# Patient Record
Sex: MALE | Race: WHITE | Employment: OTHER | URBAN - METROPOLITAN AREA
[De-identification: names, ages, dates, MRNs, and addresses within clinical notes are randomized per-mention and may not be internally consistent; named-entity substitution may affect disease eponyms.]

---

## 2017-06-21 PROBLEM — E29.1 TESTICULAR HYPOFUNCTION: Status: ACTIVE | Noted: 2017-06-21

## 2017-06-21 PROBLEM — R39.15 URGENCY OF URINATION: Status: ACTIVE | Noted: 2017-06-21

## 2017-06-21 PROBLEM — N41.9 PROSTATITIS, UNSPECIFIED: Status: ACTIVE | Noted: 2017-06-21

## 2017-06-21 PROBLEM — R97.20 ELEVATED PROSTATE SPECIFIC ANTIGEN (PSA): Status: ACTIVE | Noted: 2017-06-21

## 2017-06-21 PROBLEM — R39.14 FEELING OF INCOMPLETE BLADDER EMPTYING: Status: ACTIVE | Noted: 2017-06-21

## 2017-06-21 PROBLEM — N52.9 MALE ERECTILE DYSFUNCTION, UNSPECIFIED: Status: ACTIVE | Noted: 2017-06-21

## 2017-06-21 PROBLEM — N42.9 UNSPECIFIED DISORDER OF PROSTATE: Status: ACTIVE | Noted: 2017-06-21

## 2017-06-21 PROBLEM — E11.9 DIABETES MELLITUS WITHOUT COMPLICATION (HCC): Status: ACTIVE | Noted: 2017-06-21

## 2017-06-21 PROBLEM — N28.1 ACQUIRED CYST OF KIDNEY: Status: ACTIVE | Noted: 2017-06-21

## 2017-06-21 PROBLEM — Z80.42 FAMILY HISTORY OF MALIGNANT NEOPLASM OF PROSTATE: Status: ACTIVE | Noted: 2017-06-21

## 2017-06-21 PROBLEM — I10 UNSPECIFIED ESSENTIAL HYPERTENSION: Status: ACTIVE | Noted: 2017-06-21

## 2018-10-30 PROBLEM — Z95.810 ICD (IMPLANTABLE CARDIOVERTER-DEFIBRILLATOR), BIVENTRICULAR, IN SITU: Status: ACTIVE | Noted: 2018-10-30

## 2018-12-04 ENCOUNTER — OFFICE VISIT (OUTPATIENT)
Dept: PAIN MANAGEMENT | Age: 73
End: 2018-12-04
Payer: COMMERCIAL

## 2018-12-04 VITALS
DIASTOLIC BLOOD PRESSURE: 70 MMHG | WEIGHT: 140 LBS | TEMPERATURE: 97.7 F | BODY MASS INDEX: 23.32 KG/M2 | RESPIRATION RATE: 18 BRPM | HEART RATE: 72 BPM | SYSTOLIC BLOOD PRESSURE: 140 MMHG | HEIGHT: 65 IN

## 2018-12-04 DIAGNOSIS — S33.5XXD LUMBAR SPRAIN, SUBSEQUENT ENCOUNTER: Primary | ICD-10-CM

## 2018-12-04 DIAGNOSIS — M51.37 DEGENERATION OF LUMBAR OR LUMBOSACRAL INTERVERTEBRAL DISC: ICD-10-CM

## 2018-12-04 DIAGNOSIS — F34.1 DYSTHYMIC DISORDER: ICD-10-CM

## 2018-12-04 DIAGNOSIS — M51.26 DISPLACEMENT OF LUMBAR INTERVERTEBRAL DISC WITHOUT MYELOPATHY: ICD-10-CM

## 2018-12-04 DIAGNOSIS — S33.9XXA CHRONIC OR OLD SPRAIN OF LUMBOSACRAL LIGAMENT: ICD-10-CM

## 2018-12-04 PROBLEM — M51.379 DEGENERATION OF LUMBAR OR LUMBOSACRAL INTERVERTEBRAL DISC: Status: ACTIVE | Noted: 2018-12-04

## 2018-12-04 PROBLEM — S33.5XXA LUMBAR SPRAIN: Status: ACTIVE | Noted: 2018-12-04

## 2018-12-04 PROCEDURE — 99204 OFFICE O/P NEW MOD 45 MIN: CPT | Performed by: PAIN MEDICINE

## 2018-12-04 PROCEDURE — 99214 OFFICE O/P EST MOD 30 MIN: CPT | Performed by: PAIN MEDICINE

## 2018-12-04 RX ORDER — OXYCODONE HYDROCHLORIDE AND ACETAMINOPHEN 5; 325 MG/1; MG/1
1 TABLET ORAL 2 TIMES DAILY PRN
Qty: 60 TABLET | Refills: 0 | Status: SHIPPED | OUTPATIENT
Start: 2018-12-04 | End: 2018-12-05 | Stop reason: SDUPTHER

## 2018-12-04 NOTE — PROGRESS NOTES
[address]        [address] [facility], [address] [facility]      [phone]          Consult Note      Patient:  BARBARA Banegas 1945    Date of Service:  18    Requesting Physician:  Birdie Carrel, MD    Reason for Consult:      Patient presents with complaints of bilateral back pain that started many years ago    HISTORY OF PRESENT ILLNESS:      Pain is constant and is described as aching and dull. Pain does not radiate to both lower extremities. He  has numbness, tingling of the both lower extremities and has bladder dysfunction (intermittent incontinence) but no bowel dysfunction. Alleviating factors include: rest.  Aggravating factors include:  walking, standing, bending, lifting. He has been on anticoagulation medications to include ASA, NSAIDS (diclofenac and ASA) and has not been on herbal supplements. He is not diabetic. Imaging:   CT myelogram dated 2016 demonstrates complete block at L3/4 level. Degenerative disc disease, spondylosis causing stenosis. Probable large extruded disc at L3-4. Spinal listhesis L4 and L5 exacerbating the stenosis  EMG dated 3/24/2016    L5 radiculopathy  CT dated 2016 lumbar spine demonstrates degenerative spinal stenosis that is severe at L3-4 L4-5 and L5-S1 levels    Previous treatments: Physical Therapy, Epidural Steroid Injection, Surgery (lumbar fusion, right TKR) and medications.       Past Medical History:   Diagnosis Date    Abnormal EKG     CAD (coronary artery disease)     Cardiac defibrillator in place     CHF (congestive heart failure) (HCC)     Depression     Diabetes mellitus (HCC)     Disease of pericardium     DJD (degenerative joint disease)     Erectile dysfunction     Fracture     ulna, hand, tibia, and ankle    GERD (gastroesophageal reflux disease)     History of echocardiogram     ,,,,,10/06,,,,    Hyperlipidemia  Hypertension     Ischemic cardiomyopathy     Ischemic heart disease     LBBB (left bundle branch block)     w/ wide Qrs    Mitral regurgitation     Nonrheumatic aortic valve disorder     Presence of coronary angioplasty implant and graft     Sleep apnea     Ventricular tachycardia [facility])        Past Surgical History:   Procedure Laterality Date    ANKLE SURGERY      CARDIAC DEFIBRILLATOR PLACEMENT  11/2009    Bi-vent    CARDIAC PACEMAKER PLACEMENT      CORONARY ANGIOPLASTY  09/2014    DIAGNOSTIC CARDIAC CATH LAB PROCEDURE Left 04/2006    DIAGNOSTIC CARDIAC CATH LAB PROCEDURE Left 09/2014    LEXY-RCA    ELBOW SURGERY      FRACTURE SURGERY      foot, finger, left ankle, left leg,and right arm    JOINT REPLACEMENT Right 09/18/2018    LEG SURGERY      LUMBAR FUSION  05/29/2018    OTHER SURGICAL HISTORY  04/2006    cardiac tamponade evacuation     PACEMAKER INSERTION      TRANSESOPHAGEAL ECHOCARDIOGRAM      10/07       Prior to Admission medications    Medication Sig Start Date End Date Taking? Authorizing Provider   tamsulosin (FLOMAX) 0.4 MG capsule Take 1 capsule by mouth daily 12/3/18  Yes BARI Menon CNP   oxyCODONE-acetaminophen (PERCOCET) 5-325 MG per tablet Take 1 tablet by mouth every 6 hours as needed.  . 10/10/18  Yes Historical Provider, MD   carvedilol (COREG) 3.125 MG tablet Take 1 tablet by mouth 2 times daily (with meals) 11/9/18  Yes BARI Owen CNP   lisinopril (PRINIVIL;ZESTRIL) 20 MG tablet Take 1 tablet by mouth 2 times daily 11/14/17  Yes BARI Peck CNP   ezetimibe (ZETIA) 10 MG tablet Take 1 tablet by mouth daily 8/25/17  Yes BARI Nicholson CNP   lactulose (CEPHULAC) 10 G packet Take 10 g by mouth as needed   Yes Historical Provider, MD   diclofenac sodium 1 % GEL Apply 2 g topically as needed for Pain   Yes Historical Provider, MD   hydrALAZINE (APRESOLINE) 50 MG tablet Take 1 tablet by mouth daily 1/3/17  Yes Tha Lopez MD every 3 days   Yes Historical Provider, MD   diazepam (VALIUM) 5 MG tablet Take 5 mg by mouth as needed for Anxiety   Yes Historical Provider, MD   oxybutynin (DITROPAN-XL) 10 MG extended release tablet Take 1 tablet by mouth daily  Patient taking differently: Take 10 mg by mouth as needed  7/13/17 11/9/18  Gabriella Lewis MD   Testosterone 200 MG PLLT by Implant route    Historical Provider, MD   triazolam (HALCION) 0.25 MG tablet Take 0.25 mg by mouth nightly as needed    Historical Provider, MD       Allergies   Allergen Reactions    Cefaclor Anaphylaxis    Pentazocine-Acetaminophen Hives and Rash     Rapid heart beats.  Bee Venom Other (See Comments)    Ciprofloxacin     Statins Other (See Comments)     Myalgia      Sulfamethoxazole-Trimethoprim Itching    Toprol Xl [Metoprolol] Other (See Comments)     Fatigue         Social History     Social History    Marital status:      Spouse name: N/A    Number of children: N/A    Years of education: N/A     Occupational History    Not on file. Social History Main Topics    Smoking status: Former Smoker     Quit date: 1980    Smokeless tobacco: Never Used    Alcohol use Yes      Comment: a beer a day    Drug use: No      Comment: coffee    Sexual activity: Not on file     Other Topics Concern    Not on file     Social History Narrative    No narrative on file       Family History   Problem Relation Age of Onset    Heart Disease Mother     No Known Problems Father     Prostate Cancer Brother         2011    Cancer Other     Diabetes Other     High Blood Pressure Other     Stroke Other     Tuberculosis Other        REVIEW OF SYSTEMS:     Patient specifically denies fever/chills, chest pain, shortness of breath, new bowel or bladder complaints. All other review of systems was negative.     PHYSICAL EXAMINATION:      BP (!) 140/70   Pulse 72   Temp 97.7 °F (36.5 °C)   Resp 18   Ht 5' 5\" (1.651 m)   Wt 140 lb (63.5 kg)   BMI 23.30 kg/m²     General:      General appearance:pleasant and well-hydrated, in no distress and A & O x3  Build:Normal Weight  Function:Rises from a seated position with difficulty    HEENT:    Head:normocephalic, atraumatic  Pupils:regular, round, equal  Sclera: icterus absent    Lungs:    Breathing:normal breathing pattern    Abdomen:    Shape:non-distended and normal  Tenderness:none  Guarding:none    Cervical spine:    Inspection:normal  Palpation:tenderness paravertebral muscles, tenderness trapezium, left, right and positive. Range of motion:abnormal mildly flexion, extension rotation bilateral and is  painful. Thoracic spine:    Spine inspection:normal   pationPal:non-tender midline and paraspinals, left and right. Range of motion:normal in flexion, extension rotation bilateral and is not painful. Lumbar spine:    Spine inspection:normal   CVA tenderness:No   Palpation:tenderness paravertebral muscles, left, right and positive. Range of motion:abnormal mildly Lateral bending, flexion, extension rotation bilateral and is  painful. Musculoskeletal:    Trigger points in trapezius:absent bilaterally  Trigger points in rhomboids:absent bilaterally  Trigger points in Paraveteral:absent bilaterally  Trigger points in supraspinatus/infraspinatus:absent  Spurling's:negative right, negative left    Still's:negative right, negative left   SI joint tenderness:negative right, negative left              LAKSHMI test:not done right, not done             left  Piriformis tenderness:negative right, negative left  Trochanteric bursa tenderness:negative right, negative left  SLR:negative right, negative left, sitting     Extremities:    Tremors:None bilaterally upper and lower  Range of motion:decreased ROM rt shoulder in abudction, pain with internal rotation of hips negative.   Intact:Yes  Varicose veins:absent   Pulses:present Lt radial  Cyanosis:none  Edema:none x all 4 extremities    Knee: Inspection:asymmetric, swelling none bilaterally, right s/p TKR  Tenderness of Bony Landmarks:negative, bilateral  Tenderness of Bursa:none, bilateral  Drawer Test:negative  Effusion:absent bilaterally  Crepitus:absent bilaterally  ROM:Left full  Right full     Neurological:    Sensory:normal to light touch BLE    Motor:   Right Grip5/5              Left Grip5/5               Right Bicep5/5           Left Bicep5/5              Right Triceps5/5       Left Triceps5/5          Right Deltoid5/5     Left Deltoid5/5                  Right Quadriceps5/5          Left Quadriceps5/5           Right Gastrocnemius5/5    Left Gastrocnemius5/5  Right Ant Tibialis5/5  Left Ant Tibialis5/5    Reflexes:    Right Brachioradialis reflex2+  Left Brachioradialis reflex2+  Right Biceps reflex2+  Left Biceps reflex2+  Right Triceps reflex2+  Left Triceps reflex2+  Right Quadriceps reflex2+  Left Quadriceps reflex2+  Right Achilles reflex2+  Left Achilles reflex2+  Gait:antalgic    Dermatology:    Skin:no rashes or lesions noted    Assessment/Plan:  LBP, s/p fusion surgery 5/2018 with Dr. Connor Marks for severe stenosis  Rt shoulder pain, is candidate for RTSA per Dr. Román Wyatt knee pain, s/p 9/2018 TKR    Reviewed referral documents and imaging  Urine screen today  OARRS report reviewed  Percocet 5/325 BID #60 - discussed limited dosing  Patient encouraged to stay active  Treatment plan discussed with the patient including medication and procedure side effects     CC:  Referring physician    MYNOR Mcghee.

## 2018-12-05 DIAGNOSIS — S33.5XXD LUMBAR SPRAIN, SUBSEQUENT ENCOUNTER: ICD-10-CM

## 2018-12-05 DIAGNOSIS — M51.26 DISPLACEMENT OF LUMBAR INTERVERTEBRAL DISC WITHOUT MYELOPATHY: ICD-10-CM

## 2018-12-05 DIAGNOSIS — S33.9XXA CHRONIC OR OLD SPRAIN OF LUMBOSACRAL LIGAMENT: ICD-10-CM

## 2018-12-05 DIAGNOSIS — M51.37 DEGENERATION OF LUMBAR OR LUMBOSACRAL INTERVERTEBRAL DISC: ICD-10-CM

## 2018-12-05 RX ORDER — OXYCODONE HYDROCHLORIDE AND ACETAMINOPHEN 5; 325 MG/1; MG/1
1 TABLET ORAL 2 TIMES DAILY PRN
Qty: 60 TABLET | Refills: 0 | Status: SHIPPED | OUTPATIENT
Start: 2018-12-05 | End: 2019-01-02 | Stop reason: ALTCHOICE

## 2019-01-02 ENCOUNTER — OFFICE VISIT (OUTPATIENT)
Dept: PAIN MANAGEMENT | Age: 74
End: 2019-01-02
Payer: COMMERCIAL

## 2019-01-02 VITALS
TEMPERATURE: 98 F | SYSTOLIC BLOOD PRESSURE: 140 MMHG | DIASTOLIC BLOOD PRESSURE: 64 MMHG | RESPIRATION RATE: 18 BRPM | HEART RATE: 72 BPM

## 2019-01-02 DIAGNOSIS — S33.5XXD LUMBAR SPRAIN, SUBSEQUENT ENCOUNTER: ICD-10-CM

## 2019-01-02 DIAGNOSIS — M51.26 DISPLACEMENT OF LUMBAR INTERVERTEBRAL DISC WITHOUT MYELOPATHY: Primary | ICD-10-CM

## 2019-01-02 DIAGNOSIS — S33.9XXA CHRONIC OR OLD SPRAIN OF LUMBOSACRAL LIGAMENT: ICD-10-CM

## 2019-01-02 DIAGNOSIS — M51.37 DEGENERATION OF LUMBAR OR LUMBOSACRAL INTERVERTEBRAL DISC: ICD-10-CM

## 2019-01-02 PROCEDURE — 99214 OFFICE O/P EST MOD 30 MIN: CPT | Performed by: PAIN MEDICINE

## 2019-01-02 RX ORDER — OXYCODONE AND ACETAMINOPHEN 7.5; 325 MG/1; MG/1
1 TABLET ORAL 3 TIMES DAILY
Qty: 90 TABLET | Refills: 0 | Status: SHIPPED | OUTPATIENT
Start: 2019-01-04 | End: 2019-01-30 | Stop reason: ALTCHOICE

## 2019-01-02 RX ORDER — PREGABALIN 25 MG/1
CAPSULE ORAL
Qty: 69 CAPSULE | Refills: 0 | Status: SHIPPED | OUTPATIENT
Start: 2019-01-02 | End: 2019-01-30 | Stop reason: ALTCHOICE

## 2019-01-09 ENCOUNTER — TELEPHONE (OUTPATIENT)
Dept: PAIN MANAGEMENT | Age: 74
End: 2019-01-09

## 2019-01-12 PROBLEM — N30.00 ACUTE CYSTITIS WITHOUT HEMATURIA: Status: ACTIVE | Noted: 2019-01-12

## 2019-01-30 ENCOUNTER — OFFICE VISIT (OUTPATIENT)
Dept: PAIN MANAGEMENT | Age: 74
End: 2019-01-30
Payer: COMMERCIAL

## 2019-01-30 VITALS
OXYGEN SATURATION: 92 % | WEIGHT: 140 LBS | DIASTOLIC BLOOD PRESSURE: 76 MMHG | BODY MASS INDEX: 23.32 KG/M2 | HEART RATE: 76 BPM | SYSTOLIC BLOOD PRESSURE: 138 MMHG | RESPIRATION RATE: 18 BRPM | HEIGHT: 65 IN | TEMPERATURE: 98 F

## 2019-01-30 DIAGNOSIS — M51.37 DEGENERATION OF LUMBAR OR LUMBOSACRAL INTERVERTEBRAL DISC: Primary | ICD-10-CM

## 2019-01-30 DIAGNOSIS — M51.26 DISPLACEMENT OF LUMBAR INTERVERTEBRAL DISC WITHOUT MYELOPATHY: ICD-10-CM

## 2019-01-30 DIAGNOSIS — S33.5XXD LUMBAR SPRAIN, SUBSEQUENT ENCOUNTER: ICD-10-CM

## 2019-01-30 DIAGNOSIS — G89.4 CHRONIC PAIN SYNDROME: ICD-10-CM

## 2019-01-30 DIAGNOSIS — S33.9XXA CHRONIC OR OLD SPRAIN OF LUMBOSACRAL LIGAMENT: ICD-10-CM

## 2019-01-30 PROCEDURE — 99213 OFFICE O/P EST LOW 20 MIN: CPT | Performed by: PAIN MEDICINE

## 2019-01-30 PROCEDURE — 99213 OFFICE O/P EST LOW 20 MIN: CPT

## 2019-01-30 RX ORDER — LACTULOSE 10 G/15ML
10 SOLUTION ORAL EVERY EVENING
Qty: 1 BOTTLE | Refills: 0 | Status: SHIPPED | OUTPATIENT
Start: 2019-01-30 | End: 2019-02-26 | Stop reason: SDUPTHER

## 2019-01-30 RX ORDER — OXYCODONE HYDROCHLORIDE AND ACETAMINOPHEN 5; 325 MG/1; MG/1
1 TABLET ORAL EVERY 6 HOURS PRN
Qty: 120 TABLET | Refills: 0 | Status: SHIPPED | OUTPATIENT
Start: 2019-02-04 | End: 2019-02-26 | Stop reason: SDUPTHER

## 2019-01-30 RX ORDER — GABAPENTIN 100 MG/1
CAPSULE ORAL
Qty: 69 CAPSULE | Refills: 0 | Status: SHIPPED | OUTPATIENT
Start: 2019-01-30 | End: 2019-02-26 | Stop reason: SINTOL

## 2019-02-26 ENCOUNTER — OFFICE VISIT (OUTPATIENT)
Dept: PAIN MANAGEMENT | Age: 74
End: 2019-02-26
Payer: COMMERCIAL

## 2019-02-26 VITALS
HEART RATE: 84 BPM | TEMPERATURE: 98.2 F | HEIGHT: 64 IN | BODY MASS INDEX: 23.56 KG/M2 | WEIGHT: 138 LBS | SYSTOLIC BLOOD PRESSURE: 120 MMHG | OXYGEN SATURATION: 97 % | DIASTOLIC BLOOD PRESSURE: 78 MMHG | RESPIRATION RATE: 16 BRPM

## 2019-02-26 DIAGNOSIS — S33.5XXD LUMBAR SPRAIN, SUBSEQUENT ENCOUNTER: ICD-10-CM

## 2019-02-26 DIAGNOSIS — M51.37 DEGENERATION OF LUMBAR OR LUMBOSACRAL INTERVERTEBRAL DISC: Primary | ICD-10-CM

## 2019-02-26 DIAGNOSIS — M51.26 DISPLACEMENT OF LUMBAR INTERVERTEBRAL DISC WITHOUT MYELOPATHY: ICD-10-CM

## 2019-02-26 DIAGNOSIS — F34.1 DYSTHYMIC DISORDER: ICD-10-CM

## 2019-02-26 PROCEDURE — 99214 OFFICE O/P EST MOD 30 MIN: CPT | Performed by: PAIN MEDICINE

## 2019-02-26 PROCEDURE — 99213 OFFICE O/P EST LOW 20 MIN: CPT | Performed by: PAIN MEDICINE

## 2019-02-26 RX ORDER — LACTULOSE 10 G/15ML
10 SOLUTION ORAL EVERY EVENING
Qty: 1 BOTTLE | Refills: 0 | Status: SHIPPED | OUTPATIENT
Start: 2019-02-26 | End: 2019-05-01 | Stop reason: SDUPTHER

## 2019-02-26 RX ORDER — OXYCODONE HYDROCHLORIDE AND ACETAMINOPHEN 5; 325 MG/1; MG/1
1 TABLET ORAL EVERY 6 HOURS PRN
Qty: 120 TABLET | Refills: 0 | Status: SHIPPED | OUTPATIENT
Start: 2019-03-06 | End: 2019-04-03 | Stop reason: SDUPTHER

## 2019-03-29 NOTE — PROGRESS NOTES
[facility]  [address] [facility], [address] [place] [address] [place]  [phone]    Follow up Note      Cliff Lewis     Date of Visit:  4/3/2019    CC:  Patient presents for follow up   Chief Complaint   Patient presents with    Pain     back pain and right knee neck        HPI:    Pain is unchanged. Change in quality of symptoms:no    Medication side effects:Lyrica caused itching and difficulty breathing. Recent diagnostic testing:none. Recent interventional procedures:none. He has been on anticoagulation medications to include ASA, NSAIDS (diclofenac and ASA) and has not been on herbal supplements. He is not diabetic.     Imagin/2018 lumbar xray - fusion is solid  CT myelogram dated 2016 demonstrates complete block at L3/4 level. Degenerative disc disease, spondylosis causing stenosis. Probable large extruded disc at L3-4.  Spinal listhesis L4 and L5 exacerbating the stenosis  EMG dated 3/24/2016     L5 radiculopathy  CT dated 2016 lumbar spine demonstrates degenerative spinal stenosis that is severe at L3-4 L4-5 and L5-S1 levels       Potential Aberrant Drug-Related Behavior: no     Urine Drug Screenin2018 buccal consistent  2019 buccal consistent    OARRS report:  2018 consistent  2019 consistent  2019 consistent  2019 consistent    Past Medical History:   Diagnosis Date    Abnormal EKG     CAD (coronary artery disease)     Cardiac defibrillator in place     [facility]) [facility])     Depression     Diabetes mellitus ([facility] [address]     Disease of pericardium     DJD (degenerative joint disease)     Erectile dysfunction     Fracture     ulna, hand, tibia, and ankle    GERD (gastroesophageal reflux disease)     History of echocardiogram     ,,,,,10/06,,,,    Hyperlipidemia     Hypertension     Ischemic cardiomyopathy     Ischemic heart disease     LBBB (left bundle branch block) w/ wide Qrs    Mitral regurgitation     Nonrheumatic aortic valve disorder     Presence of coronary angioplasty implant and graft     Sleep apnea     Ventricular tachycardia [facility])        Past Surgical History:   Procedure Laterality Date    ANKLE SURGERY      CARDIAC DEFIBRILLATOR PLACEMENT  11/2009    Bi-vent    CARDIAC PACEMAKER PLACEMENT      CORONARY ANGIOPLASTY  09/2014    DIAGNOSTIC CARDIAC CATH LAB PROCEDURE Left 04/2006    DIAGNOSTIC CARDIAC CATH LAB PROCEDURE Left 09/2014    LEXY-RCA    ELBOW SURGERY      FRACTURE SURGERY      foot, finger, left ankle, left leg,and right arm    JOINT REPLACEMENT Right 09/18/2018    LEG SURGERY      LUMBAR FUSION  05/29/2018    OTHER SURGICAL HISTORY  04/2006    cardiac tamponade evacuation     PACEMAKER INSERTION      TRANSESOPHAGEAL ECHOCARDIOGRAM      10/07       Prior to Admission medications    Medication Sig Start Date End Date Taking? Authorizing Provider   oxyCODONE-acetaminophen (PERCOCET) 5-325 MG per tablet Take 1 tablet by mouth every 6 hours as needed for Pain for up to 30 days. . 3/6/19 4/5/19 Yes Xander Lara DO   lactulose [facility]) 10 GM/15ML solution Take 15 mLs by mouth every evening 2/26/19  Yes Xander Lara DO   tamsulosin [facility]) 0.4 MG capsule Take 1 capsule by mouth daily 12/3/18  Yes BARI Cummings CNP   carvedilol (COREG) 3.125 MG tablet Take 1 tablet by mouth 2 times daily (with meals) 11/9/18  Yes BARI Low CNP   lisinopril (PRINIVIL;ZESTRIL) 20 MG tablet Take 1 tablet by mouth 2 times daily 11/14/17  Yes BARI Robles CNP   ezetimibe (ZETIA) 10 MG tablet Take 1 tablet by mouth daily 8/25/17  Yes BARI Kelly CNP   Testosterone 200 MG PLLT by Implant route   Yes Historical Provider, MD   hydrALAZINE (APRESOLINE) 50 MG tablet Take 1 tablet by mouth daily 1/3/17  Yes Ember Olsen MD   albuterol sulfate  (90 BASE) MCG/ACT inhaler Inhale 2 puffs into the lungs every 6 hours as needed for Wheezing   Yes Historical Provider, MD   aspirin 81 MG tablet Take 81 mg by mouth daily   Yes Historical Provider, MD   baclofen (LIORESAL) 10 MG tablet Take 10 mg by mouth as needed    Yes Historical Provider, MD   betamethasone dipropionate (DIPROLENE) 0.05 % ointment Apply topically 2 times daily Apply topically 2 times daily. Yes Historical Provider, MD   tadalafil (CIALIS) 20 MG tablet Take 20 mg by mouth as needed for Erectile Dysfunction   Yes Historical Provider, MD   venlafaxine (EFFEXOR XR) 150 MG extended release capsule Take 150 mg by mouth daily   Yes Historical Provider, MD   EPINEPHrine (EPIPEN IJ) Inject as directed Indications: as directed   Yes Historical Provider, MD   ferrous sulfate 325 (65 FE) MG tablet Take 325 mg by mouth as needed    Yes Historical Provider, MD   fluticasone (FLONASE ALLERGY RELIEF) 50 MCG/ACT nasal spray 1 spray by Nasal route daily   Yes Historical Provider, MD   furosemide (LASIX) 40 MG tablet Take 40 mg by mouth daily Prn   Yes Historical Provider, MD   lidocaine (XYLOCAINE) 5 % ointment Apply topically as needed for Pain Apply topically as needed. Yes Historical Provider, MD   magnesium oxide (MAG-OX) 400 MG tablet Take 400 mg by mouth daily   Yes Historical Provider, MD   pantoprazole (PROTONIX) 40 MG tablet Take 40 mg by mouth daily   Yes Historical Provider, MD   Testosterone Cypionate 200 MG/ML KIT Inject into the muscle Indications: as directed   Yes Historical Provider, MD   traZODone (DESYREL) 50 MG tablet Take 50 mg by mouth nightly   Yes Historical Provider, MD   triamcinolone (KENALOG) 0.1 % cream Apply topically 2 times daily Apply topically 2 times daily.    Yes Historical Provider, MD   Febuxostat (ULORIC) 80 MG TABS Take by mouth Once every 3 days   Yes Historical Provider, MD   diazepam (VALIUM) 5 MG tablet Take 5 mg by mouth as needed for Anxiety   Yes Historical Provider, MD   diclofenac sodium (VOLTAREN) 1 % GEL Apply 4 g topically 4 times daily as needed for Pain 1/30/19 3/1/19  Nadine Plain, DO   oxybutynin (DITROPAN-XL) 10 MG extended release tablet Take 1 tablet by mouth daily  Patient taking differently: Take 10 mg by mouth as needed  17  Michael Macias MD       Allergies   Allergen Reactions    Cefaclor Anaphylaxis    Lyrica [Pregabalin] Shortness Of Breath     Itching     Pentazocine-Acetaminophen Hives and Rash     Rapid heart beats.      Bee Venom Other (See Comments)    Ciprofloxacin     Statins Other (See Comments)     Myalgia      Sulfamethoxazole-Trimethoprim Itching    Toprol Xl [Metoprolol] Other (See Comments)     Fatigue         Social History     Socioeconomic History    Marital status:      Spouse name: Not on file    Number of children: Not on file    Years of education: Not on file    Highest education level: Not on file   Occupational History    Not on file   Social Needs    Financial resource strain: Not on file    Food insecurity:     Worry: Not on file     Inability: Not on file    Transportation needs:     Medical: Not on file     Non-medical: Not on file   Tobacco Use    Smoking status: Former Smoker     Last attempt to quit: 1980     Years since quittin.2    Smokeless tobacco: Never Used   Substance and Sexual Activity    Alcohol use: Yes     Comment: a beer a day    Drug use: No     Comment: coffee    Sexual activity: Not on file   Lifestyle    Physical activity:     Days per week: Not on file     Minutes per session: Not on file    Stress: Not on file   Relationships    Social connections:     Talks on phone: Not on file     Gets together: Not on file     Attends [employer] service: Not on file     Active member of club or organization: Not on file     Attends meetings of clubs or organizations: Not on file     Relationship status: Not on file    Intimate partner violence:     Fear of current or ex partner: Not on file     Emotionally abused: Not on file Physically abused: Not on file     Forced sexual activity: Not on file   Other Topics Concern    Not on file   Social History Narrative    Not on file       Family History   Problem Relation Age of Onset    Heart Disease Mother     No Known Problems Father     Prostate Cancer Brother         2011    Cancer Other     Diabetes Other     High Blood Pressure Other     Stroke Other     Tuberculosis Other        REVIEW OF SYSTEMS:     Gilles Gip denies fever/chills, chest pain, shortness of breath, new bowel or bladder complaints. All other review of systems was negative. PHYSICAL EXAMINATION:      /78   Pulse 78   Temp 98.4 °F (36.9 °C)   Resp 18   Ht 5' 4\" (1.626 m)   Wt 140 lb (63.5 kg)   SpO2 96%   BMI 24.03 kg/m²     General:       General appearance:pleasant and well-hydrated, in no distress and A & O x3  Build:Normal Weight  Function:Rises from a seated position with difficulty, mild     HEENT:     Head:normocephalic, atraumatic  Pupils:regular, round, equal  Sclera: icterus absent     Lungs:     Breathing:normal breathing pattern     Abdomen:     Shape:non-distended and normal  Tenderness:none  Guarding:none      Lumbar spine:     Spine inspection:surgical scar   CVA tenderness:No   Palpation:tenderness paravertebral muscles, left, right and positive.   Range of motion:abnormal moderately in lateral bending, flexion, extension rotation bilateral and is painful.     Musculoskeletal:     Trigger points in Paraveteral:absent bilaterally  SI joint tenderness:negative right, negative left              LAKSHMI test:not done right, not done             left  Piriformis tenderness:negative right, negative left  Trochanteric bursa tenderness:negative right, negative left  SLR:negative right, negative left, sitting      Extremities:     Tremors:None bilaterally upper and lower  Range of motion:decreased ROM rt shoulder in abudction  Intact:Yes  Varicose veins:absent   Pulses:present Lt radial  Cyanosis:none  Edema:none x all 4 extremities     Knee:     Inspection:asymmetric, swelling none bilaterally, right s/p TKR  Tenderness of Bony Landmarks:negative, bilateral  Tenderness of Bursa:none, bilateral     Neurological:     Sensory:normal to light touch BLE     Motor:                 Right Quadriceps5/5          Left Quadriceps5/5           Right Gastrocnemius5/5    Left Gastrocnemius5/5  Right Ant Tibialis5/5  Left Ant Tibialis5/5    Gait:antalgic, with a cane     Dermatology:     Skin:no rashes or lesions noted     Assessment/Plan:  LBP, s/p L3-5 laminectomy and fusion surgery 5/2018 with Dr. Kayla Lange for severe stenosis, xray shows solid fusion 12/2018  Rt shoulder pain, is candidate for RTSA per Dr. Nadege Robertson knee pain improving, s/p 9/2018 TKR  + SHELLEY on CPAP, + ICD/pacer  Pt states most significant pain today is LBP and burning in the tops of the feet    Exam unchanged today    OARRS reviewed  Continue percocet 5/325 QID - discussed limited dosing  Failed gabapentin 100 mg titration due to weakness, Lyrica due to itching and difficulty breathing  Continue diclofenac gel  continue lactulose for OIC  PT - encouraged to continue  Caudal [id number] - r/b discussed  Patient encouraged to stay active  Treatment plan discussed with the patient including medication and procedure side effects     Cc:  Referring physician    MYNOR Ramsey.

## 2019-04-03 ENCOUNTER — PREP FOR PROCEDURE (OUTPATIENT)
Dept: PAIN MANAGEMENT | Age: 74
End: 2019-04-03

## 2019-04-03 ENCOUNTER — OFFICE VISIT (OUTPATIENT)
Dept: PAIN MANAGEMENT | Age: 74
End: 2019-04-03
Payer: COMMERCIAL

## 2019-04-03 VITALS
SYSTOLIC BLOOD PRESSURE: 132 MMHG | BODY MASS INDEX: 23.9 KG/M2 | DIASTOLIC BLOOD PRESSURE: 78 MMHG | HEART RATE: 78 BPM | TEMPERATURE: 98.4 F | RESPIRATION RATE: 18 BRPM | WEIGHT: 140 LBS | OXYGEN SATURATION: 96 % | HEIGHT: 64 IN

## 2019-04-03 DIAGNOSIS — M51.37 DEGENERATION OF LUMBAR OR LUMBOSACRAL INTERVERTEBRAL DISC: Primary | ICD-10-CM

## 2019-04-03 DIAGNOSIS — M51.26 DISPLACEMENT OF LUMBAR INTERVERTEBRAL DISC WITHOUT MYELOPATHY: ICD-10-CM

## 2019-04-03 DIAGNOSIS — S33.5XXD LUMBAR SPRAIN, SUBSEQUENT ENCOUNTER: ICD-10-CM

## 2019-04-03 PROCEDURE — 99213 OFFICE O/P EST LOW 20 MIN: CPT | Performed by: PAIN MEDICINE

## 2019-04-03 PROCEDURE — 1036F TOBACCO NON-USER: CPT | Performed by: PAIN MEDICINE

## 2019-04-03 PROCEDURE — G8420 CALC BMI NORM PARAMETERS: HCPCS | Performed by: PAIN MEDICINE

## 2019-04-03 PROCEDURE — 1123F ACP DISCUSS/DSCN MKR DOCD: CPT | Performed by: PAIN MEDICINE

## 2019-04-03 PROCEDURE — 4040F PNEUMOC VAC/ADMIN/RCVD: CPT | Performed by: PAIN MEDICINE

## 2019-04-03 PROCEDURE — G8427 DOCREV CUR MEDS BY ELIG CLIN: HCPCS | Performed by: PAIN MEDICINE

## 2019-04-03 PROCEDURE — 3017F COLORECTAL CA SCREEN DOC REV: CPT | Performed by: PAIN MEDICINE

## 2019-04-03 RX ORDER — OXYCODONE HYDROCHLORIDE AND ACETAMINOPHEN 5; 325 MG/1; MG/1
1 TABLET ORAL EVERY 6 HOURS PRN
Qty: 120 TABLET | Refills: 0 | Status: SHIPPED | OUTPATIENT
Start: 2019-04-05 | End: 2019-05-01 | Stop reason: SDUPTHER

## 2019-04-03 NOTE — PROGRESS NOTES
Casey Fields presents to the [facility] on 4/3/2019. Tate Wong is complaining of pain in the back neck knee . The pain is constant. The pain is described as aching, throbbing, shooting, stabbing and depends on what he is doing . Pain is rated on his best day at a 6, on his worst day at a 6, and on average at a 6 on the VAS scale. He took his last dose of Percocet today . Any procedures since your last visit: No    Pacemaker or defibrilator: Yes managed by [facility]   [phone] Dr Gil Sepulveda     He has been on anticoagulation medications to include ASA and is managed by Fany Boone.       BP (!) 158/84   Pulse 78   Temp 98.4 °F (36.9 °C)   Resp 18   Ht 5' 4\" (1.626 m)   Wt 140 lb (63.5 kg)   SpO2 96%   BMI 24.03 kg/m²

## 2019-04-17 ENCOUNTER — TELEPHONE (OUTPATIENT)
Dept: PAIN MANAGEMENT | Age: 74
End: 2019-04-17

## 2019-05-01 ENCOUNTER — OFFICE VISIT (OUTPATIENT)
Dept: PAIN MANAGEMENT | Age: 74
End: 2019-05-01
Payer: COMMERCIAL

## 2019-05-01 VITALS
WEIGHT: 138 LBS | RESPIRATION RATE: 16 BRPM | SYSTOLIC BLOOD PRESSURE: 122 MMHG | OXYGEN SATURATION: 98 % | HEART RATE: 78 BPM | HEIGHT: 65 IN | DIASTOLIC BLOOD PRESSURE: 64 MMHG | BODY MASS INDEX: 22.99 KG/M2 | TEMPERATURE: 98.6 F

## 2019-05-01 DIAGNOSIS — M51.37 DEGENERATION OF LUMBAR OR LUMBOSACRAL INTERVERTEBRAL DISC: Primary | ICD-10-CM

## 2019-05-01 DIAGNOSIS — S33.9XXA CHRONIC OR OLD SPRAIN OF LUMBOSACRAL LIGAMENT: ICD-10-CM

## 2019-05-01 DIAGNOSIS — M51.26 DISPLACEMENT OF LUMBAR INTERVERTEBRAL DISC WITHOUT MYELOPATHY: ICD-10-CM

## 2019-05-01 DIAGNOSIS — S33.5XXD LUMBAR SPRAIN, SUBSEQUENT ENCOUNTER: ICD-10-CM

## 2019-05-01 PROCEDURE — 1036F TOBACCO NON-USER: CPT | Performed by: PAIN MEDICINE

## 2019-05-01 PROCEDURE — 3017F COLORECTAL CA SCREEN DOC REV: CPT | Performed by: PAIN MEDICINE

## 2019-05-01 PROCEDURE — 4040F PNEUMOC VAC/ADMIN/RCVD: CPT | Performed by: PAIN MEDICINE

## 2019-05-01 PROCEDURE — 1123F ACP DISCUSS/DSCN MKR DOCD: CPT | Performed by: PAIN MEDICINE

## 2019-05-01 PROCEDURE — G8420 CALC BMI NORM PARAMETERS: HCPCS | Performed by: PAIN MEDICINE

## 2019-05-01 PROCEDURE — 99213 OFFICE O/P EST LOW 20 MIN: CPT | Performed by: PAIN MEDICINE

## 2019-05-01 PROCEDURE — G8427 DOCREV CUR MEDS BY ELIG CLIN: HCPCS | Performed by: PAIN MEDICINE

## 2019-05-01 RX ORDER — LACTULOSE 10 G/15ML
10 SOLUTION ORAL EVERY EVENING
Qty: 1 BOTTLE | Refills: 0 | Status: SHIPPED | OUTPATIENT
Start: 2019-05-01 | End: 2019-11-25 | Stop reason: SDUPTHER

## 2019-05-01 RX ORDER — OXYCODONE HYDROCHLORIDE AND ACETAMINOPHEN 5; 325 MG/1; MG/1
1 TABLET ORAL EVERY 6 HOURS PRN
Qty: 120 TABLET | Refills: 0 | Status: SHIPPED | OUTPATIENT
Start: 2019-05-05 | End: 2019-05-30 | Stop reason: SDUPTHER

## 2019-05-01 RX ORDER — LISINOPRIL 20 MG/1
20 TABLET ORAL
COMMUNITY
Start: 2019-04-25 | End: 2019-05-01

## 2019-05-01 RX ORDER — VENLAFAXINE HYDROCHLORIDE 37.5 MG/1
CAPSULE, EXTENDED RELEASE ORAL
COMMUNITY
Start: 2019-04-30 | End: 2019-05-01

## 2019-05-01 NOTE — PROGRESS NOTES
[facility]  [address] [facility], [address] [place] [address] Marcial  [phone]    Follow up Note      Emerson Lakisha     Date of Visit:  2019    CC:  Patient presents for follow up   Chief Complaint   Patient presents with    Follow-up     lower back/legs       HPI:    Pain is unchanged. Change in quality of symptoms:yes - is feeling more off-balance recently  Medication side effects:Lyrica caused itching and difficulty breathing. Recent diagnostic testing:none. Recent interventional procedures:none. He has been on anticoagulation medications to include ASA, NSAIDS (diclofenac and ASA) and has not been on herbal supplements. He is not diabetic.     Imagin/2018 lumbar xray - fusion is solid  CT myelogram dated 2016 demonstrates complete block at L3/4 level. Degenerative disc disease, spondylosis causing stenosis. Probable large extruded disc at L3-4.  Spinal listhesis L4 and L5 exacerbating the stenosis  EMG dated 3/24/2016     L5 radiculopathy  CT dated 2016 lumbar spine demonstrates degenerative spinal stenosis that is severe at L3-4 L4-5 and L5-S1 levels       Potential Aberrant Drug-Related Behavior: no     Urine Drug Screenin2018 buccal consistent  2019 buccal consistent    OARRS report:  2018 consistent  2019 consistent  2019 consistent  2019 consistent    Past Medical History:   Diagnosis Date    Abnormal EKG     CAD (coronary artery disease)     Cardiac defibrillator in place     [facility]est[facility]) [facility])     Depression     Diabetes mellitus ([facility] [address]     Disease of pericardium     DJD (degenerative joint disease)     Erectile dysfunction     Fracture     ulna, hand, tibia, and ankle    GERD (gastroesophageal reflux disease)     History of echocardiogram     ,,,,,10/06,,,,    Hyperlipidemia     Hypertension     Ischemic cardiomyopathy     Ischemic heart disease     LBBB (left bundle branch block)     w/ wide Qrs    Mitral regurgitation     Nonrheumatic aortic valve disorder     Presence of coronary angioplasty implant and graft     Sleep apnea     Ventricular tachycardia [facility])        Past Surgical History:   Procedure Laterality Date    ANKLE SURGERY      CARDIAC DEFIBRILLATOR PLACEMENT  11/2009    Bi-vent    CARDIAC PACEMAKER PLACEMENT      CORONARY ANGIOPLASTY  09/2014    DIAGNOSTIC CARDIAC CATH LAB PROCEDURE Left 04/2006    DIAGNOSTIC CARDIAC CATH LAB PROCEDURE Left 09/2014    LEXY-RCA    ELBOW SURGERY      FRACTURE SURGERY      foot, finger, left ankle, left leg,and right arm    JOINT REPLACEMENT Right 09/18/2018    LEG SURGERY      LUMBAR FUSION  05/29/2018    OTHER SURGICAL HISTORY  04/2006    cardiac tamponade evacuation     PACEMAKER INSERTION      TRANSESOPHAGEAL ECHOCARDIOGRAM      10/07       Prior to Admission medications    Medication Sig Start Date End Date Taking? Authorizing Provider   oxybutynin (DITROPAN-XL) 10 MG extended release tablet Take 1 tablet by mouth daily 4/24/19 7/23/19 Yes BARI Guerrero CNP   oxyCODONE-acetaminophen (PERCOCET) 5-325 MG per tablet Take 1 tablet by mouth every 6 hours as needed for Pain for up to 30 days.  4/5/19 5/5/19 Yes Xander Lara DO   lactulose [facility]) 10 GM/15ML solution Take 15 mLs by mouth every evening 2/26/19  Yes Xander Lara DO   tamsulosin [facility]) 0.4 MG capsule Take 1 capsule by mouth daily 12/3/18  Yes BARI Cummings CNP   carvedilol (COREG) 3.125 MG tablet Take 1 tablet by mouth 2 times daily (with meals) 11/9/18  Yes BARI Low CNP   lisinopril (PRINIVIL;ZESTRIL) 20 MG tablet Take 1 tablet by mouth 2 times daily 11/14/17  Yes BARI Robles CNP   ezetimibe (ZETIA) 10 MG tablet Take 1 tablet by mouth daily 8/25/17  Yes BARI Kelly CNP   Testosterone 200 MG PLLT by Implant route   Yes Historical Provider, MD   hydrALAZINE (APRESOLINE) 50 MG tablet on file     Forced sexual activity: Not on file   Other Topics Concern    Not on file   Social History Narrative    Not on file       Family History   Problem Relation Age of Onset    Heart Disease Mother     No Known Problems Father     Prostate Cancer Brother         2011    Cancer Other     Diabetes Other     High Blood Pressure Other     Stroke Other     Tuberculosis Other        REVIEW OF SYSTEMS:     Radha Rashid denies fever/chills, chest pain, shortness of breath, new bowel or bladder complaints. All other review of systems was negative. PHYSICAL EXAMINATION:      /64   Pulse 78   Temp 98.6 °F (37 °C) (Oral)   Resp 16   Ht 5' 5\" (1.651 m)   Wt 138 lb (62.6 kg)   SpO2 98%   BMI 22.96 kg/m²     General:       General appearance:pleasant and well-hydrated, in no distress and A & O x3  Build:Normal Weight  Function:Rises from a seated position with difficulty, mild     HEENT:     Head:normocephalic, atraumatic  Pupils:regular, round, equal  Sclera: icterus absent     Lungs:     Breathing:normal breathing pattern     Abdomen:     Shape:non-distended and normal  Tenderness:none  Guarding:none      Lumbar spine:     Spine inspection:surgical scar   CVA tenderness:No   Palpation:tenderness paravertebral muscles, left, right and positive.   Range of motion:abnormal moderately in lateral bending, flexion, extension rotation bilateral and is painful.     Musculoskeletal:     Trigger points in Paraveteral:absent bilaterally  SI joint tenderness:negative right, negative left              LAKSHMI test:not done right, not done             left  Piriformis tenderness:negative right, negative left  Trochanteric bursa tenderness:negative right, negative left  SLR:negative right, negative left, sitting      Extremities:     Tremors:None bilaterally upper and lower  Range of motion:decreased ROM rt shoulder in abudction  Intact:Yes  Varicose veins:absent   Pulses:present Lt radial  Cyanosis:none  Edema:none x

## 2019-05-06 NOTE — PROGRESS NOTES
[facility]  INSTRUCTIONS    . ..........................................................................................................................................       ? Parking the day of Surgery is located in the [facility]. Upon entering the door, make an immediate right to the surgery reception desk    ? Bring photo ID and insurance card     ? You may have a light breakfast day of procedure    ? Wear loose comfortable clothing    ? Please follow instructions for medications as given per Dr's office     ? Stop blood thinners as per Dr's office instructions    ? You can expect a call the business day prior to procedure to notify you if your arrival time changes    ? Please arrange for     ?   Other instructions

## 2019-05-07 ENCOUNTER — HOSPITAL ENCOUNTER (OUTPATIENT)
Dept: GENERAL RADIOLOGY | Age: 74
Discharge: HOME | End: 2019-05-09
Attending: PAIN MEDICINE
Payer: MEDICARE

## 2019-05-07 ENCOUNTER — HOSPITAL ENCOUNTER (OUTPATIENT)
Age: 74
Setting detail: OUTPATIENT SURGERY
Discharge: HOME | End: 2019-05-07
Attending: PAIN MEDICINE | Admitting: PAIN MEDICINE
Payer: MEDICARE

## 2019-05-07 VITALS
SYSTOLIC BLOOD PRESSURE: 162 MMHG | BODY MASS INDEX: 23.32 KG/M2 | OXYGEN SATURATION: 92 % | TEMPERATURE: 98.4 F | DIASTOLIC BLOOD PRESSURE: 86 MMHG | RESPIRATION RATE: 20 BRPM | HEART RATE: 60 BPM | WEIGHT: 140 LBS | HEIGHT: 65 IN

## 2019-05-07 DIAGNOSIS — R52 PAIN: ICD-10-CM

## 2019-05-07 PROCEDURE — 2709999900 HC NON-CHARGEABLE SUPPLY: Performed by: PAIN MEDICINE

## 2019-05-07 PROCEDURE — 2580000003 HC RX 258: Performed by: PAIN MEDICINE

## 2019-05-07 PROCEDURE — 6360000004 HC RX CONTRAST MEDICATION: Performed by: PAIN MEDICINE

## 2019-05-07 PROCEDURE — 62323 NJX INTERLAMINAR LMBR/SAC: CPT | Performed by: PAIN MEDICINE

## 2019-05-07 PROCEDURE — 6360000002 HC RX W HCPCS: Performed by: PAIN MEDICINE

## 2019-05-07 PROCEDURE — 3600000002 HC SURGERY LEVEL 2 BASE: Performed by: PAIN MEDICINE

## 2019-05-07 PROCEDURE — 7100000010 HC PHASE II RECOVERY - FIRST 15 MIN: Performed by: PAIN MEDICINE

## 2019-05-07 PROCEDURE — 2500000003 HC RX 250 WO HCPCS: Performed by: PAIN MEDICINE

## 2019-05-07 PROCEDURE — 3209999900 FLUORO FOR SURGICAL PROCEDURES

## 2019-05-07 PROCEDURE — 7100000011 HC PHASE II RECOVERY - ADDTL 15 MIN: Performed by: PAIN MEDICINE

## 2019-05-07 RX ORDER — 0.9 % SODIUM CHLORIDE 0.9 %
VIAL (ML) INJECTION PRN
Status: DISCONTINUED | OUTPATIENT
Start: 2019-05-07 | End: 2019-05-07 | Stop reason: ALTCHOICE

## 2019-05-07 RX ORDER — LIDOCAINE HYDROCHLORIDE 5 MG/ML
INJECTION, SOLUTION INFILTRATION; INTRAVENOUS PRN
Status: DISCONTINUED | OUTPATIENT
Start: 2019-05-07 | End: 2019-05-07 | Stop reason: ALTCHOICE

## 2019-05-07 RX ORDER — METHYLPREDNISOLONE ACETATE 40 MG/ML
INJECTION, SUSPENSION INTRA-ARTICULAR; INTRALESIONAL; INTRAMUSCULAR; SOFT TISSUE PRN
Status: DISCONTINUED | OUTPATIENT
Start: 2019-05-07 | End: 2019-05-07 | Stop reason: ALTCHOICE

## 2019-05-07 ASSESSMENT — PAIN DESCRIPTION - PROGRESSION
CLINICAL_PROGRESSION: GRADUALLY IMPROVING
CLINICAL_PROGRESSION: NOT CHANGED
CLINICAL_PROGRESSION: NOT CHANGED

## 2019-05-07 ASSESSMENT — PAIN SCALES - GENERAL
PAINLEVEL_OUTOF10: 5

## 2019-05-07 ASSESSMENT — PAIN - FUNCTIONAL ASSESSMENT: PAIN_FUNCTIONAL_ASSESSMENT: 0-10

## 2019-05-07 ASSESSMENT — PAIN DESCRIPTION - FREQUENCY
FREQUENCY: CONTINUOUS
FREQUENCY: CONTINUOUS

## 2019-05-07 ASSESSMENT — PAIN DESCRIPTION - LOCATION
LOCATION: BACK
LOCATION: BACK

## 2019-05-07 ASSESSMENT — PAIN DESCRIPTION - DESCRIPTORS
DESCRIPTORS: DISCOMFORT
DESCRIPTORS: DISCOMFORT

## 2019-05-07 ASSESSMENT — PAIN DESCRIPTION - PAIN TYPE
TYPE: ACUTE PAIN;CHRONIC PAIN
TYPE: CHRONIC PAIN

## 2019-05-07 NOTE — H&P
Taking? Authorizing Provider   oxyCODONE-acetaminophen (PERCOCET) 5-325 MG per tablet Take 1 tablet by mouth every 6 hours as needed for Pain for up to 30 days. 5/5/19 6/4/19  Samanta Saltness, DO   lactulose [facility]) 10 GM/15ML solution Take 15 mLs by mouth every evening 5/1/19   Samanta Saltness, DO   diclofenac sodium (VOLTAREN) 1 % GEL Apply 4 g topically 4 times daily as needed for Pain 5/1/19 5/31/19  Samanta Saltness, DO   oxybutynin (DITROPAN-XL) 10 MG extended release tablet Take 1 tablet by mouth daily 4/24/19 7/23/19  BARI Dorsey CNP   tamsulosin [facility]) 0.4 MG capsule Take 1 capsule by mouth daily 12/3/18   BARI Spear CNP   carvedilol (COREG) 3.125 MG tablet Take 1 tablet by mouth 2 times daily (with meals) 11/9/18   BARI Buchanan CNP   lisinopril (PRINIVIL;ZESTRIL) 20 MG tablet Take 1 tablet by mouth 2 times daily 11/14/17   BARI Higginbotham CNP   ezetimibe (ZETIA) 10 MG tablet Take 1 tablet by mouth daily 8/25/17   BARI Waddell CNP   hydrALAZINE (APRESOLINE) 50 MG tablet Take 1 tablet by mouth daily 1/3/17   Amelie Chaidez MD   albuterol sulfate  (90 BASE) MCG/ACT inhaler Inhale 2 puffs into the lungs every 6 hours as needed for Wheezing    Historical Provider, MD   aspirin 81 MG tablet Take 81 mg by mouth daily On week hold per dr. Mitch Horowitz Provider, MD   baclofen (LIORESAL) 10 MG tablet Take 10 mg by mouth as needed     Historical Provider, MD   betamethasone dipropionate (DIPROLENE) 0.05 % ointment Apply topically 2 times daily Apply topically 2 times daily.     Historical Provider, MD   tadalafil (CIALIS) 20 MG tablet Take 20 mg by mouth as needed for Erectile Dysfunction    Historical Provider, MD   venlafaxine (EFFEXOR XR) 150 MG extended release capsule Take 150 mg by mouth daily    Historical Provider, MD   EPINEPHrine (EPIPEN IJ) Inject as directed Indications: as directed    Historical Provider, MD   ferrous sulfate 325 (65 FE) MG tablet Take 325 mg by mouth as needed     Historical Provider, MD   fluticasone (FLONASE ALLERGY RELIEF) 50 MCG/ACT nasal spray 1 spray by Nasal route daily    Historical Provider, MD   furosemide (LASIX) 40 MG tablet Take 40 mg by mouth daily Prn    Historical Provider, MD   lidocaine (XYLOCAINE) 5 % ointment Apply topically as needed for Pain Apply topically as needed. Historical Provider, MD   magnesium oxide (MAG-OX) 400 MG tablet Take 400 mg by mouth daily    Historical Provider, MD   pantoprazole (PROTONIX) 40 MG tablet Take 40 mg by mouth daily    Historical Provider, MD   Testosterone Cypionate 200 MG/ML KIT Inject into the muscle Indications: as directed    Historical Provider, MD   traZODone (DESYREL) 50 MG tablet Take 50 mg by mouth nightly    Historical Provider, MD   triamcinolone (KENALOG) 0.1 % cream Apply topically 2 times daily Apply topically 2 times daily. Historical Provider, MD   Febuxostat (ULORIC) 80 MG TABS Take by mouth Once every 3 days    Historical Provider, MD   diazepam (VALIUM) 5 MG tablet Take 5 mg by mouth as needed for Anxiety    Historical Provider, MD       Allergies   Allergen Reactions    Cefaclor Anaphylaxis    Lyrica [Pregabalin] Shortness Of Breath     Itching     Pentazocine-Acetaminophen Hives and Rash     Rapid heart beats.      Bee Venom Other (See Comments)    Ciprofloxacin     Statins Other (See Comments)     Myalgia      Sulfamethoxazole-Trimethoprim Itching    Toprol Xl [Metoprolol] Other (See Comments)     Fatigue         Social History     Socioeconomic History    Marital status:      Spouse name: Not on file    Number of children: Not on file    Years of education: Not on file    Highest education level: Not on file   Occupational History    Not on file   Social Needs    Financial resource strain: Not on file    Food insecurity:     Worry: Not on file     Inability: Not on file    Transportation needs:     Medical: Not on file VITALS:  Ht 5' 5\" (1.651 m)   Wt 140 lb (63.5 kg)   BMI 23.30 kg/m²     CONSTITUTIONAL:  awake, alert, cooperative, no apparent distress, and appears stated age    EYES: PERRLA, EOMI    LUNGS:  No increased work of breathing, no audible wheezing    CARDIOVASCULAR:  regular rate and rhythm    ABDOMEN:  Soft non tender non distended     EXTREMITIES: no signs of clubbing or cyanosis. MUSCULOSKELETAL: negative for flaccid muscle tone or spastic movements. SKIN: gross examination reveals no signs of rashes, or diaphoresis. NEURO: Cranial nerves II-XII grossly intact. No signs of agitated mood.        Assessment/Plan:    LBP BLE pain for caudal KRZYSZTOF

## 2019-05-07 NOTE — OP NOTE
2019    Patient: Jana Larson  :  1945  Age:  75 y.o. Sex:  male     PRE-OPERATIVE DIAGNOSIS: Displacement of lumbar intervertebral disc, Lumbar DDD, Spinal stenosis. POST-OPERATIVE DIAGNOSIS: Same. PROCEDURE PERFORMED:  #1 Therapeutic Caudal Epidural done under fluoroscopic guidance. SURGEON:   MYNOR Carrion. ANESTHESIA: local    ESTIMATED BLOOD LOSS: None. BRIEF HISTORY: Jana Larson comes in today for the first  therapeutic caudal epidural steroid injection under fluorsoscopic guidance. After discussing the potential risks and benefits of the procedure with the patient  Galinaken Shimon did request that we proceed. A complete History & Physical was reviewed and it is unchanged. DESCRIPTION OF PROCEDURE:    After confirming written and informed consent, a time-out was performed and Jesus name and date of birth, the procedure to be performed as well as the plan for the location of the needle insertion were confirmed. Patient was brought into the procedure room and was placed in the prone position on a fluoroscopy table. Standard monitors were placed and vital signs were observed throughout the procedure. The lumbosacral and caudal area were prepped with chloraprep and draped in a sterile manner. The sacral hiatus was identified and marked under AP fluoroscopy. A sterile gauze was placed in the midgluteal cleft for increased sterility. The overlying skin and subcutaneous tissues were anesthetized with 0.5% Lidocaine. Under lateral fluoroscopic guidance, a # 22 gauge 3.5 inch spinal needle was advanced into the sacral hiatus . After the needle passed through the sacrococcygeal ligament, the needle angle was advanced slightly. There was no evidence of paresthesia throughout the needle placement. After negative aspiration for blood and CSF, epidural spread was confirmed with injection of 2 cc of Isovue-M 200 both live lateral and live AP fluoroscopy.  A solution consisting of 0.25% Lidocaine and 40 mg DepoMedrol, total of 15 cc, was easily injected . There was a clear outline of the epidural space and visualization of the sacral roots. The needle was then removed and a sterile Band-Aid was applied to the puncture site. Disposition the patient tolerated the procedure well and there were no complications . Vital signs remained stable throughout the procedure. The patient was escorted to the recovery area where they remained until discharge and written discharge instructions for the procedure were given. Plan: Jass Garcia will return to our pain management center as scheduled.      Samanta Bolanos, DO

## 2019-05-29 NOTE — PROGRESS NOTES
mouth daily 12/3/18  Yes BARI Sutton CNP   carvedilol (COREG) 3.125 MG tablet Take 1 tablet by mouth 2 times daily (with meals) 11/9/18  Yes BARI Bustamante CNP   lisinopril (PRINIVIL;ZESTRIL) 20 MG tablet Take 1 tablet by mouth 2 times daily 11/14/17  Yes BARI Lawrence - CNP   ezetimibe (ZETIA) 10 MG tablet Take 1 tablet by mouth daily 8/25/17  Yes BARI Walker - CNP   hydrALAZINE (APRESOLINE) 50 MG tablet Take 1 tablet by mouth daily 1/3/17  Yes Laurence Sutton MD   albuterol sulfate  (90 BASE) MCG/ACT inhaler Inhale 2 puffs into the lungs every 6 hours as needed for Wheezing   Yes Historical Provider, MD   aspirin 81 MG tablet Take 81 mg by mouth daily On week hold per dr. Misha Bains   Yes Historical Provider, MD   baclofen (LIORESAL) 10 MG tablet Take 10 mg by mouth as needed    Yes Historical Provider, MD   betamethasone dipropionate (DIPROLENE) 0.05 % ointment Apply topically 2 times daily Apply topically 2 times daily. Yes Historical Provider, MD   tadalafil (CIALIS) 20 MG tablet Take 20 mg by mouth as needed for Erectile Dysfunction   Yes Historical Provider, MD   venlafaxine (EFFEXOR XR) 150 MG extended release capsule Take 150 mg by mouth daily   Yes Historical Provider, MD   EPINEPHrine (EPIPEN IJ) Inject as directed Indications: as directed   Yes Historical Provider, MD   ferrous sulfate 325 (65 FE) MG tablet Take 325 mg by mouth as needed    Yes Historical Provider, MD   fluticasone (FLONASE ALLERGY RELIEF) 50 MCG/ACT nasal spray 1 spray by Nasal route daily   Yes Historical Provider, MD   furosemide (LASIX) 40 MG tablet Take 40 mg by mouth daily Prn   Yes Historical Provider, MD   lidocaine (XYLOCAINE) 5 % ointment Apply topically as needed for Pain Apply topically as needed.    Yes Historical Provider, MD   magnesium oxide (MAG-OX) 400 MG tablet Take 400 mg by mouth daily   Yes Historical Provider, MD   pantoprazole (PROTONIX) 40 MG tablet Take 40 mg by mouth daily Yes Historical Provider, MD   Testosterone Cypionate 200 MG/ML KIT Inject into the muscle Indications: as directed   Yes Historical Provider, MD   traZODone (DESYREL) 50 MG tablet Take 50 mg by mouth nightly   Yes Historical Provider, MD   triamcinolone (KENALOG) 0.1 % cream Apply topically 2 times daily Apply topically 2 times daily. Yes Historical Provider, MD   Febuxostat (ULORIC) 80 MG TABS Take by mouth Once every 3 days   Yes Historical Provider, MD   diazepam (VALIUM) 5 MG tablet Take 5 mg by mouth as needed for Anxiety   Yes Historical Provider, MD       Allergies   Allergen Reactions    Cefaclor Anaphylaxis    Lyrica [Pregabalin] Shortness Of Breath     Itching     Pentazocine-Acetaminophen Hives and Rash     Rapid heart beats.  Bee Venom Other (See Comments)    Ciprofloxacin     Statins Other (See Comments)     Myalgia      Sulfamethoxazole-Trimethoprim Itching    Toprol Xl [Metoprolol] Other (See Comments)     Fatigue         Social History     Socioeconomic History    Marital status:      Spouse name: Not on file    Number of children: Not on file    Years of education: Not on file    Highest education level: Not on file   Occupational History    Not on file   Social Needs    Financial resource strain: Not on file    Food insecurity:     Worry: Not on file     Inability: Not on file    Transportation needs:     Medical: Not on file     Non-medical: Not on file   Tobacco Use    Smoking status: Former Smoker     Last attempt to quit: 1980     Years since quittin.4    Smokeless tobacco: Never Used   Substance and Sexual Activity    Alcohol use:  Yes     Alcohol/week: 8.4 oz     Types: 14 Cans of beer per week    Drug use: No     Comment: coffee    Sexual activity: Not on file   Lifestyle    Physical activity:     Days per week: Not on file     Minutes per session: Not on file    Stress: Not on file   Relationships    Social connections:     Talks on phone: Not on file Gets together: Not on file     Attends Jew service: Not on file     Active member of club or organization: Not on file     Attends meetings of clubs or organizations: Not on file     Relationship status: Not on file    Intimate partner violence:     Fear of current or ex partner: Not on file     Emotionally abused: Not on file     Physically abused: Not on file     Forced sexual activity: Not on file   Other Topics Concern    Not on file   Social History Narrative    Not on file       Family History   Problem Relation Age of Onset    Heart Disease Mother     No Known Problems Father     Prostate Cancer Brother         2011    Cancer Other     Diabetes Other     High Blood Pressure Other     Stroke Other     Tuberculosis Other        REVIEW OF SYSTEMS:     Robledo People denies fever/chills, chest pain, shortness of breath, new bowel or bladder complaints. All other review of systems was negative. PHYSICAL EXAMINATION:      /68 (Site: Left Upper Arm, Position: Sitting, Cuff Size: Medium Adult)   Pulse 80   Temp 98.5 °F (36.9 °C) (Oral)   Resp 18   Ht 5' 5\" (1.651 m)   Wt 138 lb (62.6 kg)   BMI 22.96 kg/m²     General:       General appearance:pleasant and well-hydrated, in no distress and A & O x3  Build:Normal Weight  Function:Rises from a seated position with difficulty, mild     HEENT:     Head:normocephalic, atraumatic  Pupils:regular, round, equal  Sclera: icterus absent     Lungs:     Breathing:normal breathing pattern     Abdomen:     Shape:non-distended and normal  Tenderness:none  Guarding:none      Lumbar spine:     Spine inspection:surgical scar   CVA tenderness:No   Palpation:tenderness paravertebral muscles, left, right and positive - improved.   Range of motion:abnormal moderately in lateral bending, flexion, extension rotation bilateral and is painful.     Musculoskeletal:     Trigger points in Paraveteral:absent bilaterally  SI joint tenderness:negative right, negative wait until after he sees Dr. Jacinto Reid  Caudal KRZYSZTOF - 80% relief of back pain, repeat prn  Patient encouraged to stay active  Treatment plan discussed with the patient including medication and procedure side effects     Cc:  Referring physician    MYNOR Whitten.

## 2019-05-30 ENCOUNTER — OFFICE VISIT (OUTPATIENT)
Dept: PAIN MANAGEMENT | Age: 74
End: 2019-05-30
Payer: COMMERCIAL

## 2019-05-30 VITALS
TEMPERATURE: 98.5 F | WEIGHT: 138 LBS | HEIGHT: 65 IN | RESPIRATION RATE: 18 BRPM | DIASTOLIC BLOOD PRESSURE: 68 MMHG | HEART RATE: 80 BPM | BODY MASS INDEX: 22.99 KG/M2 | SYSTOLIC BLOOD PRESSURE: 122 MMHG

## 2019-05-30 DIAGNOSIS — M51.26 DISPLACEMENT OF LUMBAR INTERVERTEBRAL DISC WITHOUT MYELOPATHY: ICD-10-CM

## 2019-05-30 DIAGNOSIS — M51.37 DEGENERATION OF LUMBAR OR LUMBOSACRAL INTERVERTEBRAL DISC: Primary | ICD-10-CM

## 2019-05-30 DIAGNOSIS — S33.5XXD LUMBAR SPRAIN, SUBSEQUENT ENCOUNTER: ICD-10-CM

## 2019-05-30 PROCEDURE — 1123F ACP DISCUSS/DSCN MKR DOCD: CPT | Performed by: PAIN MEDICINE

## 2019-05-30 PROCEDURE — 4040F PNEUMOC VAC/ADMIN/RCVD: CPT | Performed by: PAIN MEDICINE

## 2019-05-30 PROCEDURE — G8427 DOCREV CUR MEDS BY ELIG CLIN: HCPCS | Performed by: PAIN MEDICINE

## 2019-05-30 PROCEDURE — 3017F COLORECTAL CA SCREEN DOC REV: CPT | Performed by: PAIN MEDICINE

## 2019-05-30 PROCEDURE — 99213 OFFICE O/P EST LOW 20 MIN: CPT | Performed by: PAIN MEDICINE

## 2019-05-30 PROCEDURE — G8420 CALC BMI NORM PARAMETERS: HCPCS | Performed by: PAIN MEDICINE

## 2019-05-30 PROCEDURE — 1036F TOBACCO NON-USER: CPT | Performed by: PAIN MEDICINE

## 2019-05-30 RX ORDER — OXYCODONE HYDROCHLORIDE AND ACETAMINOPHEN 5; 325 MG/1; MG/1
1 TABLET ORAL EVERY 6 HOURS PRN
Qty: 120 TABLET | Refills: 0 | Status: SHIPPED | OUTPATIENT
Start: 2019-06-04 | End: 2019-06-28 | Stop reason: SDUPTHER

## 2019-06-21 NOTE — PROGRESS NOTES
[address] [place] [address], [address] [place] [address] Marcial  [phone]    Follow up Note      Kike Sims     Date of Visit:  2019    CC:  Patient presents for follow up   Chief Complaint   Patient presents with    Pain     all over body rt shoulder back     HPI:    Pain is unchanged. Change in quality of symptoms:no  Medication side effects:Lyrica caused itching and difficulty breathing previously   Recent diagnostic testing:none. Recent interventional procedures:none since last visit    He has been on anticoagulation medications to include ASA, NSAIDS (diclofenac and ASA) and has not been on herbal supplements. He is not diabetic.     Imagin/2018 lumbar xray - fusion is solid  CT myelogram dated 2016 demonstrates complete block at L3/4 level. Degenerative disc disease, spondylosis causing stenosis. Probable large extruded disc at L3-4.  Spinal listhesis L4 and L5 exacerbating the stenosis  EMG dated 3/24/2016     L5 radiculopathy  CT dated 2016 lumbar spine demonstrates degenerative spinal stenosis that is severe at L3-4 L4-5 and L5-S1 levels       Potential Aberrant Drug-Related Behavior: no     Urine Drug Screenin2018 buccal consistent  2019 buccal consistent  2019 consistent    OARRS report:  2018 consistent  2019 consistent  2019 consistent  2019 consistent  2019 consistent    Past Medical History:   Diagnosis Date    Abnormal EKG     CAD (coronary artery disease)     Cardiac defibrillator in place     [facility] failure[facility])     Depression     Diabetes mellitus ([facility] [address]     not on any medications, history of    Disease of pericardium     DJD (degenerative joint disease)     Erectile dysfunction     Fracture     ulna, hand, tibia, and ankle    GERD (gastroesophageal reflux disease)     History of echocardiogram     ,,,,,10/06,,,,    Hyperlipidemia     Hypertension  Ischemic cardiomyopathy     Ischemic heart disease     LBBB (left bundle branch block)     w/ wide Qrs    Mitral regurgitation     Nonrheumatic aortic valve disorder     SHELLEY on CPAP     setting 7    Presence of coronary angioplasty implant and graft     Ventricular tachycardia [facility])        Past Surgical History:   Procedure Laterality Date    ANKLE SURGERY      CARDIAC DEFIBRILLATOR PLACEMENT  11/2009    Bi-vent    CARDIAC DEFIBRILLATOR PLACEMENT      CARDIAC PACEMAKER PLACEMENT      CORONARY ANGIOPLASTY  09/2014    DIAGNOSTIC CARDIAC CATH LAB PROCEDURE Left 04/2006    DIAGNOSTIC CARDIAC CATH LAB PROCEDURE Left 09/2014    LEXY-RCA    ELBOW SURGERY      EPIDURAL STEROID INJECTION N/A 5/7/2019    CAUDAL EPIDURAL STEROID INJECTION performed by Rafita Cerrato DO at [facility]      foot, finger, left ankle, left leg,and right arm    JOINT REPLACEMENT Right 09/18/2018    LEG SURGERY      LUMBAR FUSION  05/29/2018    OTHER SURGICAL HISTORY  04/2006    cardiac tamponade evacuation     PACEMAKER INSERTION      TRANSESOPHAGEAL ECHOCARDIOGRAM      10/07       Prior to Admission medications    Medication Sig Start Date End Date Taking? Authorizing Provider   oxyCODONE-acetaminophen (PERCOCET) 5-325 MG per tablet Take 1 tablet by mouth every 6 hours as needed for Pain for up to 30 days.  6/4/19 7/4/19 Yes Rafita Cerrato DO   lactulose [facility]) 10 GM/15ML solution Take 15 mLs by mouth every evening 5/1/19  Yes Rafita Cerrato DO   oxybutynin (DITROPAN-XL) 10 MG extended release tablet Take 1 tablet by mouth daily 4/24/19 7/23/19 Yes BARI Mejia CNP   tamsulosin [facility]) 0.4 MG capsule Take 1 capsule by mouth daily 12/3/18  Yes BARI Santiago CNP   carvedilol (COREG) 3.125 MG tablet Take 1 tablet by mouth 2 times daily (with meals) 11/9/18  Yes BARI Vargas CNP   lisinopril (PRINIVIL;ZESTRIL) 20 MG tablet Take 1 tablet by mouth 2 times daily 11/14/17  Yes Taylor Obando BARI Goyal - CNP   ezetimibe (ZETIA) 10 MG tablet Take 1 tablet by mouth daily 8/25/17  Yes BARI Arnold - CNP   hydrALAZINE (APRESOLINE) 50 MG tablet Take 1 tablet by mouth daily 1/3/17  Yes Danya Feliciano MD   albuterol sulfate  (90 BASE) MCG/ACT inhaler Inhale 2 puffs into the lungs every 6 hours as needed for Wheezing   Yes Historical Provider, MD   aspirin 81 MG tablet Take 81 mg by mouth daily On week hold per dr. Haleigh Ash   Yes Historical Provider, MD   baclofen (LIORESAL) 10 MG tablet Take 10 mg by mouth as needed    Yes Historical Provider, MD   betamethasone dipropionate (DIPROLENE) 0.05 % ointment Apply topically 2 times daily Apply topically 2 times daily. Yes Historical Provider, MD   tadalafil (CIALIS) 20 MG tablet Take 20 mg by mouth as needed for Erectile Dysfunction   Yes Historical Provider, MD   venlafaxine (EFFEXOR XR) 150 MG extended release capsule Take 150 mg by mouth daily   Yes Historical Provider, MD   EPINEPHrine (EPIPEN IJ) Inject as directed Indications: as directed   Yes Historical Provider, MD   ferrous sulfate 325 (65 FE) MG tablet Take 325 mg by mouth as needed    Yes Historical Provider, MD   fluticasone (FLONASE ALLERGY RELIEF) 50 MCG/ACT nasal spray 1 spray by Nasal route daily   Yes Historical Provider, MD   furosemide (LASIX) 40 MG tablet Take 40 mg by mouth daily Prn   Yes Historical Provider, MD   lidocaine (XYLOCAINE) 5 % ointment Apply topically as needed for Pain Apply topically as needed.    Yes Historical Provider, MD   magnesium oxide (MAG-OX) 400 MG tablet Take 400 mg by mouth daily   Yes Historical Provider, MD   pantoprazole (PROTONIX) 40 MG tablet Take 40 mg by mouth daily   Yes Historical Provider, MD   Testosterone Cypionate 200 MG/ML KIT Inject into the muscle Indications: as directed   Yes Historical Provider, MD   traZODone (DESYREL) 50 MG tablet Take 50 mg by mouth nightly   Yes Historical Provider, MD   triamcinolone (KENALOG) 0.1 % cream Apply topically 2 times daily Apply topically 2 times daily. Yes Historical Provider, MD   Febuxostat (ULORIC) 80 MG TABS Take by mouth Once every 3 days   Yes Historical Provider, MD   diazepam (VALIUM) 5 MG tablet Take 5 mg by mouth as needed for Anxiety   Yes Historical Provider, MD   Testosterone 4 MG/24HR PT24 Place 4 mg onto the skin daily for 30 days. 19  Julia Peacock MD   diclofenac sodium (VOLTAREN) 1 % GEL Apply 4 g topically 4 times daily as needed for Pain 19  Rosana Barrientos,        Allergies   Allergen Reactions    Cefaclor Anaphylaxis    Lyrica [Pregabalin] Shortness Of Breath     Itching     Pentazocine-Acetaminophen Hives and Rash     Rapid heart beats.  Bee Venom Other (See Comments)    Ciprofloxacin     Statins Other (See Comments)     Myalgia      Sulfamethoxazole-Trimethoprim Itching    Toprol Xl [Metoprolol] Other (See Comments)     Fatigue         Social History     Socioeconomic History    Marital status:      Spouse name: Not on file    Number of children: Not on file    Years of education: Not on file    Highest education level: Not on file   Occupational History    Not on file   Social Needs    Financial resource strain: Not on file    Food insecurity:     Worry: Not on file     Inability: Not on file    Transportation needs:     Medical: Not on file     Non-medical: Not on file   Tobacco Use    Smoking status: Former Smoker     Last attempt to quit: 1980     Years since quittin.5    Smokeless tobacco: Never Used   Substance and Sexual Activity    Alcohol use:  Yes     Alcohol/week: 8.4 oz     Types: 14 Cans of beer per week    Drug use: No     Comment: coffee    Sexual activity: Not on file   Lifestyle    Physical activity:     Days per week: Not on file     Minutes per session: Not on file    Stress: Not on file   Relationships    Social connections:     Talks on phone: Not on file     Gets together: Not on file Attends Religion service: Not on file     Active member of club or organization: Not on file     Attends meetings of clubs or organizations: Not on file     Relationship status: Not on file    Intimate partner violence:     Fear of current or ex partner: Not on file     Emotionally abused: Not on file     Physically abused: Not on file     Forced sexual activity: Not on file   Other Topics Concern    Not on file   Social History Narrative    Not on file       Family History   Problem Relation Age of Onset    Heart Disease Mother     No Known Problems Father     Prostate Cancer Brother         2011    Cancer Other     Diabetes Other     High Blood Pressure Other     Stroke Other     Tuberculosis Other        REVIEW OF SYSTEMS:     Dimple Marrow denies fever/chills, chest pain, shortness of breath, new bowel or bladder complaints. All other review of systems was negative. PHYSICAL EXAMINATION:      /72   Pulse 84   Temp 98.6 °F (37 °C)   Resp 18   Ht 5' 5\" (1.651 m)   Wt 137 lb (62.1 kg)   SpO2 99%   BMI 22.80 kg/m²     General:       General appearance:pleasant and well-hydrated, in no distress and A & O x3  Build:Normal Weight  Function:Rises from a seated position with difficulty, mild     HEENT:     Head:normocephalic, atraumatic  Pupils:regular, round, equal  Sclera: icterus absent     Lungs:     Breathing:normal breathing pattern     Abdomen:     Shape:non-distended and normal  Tenderness:none  Guarding:none      Lumbar spine:     Spine inspection:surgical scar   CVA tenderness:No   Palpation:tenderness paravertebral muscles, left, right and positive.   Range of motion:abnormal moderately in lateral bending, flexion, extension rotation bilateral and is painful.     Musculoskeletal:     Trigger points in Paraveteral:absent bilaterally  SI joint tenderness:negative right, negative left              LAKSHMI test:not done right, not done             left  Piriformis tenderness:negative right, negative left  Trochanteric bursa tenderness:negative right, negative left  SLR:negative right, negative left, sitting      Extremities:     Tremors:None bilaterally upper and lower  Range of motion:decreased ROM rt shoulder in abudction  Intact:Yes  Varicose veins:absent   Pulses:present Lt radial  Cyanosis:none  Edema:none x all 4 extremities     Knee:     Inspection:asymmetric, swelling none bilaterally, right s/p TKR  Tenderness of Bony Landmarks:negative, bilateral  Tenderness of Bursa:none, bilateral     Neurological:     Sensory:normal to light touch BLE     Motor:                 Right Quadriceps5/5          Left Quadriceps5/5           Right Gastrocnemius5/5    Left Gastrocnemius5/5  Right Ant Tibialis5/5  Left Ant Tibialis5/5    Gait:antalgic, with a cane     Dermatology:     Skin:no rashes or lesions noted     Assessment/Plan:  LBP, s/p L3-5 laminectomy and fusion surgery 5/2018 with Dr. Marylene Bowler for severe stenosis, xray shows solid fusion 12/2018  Rt shoulder pain, is candidate for RTSA per Dr. Alan Burch  Rt knee pain improving, s/p 9/2018 TKR  + SHELLEY on CPAP, + ICD/pacer  Pt states most significant pain today is LBP and burning in the tops of the feet    Pt states he would like a f/u visit with Dr. Marylene Bowler due to increased falls and back pain (although it did improve with the caudal KRZYSZTOF), scheduled for 7/8/2019    [address] [place] [address] denied caudal KRZYSZTOF stating patient without radiculopathy and radiculopathy not covered on claim, however, DDD lumbar is covered on claim and caudal KRZYSZTOF is utilized to treat DDD. Also pt reports feeling as though he has radicular pain, in retrospect, which improved since the injection.     Buccal screen and OARRS reviewed  Previously discussed risk of overdose with combining opioids with benzos, encouraged him to utilize the lowest dose of benzo as possible and wean off if possible  RF percocet 5/325 QID #120 - previously discussed limited dosing  Failed gabapentin 100 mg titration due to weakness, Lyrica due to itching and difficulty breathing  Cotninue diclofenac gel - no RF needed  Continue lactulose for OIC - no RF needed  PT - encouraged to r/s follow ups, he would like to wait until after he sees Dr. Sharlene Lobato (he stopped due to increased pain)  Caudal KRZYSZTOF - 80% relief of back pain, pt states today he has noted a return of radicular complaints that he did not realize were gone after the injection (BLE numbness and ankle pain), repeat prn - he'd like to have done in August (has been denied by [facility])  Patient encouraged to stay active  Treatment plan discussed with the patient including medication and procedure side effects     Cc:  Referring physician    MYNOR Harley.

## 2019-06-28 ENCOUNTER — OFFICE VISIT (OUTPATIENT)
Dept: PAIN MANAGEMENT | Age: 74
End: 2019-06-28
Payer: COMMERCIAL

## 2019-06-28 VITALS
RESPIRATION RATE: 18 BRPM | WEIGHT: 137 LBS | BODY MASS INDEX: 22.82 KG/M2 | HEIGHT: 65 IN | TEMPERATURE: 98.6 F | DIASTOLIC BLOOD PRESSURE: 72 MMHG | OXYGEN SATURATION: 99 % | HEART RATE: 84 BPM | SYSTOLIC BLOOD PRESSURE: 138 MMHG

## 2019-06-28 DIAGNOSIS — M51.26 DISPLACEMENT OF LUMBAR INTERVERTEBRAL DISC WITHOUT MYELOPATHY: ICD-10-CM

## 2019-06-28 DIAGNOSIS — M51.37 DEGENERATION OF LUMBAR OR LUMBOSACRAL INTERVERTEBRAL DISC: ICD-10-CM

## 2019-06-28 DIAGNOSIS — S33.5XXD LUMBAR SPRAIN, SUBSEQUENT ENCOUNTER: Primary | ICD-10-CM

## 2019-06-28 PROCEDURE — G8420 CALC BMI NORM PARAMETERS: HCPCS | Performed by: PAIN MEDICINE

## 2019-06-28 PROCEDURE — 99213 OFFICE O/P EST LOW 20 MIN: CPT | Performed by: PAIN MEDICINE

## 2019-06-28 PROCEDURE — G8427 DOCREV CUR MEDS BY ELIG CLIN: HCPCS | Performed by: PAIN MEDICINE

## 2019-06-28 PROCEDURE — 1123F ACP DISCUSS/DSCN MKR DOCD: CPT | Performed by: PAIN MEDICINE

## 2019-06-28 PROCEDURE — 3017F COLORECTAL CA SCREEN DOC REV: CPT | Performed by: PAIN MEDICINE

## 2019-06-28 PROCEDURE — 1036F TOBACCO NON-USER: CPT | Performed by: PAIN MEDICINE

## 2019-06-28 PROCEDURE — 4040F PNEUMOC VAC/ADMIN/RCVD: CPT | Performed by: PAIN MEDICINE

## 2019-06-28 RX ORDER — OXYCODONE HYDROCHLORIDE AND ACETAMINOPHEN 5; 325 MG/1; MG/1
1 TABLET ORAL EVERY 6 HOURS PRN
Qty: 120 TABLET | Refills: 0 | Status: SHIPPED | OUTPATIENT
Start: 2019-07-05 | End: 2019-07-30 | Stop reason: ALTCHOICE

## 2019-06-28 NOTE — PROGRESS NOTES
Doris Kamara presents to the [facility] on 6/28/2019. Robledo People is complaining of pain in the back and shoulder . The pain is constant. The pain is described as aching, throbbing, shooting and stabbing. Pain is rated on his best day at a 9, on his worst day at a 9, and on average at a 9 on the VAS scale. He took his last dose of Percocet this am.     Any procedures since your last visit: No,     Pacemaker or defibrilator: Yes managed by [facility] cardio . He has been on anticoagulation medications to include ASA and is managed by [facility]  .      /72   Pulse 84   Temp 98.6 °F (37 °C)   Resp 18   Ht 5' 5\" (1.651 m)   Wt 137 lb (62.1 kg)   SpO2 99%   BMI 22.80 kg/m²      No LMP for male patient.

## 2019-07-30 ENCOUNTER — OFFICE VISIT (OUTPATIENT)
Dept: PAIN MANAGEMENT | Age: 74
End: 2019-07-30
Payer: COMMERCIAL

## 2019-07-30 ENCOUNTER — PREP FOR PROCEDURE (OUTPATIENT)
Dept: PAIN MANAGEMENT | Age: 74
End: 2019-07-30

## 2019-07-30 VITALS
TEMPERATURE: 98.6 F | DIASTOLIC BLOOD PRESSURE: 72 MMHG | BODY MASS INDEX: 23.32 KG/M2 | WEIGHT: 140 LBS | SYSTOLIC BLOOD PRESSURE: 122 MMHG | HEIGHT: 65 IN | HEART RATE: 74 BPM | OXYGEN SATURATION: 97 % | RESPIRATION RATE: 18 BRPM

## 2019-07-30 DIAGNOSIS — S33.5XXD LUMBAR SPRAIN, SUBSEQUENT ENCOUNTER: ICD-10-CM

## 2019-07-30 DIAGNOSIS — M51.26 DISPLACEMENT OF LUMBAR INTERVERTEBRAL DISC WITHOUT MYELOPATHY: ICD-10-CM

## 2019-07-30 DIAGNOSIS — M51.37 DEGENERATION OF LUMBAR OR LUMBOSACRAL INTERVERTEBRAL DISC: Primary | ICD-10-CM

## 2019-07-30 DIAGNOSIS — M51.26 DISPLACEMENT OF LUMBAR INTERVERTEBRAL DISC WITHOUT MYELOPATHY: Primary | ICD-10-CM

## 2019-07-30 PROCEDURE — G8420 CALC BMI NORM PARAMETERS: HCPCS | Performed by: PAIN MEDICINE

## 2019-07-30 PROCEDURE — 1123F ACP DISCUSS/DSCN MKR DOCD: CPT | Performed by: PAIN MEDICINE

## 2019-07-30 PROCEDURE — G8427 DOCREV CUR MEDS BY ELIG CLIN: HCPCS | Performed by: PAIN MEDICINE

## 2019-07-30 PROCEDURE — 1036F TOBACCO NON-USER: CPT | Performed by: PAIN MEDICINE

## 2019-07-30 PROCEDURE — 99213 OFFICE O/P EST LOW 20 MIN: CPT | Performed by: PAIN MEDICINE

## 2019-07-30 PROCEDURE — 4040F PNEUMOC VAC/ADMIN/RCVD: CPT | Performed by: PAIN MEDICINE

## 2019-07-30 PROCEDURE — 3017F COLORECTAL CA SCREEN DOC REV: CPT | Performed by: PAIN MEDICINE

## 2019-07-30 RX ORDER — OXYCODONE AND ACETAMINOPHEN 7.5; 325 MG/1; MG/1
1 TABLET ORAL 3 TIMES DAILY PRN
Qty: 90 TABLET | Refills: 0 | Status: SHIPPED | OUTPATIENT
Start: 2019-08-04 | End: 2019-08-28 | Stop reason: SDUPTHER

## 2019-07-30 NOTE — PROGRESS NOTES
hydrALAZINE (APRESOLINE) 50 MG tablet Take 1 tablet by mouth daily 1/3/17  Yes Romulo Pope MD   albuterol sulfate  (90 BASE) MCG/ACT inhaler Inhale 2 puffs into the lungs every 6 hours as needed for Wheezing   Yes Historical Provider, MD   aspirin 81 MG tablet Take 81 mg by mouth daily On week hold per dr. Krysta Verdin   Yes Historical Provider, MD   baclofen (LIORESAL) 10 MG tablet Take 10 mg by mouth as needed    Yes Historical Provider, MD   betamethasone dipropionate (DIPROLENE) 0.05 % ointment Apply topically 2 times daily Apply topically 2 times daily. Yes Historical Provider, MD   tadalafil (CIALIS) 20 MG tablet Take 20 mg by mouth as needed for Erectile Dysfunction   Yes Historical Provider, MD   venlafaxine (EFFEXOR XR) 150 MG extended release capsule Take 150 mg by mouth daily   Yes Historical Provider, MD   EPINEPHrine (EPIPEN IJ) Inject as directed Indications: as directed   Yes Historical Provider, MD   ferrous sulfate 325 (65 FE) MG tablet Take 325 mg by mouth as needed    Yes Historical Provider, MD   fluticasone (FLONASE ALLERGY RELIEF) 50 MCG/ACT nasal spray 1 spray by Nasal route daily   Yes Historical Provider, MD   furosemide (LASIX) 40 MG tablet Take 40 mg by mouth daily Prn   Yes Historical Provider, MD   lidocaine (XYLOCAINE) 5 % ointment Apply topically as needed for Pain Apply topically as needed. Yes Historical Provider, MD   magnesium oxide (MAG-OX) 400 MG tablet Take 400 mg by mouth daily   Yes Historical Provider, MD   pantoprazole (PROTONIX) 40 MG tablet Take 40 mg by mouth daily   Yes Historical Provider, MD   Testosterone Cypionate 200 MG/ML KIT Inject into the muscle Indications: as directed   Yes Historical Provider, MD   traZODone (DESYREL) 50 MG tablet Take 50 mg by mouth nightly   Yes Historical Provider, MD   triamcinolone (KENALOG) 0.1 % cream Apply topically 2 times daily Apply topically 2 times daily.    Yes Historical Provider, MD   Febuxostat (ULORIC) 80 MG TABS Not on file     Attends [employer] service: Not on file     Active member of club or organization: Not on file     Attends meetings of clubs or organizations: Not on file     Relationship status: Not on file    Intimate partner violence:     Fear of current or ex partner: Not on file     Emotionally abused: Not on file     Physically abused: Not on file     Forced sexual activity: Not on file   Other Topics Concern    Not on file   Social History Narrative    Not on file       Family History   Problem Relation Age of Onset    Heart Disease Mother     No Known Problems Father     Prostate Cancer Brother         2011    Cancer Other     Diabetes Other     High Blood Pressure Other     Stroke Other     Tuberculosis Other        REVIEW OF SYSTEMS:     Felix Morning denies fever/chills, chest pain, shortness of breath, new bowel or bladder complaints. All other review of systems was negative. PHYSICAL EXAMINATION:      /72   Pulse 74   Temp 98.6 °F (37 °C)   Resp 18   Ht 5' 5\" (1.651 m)   Wt 140 lb (63.5 kg)   SpO2 97%   BMI 23.30 kg/m²     General:       General appearance:pleasant and well-hydrated, in no distress and A & O x3  Build:Normal Weight  Function:Rises from a seated position with difficulty     HEENT:     Head:normocephalic, atraumatic  Pupils:regular, round, equal  Sclera: icterus absent     Lungs:     Breathing:normal breathing pattern     Abdomen:     Shape:non-distended and normal  Tenderness:none  Guarding:none      Lumbar spine:     Spine inspection:surgical scar   CVA tenderness:No   Palpation:tenderness paravertebral muscles, left, right and positive.   Range of motion:abnormal moderately in lateral bending, flexion, extension rotation bilateral and is painful.     Musculoskeletal:     Trigger points in Paraveteral:absent bilaterally  SI joint tenderness:negative right, negative left              LAKSHMI test:not done right, not done             left  Piriformis tenderness:negative realize were gone after the injection (BLE numbness and ankle pain), repeat prn - he'd like to have done in August - will schedule  Patient encouraged to stay active  Treatment plan discussed with the patient including medication and procedure side effects     Cc:  Referring physician    MYNOR Gibbs.

## 2019-08-01 ENCOUNTER — HOSPITAL ENCOUNTER (OUTPATIENT)
Dept: GENERAL RADIOLOGY | Age: 74
Discharge: HOME | End: 2019-08-03
Attending: PAIN MEDICINE
Payer: COMMERCIAL

## 2019-08-01 ENCOUNTER — HOSPITAL ENCOUNTER (OUTPATIENT)
Age: 74
Setting detail: OUTPATIENT SURGERY
Discharge: HOME | End: 2019-08-01
Attending: PAIN MEDICINE | Admitting: PAIN MEDICINE
Payer: COMMERCIAL

## 2019-08-01 VITALS
RESPIRATION RATE: 16 BRPM | WEIGHT: 140 LBS | BODY MASS INDEX: 23.32 KG/M2 | SYSTOLIC BLOOD PRESSURE: 176 MMHG | HEIGHT: 65 IN | HEART RATE: 74 BPM | DIASTOLIC BLOOD PRESSURE: 77 MMHG | TEMPERATURE: 97.8 F | OXYGEN SATURATION: 97 %

## 2019-08-01 DIAGNOSIS — R52 PAIN: ICD-10-CM

## 2019-08-01 PROCEDURE — 3600000002 HC SURGERY LEVEL 2 BASE: Performed by: PAIN MEDICINE

## 2019-08-01 PROCEDURE — 7100000010 HC PHASE II RECOVERY - FIRST 15 MIN: Performed by: PAIN MEDICINE

## 2019-08-01 PROCEDURE — 2500000003 HC RX 250 WO HCPCS: Performed by: PAIN MEDICINE

## 2019-08-01 PROCEDURE — 7100000011 HC PHASE II RECOVERY - ADDTL 15 MIN: Performed by: PAIN MEDICINE

## 2019-08-01 PROCEDURE — 2709999900 HC NON-CHARGEABLE SUPPLY: Performed by: PAIN MEDICINE

## 2019-08-01 PROCEDURE — 3209999900 FLUORO FOR SURGICAL PROCEDURES

## 2019-08-01 PROCEDURE — 6360000004 HC RX CONTRAST MEDICATION: Performed by: PAIN MEDICINE

## 2019-08-01 PROCEDURE — 62323 NJX INTERLAMINAR LMBR/SAC: CPT | Performed by: PAIN MEDICINE

## 2019-08-01 PROCEDURE — 6360000002 HC RX W HCPCS: Performed by: PAIN MEDICINE

## 2019-08-01 PROCEDURE — 2580000003 HC RX 258: Performed by: PAIN MEDICINE

## 2019-08-01 RX ORDER — LIDOCAINE HYDROCHLORIDE 5 MG/ML
INJECTION, SOLUTION INFILTRATION; INTRAVENOUS PRN
Status: DISCONTINUED | OUTPATIENT
Start: 2019-08-01 | End: 2019-08-01 | Stop reason: ALTCHOICE

## 2019-08-01 RX ORDER — 0.9 % SODIUM CHLORIDE 0.9 %
VIAL (ML) INJECTION PRN
Status: DISCONTINUED | OUTPATIENT
Start: 2019-08-01 | End: 2019-08-01 | Stop reason: ALTCHOICE

## 2019-08-01 RX ORDER — METHYLPREDNISOLONE ACETATE 40 MG/ML
INJECTION, SUSPENSION INTRA-ARTICULAR; INTRALESIONAL; INTRAMUSCULAR; SOFT TISSUE PRN
Status: DISCONTINUED | OUTPATIENT
Start: 2019-08-01 | End: 2019-08-01 | Stop reason: ALTCHOICE

## 2019-08-01 ASSESSMENT — PAIN DESCRIPTION - DESCRIPTORS: DESCRIPTORS: ACHING;DISCOMFORT

## 2019-08-01 ASSESSMENT — PAIN DESCRIPTION - PAIN TYPE: TYPE: CHRONIC PAIN

## 2019-08-01 ASSESSMENT — PAIN SCALES - GENERAL
PAINLEVEL_OUTOF10: 0
PAINLEVEL_OUTOF10: 0

## 2019-08-01 ASSESSMENT — PAIN - FUNCTIONAL ASSESSMENT: PAIN_FUNCTIONAL_ASSESSMENT: 0-10

## 2019-08-01 NOTE — H&P
MG tablet Take 400 mg by mouth daily   Yes Historical Provider, MD   pantoprazole (PROTONIX) 40 MG tablet Take 40 mg by mouth daily   Yes Historical Provider, MD   Testosterone Cypionate 200 MG/ML KIT Inject into the muscle Indications: as directed   Yes Historical Provider, MD   traZODone (DESYREL) 50 MG tablet Take 50 mg by mouth nightly   Yes Historical Provider, MD   triamcinolone (KENALOG) 0.1 % cream Apply topically 2 times daily Apply topically 2 times daily. Yes Historical Provider, MD   Febuxostat (ULORIC) 80 MG TABS Take by mouth Once every 3 days   Yes Historical Provider, MD   diazepam (VALIUM) 5 MG tablet Take 5 mg by mouth as needed for Anxiety   Yes Historical Provider, MD       Allergies   Allergen Reactions    Bee Venom Anaphylaxis    Cefaclor Anaphylaxis    Lyrica [Pregabalin] Shortness Of Breath     Itching     Pentazocine-Acetaminophen Hives and Rash     Rapid heart beats.      Ciprofloxacin Itching    Statins Other (See Comments)     Myalgia      Sulfamethoxazole-Trimethoprim Itching    Toprol Xl [Metoprolol] Other (See Comments)     Fatigue         Social History     Socioeconomic History    Marital status:      Spouse name: Not on file    Number of children: Not on file    Years of education: Not on file    Highest education level: Not on file   Occupational History    Not on file   Social Needs    Financial resource strain: Not on file    Food insecurity:     Worry: Not on file     Inability: Not on file    Transportation needs:     Medical: Not on file     Non-medical: Not on file   Tobacco Use    Smoking status: Former Smoker     Last attempt to quit:      Years since quittin.6    Smokeless tobacco: Never Used   Substance and Sexual Activity    Alcohol use: Yes     Comment: beer daily    Drug use: No     Comment: coffee    Sexual activity: Not on file   Lifestyle    Physical activity:     Days per week: Not on file     Minutes per session: Not on file

## 2019-08-01 NOTE — OP NOTE
2019    Patient: Tino Navarro  :  1945  Age:  75 y.o. Sex:  male     PRE-OPERATIVE DIAGNOSIS: Displacement of lumbar intervertebral disc, Lumbar DDD, Spinal stenosis. POST-OPERATIVE DIAGNOSIS: Same. PROCEDURE PERFORMED:  #1 Therapeutic Caudal Epidural done under fluoroscopic guidance. SURGEON:   MYNOR Celeste. ANESTHESIA: local    ESTIMATED BLOOD LOSS: None. BRIEF HISTORY: Tino Navarro comes in today for the first  therapeutic caudal epidural steroid injection under fluorsoscopic guidance. After discussing the potential risks and benefits of the procedure with the patient  Paula Fernandez did request that we proceed. A complete History & Physical was reviewed and it is unchanged. DESCRIPTION OF PROCEDURE:    After confirming written and informed consent, a time-out was performed and Jesus name and date of birth, the procedure to be performed as well as the plan for the location of the needle insertion were confirmed. Patient was brought into the procedure room and was placed in the prone position on a fluoroscopy table. Standard monitors were placed and vital signs were observed throughout the procedure. The lumbosacral and caudal area were prepped with chloraprep and draped in a sterile manner. The sacral hiatus was identified and marked under AP fluoroscopy. A sterile gauze was placed in the midgluteal cleft for increased sterility. The overlying skin and subcutaneous tissues were anesthetized with 0.5% Lidocaine. Under lateral fluoroscopic guidance, a # 22 gauge 3.5 inch spinal needle was advanced into the sacral hiatus . After the needle passed through the sacrococcygeal ligament, the needle angle was advanced slightly. There was no evidence of paresthesia throughout the needle placement. After negative aspiration for blood and CSF, epidural spread was confirmed with injection of 2 cc of Isovue-M 200 both live lateral and live AP fluoroscopy.  A solution consisting of

## 2019-08-07 NOTE — PROGRESS NOTES
(gastroesophageal reflux disease)     Hyperlipidemia     Hypertension     Ischemic cardiomyopathy     Ischemic heart disease     LBBB (left bundle branch block)     w/ wide Qrs    Mitral regurgitation     Nonrheumatic aortic valve disorder     SHELLEY on CPAP     setting 7    Presence of coronary angioplasty implant and graft     Ventricular tachycardia [facility])        Past Surgical History:   Procedure Laterality Date    ANKLE SURGERY      CARDIAC DEFIBRILLATOR PLACEMENT  11/2009    Bi-vent    CARDIAC DEFIBRILLATOR PLACEMENT      CARDIAC PACEMAKER PLACEMENT      CORONARY ANGIOPLASTY  09/2014    DIAGNOSTIC CARDIAC CATH LAB PROCEDURE Left 04/2006    DIAGNOSTIC CARDIAC CATH LAB PROCEDURE Left 09/2014    LEXY-RCA    ELBOW SURGERY      EPIDURAL STEROID INJECTION N/A 5/7/2019    CAUDAL EPIDURAL STEROID INJECTION performed by Jenna Maciel DO at [address] N/A 8/1/2019    CAUDAL EPIDURAL STEROID INJECTION performed by Jenna Maciel DO at [address] [address]      foot, finger, left ankle, left leg,and right arm    JOINT REPLACEMENT Right 09/18/2018    LEG SURGERY      LUMBAR FUSION  05/29/2018    OTHER SURGICAL HISTORY  04/2006    cardiac tamponade evacuation     PACEMAKER INSERTION      TRANSESOPHAGEAL ECHOCARDIOGRAM      10/07       Prior to Admission medications    Medication Sig Start Date End Date Taking? Authorizing Provider   tamsulosin (FLOMAX) 0.4 MG capsule Take 1 capsule by mouth daily 8/19/19  Yes Cher Hutchinson PA-C   TRUE METRIX BLOOD GLUCOSE TEST strip USE AS DIRECTED to test blood glucose TWICE DAILY 7/30/19  Yes Historical Provider, MD   oxyCODONE-acetaminophen (PERCOCET) 7.5-325 MG per tablet Take 1 tablet by mouth 3 times daily as needed for Pain for up to 30 days.  Intended supply: 30 days 8/4/19 9/3/19 Yes Jenna Maciel DO   lactulose [facility]) 10 GM/15ML solution Take 15 mLs by mouth every evening 5/1/19  Yes Jenna Maciel DO   carvedilol abduction and FF  Intact:Yes  Varicose veins:absent   Pulses:present Lt radial  Cyanosis:none  Edema:none x all 4 extremities     Neurological:     Sensory:normal to light touch BLE     Motor:                 Right Quadriceps4/5          Left Quadriceps4/5           Right Gastrocnemius4/5    Left Gastrocnemius4/5  Right Ant Tibialis4/5  Left Ant Tibialis4/5    Gait:antalgic, with a walker     Dermatology:     Skin:no rashes or lesions noted     Assessment/Plan:  LBP, s/p L3-5 laminectomy and fusion surgery 5/2018 with Dr. Laurence Raymond for severe stenosis, xray shows solid fusion and hardware in good position 7/2019  Rt shoulder pain, is candidate for RTSA per Dr. Paulino Aburto knee pain improving, s/p 9/2018 TKR  + SHELLEY on CPAP, + ICD/pacer    OARRS reviewed 08/2019  Previously discussed risk of overdose with combining opioids with benzos, encouraged him to utilize the lowest dose of benzo as possible and wean off if possible  Continue percocet 7.5/325 TID #90 (from 5/325 QID) - again discussed limited dosing. Ordered for 9/4/2019. Failed gabapentin 100 mg titration due to weakness, Lyrica due to itching and difficulty breathing  Cotninue diclofenac gel - Ordered today. Continue lactulose for OIC - no RF needed  PT - encouraged to set up aquatic therapy by Dr. Laurence Raymond, pt is unsure about doing further therapy  Patient is s/p:  8/1/2019 PROCEDURE PERFORMED:  #1 Therapeutic Caudal Epidural done under fluoroscopic guidance - 80% better x 3 weeks. He will schedule another in October or November when it starts to get cold. Patient encouraged to stay active  Treatment plan discussed with the patient including medication  side effects     Cc:  Referring physician    Controlled Substance Monitoring:    Acute and Chronic Pain Monitoring:   RX Monitoring 8/28/2019   Periodic Controlled Substance Monitoring Possible medication side effects, risk of tolerance/dependence & alternative treatments discussed. ;No signs of potential drug abuse or diversion identified. ;Assessed functional status. ;Obtaining appropriate analgesic effect of treatment.

## 2019-08-28 ENCOUNTER — OFFICE VISIT (OUTPATIENT)
Dept: PAIN MANAGEMENT | Age: 74
End: 2019-08-28
Payer: COMMERCIAL

## 2019-08-28 VITALS
DIASTOLIC BLOOD PRESSURE: 66 MMHG | SYSTOLIC BLOOD PRESSURE: 124 MMHG | WEIGHT: 137 LBS | BODY MASS INDEX: 22.82 KG/M2 | RESPIRATION RATE: 16 BRPM | TEMPERATURE: 97.6 F | HEIGHT: 65 IN | OXYGEN SATURATION: 99 % | HEART RATE: 78 BPM

## 2019-08-28 DIAGNOSIS — M51.37 DEGENERATION OF LUMBAR OR LUMBOSACRAL INTERVERTEBRAL DISC: ICD-10-CM

## 2019-08-28 DIAGNOSIS — M51.26 DISPLACEMENT OF LUMBAR INTERVERTEBRAL DISC WITHOUT MYELOPATHY: ICD-10-CM

## 2019-08-28 DIAGNOSIS — S33.9XXA CHRONIC OR OLD SPRAIN OF LUMBOSACRAL LIGAMENT: ICD-10-CM

## 2019-08-28 DIAGNOSIS — S33.5XXD LUMBAR SPRAIN, SUBSEQUENT ENCOUNTER: ICD-10-CM

## 2019-08-28 PROCEDURE — 99213 OFFICE O/P EST LOW 20 MIN: CPT | Performed by: PHYSICIAN ASSISTANT

## 2019-08-28 PROCEDURE — 1036F TOBACCO NON-USER: CPT | Performed by: PHYSICIAN ASSISTANT

## 2019-08-28 PROCEDURE — 3017F COLORECTAL CA SCREEN DOC REV: CPT | Performed by: PHYSICIAN ASSISTANT

## 2019-08-28 PROCEDURE — 1123F ACP DISCUSS/DSCN MKR DOCD: CPT | Performed by: PHYSICIAN ASSISTANT

## 2019-08-28 PROCEDURE — G8427 DOCREV CUR MEDS BY ELIG CLIN: HCPCS | Performed by: PHYSICIAN ASSISTANT

## 2019-08-28 PROCEDURE — 4040F PNEUMOC VAC/ADMIN/RCVD: CPT | Performed by: PHYSICIAN ASSISTANT

## 2019-08-28 PROCEDURE — G8420 CALC BMI NORM PARAMETERS: HCPCS | Performed by: PHYSICIAN ASSISTANT

## 2019-08-28 RX ORDER — OXYCODONE AND ACETAMINOPHEN 7.5; 325 MG/1; MG/1
1 TABLET ORAL 3 TIMES DAILY PRN
Qty: 90 TABLET | Refills: 0 | Status: SHIPPED | OUTPATIENT
Start: 2019-09-04 | End: 2019-10-03 | Stop reason: SDUPTHER

## 2019-08-28 NOTE — PROGRESS NOTES
Steve Aden presents to the [address] [place] [address] on 8/28/2019. Peg Hayden is complaining of pain lower /middle back. The pain is constant. The pain is described as aching, throbbing, shooting and stabbing. Pain is rated on his best day at a 7, on his worst day at a 10, and on average at a 8 on the VAS scale. He took his last dose of Percocet today     Any procedures since your last visit: No, with % relief. Pacemaker or defibrilator: No managed by . He has been on anticoagulation medications to include ASA and is managed by PCP. /66   Pulse 78   Temp 97.6 °F (36.4 °C) (Oral)   Resp 16   Ht 5' 5\" (1.651 m)   Wt 137 lb (62.1 kg)   SpO2 99%   BMI 22.80 kg/m²      No LMP for male patient.

## 2019-10-03 ENCOUNTER — OFFICE VISIT (OUTPATIENT)
Dept: PAIN MANAGEMENT | Age: 74
End: 2019-10-03
Payer: COMMERCIAL

## 2019-10-03 VITALS
OXYGEN SATURATION: 95 % | HEART RATE: 95 BPM | WEIGHT: 138 LBS | TEMPERATURE: 98.6 F | RESPIRATION RATE: 18 BRPM | HEIGHT: 65 IN | SYSTOLIC BLOOD PRESSURE: 138 MMHG | DIASTOLIC BLOOD PRESSURE: 72 MMHG | BODY MASS INDEX: 22.99 KG/M2

## 2019-10-03 DIAGNOSIS — M51.26 DISPLACEMENT OF LUMBAR INTERVERTEBRAL DISC WITHOUT MYELOPATHY: ICD-10-CM

## 2019-10-03 DIAGNOSIS — M51.37 DEGENERATION OF LUMBAR OR LUMBOSACRAL INTERVERTEBRAL DISC: ICD-10-CM

## 2019-10-03 DIAGNOSIS — S33.5XXD LUMBAR SPRAIN, SUBSEQUENT ENCOUNTER: ICD-10-CM

## 2019-10-03 PROCEDURE — 3017F COLORECTAL CA SCREEN DOC REV: CPT | Performed by: PHYSICIAN ASSISTANT

## 2019-10-03 PROCEDURE — 99213 OFFICE O/P EST LOW 20 MIN: CPT | Performed by: PHYSICIAN ASSISTANT

## 2019-10-03 PROCEDURE — 4040F PNEUMOC VAC/ADMIN/RCVD: CPT | Performed by: PHYSICIAN ASSISTANT

## 2019-10-03 PROCEDURE — G8484 FLU IMMUNIZE NO ADMIN: HCPCS | Performed by: PHYSICIAN ASSISTANT

## 2019-10-03 PROCEDURE — 1123F ACP DISCUSS/DSCN MKR DOCD: CPT | Performed by: PHYSICIAN ASSISTANT

## 2019-10-03 PROCEDURE — 1036F TOBACCO NON-USER: CPT | Performed by: PHYSICIAN ASSISTANT

## 2019-10-03 PROCEDURE — G8427 DOCREV CUR MEDS BY ELIG CLIN: HCPCS | Performed by: PHYSICIAN ASSISTANT

## 2019-10-03 PROCEDURE — G8420 CALC BMI NORM PARAMETERS: HCPCS | Performed by: PHYSICIAN ASSISTANT

## 2019-10-03 RX ORDER — OXYCODONE AND ACETAMINOPHEN 7.5; 325 MG/1; MG/1
1 TABLET ORAL 3 TIMES DAILY PRN
Qty: 90 TABLET | Refills: 0 | Status: SHIPPED | OUTPATIENT
Start: 2019-10-04 | End: 2019-10-31 | Stop reason: SDUPTHER

## 2019-10-31 ENCOUNTER — OFFICE VISIT (OUTPATIENT)
Dept: PAIN MANAGEMENT | Age: 74
End: 2019-10-31
Payer: COMMERCIAL

## 2019-10-31 VITALS
HEIGHT: 65 IN | DIASTOLIC BLOOD PRESSURE: 78 MMHG | TEMPERATURE: 97.6 F | BODY MASS INDEX: 22.99 KG/M2 | WEIGHT: 138 LBS | RESPIRATION RATE: 16 BRPM | HEART RATE: 84 BPM | OXYGEN SATURATION: 99 % | SYSTOLIC BLOOD PRESSURE: 116 MMHG

## 2019-10-31 DIAGNOSIS — S33.5XXD LUMBAR SPRAIN, SUBSEQUENT ENCOUNTER: ICD-10-CM

## 2019-10-31 DIAGNOSIS — M51.37 DEGENERATION OF LUMBAR OR LUMBOSACRAL INTERVERTEBRAL DISC: ICD-10-CM

## 2019-10-31 DIAGNOSIS — M51.26 DISPLACEMENT OF LUMBAR INTERVERTEBRAL DISC WITHOUT MYELOPATHY: ICD-10-CM

## 2019-10-31 PROCEDURE — 1123F ACP DISCUSS/DSCN MKR DOCD: CPT | Performed by: PHYSICIAN ASSISTANT

## 2019-10-31 PROCEDURE — 99213 OFFICE O/P EST LOW 20 MIN: CPT | Performed by: PHYSICIAN ASSISTANT

## 2019-10-31 PROCEDURE — G8420 CALC BMI NORM PARAMETERS: HCPCS | Performed by: PHYSICIAN ASSISTANT

## 2019-10-31 PROCEDURE — 3017F COLORECTAL CA SCREEN DOC REV: CPT | Performed by: PHYSICIAN ASSISTANT

## 2019-10-31 PROCEDURE — 1036F TOBACCO NON-USER: CPT | Performed by: PHYSICIAN ASSISTANT

## 2019-10-31 PROCEDURE — G8427 DOCREV CUR MEDS BY ELIG CLIN: HCPCS | Performed by: PHYSICIAN ASSISTANT

## 2019-10-31 PROCEDURE — G8484 FLU IMMUNIZE NO ADMIN: HCPCS | Performed by: PHYSICIAN ASSISTANT

## 2019-10-31 PROCEDURE — 4040F PNEUMOC VAC/ADMIN/RCVD: CPT | Performed by: PHYSICIAN ASSISTANT

## 2019-10-31 RX ORDER — OXYCODONE AND ACETAMINOPHEN 7.5; 325 MG/1; MG/1
1 TABLET ORAL 3 TIMES DAILY PRN
Qty: 90 TABLET | Refills: 0 | Status: SHIPPED | OUTPATIENT
Start: 2019-11-03 | End: 2019-11-25 | Stop reason: SDUPTHER

## 2019-11-25 ENCOUNTER — OFFICE VISIT (OUTPATIENT)
Dept: PAIN MANAGEMENT | Age: 74
End: 2019-11-25
Payer: COMMERCIAL

## 2019-11-25 VITALS
RESPIRATION RATE: 16 BRPM | HEART RATE: 66 BPM | HEIGHT: 65 IN | TEMPERATURE: 97.6 F | DIASTOLIC BLOOD PRESSURE: 74 MMHG | BODY MASS INDEX: 22.99 KG/M2 | WEIGHT: 138 LBS | SYSTOLIC BLOOD PRESSURE: 128 MMHG | OXYGEN SATURATION: 99 %

## 2019-11-25 DIAGNOSIS — M51.37 DEGENERATION OF LUMBAR OR LUMBOSACRAL INTERVERTEBRAL DISC: ICD-10-CM

## 2019-11-25 DIAGNOSIS — S33.5XXD LUMBAR SPRAIN, SUBSEQUENT ENCOUNTER: ICD-10-CM

## 2019-11-25 DIAGNOSIS — M51.26 DISPLACEMENT OF LUMBAR INTERVERTEBRAL DISC WITHOUT MYELOPATHY: ICD-10-CM

## 2019-11-25 PROCEDURE — G8484 FLU IMMUNIZE NO ADMIN: HCPCS | Performed by: PHYSICIAN ASSISTANT

## 2019-11-25 PROCEDURE — G8427 DOCREV CUR MEDS BY ELIG CLIN: HCPCS | Performed by: PHYSICIAN ASSISTANT

## 2019-11-25 PROCEDURE — 99213 OFFICE O/P EST LOW 20 MIN: CPT | Performed by: PHYSICIAN ASSISTANT

## 2019-11-25 PROCEDURE — 3017F COLORECTAL CA SCREEN DOC REV: CPT | Performed by: PHYSICIAN ASSISTANT

## 2019-11-25 PROCEDURE — 1123F ACP DISCUSS/DSCN MKR DOCD: CPT | Performed by: PHYSICIAN ASSISTANT

## 2019-11-25 PROCEDURE — 1036F TOBACCO NON-USER: CPT | Performed by: PHYSICIAN ASSISTANT

## 2019-11-25 PROCEDURE — 4040F PNEUMOC VAC/ADMIN/RCVD: CPT | Performed by: PHYSICIAN ASSISTANT

## 2019-11-25 PROCEDURE — G8420 CALC BMI NORM PARAMETERS: HCPCS | Performed by: PHYSICIAN ASSISTANT

## 2019-11-25 RX ORDER — LACTULOSE 10 G/15ML
10 SOLUTION ORAL EVERY EVENING
Qty: 1 BOTTLE | Refills: 0 | Status: SHIPPED | OUTPATIENT
Start: 2019-11-25 | End: 2020-01-02 | Stop reason: SDUPTHER

## 2019-11-25 RX ORDER — TRAZODONE HYDROCHLORIDE 100 MG/1
TABLET ORAL
Refills: 0 | COMMUNITY
Start: 2019-11-15 | End: 2019-11-25

## 2019-11-25 RX ORDER — OXYCODONE AND ACETAMINOPHEN 7.5; 325 MG/1; MG/1
1 TABLET ORAL 3 TIMES DAILY PRN
Qty: 90 TABLET | Refills: 0 | Status: SHIPPED | OUTPATIENT
Start: 2019-12-03 | End: 2019-12-12 | Stop reason: CLARIF

## 2019-11-25 RX ORDER — EZETIMIBE 10 MG/1
10 TABLET ORAL
COMMUNITY
Start: 2019-10-23

## 2019-12-23 ENCOUNTER — TELEPHONE (OUTPATIENT)
Dept: PAIN MANAGEMENT | Age: 74
End: 2019-12-23

## 2019-12-26 ENCOUNTER — HOSPITAL ENCOUNTER (OUTPATIENT)
Dept: GENERAL RADIOLOGY | Age: 74
Setting detail: OUTPATIENT SURGERY
Discharge: HOME | End: 2019-12-28
Attending: PAIN MEDICINE
Payer: MEDICARE

## 2019-12-26 ENCOUNTER — HOSPITAL ENCOUNTER (OUTPATIENT)
Age: 74
Setting detail: OUTPATIENT SURGERY
Discharge: HOME | End: 2019-12-26
Attending: PAIN MEDICINE | Admitting: PAIN MEDICINE
Payer: MEDICARE

## 2019-12-26 VITALS
WEIGHT: 140 LBS | DIASTOLIC BLOOD PRESSURE: 72 MMHG | BODY MASS INDEX: 23.32 KG/M2 | RESPIRATION RATE: 18 BRPM | OXYGEN SATURATION: 94 % | HEIGHT: 65 IN | TEMPERATURE: 97 F | SYSTOLIC BLOOD PRESSURE: 153 MMHG | HEART RATE: 69 BPM

## 2019-12-26 DIAGNOSIS — R52 PAIN MANAGEMENT: ICD-10-CM

## 2019-12-26 PROCEDURE — 2500000003 HC RX 250 WO HCPCS: Performed by: PAIN MEDICINE

## 2019-12-26 PROCEDURE — 2580000003 HC RX 258: Performed by: PAIN MEDICINE

## 2019-12-26 PROCEDURE — 62323 NJX INTERLAMINAR LMBR/SAC: CPT | Performed by: PAIN MEDICINE

## 2019-12-26 PROCEDURE — 7100000010 HC PHASE II RECOVERY - FIRST 15 MIN: Performed by: PAIN MEDICINE

## 2019-12-26 PROCEDURE — 6360000004 HC RX CONTRAST MEDICATION: Performed by: PAIN MEDICINE

## 2019-12-26 PROCEDURE — 6360000002 HC RX W HCPCS: Performed by: PAIN MEDICINE

## 2019-12-26 PROCEDURE — 3209999900 FLUORO FOR SURGICAL PROCEDURES

## 2019-12-26 PROCEDURE — 2709999900 HC NON-CHARGEABLE SUPPLY: Performed by: PAIN MEDICINE

## 2019-12-26 PROCEDURE — 3600000002 HC SURGERY LEVEL 2 BASE: Performed by: PAIN MEDICINE

## 2019-12-26 PROCEDURE — 7100000011 HC PHASE II RECOVERY - ADDTL 15 MIN: Performed by: PAIN MEDICINE

## 2019-12-26 RX ORDER — LIDOCAINE HYDROCHLORIDE 5 MG/ML
INJECTION, SOLUTION INFILTRATION; INTRAVENOUS PRN
Status: DISCONTINUED | OUTPATIENT
Start: 2019-12-26 | End: 2019-12-26 | Stop reason: ALTCHOICE

## 2019-12-26 RX ORDER — METHYLPREDNISOLONE ACETATE 40 MG/ML
INJECTION, SUSPENSION INTRA-ARTICULAR; INTRALESIONAL; INTRAMUSCULAR; SOFT TISSUE PRN
Status: DISCONTINUED | OUTPATIENT
Start: 2019-12-26 | End: 2019-12-26 | Stop reason: ALTCHOICE

## 2019-12-26 RX ORDER — SODIUM CHLORIDE 9 MG/ML
INJECTION INTRAVENOUS PRN
Status: DISCONTINUED | OUTPATIENT
Start: 2019-12-26 | End: 2019-12-26 | Stop reason: ALTCHOICE

## 2019-12-26 ASSESSMENT — PAIN - FUNCTIONAL ASSESSMENT: PAIN_FUNCTIONAL_ASSESSMENT: 0-10

## 2020-01-02 ENCOUNTER — OFFICE VISIT (OUTPATIENT)
Dept: PAIN MANAGEMENT | Age: 75
End: 2020-01-02
Payer: COMMERCIAL

## 2020-01-02 VITALS
WEIGHT: 140 LBS | TEMPERATURE: 97.6 F | OXYGEN SATURATION: 94 % | BODY MASS INDEX: 23.32 KG/M2 | HEIGHT: 65 IN | DIASTOLIC BLOOD PRESSURE: 68 MMHG | HEART RATE: 86 BPM | SYSTOLIC BLOOD PRESSURE: 124 MMHG | RESPIRATION RATE: 16 BRPM

## 2020-01-02 PROCEDURE — 3017F COLORECTAL CA SCREEN DOC REV: CPT | Performed by: PHYSICIAN ASSISTANT

## 2020-01-02 PROCEDURE — 1036F TOBACCO NON-USER: CPT | Performed by: PHYSICIAN ASSISTANT

## 2020-01-02 PROCEDURE — 1123F ACP DISCUSS/DSCN MKR DOCD: CPT | Performed by: PHYSICIAN ASSISTANT

## 2020-01-02 PROCEDURE — 4040F PNEUMOC VAC/ADMIN/RCVD: CPT | Performed by: PHYSICIAN ASSISTANT

## 2020-01-02 PROCEDURE — G8484 FLU IMMUNIZE NO ADMIN: HCPCS | Performed by: PHYSICIAN ASSISTANT

## 2020-01-02 PROCEDURE — 99213 OFFICE O/P EST LOW 20 MIN: CPT | Performed by: PHYSICIAN ASSISTANT

## 2020-01-02 PROCEDURE — G8427 DOCREV CUR MEDS BY ELIG CLIN: HCPCS | Performed by: PHYSICIAN ASSISTANT

## 2020-01-02 PROCEDURE — G8420 CALC BMI NORM PARAMETERS: HCPCS | Performed by: PHYSICIAN ASSISTANT

## 2020-01-02 RX ORDER — OXYCODONE AND ACETAMINOPHEN 7.5; 325 MG/1; MG/1
1 TABLET ORAL 3 TIMES DAILY PRN
Qty: 90 TABLET | Refills: 0 | Status: SHIPPED | OUTPATIENT
Start: 2020-01-02 | End: 2020-01-28 | Stop reason: SDUPTHER

## 2020-01-02 RX ORDER — LACTULOSE 10 G/15ML
10 SOLUTION ORAL EVERY EVENING
Qty: 1 BOTTLE | Refills: 0 | Status: SHIPPED | OUTPATIENT
Start: 2020-01-02 | End: 2020-01-28 | Stop reason: SDUPTHER

## 2020-01-02 RX ORDER — LIDOCAINE 50 MG/G
OINTMENT TOPICAL PRN
Qty: 1 TUBE | Refills: 0 | Status: SHIPPED
Start: 2020-01-02 | End: 2020-11-03 | Stop reason: SDUPTHER

## 2020-01-02 NOTE — PROGRESS NOTES
Robyn Huber presents to the [facility] on 1/2/2020. Xuan Rosenbaum is complaining of pain lower back. The pain is constant. The pain is described as aching. Pain is rated on his best day at a 5, on his worst day at a 9, and on average at a 8 on the VAS scale. He took his last dose of Percocet      Any procedures since your last visit: Yes, with 50% relief in his legs     Pacemaker or defibrilator: Yes managed by     He is not on NSAIDS and is  on anticoagulation medications to include ASA and is managed by PCP     /68   Pulse 86   Temp 97.6 °F (36.4 °C) (Oral)   Resp 16   Ht 5' 5\" (1.651 m)   Wt 140 lb (63.5 kg)   SpO2 94%   BMI 23.30 kg/m²      No LMP for male patient.

## 2020-01-02 NOTE — PROGRESS NOTES
[facility]  [address] [id number] [facility]    Follow up Note      Diane Nuno     Date of Visit:  2020    CC:  Patient presents for follow up   Chief Complaint   Patient presents with    Follow-up     lower back     HPI:    Pain is unchanged. Patient reports caudal KRZYSZTOF only helped slightly for a couple days. Change in quality of symptoms:no  Medication side effects: None. Recent diagnostic testing:none. Recent interventional procedures:  2019 PROCEDURE PERFORMED:  #2 Therapeutic Caudal Epidural done under fluoroscopic guidance - 20-30% relief x 2-3 days.     He has been on anticoagulation medications to include ASA, NSAIDS (diclofenac and ASA) and has not been on herbal supplements. He is not diabetic.     Imagin/2018 lumbar xray - fusion is solid  CT myelogram dated 2016 demonstrates complete block at L3/4 level. Degenerative disc disease, spondylosis causing stenosis. Probable large extruded disc at L3-4.  Spinal listhesis L4 and L5 exacerbating the stenosis  EMG dated 3/24/2016     L5 radiculopathy  CT dated 2016 lumbar spine demonstrates degenerative spinal stenosis that is severe at L3-4 L4-5 and L5-S1 levels       Potential Aberrant Drug-Related Behavior: no     Urine Drug Screenin2018 buccal consistent  2019 buccal consistent  2019 consistent  10/2019 consistent for oxycodone, metabolites, and benzodiazapines    OARRS report:  2018 consistent  2019 consistent  2019 consistent  2019 consistent  2019 consistent  2019 consistent  10/2019 consistent  10/2019 consistent  2019 consistent  2020 consistent    Past Medical History:   Diagnosis Date    CAD (coronary artery disease)     Cardiac defibrillator in place     [employer]    CHF (congestive heart failure) (HCC)     Depression     Disease of pericardium     DJD (degenerative joint disease)     Erectile dysfunction     GERD (gastroesophageal reflux disease)  Hyperlipidemia     Hypertension     Ischemic cardiomyopathy     Ischemic heart disease     LBBB (left bundle branch block)     w/ wide Qrs    Mitral regurgitation     Nonrheumatic aortic valve disorder     SHELLEY on CPAP     setting 7    Presence of coronary angioplasty implant and graft     Ventricular tachycardia [facility])        Past Surgical History:   Procedure Laterality Date    ANKLE SURGERY      CARDIAC DEFIBRILLATOR PLACEMENT  11/2009    Bi-vent    CARDIAC DEFIBRILLATOR PLACEMENT      CARDIAC PACEMAKER PLACEMENT      CORONARY ANGIOPLASTY  09/2014    DIAGNOSTIC CARDIAC CATH LAB PROCEDURE Left 04/2006    DIAGNOSTIC CARDIAC CATH LAB PROCEDURE Left 09/2014    LEXY-RCA    ELBOW SURGERY      EPIDURAL STEROID INJECTION N/A 5/7/2019    CAUDAL EPIDURAL STEROID INJECTION performed by Keerthi Pollock, DO at [facility] N/A 8/1/2019    CAUDAL EPIDURAL STEROID INJECTION performed by Keerthi Pollock, DO at [address]      foot, finger, left ankle, left leg,and right arm    JOINT REPLACEMENT Right 09/18/2018    knee    LEG SURGERY      LUMBAR FUSION  05/29/2018    OTHER SURGICAL HISTORY  04/2006    cardiac tamponade evacuation     [facility] - Dr. Yakelin Pulliam N/A 12/26/2019    CAUDAL EPIDURAL STEROID INJECTION #2 performed by Keerthi Pollock, DO at [facility] TRANSESOPHAGEAL ECHOCARDIOGRAM      10/07       Prior to Admission medications    Medication Sig Start Date End Date Taking?  Authorizing Provider   NONFORMULARY Every three month testosterone pellet insertion - Last 12/2019   Yes Historical Provider, MD   carvedilol (COREG) 3.125 MG tablet Take 1 tablet by mouth 2 times daily (with meals) 12/11/19  Yes BARI Mtz - CNP   ezetimibe (ZETIA) 10 MG tablet Take 10 mg by mouth 10/23/19  Yes Historical Provider, MD   lactulose (CHRONULAC) 10 GM/15ML solution Take 15 mLs by mouth

## 2020-01-28 ENCOUNTER — OFFICE VISIT (OUTPATIENT)
Dept: PAIN MANAGEMENT | Age: 75
End: 2020-01-28
Payer: COMMERCIAL

## 2020-01-28 VITALS
RESPIRATION RATE: 18 BRPM | HEIGHT: 65 IN | HEART RATE: 70 BPM | BODY MASS INDEX: 23.99 KG/M2 | WEIGHT: 144 LBS | DIASTOLIC BLOOD PRESSURE: 68 MMHG | SYSTOLIC BLOOD PRESSURE: 124 MMHG | OXYGEN SATURATION: 97 %

## 2020-01-28 PROCEDURE — 4040F PNEUMOC VAC/ADMIN/RCVD: CPT | Performed by: PAIN MEDICINE

## 2020-01-28 PROCEDURE — 99213 OFFICE O/P EST LOW 20 MIN: CPT | Performed by: PAIN MEDICINE

## 2020-01-28 PROCEDURE — 1036F TOBACCO NON-USER: CPT | Performed by: PAIN MEDICINE

## 2020-01-28 PROCEDURE — G8427 DOCREV CUR MEDS BY ELIG CLIN: HCPCS | Performed by: PAIN MEDICINE

## 2020-01-28 PROCEDURE — G8484 FLU IMMUNIZE NO ADMIN: HCPCS | Performed by: PAIN MEDICINE

## 2020-01-28 PROCEDURE — G8420 CALC BMI NORM PARAMETERS: HCPCS | Performed by: PAIN MEDICINE

## 2020-01-28 PROCEDURE — 1123F ACP DISCUSS/DSCN MKR DOCD: CPT | Performed by: PAIN MEDICINE

## 2020-01-28 PROCEDURE — 3017F COLORECTAL CA SCREEN DOC REV: CPT | Performed by: PAIN MEDICINE

## 2020-01-28 RX ORDER — OXYCODONE AND ACETAMINOPHEN 7.5; 325 MG/1; MG/1
1 TABLET ORAL 3 TIMES DAILY PRN
Qty: 90 TABLET | Refills: 0 | Status: SHIPPED
Start: 2020-02-01 | End: 2020-02-26 | Stop reason: SDUPTHER

## 2020-01-28 RX ORDER — LACTULOSE 10 G/15ML
10 SOLUTION ORAL EVERY EVENING
Qty: 1 BOTTLE | Refills: 0 | Status: SHIPPED
Start: 2020-01-28 | End: 2020-02-26 | Stop reason: SDUPTHER

## 2020-01-28 NOTE — PROGRESS NOTES
Sarai Reyes presents to the [facility] on 1/28/2020. Kimberly Flores is complaining of pain lower back and neck. The pain is constant. The pain is described as aching, throbbing, shooting and stabbing. Pain is rated on his best day at a 6, on his worst day at a 10, and on average at a 9 on the VAS scale. He took his last dose of Percocet   Ankle and whole foot going numb hes is falling      Any procedures since your last visit: No,   Pacemaker or defibrilator: Yes managed by katarzyna. He is not on NSAIDS and is  on anticoagulation medications to include ASA and is managed by Alis . There were no vitals taken for this visit. No LMP for male patient.
left              LAKSHMI test:not done right, not done left  Piriformis tenderness:negative right, negative left  Trochanteric bursa tenderness:negative right, negative left  SLR:negative right, negative left, sitting      Extremities:     Tremors:None bilaterally upper and lower  Range of motion:decreased ROM rt shoulder in abduction and FF  Intact:Yes  Varicose veins:absent   Pulses:present Lt radial  Cyanosis:none  Edema:none x all 4 extremities     Neurological:     Sensory:normal to light touch BLE     Motor:                 Right Quadriceps4/5          Left Quadriceps4/5           Right Gastrocnemius4/5    Left Gastrocnemius4/5  Right Ant Tibialis4/5  Left Ant Tibialis4/5    Gait:antalgic, with a walker     Dermatology:     Skin:no rashes or lesions noted     Assessment/Plan:    LBP, s/p L3-5 laminectomy and fusion surgery 5/2018 with Dr. Stephen Park for severe stenosis, xray shows solid fusion and hardware in good position 7/2019  Rt shoulder pain, is candidate for RTSA per Dr. Efrem Pimentel knee pain improving, s/p 9/2018 TKR  + SHELLEY on CPAP, + ICD/pacer    Caudal KRZYSZTOF done in December with relief x 2-3 days only thus I do not suggest repeat  PT for LE strengthening and balance training  UDS today  OARRS reviewed today  Previously discussed risk of overdose with combining opioids with benzos, encouraged him to utilize the lowest dose of benzo as possible and wean off if possible  Continue percocet 7.5/325 TID #90 (from 5/325 QID) -  Ordered today. Failed gabapentin 100 mg titration due to weakness, Lyrica due to itching and difficulty breathing  Cotninue diclofenac gel - Does not need a refill. Helpful. Continue lactulose for OIC - Ordered today.     PT - encouraged to set up aquatic therapy by Dr. Stephen Park, pt is unsure about doing further therapy  Patient encouraged to stay active  Treatment plan discussed with the patient including medication side effects

## 2020-02-25 NOTE — PROGRESS NOTES
LBBB (left bundle branch block)     w/ wide Qrs    Mitral regurgitation     Nonrheumatic aortic valve disorder     SHELLEY on CPAP     setting 7    Presence of coronary angioplasty implant and graft     Ventricular tachycardia [facility])        Past Surgical History:   Procedure Laterality Date    ANKLE SURGERY      CARDIAC DEFIBRILLATOR PLACEMENT  11/2009    Bi-vent    CARDIAC DEFIBRILLATOR PLACEMENT      CARDIAC PACEMAKER PLACEMENT      CORONARY ANGIOPLASTY  09/2014    DIAGNOSTIC CARDIAC CATH LAB PROCEDURE Left 04/2006    DIAGNOSTIC CARDIAC CATH LAB PROCEDURE Left 09/2014    LEXY-RCA    ELBOW SURGERY      EPIDURAL STEROID INJECTION N/A 5/7/2019    CAUDAL EPIDURAL STEROID INJECTION performed by Adam Hernandez DO at [facility] N/A 8/1/2019    CAUDAL EPIDURAL STEROID INJECTION performed by Adam Hernandez DO at [facility]      foot, finger, left ankle, left leg,and right arm    JOINT REPLACEMENT Right 09/18/2018    knee    LEG SURGERY      LUMBAR FUSION  05/29/2018    OTHER SURGICAL HISTORY  04/2006    cardiac tamponade evacuation     [facility] N/A 12/26/2019    CAUDAL EPIDURAL STEROID INJECTION #2 performed by Adam Hernandez DO at [facility] TRANSESOPHAGEAL ECHOCARDIOGRAM      10/07       Prior to Admission medications    Medication Sig Start Date End Date Taking? Authorizing Provider   oxyCODONE-acetaminophen (PERCOCET) 7.5-325 MG per tablet Take 1 tablet by mouth 3 times daily as needed for Pain for up to 30 days.  Intended supply: 30 days 2/1/20 3/2/20 Yes Adam Hernandez DO   lactulose [facility]) 10 GM/15ML solution Take 15 mLs by mouth every evening 1/28/20  Yes Adam Hernandez DO   diclofenac sodium (VOLTAREN) 1 % GEL Apply 4 g topically 4 times daily as needed for Pain 1/28/20 2/27/20 Yes Adam Hernandez DO   lidocaine (XYLOCAINE) 5 % ointment Apply topically as needed for Pain Apply topically as needed.  1/2/20  Yes PEACE Ramirez   NONFORMULARY Every 4 month testosterone pellet insertion - Last 12/2019   Yes Historical Provider, MD   carvedilol (COREG) 3.125 MG tablet Take 1 tablet by mouth 2 times daily (with meals) 12/11/19  Yes BARI York CNP   ezetimibe (ZETIA) 10 MG tablet Take 10 mg by mouth 10/23/19  Yes Historical Provider, MD   tamsulosin (FLOMAX) 0.4 MG capsule Take 1 capsule by mouth daily 11/12/19  Yes Lennox Organ YENI Murphy   oxybutynin (DITROPAN-XL) 10 MG extended release tablet Take 1 tablet by mouth daily 11/12/19 11/6/20 Yes Lennox Organ YENI Murphy   TRUE METRIX BLOOD GLUCOSE TEST strip USE AS DIRECTED to test blood glucose TWICE DAILY 7/30/19  Yes Historical Provider, MD   lisinopril (PRINIVIL;ZESTRIL) 20 MG tablet Take 1 tablet by mouth 2 times daily 11/14/17  Yes BARI Min CNP   hydrALAZINE (APRESOLINE) 50 MG tablet Take 1 tablet by mouth daily 1/3/17  Yes Uriel Hawkins MD   albuterol sulfate  (90 BASE) MCG/ACT inhaler Inhale 2 puffs into the lungs every 6 hours as needed for Wheezing   Yes Historical Provider, MD   aspirin 81 MG tablet Take 81 mg by mouth daily To check w/ Dr. Milford Claude office   Yes Historical Provider, MD   baclofen (LIORESAL) 10 MG tablet Take 10 mg by mouth as needed    Yes Historical Provider, MD   tadalafil (CIALIS) 20 MG tablet Take 20 mg by mouth as needed for Erectile Dysfunction   Yes Historical Provider, MD   venlafaxine (EFFEXOR XR) 150 MG extended release capsule Take 150 mg by mouth daily   Yes Historical Provider, MD   EPINEPHrine (EPIPEN IJ) Inject as directed Indications: as directed   Yes Historical Provider, MD   ferrous sulfate 325 (65 FE) MG tablet Take 325 mg by mouth as needed    Yes Historical Provider, MD   fluticasone (FLONASE ALLERGY RELIEF) 50 MCG/ACT nasal spray 1 spray by Nasal route daily   Yes Historical Provider, MD   furosemide (LASIX) 40 MG tablet Take 40 mg by mouth daily Prn   Yes Historical Provider, MD   magnesium oxide (MAG-OX) 400 MG tablet Take 400 mg by mouth daily   Yes Historical Provider, MD   pantoprazole (PROTONIX) 40 MG tablet Take 40 mg by mouth daily   Yes Historical Provider, MD   traZODone (DESYREL) 50 MG tablet Take 100 mg by mouth nightly    Yes Historical Provider, MD   triamcinolone (KENALOG) 0.1 % cream Apply topically 2 times daily Apply topically 2 times daily. Yes Historical Provider, MD   Febuxostat (ULORIC) 80 MG TABS Take by mouth Once every 3 days   Yes Historical Provider, MD   diazepam (VALIUM) 5 MG tablet Take 5 mg by mouth as needed for Anxiety   Yes Historical Provider, MD       Allergies   Allergen Reactions    Bee Venom Anaphylaxis    Cefaclor Anaphylaxis    Lyrica [Pregabalin] Shortness Of Breath     Itching     Pentazocine-Acetaminophen Hives and Rash     Rapid heart beats.      Acetaminophen Hives    Ciprofloxacin Itching    Statins Other (See Comments)     Myalgia      Sulfamethoxazole-Trimethoprim Itching    Toprol Xl [Metoprolol] Other (See Comments)     Fatigue         Social History     Socioeconomic History    Marital status:      Spouse name: Not on file    Number of children: Not on file    Years of education: Not on file    Highest education level: Not on file   Occupational History    Not on file   Social Needs    Financial resource strain: Not on file    Food insecurity:     Worry: Not on file     Inability: Not on file    Transportation needs:     Medical: Not on file     Non-medical: Not on file   Tobacco Use    Smoking status: Former Smoker     Last attempt to quit:      Years since quittin.1    Smokeless tobacco: Never Used   Substance and Sexual Activity    Alcohol use: Yes     Comment: 6-8 beers weekly     Drug use: No     Comment: coffee 2 cups    Sexual activity: Not on file   Lifestyle    Physical activity:     Days per week: Not on file     Minutes per session: Not on file    Stress: Not on file   Relationships    Social connections:     Talks on phone: Not on file     Gets together: Not on file     Attends Taoism service: Not on file     Active member of club or organization: Not on file     Attends meetings of clubs or organizations: Not on file     Relationship status: Not on file    Intimate partner violence:     Fear of current or ex partner: Not on file     Emotionally abused: Not on file     Physically abused: Not on file     Forced sexual activity: Not on file   Other Topics Concern    Not on file   Social History Narrative    Not on file       Family History   Problem Relation Age of Onset    Heart Disease Mother     No Known Problems Father     Prostate Cancer Brother         2011    Cancer Other     Diabetes Other     High Blood Pressure Other     Stroke Other     Tuberculosis Other        REVIEW OF SYSTEMS:     Dejah Sethi denies fever/chills, chest pain, shortness of breath, new bowel or bladder complaints. All other review of systems was negative.     PHYSICAL EXAMINATION:      BP (!) 142/84   Pulse 107   Temp 98.6 °F (37 °C)   Resp 18   Ht 5' 5\" (1.651 m)   Wt 149 lb (67.6 kg)   SpO2 93%   BMI 24.79 kg/m²     General:       General appearance:pleasant and well-hydrated, in no distress and A & O x3  Build:Normal Weight  Function:Rises from a seated position with difficulty     HEENT:     Head:normocephalic, atraumatic  Pupils:regular, round, equal  Sclera: icterus absent     Lungs:     Breathing:normal breathing pattern     Abdomen:     Shape:non-distended and normal  Tenderness:none  Guarding:none      Lumbar spine:     Spine inspection:surgical scar, decreased lordosis  CVA tenderness:No   Palpation: + midline TTP, positive paraspinal TTP  Range of motion:abnormal moderately in lateral bending, flexion, extension rotation bilateral and is mildly painful.     Musculoskeletal:     Trigger points in Paravertebral:absent bilaterally  SI joint tenderness:negative right, negative left              LAKSHMI test:not done right, not done left  Piriformis tenderness:negative right, negative left  Trochanteric bursa tenderness:negative right, negative left  SLR:negative right, negative left, sitting      Extremities:     Tremors:None bilaterally upper and lower  Range of motion:decreased ROM rt shoulder in abduction and FF  Intact:Yes  Varicose veins:absent   Pulses:present Lt radial  Cyanosis:none  Edema:none x all 4 extremities     Neurological:     Sensory:normal to light touch BLE     Motor:                 Right Quadriceps4/5          Left Quadriceps4/5           Right Gastrocnemius4/5    Left Gastrocnemius4/5  Right Ant Tibialis4/5  Left Ant Tibialis4/5    Gait:antalgic, with a walker     Dermatology:     Skin:no rashes or lesions noted; healing ecchymoses right forefoot     Assessment/Plan:    LBP, s/p L3-5 laminectomy and fusion surgery 5/2018 with Dr. Clay Noble for severe stenosis, xray shows solid fusion and hardware in good position 7/2019  Rt shoulder pain, is candidate for RTSA per Dr. Kayleigh León knee pain improving, s/p 9/2018 TKR  + SHELLEY on CPAP, + ICD/pacer    Recently found to be hemoccult +, pending colonoscopy  Continues to have frequent falls, encouraged in use of non-rollator walker as rollator walker may have been contributory    Caudal KRZYSZTOF done in December with relief x 2-3 days only thus I do not suggest repeat  PT for LE strengthening and balance training ordered previously but he has not participated  UDS and OARRS reviewed  Previously discussed risk of overdose with combining opioids with benzos, encouraged him to utilize the lowest dose of benzo as possible and wean off if possible  RF percocet 7.5/325 TID #90 (from 5/325 QID)   Discussed OTC apap up to 2000 mg per day in total  Failed gabapentin 100 mg titration due to weakness, Lyrica due to itching and difficulty breathing  Cotninue diclofenac gel - Does not need a refill. Helpful. Continue lactulose for OIC - RF  PT - encouraged to set up aquatic therapy by Dr. Jean Pierre Beavers, pt is unsure about doing further therapy  He is not a candidate for SCS due to his quite frequent falls  Patient encouraged to stay active  Treatment plan discussed with the patient including medication side effects

## 2020-02-26 ENCOUNTER — OFFICE VISIT (OUTPATIENT)
Dept: PAIN MANAGEMENT | Age: 75
End: 2020-02-26
Payer: COMMERCIAL

## 2020-02-26 VITALS
BODY MASS INDEX: 24.83 KG/M2 | WEIGHT: 149 LBS | TEMPERATURE: 98.6 F | RESPIRATION RATE: 18 BRPM | DIASTOLIC BLOOD PRESSURE: 84 MMHG | HEIGHT: 65 IN | OXYGEN SATURATION: 93 % | SYSTOLIC BLOOD PRESSURE: 142 MMHG | HEART RATE: 107 BPM

## 2020-02-26 PROCEDURE — G8427 DOCREV CUR MEDS BY ELIG CLIN: HCPCS | Performed by: PAIN MEDICINE

## 2020-02-26 PROCEDURE — 1123F ACP DISCUSS/DSCN MKR DOCD: CPT | Performed by: PAIN MEDICINE

## 2020-02-26 PROCEDURE — G8484 FLU IMMUNIZE NO ADMIN: HCPCS | Performed by: PAIN MEDICINE

## 2020-02-26 PROCEDURE — 3017F COLORECTAL CA SCREEN DOC REV: CPT | Performed by: PAIN MEDICINE

## 2020-02-26 PROCEDURE — G8420 CALC BMI NORM PARAMETERS: HCPCS | Performed by: PAIN MEDICINE

## 2020-02-26 PROCEDURE — 4040F PNEUMOC VAC/ADMIN/RCVD: CPT | Performed by: PAIN MEDICINE

## 2020-02-26 PROCEDURE — 99214 OFFICE O/P EST MOD 30 MIN: CPT | Performed by: PAIN MEDICINE

## 2020-02-26 PROCEDURE — 1036F TOBACCO NON-USER: CPT | Performed by: PAIN MEDICINE

## 2020-02-26 RX ORDER — LACTULOSE 10 G/15ML
10 SOLUTION ORAL EVERY EVENING
Qty: 1 BOTTLE | Refills: 5 | Status: SHIPPED
Start: 2020-02-26 | End: 2020-12-23 | Stop reason: SDUPTHER

## 2020-02-26 RX ORDER — OXYCODONE AND ACETAMINOPHEN 7.5; 325 MG/1; MG/1
1 TABLET ORAL 3 TIMES DAILY PRN
Qty: 90 TABLET | Refills: 0 | Status: SHIPPED
Start: 2020-03-02 | End: 2020-03-31 | Stop reason: SDUPTHER

## 2020-03-24 ENCOUNTER — TELEPHONE (OUTPATIENT)
Dept: PAIN MANAGEMENT | Age: 75
End: 2020-03-24

## 2020-03-24 NOTE — TELEPHONE ENCOUNTER
Called spoke to wife, Gabriella Sunshine has apt on 3-31 at 2:45 with Dr Iam Motta, the [address] [place] [address] office is closed due to the Covd 19. Dr Iam Motta will be doing the telephone visits   Call [phone].

## 2020-03-31 ENCOUNTER — VIRTUAL VISIT (OUTPATIENT)
Dept: PAIN MANAGEMENT | Age: 75
End: 2020-03-31
Payer: COMMERCIAL

## 2020-03-31 PROCEDURE — 99214 OFFICE O/P EST MOD 30 MIN: CPT | Performed by: PAIN MEDICINE

## 2020-03-31 RX ORDER — OXYCODONE AND ACETAMINOPHEN 7.5; 325 MG/1; MG/1
1 TABLET ORAL 3 TIMES DAILY PRN
Qty: 90 TABLET | Refills: 0 | Status: SHIPPED
Start: 2020-04-01 | End: 2020-05-01 | Stop reason: SDUPTHER

## 2020-03-31 NOTE — PROGRESS NOTES
that these medications may cause drowsiness, sedation or dizziness and have counseled the patient not to drive or operate machinery. We have discussed that these medications will be prescribed only by one provider. We have discussed with the patient about age related risk factors and have thoroughly discussed the importance of taking these medications as prescribed. The patient verbalizes understanding. Steps to help prevent the spread of COVID-19 if you are sick  SOURCE - https://f[employer]ice.info/. html     Stay home except to get medical care   ; Stay home: You should restrict activities outside your home, except for getting medical care.   ; Avoid public areas: Do not go to work, school, or public areas.   ; Avoid public transportation: Avoid using public transportation, ride-sharing, or taxis.  ; Separate yourself from other people and animals in your home         Information for Household Members and Caregivers of Someone who is Sick   Call ahead before visiting your doctor   Call ahead: If you have a medical appointment, call the healthcare provider and tell them that you have or may have COVID-19. This will help the healthcare provider's office take steps to keep other people from getting infected or exposed. Wear a facemask if you are sick   ; If you are sick: You should wear a facemask when you are around other people and before you enter a healthcare provider's office. Cover your coughs and sneezes   ; Cover: Cover your mouth and nose with a tissue when you cough or sneeze.   ; Wash hands: Immediately wash your hands with soap and water for at least 20 seconds or, if soap and water are not available, clean your hands with an alcohol-based hand  that contains at least 60% alcohol. Clean your hands often   ;  Wash hands: Wash your hands often with soap and water for at least 20 seconds, especially after blowing your nose, coughing, or sneezing; going

## 2020-05-01 ENCOUNTER — VIRTUAL VISIT (OUTPATIENT)
Dept: PAIN MANAGEMENT | Age: 75
End: 2020-05-01
Payer: COMMERCIAL

## 2020-05-01 PROCEDURE — 99213 OFFICE O/P EST LOW 20 MIN: CPT | Performed by: PAIN MEDICINE

## 2020-05-01 RX ORDER — OXYCODONE AND ACETAMINOPHEN 7.5; 325 MG/1; MG/1
1 TABLET ORAL 3 TIMES DAILY PRN
Qty: 90 TABLET | Refills: 0 | Status: SHIPPED
Start: 2020-05-01 | End: 2020-05-26 | Stop reason: SDUPTHER

## 2020-05-01 NOTE — PROGRESS NOTES
[address]  [address] [facility], [address] [place] [address], [id number] [address]        Date of Visit:  2020    CC:     Consent:  He and/or health care decision maker is aware that that he may receive a bill for this telephone service, depending on his insurance coverage, and has provided verbal consent to proceed: Yes    HPI:  Documentation:  I communicated with the patient and/or health care decision maker about the treatment plan. Details of this discussion including any medical advice provided: in the medical record    Patient location: Home  Physician location: office    Galeinocencio Nowak on anticoagulation medications to include ASA, NSAIDS (diclofenac and ASA) and has not been on herbal supplements.  He is not diabetic.     Imagin/2018 lumbar xray - fusion is solid  CT myelogram dated 2016 demonstrates complete block at L3/4 level. Degenerative disc disease, spondylosis causing stenosis. Probable large extruded disc at L3-4.  Spinal listhesis L4 and L5 exacerbating the stenosis  EMG dated 3/24/2016     L5 radiculopathy  CT dated 2016 lumbar spine demonstrates degenerative spinal stenosis that is severe at L3-4 L4-5 and L5-S1 levels                                        Potential Aberrant Drug-Related Behavior: no      Urine Drug Screenin2018 buccal consistent  2019 buccal consistent  2019 consistent  10/2019 consistent for oxycodone, metabolites, and benzodiazapines  2020 consistent     OARRS report:  2018 consistent  2019 consistent  2019 consistent  2019 consistent  2019 consistent  2019 consistent  10/2019 consistent  10/2019 consistent  2019 consistent  2020 consistent  2020 consistent  2020 consistent  3/2020 consistent   2020 consistent    Past Medical History: Reviewed    Past Surgical History: Reviewed     Home Medications: Reviewed    Allergies: Reviewed     Social History: Reviewed     REVIEW OF SYSTEMS:     [facility] department in the past 7 days or scheduled within the next 24 hours.     Total Time: minutes: 21-30 minutes    Note: not billable if this call serves to triage the patient into an appointment for the relevant concern

## 2020-05-01 NOTE — PROGRESS NOTES
Ludmila Rodriguez was read the following message We want to confirm that, for purposes of billing, this is a virtual visit with your provider for which we will submit a claim for reimbursement with your insurance company. You will be responsible for any copays, coinsurance amounts or other amounts not covered by your insurance company. If you do not accept this, unfortunately we will not be able to schedule a virtual visit with the provider. Do you accept? Dinora Mejía responded Yes .

## 2020-05-22 NOTE — PROGRESS NOTES
[address]  [address] [facility], [address] [place] [address], [address] [address]        Date of Visit:  2020    CC:     Consent:  He and/or health care decision maker is aware that that he may receive a bill for this telephone service, depending on his insurance coverage, and has provided verbal consent to proceed: Yes    HPI:  Documentation:  I communicated with the patient and/or health care decision maker about the treatment plan. Details of this discussion including any medical advice provided: in the medical record    Patient location: Home  Physician location: Other address in [address] [place] [address] on anticoagulation medications to include ASA, NSAIDS (diclofenac and ASA) and has not been on herbal supplements.  He is not diabetic.     Imagin/2018 lumbar xray - fusion is solid  CT myelogram dated 2016 demonstrates complete block at L3/4 level. Degenerative disc disease, spondylosis causing stenosis. Probable large extruded disc at L3-4.  Spinal listhesis L4 and L5 exacerbating the stenosis  EMG dated 3/24/2016     L5 radiculopathy  CT dated 2016 lumbar spine demonstrates degenerative spinal stenosis that is severe at L3-4 L4-5 and L5-S1 levels                                        Potential Aberrant Drug-Related Behavior: no      Urine Drug Screenin2018 buccal consistent  2019 buccal consistent  2019 consistent  10/2019 consistent for oxycodone, metabolites, and benzodiazapines  2020 consistent     OARRS report:  2018 consistent  2019 consistent  2019 consistent  2019 consistent  2019 consistent  2019 consistent  10/2019 consistent  10/2019 consistent  2019 consistent  2020 consistent  2020 consistent  2020 consistent  3/2020 consistent   2020 consistent  2020 consistent    Past Medical History: Reviewed    Past Surgical History: Reviewed     Home Medications: Reviewed    Allergies: Reviewed     Social History: Reviewed REVIEW OF SYSTEMS:     Frankey Boyer denies fever/chills, chest pain, shortness of breath, new bowel or bladder complaints. All other review of systems was negative. PHYSICAL EXAMINATION:     General:       A & O x3    Lungs:    Breathing:non-labored      Impression:  LBP, s/p L3-5 laminectomy and fusion surgery 5/2018 with Dr. Corrina Brantley for severe stenosis, xray shows solid fusion and hardware in good position 7/2019  Rt shoulder pain, is candidate for RTSA per Dr. Sumi Bob knee pain improving, s/p 9/2018 TKR  + SHELLEY on CPAP, + ICD/pacer     Improving with dx of PNA, no longer coughing up blood      Plan:    Caudal KRZYSZTOF done in December with relief x 2-3 days only thus I do not suggest repeat  PT for LE strengthening and balance training ordered previously but he has not participated  OARRS reviewed  Previously discussed risk of overdose with combining opioids with benzos, encouraged him to utilize the lowest dose of benzo as possible and wean off if possible  RF percocet 7.5/325 TID #90 (from 5/325 QID)   Discussed OTC apap up to 2000 mg per day in total (including what is in percocet)  Failed gabapentin 100 mg titration due to weakness, Lyrica due to itching and difficulty breathing  Cotninue diclofenac gel - Does not need a refill. Helpful. Continue lactulose for OIC - no RF needed  PT - encouraged to set up aquatic therapy by Dr. Corrina Brantley, pt is unsure about doing further therapy  He is not a candidate for SCS due to his quite frequent falls  Patient encouraged to stay active  Treatment plan discussed with the patient including medication side effects     We discussed with the patient that combining opioids, benzodiazepines, alcohol, illicit drugs or sleep aids increases the risk of respiratory depression including death. We discussed that these medications may cause drowsiness, sedation or dizziness and have counseled the patient not to drive or operate machinery.  We have discussed that these medications will be

## 2020-05-26 ENCOUNTER — VIRTUAL VISIT (OUTPATIENT)
Dept: PAIN MANAGEMENT | Age: 75
End: 2020-05-26
Payer: COMMERCIAL

## 2020-05-26 PROCEDURE — 99442 PR PHYS/QHP TELEPHONE EVALUATION 11-20 MIN: CPT | Performed by: PAIN MEDICINE

## 2020-05-26 RX ORDER — OXYCODONE AND ACETAMINOPHEN 7.5; 325 MG/1; MG/1
1 TABLET ORAL 3 TIMES DAILY PRN
Qty: 90 TABLET | Refills: 0 | Status: SHIPPED
Start: 2020-05-31 | End: 2020-07-01 | Stop reason: SDUPTHER

## 2020-06-30 NOTE — PROGRESS NOTES
consistent  5/2020 consistent  7/2020 consistent    Past Medical History: Reviewed    Past Surgical History: Reviewed     Home Medications: Reviewed    Allergies: Reviewed     Social History: Reviewed     REVIEW OF SYSTEMS:     Yinka Zamorano denies fever/chills, chest pain, shortness of breath, new bowel or bladder complaints. All other review of systems was negative. PHYSICAL EXAMINATION:     General:       A & O x3    Lungs:    Breathing:non-labored      Impression:  LBP, s/p L3-5 laminectomy and fusion surgery 5/2018 with Dr. Tamra Hamilton for severe stenosis, xray shows solid fusion and hardware in good position 7/2019  Rt shoulder pain, is candidate for RTSA per Dr. Ariana Lin knee pain improving, s/p 9/2018 TKR  + SHELLEY on CPAP, + ICD/pacer     Patient states he continues with significant back pain and intermittent BLE numbness which leads to falls, feels as though this is worse over time and worse since he last saw Dr. Tamra Hamilton  He may consider seeing Dr. Tamra Hamilton in follow up again, we discussed the next step would be a CT myelogram  He reports losing muscle mass \"all over my body\", including BUE and BLE, discussed consideration for a neurology referral and he would like to move forward with neurology referral      Plan:    Caudal KRZYSZTOF done in December with relief x 2-3 days only thus I do not suggest repeat  PT for LE strengthening and balance training ordered previously but he has not participated  OARRS reviewed  Previously discussed risk of overdose with combining opioids with benzos, encouraged him to utilize the lowest dose of benzo as possible and wean off if possible  RF percocet 7.5/325 TID #90 (from 5/325 QID)   Discussed OTC apap up to 2000 mg per day in total (including what is in percocet)  Failed gabapentin 100 mg titration due to weakness, Lyrica due to itching and difficulty breathing  Cotninue diclofenac gel - Does not need a refill. Helpful.     Continue lactulose for OIC - no RF needed  He is not a candidate for SCS due to his quite frequent falls  Patient encouraged to stay active  Treatment plan discussed with the patient including medication side effects      We discussed with the patient that combining opioids, benzodiazepines, alcohol, illicit drugs or sleep aids increases the risk of respiratory depression including death. We discussed that these medications  may cause drowsiness, sedation or dizziness and have counseled the patient not to drive or operate machinery. We have discussed that these medications will be prescribed only by one provider. We have  discussed with the patient about age related risk factors and have thoroughly discussed the importance of taking these medications as prescribed. The patient verbalizes understanding. Steps to help prevent the spread of COVID-19 if you are sick  SOURCE - https://[employer]ice.[employer]/. html     Stay home except to get medical care   ; Stay home: You should restrict activities outside your home, except for getting medical care.   ; Avoid public areas: Do not go to work, school, or public areas.   ; Avoid public transportation: Avoid using public transportation, ride-sharing, or taxis.  ; Separate yourself from other people and animals in your home         Information for Household Members and Caregivers of Someone who is Sick   Call ahead before visiting your doctor   Call ahead: If you have a medical appointment, call the healthcare provider and tell them that you have or may have COVID-19. This will help the healthcare provider's office take steps to keep other people from getting infected or exposed. Wear a facemask if you are sick   ; If you are sick: You should wear a facemask when you are around other people and before you enter a healthcare provider's office. Cover your coughs and sneezes   ;  Cover: Cover your mouth and nose with a tissue when you cough or sneeze.   ; Wash hands: Immediately wash your hands with soap and water for at least 20 seconds or, if soap and water are not available, clean your hands with an alcohol-based hand  that contains at least 60% alcohol. Clean your hands often   ; Wash hands: Wash your hands often with soap and water for at least 20 seconds, especially after blowing your nose, coughing, or sneezing; going to the bathroom; and before eating or preparing food.   ; Hand : If soap and water are not readily available, use an alcohol-based hand  with at least 60% alcohol.  ; Avoid touching: Avoid touching your eyes, nose, and mouth with unwashed hands. Handwashing Tips   ; Wet your hands with clean, running water (warm or cold), turn off the tap, and apply soap.  ; Lather your hands by rubbing them together with the soap. Lather the backs of your hands, between your fingers, and under your nails. ; Scrub your hands for at least 20 seconds. Need a timer? Hum the [place] from beginning to end twice.  ; Rinse your hands well under clean, running water.  ; Dry your hands using a clean towel or air dry them. Monitor your symptoms     Seek medical attention: Seek prompt medical attention if your illness is worsening (e.g., difficulty breathing).     ; Call your doctor: Before seeking care, call your healthcare provider and tell them that you have, or are being evaluated for, COVID-19.   ; Wear a facemask when sick: Put on a facemask before you enter the facility. These steps will help the healthcare provider's office to keep other people in the office or waiting room from getting infected or exposed.   ; Call 911 if you have a medical emergency: If you have a medical emergency and need to call 911, notify the dispatch personnel that you have, or are being evaluated for COVID-19. If possible, put on a facemask before emergency medical services arrive.        I affirm this is a Patient Initiated Episode with an Established Patient who has not had a related appointment within my department in the past 7 days or scheduled within the next 24 hours.     Total Time: minutes: 11-20 minutes    Note: not billable if this call serves to triage the patient into an appointment for the relevant concern

## 2020-07-01 ENCOUNTER — VIRTUAL VISIT (OUTPATIENT)
Dept: PAIN MANAGEMENT | Age: 75
End: 2020-07-01
Payer: COMMERCIAL

## 2020-07-01 PROCEDURE — 99213 OFFICE O/P EST LOW 20 MIN: CPT | Performed by: PAIN MEDICINE

## 2020-07-01 RX ORDER — OXYCODONE AND ACETAMINOPHEN 7.5; 325 MG/1; MG/1
1 TABLET ORAL 3 TIMES DAILY PRN
Qty: 90 TABLET | Refills: 0 | Status: SHIPPED
Start: 2020-07-01 | End: 2020-08-05 | Stop reason: SDUPTHER

## 2020-08-03 ENCOUNTER — TELEPHONE (OUTPATIENT)
Dept: NEUROLOGY | Age: 75
End: 2020-08-03

## 2020-08-03 NOTE — TELEPHONE ENCOUNTER
Pt wife contacted office and stated pt was referred to neurology, but would like to hold off on seeing provider, because pt is dealing with a lot of other health issues at this time.       Referral closed

## 2020-08-05 ENCOUNTER — VIRTUAL VISIT (OUTPATIENT)
Dept: PAIN MANAGEMENT | Age: 75
End: 2020-08-05
Payer: COMMERCIAL

## 2020-08-05 PROCEDURE — 99213 OFFICE O/P EST LOW 20 MIN: CPT | Performed by: PAIN MEDICINE

## 2020-08-05 RX ORDER — OXYCODONE AND ACETAMINOPHEN 7.5; 325 MG/1; MG/1
1 TABLET ORAL 3 TIMES DAILY PRN
Qty: 90 TABLET | Refills: 0 | Status: SHIPPED
Start: 2020-08-05 | End: 2020-09-01 | Stop reason: SDUPTHER

## 2020-08-05 NOTE — PROGRESS NOTES
[address]  [address] [facility], [address] [place] [address], [address] [address]        Date of Visit:  2020    CC:     Consent:  He and/or health care decision maker is aware that that he may receive a bill for this telephone service, depending on his insurance coverage, and has provided verbal consent to proceed: Yes    HPI:  Documentation:  I communicated with the patient and/or health care decision maker about the treatment plan. Details of this discussion including any medical advice provided: in the medical record    Patient location: Home  Physician location: Other address in [facility]    Pain is unchanged. Change in quality of symptoms:no  Medication side effects: None. Recent diagnostic testing:none. Recent interventional procedures:  None since last visit    He has been on anticoagulation medications to include ASA, NSAIDS (diclofenac and ASA) and has not been on herbal supplements.  He is not diabetic.     Imagin/2018 lumbar xray - fusion is solid  CT myelogram dated 2016 demonstrates complete block at L3/4 level. Degenerative disc disease, spondylosis causing stenosis. Probable large extruded disc at L3-4.  Spinal listhesis L4 and L5 exacerbating the stenosis  EMG dated 3/24/2016     L5 radiculopathy  CT dated 2016 lumbar spine demonstrates degenerative spinal stenosis that is severe at L3-4 L4-5 and L5-S1 levels                                        Potential Aberrant Drug-Related Behavior: no      Urine Drug Screenin2018 buccal consistent  2019 buccal consistent  2019 consistent  10/2019 consistent for oxycodone, metabolites, and benzodiazapines  2020 consistent     OARRS report:  2018 consistent  2019 consistent  2019 consistent  2019 consistent  2019 consistent  2019 consistent  10/2019 consistent  10/2019 consistent  2019 consistent  2020 consistent  2020 consistent  2020 consistent  3/2020 consistent   2020 consistent  5/2020 consistent  7/2020 consistent  8/2020 consistent    Past Medical History: Reviewed    Past Surgical History: Reviewed     Home Medications: Reviewed    Allergies: Reviewed     Social History: Reviewed     REVIEW OF SYSTEMS:     Mikey Natarajan denies fever/chills, chest pain, shortness of breath, new bowel or bladder complaints. All other review of systems was negative.     PHYSICAL EXAMINATION:     General:       A & O x3    Lungs:    Breathing:non-labored      Impression:  LBP, s/p L3-5 laminectomy and fusion surgery 5/2018 with Dr. Brigette Acosta for severe stenosis, xray shows solid fusion and hardware in good position 7/2019  Rt shoulder pain, is candidate for RTSA per Dr. Michel Rubin knee pain improving, s/p 9/2018 TKR  + SHELLEY on CPAP, + ICD/pacer     He reports losing muscle mass \"all over my body\", including BUE and BLE, discussed consideration for a neurology referral and he would like to move forward with neurology referral (has been put on hold at this time due to the heart failure issues noted below)     Was found to have significant heart failure, had echo diagnosed with severe aortic stenosis with 3 \"bad valves\" and is pending a DUSTY  Wife also states his RBC and hemoglobin are also continuing to decline    Today he reports he had some thoracic radiating pain in to the abdomen, has had a CT chest recently which shows thoracic degeneration with autofusion  Can consider a dedicated thoracic CT scan once his heart issues are treated, cannot have MRI due to pacemaker/defib     Plan:    Caudal KRZYSZTOF done in December with relief x 2-3 days only thus I do not suggest repeat  PT for LE strengthening and balance training ordered previously but he has not participated  OARRS reviewed  Previously discussed risk of overdose with combining opioids with benzos, encouraged him to utilize the lowest dose of benzo as possible and wean off if possible  RF percocet 7.5/325 TID #90 (from 5/325 QID), pt states he feels as other people from getting infected or exposed. Wear a facemask if you are sick   ; If you are sick: You should wear a facemask when you are around other people and before you enter a healthcare provider's office. Cover your coughs and sneezes   ; Cover: Cover your mouth and nose with a tissue when you cough or sneeze.   ; Wash hands: Immediately wash your hands with soap and water for at least 20 seconds or, if soap and water are not available, clean your hands with an alcohol-based hand  that contains at least 60% alcohol. Clean your hands often   ; Wash hands: Wash your hands often with soap and water for at least 20 seconds, especially after blowing your nose, coughing, or sneezing; going to the bathroom; and before eating or preparing food.   ; Hand : If soap and water are not readily available, use an alcohol-based hand  with at least 60% alcohol.  ; Avoid touching: Avoid touching your eyes, nose, and mouth with unwashed hands. Handwashing Tips   ; Wet your hands with clean, running water (warm or cold), turn off the tap, and apply soap.  ; Lather your hands by rubbing them together with the soap. Lather the backs of your hands, between your fingers, and under your nails. ; Scrub your hands for at least 20 seconds. Need a timer? Hum the [place] from beginning to end twice.  ; Rinse your hands well under clean, running water.  ; Dry your hands using a clean towel or air dry them. Monitor your symptoms     Seek medical attention: Seek prompt medical attention if your illness is worsening (e.g., difficulty breathing).     ; Call your doctor: Before seeking care, call your healthcare provider and tell them that you have, or are being evaluated for, COVID-19.   ; Wear a facemask when sick: Put on a facemask before you enter the facility.  These steps will help the healthcare provider's office to keep other people in the office or waiting room from getting infected or exposed.   ; Call 911 if you have a medical emergency: If you have a medical emergency and need to call 911, notify the dispatch personnel that you have, or are being evaluated for COVID-19. If possible, put on a facemask before emergency medical services arrive. I affirm this is a Patient Initiated Episode with an Established Patient who has not had a related appointment within my department in the past 7 days or scheduled within the next 24 hours.     Total Time: minutes: 21-30 minutes    Note: not billable if this call serves to triage the patient into an appointment for the relevant concern

## 2020-08-05 NOTE — PROGRESS NOTES
Do you currently have any of the following:    Fever: No  Headache:  No  Cough: No  Shortness of breath: No  Exposed to anyone with these symptoms: No                                                                                                                Marti Lev presents to the [facility] on 8/5/2020. Corona Montes De Oca is complaining of pain lower back and left buttock right shoulder. The pain is constant. The pain is described as aching, throbbing, shooting and stabbing. Pain is rated on his best day at a 7, on his worst day at a 10, and on average at a 8 on the VAS scale. He took his last dose of Percocet today. Corona Montes De Oca does not have issues with constipation. Any procedures since your last visit: No, with  % relief. He is not on NSAIDS and  is  on anticoagulation medications to include ASA and is managed by Pretty Way MD  .     Pacemaker or defibrilator: yes Physician managing device is Dr Juan M Shaikh. There were no vitals taken for this visit. No LMP for male patient.

## 2020-08-05 NOTE — PROGRESS NOTES
Lotus Copeland was read the following message We want to confirm that, for purposes of billing, this is a virtual visit with your provider for which we will submit a claim for reimbursement with your insurance company. You will be responsible for any copays, coinsurance amounts or other amounts not covered by your insurance company. If you do not accept this, unfortunately we will not be able to schedule a virtual visit with the provider. Do you accept? Ember Curry responded Yes .

## 2020-08-31 PROBLEM — N48.1 BALANITIS: Status: ACTIVE | Noted: 2020-08-31

## 2020-09-01 ENCOUNTER — OFFICE VISIT (OUTPATIENT)
Dept: PAIN MANAGEMENT | Age: 75
End: 2020-09-01
Payer: COMMERCIAL

## 2020-09-01 VITALS
OXYGEN SATURATION: 98 % | BODY MASS INDEX: 22.16 KG/M2 | DIASTOLIC BLOOD PRESSURE: 69 MMHG | TEMPERATURE: 97.6 F | WEIGHT: 133 LBS | SYSTOLIC BLOOD PRESSURE: 120 MMHG | HEART RATE: 90 BPM | HEIGHT: 65 IN

## 2020-09-01 PROCEDURE — 99213 OFFICE O/P EST LOW 20 MIN: CPT | Performed by: PAIN MEDICINE

## 2020-09-01 PROCEDURE — 4040F PNEUMOC VAC/ADMIN/RCVD: CPT | Performed by: PAIN MEDICINE

## 2020-09-01 PROCEDURE — 1036F TOBACCO NON-USER: CPT | Performed by: PAIN MEDICINE

## 2020-09-01 PROCEDURE — G8427 DOCREV CUR MEDS BY ELIG CLIN: HCPCS | Performed by: PAIN MEDICINE

## 2020-09-01 PROCEDURE — 1123F ACP DISCUSS/DSCN MKR DOCD: CPT | Performed by: PAIN MEDICINE

## 2020-09-01 PROCEDURE — G8420 CALC BMI NORM PARAMETERS: HCPCS | Performed by: PAIN MEDICINE

## 2020-09-01 RX ORDER — BACLOFEN 10 MG/1
10 TABLET ORAL DAILY PRN
Qty: 30 TABLET | Refills: 0 | Status: SHIPPED
Start: 2020-09-01 | End: 2020-09-30 | Stop reason: SDUPTHER

## 2020-09-01 RX ORDER — LACTULOSE 10 G/15ML
10 SOLUTION ORAL EVERY EVENING
Qty: 1 BOTTLE | Refills: 0 | Status: SHIPPED
Start: 2020-09-01 | End: 2020-09-30 | Stop reason: SDUPTHER

## 2020-09-01 RX ORDER — OXYCODONE AND ACETAMINOPHEN 7.5; 325 MG/1; MG/1
1 TABLET ORAL 3 TIMES DAILY PRN
Qty: 90 TABLET | Refills: 0 | Status: SHIPPED
Start: 2020-09-04 | End: 2020-09-30 | Stop reason: SDUPTHER

## 2020-09-01 NOTE — PROGRESS NOTES
Do you currently have any of the following:    Fever: No  Headache:  No  Cough: No  Shortness of breath: No  Exposed to anyone with these   ymptoms: No         Bren García presents to the [address] [place] [address] on 9/1/2020. Vish Dimas is complaining of pain lumbar sprain. The pain is constant. The pain is described as aching, shooting, stabbing, dull and sharp. Pain is rated on his best day at a 7, on his worst day at a 10, and on average at a 9 on the VAS scale. He took his last dose of Percocet this am.     Any procedures since your last visit: No,     Pacemaker or defibrilator: Yes managed by Dr Edin Dillon, Cardiologist.    He is  on NSAIDS and is  on anticoagulation medications to include ASA and is managed by Dr Riki Johnson, Cardiologist.     /69 (Site: Right Upper Arm, Position: Sitting, Cuff Size: Medium Adult)   Pulse 90   Temp 97.6 °F (36.4 °C)   Ht 5' 5\" (1.651 m)   Wt 133 lb (60.3 kg)   SpO2 98%   BMI 22.13 kg/m²      No LMP for male patient.

## 2020-09-01 NOTE — PROGRESS NOTES
[address]  [address]    Follow up Note      Deborah Gomez     Date of Visit:  2020    CC:  Patient presents for follow up   Chief Complaint   Patient presents with    Follow-up     back pain     HPI:    Pain is unchanged. Change in quality of symptoms:yes, having increased back spasms  Medication side effects: None. Recent diagnostic testing:none.   Recent interventional procedures:  None since last visit     He has been on anticoagulation medications to include ASA, NSAIDS (diclofenac and ASA) and has not been on herbal supplements.  He is not diabetic.     Imagin/2018 lumbar xray - fusion is solid  CT myelogram dated 2016 demonstrates complete block at L3/4 level. Degenerative disc disease, spondylosis causing stenosis. Probable large extruded disc at L3-4.  Spinal listhesis L4 and L5 exacerbating the stenosis  EMG dated 3/24/2016     L5 radiculopathy  CT dated 2016 lumbar spine demonstrates degenerative spinal stenosis that is severe at L3-4 L4-5 and L5-S1 levels                                        Potential Aberrant Drug-Related Behavior: no      Urine Drug Screenin2018 buccal consistent  2019 buccal consistent  2019 consistent  10/2019 consistent for oxycodone, metabolites, and benzodiazapines  2020 consistent     OARRS report:  2018 consistent  2019 consistent  2019 consistent  2019 consistent  2019 consistent  2019 consistent  10/2019 consistent  10/2019 consistent  2019 consistent  2020 consistent  2020 consistent  2020 consistent  3/2020 consistent   2020 consistent  2020 consistent  2020 consistent  2020 consistent  2020 consistent      Past Medical History:   Diagnosis Date    CAD (coronary artery disease)     Cardiac defibrillator in place     [employer]    CHF (congestive heart failure) (HCC)     Depression     Disease of pericardium     DJD (degenerative joint disease)  Erectile dysfunction     GERD (gastroesophageal reflux disease)     Hyperlipidemia     Hypertension     Ischemic cardiomyopathy     Ischemic heart disease     LBBB (left bundle branch block)     w/ wide Qrs    Mitral regurgitation     Nonrheumatic aortic valve disorder     SHELLEY on CPAP     setting 7    Presence of coronary angioplasty implant and graft     Ventricular tachycardia [facility])        Past Surgical History:   Procedure Laterality Date    ANKLE SURGERY      CARDIAC CATHETERIZATION  08/24/2020    CARDIAC DEFIBRILLATOR PLACEMENT  11/2009    Bi-vent    CARDIAC DEFIBRILLATOR PLACEMENT      CARDIAC PACEMAKER PLACEMENT      CORONARY ANGIOPLASTY  09/2014    DIAGNOSTIC CARDIAC CATH LAB PROCEDURE Left 04/2006    DIAGNOSTIC CARDIAC CATH LAB PROCEDURE Left 09/2014    LXEY-RCA    ELBOW SURGERY      EPIDURAL STEROID INJECTION N/A 5/7/2019    CAUDAL EPIDURAL STEROID INJECTION performed by Tiarra Frey DO at [address] N/A 8/1/2019    CAUDAL EPIDURAL STEROID INJECTION performed by Tiarra Frey DO at [facility]      foot, finger, left ankle, left leg,and right arm    JOINT REPLACEMENT Right 09/18/2018    knee    LEG SURGERY      LUMBAR FUSION  05/29/2018    OTHER SURGICAL HISTORY  04/2006    cardiac tamponade evacuation     Carly Jiménez - Dr. Sho Hernández N/A 12/26/2019    CAUDAL EPIDURAL STEROID INJECTION #2 performed by Tiarra Frey DO at [facility] TRANSESOPHAGEAL ECHOCARDIOGRAM      10/07    TRANSESOPHAGEAL ECHOCARDIOGRAM  08/24/2020       Prior to Admission medications    Medication Sig Start Date End Date Taking? Authorizing Provider   nystatin-triamcinolone (MYCOLOG II) 354661-1.9 UNIT/GM-% cream Apply topically 2 times daily.  8/25/20  Yes Amita Macias MD   oxyCODONE-acetaminophen (PERCOCET) 7.5-325 MG per tablet Take 1 tablet by mouth 3 times daily as needed needed    Yes Historical Provider, MD   venlafaxine (EFFEXOR XR) 150 MG extended release capsule Take 150 mg by mouth daily   Yes Historical Provider, MD   EPINEPHrine (EPIPEN IJ) Inject as directed Indications: as directed   Yes Historical Provider, MD   ferrous sulfate 325 (65 FE) MG tablet Take 325 mg by mouth as needed    Yes Historical Provider, MD   fluticasone (FLONASE ALLERGY RELIEF) 50 MCG/ACT nasal spray 1 spray by Nasal route daily   Yes Historical Provider, MD   furosemide (LASIX) 40 MG tablet Take 40 mg by mouth daily Prn   Yes Historical Provider, MD   magnesium oxide (MAG-OX) 400 MG tablet Take 400 mg by mouth daily   Yes Historical Provider, MD   pantoprazole (PROTONIX) 40 MG tablet Take 40 mg by mouth daily   Yes Historical Provider, MD   traZODone (DESYREL) 50 MG tablet Take 100 mg by mouth nightly    Yes Historical Provider, MD   triamcinolone (KENALOG) 0.1 % cream Apply topically 2 times daily Apply topically 2 times daily. Yes Historical Provider, MD   Febuxostat (ULORIC) 80 MG TABS Take by mouth Once every 3 days   Yes Historical Provider, MD   diazepam (VALIUM) 5 MG tablet Take 5 mg by mouth as needed for Anxiety   Yes Historical Provider, MD   diclofenac sodium (VOLTAREN) 1 % GEL Apply 4 g topically 4 times daily as needed for Pain 1/28/20 2/27/20  Allyson Zuniga, DO       Allergies   Allergen Reactions    Bee Venom Anaphylaxis    Cefaclor Anaphylaxis    Lyrica [Pregabalin] Shortness Of Breath     Itching     Pentazocine-Acetaminophen Hives and Rash     Rapid heart beats.      Acetaminophen Hives    Ciprofloxacin Itching    Statins Other (See Comments)     Myalgia      Sulfamethoxazole-Trimethoprim Itching    Toprol Xl [Metoprolol] Other (See Comments)     Fatigue         Social History     Socioeconomic History    Marital status:      Spouse name: Not on file    Number of children: Not on file    Years of education: Not on file    Highest education level: Not on file Occupational History    Not on file   Social Needs    Financial resource strain: Not on file    Food insecurity     Worry: Not on file     Inability: Not on file    Transportation needs     Medical: Not on file     Non-medical: Not on file   Tobacco Use    Smoking status: Former Smoker     Last attempt to quit: 1980     Years since quittin.6    Smokeless tobacco: Never Used   Substance and Sexual Activity    Alcohol use: Yes     Comment: 6-8 beers weekly     Drug use: No    Sexual activity: Not on file   Lifestyle    Physical activity     Days per week: Not on file     Minutes per session: Not on file    Stress: Not on file   Relationships    Social connections     Talks on phone: Not on file     Gets together: Not on file     Attends Hinduism service: Not on file     Active member of club or organization: Not on file     Attends meetings of clubs or organizations: Not on file     Relationship status: Not on file    Intimate partner violence     Fear of current or ex partner: Not on file     Emotionally abused: Not on file     Physically abused: Not on file     Forced sexual activity: Not on file   Other Topics Concern    Not on file   Social History Narrative    Not on file       Family History   Problem Relation Age of Onset    Heart Disease Mother     No Known Problems Father     Prostate Cancer Brother             Cancer Other     Diabetes Other     High Blood Pressure Other     Stroke Other     Tuberculosis Other        REVIEW OF SYSTEMS:     Madeline Ferraro denies fever/chills, chest pain, shortness of breath, new bowel or bladder complaints. All other review of systems was negative.     PHYSICAL EXAMINATION:      /69 (Site: Right Upper Arm, Position: Sitting, Cuff Size: Medium Adult)   Pulse 90   Temp 97.6 °F (36.4 °C)   Ht 5' 5\" (1.651 m)   Wt 133 lb (60.3 kg)   SpO2 98%   BMI 22.13 kg/m²     General:       General appearance:pleasant and well-hydrated, in no distress and A & O x3  Build:Normal Weight  Function:Rises from a seated position with difficulty     HEENT:     Head:normocephalic, atraumatic  Pupils:regular, round, equal  Sclera: icterus absent     Lungs:     Breathing:normal breathing pattern     Abdomen:     Shape:non-distended and normal  Tenderness:none  Guarding:none      Lumbar spine:     Spine inspection:surgical scar, decreased lordosis  CVA tenderness:No   Palpation: + midline TTP, positive paraspinal TTP  Range of motion:abnormal moderately in lateral bending, flexion, extension rotation bilateral and is mildly painful.     Musculoskeletal:     Trigger points in Paravertebral:absent bilaterally  SI joint tenderness:negative right, negative left              LAKSHMI test:not done right, not done left  Piriformis tenderness:negative right, negative left  Trochanteric bursa tenderness:negative right, negative left  SLR:negative right, negative left, sitting      Extremities:     Tremors:None bilaterally upper and lower  Range of motion:decreased ROM rt shoulder in abduction and FF  Intact:Yes  Varicose veins:absent   Pulses:present Lt radial  Cyanosis:none  Edema:none x all 4 extremities     Neurological:     Sensory:normal to light touch BLE     Motor:                 Right Quadriceps4/5          Left Quadriceps4/5           Right Gastrocnemius4/5    Left Gastrocnemius4/5  Right Ant Tibialis4/5  Left Ant Tibialis4/5    Gait:antalgic, with a walker     Dermatology:     Skin:no rashes or lesions noted; healing ecchymoses right forefoot     Assessment/Plan:    LBP, s/p L3-5 laminectomy and fusion surgery 5/2018 with Dr. Maricarmen Montana for severe stenosis, xray shows solid fusion and hardware in good position 7/2019  Rt shoulder pain, is candidate for RTSA per Dr. Mauro Pike  Rt knee pain improving, s/p 9/2018 TKR  + SHELLEY on CPAP, + ICD/pacer, 3 heart valves are stenotic with resulting  Thoracic radiating pain in to the abdomen, has had a CT chest recently which shows thoracic degeneration with autofusion  Can consider a dedicated thoracic CT scan once his heart issues are treated, cannot have MRI due to pacemaker/defib     He's having some spasms in the lumbar area and would like to retry some baclofen to see if it helps  He is not a candidate for a TENS unit due to pacer/defib. Plan:     Caudal KRZYSZTOF done in December with relief x 2-3 days only thus I do not suggest repeat  PT for LE strengthening and balance training ordered previously but he has not participated  OARRS reviewed  Buccal drug screen   Previously discussed risk of overdose with combining opioids with benzos, encouraged him to utilize the lowest dose of benzo as possible and wean off if possible  RF percocet 7.5/325 TID #90 (from 5/325 QID), pt states he feels as though he needs more medication but we discussed limited dosing  Discussed OTC apap up to 2000 mg per day in total (including what is in percocet)  Failed gabapentin 100 mg titration due to weakness, Lyrica due to itching and difficulty breathing  RF diclofenac gel - Does not need a refill.  Helpful.    RF lactulose for OIC - no RF needed  Start baclofen 10 mg daily prn #30  He is not a candidate for SCS due to his quite frequent falls  Patient encouraged to stay active  Treatment plan discussed with the patient including medication side effects                  We discussed with the patient that combining opioids, benzodiazepines, alcohol, illicit drugs or sleep aids increases the risk of respiratory depression including death. We discussed that these medications      may cause drowsiness, sedation or dizziness and have counseled the patient not to drive or operate machinery. We have discussed that these medications will be prescribed only by one provider. We have            discussed with the patient about age related risk factors and have thoroughly discussed the importance of taking these medications as prescribed.  The patient verbalizes understanding.

## 2020-09-30 ENCOUNTER — VIRTUAL VISIT (OUTPATIENT)
Dept: PAIN MANAGEMENT | Age: 75
End: 2020-09-30
Payer: COMMERCIAL

## 2020-09-30 PROCEDURE — 99213 OFFICE O/P EST LOW 20 MIN: CPT | Performed by: PAIN MEDICINE

## 2020-09-30 RX ORDER — BACLOFEN 10 MG/1
10 TABLET ORAL DAILY PRN
Qty: 30 TABLET | Refills: 0 | Status: SHIPPED
Start: 2020-09-30 | End: 2020-10-29 | Stop reason: ALTCHOICE

## 2020-09-30 RX ORDER — OXYCODONE AND ACETAMINOPHEN 7.5; 325 MG/1; MG/1
1 TABLET ORAL 3 TIMES DAILY PRN
Qty: 90 TABLET | Refills: 0 | Status: SHIPPED
Start: 2020-10-04 | End: 2020-10-29 | Stop reason: SDUPTHER

## 2020-09-30 NOTE — PROGRESS NOTES
Horald Boast was read the following message We want to confirm that, for purposes of billing, this is a virtual visit with your provider for which we will submit a claim for reimbursement with your insurance company. You will be responsible for any copays, coinsurance amounts or other amounts not covered by your insurance company. If you do not accept this, unfortunately we will not be able to schedule a virtual visit with the provider. Do you accept? Jana Mcintosh responded Yes .

## 2020-09-30 NOTE — PROGRESS NOTES
[address]  [address] [facility], [address] [place] [address], [id number] [address]        Date of Visit:  2020    CC:     Consent:  He and/or health care decision maker is aware that that he may receive a bill for this telephone service, depending on his insurance coverage, and has provided verbal consent to proceed: Yes    HPI:  Documentation:  I communicated with the patient and/or health care decision maker about the treatment plan. Details of this discussion including any medical advice provided: in the medical record    Patient location: Home  Physician location: Other address in [facility]    Pain is unchanged. Change in quality of symptoms:no  Medication side effects: None. Recent diagnostic testing:none.   Recent interventional procedures:  None since last visit    He has been on anticoagulation medications to include ASA, NSAIDS (diclofenac and ASA) and has not been on herbal supplements.  He is not diabetic.     Imagin/2018 lumbar xray - fusion is solid  CT myelogram dated 2016 demonstrates complete block at L3/4 level. Degenerative disc disease, spondylosis causing stenosis. Probable large extruded disc at L3-4.  Spinal listhesis L4 and L5 exacerbating the stenosis  EMG dated 3/24/2016     L5 radiculopathy  CT dated 2016 lumbar spine demonstrates degenerative spinal stenosis that is severe at L3-4 L4-5 and L5-S1 levels                                        Potential Aberrant Drug-Related Behavior: no      Urine Drug Screenin2018 buccal consistent  2019 buccal consistent  2019 consistent  10/2019 consistent for oxycodone, metabolites, and benzodiazapines  2020 consistent  2020 consistent    OARRS report:  2018-2020 consistent    Past Medical History: Reviewed    Past Surgical History: Reviewed     Home Medications: Reviewed    Allergies: Reviewed     Social History: Reviewed     REVIEW OF SYSTEMS:     Prem Davis denies fever/chills, chest pain, shortness of breath, new bowel or bladder complaints. All other review of systems was negative.     PHYSICAL EXAMINATION:     General:       A & O x3    Lungs:    Breathing:non-labored      Impression:  LBP, s/p L3-5 laminectomy and fusion surgery 5/2018 with Dr. Maricarmen Montana for severe stenosis, xray shows solid fusion and hardware in good position 7/2019  Rt shoulder pain, is candidate for RTSA per Dr. Muller [place] knee pain improving, s/p 9/2018 TKR  + SHELLEY on CPAP, + ICD/pacer, 3 heart valves are stenotic with resulting  Thoracic radiating pain in to the abdomen, has had a CT chest recently which shows thoracic degeneration with autofusion  Can consider a dedicated thoracic CT scan once his heart issues are treated, cannot have MRI due to pacemaker/defib    Baclofen helpful  Will be seeing Dr. Maricarmen Montana for cervical spine issues  Saw orthopedics for Rt shoulder issues who is having him see Dr. Maricarmen Montana for his cervical spine                 Plan:     Caudal KRZYSZTOF done in December with relief x 2-3 days only thus I do not suggest repeat  PT for LE strengthening and balance training ordered previously but he has not participated  OARRS reviewed  Buccal drug screen reviewed   Previously discussed risk of overdose with combining opioids with benzos, encouraged him to utilize the lowest dose of benzo as possible and wean off if possible  RF percocet 7.5/325 TID #90 (from 5/325 QID), pt states he feels as though he needs more medication but we discussed limited dosing  Discussed OTC apap up to 2000 mg per day in total (including what is in percocet)  Failed gabapentin 100 mg titration due to weakness, Lyrica due to itching and difficulty breathing  Continue diclofenac gel - Does not need a refill.  Helpful.    Continue lactulose for OIC - no RF needed  RF baclofen 10 mg daily prn #30  He is not a candidate for SCS due to his quite frequent falls  Not candidate for TENS due to pacer/defib  Patient encouraged to stay active  Treatment plan discussed with the patient including medication side effects                  We discussed with the patient that combining opioids, benzodiazepines, alcohol, illicit drugs or sleep aids increases the risk of respiratory depression including death. We discussed that these medications             may cause drowsiness, sedation or dizziness and have counseled the patient not to drive or operate machinery. We have discussed that these medications will be prescribed only by one provider. We have             discussed with the patient about age related risk factors and have thoroughly discussed the importance of taking these medications as prescribed. The patient verbalizes understanding. Steps to help prevent the spread of COVID-19 if you are sick  SOURCE - https://f[employer]imaur[employer].info/. html     Stay home except to get medical care   ; Stay home: You should restrict activities outside your home, except for getting medical care.   ; Avoid public areas: Do not go to work, school, or public areas.   ; Avoid public transportation: Avoid using public transportation, ride-sharing, or taxis.  ; Separate yourself from other people and animals in your home         Information for Household Members and Caregivers of Someone who is Sick   Call ahead before visiting your doctor   Call ahead: If you have a medical appointment, call the healthcare provider and tell them that you have or may have COVID-19. This will help the healthcare provider's office take steps to keep other people from getting infected or exposed. Wear a facemask if you are sick   ; If you are sick: You should wear a facemask when you are around other people and before you enter a healthcare provider's office. Cover your coughs and sneezes   ;  Cover: Cover your mouth and nose with a tissue when you cough or sneeze.   ; Wash hands: Immediately wash your hands with soap and water for at least 20 seconds or, if soap and water are not available, clean your hands with an alcohol-based hand  that contains at least 60% alcohol. Clean your hands often   ; Wash hands: Wash your hands often with soap and water for at least 20 seconds, especially after blowing your nose, coughing, or sneezing; going to the bathroom; and before eating or preparing food.   ; Hand : If soap and water are not readily available, use an alcohol-based hand  with at least 60% alcohol.  ; Avoid touching: Avoid touching your eyes, nose, and mouth with unwashed hands. Handwashing Tips   ; Wet your hands with clean, running water (warm or cold), turn off the tap, and apply soap.  ; Lather your hands by rubbing them together with the soap. Lather the backs of your hands, between your fingers, and under your nails. ; Scrub your hands for at least 20 seconds. Need a timer? Hum the [place] from beginning to end twice.  ; Rinse your hands well under clean, running water.  ; Dry your hands using a clean towel or air dry them. Monitor your symptoms     Seek medical attention: Seek prompt medical attention if your illness is worsening (e.g., difficulty breathing).     ; Call your doctor: Before seeking care, call your healthcare provider and tell them that you have, or are being evaluated for, COVID-19.   ; Wear a facemask when sick: Put on a facemask before you enter the facility. These steps will help the healthcare provider's office to keep other people in the office or waiting room from getting infected or exposed.   ; Call 911 if you have a medical emergency: If you have a medical emergency and need to call 911, notify the dispatch personnel that you have, or are being evaluated for COVID-19. If possible, put on a facemask before emergency medical services arrive.        I affirm this is a Patient Initiated Episode with an Established Patient who has not had a related appointment within my department in the past 7 days or

## 2020-10-19 ENCOUNTER — TELEPHONE (OUTPATIENT)
Dept: PAIN MANAGEMENT | Age: 75
End: 2020-10-19

## 2020-10-20 ENCOUNTER — TELEPHONE (OUTPATIENT)
Dept: PAIN MANAGEMENT | Age: 75
End: 2020-10-20

## 2020-10-20 NOTE — TELEPHONE ENCOUNTER
Called patient back. Wife and patient concerned that he will not have appropriate pain control after his carpal tunnel release. Discussed that we do not manage post operative pain, however, his normal pain regimen may be appropriate for recovery from his CTR. Discussed that we would be happy to speak with his surgeon if needed.

## 2020-10-20 NOTE — TELEPHONE ENCOUNTER
Pt wife called again stating \" the surgeon will not reach out to initiate pain protocol after surgery and only 1 physician can be in charge of his pain medication and I am very concerned this will not be taken care of.\" Per wife surgery is Wednesday 10/21/20 Please advise Mrs. Devika Mijares.

## 2020-10-29 ENCOUNTER — VIRTUAL VISIT (OUTPATIENT)
Dept: PAIN MANAGEMENT | Age: 75
End: 2020-10-29
Payer: COMMERCIAL

## 2020-10-29 PROCEDURE — 99213 OFFICE O/P EST LOW 20 MIN: CPT | Performed by: PAIN MEDICINE

## 2020-10-29 RX ORDER — OXYCODONE AND ACETAMINOPHEN 7.5; 325 MG/1; MG/1
1 TABLET ORAL 3 TIMES DAILY PRN
Qty: 90 TABLET | Refills: 0 | Status: SHIPPED
Start: 2020-11-04 | End: 2020-12-01 | Stop reason: SDUPTHER

## 2020-10-29 RX ORDER — BACLOFEN 5 MG/1
10 TABLET ORAL 2 TIMES DAILY
Qty: 60 TABLET | Refills: 0 | Status: SHIPPED
Start: 2020-10-29 | End: 2020-11-03 | Stop reason: CLARIF

## 2020-10-29 NOTE — PROGRESS NOTES
Ayesha Reyes was read the following message We want to confirm that, for purposes of billing, this is a virtual visit with your provider for which we will submit a claim for reimbursement with your insurance company. You will be responsible for any copays, coinsurance amounts or other amounts not covered by your insurance company. If you do not accept this, unfortunately we will not be able to schedule a virtual visit with the provider. Do you accept?  Charlene Herrera responded Komal Small

## 2020-10-29 NOTE — PROGRESS NOTES
shortness of breath, new bowel or bladder complaints. All other review of systems was negative.     PHYSICAL EXAMINATION:     General:       A & O x3    Lungs:    Breathing:non-labored      Impression:  LBP, s/p L3-5 laminectomy and fusion surgery 5/2018 with Dr. Ernesto Olmedo for severe stenosis, xray shows solid fusion and hardware in good position 7/2019  Rt shoulder pain, is candidate for RTSA per Dr. 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 Ernesto Olmedo for cervical spine issues, has not yet done so  Saw orthopedics for Rt shoulder issues who is having him see Dr. Ernesto Olmedo for his cervical spine  Pending appt with [facility] cardiology  Scheduled for carpal tunnel surgery 11/11/2020                 Plan:     Failed most recent caudal KRZYSZTOF   Pt declines   OARRS reviewed  Previously discussed risk of overdose with combining opioids with benzos, encouraged him to utilize the lowest dose of benzo as possible and wean off if possible  RF percocet 7.5/325 TID #90 (from 5/325 QID), pt states he feels as though he needs more medication but we discussed limited dosing  Failed gabapentin 100 mg titration due to weakness, Lyrica due to itching and difficulty breathing  Continue diclofenac gel - Does not need a refill.  Helpful.    Continue lactulose for OIC - no RF needed  Change baclofen 10 mg daily to 5 mg BID #60 to see if more helpful  He is not a candidate for SCS due to his quite frequent falls  Not candidate for TENS due to pacer/defib  Patient encouraged to stay active  Treatment plan discussed with the patient including medication side effects                  We discussed with the patient that combining opioids, benzodiazepines, alcohol, illicit drugs or sleep aids increases the risk of respiratory depression including death. We discussed that these medications             may cause drowsiness, sedation or dizziness and have counseled the patient not to drive or operate machinery. We have discussed that these medications will be prescribed only by one provider. We have             discussed with the patient about age related risk factors and have thoroughly discussed the importance of taking these medications as prescribed. The patient verbalizes understanding. Steps to help prevent the spread of COVID-19 if you are sick  SOURCE - https://sri-maurice.info/. html     Stay home except to get medical care   ; Stay home: You should restrict activities outside your home, except for getting medical care.   ; Avoid public areas: Do not go to work, school, or public areas.   ; Avoid public transportation: Avoid using public transportation, ride-sharing, or taxis.  ; Separate yourself from other people and animals in your home         Information for Household Members and Caregivers of Someone who is Sick   Call ahead before visiting your doctor   Call ahead: If you have a medical appointment, call the healthcare provider and tell them that you have or may have COVID-19. This will help the healthcare provider's office take steps to keep other people from getting infected or exposed. Wear a facemask if you are sick   ; If you are sick: You should wear a facemask when you are around other people and before you enter a healthcare provider's office. Cover your coughs and sneezes   ;  Cover: Cover your mouth and nose with a tissue when you cough or sneeze.   ; Wash hands: Immediately wash your hands with soap and water for at least 20 seconds or, if soap and water are not available, clean your hands with an alcohol-based hand  that contains at least 60% alcohol. Clean your hands often   ; Wash hands: Wash your hands often with soap and water for at least 20 seconds, especially after blowing your nose, coughing, or sneezing; going to the bathroom; and before eating or preparing food.   ; Hand : If soap and water are not readily available, use an alcohol-based hand  with at least 60% alcohol.  ; Avoid touching: Avoid touching your eyes, nose, and mouth with unwashed hands. Handwashing Tips   ; Wet your hands with clean, running water (warm or cold), turn off the tap, and apply soap.  ; Lather your hands by rubbing them together with the soap. Lather the backs of your hands, between your fingers, and under your nails. ; Scrub your hands for at least 20 seconds. Need a timer? Hum the [place] from beginning to end twice.  ; Rinse your hands well under clean, running water.  ; Dry your hands using a clean towel or air dry them. Monitor your symptoms     Seek medical attention: Seek prompt medical attention if your illness is worsening (e.g., difficulty breathing).     ; Call your doctor: Before seeking care, call your healthcare provider and tell them that you have, or are being evaluated for, COVID-19.   ; Wear a facemask when sick: Put on a facemask before you enter the facility. These steps will help the healthcare provider's office to keep other people in the office or waiting room from getting infected or exposed.   ; Call 911 if you have a medical emergency: If you have a medical emergency and need to call 911, notify the dispatch personnel that you have, or are being evaluated for COVID-19. If possible, put on a facemask before emergency medical services arrive. I affirm this is a Patient Initiated Episode with an Established Patient who has not had a related appointment within my department in the past 7 days or scheduled within the next 24 hours.     Total Time: minutes: 11-20 minutes    Note: not billable if this call serves to triage the patient into an appointment for the relevant concern

## 2020-11-03 RX ORDER — BACLOFEN 5 MG/1
TABLET ORAL
Qty: 30 TABLET | Refills: 0 | OUTPATIENT
Start: 2020-11-03 | End: 2020-11-03 | Stop reason: CLARIF

## 2020-11-03 RX ORDER — LIDOCAINE 50 MG/G
OINTMENT TOPICAL PRN
Qty: 1 TUBE | Refills: 1 | OUTPATIENT
Start: 2020-11-03 | End: 2021-09-21 | Stop reason: SDUPTHER

## 2020-11-03 RX ORDER — BACLOFEN 5 MG/1
TABLET ORAL
Qty: 60 TABLET | Refills: 0 | OUTPATIENT
Start: 2020-11-03 | End: 2020-12-01 | Stop reason: SDUPTHER

## 2020-11-03 NOTE — TELEPHONE ENCOUNTER
Geoff's wife called about the refills he needed before his next appointment. The baclofen 10 mg BID is not covered by [address] [place] [address], so I called the pharmacy in [facility] for clarification. Baclofen 5 mg take 1 tablet BID is covered, as well as, refills on diclofenac and lidocaine as wife requested.

## 2020-11-12 ENCOUNTER — TELEPHONE (OUTPATIENT)
Dept: PAIN MANAGEMENT | Age: 75
End: 2020-11-12

## 2020-11-12 NOTE — TELEPHONE ENCOUNTER
He had carpal tunnel surgery per my note. Please call his wife Evelyn Basilio to verify. If so, he can take script from carpal tunnel surgery but needs to hold meds prescribed by me during that time. His next script's date will be adjusted. Please verify.   thx.

## 2020-11-12 NOTE — TELEPHONE ENCOUNTER
Spoke with pharmacist, medication Percocet 5/325 take 1 tablet Q8 hrs #20 tablets prescribed by his surgeon who performed the carpal tunnel release. This nurse called patient and instructed him that he can get this medication filled ,however, he would have to stop the current prescribed medication from this office. Erik Joy stated that he would not fill the script from the surgeon and remain on the current script from this office. Eirk Joy was instructed to call the pharmacy and cancel the script. I called the pharmacy and spoke to the pharmacist that the patient will be calling to cancel script.

## 2020-11-25 NOTE — PROGRESS NOTES
[address]  [address] [facility], [address] [place] [address], [phone] Raji Lindsay        Date of Visit:  2020    CC:     Consent:  He and/or health care decision maker is aware that that he may receive a bill for this telephone service, depending on his insurance coverage, and has provided verbal consent to proceed: Yes    HPI:  Documentation:  I communicated with the patient and/or health care decision maker about the treatment plan. Details of this discussion including any medical advice provided: in the medical record    Patient location: Home  Physician location: Other address in [facility]    Pain is unchanged. Change in quality of symptoms:no  Medication side effects: None. Recent diagnostic testing:none.   Recent interventional procedures:  None since last visit    He has been on anticoagulation medications to include ASA, NSAIDS (diclofenac and ASA) and has not been on herbal supplements.  He is not diabetic.     Imagin/2018 lumbar xray - fusion is solid  CT myelogram dated 2016 demonstrates complete block at L3/4 level. Degenerative disc disease, spondylosis causing stenosis. Probable large extruded disc at L3-4.  Spinal listhesis L4 and L5 exacerbating the stenosis  EMG dated 3/24/2016     L5 radiculopathy  CT dated 2016 lumbar spine demonstrates degenerative spinal stenosis that is severe at L3-4 L4-5 and L5-S1 levels                                        Potential Aberrant Drug-Related Behavior: no      Urine Drug Screenin2018 buccal consistent  2019 buccal consistent  2019 consistent  10/2019 consistent for oxycodone, metabolites, and benzodiazapines  2020 consistent  2020 consistent    OARRS report:  2018-2020 consistent    Past Medical History: Reviewed    Past Surgical History: Reviewed     Home Medications: Reviewed    Allergies: Reviewed     Social History: Reviewed     REVIEW OF SYSTEMS:     Rosalee Beebe denies fever/chills, chest pain, shortness of breath, new bowel or bladder complaints. All other review of systems was negative.     PHYSICAL EXAMINATION:     General:       A & O x3    Lungs:    Breathing:non-labored      Impression:  LBP, s/p L3-5 laminectomy and fusion surgery 5/2018 with Dr. Ernesto Olmedo for severe stenosis, xray shows solid fusion and hardware in good position 7/2019  Rt shoulder pain, is candidate for RTSA per Dr. 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 Ernesto Olmedo for cervical spine issues, has not yet done so  Saw orthopedics for Rt shoulder issues who is having him see Dr. Ernesto Olmedo for his cervical spine  Pending appt with [facility] cardiology  Had carpal tunnel surgery 11/11/2020, became infected but recovering well                 Plan:     Failed most recent caudal KRZYSZTOF   OARRS reviewed  Previously discussed risk of overdose with combining opioids with benzos, encouraged him to utilize the lowest dose of benzo as possible and wean off if possible  RF percocet 7.5/325 TID #90 (from 5/325 QID), pt states he feels as though he needs more medication but we discussed limited dosing  Failed gabapentin 100 mg titration due to weakness, Lyrica due to itching and difficulty breathing  Continue diclofenac gel - Does not need a refill.  Helpful.    Continue lactulose for OIC - no RF needed  RF baclofen 10 mg daily to 5 mg BID #60 to see if more helpful  He is not a candidate for SCS due to his quite frequent falls  Not candidate for TENS due to pacer/defib  Patient encouraged to stay active  Treatment plan discussed with the patient including medication side effects                  We discussed with the patient that combining opioids, benzodiazepines, alcohol, illicit drugs or sleep aids increases the risk of respiratory depression including death. We discussed that these medications             may cause drowsiness, sedation or dizziness and have counseled the patient not to drive or operate machinery. We have discussed that these medications will be prescribed only by one provider. We have             discussed with the patient about age related risk factors and have thoroughly discussed the importance of taking these medications as prescribed. The patient verbalizes understanding. Steps to help prevent the spread of COVID-19 if you are sick  SOURCE - https://sriEasy Eyemaurice.info/. html     Stay home except to get medical care   ; Stay home: You should restrict activities outside your home, except for getting medical care.   ; Avoid public areas: Do not go to work, school, or public areas.   ; Avoid public transportation: Avoid using public transportation, ride-sharing, or taxis.  ; Separate yourself from other people and animals in your home         Information for Household Members and Caregivers of Someone who is Sick   Call ahead before visiting your doctor   Call ahead: If you have a medical appointment, call the healthcare provider and tell them that you have or may have COVID-19. This will help the healthcare provider's office take steps to keep other people from getting infected or exposed. Wear a facemask if you are sick   ; If you are sick: You should wear a facemask when you are around other people and before you enter a healthcare provider's office. Cover your coughs and sneezes   ;  Cover: Cover your mouth and nose with a tissue when you cough or sneeze.   ; Wash hands: Immediately wash your hands with soap and water for at least 20 seconds or, if soap and water are not available, clean your hands with an alcohol-based hand  that contains at least 60% alcohol. Clean your hands often   ; Wash hands: Wash your hands often with soap and water for at least 20 seconds, especially after blowing your nose, coughing, or sneezing; going to the bathroom; and before eating or preparing food.   ; Hand : If soap and water are not readily available, use an alcohol-based hand  with at least 60% alcohol.  ; Avoid touching: Avoid touching your eyes, nose, and mouth with unwashed hands. Handwashing Tips   ; Wet your hands with clean, running water (warm or cold), turn off the tap, and apply soap.  ; Lather your hands by rubbing them together with the soap. Lather the backs of your hands, between your fingers, and under your nails. ; Scrub your hands for at least 20 seconds. Need a timer? Hum the [place] from beginning to end twice.  ; Rinse your hands well under clean, running water.  ; Dry your hands using a clean towel or air dry them. Monitor your symptoms     Seek medical attention: Seek prompt medical attention if your illness is worsening (e.g., difficulty breathing).     ; Call your doctor: Before seeking care, call your healthcare provider and tell them that you have, or are being evaluated for, COVID-19.   ; Wear a facemask when sick: Put on a facemask before you enter the facility. These steps will help the healthcare provider's office to keep other people in the office or waiting room from getting infected or exposed.   ; Call 911 if you have a medical emergency: If you have a medical emergency and need to call 911, notify the dispatch personnel that you have, or are being evaluated for COVID-19. If possible, put on a facemask before emergency medical services arrive. I affirm this is a Patient Initiated Episode with an Established Patient who has not had a related appointment within my department in the past 7 days or scheduled within the next 24 hours.     Total Time: minutes: 11-20 minutes    Note: not billable if this call serves to triage the patient into an appointment for the relevant concern

## 2020-12-01 ENCOUNTER — VIRTUAL VISIT (OUTPATIENT)
Dept: PAIN MANAGEMENT | Age: 75
End: 2020-12-01
Payer: COMMERCIAL

## 2020-12-01 PROCEDURE — 99212 OFFICE O/P EST SF 10 MIN: CPT | Performed by: PAIN MEDICINE

## 2020-12-01 RX ORDER — BACLOFEN 5 MG/1
TABLET ORAL
Qty: 60 TABLET | Refills: 0 | Status: SHIPPED
Start: 2020-12-01 | End: 2020-12-23

## 2020-12-01 RX ORDER — OXYCODONE AND ACETAMINOPHEN 7.5; 325 MG/1; MG/1
1 TABLET ORAL 3 TIMES DAILY PRN
Qty: 90 TABLET | Refills: 0 | Status: SHIPPED
Start: 2020-12-04 | End: 2020-12-23 | Stop reason: SDUPTHER

## 2020-12-01 NOTE — PROGRESS NOTES
Rina Cordero was read the following message We want to confirm that, for purposes of billing, this is a virtual visit with your provider for which we will submit a claim for reimbursement with your insurance company. You will be responsible for any copays, coinsurance amounts or other amounts not covered by your insurance company. If you do not accept this, unfortunately we will not be able to schedule a virtual visit with the provider. Do you accept?  Kat Vallecillo responded Theta Osier

## 2020-12-03 ENCOUNTER — TELEPHONE (OUTPATIENT)
Dept: PAIN MANAGEMENT | Age: 75
End: 2020-12-03

## 2020-12-18 NOTE — PROGRESS NOTES
[address]  [address] [facility], [address] [place] [address], [id number] Raji Newman        Date of Visit:  2020    CC:     Consent:  He and/or health care decision maker is aware that that he may receive a bill for this telephone service, depending on his insurance coverage, and has provided verbal consent to proceed: Yes    HPI:  Documentation:  I communicated with the patient and/or health care decision maker about the treatment plan. Details of this discussion including any medical advice provided: in the medical record    Patient location: Home  Physician location: Other address in [facility]    Pain is unchanged. Change in quality of symptoms:no  Medication side effects: None. Recent diagnostic testing:none.   Recent interventional procedures:  None since last visit    He has been on anticoagulation medications to include ASA, NSAIDS (diclofenac and ASA) and has not been on herbal supplements.  He is not diabetic.     Imagin/2018 lumbar xray - fusion is solid  CT myelogram dated [id number] demonstrates complete block at L3/4 level. Degenerative disc disease, spondylosis causing stenosis. Probable large extruded disc at L3-4.  Spinal listhesis L4 and L5 exacerbating the stenosis  EMG dated 3/24/2016     L5 radiculopathy  CT dated 2016 lumbar spine demonstrates degenerative spinal stenosis that is severe at L3-4 L4-5 and L5-S1 levels                                        Potential Aberrant Drug-Related Behavior: no      Urine Drug Screenin2018 buccal consistent  2019 buccal consistent  2019 consistent  10/2019 consistent for oxycodone, metabolites, and benzodiazapines  2020 consistent  2020 consistent    OARRS report:  2018-2020 consistent    Past Medical History: Reviewed    Past Surgical History: Reviewed     Home Medications: Reviewed    Allergies: Reviewed     Social History: Reviewed     REVIEW OF SYSTEMS: Leonarda Holder denies fever/chills, chest pain, shortness of breath, new bowel or bladder complaints. All other review of systems was negative.     PHYSICAL EXAMINATION:     General:       A & O x3    Lungs:    Breathing:non-labored      Impression:  LBP, s/p L3-5 laminectomy and fusion surgery 5/2018 with Dr. Jeffrey Lowe for severe stenosis, xray shows solid fusion and hardware in good position 7/2019  Rt shoulder pain, is candidate for RTSA per Dr. 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 Jeffrey Lowe for cervical spine issues, has not yet done so  Saw orthopedics for Rt shoulder issues who is having him see Dr. Jeffrey Lowe for his cervical spine  Pending appt with [facility] cardiology  Had carpal tunnel surgery 11/11/2020, became infected but recovering well                 Plan:     Failed most recent caudal KRZYSZTOF   OARRS reviewed  Previously discussed risk of overdose with combining opioids with benzos, encouraged him to utilize the lowest dose of benzo as possible and wean off if possible  RF percocet 7.5/325 TID #90 (from 5/325 QID), pt states he feels as though he needs more medication but we discussed limited dosing  Failed gabapentin 100 mg titration due to weakness, Lyrica due to itching and difficulty breathing  RF diclofenac gel - Does not need a refill.  Helpful.    RF lactulose for OIC - no RF needed  RF baclofen 10 mg daily to 5 mg BID #60 to see if more helpful  He is not a candidate for SCS due to his quite frequent falls  Not candidate for TENS due to pacer/defib  Patient encouraged to stay active  Treatment plan discussed with the patient including medication side effects                 We discussed with the patient that combining opioids, benzodiazepines, alcohol, illicit drugs or sleep aids increases the risk of respiratory depression including death. We discussed that these medications             may cause drowsiness, sedation or dizziness and have counseled the patient not to drive or operate machinery. We have discussed that these medications will be prescribed only by one provider. We have             discussed with the patient about age related risk factors and have thoroughly discussed the importance of taking these medications as prescribed. The patient verbalizes understanding. Steps to help prevent the spread of COVID-19 if you are sick  SOURCE - https://sri-maurice.info/. html     Stay home except to get medical care   ; Stay home: You should restrict activities outside your home, except for getting medical care.   ; Avoid public areas: Do not go to work, school, or public areas.   ; Avoid public transportation: Avoid using public transportation, ride-sharing, or taxis.  ; Separate yourself from other people and animals in your home         Information for Household Members and Caregivers of Someone who is Sick   Call ahead before visiting your doctor   Call ahead: If you have a medical appointment, call the healthcare provider and tell them that you have or may have COVID-19. This will help the healthcare provider's office take steps to keep other people from getting infected or exposed. Wear a facemask if you are sick   ; If you are sick: You should wear a facemask when you are around other people and before you enter a healthcare provider's office. Cover your coughs and sneezes   ; Cover: Cover your mouth and nose with a tissue when you cough or sneeze. ; Wash hands: Immediately wash your hands with soap and water for at least 20 seconds or, if soap and water are not available, clean your hands with an alcohol-based hand  that contains at least 60% alcohol. Clean your hands often   ; Wash hands: Wash your hands often with soap and water for at least 20 seconds, especially after blowing your nose, coughing, or sneezing; going to the bathroom; and before eating or preparing food.   ; Hand : If soap and water are not readily available, use an alcohol-based hand  with at least 60% alcohol.  ; Avoid touching: Avoid touching your eyes, nose, and mouth with unwashed hands. Handwashing Tips   ; Wet your hands with clean, running water (warm or cold), turn off the tap, and apply soap.  ; Lather your hands by rubbing them together with the soap. Lather the backs of your hands, between your fingers, and under your nails. ; Scrub your hands for at least 20 seconds. Need a timer? Hum the [place] from beginning to end twice.  ; Rinse your hands well under clean, running water.  ; Dry your hands using a clean towel or air dry them. Monitor your symptoms     Seek medical attention: Seek prompt medical attention if your illness is worsening (e.g., difficulty breathing).     ; Call your doctor: Before seeking care, call your healthcare provider and tell them that you have, or are being evaluated for, COVID-19.   ; Wear a facemask when sick: Put on a facemask before you enter the facility. These steps will help the healthcare provider's office to keep other people in the office or waiting room from getting infected or exposed.   ; Call 911 if you have a medical emergency: If you have a medical emergency and need to call 911, notify the dispatch personnel that you have, or are being evaluated for COVID-19. If possible, put on a facemask before emergency medical services arrive. I affirm this is a Patient Initiated Episode with an Established Patient who has not had a related appointment within my department in the past 7 days or scheduled within the next 24 hours.     Total Time: minutes: 11-20 minutes    Note: not billable if this call serves to triage the patient into an appointment for the relevant concern

## 2020-12-23 ENCOUNTER — VIRTUAL VISIT (OUTPATIENT)
Dept: PAIN MANAGEMENT | Age: 75
End: 2020-12-23
Payer: COMMERCIAL

## 2020-12-23 PROCEDURE — 99212 OFFICE O/P EST SF 10 MIN: CPT | Performed by: PAIN MEDICINE

## 2020-12-23 RX ORDER — LACTULOSE 10 G/15ML
10 SOLUTION ORAL EVERY EVENING
Qty: 1 BOTTLE | Refills: 5 | Status: SHIPPED | OUTPATIENT
Start: 2020-12-23

## 2020-12-23 RX ORDER — OXYCODONE AND ACETAMINOPHEN 7.5; 325 MG/1; MG/1
1 TABLET ORAL 3 TIMES DAILY PRN
Qty: 90 TABLET | Refills: 0 | Status: SHIPPED
Start: 2021-01-03 | End: 2021-02-02 | Stop reason: SDUPTHER

## 2020-12-23 RX ORDER — BACLOFEN 5 MG/1
TABLET ORAL
Qty: 60 TABLET | Refills: 0 | Status: SHIPPED
Start: 2020-12-23 | End: 2021-02-02 | Stop reason: ALTCHOICE

## 2021-01-05 DIAGNOSIS — G89.4 CHRONIC PAIN SYNDROME: Primary | ICD-10-CM

## 2021-01-05 RX ORDER — BACLOFEN 10 MG/1
TABLET ORAL
Qty: 30 TABLET | Refills: 0 | Status: SHIPPED
Start: 2021-01-05 | End: 2021-02-02 | Stop reason: SDUPTHER

## 2021-01-29 NOTE — PROGRESS NOTES
Tiigi 33  [address] [facility], [address] [place] [address], [id number] Raji Lindsay        Date of Visit:  2021    CC:     Consent:  He and/or health care decision maker is aware that that he may receive a bill for this telephone service, depending on his insurance coverage, and has provided verbal consent to proceed: Yes    HPI:  Documentation:  I communicated with the patient and/or health care decision maker about the treatment plan. Details of this discussion including any medical advice provided: in the medical record    Patient location: Home  Physician location: Other address in [facility]    Pain is unchanged. Change in quality of symptoms:no  Medication side effects: None. Recent diagnostic testing:none.   Recent interventional procedures: None since last visit    He has been on anticoagulation medications to include Eliquis, ASA, NSAIDS (diclofenac and ASA) and has not been on herbal supplements.  He is not diabetic.     Imagin/2018 lumbar xray - fusion is solid  CT myelogram dated [id number] demonstrates complete block at L3/4 level. Degenerative disc disease, spondylosis causing stenosis. Probable large extruded disc at L3-4.  Spinal listhesis L4 and L5 exacerbating the stenosis  EMG dated 3/24/2016     L5 radiculopathy  CT dated 2016 lumbar spine demonstrates degenerative spinal stenosis that is severe at L3-4 L4-5 and L5-S1 levels                                        Potential Aberrant Drug-Related Behavior: no      Urine Drug Screenin2018 buccal consistent  2019 buccal consistent  2019 consistent  10/2019 consistent for oxycodone, metabolites, and benzodiazapines  2020 consistent  2020 consistent    OARRS report:  2018-2021 consistent    Past Medical History: Reviewed    Past Surgical History: Reviewed     Home Medications: Reviewed    Allergies: Reviewed     Social History: Reviewed     REVIEW OF SYSTEMS:     Nicole Funk denies fever/chills, chest pain, shortness of breath, new bowel or bladder complaints. All other review of systems was negative.     PHYSICAL EXAMINATION:     General:       A & O x3    Lungs:    Breathing:non-labored    Impression:  LBP, s/p L3-5 laminectomy and fusion surgery 5/2018 with Dr. Gregorio Negron for severe stenosis, xray shows solid fusion and hardware in good position 7/2019  Rt shoulder pain, is candidate for RTSA per Dr. 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 Gregorio Negron for cervical spine issues, has not yet done so, had c-spine xray which shows severe DDD  Saw orthopedics for Rt shoulder issues who is having him see Dr. Gregorio Negron for his cervical spine  Had urgent appt with [facility] cardiology today due to his device reporting he is in a. Fib, now on Eliquis                 Plan:     Failed most recent caudal KRZYSZTOF   OARRS reviewed  Previously discussed risk of overdose with combining opioids with benzos, encouraged him to utilize the lowest dose of benzo as possible and wean off if possible  RF percocet 7.5/325 TID #90 - we have previously discussed limited dosing  Failed gabapentin 100 mg titration due to weakness, Lyrica due to itching and difficulty breathing  Continue diclofenac gel - Does not need a refill.  Helpful.    Continue lactulose for OIC - no RF needed  RF baclofen 10 mg daily #30 to see if more helpful  He is not a candidate for SCS due to his quite frequent falls  Not candidate for TENS due to pacer/defib  Patient encouraged to stay active  Treatment plan discussed with the patient including medication side effects                  We discussed with the patient that combining opioids, benzodiazepines, alcohol, illicit drugs or sleep aids increases the risk of respiratory depression including death. We discussed that these medications             may cause drowsiness, sedation or dizziness and have counseled the patient not to drive or operate machinery. We have discussed that these medications will be prescribed only by one provider. We have             discussed with the patient about age related risk factors and have thoroughly discussed the importance of taking these medications as prescribed. The patient verbalizes understanding. Steps to help prevent the spread of COVID-19 if you are sick  SOURCE - https://fierroMlogmaurice.info/. html     Stay home except to get medical care   ; Stay home: You should restrict activities outside your home, except for getting medical care.   ; Avoid public areas: Do not go to work, school, or public areas.   ; Avoid public transportation: Avoid using public transportation, ride-sharing, or taxis.  ; Separate yourself from other people and animals in your home         Information for Household Members and Caregivers of Someone who is Sick   Call ahead before visiting your doctor   Call ahead: If you have a medical appointment, call the healthcare provider and tell them that you have or may have COVID-19. This will help the healthcare provider's office take steps to keep other people from getting infected or exposed. Wear a facemask if you are sick   ; If you are sick: You should wear a facemask when you are around other people and before you enter a healthcare provider's office. Cover your coughs and sneezes   ; Cover: Cover your mouth and nose with a tissue when you cough or sneeze.   ; Wash hands: Immediately wash your hands with soap and water for at least 20 seconds or, if soap and water are not available, clean your hands with an alcohol-based hand  that contains at least 60% alcohol. Clean your hands often   ;  Wash hands: Wash your hands often with soap and water for at least 20 seconds, especially after blowing your nose, coughing, or sneezing; going to the bathroom; and before eating or preparing food.   ; Hand : If soap and water are not readily available, use an alcohol-based hand  with at least 60% alcohol.  ; Avoid touching: Avoid touching your eyes, nose, and mouth with unwashed hands. Handwashing Tips   ; Wet your hands with clean, running water (warm or cold), turn off the tap, and apply soap.  ; Lather your hands by rubbing them together with the soap. Lather the backs of your hands, between your fingers, and under your nails. ; Scrub your hands for at least 20 seconds. Need a timer? Hum the [place] from beginning to end twice.  ; Rinse your hands well under clean, running water.  ; Dry your hands using a clean towel or air dry them. Monitor your symptoms     Seek medical attention: Seek prompt medical attention if your illness is worsening (e.g., difficulty breathing).     ; Call your doctor: Before seeking care, call your healthcare provider and tell them that you have, or are being evaluated for, COVID-19.   ; Wear a facemask when sick: Put on a facemask before you enter the facility. These steps will help the healthcare provider's office to keep other people in the office or waiting room from getting infected or exposed.   ; Call 911 if you have a medical emergency: If you have a medical emergency and need to call 911, notify the dispatch personnel that you have, or are being evaluated for COVID-19. If possible, put on a facemask before emergency medical services arrive. I affirm this is a Patient Initiated Episode with an Established Patient who has not had a related appointment within my department in the past 7 days or scheduled within the next 24 hours.     Total Time: minutes: 11-20 minutes    Note: not billable if this call serves to triage the patient into an appointment for the relevant concern

## 2021-02-02 ENCOUNTER — VIRTUAL VISIT (OUTPATIENT)
Dept: PAIN MANAGEMENT | Age: 76
End: 2021-02-02
Payer: COMMERCIAL

## 2021-02-02 DIAGNOSIS — S33.5XXD LUMBAR SPRAIN, SUBSEQUENT ENCOUNTER: ICD-10-CM

## 2021-02-02 DIAGNOSIS — M51.37 DEGENERATION OF LUMBAR OR LUMBOSACRAL INTERVERTEBRAL DISC: ICD-10-CM

## 2021-02-02 DIAGNOSIS — M51.26 DISPLACEMENT OF LUMBAR INTERVERTEBRAL DISC WITHOUT MYELOPATHY: ICD-10-CM

## 2021-02-02 DIAGNOSIS — S33.9XXA CHRONIC OR OLD SPRAIN OF LUMBOSACRAL LIGAMENT: ICD-10-CM

## 2021-02-02 PROCEDURE — 99213 OFFICE O/P EST LOW 20 MIN: CPT | Performed by: PAIN MEDICINE

## 2021-02-02 RX ORDER — BACLOFEN 10 MG/1
10 TABLET ORAL DAILY
Qty: 30 TABLET | Refills: 0 | Status: SHIPPED
Start: 2021-02-02 | End: 2021-03-24 | Stop reason: SDUPTHER

## 2021-02-02 RX ORDER — OXYCODONE AND ACETAMINOPHEN 7.5; 325 MG/1; MG/1
1 TABLET ORAL 3 TIMES DAILY PRN
Qty: 90 TABLET | Refills: 0 | Status: SHIPPED
Start: 2021-02-04 | End: 2021-03-24 | Stop reason: SDUPTHER

## 2021-02-02 NOTE — PROGRESS NOTES
Papi Gupta was read the following message We want to confirm that, for purposes of billing, this is a virtual visit with your provider for which we will submit a claim for reimbursement with your insurance company. You will be responsible for any copays, coinsurance amounts or other amounts not covered by your insurance company. If you do not accept this, unfortunately we will not be able to schedule a virtual visit with the provider. Do you accept?  Thad Bolanos responded [facility]

## 2021-02-08 ENCOUNTER — TELEPHONE (OUTPATIENT)
Dept: PAIN MANAGEMENT | Age: 76
End: 2021-02-08

## 2021-03-01 ENCOUNTER — TELEPHONE (OUTPATIENT)
Dept: PAIN MANAGEMENT | Age: 76
End: 2021-03-01

## 2021-03-01 NOTE — TELEPHONE ENCOUNTER
Pt wife called to cancel his virtual visit as pt is hospitalized. Wife states while in hospital they are using oxycodone to manage his pain currently.

## 2021-03-02 NOTE — TELEPHONE ENCOUNTER
Please let them know that we must move back to in office visits and help them to schedule this. Thank you.

## 2021-03-02 NOTE — TELEPHONE ENCOUNTER
Can you please call her and ask why he is hospitalized and ask if he is OK. Please tell Antonina Salazar I am praying for him. Thank  You.

## 2021-03-23 ENCOUNTER — TELEPHONE (OUTPATIENT)
Dept: PAIN MANAGEMENT | Age: 76
End: 2021-03-23

## 2021-03-24 ENCOUNTER — VIRTUAL VISIT (OUTPATIENT)
Dept: PAIN MANAGEMENT | Age: 76
End: 2021-03-24
Payer: COMMERCIAL

## 2021-03-24 DIAGNOSIS — M51.37 DEGENERATION OF LUMBAR OR LUMBOSACRAL INTERVERTEBRAL DISC: ICD-10-CM

## 2021-03-24 DIAGNOSIS — S33.9XXA CHRONIC OR OLD SPRAIN OF LUMBOSACRAL LIGAMENT: ICD-10-CM

## 2021-03-24 DIAGNOSIS — S33.5XXD LUMBAR SPRAIN, SUBSEQUENT ENCOUNTER: ICD-10-CM

## 2021-03-24 DIAGNOSIS — M51.26 DISPLACEMENT OF LUMBAR INTERVERTEBRAL DISC WITHOUT MYELOPATHY: Primary | ICD-10-CM

## 2021-03-24 PROCEDURE — 99372 PR PHYSICIAN PHONE CONSULT,INTERMED: CPT | Performed by: PAIN MEDICINE

## 2021-03-24 RX ORDER — ATORVASTATIN CALCIUM 80 MG/1
80 TABLET, FILM COATED ORAL
COMMUNITY

## 2021-03-24 RX ORDER — OXYCODONE AND ACETAMINOPHEN 7.5; 325 MG/1; MG/1
1 TABLET ORAL 3 TIMES DAILY PRN
Qty: 90 TABLET | Refills: 0 | Status: SHIPPED
Start: 2021-03-24 | End: 2021-04-23 | Stop reason: SDUPTHER

## 2021-03-24 RX ORDER — AMIODARONE HYDROCHLORIDE 200 MG/1
200 TABLET ORAL DAILY
COMMUNITY

## 2021-03-24 RX ORDER — MIRTAZAPINE 30 MG/1
30 TABLET, FILM COATED ORAL NIGHTLY
COMMUNITY

## 2021-03-24 RX ORDER — BUMETANIDE 1 MG/1
1.5 TABLET ORAL DAILY
COMMUNITY

## 2021-03-24 RX ORDER — BACLOFEN 10 MG/1
10 TABLET ORAL DAILY
Qty: 30 TABLET | Refills: 0 | Status: SHIPPED
Start: 2021-03-24 | End: 2021-04-23 | Stop reason: SDUPTHER

## 2021-03-24 RX ORDER — SPIRONOLACTONE 25 MG/1
12.5 TABLET ORAL DAILY
COMMUNITY

## 2021-03-24 RX ORDER — DIGOXIN 125 MCG
125 TABLET ORAL DAILY
COMMUNITY
End: 2021-06-22

## 2021-03-24 NOTE — PROGRESS NOTES
Carlene Guzmán  [address] [facility], [address] [place] [address], [id number] Raji Lindsay        Date of Visit:  3/24/2021    CC:     Consent:  He and/or health care decision maker is aware that that he may receive a bill for this telephone service, depending on his insurance coverage, and has provided verbal consent to proceed: Yes    HPI:  Documentation:  I communicated with the patient and/or health care decision maker about the treatment plan. Details of this discussion including any medical advice provided: in the medical record    Patient location: Home  Physician location: Other address in [address] [place] [address], having pain from rib fractures. Change in quality of symptoms:yes - as above  Medication side effects: None. Recent diagnostic testing:none.   Recent interventional procedures: None since last visit    He has been on anticoagulation medications to include Eliquis, ASA, NSAIDS (diclofenac and ASA) and has not been on herbal supplements.  He is not diabetic.     Imagin/2018 lumbar xray - fusion is solid  CT myelogram dated  demonstrates complete block at L3/4 level. Degenerative disc disease, spondylosis causing stenosis. Probable large extruded disc at L3-4.  Spinal listhesis L4 and L5 exacerbating the stenosis  EMG dated 3/24/2016     L5 radiculopathy  CT dated 2016 lumbar spine demonstrates degenerative spinal stenosis that is severe at L3-4 L4-5 and L5-S1 levels                                        Potential Aberrant Drug-Related Behavior: no      Urine Drug Screenin2018 buccal consistent  2019 buccal consistent  2019 consistent  10/2019 consistent for oxycodone, metabolites, and benzodiazapines  2020 consistent  2020 consistent    OARRS report:  2018-3/2021 consistent    Past Medical History: Reviewed    Past Surgical History: Reviewed     Home Medications: Reviewed    Allergies: Reviewed     Social History: Reviewed     REVIEW OF SYSTEMS: Leah Byers denies fever/chills, chest pain, shortness of breath, new bowel or bladder complaints. All other review of systems was negative. PHYSICAL EXAMINATION:     General:       A & O x3    Lungs:    Breathing:non-labored    Impression:  LBP, s/p L3-5 laminectomy and fusion surgery 5/2018 with Dr. Alivia Perry for severe stenosis, xray shows solid fusion and hardware in good position 7/2019  Rt shoulder pain, is candidate for RTSA per Dr. 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 Fib, cardiac arrest while inpatient resulting in rib fractures.                Plan:     Failed most recent caudal KRZYSZTOF   OARRS reviewed  Previously discussed risk of overdose with combining opioids with benzos, encouraged him to utilize the lowest dose of benzo as possible and wean off if possible  RF percocet 7.5/325 TID #90 - we have previously discussed limited dosing   RF baclofen 10 mg daily #30 to see if more helpful  Failed gabapentin 100 mg titration due to weakness, Lyrica due to itching and difficulty breathing  Continue diclofenac gel - Does not need a refill.  Helpful.    Continue lactulose for OIC - no RF needed  He is not a candidate for SCS due to his quite frequent falls  Not candidate for TENS due to pacer/defib  Patient encouraged to stay active  Treatment plan discussed with the patient including medication side effects                  We discussed with the patient that combining opioids, benzodiazepines, alcohol, illicit drugs or sleep aids increases the risk of respiratory depression including death.  We discussed that these medications             may cause drowsiness, sedation or dizziness and have counseled the patient not to drive or operate machinery. We have discussed that these medications will be prescribed only by one provider. We have             discussed with the patient about age related risk factors and have thoroughly discussed the importance of taking these medications as prescribed. The patient verbalizes understanding. Steps to help prevent the spread of COVID-19 if you are sick  SOURCE - https://fierroTidePoolmaurice.info/. html     Stay home except to get medical care   ; Stay home: You should restrict activities outside your home, except for getting medical care.   ; Avoid public areas: Do not go to work, school, or public areas.   ; Avoid public transportation: Avoid using public transportation, ride-sharing, or taxis.  ; Separate yourself from other people and animals in your home         Information for Household Members and Caregivers of Someone who is Sick   Call ahead before visiting your doctor   Call ahead: If you have a medical appointment, call the healthcare provider and tell them that you have or may have COVID-19. This will help the healthcare provider's office take steps to keep other people from getting infected or exposed. Wear a facemask if you are sick   ; If you are sick: You should wear a facemask when you are around other people and before you enter a healthcare provider's office. Cover your coughs and sneezes   ; Cover: Cover your mouth and nose with a tissue when you cough or sneeze.   ; Wash hands: Immediately wash your hands with soap and water for at least 20 seconds or, if soap and water are not available, clean your hands with an alcohol-based hand  that contains at least 60% alcohol. Clean your hands often   ;  Wash hands: Wash your hands often with soap and water for at least 20 seconds, especially after blowing your nose,

## 2021-04-23 ENCOUNTER — OFFICE VISIT (OUTPATIENT)
Dept: PAIN MANAGEMENT | Age: 76
End: 2021-04-23
Payer: COMMERCIAL

## 2021-04-23 VITALS
HEART RATE: 101 BPM | TEMPERATURE: 98.1 F | OXYGEN SATURATION: 96 % | RESPIRATION RATE: 16 BRPM | WEIGHT: 153 LBS | DIASTOLIC BLOOD PRESSURE: 62 MMHG | HEIGHT: 65 IN | SYSTOLIC BLOOD PRESSURE: 110 MMHG | BODY MASS INDEX: 25.49 KG/M2

## 2021-04-23 DIAGNOSIS — M51.26 DISPLACEMENT OF LUMBAR INTERVERTEBRAL DISC WITHOUT MYELOPATHY: ICD-10-CM

## 2021-04-23 DIAGNOSIS — G89.4 CHRONIC PAIN SYNDROME: Primary | ICD-10-CM

## 2021-04-23 DIAGNOSIS — M51.37 DEGENERATION OF LUMBAR OR LUMBOSACRAL INTERVERTEBRAL DISC: ICD-10-CM

## 2021-04-23 DIAGNOSIS — S33.5XXD LUMBAR SPRAIN, SUBSEQUENT ENCOUNTER: ICD-10-CM

## 2021-04-23 DIAGNOSIS — S33.9XXA CHRONIC OR OLD SPRAIN OF LUMBOSACRAL LIGAMENT: ICD-10-CM

## 2021-04-23 PROCEDURE — 1123F ACP DISCUSS/DSCN MKR DOCD: CPT | Performed by: PAIN MEDICINE

## 2021-04-23 PROCEDURE — G8427 DOCREV CUR MEDS BY ELIG CLIN: HCPCS | Performed by: PAIN MEDICINE

## 2021-04-23 PROCEDURE — 1036F TOBACCO NON-USER: CPT | Performed by: PAIN MEDICINE

## 2021-04-23 PROCEDURE — 99213 OFFICE O/P EST LOW 20 MIN: CPT | Performed by: PAIN MEDICINE

## 2021-04-23 PROCEDURE — G8417 CALC BMI ABV UP PARAM F/U: HCPCS | Performed by: PAIN MEDICINE

## 2021-04-23 PROCEDURE — 4040F PNEUMOC VAC/ADMIN/RCVD: CPT | Performed by: PAIN MEDICINE

## 2021-04-23 PROCEDURE — 99203 OFFICE O/P NEW LOW 30 MIN: CPT | Performed by: PAIN MEDICINE

## 2021-04-23 RX ORDER — BACLOFEN 10 MG/1
10 TABLET ORAL DAILY
Qty: 30 TABLET | Refills: 0 | Status: SHIPPED
Start: 2021-04-23 | End: 2021-05-21 | Stop reason: SDUPTHER

## 2021-04-23 RX ORDER — OXYCODONE AND ACETAMINOPHEN 7.5; 325 MG/1; MG/1
1 TABLET ORAL 3 TIMES DAILY PRN
Qty: 90 TABLET | Refills: 0 | Status: SHIPPED
Start: 2021-04-23 | End: 2021-05-21 | Stop reason: SDUPTHER

## 2021-04-23 NOTE — PROGRESS NOTES
Do you currently have any of the following:    Fever: No  Headache:  No  Cough: No  Shortness of breath: No  Exposed to anyone with these symptoms: No         [place] Room presents to the [address] [place] [address] on 4/23/2021. Dionne Maxwell is complaining of pain back. The pain is constant. The pain is described as aching, throbbing, shooting and burning. Pain is rated on his best day at a 6, on his worst day at a 10, and on average at a 8 on the VAS scale. He took his last dose of Percocet this morning. Any procedures since your last visit: No, with  % relief. Pacemaker or defibrillator: Yes managed by Dr Sandee Pulliam. He is not on NSAIDS and is  on anticoagulation medications to include Eliquis and is managed by Cardiologist.     Medication Contract and Consent for Opioid Use Documents Filed      No documents found                /62   Pulse 101   Temp 98.1 °F (36.7 °C) (Infrared)   Resp 16   Ht 5' 5\" (1.651 m)   Wt 153 lb (69.4 kg)   SpO2 96%   BMI 25.46 kg/m²      No LMP for male patient.

## 2021-04-23 NOTE — PROGRESS NOTES
[address]  [address] [facility], [address]    Follow up Note      Clara Bunn     Date of Visit:  2021    CC:  Patient presents for follow up   Chief Complaint   Patient presents with    Follow-up    Back Pain    Shoulder Pain     HPI:    Pain is worse, having pain from rib fractures. Change in quality of symptoms:yes - as above  Medication side effects: None. Recent diagnostic testing:none.   Recent interventional procedures: None since last visit     He has been on anticoagulation medications to include Eliquis and has not been on herbal supplements.  He is not diabetic.     Imagin/2018 lumbar xray - fusion is solid  CT myelogram dated 5452432 demonstrates complete block at L3/4 level. Degenerative disc disease, spondylosis causing stenosis. Probable large extruded disc at L3-4.  Spinal listhesis L4 and L5 exacerbating the stenosis  EMG dated 3/24/2016     L5 radiculopathy  CT dated 2016 lumbar spine demonstrates degenerative spinal stenosis that is severe at L3-4 L4-5 and L5-S1 levels                                        Potential Aberrant Drug-Related Behavior: no      Urine Drug Screenin2018 buccal consistent  2019 buccal consistent  2019 consistent  10/2019 consistent for oxycodone, metabolites, and benzodiazapines  2020 consistent     OARRS report:  2018-2021 consistent    Past Medical History:   Diagnosis Date    CAD (coronary artery disease)     Cardiac defibrillator in place     Medtronic    CHF (congestive heart failure) (HCC)     Depression     Disease of pericardium     DJD (degenerative joint disease)     Erectile dysfunction     GERD (gastroesophageal reflux disease)     Hyperlipidemia     Hypertension     Ischemic cardiomyopathy     Ischemic heart disease     LBBB (left bundle branch block)     w/ wide Qrs    Mitral regurgitation     Nonrheumatic aortic valve disorder     SHELLEY on CPAP     setting 7    Presence of coronary angioplasty implant and graft     Ventricular tachycardia [facility])        Past Surgical History:   Procedure Laterality Date    ANKLE SURGERY      CARDIAC CATHETERIZATION  08/24/2020    CARDIAC DEFIBRILLATOR PLACEMENT  11/2009    Bi-vent    CARDIAC DEFIBRILLATOR PLACEMENT      CARDIAC PACEMAKER PLACEMENT      CARPAL TUNNEL RELEASE      CORONARY ANGIOPLASTY  09/2014    DIAGNOSTIC CARDIAC CATH LAB PROCEDURE Left 04/2006    DIAGNOSTIC CARDIAC CATH LAB PROCEDURE Left 09/2014    LEXY-RCA    ELBOW SURGERY      EPIDURAL STEROID INJECTION N/A 5/7/2019    CAUDAL EPIDURAL STEROID INJECTION performed by Kassandra Roa DO at [facility] N/A 8/1/2019    CAUDAL EPIDURAL STEROID INJECTION performed by Kassandra Roa DO at [facility]      foot, finger, left ankle, left leg,and right arm    JOINT REPLACEMENT Right 09/18/2018    knee    LEG SURGERY      LUMBAR FUSION  05/29/2018    OTHER SURGICAL HISTORY  04/2006    cardiac tamponade evacuation     Elio Ovalle - Dr. Luciana Xie N/A 12/26/2019    CAUDAL EPIDURAL STEROID INJECTION #2 performed by Kassandra Roa DO at [facility] TRANSESOPHAGEAL ECHOCARDIOGRAM      10/07    TRANSESOPHAGEAL ECHOCARDIOGRAM  08/24/2020       Prior to Admission medications    Medication Sig Start Date End Date Taking? Authorizing Provider   dapagliflozin (FARXIGA) 10 MG tablet Take 10 mg by mouth every morning   Yes Historical Provider, MD   sacubitril-valsartan (ENTRESTO)  MG per tablet Take 1 tablet by mouth 2 times daily   Yes Historical Provider, MD   oxyCODONE-acetaminophen (PERCOCET) 7.5-325 MG per tablet Take 1 tablet by mouth 3 times daily as needed for Pain for up to 30 days.  Intended supply: 30 days 3/24/21 4/23/21 Yes Kassandra Roa DO   baclofen (LIORESAL) 10 MG tablet Take 1 tablet by mouth daily 3/24/21 4/23/21 Yes Kassandra Roa DO differently: 25 mg 2 times daily  3/6/20  Yes BARI Kauffman CNP   ezetimibe (ZETIA) 10 MG tablet Take 10 mg by mouth 10/23/19  Yes Historical Provider, MD   TRUE METRIX BLOOD GLUCOSE TEST strip USE AS DIRECTED to test blood glucose TWICE DAILY 7/30/19  Yes Historical Provider, MD   albuterol sulfate  (90 BASE) MCG/ACT inhaler Inhale 2 puffs into the lungs every 6 hours as needed for Wheezing    Yes Historical Provider, MD   venlafaxine (EFFEXOR XR) 150 MG extended release capsule Take 150 mg by mouth daily   Yes Historical Provider, MD   EPINEPHrine (EPIPEN IJ) Inject as directed Indications: as directed   Yes Historical Provider, MD   ferrous sulfate 325 (65 FE) MG tablet Take 325 mg by mouth as needed    Yes Historical Provider, MD   fluticasone (FLONASE ALLERGY RELIEF) 50 MCG/ACT nasal spray 1 spray by Nasal route daily as needed    Yes Historical Provider, MD   magnesium oxide (MAG-OX) 400 MG tablet Take 400 mg by mouth daily   Yes Historical Provider, MD   pantoprazole (PROTONIX) 40 MG tablet Take 40 mg by mouth daily   Yes Historical Provider, MD   triamcinolone (KENALOG) 0.1 % cream Apply topically 2 times daily Apply topically 2 times daily. Yes Historical Provider, MD   Febuxostat (ULORIC) 80 MG TABS Take by mouth Once every 3 days   Yes Historical Provider, MD   diazepam (VALIUM) 5 MG tablet Take 5 mg by mouth as needed for Anxiety.     Yes Historical Provider, MD   digoxin (LANOXIN) 125 MCG tablet Take 125 mcg by mouth daily 2x week    Historical Provider, MD   oxybutynin (DITROPAN-XL) 10 MG extended release tablet Take 1 tablet by mouth daily 11/12/19 11/6/20  Lisa Murphy PA-C   lisinopril (PRINIVIL;ZESTRIL) 20 MG tablet Take 1 tablet by mouth 2 times daily  Patient not taking: Reported on 3/24/2021 11/14/17   BARI Edge CNP   hydrALAZINE (APRESOLINE) 50 MG tablet Take 1 tablet by mouth daily  Patient not taking: Reported on 4/23/2021 1/3/17   Wanda Arrieta MD aspirin 81 MG tablet Take 81 mg by mouth daily To check w/ Dr. Kimmie Santo office    Historical Provider, MD   furosemide (LASIX) 40 MG tablet Take 40 mg by mouth daily Indications: as needed     Historical Provider, MD   traZODone (DESYREL) 50 MG tablet Take 100 mg by mouth nightly     Historical Provider, MD       Allergies   Allergen Reactions    Bee Venom Anaphylaxis    Cefaclor Anaphylaxis    Lyrica [Pregabalin] Shortness Of Breath     Itching     Pentazocine-Acetaminophen Hives and Rash     Rapid heart beats.      Statins Other (See Comments)     Myalgia      Toprol Xl [Metoprolol] Other (See Comments)     Fatigue         Social History     Socioeconomic History    Marital status:      Spouse name: Not on file    Number of children: Not on file    Years of education: Not on file    Highest education level: Not on file   Occupational History    Not on file   Social Needs    Financial resource strain: Not on file    Food insecurity     Worry: Not on file     Inability: Not on file    Transportation needs     Medical: Not on file     Non-medical: Not on file   Tobacco Use    Smoking status: Former Smoker     Quit date:      Years since quittin.3    Smokeless tobacco: Never Used   Substance and Sexual Activity    Alcohol use: Yes     Comment: 6-8 beers weekly     Drug use: No     Comment: caffeine 2 cups coffee daily    Sexual activity: Not on file   Lifestyle    Physical activity     Days per week: Not on file     Minutes per session: Not on file    Stress: Not on file   Relationships    Social connections     Talks on phone: Not on file     Gets together: Not on file     Attends [employer] service: Not on file     Active member of club or organization: Not on file     Attends meetings of clubs or organizations: Not on file     Relationship status: Not on file    Intimate partner violence     Fear of current or ex partner: Not on file     Emotionally abused: Not on file Pulses:present Lt radial  Cyanosis:none  Edema:none x all 4 extremities     Neurological:     Sensory:normal to light touch BLE     Motor:                 Right Quadriceps5/5          Left Quadriceps5/5           Right Gastrocnemius5/5    Left Gastrocnemius5/5  Right Ant Tibialis5/5  Left Ant Tibialis5/5    Gait:normal station     Dermatology:     Skin:no rashes or lesions noted     Assessment/Plan:    LBP, s/p L3-5 laminectomy and fusion surgery 5/2018 with Dr. Apoorva Richards for severe stenosis, xray shows solid fusion and hardware in good position 7/2019  Rt shoulder pain, is candidate for RTSA per Dr. Mattie Munroe knee pain improving, s/p 9/2018 TKR  + SHELLEY on CPAP, + ICD/pacer, 3 heart valves are stenotic with resulting  Thoracic radiating pain in to the abdomen, has had a CT chest recently which shows thoracic degeneration with autofusion  Can consider a dedicated thoracic CT scan once his heart issues are treated, cannot have MRI due to pacemaker/defib  On ELILQUIS     He has had some significant cardiac issues since last seen, heart failure, a. Fib, cardiac arrest while inpatient resulting in rib fractures. Now has Cardiomems implanted in his pulmonary artery which is measuring his pulmonary pressures.     Will call PCP to discuss a potential diagnosis of PM to see if he has ever been evaluated for this given the pt describes diffuse pain/stiffness.                Plan:     Buccal drug screen next visit  Failed most recent caudal [id number]   OARRS reviewed  Previously discussed risk of overdose with combining opioids with benzos, encouraged him to utilize the lowest dose of benzo as possible and wean off if possible  RF percocet 7.5/325 TID #90 - we have previously discussed limited dosing              RF baclofen 10 mg daily #30 to see if more helpful  Failed gabapentin 100 mg titration due to weakness, Lyrica due to itching and difficulty breathing  Continue diclofenac gel - Does not need a refill.  Helpful.   Continue lactulose for OIC - no RF needed  He is not a candidate for SCS due to his quite frequent falls  Not candidate for TENS due to pacer/defib  Patient encouraged to stay active  Treatment plan discussed with the patient including medication side effects                  We discussed with the patient that combining opioids, benzodiazepines, alcohol, illicit drugs or sleep aids increases the risk of respiratory depression including death. We discussed that these medications             may cause drowsiness, sedation or dizziness and have counseled the patient not to drive or operate machinery. We have discussed that these medications will be prescribed only by one provider. We have             discussed with the patient about age related risk factors and have thoroughly discussed the importance of taking these medications as prescribed.  The patient verbalizes understanding.

## 2021-05-21 ENCOUNTER — OFFICE VISIT (OUTPATIENT)
Dept: PAIN MANAGEMENT | Age: 76
End: 2021-05-21
Payer: COMMERCIAL

## 2021-05-21 VITALS
SYSTOLIC BLOOD PRESSURE: 122 MMHG | TEMPERATURE: 97.8 F | OXYGEN SATURATION: 98 % | BODY MASS INDEX: 23.32 KG/M2 | WEIGHT: 140 LBS | RESPIRATION RATE: 16 BRPM | DIASTOLIC BLOOD PRESSURE: 64 MMHG | HEART RATE: 60 BPM | HEIGHT: 65 IN

## 2021-05-21 DIAGNOSIS — G89.4 CHRONIC PAIN SYNDROME: Primary | ICD-10-CM

## 2021-05-21 DIAGNOSIS — M51.26 DISPLACEMENT OF LUMBAR INTERVERTEBRAL DISC WITHOUT MYELOPATHY: ICD-10-CM

## 2021-05-21 DIAGNOSIS — S33.5XXD LUMBAR SPRAIN, SUBSEQUENT ENCOUNTER: ICD-10-CM

## 2021-05-21 DIAGNOSIS — M51.37 DEGENERATION OF LUMBAR OR LUMBOSACRAL INTERVERTEBRAL DISC: ICD-10-CM

## 2021-05-21 DIAGNOSIS — S33.9XXA CHRONIC OR OLD SPRAIN OF LUMBOSACRAL LIGAMENT: ICD-10-CM

## 2021-05-21 PROCEDURE — 99213 OFFICE O/P EST LOW 20 MIN: CPT | Performed by: PHYSICIAN ASSISTANT

## 2021-05-21 PROCEDURE — G8420 CALC BMI NORM PARAMETERS: HCPCS | Performed by: PHYSICIAN ASSISTANT

## 2021-05-21 PROCEDURE — 1036F TOBACCO NON-USER: CPT | Performed by: PHYSICIAN ASSISTANT

## 2021-05-21 PROCEDURE — 1123F ACP DISCUSS/DSCN MKR DOCD: CPT | Performed by: PHYSICIAN ASSISTANT

## 2021-05-21 PROCEDURE — 4040F PNEUMOC VAC/ADMIN/RCVD: CPT | Performed by: PHYSICIAN ASSISTANT

## 2021-05-21 PROCEDURE — G8427 DOCREV CUR MEDS BY ELIG CLIN: HCPCS | Performed by: PHYSICIAN ASSISTANT

## 2021-05-21 PROCEDURE — 99214 OFFICE O/P EST MOD 30 MIN: CPT | Performed by: PHYSICIAN ASSISTANT

## 2021-05-21 RX ORDER — OXYCODONE AND ACETAMINOPHEN 7.5; 325 MG/1; MG/1
1 TABLET ORAL 3 TIMES DAILY PRN
Qty: 90 TABLET | Refills: 0 | Status: SHIPPED
Start: 2021-05-23 | End: 2021-06-22 | Stop reason: SDUPTHER

## 2021-05-21 RX ORDER — BACLOFEN 10 MG/1
10 TABLET ORAL DAILY
Qty: 30 TABLET | Refills: 0 | Status: SHIPPED
Start: 2021-05-21 | End: 2021-06-22 | Stop reason: SDUPTHER

## 2021-05-21 NOTE — PROGRESS NOTES
NICOLASA NAVEEN [facility] - BEHAVIORAL HEALTH SERVICES Pain Management  [address]  [facility] - BEHAVIORAL HEALTH SERVICES, [facility]    Follow up Note      Mckay Cui     Date of Visit:  2021    CC:  Patient presents for follow up   Chief Complaint   Patient presents with    Follow-up     lower back pain        HPI:    Pain is unchanged. No new changes today. Appropriate analgesia with current medications regimen: yes. Change in quality of symptoms:no. Medication side effects:none. Recent diagnostic testing:none. Recent interventional procedures:none. He has been on anticoagulation medications to include Eliquis and has not been on herbal supplements.  He is not diabetic.     Imagin/2018 lumbar xray - fusion is solid  CT myelogram dated 6488498 demonstrates complete block at L3/4 level. Degenerative disc disease, spondylosis causing stenosis. Probable large extruded disc at L3-4. Spinal listhesis L4 and L5 exacerbating the stenosis  EMG dated 3/24/2016     L5 radiculopathy  CT dated 2016 lumbar spine demonstrates degenerative spinal stenosis that is severe at L3-4 L4-5 and L5-S1 levels                                        Potential Aberrant Drug-Related Behavior: no      Urine Drug Screenin2018 buccal consistent  2019 buccal consistent  2019 consistent  10/2019 consistent for oxycodone, metabolites, and benzodiazapines  2020 consistent     OARRS report:  2018-2021 consistent    Opioid Agreement:  Date enacted:  Needs new one today.   Renewal date:    Past Medical History:   Diagnosis Date    CAD (coronary artery disease)     Cardiac defibrillator in place     [employer]tronic    CHF (congestive heart failure) (HCC)     Depression     Disease of pericardium     DJD (degenerative joint disease)     Erectile dysfunction     GERD (gastroesophageal reflux disease)     Hyperlipidemia     Hypertension     Ischemic cardiomyopathy     Ischemic heart disease     LBBB (left bundle branch block)     w/ wide Qrs    Mitral regurgitation     Nonrheumatic aortic valve disorder     SHELLEY on CPAP     setting 7    Presence of coronary angioplasty implant and graft     Ventricular tachycardia [facility])        Past Surgical History:   Procedure Laterality Date    ANKLE SURGERY      CARDIAC CATHETERIZATION  08/24/2020    CARDIAC DEFIBRILLATOR PLACEMENT  11/2009    Bi-vent    CARDIAC DEFIBRILLATOR PLACEMENT      CARDIAC PACEMAKER PLACEMENT      CARPAL TUNNEL RELEASE      CORONARY ANGIOPLASTY  09/2014    DIAGNOSTIC CARDIAC CATH LAB PROCEDURE Left 04/2006    DIAGNOSTIC CARDIAC CATH LAB PROCEDURE Left 09/2014    LEXY-RCA    ELBOW SURGERY      EPIDURAL STEROID INJECTION N/A 5/7/2019    CAUDAL EPIDURAL STEROID INJECTION performed by Kathi Groves DO at [facility] N/A 8/1/2019    CAUDAL EPIDURAL STEROID INJECTION performed by Kathi Groves DO at [facility]      foot, finger, left ankle, left leg,and right arm    JOINT REPLACEMENT Right 09/18/2018    knee    LEG SURGERY      LUMBAR FUSION  05/29/2018    OTHER SURGICAL HISTORY  04/2006    cardiac tamponade evacuation     Gilbert Huang - Dr. Pauline Goldstein N/A 12/26/2019    CAUDAL EPIDURAL STEROID INJECTION #2 performed by Kathi Groves DO at [address] TRANSESOPHAGEAL ECHOCARDIOGRAM      10/07    TRANSESOPHAGEAL ECHOCARDIOGRAM  08/24/2020       Prior to Admission medications    Medication Sig Start Date End Date Taking? Authorizing Provider   dapagliflozin (FARXIGA) 10 MG tablet Take 10 mg by mouth every morning   Yes Historical Provider, MD   sacubitril-valsartan (ENTRESTO)  MG per tablet Take 1 tablet by mouth 2 times daily   Yes Historical Provider, MD   oxyCODONE-acetaminophen (PERCOCET) 7.5-325 MG per tablet Take 1 tablet by mouth 3 times daily as needed for Pain for up to 30 days.  Intended supply: 30 days 4/23/21 5/23/21 Yes Kathi Groves DO   baclofen (LIORESAL) 10 MG tablet Take 1 tablet by mouth daily 4/23/21 5/23/21 Yes Tevin Henning DO   amiodarone (CORDARONE) 200 MG tablet Take 200 mg by mouth daily   Yes Historical Provider, MD   atorvastatin (LIPITOR) 80 MG tablet Take 80 mg by mouth   Yes Historical Provider, MD   bumetanide (BUMEX) 1 MG tablet Take 1 mg by mouth daily Once in the morning, and once in the afternoon on Monday, Wednesday, Friday, and Saturday   Yes Historical Provider, MD   digoxin (LANOXIN) 125 MCG tablet Take 125 mcg by mouth daily 2x week   Yes Historical Provider, MD   mirtazapine (REMERON) 15 MG tablet Take 15 mg by mouth nightly   Yes Historical Provider, MD   spironolactone (ALDACTONE) 25 MG tablet Take 25 mg by mouth daily   Yes Historical Provider, MD   pseudoephedrine (SUDAFED) 30 MG tablet Take 30 mg by mouth every 4 hours as needed for Congestion   Yes Historical Provider, MD   apixaban (ELIQUIS) 5 MG TABS tablet Take 1 tablet by mouth 2 times daily 2/2/21  Yes BARI Peterson CNP   tamsulosin (FLOMAX) 0.4 MG capsule Take 1 capsule by mouth daily 1/4/21  Yes BARI Cotter CNP   tadalafil (CIALIS) 20 MG tablet Take 1 tablet by mouth as needed for Erectile Dysfunction 30 minutes prior to sexual intercourse 12/23/20  Yes BARI Allen CNP   lactulose [facility]) 10 GM/15ML solution Take 15 mLs by mouth every evening 12/23/20  Yes Tevin Henning DO   lidocaine (XYLOCAINE) 5 % ointment Apply topically as needed for Pain Apply topically as needed. Patient taking differently: Apply topically as needed for Pain Apply topically as needed.   11/3/20  Yes Tevin Henning DO   carvedilol (COREG) 3.125 MG tablet TAKE 1 TABLET BY MOUTH WITH MEALS TWICE DAILY  Patient taking differently: 25 mg 2 times daily  3/6/20  Yes BARI Peterson CNP   ezetimibe (ZETIA) 10 MG tablet Take 10 mg by mouth 10/23/19  Yes Historical Provider, MD   TRUE METRIX BLOOD GLUCOSE TEST strip USE AS DIRECTED to test blood glucose TWICE DAILY tablet by mouth daily 19  Jr Sanchez PA-C       Allergies   Allergen Reactions    Bee Venom Anaphylaxis    Cefaclor Anaphylaxis    Lyrica [Pregabalin] Shortness Of Breath     Itching     Pentazocine-Acetaminophen Hives and Rash     Rapid heart beats.  Statins Other (See Comments)     Myalgia      Toprol Xl [Metoprolol] Other (See Comments)     Fatigue         Social History     Socioeconomic History    Marital status:      Spouse name: Not on file    Number of children: Not on file    Years of education: Not on file    Highest education level: Not on file   Occupational History    Not on file   Tobacco Use    Smoking status: Former Smoker     Quit date:      Years since quittin.4    Smokeless tobacco: Never Used   Vaping Use    Vaping Use: Never used   Substance and Sexual Activity    Alcohol use: Yes     Comment: 6-8 beers weekly     Drug use: No     Comment: caffeine 2 cups coffee daily    Sexual activity: Not on file   Other Topics Concern    Not on file   Social History Narrative    Not on file     Social Determinants of Health     Financial Resource Strain:     Difficulty of Paying Living Expenses:    Food Insecurity:     Worried About Running Out of Food in the Last Year:     [address] in the Last Year:    Transportation Needs:     Lack of Transportation (Medical):      Lack of Transportation (Non-Medical):    Physical Activity:     Days of Exercise per Week:     Minutes of Exercise per Session:    Stress:     Feeling of Stress :    Social Connections:     Frequency of Communication with Friends and Family:     Frequency of Social Gatherings with Friends and Family:     Attends Religion Services:     Active Member of Clubs or Organizations:     Attends Club or Organization Meetings:     Marital Status:    Intimate Partner Violence:     Fear of Current or Ex-Partner:     Emotionally Abused:     Physically Abused:     Sexually Abused:        Family History   Problem Relation Age of Onset    Heart Disease Mother     No Known Problems Father     Prostate Cancer Brother         2011    Cancer Other     Diabetes Other     High Blood Pressure Other     Stroke Other     Tuberculosis Other        REVIEW OF SYSTEMS:     Rafa Webb denies fever/chills, chest pain, shortness of breath, new bowel or bladder complaints. All other review of systems was negative. PHYSICAL EXAMINATION:      /64   Pulse 60   Temp 97.8 °F (36.6 °C) (Infrared)   Resp 16   Ht 5' 5\" (1.651 m)   Wt 140 lb (63.5 kg)   SpO2 98%   BMI 23.30 kg/m²     General:      General appearance:   pleasant and well-hydrated. , in mild discomfort and A & O x3  Build:Normal Weight    HEENT:    Head:normocephalic and atraumatic  Sclera: icterus absent,    Lungs:    Breathing:Normal expansion. No wheezing. Abdomen:    Shape:non-distended and normal    Lumbar spine:    Range of motion:abnormal moderately Lateral bending, flexion, extension rotation bilateral and is painful. Extremities:    Tremors:None bilaterally upper and lower  Range of motion:Generally limited extension shoulder - right, pain with internal rotation of hips not done.   Intact:Yes  Edema:Normal      Neurological:    [address]    Dermatology:    Skin:no unusual rashes, no skin lesions, no palpable subcutaneous nodules and good skin turgor    Impression:    LBP, s/p L3-5 laminectomy and fusion surgery 5/2018 with Dr. Bonita Noe for severe stenosis, xray shows solid fusion and hardware in good position 7/2019  Rt shoulder pain, is candidate for RTSA per Dr. Deepthi Bolanos knee pain improving, s/p 9/2018 TKR  + SHELLEY on CPAP, + ICD/pacer, 3 heart valves are stenotic with resulting  Thoracic radiating pain in to the abdomen, has had a CT chest recently which shows thoracic degeneration with autofusion  Can consider a dedicated thoracic CT scan once his heart issues are treated, cannot have MRI due to death. We discussed that these medications may cause drowsiness, sedation or dizziness and have counseled the patient not to drive or operate machinery. We have discussed that these medications will be prescribed only by one provider. We have discussed with the patient about age related risk factors and have thoroughly discussed the importance of taking these medications as prescribed. The patient verbalizes understanding.     ccreferring physic

## 2021-05-21 NOTE — PROGRESS NOTES
Do you currently have any of the following:    Fever: No  Headache:  No  Cough: No  Shortness of breath: No  Exposed to anyone with these symptoms: No         William Mcghee presents to the [address] [place] [address] on 5/21/2021. Evelia Moreno is complaining of pain lower back . The pain is constant. The pain is described as aching, throbbing, shooting and stabbing. Pain is rated on his best day at a 4, on his worst day at a 10, and on average at a 7 on the VAS scale. He took his last dose of Percocet today     Any procedures since your last visit:     Pacemaker or defibrillator: Yes managed by cardiology. He is not on NSAIDS and is  on anticoagulation medications to include ASA and Eliquis and is managed by PCP. Medication Contract and Consent for Opioid Use Documents Filed      No documents found                /64   Pulse 60   Temp 97.8 °F (36.6 °C) (Infrared)   Resp 16   Ht 5' 5\" (1.651 m)   Wt 140 lb (63.5 kg)   SpO2 98%   BMI 23.30 kg/m²      No LMP for male patient.

## 2021-06-21 NOTE — PROGRESS NOTES
[facility]  [address]  [facility]    Follow up Note      Aminah Hoffman     Date of Visit:  2021    CC:  Patient presents for follow up   Chief Complaint   Patient presents with    Follow-up    Lower Back Pain       HPI:    Pain is unchanged. No new changes today. Appropriate analgesia with current medications regimen: yes. Change in quality of symptoms:no. Medication side effects:none. Recent diagnostic testing:none. Recent interventional procedures:none. He has been on anticoagulation medications to include Eliquis and has not been on herbal supplements.  He is not diabetic.     Imagin/2018 lumbar xray - fusion is solid  CT myelogram dated [id number] demonstrates complete block at L3/4 level. Degenerative disc disease, spondylosis causing stenosis. Probable large extruded disc at L3-4. Spinal listhesis L4 and L5 exacerbating the stenosis  EMG dated 3/24/2016     L5 radiculopathy  CT dated 2016 lumbar spine demonstrates degenerative spinal stenosis that is severe at L3-4 L4-5 and L5-S1 levels                                        Potential Aberrant Drug-Related Behavior: no      Urine Drug Screenin2018 buccal consistent  2019 buccal consistent  2019 consistent  10/2019 consistent for oxycodone, metabolites, and benzodiazapines  2020 consistent  2021 consistent     OARRS report:  2018-2021 consistent    Opioid Agreement:  Date enacted:  Needs new one today.   Renewal date:    Past Medical History:   Diagnosis Date    CAD (coronary artery disease)     Cardiac defibrillator in place     [employer]    CHF (congestive heart failure) (HCC)     Depression     Disease of pericardium     DJD (degenerative joint disease)     Erectile dysfunction     GERD (gastroesophageal reflux disease)     Hyperlipidemia     Hypertension     Ischemic cardiomyopathy     Ischemic heart disease     LBBB (left bundle branch block)     w/ sacubitril-valsartan (ENTRESTO)  MG per tablet Take 1 tablet by mouth 2 times daily   Yes Historical Provider, MD   amiodarone (CORDARONE) 200 MG tablet Take 200 mg by mouth daily   Yes Historical Provider, MD   atorvastatin (LIPITOR) 80 MG tablet Take 80 mg by mouth   Yes Historical Provider, MD   bumetanide (BUMEX) 1 MG tablet Take 1 mg by mouth daily Once in the morning, and once in the afternoon on Monday, Wednesday, Friday, and Saturday   Yes Historical Provider, MD   mirtazapine (REMERON) 30 MG tablet Take 30 mg by mouth nightly    Yes Historical Provider, MD   spironolactone (ALDACTONE) 25 MG tablet Take 12.5 mg by mouth daily    Yes Historical Provider, MD   pseudoephedrine (SUDAFED) 30 MG tablet Take 30 mg by mouth every 4 hours as needed for Congestion   Yes Historical Provider, MD   apixaban (ELIQUIS) 5 MG TABS tablet Take 1 tablet by mouth 2 times daily 2/2/21  Yes Deloras Schlatter, APRN - CNP   tamsulosin (FLOMAX) 0.4 MG capsule Take 1 capsule by mouth daily 1/4/21  Yes BARI Cotter - CNP   tadalafil (CIALIS) 20 MG tablet Take 1 tablet by mouth as needed for Erectile Dysfunction 30 minutes prior to sexual intercourse 12/23/20  Yes BARI Post CNP   lactulose [facility]) 10 GM/15ML solution Take 15 mLs by mouth every evening 12/23/20  Yes Emily Hurt DO   diclofenac sodium (VOLTAREN) 1 % GEL Apply topically 4 times daily as needed for Pain 12/23/20 6/22/21 Yes Emily Hurt DO   lidocaine (XYLOCAINE) 5 % ointment Apply topically as needed for Pain Apply topically as needed. Patient taking differently: Apply topically as needed for Pain Apply topically as needed. 11/3/20  Yes Emily Hurt DO   nystatin-triamcinolone [facility] II) 853696-1.6 UNIT/GM-% cream Apply topically 2 times daily.  10/6/20  Yes BARI Post CNP   carvedilol (COREG) 3.125 MG tablet TAKE 1 TABLET BY MOUTH WITH MEALS TWICE DAILY  Patient taking differently: 25 mg 2 times daily  3/6/20  Yes Deloras Schlatter, [Metoprolol] Other (See Comments)     Fatigue         Social History     Socioeconomic History    Marital status:      Spouse name: Not on file    Number of children: Not on file    Years of education: Not on file    Highest education level: Not on file   Occupational History    Not on file   Tobacco Use    Smoking status: Former Smoker     Quit date:      Years since quittin.5    Smokeless tobacco: Never Used   Vaping Use    Vaping Use: Never used   Substance and Sexual Activity    Alcohol use: Yes     Comment: 6-8 beers weekly     Drug use: No     Comment: caffeine 2 cups coffee daily    Sexual activity: Not on file   Other Topics Concern    Not on file   Social History Narrative    Not on file     Social Determinants of Health     Financial Resource Strain:     Difficulty of Paying Living Expenses:    Food Insecurity:     Worried About [address] in the Last Year:     [address] in the Last Year:    Transportation Needs:     Lack of Transportation (Medical):      Lack of Transportation (Non-Medical):    Physical Activity:     Days of Exercise per Week:     Minutes of Exercise per Session:    Stress:     Feeling of Stress :    Social Connections:     Frequency of Communication with Friends and Family:     Frequency of Social Gatherings with Friends and Family:     Attends Worship Services:     Active Member of Clubs or Organizations:     Attends Club or Organization Meetings:     Marital Status:    Intimate Partner Violence:     Fear of Current or Ex-Partner:     Emotionally Abused:     Physically Abused:     Sexually Abused:        Family History   Problem Relation Age of Onset    Heart Disease Mother     No Known Problems Father     Prostate Cancer Brother             Cancer Other     Diabetes Other     High Blood Pressure Other     Stroke Other     Tuberculosis Other        REVIEW OF SYSTEMS:     Arpita Arceo denies fever/chills, chest pain, shortness of breath, new bowel or bladder complaints. All other review of systems was negative. PHYSICAL EXAMINATION:      /70   Pulse 72   Temp 97.4 °F (36.3 °C)   Resp 16     General:      General appearance:   pleasant and well-hydrated. , in mild discomfort and A & O x3  Build:Normal Weight    HEENT:    Head:normocephalic and atraumatic  Sclera: icterus absent,    Lungs:    Breathing:Normal expansion. No wheezing. Abdomen:    Shape:non-distended and normal    Lumbar spine:    Range of motion:abnormal moderately Lateral bending, flexion, extension rotation bilateral and is painful. Extremities:    Tremors:None bilaterally upper and lower  Range of motion:Generally limited extension shoulder - right, pain with internal rotation of hips not done. Intact:Yes  Edema:Normal      Neurological:    [address]    Dermatology:    Skin:no unusual rashes, no skin lesions, no palpable subcutaneous nodules and good skin turgor    Impression:    LBP, s/p L3-5 laminectomy and fusion surgery 5/2018 with Dr. Suzanna Owens for severe stenosis, xray shows solid fusion and hardware in good position 7/2019  Rt shoulder pain, is candidate for RTSA per Dr. Jane Horner knee pain improving, s/p 9/2018 TKR  + SHELLEY on CPAP, + ICD/pacer, 3 heart valves are stenotic with resulting  Thoracic radiating pain in to the abdomen, has had a CT chest recently which shows thoracic degeneration with autofusion  Can consider a dedicated thoracic CT scan once his heart issues are treated, cannot have MRI due to pacemaker/defib  On ELILQUIS     He has had some significant cardiac issues recently, heart failure, a. Fib, cardiac arrest while inpatient resulting in rib fractures.   Now has Cardiomems implanted in his pulmonary artery which is measuring his pulmonary pressures.     Dr. Ju Ha to call PCP to discuss a potential diagnosis of PM to see if he has ever been evaluated for this given the pt describes diffuse pain/stiffness.                Plan:     Failed most recent caudal KRZYSZTOF   OARRS reviewed  Previously discussed risk of overdose with combining opioids with benzos, encouraged him to utilize the lowest dose of benzo as possible and wean off if possible  RF percocet 7.5/325 TID #90 - we have previously discussed limited dosing              RF baclofen 10 mg daily #30 to see if more helpful  Failed gabapentin 100 mg titration due to weakness, Lyrica due to itching and difficulty breathing  Continue diclofenac gel  Continue lactulose for OIC - no RF needed  He is not a candidate for SCS due to his quite frequent falls  Not candidate for TENS due to pacer/defib  Patient encouraged to stay active  Treatment plan discussed with the patient including medication side effects                 Controlled Substance Monitoring:    Acute and Chronic Pain Monitoring:   RX Monitoring 6/22/2021   Periodic Controlled Substance Monitoring Possible medication side effects, risk of tolerance/dependence & alternative treatments discussed. ;No signs of potential drug abuse or diversion identified. ;Assessed functional status. ;Obtaining appropriate analgesic effect of treatment. We discussed with the patient that combining opioids, benzodiazepines, alcohol, illicit drugs or sleep aids increases the risk of respiratory depression including death. We discussed that these medications may cause drowsiness, sedation or dizziness and have counseled the patient not to drive or operate machinery. We have discussed that these medications will be prescribed only by one provider. We have discussed with the patient about age related risk factors and have thoroughly discussed the importance of taking these medications as prescribed. The patient verbalizes understanding.     ccreferring physic

## 2021-06-22 ENCOUNTER — OFFICE VISIT (OUTPATIENT)
Dept: PAIN MANAGEMENT | Age: 76
End: 2021-06-22
Payer: MEDICARE

## 2021-06-22 VITALS
HEART RATE: 72 BPM | DIASTOLIC BLOOD PRESSURE: 70 MMHG | RESPIRATION RATE: 16 BRPM | TEMPERATURE: 97.4 F | SYSTOLIC BLOOD PRESSURE: 108 MMHG

## 2021-06-22 DIAGNOSIS — S33.9XXA CHRONIC OR OLD SPRAIN OF LUMBOSACRAL LIGAMENT: Primary | ICD-10-CM

## 2021-06-22 DIAGNOSIS — G89.4 CHRONIC PAIN SYNDROME: ICD-10-CM

## 2021-06-22 DIAGNOSIS — M51.37 DEGENERATION OF LUMBAR OR LUMBOSACRAL INTERVERTEBRAL DISC: ICD-10-CM

## 2021-06-22 DIAGNOSIS — M51.26 DISPLACEMENT OF LUMBAR INTERVERTEBRAL DISC WITHOUT MYELOPATHY: ICD-10-CM

## 2021-06-22 DIAGNOSIS — S33.5XXD LUMBAR SPRAIN, SUBSEQUENT ENCOUNTER: ICD-10-CM

## 2021-06-22 PROCEDURE — 1123F ACP DISCUSS/DSCN MKR DOCD: CPT | Performed by: PHYSICIAN ASSISTANT

## 2021-06-22 PROCEDURE — 1036F TOBACCO NON-USER: CPT | Performed by: PHYSICIAN ASSISTANT

## 2021-06-22 PROCEDURE — G8427 DOCREV CUR MEDS BY ELIG CLIN: HCPCS | Performed by: PHYSICIAN ASSISTANT

## 2021-06-22 PROCEDURE — 4040F PNEUMOC VAC/ADMIN/RCVD: CPT | Performed by: PHYSICIAN ASSISTANT

## 2021-06-22 PROCEDURE — G8420 CALC BMI NORM PARAMETERS: HCPCS | Performed by: PHYSICIAN ASSISTANT

## 2021-06-22 PROCEDURE — 99213 OFFICE O/P EST LOW 20 MIN: CPT

## 2021-06-22 PROCEDURE — 99213 OFFICE O/P EST LOW 20 MIN: CPT | Performed by: PHYSICIAN ASSISTANT

## 2021-06-22 RX ORDER — BACLOFEN 10 MG/1
10 TABLET ORAL DAILY
Qty: 30 TABLET | Refills: 0 | Status: SHIPPED
Start: 2021-06-22 | End: 2021-07-21 | Stop reason: SDUPTHER

## 2021-06-22 RX ORDER — OXYCODONE AND ACETAMINOPHEN 7.5; 325 MG/1; MG/1
1 TABLET ORAL 3 TIMES DAILY PRN
Qty: 90 TABLET | Refills: 0 | Status: SHIPPED
Start: 2021-06-22 | End: 2021-07-21 | Stop reason: SDUPTHER

## 2021-06-22 NOTE — PROGRESS NOTES
Do you currently have any of the following:    Fever: No  Headache:  No  Cough: No  Shortness of breath: No  Exposed to anyone with these symptoms: No         Conner Stroud presents to the [address] [place] [address] on 6/22/2021. Fany Rios is complaining of pain in his lower back. The pain is constant. The pain is described as aching, throbbing, shooting, stabbing, sharp and nagging. Pain is rated on his best day at a 4, on his worst day at a 10, and on average at a 8 on the VAS scale. He took his last dose of Percocet today. Any procedures since your last visit: No.    Pacemaker or defibrillator: Yes managed by Dr. Fernando Breaux at [facility]    He is  on NSAIDS and is  on anticoagulation medications to include Eliquis and is managed by Dr. Patricia Morin. Medication Contract and Consent for Opioid Use Documents Filed     Patient Documents       Type of Document Status Date Received Received By Description     Medication Contract Received 5/24/2021  9:00 AM Ya Pulliam Medication Contract                /70   Pulse 72   Temp 97.4 °F (36.3 °C)   Resp 16      No LMP for male patient.

## 2021-06-29 PROBLEM — L08.9 SKIN INFECTION: Status: ACTIVE | Noted: 2021-06-29

## 2021-07-21 ENCOUNTER — OFFICE VISIT (OUTPATIENT)
Dept: PAIN MANAGEMENT | Age: 76
End: 2021-07-21
Payer: MEDICARE

## 2021-07-21 VITALS
DIASTOLIC BLOOD PRESSURE: 70 MMHG | HEIGHT: 65 IN | HEART RATE: 70 BPM | SYSTOLIC BLOOD PRESSURE: 106 MMHG | RESPIRATION RATE: 16 BRPM | TEMPERATURE: 97.9 F | OXYGEN SATURATION: 93 % | BODY MASS INDEX: 23.32 KG/M2 | WEIGHT: 140 LBS

## 2021-07-21 DIAGNOSIS — M51.37 DEGENERATION OF LUMBAR OR LUMBOSACRAL INTERVERTEBRAL DISC: Primary | ICD-10-CM

## 2021-07-21 DIAGNOSIS — M51.26 DISPLACEMENT OF LUMBAR INTERVERTEBRAL DISC WITHOUT MYELOPATHY: ICD-10-CM

## 2021-07-21 DIAGNOSIS — S33.5XXD LUMBAR SPRAIN, SUBSEQUENT ENCOUNTER: ICD-10-CM

## 2021-07-21 DIAGNOSIS — S33.9XXA CHRONIC OR OLD SPRAIN OF LUMBOSACRAL LIGAMENT: ICD-10-CM

## 2021-07-21 DIAGNOSIS — G89.4 CHRONIC PAIN SYNDROME: ICD-10-CM

## 2021-07-21 PROCEDURE — G8427 DOCREV CUR MEDS BY ELIG CLIN: HCPCS | Performed by: PHYSICIAN ASSISTANT

## 2021-07-21 PROCEDURE — 1036F TOBACCO NON-USER: CPT | Performed by: PHYSICIAN ASSISTANT

## 2021-07-21 PROCEDURE — 1123F ACP DISCUSS/DSCN MKR DOCD: CPT | Performed by: PHYSICIAN ASSISTANT

## 2021-07-21 PROCEDURE — G8420 CALC BMI NORM PARAMETERS: HCPCS | Performed by: PHYSICIAN ASSISTANT

## 2021-07-21 PROCEDURE — 4040F PNEUMOC VAC/ADMIN/RCVD: CPT | Performed by: PHYSICIAN ASSISTANT

## 2021-07-21 PROCEDURE — 99213 OFFICE O/P EST LOW 20 MIN: CPT | Performed by: PHYSICIAN ASSISTANT

## 2021-07-21 RX ORDER — BACLOFEN 10 MG/1
10 TABLET ORAL DAILY
Qty: 30 TABLET | Refills: 0 | Status: SHIPPED
Start: 2021-07-21 | End: 2021-08-18 | Stop reason: SDUPTHER

## 2021-07-21 RX ORDER — OXYCODONE AND ACETAMINOPHEN 7.5; 325 MG/1; MG/1
1 TABLET ORAL 3 TIMES DAILY PRN
Qty: 90 TABLET | Refills: 0 | Status: SHIPPED
Start: 2021-07-23 | End: 2021-08-18 | Stop reason: SDUPTHER

## 2021-07-21 RX ORDER — ALLOPURINOL 100 MG/1
TABLET ORAL
COMMUNITY
Start: 2021-07-09 | End: 2021-08-18

## 2021-07-21 RX ORDER — NITROGLYCERIN 0.4 MG/1
TABLET SUBLINGUAL
COMMUNITY
Start: 2020-07-31

## 2021-07-21 NOTE — PROGRESS NOTES
Ischemic cardiomyopathy     Ischemic heart disease     LBBB (left bundle branch block)     w/ wide Qrs    Mitral regurgitation     Nonrheumatic aortic valve disorder     SHELLEY on CPAP     setting 7    Presence of coronary angioplasty implant and graft     Ventricular tachycardia [facility])        Past Surgical History:   Procedure Laterality Date    ANKLE SURGERY      CARDIAC CATHETERIZATION  08/24/2020    CARDIAC DEFIBRILLATOR PLACEMENT  11/2009    Bi-vent    CARDIAC DEFIBRILLATOR PLACEMENT      CARDIAC PACEMAKER PLACEMENT      CARPAL TUNNEL RELEASE      CORONARY ANGIOPLASTY  09/2014    DIAGNOSTIC CARDIAC CATH LAB PROCEDURE Left 04/2006    DIAGNOSTIC CARDIAC CATH LAB PROCEDURE Left 09/2014    LEXY-RCA    ELBOW SURGERY      EPIDURAL STEROID INJECTION N/A 5/7/2019    CAUDAL EPIDURAL STEROID INJECTION performed by Marcos Rhodes DO at [facility] N/A 8/1/2019    CAUDAL EPIDURAL STEROID INJECTION performed by Marcos Rhodes DO at [facility]      foot, finger, left ankle, left leg,and right arm    JOINT REPLACEMENT Right 09/18/2018    knee    LEG SURGERY      LUMBAR FUSION  05/29/2018    OTHER SURGICAL HISTORY  04/2006    cardiac tamponade evacuation     Tomas Méndez N/A 12/26/2019    CAUDAL EPIDURAL STEROID INJECTION #2 performed by Marcos Rhodes DO at [facility] TRANSESOPHAGEAL ECHOCARDIOGRAM      10/07    TRANSESOPHAGEAL ECHOCARDIOGRAM  08/24/2020       Prior to Admission medications    Medication Sig Start Date End Date Taking?  Authorizing Provider   nitroGLYCERIN (NITROSTAT) 0.4 MG SL tablet Place under the tongue 7/31/20  Yes Historical Provider, MD   allopurinol (ZYLOPRIM) 100 MG tablet TAKE 1 TABLET BY MOUTH EVERY DAY 7/9/21  Yes Historical Provider, MD   oxyCODONE-acetaminophen (PERCOCET) 7.5-325 MG per tablet Take 1 tablet by mouth 3 times daily as needed for Pain for up to 30 days. Intended supply: 30 days 7/23/21 8/22/21 Yes PEACE Orta   baclofen (LIORESAL) 10 MG tablet Take 1 tablet by mouth daily 7/21/21 8/20/21 Yes PEACE Orta   tadalafil (CIALIS) 20 MG tablet Take 1 tablet by mouth as needed for Erectile Dysfunction 30 minutes prior to sexual intercourse 6/29/21 7/29/21 Yes Zafar Gonzales MD   dapagliflozin (FARXIGA) 10 MG tablet Take 10 mg by mouth every morning   Yes Historical Provider, MD   sacubitril-valsartan (ENTRESTO)  MG per tablet Take 1 tablet by mouth 2 times daily   Yes Historical Provider, MD   amiodarone (CORDARONE) 200 MG tablet Take 200 mg by mouth daily   Yes Historical Provider, MD   atorvastatin (LIPITOR) 80 MG tablet Take 80 mg by mouth   Yes Historical Provider, MD   bumetanide (BUMEX) 1 MG tablet Take 1 mg by mouth daily Once in the morning, and once in the afternoon on Monday, Wednesday, Friday, and Saturday   Yes Historical Provider, MD   mirtazapine (REMERON) 30 MG tablet Take 30 mg by mouth nightly    Yes Historical Provider, MD   spironolactone (ALDACTONE) 25 MG tablet Take 12.5 mg by mouth daily    Yes Historical Provider, MD   pseudoephedrine (SUDAFED) 30 MG tablet Take 30 mg by mouth every 4 hours as needed for Congestion   Yes Historical Provider, MD   apixaban (ELIQUIS) 5 MG TABS tablet Take 1 tablet by mouth 2 times daily 2/2/21  Yes BARI Light CNP   tamsulosin (FLOMAX) 0.4 MG capsule Take 1 capsule by mouth daily 1/4/21  Yes BARI Cotter CNP   amoxicillin (AMOXIL) 500 MG capsule amoxicillin 500 mg capsule   Yes Historical Provider, MD   lactulose (CHRONULAC) 10 GM/15ML solution Take 15 mLs by mouth every evening 12/23/20  Yes Aziza Ferreira, DO   diclofenac sodium (VOLTAREN) 1 % GEL Apply topically 4 times daily as needed for Pain 12/23/20 7/21/21 Yes Aziza Ferreira, DO   lidocaine (XYLOCAINE) 5 % ointment Apply topically as needed for Pain Apply topically as needed.   Patient taking differently: Apply topically as needed for Pain Apply topically as needed. 11/3/20  Yes Stefanie Marquez,    nystatin-triamcinolone [facility] II) 146626-4.0 UNIT/GM-% cream Apply topically 2 times daily. 10/6/20  Yes Josefina Officer, APRN - CNP   carvedilol (COREG) 3.125 MG tablet TAKE 1 TABLET BY MOUTH WITH MEALS TWICE DAILY  Patient taking differently: 25 mg 2 times daily  3/6/20  Yes Lencho Mccray APRN - CYNDI   ezetimibe (ZETIA) 10 MG tablet Take 10 mg by mouth 10/23/19  Yes Historical Provider, MD   TRUE METRIX BLOOD GLUCOSE TEST strip USE AS DIRECTED to test blood glucose TWICE DAILY 7/30/19  Yes Historical Provider, MD   albuterol sulfate  (90 BASE) MCG/ACT inhaler Inhale 2 puffs into the lungs every 6 hours as needed for Wheezing    Yes Historical Provider, MD   venlafaxine (EFFEXOR XR) 150 MG extended release capsule Take 150 mg by mouth daily   Yes Historical Provider, MD   EPINEPHrine (EPIPEN IJ) Inject as directed Indications: as directed   Yes Historical Provider, MD   ferrous sulfate 325 (65 FE) MG tablet Take 325 mg by mouth as needed    Yes Historical Provider, MD   fluticasone (FLONASE ALLERGY RELIEF) 50 MCG/ACT nasal spray 1 spray by Nasal route daily as needed    Yes Historical Provider, MD   magnesium oxide (MAG-OX) 400 MG tablet Take 400 mg by mouth daily   Yes Historical Provider, MD   pantoprazole (PROTONIX) 40 MG tablet Take 40 mg by mouth daily   Yes Historical Provider, MD   triamcinolone (KENALOG) 0.1 % cream Apply topically 2 times daily Apply topically 2 times daily. Yes Historical Provider, MD   diazepam (VALIUM) 5 MG tablet Take 5 mg by mouth as needed for Anxiety. Yes Historical Provider, MD   nystatin-triamcinolone (MYCOLOG II) 235150-8.1 UNIT/GM-% cream Apply topically 2 times daily.   Patient not taking: Reported on 7/21/2021 6/29/21   Irineo Pereira MD   oxybutynin (DITROPAN-XL) 10 MG extended release tablet Take 1 tablet by mouth daily  Patient not taking: Reported on 19  Ramila Murphy PA-C   Febuxostat (ULORIC) 80 MG TABS Take by mouth Once every 3 days  Patient not taking: Reported on 2021    Historical Provider, MD       Allergies   Allergen Reactions    Bee Venom Anaphylaxis    Cefaclor Anaphylaxis    Lyrica [Pregabalin] Shortness Of Breath     Itching     Pentazocine-Acetaminophen Hives and Rash     Rapid heart beats.  Statins Other (See Comments)     Myalgia      Toprol Xl [Metoprolol] Other (See Comments)     Fatigue         Social History     Socioeconomic History    Marital status:      Spouse name: Not on file    Number of children: Not on file    Years of education: Not on file    Highest education level: Not on file   Occupational History    Not on file   Tobacco Use    Smoking status: Former Smoker     Quit date:      Years since quittin.5    Smokeless tobacco: Never Used   Vaping Use    Vaping Use: Never used   Substance and Sexual Activity    Alcohol use: Yes     Comment: 6-8 beers weekly     Drug use: No     Comment: caffeine 2 cups coffee daily    Sexual activity: Not on file   Other Topics Concern    Not on file   Social History Narrative    Not on file     Social Determinants of Health     Financial Resource Strain:     Difficulty of Paying Living Expenses:    Food Insecurity:     Worried About Running Out of Food in the Last Year:     [address] in the Last Year:    Transportation Needs:     Lack of Transportation (Medical):      Lack of Transportation (Non-Medical):    Physical Activity:     Days of Exercise per Week:     Minutes of Exercise per Session:    Stress:     Feeling of Stress :    Social Connections:     Frequency of Communication with Friends and Family:     Frequency of Social Gatherings with Friends and Family:     Attends Hinduism Services:     Active Member of Clubs or Organizations:     Attends Club or Organization Meetings:     Marital Status: degeneration with autofusion  Can consider a dedicated thoracic CT scan once his heart issues are treated, cannot have MRI due to pacemaker/defib  On ELILQUIS     He has had some significant cardiac issues recently, heart failure, a. Fib, cardiac arrest while inpatient resulting in rib fractures. Now has Cardiomems implanted in his pulmonary artery which is measuring his pulmonary pressures.     Dr. Wasserman to call PCP to discuss a potential diagnosis of PM to see if he has ever been evaluated for this given the pt describes diffuse pain/stiffness.                Plan:     Failed most recent caudal KRZYSZTOF   OARRS reviewed  Previously discussed risk of overdose with combining opioids with benzos, encouraged him to utilize the lowest dose of benzo as possible and wean off if possible  RF percocet 7.5/325 TID #90 - we have previously discussed limited dosing              RF baclofen 10 mg daily #30 to see if more helpful  Failed gabapentin 100 mg titration due to weakness, Lyrica due to itching and difficulty breathing  Continue diclofenac gel  Continue lactulose for OIC - no RF needed  He is not a candidate for SCS due to his quite frequent falls  Not candidate for TENS due to pacer/defib  Patient encouraged to stay active  Treatment plan discussed with the patient including medication side effects               Controlled Substance Monitoring:    Acute and Chronic Pain Monitoring:   RX Monitoring 7/21/2021   Periodic Controlled Substance Monitoring Possible medication side effects, risk of tolerance/dependence & alternative treatments discussed. ;No signs of potential drug abuse or diversion identified.;Obtaining appropriate analgesic effect of treatment. ;Assessed functional status. We discussed with the patient that combining opioids, benzodiazepines, alcohol, illicit drugs or sleep aids increases the risk of respiratory depression including death.  We discussed that these medications may cause drowsiness, sedation or dizziness and have counseled the patient not to drive or operate machinery. We have discussed that these medications will be prescribed only by one provider. We have discussed with the patient about age related risk factors and have thoroughly discussed the importance of taking these medications as prescribed. The patient verbalizes understanding.     ccreferring physic

## 2021-07-21 NOTE — PROGRESS NOTES
Do you currently have any of the following:    Fever: No  Headache:  No  Cough: No  Shortness of breath: No  Exposed to anyone with these symptoms: No         Jairo Edmondnt presents to the [address] [place] [address] on 7/21/2021. Nereida Todd is complaining of pain lower back, shoulder, and knee . The pain is constant. The pain is described as aching, throbbing, shooting and stabbing. Pain is rated on his best day at a 5, on his worst day at a 10, and on average at a 8 on the VAS scale. He took his last dose of Percocet . Any procedures since your last visit: No, with  % relief. Pacemaker or defibrillator: Yes managed by Dr Nithin Jo. He is not on NSAIDS and is  on anticoagulation medications to include Eliquis and is managed by Sparkle Lopez. Medication Contract and Consent for Opioid Use Documents Filed     Patient Documents       Type of Document Status Date Received Received By Description     Medication Contract Received 5/24/2021  9:00 AM Eugenio Coats Medication Contract                /70   Pulse 70   Temp 97.9 °F (36.6 °C) (Infrared)   Resp 16   Ht 5' 5\" (1.651 m)   Wt 140 lb (63.5 kg)   SpO2 93%   BMI 23.30 kg/m²      No LMP for male patient.

## 2021-08-12 PROBLEM — D63.1 ANEMIA DUE TO STAGE 4 CHRONIC KIDNEY DISEASE TREATED WITH ERYTHROPOIETIN (HCC): Status: ACTIVE | Noted: 2021-08-12

## 2021-08-12 PROBLEM — N18.4 ANEMIA DUE TO STAGE 4 CHRONIC KIDNEY DISEASE TREATED WITH ERYTHROPOIETIN (HCC): Status: ACTIVE | Noted: 2021-08-12

## 2021-08-18 ENCOUNTER — OFFICE VISIT (OUTPATIENT)
Dept: PAIN MANAGEMENT | Age: 76
End: 2021-08-18
Payer: MEDICARE

## 2021-08-18 VITALS
WEIGHT: 140 LBS | DIASTOLIC BLOOD PRESSURE: 76 MMHG | OXYGEN SATURATION: 98 % | BODY MASS INDEX: 23.32 KG/M2 | SYSTOLIC BLOOD PRESSURE: 126 MMHG | HEART RATE: 64 BPM | RESPIRATION RATE: 16 BRPM | TEMPERATURE: 98 F | HEIGHT: 65 IN

## 2021-08-18 DIAGNOSIS — M51.37 DEGENERATION OF LUMBAR OR LUMBOSACRAL INTERVERTEBRAL DISC: ICD-10-CM

## 2021-08-18 DIAGNOSIS — S33.9XXA CHRONIC OR OLD SPRAIN OF LUMBOSACRAL LIGAMENT: ICD-10-CM

## 2021-08-18 DIAGNOSIS — G89.4 CHRONIC PAIN SYNDROME: Primary | ICD-10-CM

## 2021-08-18 DIAGNOSIS — M51.26 DISPLACEMENT OF LUMBAR INTERVERTEBRAL DISC WITHOUT MYELOPATHY: ICD-10-CM

## 2021-08-18 DIAGNOSIS — S33.5XXD LUMBAR SPRAIN, SUBSEQUENT ENCOUNTER: ICD-10-CM

## 2021-08-18 PROCEDURE — G8420 CALC BMI NORM PARAMETERS: HCPCS | Performed by: PHYSICIAN ASSISTANT

## 2021-08-18 PROCEDURE — 99213 OFFICE O/P EST LOW 20 MIN: CPT | Performed by: PHYSICIAN ASSISTANT

## 2021-08-18 PROCEDURE — G8427 DOCREV CUR MEDS BY ELIG CLIN: HCPCS | Performed by: PHYSICIAN ASSISTANT

## 2021-08-18 PROCEDURE — 1123F ACP DISCUSS/DSCN MKR DOCD: CPT | Performed by: PHYSICIAN ASSISTANT

## 2021-08-18 PROCEDURE — 1036F TOBACCO NON-USER: CPT | Performed by: PHYSICIAN ASSISTANT

## 2021-08-18 PROCEDURE — 4040F PNEUMOC VAC/ADMIN/RCVD: CPT | Performed by: PHYSICIAN ASSISTANT

## 2021-08-18 RX ORDER — OXYCODONE AND ACETAMINOPHEN 7.5; 325 MG/1; MG/1
1 TABLET ORAL 3 TIMES DAILY PRN
Qty: 90 TABLET | Refills: 0 | Status: SHIPPED
Start: 2021-08-22 | End: 2021-09-21 | Stop reason: SDUPTHER

## 2021-08-18 RX ORDER — BACLOFEN 10 MG/1
10 TABLET ORAL DAILY
Qty: 30 TABLET | Refills: 0 | Status: SHIPPED
Start: 2021-08-18 | End: 2021-09-21 | Stop reason: SDUPTHER

## 2021-08-18 NOTE — PROGRESS NOTES
[facility]  [address]  [facility]    Follow up Note      Jennifer Anthony     Date of Visit:  2021    CC:  Patient presents for follow up   Chief Complaint   Patient presents with    Follow-up     lower back        HPI:    Pain is unchanged. No new changes today. Right shoulder continues to be the most painful area. Lower back is painful. Appropriate analgesia with current medications regimen: yes. Change in quality of symptoms:no. Medication side effects:none. Recent diagnostic testing:none. Recent interventional procedures:none. He has been on anticoagulation medications to include Eliquis and has not been on herbal supplements.  He is not diabetic.     Imagin/2018 lumbar xray - fusion is solid  CT myelogram dated [id number] demonstrates complete block at L3/4 level. Degenerative disc disease, spondylosis causing stenosis. Probable large extruded disc at L3-4.  Spinal listhesis L4 and L5 exacerbating the stenosis  EMG dated 3/24/2016     L5 radiculopathy  CT dated 2016 lumbar spine demonstrates degenerative spinal stenosis that is severe at L3-4 L4-5 and L5-S1 levels                                        Potential Aberrant Drug-Related Behavior: no      Urine Drug Screenin2018 buccal consistent  2019 buccal consistent  2019 consistent  10/2019 consistent for oxycodone, metabolites, and benzodiazapines  2020 consistent  2021 consistent     OARRS report:  2018-2021 consistent    Opioid Agreement:  Date enacted:  2021  Renewal date:    Past Medical History:   Diagnosis Date    CAD (coronary artery disease)     Cardiac defibrillator in place     Medtronic    CHF (congestive heart failure) ([facility] [address]     Depression     Disease of pericardium     DJD (degenerative joint disease)     Erectile dysfunction     GERD (gastroesophageal reflux disease)     Hyperlipidemia     Hypertension     Ischemic cardiomyopathy     Ischemic heart disease     LBBB (left bundle branch block)     w/ wide Qrs    Mitral regurgitation     Nonrheumatic aortic valve disorder     SHELLEY on CPAP     setting 7    Presence of coronary angioplasty implant and graft     Ventricular tachycardia [facility])        Past Surgical History:   Procedure Laterality Date    ANKLE SURGERY      CARDIAC CATHETERIZATION  08/24/2020    CARDIAC DEFIBRILLATOR PLACEMENT  11/2009    Bi-vent    CARDIAC DEFIBRILLATOR PLACEMENT      CARDIAC PACEMAKER PLACEMENT      CARPAL TUNNEL RELEASE      CORONARY ANGIOPLASTY  09/2014    DIAGNOSTIC CARDIAC CATH LAB PROCEDURE Left 04/2006    DIAGNOSTIC CARDIAC CATH LAB PROCEDURE Left 09/2014    LEXY-RCA    ELBOW SURGERY      EPIDURAL STEROID INJECTION N/A 5/7/2019    CAUDAL EPIDURAL STEROID INJECTION performed by Jennifer Steven DO at [address] N/A 8/1/2019    CAUDAL EPIDURAL STEROID INJECTION performed by Jennifer Steven DO at [address] [facility]      foot, finger, left ankle, left leg,and right arm    JOINT REPLACEMENT Right 09/18/2018    knee    LEG SURGERY      LUMBAR FUSION  05/29/2018    OTHER SURGICAL HISTORY  04/2006    cardiac tamponade evacuation     [facility] - Dr. Georgina Lucero N/A 12/26/2019    CAUDAL EPIDURAL STEROID INJECTION #2 performed [facility] Jennifer Steven DO at [facility] TRANSESOPHAGEAL ECHOCARDIOGRAM      10/07    TRANSESOPHAGEAL ECHOCARDIOGRAM  08/24/2020       Prior to Admission medications    Medication Sig Start Date End Date Taking? Authorizing Provider   oxyCODONE-acetaminophen (PERCOCET) 7.5-325 MG per tablet Take 1 tablet by mouth 3 times daily as needed for Pain for up to 30 days.  Intended supply: 30 days 8/22/21 9/21/21 Yes PEACE Ingram   baclofen (LIORESAL) 10 MG tablet Take 1 tablet by mouth daily 8/18/21 9/17/21 Yes PEACE Ingram   nitroGLYCERIN (NITROSTAT) 0.4 MG SL tablet Place under the tongue 7/31/20  Yes Historical Provider, MD   nystatin-triamcinolone (MYCOLOG II) 023589-7.1 UNIT/GM-% cream Apply topically 2 times daily. 6/29/21  Yes Giuliano Lopez MD   dapagliflozin (FARXIGA) 10 MG tablet Take 10 mg by mouth every morning   Yes Historical Provider, MD   sacubitril-valsartan (ENTRESTO)  MG per tablet Take 1 tablet by mouth 2 times daily   Yes Historical Provider, MD   amiodarone (CORDARONE) 200 MG tablet Take 200 mg by mouth daily   Yes Historical Provider, MD   atorvastatin (LIPITOR) 80 MG tablet Take 80 mg by mouth   Yes Historical Provider, MD   bumetanide (BUMEX) 1 MG tablet Take 1 mg by mouth daily Once in the morning, and once in the afternoon on Monday, Wednesday, Friday, and Saturday   Yes Historical Provider, MD   mirtazapine (REMERON) 30 MG tablet Take 30 mg by mouth nightly    Yes Historical Provider, MD   spironolactone (ALDACTONE) 25 MG tablet Take 12.5 mg by mouth daily    Yes Historical Provider, MD   pseudoephedrine (SUDAFED) 30 MG tablet Take 30 mg by mouth every 4 hours as needed for Congestion   Yes Historical Provider, MD   apixaban (ELIQUIS) 5 MG TABS tablet Take 1 tablet by mouth 2 times daily 2/2/21  Yes BARI Aldridge CNP   tamsulosin (FLOMAX) 0.4 MG capsule Take 1 capsule by mouth daily 1/4/21  Yes BARI Cotter CNP   lactulose (CHRONULAC) 10 GM/15ML solution Take 15 mLs by mouth every evening 12/23/20  Yes Doris Tejeda,    lidocaine (XYLOCAINE) 5 % ointment Apply topically as needed for Pain Apply topically as needed. Patient taking differently: Apply topically as needed for Pain Apply topically as needed. 11/3/20  Yes Doris Tejeda DO   nystatin-triamcinolone [facility] II) 284113-2.4 UNIT/GM-% cream Apply topically 2 times daily.  10/6/20  Yes BARI Cervantes CNP   carvedilol (COREG) 3.125 MG tablet TAKE 1 TABLET BY MOUTH WITH MEALS TWICE DAILY  Patient taking differently: 25 mg 2 times daily  3/6/20  Yes Joceline RICO BARI Montana - CNP   ezetimibe (ZETIA) 10 MG tablet Take 10 mg by mouth 10/23/19  Yes Historical Provider, MD   TRUE METRIX BLOOD GLUCOSE TEST strip USE AS DIRECTED to test blood glucose TWICE DAILY 7/30/19  Yes Historical Provider, MD   albuterol sulfate  (90 BASE) MCG/ACT inhaler Inhale 2 puffs into the lungs every 6 hours as needed for Wheezing    Yes Historical Provider, MD   venlafaxine (EFFEXOR XR) 150 MG extended release capsule Take 150 mg by mouth daily   Yes Historical Provider, MD   EPINEPHrine (EPIPEN IJ) Inject as directed Indications: as directed   Yes Historical Provider, MD   ferrous sulfate 325 (65 FE) MG tablet Take 325 mg by mouth as needed    Yes Historical Provider, MD   fluticasone (FLONASE ALLERGY RELIEF) 50 MCG/ACT nasal spray 1 spray by Nasal route daily as needed    Yes Historical Provider, MD   pantoprazole (PROTONIX) 40 MG tablet Take 40 mg by mouth daily   Yes Historical Provider, MD   triamcinolone (KENALOG) 0.1 % cream Apply topically 2 times daily Apply topically 2 times daily. Yes Historical Provider, MD   diazepam (VALIUM) 5 MG tablet Take 5 mg by mouth as needed for Anxiety.     Yes Historical Provider, MD   tadalafil (CIALIS) 20 MG tablet Take 1 tablet by mouth as needed for Erectile Dysfunction 30 minutes prior to sexual intercourse 6/29/21 7/29/21  Raul Tapia MD   amoxicillin (AMOXIL) 500 MG capsule amoxicillin 500 mg capsule  Patient not taking: Reported on 8/18/2021    Historical Provider, MD   diclofenac sodium (VOLTAREN) 1 % GEL Apply topically 4 times daily as needed for Pain 12/23/20 7/21/21  Gaby Peppers,    oxybutynin (DITROPAN-XL) 10 MG extended release tablet Take 1 tablet by mouth daily  Patient not taking: Reported on 7/21/2021 11/12/19 6/22/21  Daysi Murphy PA-C   Febuxostat (ULORIC) 80 MG TABS Take by mouth Once every 3 days  Patient not taking: Reported on 8/18/2021    Historical Provider, MD       Allergies   Allergen Reactions    Bee Venom Anaphylaxis    Cefaclor Anaphylaxis    Lyrica [Pregabalin] Shortness Of Breath     Itching     Pentazocine-Acetaminophen Hives and Rash     Rapid heart beats.  Statins Other (See Comments)     Myalgia      Toprol Xl [Metoprolol] Other (See Comments)     Fatigue         Social History     Socioeconomic History    Marital status:      Spouse name: Not on file    Number of children: Not on file    Years of education: Not on file    Highest education level: Not on file   Occupational History    Not on file   Tobacco Use    Smoking status: Former Smoker     Quit date:      Years since quittin.6    Smokeless tobacco: Never Used   Vaping Use    Vaping Use: Never used   Substance and Sexual Activity    Alcohol use: Yes     Comment: 6-8 beers weekly     Drug use: No     Comment: caffeine 2 cups coffee daily    Sexual activity: Not on file   Other Topics Concern    Not on file   Social History Narrative    Not on file     Social Determinants of Health     Financial Resource Strain:     Difficulty of Paying Living Expenses:    Food Insecurity:     Worried About [address] [employer] in the Last Year:     [address] in the Last Year:    Transportation Needs:     Lack of Transportation (Medical):      Lack of Transportation (Non-Medical):    Physical Activity:     Days of Exercise per Week:     Minutes of Exercise per Session:    Stress:     Feeling of Stress :    Social Connections:     Frequency of Communication with Friends and Family:     Frequency of Social Gatherings with Friends and Family:     Attends Temple Services:     Active Member of Clubs or Organizations:     Attends Club or Organization Meetings:     Marital Status:    Intimate Partner Violence:     Fear of Current or Ex-Partner:     Emotionally Abused:     Physically Abused:     Sexually Abused:        Family History   Problem Relation Age of Onset    Heart Disease Mother     No Known Problems Father     Prostate Cancer Brother         2011    Cancer Other     Diabetes Other     High Blood Pressure Other     Stroke Other     Tuberculosis Other        REVIEW OF SYSTEMS:     Yesy Kelley denies fever/chills, chest pain, shortness of breath, new bowel or bladder complaints. All other review of systems was negative. PHYSICAL EXAMINATION:      /76   Pulse 64   Temp 98 °F (36.7 °C) (Infrared)   Resp 16   Ht 5' 5\" (1.651 m)   Wt 140 lb (63.5 kg)   SpO2 98%   BMI 23.30 kg/m²     General:      General appearance:   pleasant and well-hydrated. , in mild discomfort and A & O x3  Build:Normal Weight    HEENT:    Head:normocephalic and atraumatic  Sclera: icterus absent,    Lungs:    Breathing:Normal expansion. No wheezing. Abdomen:    Shape:non-distended and normal    Lumbar spine:    Range of motion:abnormal moderately Lateral bending, flexion, extension rotation bilateral and is painful. Extremities:    Tremors:None bilaterally upper and lower  Range of motion:Generally limited extension shoulder - right, pain with internal rotation of hips not done.   Intact:Yes  Edema:Normal      Neurological:    [address]    Dermatology:    Skin:no unusual rashes, no skin lesions, no palpable subcutaneous nodules and good skin turgor    Impression:    LBP, s/p L3-5 laminectomy and fusion surgery 5/2018 with Dr. Twyla Reaves for severe stenosis, xray shows solid fusion and hardware in good position 7/2019  Rt shoulder pain, is candidate for RTSA per Dr. Fritz Glover knee pain improving, s/p 9/2018 TKR  + SHELLEY on CPAP, + ICD/pacer, 3 heart valves are stenotic with resulting  Thoracic radiating pain in to the abdomen, has had a CT chest recently which shows thoracic degeneration with autofusion  Can consider a dedicated thoracic CT scan once his heart issues are treated, cannot have MRI due to pacemaker/defib  On ELILQUIS     He has had some significant cardiac issues recently, heart failure, a. Fib, cardiac arrest while inpatient resulting in rib fractures. Now has Cardiomems implanted in his pulmonary artery which is measuring his pulmonary pressures.     Dr. Kailee Whittaker to call PCP to discuss a potential diagnosis of PM to see if he has ever been evaluated for this given the pt describes diffuse pain/stiffness.                Plan:     Failed most recent caudal [id number]   OARRS reviewed  Previously discussed risk of overdose with combining opioids with benzos, encouraged him to utilize the lowest dose of benzo as possible and wean off if possible  RF percocet 7.5/325 TID #90 - we have previously discussed limited dosing              RF baclofen 10 mg daily #30  Failed gabapentin 100 mg titration due to weakness, Lyrica due to itching and difficulty breathing  Continue diclofenac gel  Continue lactulose for OIC   He is not a candidate for SCS due to his quite frequent falls  Not candidate for TENS due to pacer/defib  Patient encouraged to stay active  Treatment plan discussed with the patient including medication side effects     Controlled Substance Monitoring:    Acute and Chronic Pain Monitoring:   RX Monitoring 8/18/2021   Periodic Controlled Substance Monitoring Possible medication side effects, risk of tolerance/dependence & alternative treatments discussed. ;No signs of potential drug abuse or diversion identified. ;Assessed functional status. ;Obtaining appropriate analgesic effect of treatment. We discussed with the patient that combining opioids, benzodiazepines, alcohol, illicit drugs or sleep aids increases the risk of respiratory depression including death. We discussed that these medications may cause drowsiness, sedation or dizziness and have counseled the patient not to drive or operate machinery. We have discussed that these medications will be prescribed only by one provider.  We have discussed with the patient about age related risk factors and have thoroughly discussed the importance of taking these medications as prescribed. The patient verbalizes understanding.     ccreferring physic

## 2021-08-18 NOTE — PROGRESS NOTES
Do you currently have any of the following:    Fever: No  Headache:  No  Cough: No  Shortness of breath: No  Exposed to anyone with these symptoms: No         Gilson Kang presents to the [facility] on [facility]18/2021. Olen Schwab is complaining of pain lower back The pain is constant. The pain is described as aching, throbbing, shooting and stabbing. Pain is rated on his best day at a 6, on his worst day at a 10, and on average at a 6 on the VAS scale. He took his last dose of Percocet today     Any procedures since your last visit:     Pacemaker or defibrillator: Yes managed by     He is not on NSAIDS and is  on anticoagulation medications to include Eliquis and is managed by cardiology     Medication Contract and Consent for Opioid Use Documents Filed     Patient Documents       Type of Document Status Date Received Received By Description     Medication Contract Received 5/24/2021  9:00 AM Elvis Sandoval Medication Contract                /76   Pulse 64   Temp 98 °F (36.7 °C) (Infrared)   Resp 16   Ht 5' 5\" (1.651 m)   Wt 140 lb (63.5 kg)   SpO2 98%   BMI 23.30 kg/m²      No LMP for male patient.

## 2021-09-20 NOTE — PROGRESS NOTES
[facility]  [address]  [facility]    Follow up Note      Ophelia Novak     Date of Visit:  2021    CC:  Patient presents for follow up   Chief Complaint   Patient presents with    Follow-up     low back pain        HPI:    Pain is unchanged. No new changes today. Right shoulder continues to be the most painful area. Lower back is painful. C/o right 2nd finger joint pain. Appropriate analgesia with current medications regimen: yes. Change in quality of symptoms:no. Medication side effects:none. Recent diagnostic testing:none. Recent interventional procedures:none. He has been on anticoagulation medications to include Eliquis and has not been on herbal supplements.  He is not diabetic.     Imagin/2018 lumbar xray - fusion is solid  CT myelogram dated 9[id number] demonstrates complete block at L3/4 level. Degenerative disc disease, spondylosis causing stenosis. Probable large extruded disc at L3-4.  Spinal listhesis L4 and L5 exacerbating the stenosis  EMG dated 3/24/2016     L5 radiculopathy  CT dated 2016 lumbar spine demonstrates degenerative spinal stenosis that is severe at L3-4 L4-5 and L5-S1 levels                                        Potential Aberrant Drug-Related Behavior: no      Urine Drug Screenin2018 buccal consistent  2019 buccal consistent  2019 consistent  10/2019 consistent for oxycodone, metabolites, and benzodiazapines  2020 consistent  2021 consistent     OARRS report:  2018-2021 consistent    Opioid Agreement:  Date enacted:  2021  Renewal date:    Past Medical History:   Diagnosis Date    CAD (coronary artery disease)     Cardiac defibrillator in place     [employer]tr[employer]    CHF [facility]congestive heart failure) ([facility] [address]     Depression     Disease of pericardium     DJD (degenerative joint disease)     Erectile dysfunction     GERD (gastroesophageal reflux disease)     Hyperlipidemia     Hypertension  Ischemic cardiomyopathy     Ischemic heart disease     LBBB (left bundle branch block)     w/ wide Qrs    Mitral regurgitation     Nonrheumatic aortic valve disorder     SHELLEY on CPAP     setting 7    Presence of coronary angioplasty implant and graft     Ventricular tachycardia [facility])        Past Surgical History:   Procedure Laterality Date    ANKLE SURGERY      CARDIAC CATHETERIZATION  08/24/2020    CARDIAC DEFIBRILLATOR PLACEMENT  11/2009    Bi-vent    CARDIAC DEFIBRILLATOR PLACEMENT      CARDIAC PACEMAKER PLACEMENT      CARPAL TUNNEL RELEASE      CORONARY ANGIOPLASTY  09/2014    DIAGNOSTIC CARDIAC CATH LAB PROCEDURE Left 04/2006    DIAGNOSTIC CARDIAC CATH LAB PROCEDURE Left 09/2014    LEXY-RCA    ELBOW SURGERY      EPIDURAL STEROID INJECTION N/A 5/7/2019    CAUDAL EPIDURAL STEROID INJECTION performed by Gagan Galloway DO at [address] [facility] N/A 8/1/2019    CAUDAL EPIDURAL STEROID INJECTION performed by Gagan Galloway DO at [address] [facility]      foot, finger, left ankle, left leg,and right arm    JOINT REPLACEMENT Right 09/18/2018    knee    LEG SURGERY      LUMBAR FUSION  05/29/2018    OTHER SURGICAL HISTORY  04/2006    cardiac tamponade evacuation     Ishan Felder Manual N/A 12/26/2019    CAUDAL EPIDURAL STEROID INJECTION #2 performed by Gagan Galloway DO at [address] TRANSESOPHAGEAL ECHOCARDIOGRAM      10/07    TRANSESOPHAGEAL ECHOCARDIOGRAM  08/24/2020       Prior to Admission medications    Medication Sig Start Date End Date Taking? Authorizing Provider   oxyCODONE-acetaminophen (PERCOCET) 7.5-325 MG per tablet Take 1 tablet by mouth 3 times daily as needed for Pain for up to 30 days.  Intended supply: 30 days 9/23/21 10/23/21 Yes PEACE Cervantes   baclofen (LIORESAL) 10 MG tablet Take 1 tablet by mouth daily 9/21/21 10/21/21 Yes Jaxson Cervantes lidocaine (XYLOCAINE) 5 % ointment Apply topically as needed for Pain Apply topically as needed. 9/21/21  Yes PEACE Block   nitroGLYCERIN (NITROSTAT) 0.4 MG SL tablet Place under the tongue 7/31/20  Yes Historical Provider, MD   nystatin-triamcinolone (MYCOLOG II) 270751-6.1 UNIT/GM-% cream Apply topically 2 times daily. 6/29/21  Yes Tyra Ramsey MD   sacubitril-valsartan (ENTRESTO)  MG per tablet Take 1 tablet by mouth 2 times daily   Yes Historical Provider, MD   amiodarone (CORDARONE) 200 MG tablet Take 200 mg by mouth daily   Yes Historical Provider, MD   atorvastatin (LIPITOR) 80 MG tablet Take 80 mg by mouth   Yes Historical Provider, MD   bumetanide (BUMEX) 1 MG tablet Take 1 mg by mouth daily Once in the morning, and once in the afternoon on Monday, Wednesday, Friday, and Saturday   Yes Historical Provider, MD   mirtazapine (REMERON) 30 MG tablet Take 30 mg by mouth nightly    Yes Historical Provider, MD   spironolactone (ALDACTONE) 25 MG tablet Take 12.5 mg by mouth daily    Yes Historical Provider, MD   pseudoephedrine (SUDAFED) 30 MG tablet Take 30 mg by mouth every 4 hours as needed for Congestion   Yes Historical Provider, MD   apixaban (ELIQUIS) 5 MG TABS tablet Take 1 tablet by mouth 2 times daily 2/2/21  Yes Tacho Elise APRN - CNP   tamsulosin (FLOMAX) 0.4 MG capsule Take 1 capsule by mouth daily 1/4/21  Yes Prince Juarez APRN - CNP   amoxicillin (AMOXIL) 500 MG capsule amoxicillin 500 mg capsule   Yes Historical Provider, MD   lactulose (CHRONULAC) 10 GM/15ML solution Take 15 mLs by mouth every evening 12/23/20  Yes Loni Healy DO   nystatin-triamcinolone [facility] II) 810662-8.1 UNIT/GM-% cream Apply topically 2 times daily.  10/6/20  Yes BARI Mckeon - CNP   carvedilol (COREG) 3.125 MG tablet TAKE 1 TABLET BY MOUTH WITH MEALS TWICE DAILY  Patient taking differently: 25 mg 2 times daily  3/6/20  Yes BARI Mancini - CNP   ezetimibe (ZETIA) 10 MG tablet Take 10 mg shortness of breath, new bowel or bladder complaints. All other review of systems was negative. PHYSICAL EXAMINATION:      /64   Pulse 74   Temp 98.2 °F (36.8 °C) (Infrared)   Resp 16   Ht 5' 4\" (1.626 m)   Wt 155 lb (70.3 kg)   SpO2 98%   BMI 26.61 kg/m²     General:      General appearance:   pleasant and well-hydrated. , in mild discomfort and A & O x3  Build:Normal Weight    HEENT:    Head:normocephalic and atraumatic  Sclera: icterus absent,    Lungs:    Breathing:Normal expansion. No wheezing. Abdomen:    Shape:non-distended and normal    Lumbar spine:    Range of motion:abnormal moderately Lateral bending, flexion, extension rotation bilateral and is painful. Extremities:    Tremors:None bilaterally upper and lower  Range of motion:Generally limited extension shoulder - right, pain with internal rotation of hips not done. Intact:Yes  Edema:Normal      Neurological:    [address]    Dermatology:    Skin:no unusual rashes, no skin lesions, no palpable subcutaneous nodules and good skin turgor    Impression:    LBP, s/p L3-5 laminectomy and fusion surgery 5/2018 with Dr. Twyla Reaves for severe stenosis, xray shows solid fusion and hardware in good position 7/2019  Rt shoulder pain, is candidate for RTSA per Dr. Fritz Glover knee pain improving, s/p 9/2018 TKR  + SHELLEY on CPAP, + ICD/pacer, 3 heart valves are stenotic with resulting  Thoracic radiating pain in to the abdomen, has had a CT chest recently which shows thoracic degeneration with autofusion  Can consider a dedicated thoracic CT scan once his heart issues are treated, cannot have MRI due to pacemaker/defib  On ELILQUIS     He has had some significant cardiac issues recently, heart failure, a. Fib, cardiac arrest while inpatient resulting in rib fractures.   Now has Cardiomems implanted in his pulmonary artery which is measuring his pulmonary pressures.     From previous note: Dr. Dawood Peterson to call PCP to discuss a potential diagnosis of

## 2021-09-21 ENCOUNTER — OFFICE VISIT (OUTPATIENT)
Dept: PAIN MANAGEMENT | Age: 76
End: 2021-09-21
Payer: MEDICARE

## 2021-09-21 VITALS
WEIGHT: 155 LBS | DIASTOLIC BLOOD PRESSURE: 64 MMHG | HEIGHT: 64 IN | TEMPERATURE: 98.2 F | SYSTOLIC BLOOD PRESSURE: 120 MMHG | HEART RATE: 74 BPM | RESPIRATION RATE: 16 BRPM | OXYGEN SATURATION: 98 % | BODY MASS INDEX: 26.46 KG/M2

## 2021-09-21 DIAGNOSIS — M51.37 DEGENERATION OF LUMBAR OR LUMBOSACRAL INTERVERTEBRAL DISC: Primary | ICD-10-CM

## 2021-09-21 DIAGNOSIS — G89.4 CHRONIC PAIN SYNDROME: ICD-10-CM

## 2021-09-21 DIAGNOSIS — S33.5XXD LUMBAR SPRAIN, SUBSEQUENT ENCOUNTER: ICD-10-CM

## 2021-09-21 DIAGNOSIS — S33.9XXA CHRONIC OR OLD SPRAIN OF LUMBOSACRAL LIGAMENT: ICD-10-CM

## 2021-09-21 DIAGNOSIS — M51.26 DISPLACEMENT OF LUMBAR INTERVERTEBRAL DISC WITHOUT MYELOPATHY: ICD-10-CM

## 2021-09-21 PROCEDURE — 1123F ACP DISCUSS/DSCN MKR DOCD: CPT | Performed by: PHYSICIAN ASSISTANT

## 2021-09-21 PROCEDURE — 1036F TOBACCO NON-USER: CPT | Performed by: PHYSICIAN ASSISTANT

## 2021-09-21 PROCEDURE — G8427 DOCREV CUR MEDS BY ELIG CLIN: HCPCS | Performed by: PHYSICIAN ASSISTANT

## 2021-09-21 PROCEDURE — 4040F PNEUMOC VAC/ADMIN/RCVD: CPT | Performed by: PHYSICIAN ASSISTANT

## 2021-09-21 PROCEDURE — G8417 CALC BMI ABV UP PARAM F/U: HCPCS | Performed by: PHYSICIAN ASSISTANT

## 2021-09-21 PROCEDURE — 99213 OFFICE O/P EST LOW 20 MIN: CPT | Performed by: PHYSICIAN ASSISTANT

## 2021-09-21 RX ORDER — OXYCODONE AND ACETAMINOPHEN 7.5; 325 MG/1; MG/1
1 TABLET ORAL 3 TIMES DAILY PRN
Qty: 90 TABLET | Refills: 0 | Status: SHIPPED
Start: 2021-09-23 | End: 2022-01-05 | Stop reason: SDUPTHER

## 2021-09-21 RX ORDER — LIDOCAINE 50 MG/G
OINTMENT TOPICAL PRN
Qty: 4 G | Refills: 0 | Status: SHIPPED
Start: 2021-09-21 | End: 2021-09-22 | Stop reason: SDUPTHER

## 2021-09-21 RX ORDER — BACLOFEN 10 MG/1
10 TABLET ORAL DAILY
Qty: 30 TABLET | Refills: 2 | Status: SHIPPED
Start: 2021-09-21 | End: 2022-02-01 | Stop reason: SDUPTHER

## 2021-09-21 NOTE — PROGRESS NOTES
Do you currently have any of the following:    Fever: No  Headache:  No  Cough: No  Shortness of breath: No  Exposed to anyone with these symptoms: No         Mei Cairo presents to the [address] [place] [address] on 9/21/2021. Cricket Head is complaining of pain low back  The pain is constant. The pain is described as aching, throbbing, shooting and stabbing. Pain is rated on his best day at a 7, on his worst day at a 10, and on average at a 8 on the VAS scale. He took his last dose of Percocet    Any procedures since your last visit:     Pacemaker or defibrillator: Yes managed by     He is not on NSAIDS and is  on anticoagulation medications to include Eliquis and is managed by PCP     Medication Contract and Consent for Opioid Use Documents Filed     Patient Documents       Type of Document Status Date Received Received By Description     Medication Contract Received 5/24/2021  9:00 AM Nabor Patel Medication Contract                /64   Pulse 74   Temp 98.2 °F (36.8 °C) (Infrared)   Resp 16   Ht 5' 4\" (1.626 m)   Wt 155 lb (70.3 kg)   SpO2 98%   BMI 26.61 kg/m²      No LMP for male patient.

## 2021-09-22 ENCOUNTER — TELEPHONE (OUTPATIENT)
Dept: PAIN MANAGEMENT | Age: 76
End: 2021-09-22

## 2021-09-22 RX ORDER — LIDOCAINE 50 MG/G
OINTMENT TOPICAL PRN
Qty: 35.4 G | Refills: 1 | OUTPATIENT
Start: 2021-09-22 | End: 2022-02-01

## 2021-09-22 NOTE — TELEPHONE ENCOUNTER
Pharmacy called with a question on the lidocaine ointment. It was written as a 4g tube which it does come in a 35.4 g tube. I did switch to a 35.4 g tube.

## 2021-11-15 ENCOUNTER — TELEPHONE (OUTPATIENT)
Dept: PAIN MANAGEMENT | Age: 76
End: 2021-11-15

## 2021-11-15 NOTE — TELEPHONE ENCOUNTER
Wife called in. Reports patient is in rehab for heart failure and \"head bleed\" after fall. States that he was only getting his pain medication prn. She has since spoken to the DON and states that he is getting his medication TID. Discussed that we do not manage patient while they are in rehab but would be happy to speak with the treating provider if needed.

## 2022-01-05 ENCOUNTER — OFFICE VISIT (OUTPATIENT)
Dept: PAIN MANAGEMENT | Age: 77
End: 2022-01-05
Payer: MEDICARE

## 2022-01-05 VITALS
OXYGEN SATURATION: 98 % | TEMPERATURE: 98.2 F | SYSTOLIC BLOOD PRESSURE: 118 MMHG | DIASTOLIC BLOOD PRESSURE: 72 MMHG | HEART RATE: 61 BPM

## 2022-01-05 DIAGNOSIS — M51.26 DISPLACEMENT OF LUMBAR INTERVERTEBRAL DISC WITHOUT MYELOPATHY: ICD-10-CM

## 2022-01-05 DIAGNOSIS — S33.5XXD LUMBAR SPRAIN, SUBSEQUENT ENCOUNTER: ICD-10-CM

## 2022-01-05 DIAGNOSIS — S33.9XXA CHRONIC OR OLD SPRAIN OF LUMBOSACRAL LIGAMENT: ICD-10-CM

## 2022-01-05 DIAGNOSIS — M51.37 DEGENERATION OF LUMBAR OR LUMBOSACRAL INTERVERTEBRAL DISC: Primary | ICD-10-CM

## 2022-01-05 DIAGNOSIS — G89.4 CHRONIC PAIN SYNDROME: ICD-10-CM

## 2022-01-05 PROCEDURE — G8484 FLU IMMUNIZE NO ADMIN: HCPCS | Performed by: PHYSICIAN ASSISTANT

## 2022-01-05 PROCEDURE — 99213 OFFICE O/P EST LOW 20 MIN: CPT | Performed by: PHYSICIAN ASSISTANT

## 2022-01-05 PROCEDURE — 1123F ACP DISCUSS/DSCN MKR DOCD: CPT | Performed by: PHYSICIAN ASSISTANT

## 2022-01-05 PROCEDURE — 99213 OFFICE O/P EST LOW 20 MIN: CPT

## 2022-01-05 PROCEDURE — G8417 CALC BMI ABV UP PARAM F/U: HCPCS | Performed by: PHYSICIAN ASSISTANT

## 2022-01-05 PROCEDURE — 4040F PNEUMOC VAC/ADMIN/RCVD: CPT | Performed by: PHYSICIAN ASSISTANT

## 2022-01-05 PROCEDURE — 1036F TOBACCO NON-USER: CPT | Performed by: PHYSICIAN ASSISTANT

## 2022-01-05 PROCEDURE — G8427 DOCREV CUR MEDS BY ELIG CLIN: HCPCS | Performed by: PHYSICIAN ASSISTANT

## 2022-01-05 RX ORDER — OXYCODONE AND ACETAMINOPHEN 7.5; 325 MG/1; MG/1
1 TABLET ORAL 3 TIMES DAILY PRN
Qty: 90 TABLET | Refills: 0 | Status: SHIPPED
Start: 2022-01-05 | End: 2022-02-01 | Stop reason: SDUPTHER

## 2022-01-05 RX ORDER — EMPAGLIFLOZIN 10 MG/1
TABLET, FILM COATED ORAL
COMMUNITY

## 2022-01-05 RX ORDER — OXYCODONE AND ACETAMINOPHEN 7.5; 325 MG/1; MG/1
TABLET ORAL
COMMUNITY
End: 2022-01-05

## 2022-01-05 RX ORDER — CARVEDILOL 25 MG/1
25 TABLET ORAL 2 TIMES DAILY
COMMUNITY
Start: 2021-10-02

## 2022-01-05 NOTE — PROGRESS NOTES
[facility]  [facility] [address] [place] [address]    Follow up Note      Garrison Irby     Date of Visit:  2022    CC:  Patient presents for follow up   Chief Complaint   Patient presents with    Follow-up    Lower Back Pain       HPI:    Pain is unchanged. No new changes today. Right shoulder continues to be the most painful area. Recently out of rehab. Appropriate analgesia with current medications regimen: yes. Change in quality of symptoms:no. Medication side effects:none. Recent diagnostic testing:none. Recent interventional procedures:none. He has been on anticoagulation medications to include Eliquis and has not been on herbal supplements.  He is not diabetic.     Imagin/2018 lumbar xray - fusion is solid  CT myelogram dated 2016 demonstrates complete block at L3/4 level. Degenerative disc disease, spondylosis causing stenosis. Probable large extruded disc at L3-4.  Spinal listhesis L4 and L5 exacerbating the stenosis  EMG dated 3/24/2016     L5 radiculopathy  CT dated 2016 lumbar spine demonstrates degenerative spinal stenosis that is severe at L3-4 L4-5 and L5-S1 levels                                        Potential Aberrant Drug-Related Behavior: no      Urine Drug Screenin2018 buccal consistent  2019 buccal consistent  2019 consistent  10/2019 consistent for oxycodone, metabolites, and benzodiazapines  2020 consistent  2021 consistent     OARRS report:  2018-2022 consistent    Opioid Agreement:  Date enacted:  2021  Renewal date:    Past Medical History:   Diagnosis Date    CAD (coronary artery disease)     Cardiac defibrillator in place     [employer]    CHF (congestive heart failure) ([facility] [address]     Depression     Disease of pericardium     DJD (degenerative joint disease)     Erectile dysfunction     GERD (gastroesophageal reflux disease)     Hyperlipidemia     Hypertension     Ischemic cardiomyopathy     Ischemic heart disease     LBBB (left bundle branch block)     w/ wide Qrs    Mitral regurgitation     Nonrheumatic aortic valve disorder     SHELLEY on CPAP     setting 7    Presence of coronary angioplasty implant and graft     Ventricular tachycardia [facility])        Past Surgical History:   Procedure Laterality Date    ANKLE SURGERY      CARDIAC CATHETERIZATION  08/24/2020    CARDIAC DEFIBRILLATOR PLACEMENT  11/2009    Bi-vent    CARDIAC DEFIBRILLATOR PLACEMENT      CARDIAC PACEMAKER PLACEMENT      CARPAL TUNNEL RELEASE      CORONARY ANGIOPLASTY  09/2014    DIAGNOSTIC CARDIAC CATH LAB PROCEDURE Left 04/2006    DIAGNOSTIC CARDIAC CATH LAB PROCEDURE Left 09/2014    LEXY-RCA    ELBOW SURGERY      EPIDURAL STEROID INJECTION N/A 5/7/2019    CAUDAL EPIDURAL STEROID INJECTION performed by Lula Guevara DO at [facility] N/A 8/1/2019    CAUDAL EPIDURAL STEROID INJECTION performed by Lula Guevara DO at [facility]      foot, finger, left ankle, left leg,and right arm    JOINT REPLACEMENT Right 09/18/2018    knee    LEG SURGERY      LUMBAR FUSION  05/29/2018    OTHER SURGICAL HISTORY  04/2006    cardiac tamponade evacuation     [facility] - Dr. Dragan Sparks N/A 12/26/2019    CAUDAL EPIDURAL STEROID INJECTION #2 performed by Lula Guevara DO at [facility] TRANSESOPHAGEAL ECHOCARDIOGRAM      10/07    TRANSESOPHAGEAL ECHOCARDIOGRAM  08/24/2020       Prior to Admission medications    Medication Sig Start Date End Date Taking? Authorizing Provider   empagliflozin (JARDIANCE) 10 MG tablet 1 tablet   Yes Historical Provider, MD   carvedilol (COREG) 25 MG tablet Take 25 mg by mouth 2 times daily 10/2/21  Yes Historical Provider, MD   oxyCODONE-acetaminophen (PERCOCET) 7.5-325 MG per tablet Take 1 tablet by mouth 3 times daily as needed for Pain for up to 30 days.  Intended supply: 30 days 1/5/22 2/4/22 Yes PEACE Ellis   tamsulosin (FLOMAX) 0.4 MG capsule Take 1 capsule by mouth daily 11/1/21  Yes BARI Cotter CNP   nystatin-triamcinolone (MYCOLOG II) 077899-4.1 UNIT/GM-% cream Apply topically 2 times daily. 10/4/21  Yes Simeon Ledezma MD   lidocaine (XYLOCAINE) 5 % ointment Apply topically as needed for Pain Apply topically as needed. 9/22/21  Yes PEACE Ellis   nitroGLYCERIN (NITROSTAT) 0.4 MG SL tablet Place under the tongue 7/31/20  Yes Historical Provider, MD   tadalafil (CIALIS) 20 MG tablet Take 1 tablet by mouth as needed for Erectile Dysfunction 30 minutes prior to sexual intercourse 6/29/21 1/5/22 Yes Simeon Ledezma MD   sacubitril-valsartan (ENTRESTO)  MG per tablet Take 1 tablet by mouth 2 times daily   Yes Historical Provider, MD   amiodarone (CORDARONE) 200 MG tablet Take 200 mg by mouth daily   Yes Historical Provider, MD   atorvastatin (LIPITOR) 80 MG tablet Take 80 mg by mouth   Yes Historical Provider, MD   bumetanide (BUMEX) 1 MG tablet Take 1 mg by mouth daily Once in the morning, and once in the afternoon on Monday, Wednesday, Friday, and Saturday   Yes Historical Provider, MD   mirtazapine (REMERON) 30 MG tablet Take 30 mg by mouth nightly    Yes Historical Provider, MD   pseudoephedrine (SUDAFED) 30 MG tablet Take 30 mg by mouth every 4 hours as needed for Congestion   Yes Historical Provider, MD   apixaban (ELIQUIS) 5 MG TABS tablet Take 1 tablet by mouth 2 times daily 2/2/21  Yes Du Esparza, APRN - CNP   lactulose ([facility]) 10 GM/15ML solution Take 15 mLs by mouth every evening 12/23/20  Yes Selene Crowley,    diclofenac sodium (VOLTAREN) 1 % GEL Apply topically 4 times daily as needed for Pain 12/23/20 1/5/22 Yes Selene Crowley, DO   nystatin-triamcinolone [facility]) 382242-8.1 UNIT/GM-% cream Apply topically 2 times daily.  10/6/20  Yes BARI Meehan CNP   ezetimibe (ZETIA) 10 MG tablet Take 10 mg by mouth 10/23/19  Yes Historical Provider, MD   TRUE METRIX BLOOD GLUCOSE TEST strip USE AS DIRECTED to test blood glucose TWICE DAILY 7/30/19  Yes Historical Provider, MD   albuterol sulfate  (90 BASE) MCG/ACT inhaler Inhale 2 puffs into the lungs every 6 hours as needed for Wheezing    Yes Historical Provider, MD   venlafaxine (EFFEXOR XR) 150 MG extended release capsule Take 150 mg by mouth daily   Yes Historical Provider, MD   ferrous sulfate 325 (65 FE) MG tablet Take 325 mg by mouth as needed    Yes Historical Provider, MD   fluticasone (FLONASE ALLERGY RELIEF) 50 MCG/ACT nasal spray 1 spray by Nasal route daily as needed    Yes Historical Provider, MD   pantoprazole (PROTONIX) 40 MG tablet Take 40 mg by mouth daily   Yes Historical Provider, MD   triamcinolone (KENALOG) 0.1 % cream Apply topically 2 times daily Apply topically 2 times daily. Yes Historical Provider, MD   diazepam (VALIUM) 5 MG tablet Take 5 mg by mouth as needed for Anxiety. Yes Historical Provider, MD   spironolactone (ALDACTONE) 25 MG tablet Take 12.5 mg by mouth daily   Patient not taking: Reported on 1/5/2022    Historical Provider, MD   amoxicillin (AMOXIL) 500 MG capsule amoxicillin 500 mg capsule  Patient not taking: Reported on 1/5/2022    Historical Provider, MD   EPINEPHrine (EPIPEN IJ) Inject as directed Indications: as directed  Patient not taking: Reported on 1/5/2022    Historical Provider, MD       Allergies   Allergen Reactions    Bee Venom Anaphylaxis    Cefaclor Anaphylaxis    Lyrica [Pregabalin] Shortness Of Breath     Itching     Pentazocine-Acetaminophen Hives and Rash     Rapid heart beats.      Statins Other (See Comments)     Myalgia      Toprol Xl [Metoprolol] Other (See Comments)     Fatigue         Social History     Socioeconomic History    Marital status:      Spouse name: Not on file    Number of children: Not on file    Years of education: Not on file    Highest education level: Not on file   Occupational History    Not on file   Tobacco Use    Smoking status: Former Smoker     Quit date:      Years since quittin.0    Smokeless tobacco: Never Used   Vaping Use    Vaping Use: Never used   Substance and Sexual Activity    Alcohol use: Yes     Comment: 6-8 beers weekly     Drug use: No     Comment: caffeine 2 cups coffee daily    Sexual activity: Not on file   Other Topics Concern    Not on file   Social History Narrative    Not on file     Social Determinants of Health     Financial Resource Strain:     Difficulty of Paying Living Expenses: Not on file   Food Insecurity:     Worried About Running Out of Food in the Last Year: Not on file    Yara of Food in the Last Year: Not on file   Transportation Needs:     Lack of Transportation (Medical): Not on file    Lack of Transportation (Non-Medical):  Not on file   Physical Activity:     Days of Exercise per Week: Not on file    Minutes of Exercise per Session: Not on file   Stress:     Feeling of Stress : Not on file   Social Connections:     Frequency of Communication with Friends and Family: Not on file    Frequency of Social Gatherings with Friends and Family: Not on file    Attends Mu-ism Services: Not on file    Active Member of [address] [place] [address] [facility] or Organizations: Not on file    Attends Club or Organization Meetings: Not on file    Marital Status: Not on file   Intimate Partner Violence:     Fear of Current or Ex-Partner: Not on file    Emotionally Abused: Not on file    Physically Abused: Not on file    Sexually Abused: Not on file   Housing Stability:     Unable to Pay for Housing in the Last Year: Not on file    Number of [place] in the Last Year: Not on file    Unstable Housing in the Last Year: Not on file       Family History   Problem Relation Age of Onset    Heart Disease Mother     No Known Problems Father     Prostate Cancer Brother             Cancer Other     Diabetes Other     High Blood Pressure Other     Stroke Other     Tuberculosis Other        REVIEW OF SYSTEMS:     Phil Prakash denies fever/chills, chest pain, shortness of breath, new bowel or bladder complaints. All other review of systems was negative. PHYSICAL EXAMINATION:      /72   Pulse 61   Temp 98.2 °F (36.8 °C) (Infrared)   SpO2 98%     General:      General appearance:   pleasant and well-hydrated. , in mild discomfort and A & O x3  Build:Normal Weight    HEENT:    Head:normocephalic and atraumatic  Sclera: icterus absent,    Lungs:    Breathing:Normal expansion. No wheezing. Abdomen:    Shape:non-distended and normal    Lumbar spine:    Range of motion:abnormal moderately Lateral bending, flexion, extension rotation bilateral and is painful. Extremities:    Tremors:None bilaterally upper and lower  Range of motion:Generally limited extension shoulder - right, pain with internal rotation of hips not done. Intact:Yes  Edema:Normal      Neurological:    [address]    Dermatology:    Skin:no unusual rashes, no skin lesions, no palpable subcutaneous nodules and good skin turgor    Impression:    LBP, s/p L3-5 laminectomy and fusion surgery 5/2018 with Dr. Orlando Gomez for severe stenosis, xray shows solid fusion and hardware in good position 7/2019  Rt shoulder pain, is candidate for RTSA per Dr. Jorge Fung knee pain improving, s/p 9/2018 TKR  + SHELLEY on CPAP, + ICD/pacer, 3 heart valves are stenotic with resulting  Thoracic radiating pain in to the abdomen, has had a CT chest recently which shows thoracic degeneration with autofusion  Can consider a dedicated thoracic CT scan once his heart issues are treated, cannot have MRI due to pacemaker/defib  On ELILQUIS     He has had some significant cardiac issues recently, heart failure, a. Fib, cardiac arrest while inpatient resulting in rib fractures.   Now has Cardiomems implanted in his pulmonary artery which is measuring his pulmonary pressures.     From previous note: Dr. Griselda Badillo to call PCP to discuss a potential diagnosis of PM to see if he has ever been evaluated for this given the pt describes diffuse pain/stiffness. Reports his pain came on gradually and not suddently.                       Plan:     Failed most recent caudal KRZYSZTOF   OARRS reviewed  Previously discussed risk of overdose with combining opioids with benzos, encouraged him to utilize the lowest dose of benzo as possible and wean off if possible  RF percocet 7.5/325 TID #90 - we have previously discussed limited dosing. Patient and family report that he has been taking same dosing in rehab and the same since he has been home utilizing medication that was not used while in the hospital.   April Blandon will call if they need a refill of baclofen 10 mg daily #30  Failed gabapentin 100 mg titration due to weakness, Lyrica due to itching and difficulty breathing  Continue diclofenac gel  Continue lactulose for OIC   He is not a candidate for SCS due to his quite frequent falls  Not candidate for TENS due to pacer/defib  Patient encouraged to stay active  Treatment plan discussed with the patient including medication side effects     Controlled Substance Monitoring:    Acute and Chronic Pain Monitoring:   RX Monitoring 1/5/2022   Periodic Controlled Substance Monitoring Possible medication side effects, risk of tolerance/dependence & alternative treatments discussed. ;No signs of potential drug abuse or diversion identified. ;Assessed functional status. ;Obtaining appropriate analgesic effect of treatment. We discussed with the patient that combining opioids, benzodiazepines, alcohol, illicit drugs or sleep aids increases the risk of respiratory depression including death. We discussed that these medications may cause drowsiness, sedation or dizziness and have counseled the patient not to drive or operate machinery. We have discussed that these medications will be prescribed only by one provider.  We have discussed with the patient about age related risk factors and have thoroughly discussed the importance of taking these medications as prescribed. The patient verbalizes understanding.     ccreferring physic

## 2022-01-05 NOTE — PROGRESS NOTES
Do you currently have any of the following:    Fever: No  Headache:  No  Cough: No  Shortness of breath: No  Exposed to anyone with these symptoms: No         Edilma Padilla presents to the [facility] on [facility]. Lucina Dumont is complaining of pain in his lower back. The pain is constant. The pain is described as aching, shooting, stabbing, sharp and nagging. Pain is rated on his best day at a 7, on his worst day at a 10, and on average at a 8 on the VAS scale. He took his last dose of Percocet today. Any procedures since your last visit: No.    Pacemaker or defibrillator: Yes managed by Dr. Toño Hedrick. He is  on NSAIDS and is  on anticoagulation medications to include Eliquis and is managed by Dr. Hardin Halsted. Medication Contract and Consent for Opioid Use Documents Filed     Patient Documents     Type of Document Status Date Received Received By Description    Medication Contract Received 5/24/2021  9:00 AM Shani Granados Medication Contract                /72   Pulse 61   Temp 98.2 °F (36.8 °C) (Infrared)   SpO2 98%      No LMP for male patient.

## 2022-02-01 ENCOUNTER — VIRTUAL VISIT (OUTPATIENT)
Dept: PAIN MANAGEMENT | Age: 77
End: 2022-02-01
Payer: COMMERCIAL

## 2022-02-01 DIAGNOSIS — G89.4 CHRONIC PAIN SYNDROME: ICD-10-CM

## 2022-02-01 DIAGNOSIS — M51.26 DISPLACEMENT OF LUMBAR INTERVERTEBRAL DISC WITHOUT MYELOPATHY: Primary | ICD-10-CM

## 2022-02-01 DIAGNOSIS — M51.37 DEGENERATION OF LUMBAR OR LUMBOSACRAL INTERVERTEBRAL DISC: ICD-10-CM

## 2022-02-01 DIAGNOSIS — S33.9XXA CHRONIC OR OLD SPRAIN OF LUMBOSACRAL LIGAMENT: ICD-10-CM

## 2022-02-01 DIAGNOSIS — S33.5XXD LUMBAR SPRAIN, SUBSEQUENT ENCOUNTER: ICD-10-CM

## 2022-02-01 PROCEDURE — 99372 PR PHYSICIAN PHONE CONSULT,INTERMED: CPT | Performed by: PHYSICIAN ASSISTANT

## 2022-02-01 RX ORDER — LIDOCAINE 50 MG/G
OINTMENT TOPICAL PRN
Qty: 4 G | Refills: 0 | Status: SHIPPED
Start: 2022-02-01 | End: 2022-03-01 | Stop reason: SDUPTHER

## 2022-02-01 RX ORDER — BACLOFEN 10 MG/1
10 TABLET ORAL DAILY
Qty: 30 TABLET | Refills: 2 | Status: SHIPPED
Start: 2022-02-01 | End: 2022-04-08 | Stop reason: SDUPTHER

## 2022-02-01 RX ORDER — OXYCODONE AND ACETAMINOPHEN 7.5; 325 MG/1; MG/1
1 TABLET ORAL 3 TIMES DAILY PRN
Qty: 90 TABLET | Refills: 0 | Status: SHIPPED
Start: 2022-02-04 | End: 2022-03-01 | Stop reason: SDUPTHER

## 2022-02-01 NOTE — PROGRESS NOTES
Osmany Samayoa was read the following message We want to confirm that, for purposes of billing, this is a virtual visit with your provider for which we will submit a claim for reimbursement with your insurance company. You will be responsible for any copays, coinsurance amounts or other amounts not covered by your insurance company. If you do not accept this, unfortunately we will not be able to schedule or proceed with a virtual visit with the provider. Do you accept?  Brodie Damon responded No .

## 2022-02-01 NOTE — PROGRESS NOTES
NICOLASA ZUNIGA [facility] Pain Management  Located within [facility]    Telephone follow up visit      Date of Visit:  2022    CC:  Follow Up    Consent:  Telephone follow up due to Meagan 19 pandemic   The patient and/or health care decision maker is aware that he/she may receive a bill for this telephone service, depending on his insurance coverage, and has provided verbal consent to proceed: Yes    I have considered the risks of abuse, dependence, addiction and diversion. My patient is aware that they will need a follow-up visit (in-person or virtually) at the appropriate time indicated for continued medications. Further, my patient is aware that when this acute crisis has lifted, they will be expected to return for an in-person visit and all elements of standard local and hospital guidelines in order to continue this medication. Patient location: Home   Phys[place] Location[place] in [place]    HPI:  Pain is unchanged. No new issues. Appropriate analgesia with current medications regimen: yes. Change in quality of symptoms:no. Medication side effects:none. Recent diagnostic testing:none. Recent interventional procedures:none. He has been on anticoagulation medications to include Eliquis and has not been on herbal supplements.  He is not diabetic.     Imagin/2018 lumbar xray - fusion is solid  CT myelogram dated [id number] demonstrates complete block at L3/4 level. Degenerative disc disease, spondylosis causing stenosis. Probable large extruded disc at L3-4.  Spinal listhesis L4 and L5 exacerbating the stenosis  EMG dated 3/24/2016     L5 radiculopathy  CT dated 2016 lumbar spine demonstrates degenerative spinal stenosis that is severe at L3-4 L4-5 and L5-S1 levels                                        Potential Aberrant Drug-Related Behavior: no      Urine Drug Screenin2018 buccal consistent  2019 buccal consistent  2019 consistent  10/2019 consistent for oxycodone, metabolites, and benzodiazapines  2/2020 consistent  05/2021 consistent     OARRS report:  12/2018-02/2022 consistent     Opioid Agreement:  Date enacted:  05/24/2021  Renewal date:    Past Medical History: Reviewed    Past Surgical History: Reviewed     Home Medications: Reviewed    Allergies: Reviewed     Social History: Reviewed     REVIEW OF SYSTEMS:     Arvin Roach denies fever/chills, chest pain, shortness of breath, new bowel or bladder complaints. All other review of systems was negative. PHYSICAL EXAMINATION:     General:       A & O x3    Lungs:    Breathing: No breathing abnormalities noted over the phone. Impression:    LBP, s/p L3-5 laminectomy and fusion surgery 5/2018 with Dr. Flower Chao for severe stenosis, xray shows solid fusion and hardware in good position 7/2019  Rt shoulder pain, is candidate for RTSA per Dr. Cynthia Petersen knee pain improving, s/p 9/2018 TKR  + SHELLEY on CPAP, + ICD/pacer, 3 heart valves are stenotic with resulting  Thoracic radiating pain in to the abdomen, has had a CT chest recently which shows thoracic degeneration with autofusion  Can consider a dedicated thoracic CT scan once his heart issues are treated, cannot have MRI due to pacemaker/defib  On ELILQUIS     He has had some significant cardiac issues recently, heart failure, a. Fib, cardiac arrest while inpatient resulting in rib fractures. Now has Cardiomems implanted in his pulmonary artery which is measuring his pulmonary pressures.     From previous note: Dr. Aranza Estrada to call PCP to discuss a potential diagnosis of PM to see if he has ever been evaluated for this given the pt describes diffuse pain/stiffness. Reports his pain came on gradually and not suddently. No new issues this visit.   Virtual visit due to having water damage occurring right now in their sun room.           Plan:     Failed most recent caudal KRZYSZTOF   OARRS reviewed  UDS next visit  Previously discussed risk of overdose with combining opioids with benzos, encouraged him to utilize the lowest dose of benzo as possible and wean off if possible              RF percocet 7.5/325 TID #90 - we have previously discussed limited             dosing.                            Continue baclofen 10 mg daily #30  Failed gabapentin 100 mg titration due to weakness, Lyrica due to itching and difficulty breathing  Continue diclofenac gel  Continue lactulose for OIC   He is not a candidate for SCS due to his quite frequent falls  Not candidate for TENS due to pacer/defib  Patient encouraged to stay active  Treatment plan discussed with the patient including medication side effects                 Controlled Substance Monitoring:    Acute and Chronic Pain Monitoring:   RX Monitoring 2/1/2022   Periodic Controlled Substance Monitoring Possible medication side effects, risk of tolerance/dependence & alternative treatments discussed. ;No signs of potential drug abuse or diversion identified. ;Assessed functional status. ;Obtaining appropriate analgesic effect of treatment. Shin Sparks, was evaluated through a synchronous (real-time) audio-video encounter. The patient (or guardian if applicable) is aware that this is a billable service, which includes applicable co-pays. This Virtual Visit was conducted with patient's (and/or legal guardian's) consent. The visit was conducted pursuant to the emergency declaration under the [facility], [address] [place] [address] [facility] authority and the [employer] General Act. Patient identification was verified, and a caregiver was present when appropriate. The patient was located in a state where the provider was licensed to provide care. We discussed with the patient that combining opioids, benzodiazepines, alcohol, illicit drugs or sleep aids increases the risk of respiratory depression including death.  We discussed that these medications may cause drowsiness, sedation or dizziness and have counseled the patient not to drive or operate machinery. We have discussed that these medications will be prescribed only by one provider. We have discussed with the patient about age related risk factors and have thoroughly discussed the importance of taking these medications as prescribed. The patient verbalizes understanding. Patient advised regarding steps to help prevent the spread of COVID-19   SOURCE - https://sri-kaylene.info/. html     1-Stay home except to get medical care  2-Clean your hands often for atleast 20 secnds, avoid touching: Avoid touching your eyes, nose, and mouth with unwashed hands. 3-Seek medical attention: Seek prompt medical attention if your illness is worsening (e.g., difficulty breathing). Call you doctor first.  3-Wear a facemask if you are sick   4-Cover your coughs and sneezes        I affirm this is a Patient Initiated Episode with an Established Patient who has not had a related appointment within my department in the past 7 days or scheduled within the next 24 hours.     Total Time: minutes: 11-20 minutes    Note: not billable if this call serves to triage the patient into an appointment for the relevant concern

## 2022-03-01 ENCOUNTER — OFFICE VISIT (OUTPATIENT)
Dept: PAIN MANAGEMENT | Age: 77
End: 2022-03-01
Payer: MEDICARE

## 2022-03-01 VITALS
HEIGHT: 65 IN | OXYGEN SATURATION: 93 % | DIASTOLIC BLOOD PRESSURE: 61 MMHG | TEMPERATURE: 96.5 F | WEIGHT: 153 LBS | BODY MASS INDEX: 25.49 KG/M2 | RESPIRATION RATE: 16 BRPM | HEART RATE: 67 BPM | SYSTOLIC BLOOD PRESSURE: 119 MMHG

## 2022-03-01 DIAGNOSIS — S33.9XXA CHRONIC OR OLD SPRAIN OF LUMBOSACRAL LIGAMENT: ICD-10-CM

## 2022-03-01 DIAGNOSIS — M51.37 DEGENERATION OF LUMBAR OR LUMBOSACRAL INTERVERTEBRAL DISC: ICD-10-CM

## 2022-03-01 DIAGNOSIS — G89.4 CHRONIC PAIN SYNDROME: Primary | ICD-10-CM

## 2022-03-01 DIAGNOSIS — M51.26 DISPLACEMENT OF LUMBAR INTERVERTEBRAL DISC WITHOUT MYELOPATHY: ICD-10-CM

## 2022-03-01 DIAGNOSIS — S33.5XXD LUMBAR SPRAIN, SUBSEQUENT ENCOUNTER: ICD-10-CM

## 2022-03-01 PROCEDURE — 1123F ACP DISCUSS/DSCN MKR DOCD: CPT | Performed by: PAIN MEDICINE

## 2022-03-01 PROCEDURE — G8484 FLU IMMUNIZE NO ADMIN: HCPCS | Performed by: PAIN MEDICINE

## 2022-03-01 PROCEDURE — 99203 OFFICE O/P NEW LOW 30 MIN: CPT | Performed by: PAIN MEDICINE

## 2022-03-01 PROCEDURE — 4040F PNEUMOC VAC/ADMIN/RCVD: CPT | Performed by: PAIN MEDICINE

## 2022-03-01 PROCEDURE — 1036F TOBACCO NON-USER: CPT | Performed by: PAIN MEDICINE

## 2022-03-01 PROCEDURE — 99213 OFFICE O/P EST LOW 20 MIN: CPT | Performed by: PAIN MEDICINE

## 2022-03-01 PROCEDURE — G8417 CALC BMI ABV UP PARAM F/U: HCPCS | Performed by: PAIN MEDICINE

## 2022-03-01 PROCEDURE — G8427 DOCREV CUR MEDS BY ELIG CLIN: HCPCS | Performed by: PAIN MEDICINE

## 2022-03-01 RX ORDER — LIDOCAINE 50 MG/G
OINTMENT TOPICAL PRN
Qty: 4 G | Refills: 0 | Status: SHIPPED
Start: 2022-03-01 | End: 2022-04-08

## 2022-03-01 RX ORDER — FOLIC ACID-PYRIDOXINE-CYANOCOBALAMIN TAB 2.5-25-2 MG 2.5-25-2 MG
TAB ORAL
COMMUNITY
Start: 2022-02-17

## 2022-03-01 RX ORDER — OXYCODONE AND ACETAMINOPHEN 7.5; 325 MG/1; MG/1
1 TABLET ORAL 3 TIMES DAILY PRN
Qty: 90 TABLET | Refills: 0 | Status: SHIPPED
Start: 2022-03-09 | End: 2022-04-08 | Stop reason: SDUPTHER

## 2022-03-01 NOTE — PROGRESS NOTES
Do you currently have any of the following:    Fever: No  Headache:  No  Cough: No  Shortness of breath: No  Exposed to anyone with these symptoms: No         Jayme Thomas presents to the [address] [place] [address] on 3/1/2022. Gabriel Denise is complaining of pain back right shoulder. The pain is constant. The pain is described as aching, throbbing, shooting, stabbing and sharp. Pain is rated on his best day at a 7, on his worst day at a 10, and on average at a 7 on the VAS scale. He took his last dose of Percocet last night. Any procedures since your last visit: Yes, with  % relief. Pacemaker or defibrillator: Yes managed by Dr. Sheeba Shepherd. He is not on NSAIDS and is  on anticoagulation medications to include Eliquis and is managed by Dr. Shelly Colvin. Medication Contract and Consent for Opioid Use Documents Filed     Patient Documents     Type of Document Status Date Received Received By Description    Medication Contract Received 5/24/2021  9:00 AM Arlean Gosselin Medication Contract                /61   Pulse 67   Temp 96.5 °F (35.8 °C) (Infrared)   Resp 16   Ht 5' 5\" (1.651 m)   Wt 153 lb (69.4 kg)   SpO2 93%   BMI 25.46 kg/m²      No LMP for male patient.

## 2022-03-01 NOTE — PROGRESS NOTES
[facility]  [address]  [facility]    Follow up Note      Alexandro Gilford     Date of Visit:  3/1/2022    CC:  Patient presents for follow up   Chief Complaint   Patient presents with    Follow-up    Back Pain    Shoulder Pain     right        HPI:    Pain is unchanged. Appropriate analgesia with current medications regimen: yes. Change in quality of symptoms:no. Medication side effects:none. Recent diagnostic testing:none. Recent interventional procedures:none. He has been on anticoagulation medications to include Eliquis and has not been on herbal supplements.  He is not diabetic.     Imagin/2018 lumbar xray - fusion is solid  CT myelogram dated 2016 demonstrates complete block at L3/4 level. Degenerative disc disease, spondylosis causing stenosis. Probable large extruded disc at L3-4.  Spinal listhesis L4 and L5 exacerbating the stenosis  EMG dated 3/24/2016     L5 radiculopathy  CT dated 2016 lumbar spine demonstrates degenerative spinal stenosis that is severe at L3-4 L4-5 and L5-S1 levels                                        Potential Aberrant Drug-Related Behavior: no      Urine Drug Screenin2018 buccal consistent  2019 buccal consistent  2019 consistent  10/2019 consistent for oxycodone, metabolites, and benzodiazapines  2020 consistent  2021 consistent     OARRS report:  2018-2022 consistent    Opioid Agreement:  Date enacted:  2021  Renewal date:    Past Medical History:   Diagnosis Date    CAD (coronary artery disease)     Cardiac defibrillator in place     [employer]    CHF (congestive heart failure) ([facility] [address]     Depression     Disease of pericardium     DJD (degenerative joint disease)     Erectile dysfunction     GERD (gastroesophageal reflux disease)     Hyperlipidemia     Hypertension     Ischemic cardiomyopathy     Ischemic heart disease     LBBB (left bundle branch block)     w/ wide Qrs    Mitral regurgitation     Nonrheumatic aortic valve disorder     SHELLEY on CPAP     setting 7    Presence of coronary angioplasty implant and graft     Ventricular tachycardia [facility])        Past Surgical History:   Procedure Laterality Date    ANKLE SURGERY      CARDIAC CATHETERIZATION  08/24/2020    CARDIAC DEFIBRILLATOR PLACEMENT  11/2009    Bi-vent    CARDIAC DEFIBRILLATOR PLACEMENT      CARDIAC PACEMAKER PLACEMENT      CARPAL TUNNEL RELEASE      CORONARY ANGIOPLASTY  09/2014    DIAGNOSTIC CARDIAC CATH LAB PROCEDURE Left 04/2006    DIAGNOSTIC CARDIAC CATH LAB PROCEDURE Left 09/2014    LEXY-RCA    ELBOW SURGERY      EPIDURAL STEROID INJECTION N/A 5/7/2019    CAUDAL EPIDURAL STEROID INJECTION performed by Nabil Grossman DO at [address] N/A 8/1/2019    CAUDAL EPIDURAL STEROID INJECTION performed by Nabil Grossman DO at [address]      foot, finger, left ankle, left leg,and right arm    JOINT REPLACEMENT Right 09/18/2018    knee    LEG SURGERY      LUMBAR FUSION  05/29/2018    OTHER SURGICAL HISTORY  04/2006    cardiac tamponade evacuation     Ivonne Anders N/A 12/26/2019    CAUDAL EPIDURAL STEROID INJECTION #2 performed by Nabil Grossman DO at [address] TRANSESOPHAGEAL ECHOCARDIOGRAM      10/07    TRANSESOPHAGEAL ECHOCARDIOGRAM  08/24/2020       Prior to Admission medications    Medication Sig Start Date End Date Taking? Authorizing Provider   FOLBIC 2.5-25-2 MG TABS TAKE 1 TABLET BY MOUTH ONCE DAILY 2/17/22  Yes Historical Provider, MD   oxyCODONE-acetaminophen (PERCOCET) 7.5-325 MG per tablet Take 1 tablet by mouth 3 times daily as needed for Pain for up to 30 days.  Intended supply: 30 days 2/4/22 3/6/22 Yes PEACE Lemus   baclofen (LIORESAL) 10 MG tablet Take 1 tablet by mouth daily 2/1/22 3/3/22 Yes PEACE Lemus   lidocaine (XYLOCAINE) 5 % ointment Apply topically as needed for Pain Apply topically as needed. 2/1/22  Yes PEACE Porras   empagliflozin (JARDIANCE) 10 MG tablet 1 tablet   Yes Historical Provider, MD   carvedilol (COREG) 25 MG tablet Take 25 mg by mouth 2 times daily 10/2/21  Yes Historical Provider, MD   tamsulosin (FLOMAX) 0.4 MG capsule Take 1 capsule by mouth daily 11/1/21  Yes BARI Cotter - CNP   nystatin-triamcinolone (MYCOLOG II) 972415-1.1 UNIT/GM-% cream Apply topically 2 times daily. 10/4/21  Yes Elke Reyes MD   nitroGLYCERIN (NITROSTAT) 0.4 MG SL tablet Place under the tongue 7/31/20  Yes Historical Provider, MD   amiodarone (CORDARONE) 200 MG tablet Take 200 mg by mouth daily   Yes Historical Provider, MD   atorvastatin (LIPITOR) 80 MG tablet Take 80 mg by mouth   Yes Historical Provider, MD   bumetanide (BUMEX) 1 MG tablet Take 1 mg by mouth daily Once in the morning, and once in the afternoon on Monday, Wednesday, Friday, and Saturday   Yes Historical Provider, MD   mirtazapine (REMERON) 30 MG tablet Take 30 mg by mouth nightly    Yes Historical Provider, MD   spironolactone (ALDACTONE) 25 MG tablet Take 12.5 mg by mouth daily    Yes Historical Provider, MD   pseudoephedrine (SUDAFED) 30 MG tablet Take 30 mg by mouth every 4 hours as needed for Congestion   Yes Historical Provider, MD   apixaban (ELIQUIS) 5 MG TABS tablet Take 1 tablet by mouth 2 times daily 2/2/21  Yes BARI Vargas CNP   amoxicillin (AMOXIL) 500 MG capsule amoxicillin 500 mg capsule   Yes Historical Provider, MD   lactulose (CHRONULAC) 10 GM/15ML solution Take 15 mLs by mouth every evening 12/23/20  Yes Rocky Holter, DO   nystatin-triamcinolone [facility] II) 294040-8.1 UNIT/GM-% cream Apply topically 2 times daily.  10/6/20  Yes BARI Agarwal - CNP   ezetimibe (ZETIA) 10 MG tablet Take 10 mg by mouth 10/23/19  Yes Historical Provider, MD   TRUE METRIX BLOOD GLUCOSE TEST strip USE AS DIRECTED to test blood glucose TWICE DAILY 7/30/19  Yes Historical Provider, MD   albuterol sulfate  (90 BASE) MCG/ACT inhaler Inhale 2 puffs into the lungs every 6 hours as needed for Wheezing    Yes Historical Provider, MD   venlafaxine (EFFEXOR XR) 150 MG extended release capsule Take 150 mg by mouth daily   Yes Historical Provider, MD   EPINEPHrine (EPIPEN IJ) Inject as directed Indications: as directed    Yes Historical Provider, MD   ferrous sulfate 325 (65 FE) MG tablet Take 325 mg by mouth as needed    Yes Historical Provider, MD   fluticasone (FLONASE ALLERGY RELIEF) 50 MCG/ACT nasal spray 1 spray by Nasal route daily as needed    Yes Historical Provider, MD   pantoprazole (PROTONIX) 40 MG tablet Take 40 mg by mouth daily   Yes Historical Provider, MD   triamcinolone (KENALOG) 0.1 % cream Apply topically 2 times daily Apply topically 2 times daily. Yes Historical Provider, MD   diazepam (VALIUM) 5 MG tablet Take 5 mg by mouth as needed for Anxiety. Yes Historical Provider, MD   tadalafil (CIALIS) 20 MG tablet Take 1 tablet by mouth as needed for Erectile Dysfunction 30 minutes prior to sexual intercourse 6/29/21 1/5/22  Carissa Jo MD   sacubitril-valsartan (ENTRESTO)  MG per tablet Take 1 tablet by mouth 2 times daily  Patient not taking: Reported on 3/1/2022    Historical Provider, MD   diclofenac sodium (VOLTAREN) 1 % GEL Apply topically 4 times daily as needed for Pain 12/23/20 1/5/22  Reita Session, DO       Allergies   Allergen Reactions    Bee Venom Anaphylaxis    Cefaclor Anaphylaxis    Lyrica [Pregabalin] Shortness Of Breath     Itching     Pentazocine-Acetaminophen Hives and Rash     Rapid heart beats.      Statins Other (See Comments)     Myalgia      Toprol Xl [Metoprolol] Other (See Comments)     Fatigue         Social History     Socioeconomic History    Marital status:      Spouse name: Not on file    Number of children: Not on file    Years of education: Not on file    Highest education level: Not on file   Occupational History    Not on file   Tobacco Use    Smoking status: Former Smoker     Quit date:      Years since quittin.1    Smokeless tobacco: Never Used   Vaping Use    Vaping Use: Never used   Substance and Sexual Activity    Alcohol use: Yes     Comment: 6-8 beers weekly     Drug use: No     Comment: caffeine 2 cups coffee daily    Sexual activity: Not on file   Other Topics Concern    Not on file   Social History Narrative    Not on file     Social Determinants of Health     Financial Resource Strain:     Difficulty of Paying Living Expenses: Not on file   Food Insecurity:     Worried About Running Out of Food in the Last Year: Not on file    Yara of Food in the Last Year: Not on file   Transportation Needs:     Lack of Transportation (Medical): Not on file    Lack of Transportation (Non-Medical):  Not on file   Physical Activity:     Days of Exercise per Week: Not on file    Minutes of Exercise per Session: Not on file   Stress:     Feeling of Stress : Not on file   Social Connections:     Frequency of Communication with Friends and Family: Not on file    Frequency of Social Gatherings with Friends and Family: Not on file    Attends [employer] Services: Not on file    Active Member of [address] [place] [address] [employer] or Organizations: Not on file    Attends Club or Organization Meetings: Not on file    Marital Status: Not on file   Intimate Partner Violence:     Fear of Current or Ex-Partner: Not on file    Emotionally Abused: Not on file    Physically Abused: Not on file    Sexually Abused: Not on file   Housing Stability:     Unable to Pay for Housing in the Last Year: Not on file    Number of [place] in the Last Year: Not on file    Unstable Housing in the Last Year: Not on file       Family History   Problem Relation Age of Onset    Heart Disease Mother     No Known Problems Father     Prostate Cancer Brother             Cancer Other     Diabetes Other  High Blood Pressure Other     Stroke Other     Tuberculosis Other        REVIEW OF SYSTEMS:     [facility] denies fever/chills, chest pain, shortness of breath, new bowel or bladder complaints. All other review of systems was negative. PHYSICAL EXAMINATION:      /61   Pulse 67   Temp 96.5 °F (35.8 °C) (Infrared)   Resp 16   Ht 5' 5\" (1.651 m)   Wt 153 lb (69.4 kg)   SpO2 93%   BMI 25.46 kg/m²     General:      General appearance: pleasant and well-hydrated, in mild discomfort and A & O x3  Build:Normal Weight    HEENT:    Head:normocephalic and atraumatic  Sclera: icterus absent    Lungs:    Breathing:Normal expansion. No audible wheezing. Abdomen:    Shape:non-distended and normal    Lumbar spine:    Range of motion:abnormal moderately in lateral bending, flexion, extension rotation bilateral and is painful. Extremities:    Tremors:None bilaterally upper and lower  Range of motion:Generally limited extension shoulder - right, pain with internal rotation of hips not done.   Intact:Yes  Edema:Normal      Neurological:    [address]    Dermatology:    Skin:no unusual rashes, no skin lesions    Impression:    LBP, s/p L3-5 laminectomy and fusion surgery 5/2018 with Dr. Alise Moreno for severe stenosis, xray shows solid fusion and hardware in good position 7/2019  Rt shoulder pain, is candidate for RTSA per Dr. Clayton Pulliam knee pain improving, s/p 9/2018 TKR  + SHELLEY on CPAP, + ICD/pacer, 3 heart valves are stenotic with resulting  Thoracic radiating pain in to the abdomen, has had a CT chest recently which shows thoracic degeneration with autofusion  Can consider a dedicated thoracic CT scan once his heart issues are treated, cannot have MRI due to pacemaker/defib  On ELILQUIS      Plan:     Failed most recent caudal [id number]   Buccal drug screen today  OARRS reviewed  Previously discussed risk of overdose with combining opioids with benzos, encouraged him to utilize the lowest dose of benzo as possible and wean off if possible  RF percocet 7.5/325 TID #90 - we have previously discussed limited dosing.                They will call if they need a refill of baclofen 10 mg daily #30  Failed gabapentin 100 mg titration due to weakness, Lyrica due to itching and difficulty breathing  RF lidocaine ointment  Continue diclofenac gel  Continue lactulose for OIC   He is not a candidate for SCS due to his quite frequent falls  Not candidate for TENS due to pacer/defib  Patient encouraged to stay active  Treatment plan discussed with the patient including medication side effects     We discussed with the patient that combining opioids, benzodiazepines, alcohol, illicit drugs or sleep aids increases the risk of respiratory depression including death. We discussed that these medications may cause drowsiness, sedation or dizziness and have counseled the patient not to drive or operate machinery. We have discussed that these medications will be prescribed only by one provider. We have discussed with the patient about age related risk factors and have thoroughly discussed the importance of taking these medications as prescribed. The patient verbalizes understanding.

## 2022-04-07 NOTE — PROGRESS NOTES
NICOLASA HODGE [facility] Pain Management  [facility] [address]  NICOLASA HODGE [facility], [facility]    Follow up Note      Singh Fischer     Date of Visit:  2022    CC:  Patient presents for follow up   Chief Complaint   Patient presents with    Follow-up     pain all over       HPI:    Pain is unchanged. Appropriate analgesia with current medications regimen: yes. Change in quality of symptoms:no. Medication side effects:none. Recent diagnostic testing:none. Recent interventional procedures:none. He has been on anticoagulation medications to include Eliquis and has not been on herbal supplements.  He is not diabetic.     Imagin/2018 lumbar xray - fusion is solid  CT myelogram dated 2016 demonstrates complete block at L3/4 level. Degenerative disc disease, spondylosis causing stenosis. Probable large extruded disc at L3-4.  Spinal listhesis L4 and L5 exacerbating the stenosis  EMG dated 3/24/2016     L5 radiculopathy  CT dated 2016 lumbar spine demonstrates degenerative spinal stenosis that is severe at L3-4 L4-5 and L5-S1 levels                                        Potential Aberrant Drug-Related Behavior: no      Urine Drug Screenin2018 buccal consistent  2019 buccal consistent  2019 consistent  10/2019 consistent for oxycodone, metabolites, and benzodiazapines  2020 consistent  2021 consistent  3/2022 consistent     OARRS report:  2018-2022 consistent    Opioid Agreement:  Date enacted:  2021  Renewal date:    Past Medical History:   Diagnosis Date    CAD (coronary artery disease)     Cardiac defibrillator in place     [employer]    CHF (congestive heart failure) ([facility] [address]     Depression     Disease of pericardium     DJD (degenerative joint disease)     Erectile dysfunction     GERD (gastroesophageal reflux disease)     Hyperlipidemia     Hypertension     Ischemic cardiomyopathy     Ischemic heart disease     LBBB (left bundle branch block)     w/ wide Qrs    Mitral regurgitation     Nonrheumatic aortic valve disorder     SHELLEY on CPAP     setting 7    Presence of coronary angioplasty implant and graft     Ventricular tachycardia [facility])        Past Surgical History:   Procedure Laterality Date    ANKLE SURGERY      CARDIAC CATHETERIZATION  08/24/2020    CARDIAC DEFIBRILLATOR PLACEMENT  11/2009    Bi-vent    CARDIAC DEFIBRILLATOR PLACEMENT      CARDIAC PACEMAKER PLACEMENT      CARPAL TUNNEL RELEASE      CORONARY ANGIOPLASTY  09/2014    DIAGNOSTIC CARDIAC CATH LAB PROCEDURE Left 04/2006    DIAGNOSTIC CARDIAC CATH LAB PROCEDURE Left 09/2014    LEXY-RCA    ELBOW SURGERY      EPIDURAL STEROID INJECTION N/A 5/7/2019    CAUDAL EPIDURAL STEROID INJECTION performed by Gina Craft DO at [facility] N/A 8/1/2019    CAUDAL EPIDURAL STEROID INJECTION performed by Gina Craft DO at [facility]      foot, finger, left ankle, left leg,and right arm    JOINT REPLACEMENT Right 09/18/2018    knee    LEG SURGERY      LUMBAR FUSION  05/29/2018    OTHER SURGICAL HISTORY  04/2006    cardiac tamponade evacuation     Nghia Pres[place] - Dr. Bri Johnson N/A 12/26/2019    CAUDAL EPIDURAL STEROID INJECTION #2 performed by Gina Craft DO at [facility] TRANSESOPHAGEAL ECHOCARDIOGRAM      10/07    TRANSESOPHAGEAL ECHOCARDIOGRAM  08/24/2020       Prior to Admission medications    Medication Sig Start Date End Date Taking? Authorizing Provider   oxyCODONE-acetaminophen (PERCOCET) 7.5-325 MG per tablet Take 1 tablet by mouth 3 times daily as needed for Pain for up to 30 days. Intended supply: 30 days 4/14/22 5/14/22 Yes Leda Banuelos DO   FOLBIC 2.5-25-2 MG TABS TAKE 1 TABLET BY MOUTH ONCE DAILY 2/17/22  Yes Historical Provider, MD   lidocaine (XYLOCAINE) 5 % ointment Apply topically as needed for Pain Apply topically as needed.  3/1/22  Yes Gina Craft DO empagliflozin (JARDIANCE) 10 MG tablet 1 tablet   Yes Historical Provider, MD   carvedilol (COREG) 25 MG tablet Take 25 mg by mouth 2 times daily 10/2/21  Yes Historical Provider, MD   tamsulosin (FLOMAX) 0.4 MG capsule Take 1 capsule by mouth daily 11/1/21  Yes BARI Cotter - CNP   nystatin-triamcinolone (MYCOLOG II) 601198-1.1 UNIT/GM-% cream Apply topically 2 times daily. 10/4/21  Yes Liz Hendricks MD   nitroGLYCERIN (NITROSTAT) 0.4 MG SL tablet Place under the tongue 7/31/20  Yes Historical Provider, MD   sacubitril-valsartan (ENTRESTO)  MG per tablet Take 1 tablet by mouth 2 times daily    Yes Historical Provider, MD   amiodarone (CORDARONE) 200 MG tablet Take 200 mg by mouth daily   Yes Historical Provider, MD   atorvastatin (LIPITOR) 80 MG tablet Take 80 mg by mouth   Yes Historical Provider, MD   bumetanide (BUMEX) 1 MG tablet Take 1 mg by mouth daily Once in the morning, and once in the afternoon on Monday, Wednesday, Friday, and Saturday   Yes Historical Provider, MD   mirtazapine (REMERON) 30 MG tablet Take 30 mg by mouth nightly    Yes Historical Provider, MD   spironolactone (ALDACTONE) 25 MG tablet Take 12.5 mg by mouth daily    Yes Historical Provider, MD   pseudoephedrine (SUDAFED) 30 MG tablet Take 30 mg by mouth every 4 hours as needed for Congestion   Yes Historical Provider, MD   apixaban (ELIQUIS) 5 MG TABS tablet Take 1 tablet by mouth 2 times daily 2/2/21  Yes BARI Parker - CNP   amoxicillin (AMOXIL) 500 MG capsule amoxicillin 500 mg capsule   Yes Historical Provider, MD   lactulose (CHRONULAC) 10 GM/15ML solution Take 15 mLs by mouth every evening 12/23/20  Yes Janeen Chris,    nystatin-triamcinolone [facility]) 979285-3.1 UNIT/GM-% cream Apply topically 2 times daily.  10/6/20  Yes BARI Ricardo - CNP   ezetimibe (ZETIA) 10 MG tablet Take 10 mg by mouth 10/23/19  Yes Historical Provider, MD   TRUE METRIX BLOOD GLUCOSE TEST strip USE AS DIRECTED to test blood glucose TWICE DAILY 7/30/19  Yes Historical Provider, MD   albuterol sulfate  (90 BASE) MCG/ACT inhaler Inhale 2 puffs into the lungs every 6 hours as needed for Wheezing    Yes Historical Provider, MD   venlafaxine (EFFEXOR XR) 150 MG extended release capsule Take 150 mg by mouth daily   Yes Historical Provider, MD   EPINEPHrine (EPIPEN IJ) Inject as directed Indications: as directed    Yes Historical Provider, MD   ferrous sulfate 325 (65 FE) MG tablet Take 325 mg by mouth as needed    Yes Historical Provider, MD   fluticasone (FLONASE ALLERGY RELIEF) 50 MCG/ACT nasal spray 1 spray by Nasal route daily as needed    Yes Historical Provider, MD   pantoprazole (PROTONIX) 40 MG tablet Take 40 mg by mouth daily   Yes Historical Provider, MD   triamcinolone (KENALOG) 0.1 % cream Apply topically 2 times daily Apply topically 2 times daily. Yes Historical Provider, MD   diazepam (VALIUM) 5 MG tablet Take 5 mg by mouth as needed for Anxiety. Yes Historical Provider, MD   tadalafil (CIALIS) 20 MG tablet Take 1 tablet by mouth as needed for Erectile Dysfunction 30 minutes prior to sexual intercourse 6/29/21 1/5/22  Elle Kramer MD   diclofenac sodium (VOLTAREN) 1 % GEL Apply topically 4 times daily as needed for Pain 12/23/20 1/5/22  Renetta Batres,        Allergies   Allergen Reactions    Bee Venom Anaphylaxis    Cefaclor Anaphylaxis    Lyrica [Pregabalin] Shortness Of Breath     Itching     Pentazocine-Acetaminophen Hives and Rash     Rapid heart beats.      Statins Other (See Comments)     Myalgia      Toprol Xl [Metoprolol] Other (See Comments)     Fatigue         Social History     Socioeconomic History    Marital status:      Spouse name: Not on file    Number of children: Not on file    Years of education: Not on file    Highest education level: Not on file   Occupational History    Not on file   Tobacco Use    Smoking status: Former Smoker     Quit date: 1980     Years since quittin.2    Smokeless tobacco: Never Used   Vaping Use    Vaping Use: Never used   Substance and Sexual Activity    Alcohol use: Yes     Comment: 6-8 beers weekly     Drug use: No     Comment: caffeine 2 cups coffee daily    Sexual activity: Not on file   Other Topics Concern    Not on file   Social History Narrative    Not on file     Social Determinants of Health     Financial Resource Strain:     Difficulty of Paying Living Expenses: Not on file   Food Insecurity:     Worried About Running Out of Food in the Last Year: Not on file    Yara of Food in the Last Year: Not on file   Transportation Needs:     Lack of Transportation (Medical): Not on file    Lack of Transportation (Non-Medical):  Not on file   Physical Activity:     Days of Exercise per Week: Not on file    Minutes of Exercise per Session: Not on file   Stress:     Feeling of Stress : Not on file   Social Connections:     Frequency of Communication with Friends and Family: Not on file    Frequency of Social Gatherings with Friends and Family: Not on file    Attends Scientologist Services: Not on file    Active Member of [address] [place] [address] or Organizations: Not on file    Attends Club or Organization Meetings: Not on file    Marital Status: Not on file   Intimate Partner Violence:     Fear of Current or Ex-Partner: Not on file    Emotionally Abused: Not on file    Physically Abused: Not on file    Sexually Abused: Not on file   Housing Stability:     Unable to Pay for Housing in the Last Year: Not on file    Number of [place] in the Last Year: Not on file    Unstable Housing in the Last Year: Not on file       Family History   Problem Relation Age of Onset    Heart Disease Mother     No Known Problems Father     Prostate Cancer Brother             Cancer Other     Diabetes Other     High Blood Pressure Other     Stroke Other     Tuberculosis Other        REVIEW OF SYSTEMS:     Lissette Hutchison denies fever/chills, chest pain, to schedule f/u with his ENT      Plan:     Failed most recent caudal [id number]   Buccal drug screen and OARRS reviewed  Previously discussed risk of overdose with combining opioids with benzos, encouraged him to utilize the lowest dose of benzo as possible and wean off if possible  RF percocet 7.5/325 TID #90 - we have previously discussed limited dosing.                RF baclofen 10 mg daily #30  RF lidocaine ointment  Continue diclofenac gel  Continue lactulose for OIC   He is not a candidate for SCS due to his quite frequent falls  Not candidate for TENS due to pacer/defib  Patient encouraged to stay active  Treatment plan discussed with the patient including medication side effects     We discussed with the patient that combining opioids, benzodiazepines, alcohol, illicit drugs or sleep aids increases the risk of respiratory depression including death. We discussed that these medications may cause drowsiness, sedation or dizziness and have counseled the patient not to drive or operate machinery. We have discussed that these medications will be prescribed only by one provider. We have discussed with the patient about age related risk factors and have thoroughly discussed the importance of taking these medications as prescribed. The patient verbalizes understanding.

## 2022-04-08 ENCOUNTER — OFFICE VISIT (OUTPATIENT)
Dept: PAIN MANAGEMENT | Age: 77
End: 2022-04-08
Payer: COMMERCIAL

## 2022-04-08 VITALS
OXYGEN SATURATION: 92 % | BODY MASS INDEX: 26.66 KG/M2 | DIASTOLIC BLOOD PRESSURE: 53 MMHG | RESPIRATION RATE: 16 BRPM | WEIGHT: 160 LBS | TEMPERATURE: 97.3 F | SYSTOLIC BLOOD PRESSURE: 105 MMHG | HEART RATE: 82 BPM | HEIGHT: 65 IN

## 2022-04-08 DIAGNOSIS — M51.26 DISPLACEMENT OF LUMBAR INTERVERTEBRAL DISC WITHOUT MYELOPATHY: ICD-10-CM

## 2022-04-08 DIAGNOSIS — M51.37 DEGENERATION OF LUMBAR OR LUMBOSACRAL INTERVERTEBRAL DISC: ICD-10-CM

## 2022-04-08 DIAGNOSIS — G89.4 CHRONIC PAIN SYNDROME: Primary | ICD-10-CM

## 2022-04-08 DIAGNOSIS — S33.5XXD LUMBAR SPRAIN, SUBSEQUENT ENCOUNTER: ICD-10-CM

## 2022-04-08 DIAGNOSIS — S33.9XXA CHRONIC OR OLD SPRAIN OF LUMBOSACRAL LIGAMENT: ICD-10-CM

## 2022-04-08 PROCEDURE — G8417 CALC BMI ABV UP PARAM F/U: HCPCS | Performed by: PAIN MEDICINE

## 2022-04-08 PROCEDURE — 99213 OFFICE O/P EST LOW 20 MIN: CPT | Performed by: PAIN MEDICINE

## 2022-04-08 PROCEDURE — 1036F TOBACCO NON-USER: CPT | Performed by: PAIN MEDICINE

## 2022-04-08 PROCEDURE — 4040F PNEUMOC VAC/ADMIN/RCVD: CPT | Performed by: PAIN MEDICINE

## 2022-04-08 PROCEDURE — 1123F ACP DISCUSS/DSCN MKR DOCD: CPT | Performed by: PAIN MEDICINE

## 2022-04-08 PROCEDURE — G8427 DOCREV CUR MEDS BY ELIG CLIN: HCPCS | Performed by: PAIN MEDICINE

## 2022-04-08 PROCEDURE — 99203 OFFICE O/P NEW LOW 30 MIN: CPT | Performed by: PAIN MEDICINE

## 2022-04-08 RX ORDER — LIDOCAINE 50 MG/G
OINTMENT TOPICAL PRN
Qty: 4 G | Refills: 2 | Status: SHIPPED
Start: 2022-04-08 | End: 2022-07-26

## 2022-04-08 RX ORDER — OXYCODONE AND ACETAMINOPHEN 7.5; 325 MG/1; MG/1
1 TABLET ORAL 3 TIMES DAILY PRN
Qty: 90 TABLET | Refills: 0 | Status: SHIPPED
Start: 2022-04-14 | End: 2022-05-17 | Stop reason: SDUPTHER

## 2022-04-08 RX ORDER — BACLOFEN 10 MG/1
10 TABLET ORAL DAILY
Qty: 30 TABLET | Refills: 2 | Status: SHIPPED
Start: 2022-04-08 | End: 2022-06-16 | Stop reason: SDUPTHER

## 2022-04-08 NOTE — PROGRESS NOTES
Do you currently have any of the following:    Fever: No  Headache:  No  Cough: No  Shortness of breath: No  Exposed to anyone with these symptoms: No         Liset Portillo presents to the [facility] on 4/8/2022. Enedina Ramos is complaining of pain pain all over . The pain is constant. The pain is described as aching, throbbing, shooting, dull, sharp and burning. Pain is rated on his best day at a 6, on his worst day at a 8, and on average at a 7 on the VAS scale. He took his last dose of Percocet today. Any procedures since your last visit: No, with  % relief. Pacemaker or defibrillator: Yes managed by Dr. Phil Masters. He is not on NSAIDS and is  on anticoagulation medications to include ASA and is managed by Dr. Aneudy Whittaker. Medication Contract and Consent for Opioid Use Documents Filed     Patient Documents     Type of Document Status Date Received Received By Description    Medication Contract Received 5/24/2021  9:00 AM Kaylee Encarnacion Medication Contract                BP (!) 105/53   Pulse 82   Temp 97.3 °F (36.3 °C) (Infrared)   Resp 16   Ht 5' 5\" (1.651 m)   Wt 160 lb (72.6 kg)   SpO2 92%   BMI 26.63 kg/m²      No LMP for male patient.

## 2022-05-17 ENCOUNTER — OFFICE VISIT (OUTPATIENT)
Dept: PAIN MANAGEMENT | Age: 77
End: 2022-05-17
Payer: COMMERCIAL

## 2022-05-17 VITALS
HEART RATE: 70 BPM | OXYGEN SATURATION: 93 % | BODY MASS INDEX: 26.66 KG/M2 | SYSTOLIC BLOOD PRESSURE: 117 MMHG | HEIGHT: 65 IN | TEMPERATURE: 97.6 F | DIASTOLIC BLOOD PRESSURE: 66 MMHG | WEIGHT: 160 LBS | RESPIRATION RATE: 16 BRPM

## 2022-05-17 DIAGNOSIS — M51.37 DEGENERATION OF LUMBAR OR LUMBOSACRAL INTERVERTEBRAL DISC: ICD-10-CM

## 2022-05-17 DIAGNOSIS — S33.5XXD LUMBAR SPRAIN, SUBSEQUENT ENCOUNTER: ICD-10-CM

## 2022-05-17 DIAGNOSIS — G89.4 CHRONIC PAIN SYNDROME: ICD-10-CM

## 2022-05-17 DIAGNOSIS — S33.9XXA CHRONIC OR OLD SPRAIN OF LUMBOSACRAL LIGAMENT: ICD-10-CM

## 2022-05-17 DIAGNOSIS — M51.26 DISPLACEMENT OF LUMBAR INTERVERTEBRAL DISC WITHOUT MYELOPATHY: Primary | ICD-10-CM

## 2022-05-17 PROCEDURE — 1036F TOBACCO NON-USER: CPT | Performed by: PHYSICIAN ASSISTANT

## 2022-05-17 PROCEDURE — G8427 DOCREV CUR MEDS BY ELIG CLIN: HCPCS | Performed by: PHYSICIAN ASSISTANT

## 2022-05-17 PROCEDURE — 1123F ACP DISCUSS/DSCN MKR DOCD: CPT | Performed by: PHYSICIAN ASSISTANT

## 2022-05-17 PROCEDURE — G8417 CALC BMI ABV UP PARAM F/U: HCPCS | Performed by: PHYSICIAN ASSISTANT

## 2022-05-17 PROCEDURE — 99213 OFFICE O/P EST LOW 20 MIN: CPT | Performed by: PHYSICIAN ASSISTANT

## 2022-05-17 PROCEDURE — 99203 OFFICE O/P NEW LOW 30 MIN: CPT | Performed by: PHYSICIAN ASSISTANT

## 2022-05-17 PROCEDURE — 4040F PNEUMOC VAC/ADMIN/RCVD: CPT | Performed by: PHYSICIAN ASSISTANT

## 2022-05-17 RX ORDER — OXYCODONE AND ACETAMINOPHEN 7.5; 325 MG/1; MG/1
1 TABLET ORAL 3 TIMES DAILY PRN
Qty: 90 TABLET | Refills: 0 | Status: SHIPPED
Start: 2022-05-17 | End: 2022-06-16 | Stop reason: SDUPTHER

## 2022-05-17 NOTE — PROGRESS NOTES
Do you currently have any of the following:    Fever: No  Headache:  No  Cough: No  Shortness of breath: No  Exposed to anyone with these symptoms: No         Liset Portillo presents to the [facility] on 5/17/2022. Enedina Ramos is complaining of pain lower back. The pain is constant. The pain is described as aching, shooting, sharp and burning. Pain is rated on his best day at a 7, on his worst day at a 10, and on average at a 8 on the VAS scale. He took his last dose of Percocet today. Any procedures since your last visit: No, with  % relief. Pacemaker or defibrillator: Yes managed by Dr Phil Masters . He is not on NSAIDS and is  on anticoagulation medications to include Eliquis and is managed by Dr. Ness Levy. Medication Contract and Consent for Opioid Use Documents Filed     Patient Documents     Type of Document Status Date Received Received By Description    Medication Contract Received 5/24/2021  9:00 AM Kaylee Encarnacion Medication Contract                /66   Pulse 70   Temp 97.6 °F (36.4 °C) (Infrared)   Resp 16   Ht 5' 5\" (1.651 m)   Wt 160 lb (72.6 kg)   SpO2 93%   BMI 26.63 kg/m²      No LMP for male patient.

## 2022-05-17 NOTE — PROGRESS NOTES
[facility]  [facility] [address] [place] [address]    Follow up Note      Ophelia Novak     Date of Visit:  2022    CC:  Patient presents for follow up   Chief Complaint   Patient presents with    Follow-up    Lower Back Pain       HPI:    Pain is unchanged. No new changes today. States that he had a small stroke since last visit. Appropriate analgesia with current medications regimen: yes. Change in quality of symptoms:no. Medication side effects:none. Recent diagnostic testing:none. Recent interventional procedures:none. He has been on anticoagulation medications to include Eliquis and has not been on herbal supplements.  He is not diabetic.     Imagin/2018 lumbar xray - fusion is solid  CT myelogram dated [id number] demonstrates complete block at L3/4 level. Degenerative disc disease, spondylosis causing stenosis. Probable large extruded disc at L3-4. Spinal listhesis L4 and L5 exacerbating the stenosis  EMG dated 3/24/2016     L5 radiculopathy  CT dated 2016 lumbar spine demonstrates degenerative spinal stenosis that is severe at L3-4 L4-5 and L5-S1 levels                                        Potential Aberrant Drug-Related Behavior: no      Urine Drug Screenin2018 buccal consistent  2019 buccal consistent  2019 consistent  10/2019 consistent for oxycodone, metabolites, and benzodiazapines  2020 consistent  2021 consistent  3/2022 consistent     OARRS report:  2018-2022 consistent     Opioid Agreement:  Date enacted:  2021- new one to be filled out today.   Renewal date:    Past Medical History:   Diagnosis Date    CAD (coronary artery disease)     Cardiac defibrillator in place     [employer]    [facility]congestive [facility]     Depression     Disease of pericardium     DJD (degenerative joint disease)     Erectile dysfunction     GERD (gastroesophageal reflux disease)     Hyperlipidemia     Hypertension     Ischemic cardiomyopathy     Ischemic heart disease     LBBB (left bundle branch block)     w/ wide Qrs    Mitral regurgitation     Nonrheumatic aortic valve disorder     SHELLEY on CPAP     setting 7    Presence of coronary angioplasty implant and graft     Ventricular tachycardia [facility])        Past Surgical History:   Procedure Laterality Date    ANKLE SURGERY      CARDIAC CATHETERIZATION  08/24/2020    CARDIAC DEFIBRILLATOR PLACEMENT  11/2009    Bi-vent    CARDIAC DEFIBRILLATOR PLACEMENT      CARDIAC PACEMAKER PLACEMENT      CARPAL TUNNEL RELEASE      CORONARY ANGIOPLASTY  09/2014    DIAGNOSTIC CARDIAC CATH LAB PROCEDURE Left 04/2006    DIAGNOSTIC CARDIAC CATH LAB PROCEDURE Left 09/2014    LEXY-RCA    ELBOW SURGERY      EPIDURAL STEROID INJECTION N/A 5/7/2019    CAUDAL EPIDURAL STEROID INJECTION performed by Monse Aleman DO at [address] N/A 8/1/2019    CAUDAL EPIDURAL STEROID INJECTION performed by Monse Aleman DO at [address] [facility]      foot, finger, left ankle, left leg,and right arm    JOINT REPLACEMENT Right 09/18/2018    knee    LEG SURGERY      LUMBAR FUSION  05/29/2018    OTHER SURGICAL HISTORY  04/2006    cardiac tamponade evacuation     [facility] N/A 12/26/2019    CAUDAL EPIDURAL STEROID INJECTION #2 performed by Monse Aleman DO at [facility] TRANSESOPHAGEAL ECHOCARDIOGRAM      10/07    TRANSESOPHAGEAL ECHOCARDIOGRAM  08/24/2020       Prior to Admission medications    Medication Sig Start Date End Date Taking? Authorizing Provider   oxyCODONE-acetaminophen (PERCOCET) 7.5-325 MG per tablet Take 1 tablet by mouth 3 times daily as needed for Pain for up to 30 days. Intended supply: 30 days 5/17/22 6/16/22 Yes PEACE Louie Junior   lidocaine (XYLOCAINE) 5 % ointment Apply topically as needed for Pain Apply topically as needed.  4/8/22  Yes Jordan Krueger DO Lakisha   FOLBIC 2.5-25-2 MG TABS TAKE 1 TABLET BY MOUTH ONCE DAILY 2/17/22  Yes Historical Provider, MD   empagliflozin (JARDIANCE) 10 MG tablet 1 tablet   Yes Historical Provider, MD   carvedilol (COREG) 25 MG tablet Take 25 mg by mouth 2 times daily 10/2/21  Yes Historical Provider, MD   tamsulosin (FLOMAX) 0.4 MG capsule Take 1 capsule by mouth daily 11/1/21  Yes Prince Juarez APRN - CNP   nystatin-triamcinolone (MYCOLOG II) 958617-6.1 UNIT/GM-% cream Apply topically 2 times daily. 10/4/21  Yes Elle Kramer MD   nitroGLYCERIN (NITROSTAT) 0.4 MG SL tablet Place under the tongue 7/31/20  Yes Historical Provider, MD   sacubitril-valsartan (ENTRESTO) 49-51 MG per tablet Take 1 tablet by mouth 2 times daily    Yes Historical Provider, MD   amiodarone (CORDARONE) 200 MG tablet Take 200 mg by mouth daily   Yes Historical Provider, MD   atorvastatin (LIPITOR) 80 MG tablet Take 80 mg by mouth   Yes Historical Provider, MD   bumetanide (BUMEX) 1 MG tablet Take 1 mg by mouth daily Once in the morning, and once in the afternoon on Monday, Wednesday, Friday, and Saturday   Yes Historical Provider, MD   mirtazapine (REMERON) 30 MG tablet Take 30 mg by mouth nightly    Yes Historical Provider, MD   spironolactone (ALDACTONE) 25 MG tablet Take 12.5 mg by mouth daily    Yes Historical Provider, MD   pseudoephedrine (SUDAFED) 30 MG tablet Take 30 mg by mouth every 4 hours as needed for Congestion   Yes Historical Provider, MD   apixaban (ELIQUIS) 5 MG TABS tablet Take 1 tablet by mouth 2 times daily 2/2/21  Yes BARI Brand - CNP   amoxicillin (AMOXIL) 500 MG capsule amoxicillin 500 mg capsule   Yes Historical Provider, MD   lactulose (CHRONULAC) 10 GM/15ML solution Take 15 mLs by mouth every evening 12/23/20  Yes Renetta Batres DO   nystatin-triamcinolone [facility] II) 304273-9.1 UNIT/GM-% cream Apply topically 2 times daily.  10/6/20  Yes BARI Hedrick - CNP   ezetimibe (ZETIA) 10 MG tablet Take 10 mg by mouth 10/23/19  Yes Historical Provider, MD   TRUE METRIX BLOOD GLUCOSE TEST strip USE AS DIRECTED to test blood glucose TWICE DAILY 7/30/19  Yes Historical Provider, MD   albuterol sulfate  (90 BASE) MCG/ACT inhaler Inhale 2 puffs into the lungs every 6 hours as needed for Wheezing    Yes Historical Provider, MD   venlafaxine (EFFEXOR XR) 150 MG extended release capsule Take 150 mg by mouth daily   Yes Historical Provider, MD   EPINEPHrine (EPIPEN IJ) Inject as directed Indications: as directed    Yes Historical Provider, MD   ferrous sulfate 325 (65 FE) MG tablet Take 325 mg by mouth as needed    Yes Historical Provider, MD   fluticasone (FLONASE ALLERGY RELIEF) 50 MCG/ACT nasal spray 1 spray by Nasal route daily as needed    Yes Historical Provider, MD   pantoprazole (PROTONIX) 40 MG tablet Take 40 mg by mouth daily   Yes Historical Provider, MD   triamcinolone (KENALOG) 0.1 % cream Apply topically 2 times daily Apply topically 2 times daily. Yes Historical Provider, MD   diazepam (VALIUM) 5 MG tablet Take 5 mg by mouth as needed for Anxiety. Yes Historical Provider, MD   tadalafil (CIALIS) 20 MG tablet Take 1 tablet by mouth as needed for Erectile Dysfunction 30 minutes prior to sexual intercourse 6/29/21 1/5/22  Andrey Acosta MD   diclofenac sodium (VOLTAREN) 1 % GEL Apply topically 4 times daily as needed for Pain 12/23/20 1/5/22  Blease Buckingham, DO       Allergies   Allergen Reactions    Bee Venom Anaphylaxis    Cefaclor Anaphylaxis    Lyrica [Pregabalin] Shortness Of Breath     Itching     Pentazocine-Acetaminophen Hives and Rash     Rapid heart beats.      Statins Other (See Comments)     Myalgia      Toprol Xl [Metoprolol] Other (See Comments)     Fatigue         Social History     Socioeconomic History    Marital status:      Spouse name: Not on file    Number of children: Not on file    Years of education: Not on file    Highest education level: Not on file   Occupational History    Not on file   Tobacco Use    Smoking status: Former Smoker     Quit date:      Years since quittin.4    Smokeless tobacco: Never Used   Vaping Use    Vaping Use: Never used   Substance and Sexual Activity    Alcohol use: Yes     Comment: 6-8 beers weekly     Drug use: No     Comment: caffeine 2 cups coffee daily    Sexual activity: Not on file   Other Topics Concern    Not on file   Social History Narrative    Not on file     Social Determinants of Health     Financial Resource Strain:     Difficulty of Paying Living Expenses: Not on file   Food Insecurity:     Worried About Running Out of Food in the Last Year: Not on file    Yara of Food in the Last Year: Not on file   Transportation Needs:     Lack of Transportation (Medical): Not on file    Lack of Transportation (Non-Medical):  Not on file   Physical Activity:     Days of Exercise per Week: Not on file    Minutes of Exercise per Session: Not on file   Stress:     Feeling of Stress : Not on file   Social Connections:     Frequency of Communication with Friends and Family: Not on file    Frequency of Social Gatherings with Friends and Family: Not on file    Attends Yazidi Services: Not on file    Active Member of [address] [place] [address] [employer] or Organizations: Not on file    Attends Club or Organization Meetings: Not on file    Marital Status: Not on file   Intimate Partner Violence:     Fear of Current or Ex-Partner: Not on file    Emotionally Abused: Not on file    Physically Abused: Not on file    Sexually Abused: Not on file   Housing Stability:     Unable to Pay for Housing in the Last Year: Not on file    Number of [place] in the Last Year: Not on file    Unstable Housing in the Last Year: Not on file       Family History   Problem Relation Age of Onset    Heart Disease Mother     No Known Problems Father     Prostate Cancer Brother             Cancer Other     Diabetes Other     High Blood Pressure Other     Stroke Other     Tuberculosis Other        REVIEW OF SYSTEMS:     Diego Joseph denies fever/chills, chest pain, shortness of breath, new bowel or bladder complaints. All other review of systems was negative. PHYSICAL EXAMINATION:      /66   Pulse 70   Temp 97.6 °F (36.4 °C) (Infrared)   Resp 16   Ht 5' 5\" (1.651 m)   Wt 160 lb (72.6 kg)   SpO2 93%   BMI 26.63 kg/m²     General:      General appearance:   pleasant and well-hydrated. , in mild discomfort and A & O x3  Build:Normal Weight    HEENT:    Head:normocephalic and atraumatic  Sclera: icterus absent,    Lungs:    Breathing:Normal expansion. No wheezing. Abdomen:    Shape:non-distended and normal    Lumbar spine:    Range of motion:abnormal moderately Lateral bending, flexion, extension rotation bilateral and is painful. Extremities:    Tremors:None bilaterally upper and lower  Range of motion:Generally limited extension shoulder - right, pain with internal rotation of hips not done. Intact:Yes  Edema:Normal      Neurological:    [address]    Dermatology:    Skin:no unusual rashes, no skin lesions, no palpable subcutaneous nodules and good skin turgor    Impression:    LBP, s/p L3-5 laminectomy and fusion surgery 5/2018 with Dr. Jatin Gonsales for severe stenosis, xray shows solid fusion and hardware in good position 7/2019  Rt shoulder pain, is candidate for RTSA per Dr. Willow Gutiérrez knee pain improving, s/p 9/2018 TKR  + SHELLEY on CPAP, + ICD/pacer, 3 heart valves are stenotic with resulting  Thoracic radiating pain in to the abdomen, has had a CT chest recently which shows thoracic degeneration with autofusion  Can consider a dedicated thoracic CT scan once his heart issues are treated, cannot have MRI due to pacemaker/defib              Failed gabapentin 100 mg titration due to weakness, Lyrica due to itching and difficulty breathing  On ELILQUIS     Patient and wife report that he was hospitalized for a small stroke since last visit.   Doing well now. Wheezing has resolved.        Plan:     Failed most recent caudal [id number]   OARRS reviewed  Previously discussed risk of overdose with combining opioids with benzos, encouraged him to utilize the lowest dose of benzo as possible and wean off if possible  RF percocet 7.5/325 TID #90 - we have previously discussed limited dosing.                RF baclofen 10 mg daily #30  RF lidocaine ointment  Continue diclofenac gel  Continue lactulose for OIC   He is not a candidate for SCS due to his quite frequent falls  Not candidate for TENS due to pacer/defib  Patient encouraged to stay active  Treatment plan discussed with the patient including medication side effects      Controlled Substance Monitoring:    Acute and Chronic Pain Monitoring:   RX Monitoring 5/17/2022   Periodic Controlled Substance Monitoring Possible medication side effects, risk of tolerance/dependence & alternative treatments discussed. ;No signs of potential drug abuse or diversion identified. ;Assessed functional status. ;Obtaining appropriate analgesic effect of treatment. We discussed with the patient that combining opioids, benzodiazepines, alcohol, illicit drugs or sleep aids increases the risk of respiratory depression including death. We discussed that these medications may cause drowsiness, sedation or dizziness and have counseled the patient not to drive or operate machinery. We have discussed that these medications will be prescribed only by one provider. We have discussed with the patient about age related risk factors and have thoroughly discussed the importance of taking these medications as prescribed. The patient verbalizes understanding.     ccreferring physic

## 2022-06-16 ENCOUNTER — OFFICE VISIT (OUTPATIENT)
Dept: PAIN MANAGEMENT | Age: 77
End: 2022-06-16
Payer: COMMERCIAL

## 2022-06-16 VITALS
OXYGEN SATURATION: 92 % | WEIGHT: 160 LBS | TEMPERATURE: 95 F | HEART RATE: 63 BPM | BODY MASS INDEX: 26.66 KG/M2 | HEIGHT: 65 IN | SYSTOLIC BLOOD PRESSURE: 107 MMHG | DIASTOLIC BLOOD PRESSURE: 73 MMHG

## 2022-06-16 DIAGNOSIS — M51.26 DISPLACEMENT OF LUMBAR INTERVERTEBRAL DISC WITHOUT MYELOPATHY: ICD-10-CM

## 2022-06-16 DIAGNOSIS — G89.4 CHRONIC PAIN SYNDROME: Primary | ICD-10-CM

## 2022-06-16 DIAGNOSIS — S33.9XXA CHRONIC OR OLD SPRAIN OF LUMBOSACRAL LIGAMENT: ICD-10-CM

## 2022-06-16 DIAGNOSIS — S33.5XXD LUMBAR SPRAIN, SUBSEQUENT ENCOUNTER: ICD-10-CM

## 2022-06-16 DIAGNOSIS — M51.37 DEGENERATION OF LUMBAR OR LUMBOSACRAL INTERVERTEBRAL DISC: ICD-10-CM

## 2022-06-16 PROCEDURE — 99213 OFFICE O/P EST LOW 20 MIN: CPT

## 2022-06-16 PROCEDURE — 1036F TOBACCO NON-USER: CPT | Performed by: PAIN MEDICINE

## 2022-06-16 PROCEDURE — 99213 OFFICE O/P EST LOW 20 MIN: CPT | Performed by: PAIN MEDICINE

## 2022-06-16 PROCEDURE — 1123F ACP DISCUSS/DSCN MKR DOCD: CPT | Performed by: PAIN MEDICINE

## 2022-06-16 PROCEDURE — G8417 CALC BMI ABV UP PARAM F/U: HCPCS | Performed by: PAIN MEDICINE

## 2022-06-16 PROCEDURE — G8427 DOCREV CUR MEDS BY ELIG CLIN: HCPCS | Performed by: PAIN MEDICINE

## 2022-06-16 RX ORDER — BACLOFEN 10 MG/1
10 TABLET ORAL DAILY
Qty: 30 TABLET | Refills: 2 | Status: SHIPPED
Start: 2022-06-16 | End: 2022-07-15 | Stop reason: SDUPTHER

## 2022-06-16 RX ORDER — OXYCODONE AND ACETAMINOPHEN 7.5; 325 MG/1; MG/1
1 TABLET ORAL 3 TIMES DAILY PRN
Qty: 90 TABLET | Refills: 0 | Status: SHIPPED
Start: 2022-06-16 | End: 2022-07-15 | Stop reason: SDUPTHER

## 2022-06-16 NOTE — PROGRESS NOTES
Do you currently have any of the following:    Fever: No  Headache:  No  Cough: No  Shortness of breath: No  Exposed to anyone with these symptoms: No         Meron Piña presents to the [facility] on 6/16/2022. Erik Joy is complaining of pain in back. The pain is constant. The pain is described as aching and sharp. Pain is rated on his best day at a 6, on his worst day at a 10, and on average at a 8 on the VAS scale. He took his last dose of Percocet today. Any procedures since your last visit: No.    Pacemaker or defibrillator: Yes managed by Dr. Leslye Brush. He is not on NSAIDS and is  on anticoagulation medications to include eLIQUIS and is managed by Dr. Tosha Bautista. Medication Contract and Consent for Opioid Use Documents Filed     Patient Documents     Type of Document Status Date Received Received By Description    Medication Contract Received 5/24/2021  9:00 AM Mejia Cristina Medication Contract    Medication Contract Received 5/17/2022  3:39 PM DANNI, [address] [place] [address] Pain Management                Temp (!) 95 °F (35 °C) (Infrared)   Ht 5' 5\" (1.651 m)   Wt 160 lb (72.6 kg)   BMI 26.63 kg/m²      No LMP for male patient.

## 2022-06-16 NOTE — PROGRESS NOTES
[facility] - BEHAVIORAL HEALTH SERVICES Pain Management  [facility] [address]  [facility] - BE[facility]AVIORAL HE[facility] SERVICES, [facility]    Follow up Note      June Baez     Date of Visit:  2022    CC:  Patient presents for follow up   Chief Complaint   Patient presents with    Back Pain       HPI:    Pain is unchanged. Appropriate analgesia with current medications regimen: yes - having some pain in the \"right kidney area\" which in actuality is his iliac crest area. Change in quality of symptoms:no. Medication side effects:none. Recent diagnostic testing:none. Recent interventional procedures:none. He has been on anticoagulation medications to include Eliquis and has not been on herbal supplements.  He is not diabetic.     Imagin/2018 lumbar xray - fusion is solid  CT myelogram dated 2016 demonstrates complete block at L3/4 level. Degenerative disc disease, spondylosis causing stenosis. Probable large extruded disc at L3-4. Spinal listhesis L4 and L5 exacerbating the stenosis  EMG dated 3/24/2016     L5 radiculopathy  CT dated 2016 lumbar spine demonstrates degenerative spinal stenosis that is severe at L3-4 L4-5 and L5-S1 levels                                        Potential Aberrant Drug-Related Behavior: no      Urine Drug Screenin2018 buccal consistent  2019 buccal consistent  2019 consistent  10/2019 consistent for oxycodone, metabolites, and benzodiazapines  2020 consistent  2021 consistent  3/2022 consistent     OARRS report:  2018-2022 consistent     Opioid Agreement:  Date enacted:  2021- new one to be filled out today.   Renewal date:    Past Medical History:   Diagnosis Date    CAD (coronary artery disease)     Cardiac defibrillator in place     [employer]    CHF [facility]congestive heart failure) ([facility])     Depression     Disease of pericardium     DJD (degenerative joint disease)     Erectile dysfunction     GERD (gastroesophageal reflux disease)     Hyperlipidemia     Hypertension     Ischemic cardiomyopathy     Ischemic heart disease     LBBB (left bundle branch block)     w/ wide Qrs    Mitral regurgitation     Nonrheumatic aortic valve disorder     SHELLEY on CPAP     setting 7    Presence of coronary angioplasty implant and graft     Ventricular tachycardia [facility])        Past Surgical History:   Procedure Laterality Date    ANKLE SURGERY      CARDIAC CATHETERIZATION  08/24/2020    CARDIAC DEFIBRILLATOR PLACEMENT  11/2009    Bi-vent    CARDIAC DEFIBRILLATOR PLACEMENT      CARDIAC PACEMAKER PLACEMENT      CARPAL TUNNEL RELEASE      CORONARY ANGIOPLASTY  09/2014    DIAGNOSTIC CARDIAC CATH LAB PROCEDURE Left 04/2006    DIAGNOSTIC CARDIAC CATH LAB PROCEDURE Left 09/2014    LEXY-RCA    ELBOW SURGERY      EPIDURAL STEROID INJECTION N/A 5/7/2019    CAUDAL EPIDURAL STEROID INJECTION performed by Aubrey Spencer DO at [facility] 30 N/A 8/1/2019    CAUDAL EPIDURAL STEROID INJECTION performed by Aubrey Spencer DO at [address] [place] [address]      foot, finger, left ankle, left leg,and right arm    JOINT REPLACEMENT Right 09/18/2018    knee    LEG SURGERY      LUMBAR FUSION  05/29/2018    OTHER SURGICAL HISTORY  04/2006    cardiac tamponade evacuation     Airam Mccarty - Dr. Misty Valdes N/A 12/26/2019    CAUDAL EPIDURAL STEROID INJECTION #2 performed by Aubrey Spencer DO at [facility] 22 TRANSESOPHAGEAL ECHOCARDIOGRAM      10/07    TRANSESOPHAGEAL ECHOCARDIOGRAM  08/24/2020       Prior to Admission medications    Medication Sig Start Date End Date Taking? Authorizing Provider   oxyCODONE-acetaminophen (PERCOCET) 7.5-325 MG per tablet Take 1 tablet by mouth 3 times daily as needed for Pain for up to 30 days. Intended supply: 30 days 5/17/22 6/16/22 Yes PEACE Bello   lidocaine (XYLOCAINE) 5 % ointment Apply topically as needed for Pain Apply topically as needed.  4/8/22  Yes Aubrey Specner DO   FOLBIC 2.5-25-2 MG TABS TAKE 1 TABLET BY MOUTH ONCE DAILY 2/17/22  Yes Historical Provider, MD   empagliflozin (JARDIANCE) 10 MG tablet 1 tablet   Yes Historical Provider, MD   carvedilol (COREG) 25 MG tablet Take 25 mg by mouth 2 times daily 10/2/21  Yes Historical Provider, MD   tamsulosin (FLOMAX) 0.4 MG capsule Take 1 capsule by mouth daily 11/1/21  Yes Prince Juarez APRN - CNP   nystatin-triamcinolone (MYCOLOG II) 490426-3.1 UNIT/GM-% cream Apply topically 2 times daily. 10/4/21  Yes Nils Joseph MD   nitroGLYCERIN (NITROSTAT) 0.4 MG SL tablet Place under the tongue 7/31/20  Yes Historical Provider, MD   sacubitril-valsartan (ENTRESTO) 49-51 MG per tablet Take 1 tablet by mouth 2 times daily    Yes Historical Provider, MD   amiodarone (CORDARONE) 200 MG tablet Take 200 mg by mouth daily   Yes Historical Provider, MD   atorvastatin (LIPITOR) 80 MG tablet Take 80 mg by mouth   Yes Historical Provider, MD   bumetanide (BUMEX) 1 MG tablet Take 1 mg by mouth daily Once in the morning, and once in the afternoon on Monday, Wednesday, Friday, and Saturday   Yes Historical Provider, MD   mirtazapine (REMERON) 30 MG tablet Take 30 mg by mouth nightly    Yes Historical Provider, MD   spironolactone (ALDACTONE) 25 MG tablet Take 12.5 mg by mouth daily    Yes Historical Provider, MD   pseudoephedrine (SUDAFED) 30 MG tablet Take 30 mg by mouth every 4 hours as needed for Congestion   Yes Historical Provider, MD   apixaban (ELIQUIS) 5 MG TABS tablet Take 1 tablet by mouth 2 times daily 2/2/21  Yes BARI Easley - CNP   amoxicillin (AMOXIL) 500 MG capsule amoxicillin 500 mg capsule   Yes Historical Provider, MD   lactulose (CHRONULAC) 10 GM/15ML solution Take 15 mLs by mouth every evening 12/23/20  Yes Monse Aleman, DO   nystatin-triamcinolone [facility] II) 521810-8.1 UNIT/GM-% cream Apply topically 2 times daily.  10/6/20  Yes BARI Crook - CNP   ezetimibe (ZETIA) 10 MG tablet Take 10 mg by mouth 10/23/19 Yes Historical Provider, MD   TRUE METRIX BLOOD GLUCOSE TEST strip USE AS DIRECTED to test blood glucose TWICE DAILY 7/30/19  Yes Historical Provider, MD   albuterol sulfate  (90 BASE) MCG/ACT inhaler Inhale 2 puffs into the lungs every 6 hours as needed for Wheezing    Yes Historical Provider, MD   venlafaxine (EFFEXOR XR) 150 MG extended release capsule Take 150 mg by mouth daily   Yes Historical Provider, MD   EPINEPHrine (EPIPEN IJ) Inject as directed Indications: as directed    Yes Historical Provider, MD   ferrous sulfate 325 (65 FE) MG tablet Take 325 mg by mouth as needed    Yes Historical Provider, MD   fluticasone (FLONASE ALLERGY RELIEF) 50 MCG/ACT nasal spray 1 spray by Nasal route daily as needed    Yes Historical Provider, MD   pantoprazole (PROTONIX) 40 MG tablet Take 40 mg by mouth daily   Yes Historical Provider, MD   triamcinolone (KENALOG) 0.1 % cream Apply topically 2 times daily Apply topically 2 times daily. Yes Historical Provider, MD   diazepam (VALIUM) 5 MG tablet Take 5 mg by mouth as needed for Anxiety. Yes Historical Provider, MD   tadalafil (CIALIS) 20 MG tablet Take 1 tablet by mouth as needed for Erectile Dysfunction 30 minutes prior to sexual intercourse 6/29/21 1/5/22  Dick Wlash MD   diclofenac sodium (VOLTAREN) 1 % GEL Apply topically 4 times daily as needed for Pain 12/23/20 1/5/22  Lamona Crater, DO       Allergies   Allergen Reactions    Bee Venom Anaphylaxis    Cefaclor Anaphylaxis    Lyrica [Pregabalin] Shortness Of Breath     Itching     Pentazocine-Acetaminophen Hives and Rash     Rapid heart beats.      Bactrim [Sulfamethoxazole-Trimethoprim] Rash     Patient states he is not allergic and has taken     Statins Other (See Comments)     Myalgia      Toprol Xl [Metoprolol] Other (See Comments)     Fatigue         Social History     Socioeconomic History    Marital status:      Spouse name: Not on file    Number of children: Not on file    Years of education: Not on file    Highest education level: Not on file   Occupational History    Not on file   Tobacco Use    Smoking status: Former Smoker     Quit date:      Years since quittin.4    Smokeless tobacco: Never Used   Vaping Use    Vaping Use: Never used   Substance and Sexual Activity    Alcohol use: Yes     Comment: 6-8 beers weekly     Drug use: No     Comment: caffeine 2 cups coffee daily    Sexual activity: Not on file   Other Topics Concern    Not on file   Social History Narrative    Not on file     Social Determinants of Health     Financial Resource Strain:     Difficulty of Paying Living Expenses: Not on file   Food Insecurity:     Worried About [address] [employer] in the Last Year: Not on file    Yara of Food in the Last Year: Not on file   Transportation Needs:     Lack of Transportation (Medical): Not on file    Lack of Transportation (Non-Medical):  Not on file   Physical Activity:     Days of Exercise per Week: Not on file    Minutes of Exercise per Session: Not on file   Stress:     Feeling of Stress : Not on file   Social Connections:     Frequency of Communication with Friends and Family: Not on file    Frequency of Social Gatherings with Friends and Family: Not on file    Attends [employer] Services: Not on file    Active Member of [address] [place] [address] [employer] or Organizations: Not on file    Attends Club or Organization Meetings: Not on file    Marital Status: Not on file   Intimate Partner Violence:     Fear of Current or Ex-Partner: Not on file    Emotionally Abused: Not on file    Physically Abused: Not on file    Sexually Abused: Not on file   Housing Stability:     Unable to Pay for Housing in the Last Year: Not on file    Number of [place] in the Last Year: Not on file    Unstable Housing in the Last Year: Not on file       Family History   Problem Relation Age of Onset    Heart Disease Mother     No Known Problems Father     Prostate Cancer Brother 2011    Cancer Other     Diabetes Other     High Blood Pressure Other     Stroke Other     Tuberculosis Other        REVIEW OF SYSTEMS:     Inés Farley denies fever/chills, chest pain, shortness of breath, new bowel or bladder complaints. All other review of systems was negative. PHYSICAL EXAMINATION:      /73   Pulse 63   Temp (!) 95 °F (35 °C) (Infrared)   Ht 5' 5\" (1.651 m)   Wt 160 lb (72.6 kg)   SpO2 92%   BMI 26.63 kg/m²     General:      General appearance: pleasant and well-hydrated, in mild discomfort and A & O x3  Build:Normal Weight    HEENT:    Head:normocephalic and atraumatic  Sclera: icterus absent    Lungs:    Breathing:Normal expansion. No wheezing. Abdomen:    Shape:non-distended and normal    Lumbar spine:    Tenderness: + right iliac crest region  Range of motion:abnormal moderately Lateral bending, flexion, extension rotation bilateral and is painful. Extremities:    Tremors:None bilaterally upper and lower  Range of motion:Generally limited extension shoulder - right, pain with internal rotation of hips not done.   Intact:Yes  Edema:Normal      Neurological:    [address]    Dermatology:    Skin:no unusual rashes, no skin lesions    Impression:    LBP, s/p L3-5 laminectomy and fusion surgery 5/2018 with Dr. Josee Sherwood for severe stenosis, xray shows solid fusion and hardware in good position 7/2019  Rt shoulder pain, is candidate for RTSA per Dr. Linda Holder knee pain improving, s/p 9/2018 TKR  + SHELLEY on CPAP, + ICD/pacer, 3 heart valves are stenotic with resulting  Thoracic radiating pain in to the abdomen, has had a CT chest recently which shows thoracic degeneration with autofusion  Can consider a dedicated thoracic CT scan once his heart issues are treated, cannot have MRI due to pacemaker/defib              Failed gabapentin 100 mg titration due to weakness, Lyrica due to itching and difficulty breathing  On ELILQUIS     He reports pain in my \"right kidney\", in actuality this is the iliac crest region  No palpable masses       Plan:     Consider xray rt iliac crest/hip if pain does not improve by next visit  Ztlido samples for the above area, escribed to [address] [place] [address] too  Failed most recent caudal [id number]   OARRS reviewed  Previously discussed risk of overdose with combining opioids with benzos, encouraged him to utilize the lowest dose of benzo as possible and wean off if possible  RF percocet 7.5/325 TID #90 - we have previously discussed limited dosing.                RF baclofen 10 mg daily #30  Continue lidocaine ointment  Continue diclofenac gel  Continue lactulose for OIC   He is not a candidate for SCS due to his quite frequent falls  Not candidate for TENS due to pacer/defib  Patient encouraged to stay active  Treatment plan discussed with the patient including medication side effects      We discussed with the patient that combining opioids, benzodiazepines, alcohol, illicit drugs or sleep aids increases the risk of respiratory depression including death. We discussed that these medications may cause drowsiness, sedation or dizziness and have counseled the patient not to drive or operate machinery. We have discussed that these medications will be prescribed only by one provider. We have discussed with the patient about age related risk factors and have thoroughly discussed the importance of taking these medications as prescribed. The patient verbalizes understanding.

## 2022-06-17 ENCOUNTER — TELEPHONE (OUTPATIENT)
Dept: PAIN MANAGEMENT | Age: 77
End: 2022-06-17

## 2022-06-20 ENCOUNTER — TELEPHONE (OUTPATIENT)
Dept: PAIN MANAGEMENT | Age: 77
End: 2022-06-20

## 2022-06-23 ENCOUNTER — TELEPHONE (OUTPATIENT)
Dept: PAIN MANAGEMENT | Age: 77
End: 2022-06-23

## 2022-06-23 DIAGNOSIS — S33.5XXD LUMBAR SPRAIN, SUBSEQUENT ENCOUNTER: ICD-10-CM

## 2022-06-23 DIAGNOSIS — S33.9XXA CHRONIC OR OLD SPRAIN OF LUMBOSACRAL LIGAMENT: ICD-10-CM

## 2022-06-23 DIAGNOSIS — G89.4 CHRONIC PAIN SYNDROME: Primary | ICD-10-CM

## 2022-06-23 DIAGNOSIS — M51.26 DISPLACEMENT OF LUMBAR INTERVERTEBRAL DISC WITHOUT MYELOPATHY: ICD-10-CM

## 2022-06-23 DIAGNOSIS — M51.37 DEGENERATION OF LUMBAR OR LUMBOSACRAL INTERVERTEBRAL DISC: ICD-10-CM

## 2022-06-23 RX ORDER — LIDOCAINE 50 MG/G
1 PATCH TOPICAL DAILY
Qty: 30 PATCH | Refills: 5 | Status: SHIPPED | OUTPATIENT
Start: 2022-06-23 | End: 2022-07-23

## 2022-06-27 ENCOUNTER — TELEPHONE (OUTPATIENT)
Dept: PAIN MANAGEMENT | Age: 77
End: 2022-06-27

## 2022-07-14 NOTE — PROGRESS NOTES
[facility]  [address]  [facility]    Follow up Note      Denis Stovall     Date of Visit:  7/15/2022    CC:  Patient presents for follow up   Chief Complaint   Patient presents with    1 Month Follow-Up     Low back pain        HPI:    Pain is unchanged. Appropriate analgesia with current medications regimen: yes - having some continued pain in the \"right kidney area\" which in actuality is his iliac crest area. Change in quality of symptoms:no. Medication side effects:none. Recent diagnostic testing:none. Recent interventional procedures:none. He has been on anticoagulation medications to include Eliquis and has not been on herbal supplements. He is not diabetic. Imagin/2018 lumbar xray - fusion is solid  CT myelogram dated 2016 demonstrates complete block at L3/4 level. Degenerative disc disease, spondylosis causing stenosis. Probable large extruded disc at L3-4. Spinal listhesis L4 and L5 exacerbating the stenosis  EMG dated 3/24/2016     L5 radiculopathy  CT dated 2016 lumbar spine demonstrates degenerative spinal stenosis that is severe at L3-4 L4-5 and L5-S1 levels                                        Potential Aberrant Drug-Related Behavior: no      Urine Drug Screenin2018 buccal consistent  2019 buccal consistent  2019 consistent  10/2019 consistent for oxycodone, metabolites, and benzodiazapines  2020 consistent  2021 consistent  3/2022 consistent     OARRS report:  2018-2022 consistent     Opioid Agreement:  Date enacted:  2021- new one to be filled out today.   Renewal date:    Past Medical History:   Diagnosis Date    CAD (coronary artery disease)     Cardiac defibrillator in place     [employer]    CHF (congestive heart failure) (HCC)     Depression     Disease of pericardium     DJD (degenerative joint disease)     Erectile dysfunction     GERD (gastroesophageal reflux disease)     Hyperlipidemia Hypertension     Ischemic cardiomyopathy     Ischemic heart disease     LBBB (left bundle branch block)     w/ wide Qrs    Mitral regurgitation     Nonrheumatic aortic valve disorder     SHELLEY on CPAP     setting 7    Presence of coronary angioplasty implant and graft     Ventricular tachycardia [facility])        Past Surgical History:   Procedure Laterality Date    ANKLE SURGERY      CARDIAC CATHETERIZATION  08/24/2020    CARDIAC DEFIBRILLATOR PLACEMENT  11/2009    Bi-vent    CARDIAC DEFIBRILLATOR PLACEMENT      CARDIAC PACEMAKER PLACEMENT      CARPAL TUNNEL RELEASE      CORONARY ANGIOPLASTY  09/2014    DIAGNOSTIC CARDIAC CATH LAB PROCEDURE Left 04/2006    DIAGNOSTIC CARDIAC CATH LAB PROCEDURE Left 09/2014    LEXY-RCA    ELBOW SURGERY      EPIDURAL STEROID INJECTION N/A 5/7/2019    CAUDAL EPIDURAL STEROID INJECTION performed by Eugenio Gee DO at [address] [place] [address] N/A 8/1/2019    CAUDAL EPIDURAL STEROID INJECTION performed by Eugenio Gee DO at [place] [id number]      foot, finger, left ankle, left leg,and right arm    JOINT REPLACEMENT Right 09/18/2018    knee    LEG SURGERY      LUMBAR FUSION  05/29/2018    OTHER SURGICAL HISTORY  04/2006    cardiac tamponade evacuation     Salvador Ritter N/A 12/26/2019    CAUDAL EPIDURAL STEROID INJECTION #2 performed by Eugenio Gee DO at [facility] OR    TRANSESOPHAGEAL ECHOCARDIOGRAM      10/07    TRANSESOPHAGEAL ECHOCARDIOGRAM  08/24/2020       Prior to Admission medications    Medication Sig Start Date End Date Taking? Authorizing Provider   lidocaine (LIDODERM) 5 % Place 1 patch onto the skin daily 12 hours on, 12 hours off. 6/23/22 7/23/22  Eugenio Gee DO   oxyCODONE-acetaminophen (PERCOCET) 7.5-325 MG per tablet Take 1 tablet by mouth 3 times daily as needed for Pain for up to 30 days.  Intended supply: 30 days 6/16/22 7/16/22  Eugenio Gee DO   Lidocaine 1.8 % PTCH Apply 1 patch topically every 24 hours If skin irritation occurs wear patch 12 hours on and 12 hours off 6/16/22 7/16/22  Chandni Sharma DO   baclofen (LIORESAL) 10 MG tablet Take 1 tablet by mouth daily 6/16/22 7/16/22  Chandni Sharma DO   lidocaine (XYLOCAINE) 5 % ointment Apply topically as needed for Pain Apply topically as needed. 4/8/22   Leda Singleton DO   FOLBIC 2.5-25-2 MG TABS TAKE 1 TABLET BY MOUTH ONCE DAILY 2/17/22   Historical Provider, MD   empagliflozin (JARDIANCE) 10 MG tablet 1 tablet    Historical Provider, MD   carvedilol (COREG) 25 MG tablet Take 25 mg by mouth 2 times daily 10/2/21   Historical Provider, MD   tamsulosin (FLOMAX) 0.4 MG capsule Take 1 capsule by mouth daily 11/1/21   Prince Juarez, APRN - CNP   nystatin-triamcinolone (MYCOLOG II) 597060-0.1 UNIT/GM-% cream Apply topically 2 times daily.  10/4/21   Charlean Olszewski, MD   nitroGLYCERIN (NITROSTAT) 0.4 MG SL tablet Place under the tongue 7/31/20   Historical Provider, MD   tadalafil (CIALIS) 20 MG tablet Take 1 tablet by mouth as needed for Erectile Dysfunction 30 minutes prior to sexual intercourse 6/29/21 7/11/22  Charlean Olszewski, MD   sacubitril-valsartan (ENTRESTO) 49-51 MG per tablet Take 1 tablet by mouth 2 times daily     Historical Provider, MD   amiodarone (CORDARONE) 200 MG tablet Take 200 mg by mouth daily    Historical Provider, MD   atorvastatin (LIPITOR) 80 MG tablet Take 80 mg by mouth    Historical Provider, MD   bumetanide (BUMEX) 1 MG tablet Take 1 mg by mouth daily Once in the morning, and once in the afternoon on Monday, Wednesday, Friday, and Saturday    Historical Provider, MD   mirtazapine (REMERON) 30 MG tablet Take 30 mg by mouth nightly     Historical Provider, MD   spironolactone (ALDACTONE) 25 MG tablet Take 12.5 mg by mouth daily     Historical Provider, MD   pseudoephedrine (SUDAFED) 30 MG tablet Take 30 mg by mouth every 4 hours as needed for Congestion    Historical Provider, MD apixaban (ELIQUIS) 5 MG TABS tablet Take 1 tablet by mouth 2 times daily 2/2/21   Lane Show, APRN - CNP   amoxicillin (AMOXIL) 500 MG capsule amoxicillin 500 mg capsule    Historical Provider, MD   lactulose (CHRONULAC) 10 GM/15ML solution Take 15 mLs by mouth every evening 12/23/20   Long Cuellar,    diclofenac sodium (VOLTAREN) 1 % GEL Apply topically 4 times daily as needed for Pain 12/23/20 7/11/22  Long Cuellar, DO   nystatin-triamcinolone [facility]) 240183-1.1 UNIT/GM-% cream Apply topically 2 times daily. 10/6/20   Pateltirso Lilly APRN - CNP   ezetimibe (ZETIA) 10 MG tablet Take 10 mg by mouth 10/23/19   Historical Provider, MD   TRUE METRIX BLOOD GLUCOSE TEST strip USE AS DIRECTED to test blood glucose TWICE DAILY 7/30/19   Historical Provider, MD   albuterol sulfate  (90 BASE) MCG/ACT inhaler Inhale 2 puffs into the lungs every 6 hours as needed for Wheezing     Historical Provider, MD   venlafaxine (EFFEXOR XR) 150 MG extended release capsule Take 150 mg by mouth daily    Historical Provider, MD   EPINEPHrine (EPIPEN IJ) Inject as directed Indications: as directed     Historical Provider, MD   ferrous sulfate 325 (65 FE) MG tablet Take 325 mg by mouth as needed     Historical Provider, MD   fluticasone (FLONASE ALLERGY RELIEF) 50 MCG/ACT nasal spray 1 spray by Nasal route daily as needed     Historical Provider, MD   pantoprazole (PROTONIX) 40 MG tablet Take 40 mg by mouth daily    Historical Provider, MD   triamcinolone (KENALOG) 0.1 % cream Apply topically 2 times daily Apply topically 2 times daily. Historical Provider, MD   diazepam (VALIUM) 5 MG tablet Take 5 mg by mouth as needed for Anxiety. Historical Provider, MD       Allergies   Allergen Reactions    Bee Venom Anaphylaxis    Cefaclor Anaphylaxis    Lyrica [Pregabalin] Shortness Of Breath     Itching     Pentazocine-Acetaminophen Hives and Rash     Rapid heart beats.      Bactrim [Sulfamethoxazole-Trimethoprim] Rash Patient states he is not allergic and has taken     Statins Other (See Comments)     Myalgia      Toprol Xl [Metoprolol] Other (See Comments)     Fatigue         Social History     Socioeconomic History    Marital status:      Spouse name: Not on file    Number of children: Not on file    Years of education: Not on file    Highest education level: Not on file   Occupational History    Not on file   Tobacco Use    Smoking status: Former     Packs/day: 0.25     Years: 20.00     Pack years: 5.00     Types: Cigarettes     Start date:      Quit date:      Years since quittin.5    Smokeless tobacco: Never   Vaping Use    Vaping Use: Never used   Substance and Sexual Activity    Alcohol use: Yes     Comment: 6-8 beers weekly     Drug use: No     Comment: caffeine 2 cups coffee daily    Sexual activity: Not on file   Other Topics Concern    Not on file   Social History Narrative    Not on file     Social Determinants of Health     Financial Resource Strain: Not on file   Food Insecurity: Not on file   Transportation Needs: Not on file   Physical Activity: Not on file   Stress: Not on file   Social Connections: Not on file   Intimate Partner Violence: Not on file   Housing Stability: Not on file       Family History   Problem Relation Age of Onset    Heart Disease Mother     No Known Problems Father     Prostate Cancer Brother             Cancer Other     Diabetes Other     High Blood Pressure Other     Stroke Other     Tuberculosis Other        REVIEW OF SYSTEMS:     Wally Bro denies fever/chills, chest pain, shortness of breath, new bowel or bladder complaints. All other review of systems was negative.     PHYSICAL EXAMINATION:      BP (!) 102/54   Pulse 64   Temp 97.9 °F (36.6 °C) (Oral)   Resp 16   Ht 5' 5\" (1.651 m)   Wt 158 lb (71.7 kg)   SpO2 94%   BMI 26.29 kg/m²     General:      General appearance: pleasant and well-hydrated, in mild discomfort and A & O x3  Build:Normal Weight    HEENT:    Head:normocephalic and atraumatic  Sclera: icterus absent    Lungs:    Breathing:Normal expansion. No wheezing. Abdomen:    Shape:non-distended and normal    Lumbar spine:    Tenderness: + right iliac crest region  Range of motion:abnormal moderately Lateral bending, flexion, extension rotation bilateral and is painful. Extremities:    Tremors:None bilaterally upper and lower  Range of motion:Generally limited extension shoulder - right, pain with internal rotation of hips not done. Intact:Yes  Edema:Normal      Neurological:    [address]    Dermatology:    Skin:healing ecchymosis right iliac crest    Impression:    LBP, s/p L3-5 laminectomy and fusion surgery 5/2018 with Dr. Flash Trent for severe stenosis, xray shows solid fusion and hardware in good position 7/2019  Rt shoulder pain, is candidate for RTSA per Dr. Gonzalez Exon knee pain improving, s/p 9/2018 TKR  + SHELLEY on CPAP, + ICD/pacer, 3 heart valves are stenotic with resulting  Thoracic radiating pain in to the abdomen, has had a CT chest recently which shows thoracic degeneration with autofusion  Can consider a dedicated thoracic CT scan once his heart issues are treated, cannot have MRI due to pacemaker/defib              Failed gabapentin 100 mg titration due to weakness, Lyrica due to itching and difficulty breathing  On ELILQUIS     He reports pain in my \"right kidney\", in actuality this is the iliac crest region  No palpable masses   He has developed some ecchymosis in this region, no active bleeding at this time, and it does appear to be healing      Plan:     Xray pelvis and lumbar spine given above continued pain  Continue Ztlido patches  Failed most recent caudal [id number]   OARRS reviewed  Previously discussed risk of overdose with combining opioids with benzos, encouraged him to utilize the lowest dose of benzo as possible and wean off if possible  RF percocet 7.5/325 TID #90 - we have previously discussed limited dosing. RF baclofen 10 mg daily #30  Continue lidocaine ointment  Continue diclofenac gel  Continue lactulose for OIC   He is not a candidate for SCS due to his quite frequent falls  Not candidate for TENS due to pacer/defib  Patient encouraged to stay active  Treatment plan discussed with the patient including medication side effects      We discussed with the patient that combining opioids, benzodiazepines, alcohol, illicit drugs or sleep aids increases the risk of respiratory depression including death. We discussed that these medications may cause drowsiness, sedation or dizziness and have counseled the patient not to drive or operate machinery. We have discussed that these medications will be prescribed only by one provider. We have discussed with the patient about age related risk factors and have thoroughly discussed the importance of taking these medications as prescribed. The patient verbalizes understanding.

## 2022-07-15 ENCOUNTER — OFFICE VISIT (OUTPATIENT)
Dept: PAIN MANAGEMENT | Age: 77
End: 2022-07-15
Payer: COMMERCIAL

## 2022-07-15 VITALS
BODY MASS INDEX: 26.33 KG/M2 | SYSTOLIC BLOOD PRESSURE: 102 MMHG | HEIGHT: 65 IN | OXYGEN SATURATION: 94 % | TEMPERATURE: 97.9 F | RESPIRATION RATE: 16 BRPM | HEART RATE: 64 BPM | WEIGHT: 158 LBS | DIASTOLIC BLOOD PRESSURE: 54 MMHG

## 2022-07-15 DIAGNOSIS — S33.5XXD LUMBAR SPRAIN, SUBSEQUENT ENCOUNTER: ICD-10-CM

## 2022-07-15 DIAGNOSIS — M25.551 RIGHT HIP PAIN: ICD-10-CM

## 2022-07-15 DIAGNOSIS — M25.251: ICD-10-CM

## 2022-07-15 DIAGNOSIS — M51.26 DISPLACEMENT OF LUMBAR INTERVERTEBRAL DISC WITHOUT MYELOPATHY: ICD-10-CM

## 2022-07-15 DIAGNOSIS — S33.9XXA CHRONIC OR OLD SPRAIN OF LUMBOSACRAL LIGAMENT: ICD-10-CM

## 2022-07-15 DIAGNOSIS — G89.4 CHRONIC PAIN SYNDROME: Primary | ICD-10-CM

## 2022-07-15 DIAGNOSIS — M51.37 DEGENERATION OF LUMBAR OR LUMBOSACRAL INTERVERTEBRAL DISC: ICD-10-CM

## 2022-07-15 DIAGNOSIS — G89.4 CHRONIC PAIN SYNDROME: ICD-10-CM

## 2022-07-15 PROCEDURE — 99213 OFFICE O/P EST LOW 20 MIN: CPT | Performed by: PAIN MEDICINE

## 2022-07-15 RX ORDER — BACLOFEN 10 MG/1
10 TABLET ORAL DAILY
Qty: 30 TABLET | Refills: 2 | Status: SHIPPED
Start: 2022-07-15 | End: 2022-10-21 | Stop reason: SDUPTHER

## 2022-07-15 RX ORDER — OXYCODONE AND ACETAMINOPHEN 7.5; 325 MG/1; MG/1
1 TABLET ORAL 3 TIMES DAILY PRN
Qty: 90 TABLET | Refills: 0 | Status: SHIPPED
Start: 2022-07-16 | End: 2022-08-12 | Stop reason: SDUPTHER

## 2022-07-15 NOTE — PROGRESS NOTES
Do you currently have any of the following:    Fever: No  Headache:  No  Cough: No  Shortness of breath: No  Exposed to anyone with these symptoms: No         Samuel Hoskins presents to the [address] [place] [address] on 7/15/2022. Tiffany Laird is complaining of pain in his low back. The pain is constant. The pain is described as aching, stabbing, dull, sharp, and miserable. Pain is rated on his best day at a 7, on his worst day at a 10, and on average at a 8 on the VAS scale. He took his last dose of Percocet today. Any procedures since your last visit: No    Pacemaker or defibrillator: Yes managed by Dr. Daria Elkins. He is not on NSAIDS and is  on anticoagulation medications to include Eliquis and is managed by Dr. Neto Coello. Medication Contract and Consent for Opioid Use Documents Filed       Patient Documents       Type of Document Status Date Received Received By Description    Medication Contract Received 5/24/2021  9:00 AM Merline Neighbours Medication Contract    Medication Contract Received 5/17/2022  3:39 PM Kaylee RAZO [facility]                    BP (!) 102/54   Pulse 64   Temp 97.9 °F (36.6 °C) (Oral)   Resp 16   Ht 5' 5\" (1.651 m)   Wt 158 lb (71.7 kg)   SpO2 94%   BMI 26.29 kg/m²      No LMP for male patient.

## 2022-07-25 ENCOUNTER — TELEPHONE (OUTPATIENT)
Dept: PAIN MANAGEMENT | Age: 77
End: 2022-07-25

## 2022-07-25 NOTE — TELEPHONE ENCOUNTER
Patient's wife called stating Carley Heaton got his X-rays done last Wednesday 7/20/22. Says he is still in a lot of pain. Please advise.

## 2022-07-26 ENCOUNTER — TELEPHONE (OUTPATIENT)
Dept: PAIN MANAGEMENT | Age: 77
End: 2022-07-26

## 2022-07-26 DIAGNOSIS — M51.37 DEGENERATION OF LUMBAR OR LUMBOSACRAL INTERVERTEBRAL DISC: ICD-10-CM

## 2022-07-26 DIAGNOSIS — S33.9XXA CHRONIC OR OLD SPRAIN OF LUMBOSACRAL LIGAMENT: ICD-10-CM

## 2022-07-26 DIAGNOSIS — M51.26 DISPLACEMENT OF LUMBAR INTERVERTEBRAL DISC WITHOUT MYELOPATHY: ICD-10-CM

## 2022-07-26 DIAGNOSIS — G89.4 CHRONIC PAIN SYNDROME: ICD-10-CM

## 2022-07-26 DIAGNOSIS — S33.5XXD LUMBAR SPRAIN, SUBSEQUENT ENCOUNTER: ICD-10-CM

## 2022-07-26 RX ORDER — LIDOCAINE 50 MG/G
OINTMENT TOPICAL
Qty: 35.44 G | Refills: 2 | Status: SHIPPED
Start: 2022-07-26 | End: 2022-10-17 | Stop reason: SDUPTHER

## 2022-07-26 NOTE — TELEPHONE ENCOUNTER
If he would like, we can try an injection of local anesthetic into the area the next time he is in the office (or earlier if they want to make the trip). A trigger point injeciton. Thanks.

## 2022-07-26 NOTE — TELEPHONE ENCOUNTER
I called and spoke to [address] wife Read Monika and explained to her that the X-rays were negative/normal.  She stated understanding and stated to me that Donna Carbajal continues to have difficulty ambulating and is still having pain. She asked what is the next plan of care for Donna Carbajal. Please advise.

## 2022-07-26 NOTE — TELEPHONE ENCOUNTER
Scheduled patient for TPI on 8-12-22 per Dr. Bernardine Dakins for TPI on the same day as his follow up with Antonia Roberson. Patient also requesting a new script for Lidocaine ointment.

## 2022-08-11 NOTE — PROGRESS NOTES
[facility]  [facility] [address] [place] [address]    Follow up Note      Aileen Hui     Date of Visit:  2022    CC:  Patient presents for follow up   Chief Complaint   Patient presents with    Follow-up    Lower Back Pain         HPI:    Pain is unchanged to right lower back area since last visit. Appropriate analgesia with current medications regimen: yes. Change in quality of symptoms:no. Medication side effects:none. Recent diagnostic testing:none. Recent interventional procedures:none. He has been on anticoagulation medications to include Eliquis and has not been on herbal supplements. He is not diabetic. Imagin/2018 lumbar xray - fusion is solid  CT myelogram dated 2016 demonstrates complete block at L3/4 level. Degenerative disc disease, spondylosis causing stenosis. Probable large extruded disc at L3-4.  Spinal listhesis L4 and L5 exacerbating the stenosis  EMG dated 3/24/2016     L5 radiculopathy  CT dated 2016 lumbar spine demonstrates degenerative spinal stenosis that is severe at L3-4 L4-5 and L5-S1 levels                                        Potential Aberrant Drug-Related Behavior: no     Urine Drug Screenin2018 buccal consistent  2019 buccal consistent  2019 consistent  10/2019 consistent for oxycodone, metabolites, and benzodiazapines  2020 consistent  2021 consistent  3/2022 consistent     OARRS report:  2018-2022 consistent     Opioid Agreement:  Date enacted:  2021  Renewal date:  2022       Past Medical History:   Diagnosis Date    CAD (coronary artery disease)     Cardiac defibrillator in place     [employer]    CHF (congestive heart failure) (HCC)     Depression     Disease of pericardium     DJD (degenerative joint disease)     Erectile dysfunction     GERD (gastroesophageal reflux disease)     Hyperlipidemia     Hypertension     Ischemic cardiomyopathy     Ischemic heart disease     LBBB (left bundle branch block)     w/ wide Qrs    Mitral regurgitation     Nonrheumatic aortic valve disorder     SHELLEY on CPAP     setting 7    Presence of coronary angioplasty implant and graft     Ventricular tachycardia [facility])        Past Surgical History:   Procedure Laterality Date    ANKLE SURGERY      CARDIAC CATHETERIZATION  08/24/2020    CARDIAC DEFIBRILLATOR PLACEMENT  11/2009    Bi-vent    CARDIAC DEFIBRILLATOR PLACEMENT      CARDIAC PACEMAKER PLACEMENT      CARPAL TUNNEL RELEASE      CORONARY ANGIOPLASTY  09/2014    DIAGNOSTIC CARDIAC CATH LAB PROCEDURE Left 04/2006    DIAGNOSTIC CARDIAC CATH LAB PROCEDURE Left 09/2014    LEXY-RCA    ELBOW SURGERY      EPIDURAL STEROID INJECTION N/A 5/7/2019    CAUDAL EPIDURAL STEROID INJECTION performed by Sonia Arcos DO at [facility] [address] N/A 8/1/2019    CAUDAL EPIDURAL STEROID INJECTION performed by Sonia Arcos DO at [address] [place] [address]      foot, finger, left ankle, left leg,and right arm    JOINT REPLACEMENT Right 09/18/2018    knee    LEG SURGERY      LUMBAR FUSION  05/29/2018    OTHER SURGICAL HISTORY  04/2006    cardiac tamponade evacuation     Racquel Dickerson - Dr. Mar Be N/A 12/26/2019    CAUDAL EPIDURAL STEROID INJECTION #2 performed by Sonia Arcos DO at [facility] OR    TRANSESOPHAGEAL ECHOCARDIOGRAM      10/07    TRANSESOPHAGEAL ECHOCARDIOGRAM  08/24/2020       Prior to Admission medications    Medication Sig Start Date End Date Taking? Authorizing Provider   oxyCODONE-acetaminophen (PERCOCET) 7.5-325 MG per tablet Take 1 tablet by mouth 3 times daily as needed for Pain for up to 30 days. Intended supply: 30 days 8/15/22 9/14/22 Yes PEACE Carlton   lidocaine (XYLOCAINE) 5 % ointment APPLY TO THE AFFECTED AREA(S) DAILY AS NEEDED FOR PAIN 7/26/22  Yes PEACE Carlton   baclofen (LIORESAL) 10 MG tablet Take 1 tablet by mouth in the morning.  7/15/22 8/14/22 Yes Ledasa Santo Dominguez,    FOLBIC 2.5-25-2 MG TABS TAKE 1 TABLET BY MOUTH ONCE DAILY 2/17/22  Yes Historical Provider, MD   empagliflozin (JARDIANCE) 10 MG tablet 1 tablet   Yes Historical Provider, MD   carvedilol (COREG) 25 MG tablet Take 25 mg by mouth 2 times daily 10/2/21  Yes Historical Provider, MD   tamsulosin (FLOMAX) 0.4 MG capsule Take 1 capsule by mouth daily 11/1/21  Yes BARI Cotter - CNP   nystatin-triamcinolone (MYCOLOG II) 833486-8.1 UNIT/GM-% cream Apply topically 2 times daily. 10/4/21  Yes Vania Baig MD   nitroGLYCERIN (NITROSTAT) 0.4 MG SL tablet Place under the tongue 7/31/20  Yes Historical Provider, MD   sacubitril-valsartan (ENTRESTO) 49-51 MG per tablet Take 1 tablet by mouth 2 times daily    Yes Historical Provider, MD   amiodarone (CORDARONE) 200 MG tablet Take 200 mg by mouth daily   Yes Historical Provider, MD   atorvastatin (LIPITOR) 80 MG tablet Take 80 mg by mouth   Yes Historical Provider, MD   bumetanide (BUMEX) 1 MG tablet Take 1.5 mg by mouth in the morning. Once in the morning, and once in the afternoon on Monday, Wednesday, Friday, and Saturday. Yes Historical Provider, MD   mirtazapine (REMERON) 30 MG tablet Take 30 mg by mouth nightly    Yes Historical Provider, MD   spironolactone (ALDACTONE) 25 MG tablet Take 12.5 mg by mouth daily    Yes Historical Provider, MD   pseudoephedrine (SUDAFED) 30 MG tablet Take 30 mg by mouth every 4 hours as needed for Congestion   Yes Historical Provider, MD   apixaban (ELIQUIS) 5 MG TABS tablet Take 1 tablet by mouth 2 times daily 2/2/21  Yes BARI Jensen CNP   amoxicillin (AMOXIL) 500 MG capsule amoxicillin 500 mg capsule   Yes Historical Provider, MD   lactulose (CHRONULAC) 10 GM/15ML solution Take 15 mLs by mouth every evening 12/23/20  Yes Jung Jewell DO   nystatin-triamcinolone [facility] II) 701486-2.1 UNIT/GM-% cream Apply topically 2 times daily.  10/6/20  Yes Artist BARI Mcdowell - CNP   ezetimibe (Morales Pean) children: Not on file    Years of education: Not on file    Highest education level: Not on file   Occupational History    Not on file   Tobacco Use    Smoking status: Former     Packs/day: 0.25     Years: 20.00     Pack years: 5.00     Types: Cigarettes     Start date: [facility]     Quit date: 36     Years since quittin.6    Smokeless tobacco: Never   Vaping Use    Vaping Use: Never used   Substance and Sexual Activity    Alcohol use: Yes     Comment: 6-8 beers weekly     Drug use: No     Comment: caffeine 2 cups coffee daily    Sexual activity: Not on file   Other Topics Concern    Not on file   Social History Narrative    Not on file     Social Determinants of Health     Financial Resource Strain: Not on file   Food Insecurity: Not on file   Transportation Needs: Not on file   Physical Activity: Not on file   Stress: Not on file   Social Connections: Not on file   Intimate Partner Violence: Not on file   Housing Stability: Not on file       Family History   Problem Relation Age of Onset    Heart Disease Mother     No Known Problems Father     Prostate Cancer Brother             Cancer Other     Diabetes Other     High Blood Pressure Other     Stroke Other     Tuberculosis Other        REVIEW OF SYSTEMS:     Ignacia Zeng denies fever/chills, chest pain, shortness of breath, new bowel or bladder complaints. All other review of systems was negative. PHYSICAL EXAMINATION:      /68   Pulse 71   Temp 97.9 °F (36.6 °C) (Infrared)   Resp 16   Ht 5' 5\" (1.651 m)   Wt 158 lb (71.7 kg)   SpO2 93%   BMI 26.29 kg/m²     General:      General appearance:   pleasant and well-hydrated. , in mild discomfort and A & O x3  Build:Normal Weight    HEENT:    Head:normocephalic and atraumatic  Sclera: icterus absent,    Lungs:    Breathing:Normal expansion. No wheezing. Abdomen:    Shape:non-distended and normal    Lumbar spine:                + TTP to right iliac crest area. No ecchymosis noted.     Range of motion:abnormal moderately Lateral bending, flexion, extension rotation bilateral and is painful. Extremities:    Tremors:None bilaterally upper and lower  Range of motion:Generally limited extension shoulder - right, pain with internal rotation of hips not done. Intact:Yes  Edema:Normal      Neurological:    [address]    Dermatology:    Skin:no unusual rashes, no skin lesions, no palpable subcutaneous nodules and good skin turgor    Impression:    LBP, s/p L3-5 laminectomy and fusion surgery 5/2018 with Dr. Bridgette Ross for severe stenosis, xray shows solid fusion and hardware in good position 7/2019  Rt shoulder pain, is candidate for RTSA per Dr. Kilo Plummer knee pain improving, s/p 9/2018 TKR  + SHELLEY on CPAP, + ICD/pacer, 3 heart valves are stenotic with resulting  Thoracic radiating pain in to the abdomen, has had a CT chest recently which shows thoracic degeneration with autofusion  Can consider a dedicated thoracic CT scan once his heart issues are treated, cannot have MRI due to pacemaker/defib              Failed gabapentin 100 mg titration due to weakness, Lyrica due to itching and difficulty breathing  On ELILQUIS     Pain in right iliac crest area continues. No ecchymosis. Scheduled for injection to the area. Plan:     Xray pelvis and lumbar spine given above continued pain  Continue Ztlido patches  Failed most recent caudal [id number]  OARRS reviewed  Previously discussed risk of overdose with combining opioids with benzos, encouraged him to utilize the lowest dose of benzo as possible and wean off if possible  RF percocet 7.5/325 TID #90 - we have previously discussed limited dosing.                 RF baclofen 10 mg daily #30  Continue lidocaine ointment  Continue diclofenac gel  Continue lactulose for OIC  He is not a candidate for SCS due to his quite frequent falls  Not candidate for TENS due to pacer/defib  Patient encouraged to stay active  Treatment plan discussed with the patient including medication side effects        Controlled Substance Monitoring:    Acute and Chronic Pain Monitoring:   RX Monitoring 8/12/2022   Periodic Controlled Substance Monitoring Possible medication side effects, risk of tolerance/dependence & alternative treatments discussed. ;No signs of potential drug abuse or diversion identified. ;Assessed functional status. ;Obtaining appropriate analgesic effect of treatment. We discussed with the patient that combining opioids, benzodiazepines, alcohol, illicit drugs or sleep aids increases the risk of respiratory depression including death. We discussed that these medications may cause drowsiness, sedation or dizziness and have counseled the patient not to drive or operate machinery. We have discussed that these medications will be prescribed only by one provider. We have discussed with the patient about age related risk factors and have thoroughly discussed the importance of taking these medications as prescribed. The patient verbalizes understanding.     ccreferring physic

## 2022-08-12 ENCOUNTER — OFFICE VISIT (OUTPATIENT)
Dept: PAIN MANAGEMENT | Age: 77
End: 2022-08-12
Payer: MEDICARE

## 2022-08-12 ENCOUNTER — PROCEDURE VISIT (OUTPATIENT)
Dept: PAIN MANAGEMENT | Age: 77
End: 2022-08-12
Payer: MEDICARE

## 2022-08-12 VITALS
SYSTOLIC BLOOD PRESSURE: 112 MMHG | TEMPERATURE: 97.9 F | BODY MASS INDEX: 26.33 KG/M2 | DIASTOLIC BLOOD PRESSURE: 68 MMHG | RESPIRATION RATE: 16 BRPM | HEIGHT: 65 IN | OXYGEN SATURATION: 93 % | HEART RATE: 71 BPM | WEIGHT: 158 LBS

## 2022-08-12 VITALS
TEMPERATURE: 97.9 F | OXYGEN SATURATION: 93 % | HEART RATE: 71 BPM | DIASTOLIC BLOOD PRESSURE: 68 MMHG | WEIGHT: 158 LBS | SYSTOLIC BLOOD PRESSURE: 112 MMHG | BODY MASS INDEX: 26.33 KG/M2 | HEIGHT: 65 IN | RESPIRATION RATE: 16 BRPM

## 2022-08-12 DIAGNOSIS — M51.37 DEGENERATION OF LUMBAR OR LUMBOSACRAL INTERVERTEBRAL DISC: ICD-10-CM

## 2022-08-12 DIAGNOSIS — M51.26 DISPLACEMENT OF LUMBAR INTERVERTEBRAL DISC WITHOUT MYELOPATHY: Primary | ICD-10-CM

## 2022-08-12 DIAGNOSIS — S33.5XXD LUMBAR SPRAIN, SUBSEQUENT ENCOUNTER: ICD-10-CM

## 2022-08-12 DIAGNOSIS — M25.551 RIGHT HIP PAIN: ICD-10-CM

## 2022-08-12 DIAGNOSIS — G89.4 CHRONIC PAIN SYNDROME: ICD-10-CM

## 2022-08-12 DIAGNOSIS — S33.9XXA CHRONIC OR OLD SPRAIN OF LUMBOSACRAL LIGAMENT: ICD-10-CM

## 2022-08-12 DIAGNOSIS — M79.10 MYALGIA: Primary | ICD-10-CM

## 2022-08-12 DIAGNOSIS — M25.251: ICD-10-CM

## 2022-08-12 PROCEDURE — 76942 ECHO GUIDE FOR BIOPSY: CPT | Performed by: PAIN MEDICINE

## 2022-08-12 PROCEDURE — 20553 NJX 1/MLT TRIGGER POINTS 3/>: CPT | Performed by: PAIN MEDICINE

## 2022-08-12 PROCEDURE — 99213 OFFICE O/P EST LOW 20 MIN: CPT | Performed by: PHYSICIAN ASSISTANT

## 2022-08-12 RX ORDER — BUPIVACAINE HYDROCHLORIDE 2.5 MG/ML
10 INJECTION, SOLUTION EPIDURAL; INFILTRATION; INTRACAUDAL ONCE
Status: COMPLETED | OUTPATIENT
Start: 2022-08-12 | End: 2022-08-12

## 2022-08-12 RX ORDER — OXYCODONE AND ACETAMINOPHEN 7.5; 325 MG/1; MG/1
1 TABLET ORAL 3 TIMES DAILY PRN
Qty: 90 TABLET | Refills: 0 | Status: SHIPPED
Start: 2022-08-15 | End: 2022-09-14 | Stop reason: SDUPTHER

## 2022-08-12 RX ADMIN — BUPIVACAINE HYDROCHLORIDE 25 MG: 2.5 INJECTION, SOLUTION EPIDURAL; INFILTRATION; INTRACAUDAL at 14:32

## 2022-08-12 NOTE — PROGRESS NOTES
Do you currently have any of the following:    Fever: No  Headache:  No  Cough: No  Shortness of breath: No  Exposed to anyone with these symptoms: No         Marivel Rojo presents to the [address] [place] [address] on 8/12/2022. Carley Heaton is complaining of pain lower back. The pain is constant. The pain is described as aching, throbbing, shooting, stabbing, and sharp. Pain is rated on his best day at a 5, on his worst day at a 10, and on average at a 7 on the VAS scale. He took his last dose of Percocet today. Any procedures since your last visit: No    Pacemaker or defibrillator: No     He is not on NSAIDS and is  on anticoagulation medications to include Eliquis and is managed by Dr. Nusrat Andrews. Medication Contract and Consent for Opioid Use Documents Filed       Patient Documents       Type of Document Status Date Received Received By Description    Medication Contract Received 5/24/2021  9:00 AM Pili Kirkpatrick Medication Contract    Medication Contract Received 5/17/2022  3:39 PM [facility]                    /68   Pulse 71   Temp 97.9 °F (36.6 °C) (Infrared)   Resp 16   Ht 5' 5\" (1.651 m)   Wt 158 lb (71.7 kg)   SpO2 93%   BMI 26.29 kg/m²      No LMP for male patient.

## 2022-08-12 NOTE — PROGRESS NOTES
Do you currently have any of the following:    Fever: No  Headache:  No  Cough: No  Shortness of breath: No  Exposed to anyone with these symptoms: No         Marivel Rojo presents to the [address] [place] [address] on 8/12/2022. Carley Heaton is complaining of pain lower back. The pain is constant. The pain is described as aching. Pain is rated on his best day at a 5, on his worst day at a 10, and on average at a 7 on the VAS scale. He took his last dose of Percocet today. Any procedures since your last visit: No  Pacemaker or defibrillator: No .    He is not on NSAIDS and is  on anticoagulation medications to include Eliquis and is managed by Dr. Nusrat Andrews. Medication Contract and Consent for Opioid Use Documents Filed       Patient Documents       Type of Document Status Date Received Received By Description    Medication Contract Received 5/24/2021  9:00 AM Pili Kirkpatrick Medication Contract    Medication Contract Received 5/17/2022  3:39 PM [id number] Pain Management                    There were no vitals taken for this visit. No LMP for male patient.

## 2022-08-12 NOTE — PROGRESS NOTES
2022    Patient: Richar Hayden  :  1945  Age:  65 y.o. Sex:  male     PRE-PROCEDURE DIAGNOSIS: Myofascial pain. POST-PROCEDURE DIAGNOSIS: Same. PROCEDURE: right lumbar paraspinal and gluteal trigger point injections under ultrasound guidance. SURGEON:   MYNOR Russell. ANESTHESIA: local    ESTIMATED BLOOD LOSS:  None.  ______________________________________________________________________    BRIEF HISTORY: Richar Hayden comes in today for right lumbar paraspinal and gluteal trigger point injection. After discussing the potential risks and benefits of the procedure with the patient. Harsha Abraham did request that we proceed. A complete History & Physical was reviewed and it is unchanged. DESCRIPTION OF PROCEDURE:   Each trigger point was marked with patient input, prepped with chloraprep and draped. A #25-gauge, 1.5-inch needle was passed into the area of greatest tenderness under ultrasound guidance. Each trigger point received equal, divided dosages or a total of 10 cc of 0.25% Marcaine after negative aspiration of blood, air or paresthesia with ultrasound visualization of solution spread. The needle was removed intact and Band-Aids were applied. Disposition the patient tolerated the procedure well and there were no complications . Harsha Abraham will follow up in our [address] [place] [address] as scheduled. He was encouraged to call with questions, concerns or if worsening of symptoms occurs.     Minor Sommer DO

## 2022-09-14 ENCOUNTER — TELEPHONE (OUTPATIENT)
Dept: PAIN MANAGEMENT | Age: 77
End: 2022-09-14

## 2022-09-14 DIAGNOSIS — S33.5XXD LUMBAR SPRAIN, SUBSEQUENT ENCOUNTER: ICD-10-CM

## 2022-09-14 DIAGNOSIS — G89.4 CHRONIC PAIN SYNDROME: ICD-10-CM

## 2022-09-14 DIAGNOSIS — S33.9XXA CHRONIC OR OLD SPRAIN OF LUMBOSACRAL LIGAMENT: ICD-10-CM

## 2022-09-14 DIAGNOSIS — M51.37 DEGENERATION OF LUMBAR OR LUMBOSACRAL INTERVERTEBRAL DISC: ICD-10-CM

## 2022-09-14 DIAGNOSIS — M51.26 DISPLACEMENT OF LUMBAR INTERVERTEBRAL DISC WITHOUT MYELOPATHY: ICD-10-CM

## 2022-09-14 RX ORDER — OXYCODONE AND ACETAMINOPHEN 7.5; 325 MG/1; MG/1
1 TABLET ORAL 3 TIMES DAILY PRN
Qty: 27 TABLET | Refills: 0 | Status: SHIPPED
Start: 2022-09-14 | End: 2022-09-16

## 2022-09-14 NOTE — TELEPHONE ENCOUNTER
Called Luzma back and let her know the percocet was sent in for enough to get Linda Epstein through until next Friday the 23rd. Sent enough for into next week erich Epstein is not feeling better to come in this Friday the 16th.

## 2022-09-14 NOTE — TELEPHONE ENCOUNTER
Shoaib Nuñez called in to cancel Geoff's appointment today 9/14/22 because he is not feeling good. I rescheduled him Friday in hopes he is feeling better. She was concerned with him missing this appointment because of his percocte ending today. Let her know I would check with what we can do and call her back. Please advise. Thanks.

## 2022-09-14 NOTE — TELEPHONE ENCOUNTER
I sent enough percocet in for him to get to next Friday so if he wants to come sometime next week, that is fine as well. Can also be scheduled with Dr. Kasey Pedroza if I have no spots.

## 2022-09-16 ENCOUNTER — OFFICE VISIT (OUTPATIENT)
Dept: PAIN MANAGEMENT | Age: 77
End: 2022-09-16
Payer: MEDICARE

## 2022-09-16 VITALS
RESPIRATION RATE: 16 BRPM | HEART RATE: 70 BPM | SYSTOLIC BLOOD PRESSURE: 110 MMHG | OXYGEN SATURATION: 94 % | DIASTOLIC BLOOD PRESSURE: 69 MMHG | HEIGHT: 65 IN | WEIGHT: 155 LBS | TEMPERATURE: 98.1 F | BODY MASS INDEX: 25.83 KG/M2

## 2022-09-16 DIAGNOSIS — M25.251: ICD-10-CM

## 2022-09-16 DIAGNOSIS — R10.9 CHRONIC FLANK PAIN: ICD-10-CM

## 2022-09-16 DIAGNOSIS — M79.10 MYALGIA: ICD-10-CM

## 2022-09-16 DIAGNOSIS — M51.37 DEGENERATION OF LUMBAR OR LUMBOSACRAL INTERVERTEBRAL DISC: ICD-10-CM

## 2022-09-16 DIAGNOSIS — S33.9XXA CHRONIC OR OLD SPRAIN OF LUMBOSACRAL LIGAMENT: Primary | ICD-10-CM

## 2022-09-16 DIAGNOSIS — M51.26 DISPLACEMENT OF LUMBAR INTERVERTEBRAL DISC WITHOUT MYELOPATHY: ICD-10-CM

## 2022-09-16 DIAGNOSIS — M25.551 RIGHT HIP PAIN: ICD-10-CM

## 2022-09-16 DIAGNOSIS — S33.5XXD LUMBAR SPRAIN, SUBSEQUENT ENCOUNTER: ICD-10-CM

## 2022-09-16 DIAGNOSIS — G89.29 CHRONIC FLANK PAIN: ICD-10-CM

## 2022-09-16 DIAGNOSIS — G89.4 CHRONIC PAIN SYNDROME: ICD-10-CM

## 2022-09-16 PROCEDURE — 99213 OFFICE O/P EST LOW 20 MIN: CPT | Performed by: PHYSICIAN ASSISTANT

## 2022-09-16 RX ORDER — OXYCODONE AND ACETAMINOPHEN 7.5; 325 MG/1; MG/1
1 TABLET ORAL 3 TIMES DAILY PRN
Qty: 90 TABLET | Refills: 0 | Status: SHIPPED
Start: 2022-09-16 | End: 2022-10-21 | Stop reason: SDUPTHER

## 2022-09-16 NOTE — PROGRESS NOTES
[facility]  [address]  [facility]    Follow up Note      Lisa Finley     Date of Visit:  2022    CC:  Patient presents for follow up   Chief Complaint   Patient presents with    Follow-up     Low back pain          HPI:    Pain is unchanged to right lower back area. Appropriate analgesia with current medications regimen: yes. Change in quality of symptoms:no. Medication side effects:none. Recent diagnostic testing:none. Recent interventional procedures: 2022 PROCEDURE: right lumbar paraspinal and gluteal trigger point injections under ultrasound guidance - worse after the injections. He has been on anticoagulation medications to include Eliquis and has not been on herbal supplements. He is not diabetic. Imagin/2018 lumbar xray - fusion is solid  CT myelogram dated 2016 demonstrates complete block at L3/4 level. Degenerative disc disease, spondylosis causing stenosis. Probable large extruded disc at L3-4.  Spinal listhesis L4 and L5 exacerbating the stenosis  EMG dated 3/24/2016     L5 radiculopathy  CT dated 2016 lumbar spine demonstrates degenerative spinal stenosis that is severe at L3-4 L4-5 and L5-S1 levels                                        Potential Aberrant Drug-Related Behavior: no     Urine Drug Screenin2018 buccal consistent  2019 buccal consistent  2019 consistent  10/2019 consistent for oxycodone, metabolites, and benzodiazapines  2020 consistent  2021 consistent  3/2022 consistent     OARRS report:  2018-2022 consistent     Opioid Agreement:  Date enacted:  2021  Renewal date:  2022       Past Medical History:   Diagnosis Date    CAD (coronary artery disease)     Cardiac defibrillator in place     [employer]    CHF (congestive heart failure) ([facility])     Depression     Disease of pericardium     DJD (degenerative joint disease)     Erectile dysfunction     GERD (gastroesophageal reflux disease)     Hyperlipidemia     Hypertension     Ischemic cardiomyopathy     Ischemic heart disease     LBBB (left bundle branch block)     w/ wide Qrs    Mitral regurgitation     Nonrheumatic aortic valve disorder     SHELLEY on CPAP     setting 7    Presence of coronary angioplasty implant and graft     Ventricular tachycardia [facility])        Past Surgical History:   Procedure Laterality Date    ANKLE SURGERY      CARDIAC CATHETERIZATION  08/24/2020    CARDIAC DEFIBRILLATOR PLACEMENT  11/2009    Bi-vent    CARDIAC DEFIBRILLATOR PLACEMENT      CARDIAC PACEMAKER PLACEMENT      CARPAL TUNNEL RELEASE      CORONARY ANGIOPLASTY  09/2014    DIAGNOSTIC CARDIAC CATH LAB PROCEDURE Left 04/2006    DIAGNOSTIC CARDIAC CATH LAB PROCEDURE Left 09/2014    LEXY-RCA    ELBOW SURGERY      EPIDURAL STEROID INJECTION N/A 5/7/2019    CAUDAL EPIDURAL STEROID INJECTION performed by Natalia Masters DO at [address] [place] [address] N/A 8/1/2019    CAUDAL EPIDURAL STEROID INJECTION performed by Natalia Masters DO at [place] [address]      foot, finger, left ankle, left leg,and right arm    JOINT REPLACEMENT Right 09/18/2018    knee    LEG SURGERY      LUMBAR FUSION  05/29/2018    OTHER SURGICAL HISTORY  04/2006    cardiac tamponade evacuation     [place] N/A 12/26/2019    CAUDAL EPIDURAL STEROID INJECTION #2 performed by Natalia Masters DO at [facility] OR    TRANSESOPHAGEAL ECHOCARDIOGRAM      10/07    TRANSESOPHAGEAL ECHOCARDIOGRAM  08/24/2020       Prior to Admission medications    Medication Sig Start Date End Date Taking? Authorizing Provider   oxyCODONE-acetaminophen (PERCOCET) 7.5-325 MG per tablet Take 1 tablet by mouth 3 times daily as needed for Pain for up to 30 days.  Intended supply: 30 days 9/16/22 10/16/22 Yes PEACE Vela   lidocaine (XYLOCAINE) 5 % ointment APPLY TO THE AFFECTED AREA(S) DAILY AS NEEDED FOR PAIN 7/26/22  Yes PEACE Call   FOLBIC 2.5-25-2 MG TABS TAKE 1 TABLET BY MOUTH ONCE DAILY 2/17/22  Yes Historical Provider, MD   empagliflozin (JARDIANCE) 10 MG tablet 1 tablet   Yes Historical Provider, MD   carvedilol (COREG) 25 MG tablet Take 25 mg by mouth 2 times daily 10/2/21  Yes Historical Provider, MD   tamsulosin (FLOMAX) 0.4 MG capsule Take 1 capsule by mouth daily 11/1/21  Yes BARI Cotter CNP   nystatin-triamcinolone (MYCOLOG II) 053376-0.1 UNIT/GM-% cream Apply topically 2 times daily. 10/4/21  Yes Vania Baig MD   nitroGLYCERIN (NITROSTAT) 0.4 MG SL tablet Place under the tongue 7/31/20  Yes Historical Provider, MD   sacubitril-valsartan (ENTRESTO) 49-51 MG per tablet Take 1 tablet by mouth 2 times daily    Yes Historical Provider, MD   amiodarone (CORDARONE) 200 MG tablet Take 200 mg by mouth daily   Yes Historical Provider, MD   atorvastatin (LIPITOR) 80 MG tablet Take 80 mg by mouth   Yes Historical Provider, MD   bumetanide (BUMEX) 1 MG tablet Take 1.5 mg by mouth in the morning. Once in the morning, and once in the afternoon on Monday, Wednesday, Friday, and Saturday. Yes Historical Provider, MD   mirtazapine (REMERON) 30 MG tablet Take 30 mg by mouth nightly    Yes Historical Provider, MD   spironolactone (ALDACTONE) 25 MG tablet Take 12.5 mg by mouth daily    Yes Historical Provider, MD   pseudoephedrine (SUDAFED) 30 MG tablet Take 30 mg by mouth every 4 hours as needed for Congestion   Yes Historical Provider, MD   apixaban (ELIQUIS) 5 MG TABS tablet Take 1 tablet by mouth 2 times daily 2/2/21  Yes BARI Jensen CNP   lactulose (CHRONULAC) 10 GM/15ML solution Take 15 mLs by mouth every evening 12/23/20  Yes Jung Jewell DO   nystatin-triamcinolone [facility] II) 451191-2.1 UNIT/GM-% cream Apply topically 2 times daily.  10/6/20  Yes Malachi Mcdowell APRN - CNP   ezetimibe (ZETIA) 10 MG tablet Take 10 mg by mouth 10/23/19  Yes Historical Provider, MD MACY FONSECA BLOOD GLUCOSE TEST strip USE AS DIRECTED to test blood glucose TWICE DAILY 7/30/19  Yes Historical Provider, MD   albuterol sulfate  (90 BASE) MCG/ACT inhaler Inhale 2 puffs into the lungs every 6 hours as needed for Wheezing    Yes Historical Provider, MD   venlafaxine (EFFEXOR XR) 150 MG extended release capsule Take 150 mg by mouth daily   Yes Historical Provider, MD   EPINEPHrine (EPIPEN IJ) Inject as directed Indications: as directed    Yes Historical Provider, MD   ferrous sulfate 325 (65 FE) MG tablet Take 325 mg by mouth as needed    Yes Historical Provider, MD   fluticasone (FLONASE) 50 MCG/ACT nasal spray 1 spray by Nasal route daily as needed    Yes Historical Provider, MD   pantoprazole (PROTONIX) 40 MG tablet Take 40 mg by mouth daily   Yes Historical Provider, MD   triamcinolone (KENALOG) 0.1 % cream Apply topically 2 times daily Apply topically 2 times daily. Yes Historical Provider, MD   diazepam (VALIUM) 5 MG tablet Take 5 mg by mouth as needed for Anxiety. Yes Historical Provider, MD   tadalafil (CIALIS) 20 MG tablet Take 1 tablet by mouth as needed for Erectile Dysfunction 30 minutes prior to sexual intercourse 6/29/21 7/15/22  Gladystine Adilia, MD   amoxicillin (AMOXIL) 500 MG capsule amoxicillin 500 mg capsule  Patient not taking: Reported on 9/16/2022    Historical Provider, MD   diclofenac sodium (VOLTAREN) 1 % GEL Apply topically 4 times daily as needed for Pain 12/23/20 7/15/22  Josh Gastelum,        Allergies   Allergen Reactions    Bee Venom Anaphylaxis    Cefaclor Anaphylaxis    Lyrica [Pregabalin] Shortness Of Breath     Itching     Pentazocine-Acetaminophen Hives and Rash     Rapid heart beats.      Bactrim [Sulfamethoxazole-Trimethoprim] Rash     Patient states he is not allergic and has taken     Statins Other (See Comments)     Myalgia      Toprol Xl [Metoprolol] Other (See Comments)     Fatigue         Social History     Socioeconomic History    Marital status:  to pacer/defib  Patient encouraged to stay active  Treatment plan discussed with the patient including medication side effects        Controlled Substance Monitoring:    Acute and Chronic Pain Monitoring:   RX Monitoring 9/16/2022   Periodic Controlled Substance Monitoring Possible medication side effects, risk of tolerance/dependence & alternative treatments discussed. ;No signs of potential drug abuse or diversion identified. ;Assessed functional status. ;Obtaining appropriate analgesic effect of treatment. We discussed with the patient that combining opioids, benzodiazepines, alcohol, illicit drugs or sleep aids increases the risk of respiratory depression including death. We discussed that these medications may cause drowsiness, sedation or dizziness and have counseled the patient not to drive or operate machinery. We have discussed that these medications will be prescribed only by one provider. We have discussed with the patient about age related risk factors and have thoroughly discussed the importance of taking these medications as prescribed. The patient verbalizes understanding.     ccreferring physic

## 2022-09-16 NOTE — PROGRESS NOTES
Do you currently have any of the following:    Fever: No  Headache:  No  Cough: No  Shortness of breath: No  Exposed to anyone with these symptoms: No         Samuel Hoskins presents to the [address] [place] [address] on 9/16/2022. Tiffany Laird is complaining of pain in his low back. The pain is constant. The pain is described as aching, dull, sharp, and miserable. Pain is rated on his best day at a 6, on his worst day at a 10, and on average at a 8 on the VAS scale. He took his last dose of Percocet today. Any procedures since your last visit: No    Pacemaker or defibrillator: Yes managed by . He is not on NSAIDS and is  on anticoagulation medications to include Eliquis and is managed by Koreen Skiff, MD  .     Medication Contract and Consent for Opioid Use Documents Filed       Patient Documents       Type of Document Status Date Received Received By Description    Medication Contract Received 5/24/2021  9:00 AM [facility] Medication Contract    Medication Contract Received 5/17/2022  3:39 PM Kaylee RAZO [facility]                    /69   Pulse 70   Temp 98.1 °F (36.7 °C) (Infrared)   Resp 16   Ht 5' 5\" (1.651 m)   Wt 155 lb (70.3 kg)   SpO2 94%   BMI 25.79 kg/m²      No LMP for male patient.

## 2022-10-17 ENCOUNTER — TELEPHONE (OUTPATIENT)
Dept: PAIN MANAGEMENT | Age: 77
End: 2022-10-17

## 2022-10-17 DIAGNOSIS — S33.5XXD LUMBAR SPRAIN, SUBSEQUENT ENCOUNTER: ICD-10-CM

## 2022-10-17 DIAGNOSIS — S33.9XXA CHRONIC OR OLD SPRAIN OF LUMBOSACRAL LIGAMENT: ICD-10-CM

## 2022-10-17 DIAGNOSIS — M51.37 DEGENERATION OF LUMBAR OR LUMBOSACRAL INTERVERTEBRAL DISC: ICD-10-CM

## 2022-10-17 DIAGNOSIS — G89.4 CHRONIC PAIN SYNDROME: ICD-10-CM

## 2022-10-17 DIAGNOSIS — M51.26 DISPLACEMENT OF LUMBAR INTERVERTEBRAL DISC WITHOUT MYELOPATHY: ICD-10-CM

## 2022-10-17 RX ORDER — LIDOCAINE 50 MG/G
OINTMENT TOPICAL
Qty: 35.44 G | Refills: 2 | Status: SHIPPED | OUTPATIENT
Start: 2022-10-17

## 2022-10-17 NOTE — TELEPHONE ENCOUNTER
Dipesh Le called into the office requesting a refill of  Lidocaine 5%  Ointment. It was last filled on 07/26/22 for a 90 day supply. Their last office visit was on 09/16/22. Their next office visit is scheduled for 10/21/22. The reason they need a refill before their next appointment is because he Cancelled his last appointment because he was sick. Medication pended.

## 2022-10-21 ENCOUNTER — OFFICE VISIT (OUTPATIENT)
Dept: PAIN MANAGEMENT | Age: 77
End: 2022-10-21
Payer: MEDICARE

## 2022-10-21 VITALS
RESPIRATION RATE: 16 BRPM | SYSTOLIC BLOOD PRESSURE: 121 MMHG | TEMPERATURE: 98.6 F | OXYGEN SATURATION: 93 % | HEIGHT: 65 IN | BODY MASS INDEX: 25.66 KG/M2 | DIASTOLIC BLOOD PRESSURE: 74 MMHG | WEIGHT: 154 LBS | HEART RATE: 77 BPM

## 2022-10-21 DIAGNOSIS — M25.251: ICD-10-CM

## 2022-10-21 DIAGNOSIS — G89.4 CHRONIC PAIN SYNDROME: Primary | ICD-10-CM

## 2022-10-21 DIAGNOSIS — S33.9XXA CHRONIC OR OLD SPRAIN OF LUMBOSACRAL LIGAMENT: ICD-10-CM

## 2022-10-21 DIAGNOSIS — G89.4 CHRONIC PAIN SYNDROME: ICD-10-CM

## 2022-10-21 DIAGNOSIS — M51.26 DISPLACEMENT OF LUMBAR INTERVERTEBRAL DISC WITHOUT MYELOPATHY: ICD-10-CM

## 2022-10-21 DIAGNOSIS — S33.5XXD LUMBAR SPRAIN, SUBSEQUENT ENCOUNTER: ICD-10-CM

## 2022-10-21 DIAGNOSIS — M79.10 MYALGIA: ICD-10-CM

## 2022-10-21 DIAGNOSIS — M51.37 DEGENERATION OF LUMBAR OR LUMBOSACRAL INTERVERTEBRAL DISC: ICD-10-CM

## 2022-10-21 PROCEDURE — 99213 OFFICE O/P EST LOW 20 MIN: CPT | Performed by: PHYSICIAN ASSISTANT

## 2022-10-21 RX ORDER — ALLOPURINOL 100 MG/1
TABLET ORAL
COMMUNITY
Start: 2022-08-23

## 2022-10-21 RX ORDER — BACLOFEN 10 MG/1
10 TABLET ORAL DAILY
Qty: 30 TABLET | Refills: 2 | Status: SHIPPED | OUTPATIENT
Start: 2022-10-21 | End: 2022-11-20

## 2022-10-21 RX ORDER — OXYCODONE AND ACETAMINOPHEN 7.5; 325 MG/1; MG/1
1 TABLET ORAL 3 TIMES DAILY PRN
Qty: 90 TABLET | Refills: 0 | Status: SHIPPED | OUTPATIENT
Start: 2022-10-22 | End: 2022-11-21

## 2022-10-21 NOTE — PROGRESS NOTES
Do you currently have any of the following:    Fever: No  Headache:  No  Cough: No  Shortness of breath: No  Exposed to anyone with these symptoms: No         Rinku Donis presents to the [facility] on 10/21/2022. Cristina Go is complaining of pain in his low back. The pain is constant. The pain is described as aching, dull, sharp, tender, and miserable. Pain is rated on his best day at a 5, on his worst day at a 10, and on average at a 7 on the VAS scale. He took his last dose of oxycodone today. Any procedures since your last visit: No    Pacemaker or defibrillator: No     He is not on NSAIDS and is  on anticoagulation medications. Medication Contract and Consent for Opioid Use Documents Filed       Patient Documents       Type of Document Status Date Received Received By Description    Medication Contract Received 5/24/2021  9:00 AM Ruth Castellon Medication Contract    Medication Contract Received 5/17/2022  3:39 PM [facility]                    /74   Pulse 77   Temp 98.6 °F (37 °C) (Infrared)   Resp 16   Ht 5' 5\" (1.651 m)   Wt 154 lb (69.9 kg)   SpO2 93%   BMI 25.63 kg/m²      No LMP for male patient.

## 2022-10-21 NOTE — PROGRESS NOTES
[facility] Pain Management  [address]  [facility]    Follow up Note      Wayne Linear     Date of Visit:  10/21/2022    CC:  Patient presents for follow up   Chief Complaint   Patient presents with    Follow-up     Low back pain            HPI:    Pain is better to right lower back. Appropriate analgesia with current medications regimen: yes. Change in quality of symptoms:no. Medication side effects:none. Recent diagnostic testing:none. Recent interventional procedures: None. He has been on anticoagulation medications to include Eliquis and has not been on herbal supplements. He is not diabetic. Imagin2022 CT abdomen/pelvis    IMPRESSION:   1. Multiple small nonobstructing bilateral calcified calyceal calculi. 2. No evidence for other calcified collecting system calculi or   obstruction. 3. Hypodense posterior right upper pole renal cortical lesion most   likely a cyst. No further evaluation is required*. 4. Streaky soft tissue densities in the bilateral perinephric fat   compatible with active or, more likely, remote inflammation. 5. No evidence of mass, lymphadenopathy or inflammatory process. 6. Dense atherosclerotic calcification throughout normal caliber   abdominal aorta. 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 Degenerative disc disease, spondylosis causing stenosis. Probable large extruded disc at L3-4.  Spinal listhesis L4 and L5 exacerbating the stenosis  EMG dated 3/24/2016     L5 radiculopathy  CT dated 2016 lumbar spine demonstrates degenerative spinal stenosis that is severe at L3-4 L4-5 and L5-S1 levels                                        Potential Aberrant Drug-Related Behavior: no     Urine Drug Screenin2018 buccal consistent  2019 buccal consistent  2019 consistent  10/2019 consistent for oxycodone, metabolites, and benzodiazapines  2020 consistent  05/2021 consistent  3/2022 consistent     OARRS report:  12/2018-10/2022 consistent     Opioid Agreement:  Date enacted:  05/24/2021  Renewal date:  Updated: 05/17/2022       Past Medical History:   Diagnosis Date    CAD (coronary artery disease)     Cardiac defibrillator in place     [employer]    CHF (congestive heart failure) (HCC)     Depression     Disease of pericardium     DJD (degenerative joint disease)     Erectile dysfunction     GERD (gastroesophageal reflux disease)     Hyperlipidemia     Hypertension     Ischemic cardiomyopathy     Ischemic heart disease     LBBB (left bundle branch block)     w/ wide Qrs    Mitral regurgitation     Nonrheumatic aortic valve disorder     SHELLEY on CPAP     setting 7    Presence of coronary angioplasty implant and graft     Ventricular tachycardia        Past Surgical History:   Procedure Laterality Date    ANKLE SURGERY      CARDIAC CATHETERIZATION  08/24/2020    CARDIAC DEFIBRILLATOR PLACEMENT  11/2009    Bi-vent    CARDIAC DEFIBRILLATOR PLACEMENT      CARDIAC PACEMAKER PLACEMENT      CARPAL TUNNEL RELEASE      CORONARY ANGIOPLASTY  09/2014    DIAGNOSTIC CARDIAC CATH LAB PROCEDURE Left 04/2006    DIAGNOSTIC CARDIAC CATH LAB PROCEDURE Left 09/2014    LEXY-RCA    ELBOW SURGERY      EPIDURAL STEROID INJECTION N/A 5/7/2019    CAUDAL EPIDURAL STEROID INJECTION performed by Oziel Pavon DO at [address] [place] [address] N/A 8/1/2019    CAUDAL EPIDURAL STEROID INJECTION performed by Oziel Pavon DO at [place] [id number]      foot, finger, left ankle, left leg,and right arm    JOINT REPLACEMENT Right 09/18/2018    knee    LEG SURGERY      LUMBAR FUSION  05/29/2018    OTHER SURGICAL HISTORY  04/2006    cardiac tamponade evacuation     Doron Hilario - Dr. Ryan Larsen N/A 12/26/2019    CAUDAL EPIDURAL STEROID INJECTION #2 performed by Oziel Pavon DO at [facility] TRANSESOPHAGEAL ECHOCARDIOGRAM      10/07    TRANSESOPHAGEAL ECHOCARDIOGRAM  08/24/2020       Prior to Admission medications    Medication Sig Start Date End Date Taking? Authorizing Provider   oxyCODONE-acetaminophen (PERCOCET) 7.5-325 MG per tablet Take 1 tablet by mouth 3 times daily as needed for Pain for up to 30 days. Intended supply: 30 days 10/22/22 11/21/22 Yes [employer]   baclofen (LIORESAL) 10 MG tablet Take 1 tablet by mouth daily 10/21/22 11/20/22 Yes [employer]   allopurinol (ZYLOPRIM) 100 MG tablet TAKE 1 TABLET BY MOUTH DAILY 8/23/22   Historical Provider, MD   lidocaine (XYLOCAINE) 5 % ointment APPLY TO THE AFFECTED AREA(S) DAILY AS NEEDED FOR PAIN 10/17/22   [employer]   FOLBIC 2.5-25-2 MG TABS TAKE 1 TABLET BY MOUTH ONCE DAILY 2/17/22   Historical Provider, MD   empagliflozin (JARDIANCE) 10 MG tablet 1 tablet    Historical Provider, MD   carvedilol (COREG) 25 MG tablet Take 25 mg by mouth 2 times daily 10/2/21   Historical Provider, MD   tamsulosin (FLOMAX) 0.4 MG capsule Take 1 capsule by mouth daily 11/1/21   Prince Juarez, APRN - CNP   nystatin-triamcinolone (MYCOLOG II) 021119-9.1 UNIT/GM-% cream Apply topically 2 times daily. 10/4/21   Hailey Lema MD   nitroGLYCERIN (NITROSTAT) 0.4 MG SL tablet Place under the tongue 7/31/20   Historical Provider, MD   tadalafil (CIALIS) 20 MG tablet Take 1 tablet by mouth as needed for Erectile Dysfunction 30 minutes prior to sexual intercourse 6/29/21 7/15/22  Hailey Lema MD   sacubitril-valsartan (ENTRESTO) 49-51 MG per tablet Take 1 tablet by mouth 2 times daily     Historical Provider, MD   amiodarone (CORDARONE) 200 MG tablet Take 200 mg by mouth daily  Patient not taking: Reported on 10/21/2022    Historical Provider, MD   atorvastatin (LIPITOR) 80 MG tablet Take 80 mg by mouth    Historical Provider, MD   bumetanide (BUMEX) 1 MG tablet Take 1.5 mg by mouth in the morning.  Once in the morning, and once in the afternoon on Monday, Wednesday, Friday, and Saturday. Historical Provider, MD   mirtazapine (REMERON) 30 MG tablet Take 30 mg by mouth nightly     Historical Provider, MD   spironolactone (ALDACTONE) 25 MG tablet Take 12.5 mg by mouth daily     Historical Provider, MD   pseudoephedrine (SUDAFED) 30 MG tablet Take 30 mg by mouth every 4 hours as needed for Congestion    Historical Provider, MD   apixaban (ELIQUIS) 5 MG TABS tablet Take 1 tablet by mouth 2 times daily 2/2/21   Elvernarendra Fairbanks APRN - CNP   amoxicillin (AMOXIL) 500 MG capsule amoxicillin 500 mg capsule  Patient not taking: No sig reported    Historical Provider, MD   lactulose (CHRONULAC) 10 GM/15ML solution Take 15 mLs by mouth every evening 12/23/20   Curt Marroquin,    diclofenac sodium (VOLTAREN) 1 % GEL Apply topically 4 times daily as needed for Pain 12/23/20 7/15/22  Curt Marroquin, DO   nystatin-triamcinolone [facility]) 681992-1.1 UNIT/GM-% cream Apply topically 2 times daily.  10/6/20   BARI Galeas - CNP   ezetimibe (ZETIA) 10 MG tablet Take 10 mg by mouth 10/23/19   Historical Provider, MD   TRUE METRIX BLOOD GLUCOSE TEST strip USE AS DIRECTED to test blood glucose TWICE DAILY 7/30/19   Historical Provider, MD   albuterol sulfate  (90 BASE) MCG/ACT inhaler Inhale 2 puffs into the lungs every 6 hours as needed for Wheezing     Historical Provider, MD   venlafaxine (EFFEXOR XR) 150 MG extended release capsule Take 150 mg by mouth daily    Historical Provider, MD   EPINEPHrine (EPIPEN IJ) Inject as directed Indications: as directed     Historical Provider, MD   ferrous sulfate 325 (65 FE) MG tablet Take 325 mg by mouth as needed     Historical Provider, MD   fluticasone (FLONASE) 50 MCG/ACT nasal spray 1 spray by Nasal route daily as needed     Historical Provider, MD   pantoprazole (PROTONIX) 40 MG tablet Take 40 mg by mouth daily    Historical Provider, MD   triamcinolone (KENALOG) 0.1 % cream Apply topically 2 times daily Apply topically 2 times daily. Historical Provider, MD   diazepam (VALIUM) 5 MG tablet Take 5 mg by mouth as needed for Anxiety. Historical Provider, MD       Allergies   Allergen Reactions    Bee Venom Anaphylaxis    Cefaclor Anaphylaxis    Lyrica [Pregabalin] Shortness Of Breath     Itching     Pentazocine-Acetaminophen Hives and Rash     Rapid heart beats.      Bactrim [Sulfamethoxazole-Trimethoprim] Rash     Patient states he is not allergic and has taken     Statins Other (See Comments)     Myalgia      Toprol Xl [Metoprolol] Other (See Comments)     Fatigue         Social History     Socioeconomic History    Marital status:      Spouse name: Not on file    Number of children: Not on file    Years of education: Not on file    Highest education level: Not on file   Occupational History    Not on file   Tobacco Use    Smoking status: Former     Packs/day: 0.25     Years: 20.00     Pack years: 5.00     Types: Cigarettes     Start date:      Quit date:      Years since quittin.8    Smokeless tobacco: Never   Vaping Use    Vaping Use: Never used   Substance and Sexual Activity    Alcohol use: Yes     Comment: 6-8 beers weekly     Drug use: No     Comment: caffeine 2 cups coffee daily    Sexual activity: Not on file   Other Topics Concern    Not on file   Social History Narrative    Not on file     Social Determinants of Health     Financial Resource Strain: Not on file   Food Insecurity: Not on file   Transportation Needs: Not on file   Physical Activity: Not on file   Stress: Not on file   Social Connections: Not on file   Intimate Partner Violence: Not on file   Housing Stability: Not on file       Family History   Problem Relation Age of Onset    Heart Disease Mother     No Known Problems Father     Prostate Cancer Brother             Cancer Other     Diabetes Other     High Blood Pressure Other     Stroke Other     Tuberculosis Other        REVIEW OF SYSTEMS:     Aliza Munroe denies fever/chills, chest pain, shortness of breath, new bowel or bladder complaints. All other review of systems was negative. PHYSICAL EXAMINATION:      /74   Pulse 77   Temp 98.6 °F (37 °C) (Infrared)   Resp 16   Ht 5' 5\" (1.651 m)   Wt 154 lb (69.9 kg)   SpO2 93%   BMI 25.63 kg/m²     General:      General appearance:   pleasant and well-hydrated. , in mild discomfort and A & O x3  Build:Normal Weight    HEENT:    Head:normocephalic and atraumatic  Sclera: icterus absent,    Lungs:    Breathing:Normal expansion. No wheezing. Abdomen:    Shape:non-distended and normal    Lumbar spine:    Range of motion:abnormal moderately Lateral bending, flexion, extension rotation bilateral and is painful. Extremities:    Tremors:None bilaterally upper and lower  Range of motion:Generally limited extension shoulder - right, pain with internal rotation of hips not done. Intact:Yes  Edema:Normal      Neurological:    [address]    Dermatology:    Skin:no unusual rashes, no skin lesions, no palpable subcutaneous nodules and good skin turgor    Impression:    LBP, s/p L3-5 laminectomy and fusion surgery 5/2018 with Dr. Colleen Son for severe stenosis, xray shows solid fusion and hardware in good position 7/2019  Rt shoulder pain, is candidate for RTSA per Dr. Atilio Gerber knee pain improving, s/p 9/2018 TKR  + SHELLEY on CPAP, + ICD/pacer, 3 heart valves are stenotic with resulting  Thoracic radiating pain in to the abdomen, has had a CT chest recently which shows thoracic degeneration with autofusion  Can consider a dedicated thoracic CT scan once his heart issues are treated, cannot have MRI due to pacemaker/defib              Failed gabapentin 100 mg titration due to weakness, Lyrica due to itching and difficulty breathing  On ELILQUIS     Pain in right iliac crest area is somewhat better. Reviewed CT abdomen/pelvis reviewed. This has been sent to his PCP.         Plan:     Continue Ztlido patches  Failed most recent caudal KRZYSZTOF  OARRS reviewed  Previously discussed risk of overdose with combining opioids with benzos, encouraged him to utilize the lowest dose of benzo as possible and wean off if possible  RF percocet 7.5/325 TID #90 - we have previously discussed limited dosing. RF baclofen 10 mg daily #30  Continue lidocaine ointment  Continue diclofenac gel  Continue lactulose for OIC  He is not a candidate for SCS due to his quite frequent falls  Not candidate for TENS due to pacer/defib  Patient encouraged to stay active  Treatment plan discussed with the patient including medication side effects        Controlled Substance Monitoring:    Acute and Chronic Pain Monitoring:   RX Monitoring 10/21/2022   Periodic Controlled Substance Monitoring Possible medication side effects, risk of tolerance/dependence & alternative treatments discussed. ;No signs of potential drug abuse or diversion identified. ;Assessed functional status. ;Obtaining appropriate analgesic effect of treatment. We discussed with the patient that combining opioids, benzodiazepines, alcohol, illicit drugs or sleep aids increases the risk of respiratory depression including death. We discussed that these medications may cause drowsiness, sedation or dizziness and have counseled the patient not to drive or operate machinery. We have discussed that these medications will be prescribed only by one provider. We have discussed with the patient about age related risk factors and have thoroughly discussed the importance of taking these medications as prescribed. The patient verbalizes understanding.     ccreferring physic

## 2022-11-07 ENCOUNTER — TELEPHONE (OUTPATIENT)
Dept: PAIN MANAGEMENT | Age: 77
End: 2022-11-07

## 2022-11-07 NOTE — TELEPHONE ENCOUNTER
Geoff's wife called in and stated that the pharmacy will not fill the Lidocaine Gel refill stating it is too early, there is no duration marked on the script. Drew's wife stated that he goes through a tube usually in about three weeks. Please advise.

## 2022-11-10 NOTE — TELEPHONE ENCOUNTER
I called the pharmacy and changed the script to 2 tubes with 2 refills under Cheyenne Elder since she wrote the original script. I called and notified Drew's wife.

## 2022-11-11 ENCOUNTER — SCHEDULED TELEPHONE ENCOUNTER (OUTPATIENT)
Dept: PAIN MANAGEMENT | Age: 77
End: 2022-11-11
Payer: MEDICARE

## 2022-11-11 DIAGNOSIS — G89.29 CHRONIC FLANK PAIN: ICD-10-CM

## 2022-11-11 DIAGNOSIS — M25.251: ICD-10-CM

## 2022-11-11 DIAGNOSIS — S33.9XXA CHRONIC OR OLD SPRAIN OF LUMBOSACRAL LIGAMENT: ICD-10-CM

## 2022-11-11 DIAGNOSIS — G89.4 CHRONIC PAIN SYNDROME: ICD-10-CM

## 2022-11-11 DIAGNOSIS — M79.10 MYALGIA: ICD-10-CM

## 2022-11-11 DIAGNOSIS — S33.5XXD LUMBAR SPRAIN, SUBSEQUENT ENCOUNTER: ICD-10-CM

## 2022-11-11 DIAGNOSIS — M25.551 RIGHT HIP PAIN: ICD-10-CM

## 2022-11-11 DIAGNOSIS — R10.9 CHRONIC FLANK PAIN: ICD-10-CM

## 2022-11-11 DIAGNOSIS — M51.26 DISPLACEMENT OF LUMBAR INTERVERTEBRAL DISC WITHOUT MYELOPATHY: ICD-10-CM

## 2022-11-11 DIAGNOSIS — M51.37 DEGENERATION OF LUMBAR OR LUMBOSACRAL INTERVERTEBRAL DISC: Primary | ICD-10-CM

## 2022-11-11 PROCEDURE — 99442 PR PHYS/QHP TELEPHONE EVALUATION 11-20 MIN: CPT | Performed by: PHYSICIAN ASSISTANT

## 2022-11-11 RX ORDER — OXYCODONE AND ACETAMINOPHEN 7.5; 325 MG/1; MG/1
1 TABLET ORAL 3 TIMES DAILY PRN
Qty: 90 TABLET | Refills: 0 | Status: SHIPPED | OUTPATIENT
Start: 2022-11-21 | End: 2022-12-21

## 2022-11-11 NOTE — PROGRESS NOTES
NICOLASA ZUNIGA [facility] Pain [facility]  [facility]     Telephone follow up visit      Date of Visit:  2022    CC:  Follow up    Consent:  Telephone follow up due to Cristiparth 19 pandemic   The patient and/or health care decision maker is aware that he/she may receive a bill for this telephone service, depending on his insurance coverage, and has provided verbal consent to proceed: Yes    I have considered the risks of abuse, dependence, addiction and diversion. My patient is aware that they will need a follow-up visit (in-person or virtually) at the appropriate time indicated for continued medications. Further, my patient is aware that when this acute crisis has lifted, they will be expected to return for an in-person visit and all elements of standard local and hospital guidelines in order to continue this medication. Patient location: Home   Physician Location:[place] address in [place]    HPI:  Pain is unchanged. No new changes. Appropriate analgesia with current medications regimen: yes. Change in quality of symptoms:no. Medication side effects:none. Recent diagnostic testing:none. Recent interventional procedures:none. He has been on anticoagulation medications to include Eliquis and has not been on herbal supplements. He is not diabetic. Imagin/30/2022 CT abdomen/pelvis     IMPRESSION:   1. Multiple small nonobstructing bilateral calcified calyceal calculi. 2. No evidence for other calcified collecting system calculi or   obstruction. 3. Hypodense posterior right upper pole renal cortical lesion most   likely a cyst. No further evaluation is required*. 4. Streaky soft tissue densities in the bilateral perinephric fat   compatible with active or, more likely, remote inflammation. 5. No evidence of mass, lymphadenopathy or inflammatory process. 6. Dense atherosclerotic calcification throughout normal caliber   abdominal aorta.          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 Degenerative disc disease, spondylosis causing stenosis. Probable large extruded disc at L3-4. Spinal listhesis L4 and L5 exacerbating the stenosis  EMG dated 3/24/2016     L5 radiculopathy  CT dated 2016 lumbar spine demonstrates degenerative spinal stenosis that is severe at L3-4 L4-5 and L5-S1 levels                                        Potential Aberrant Drug-Related Behavior: no     Urine Drug Screenin2018 buccal consistent  2019 buccal consistent  2019 consistent  10/2019 consistent for oxycodone, metabolites, and benzodiazapines  2020 consistent  2021 consistent  3/2022 consistent     OARRS report:  2018-2022 consistent     Opioid Agreement:  Date enacted:  2021  Renewal date:  Updated: 2022      Past Medical History: Reviewed    Past Surgical History: Reviewed     Home Medications: Reviewed    Allergies: Reviewed     Social History: Reviewed     REVIEW OF SYSTEMS:     Long Jiménez denies fever/chills, chest pain, shortness of breath, new bowel or bladder complaints. All other review of systems was negative. PHYSICAL EXAMINATION:     General:       A & O x3    Lungs:    Breathing: No breathing abnormalities noted over the phone.       Impression:    LBP, s/p L3-5 laminectomy and fusion surgery 2018 with Dr. Arielle Funes for severe stenosis, xray shows solid fusion and hardware in good position 2019  Rt shoulder pain, is candidate for RTSA per Dr. Yanez Brood knee pain improving, s/p 2018 TKR  + SHELLEY on CPAP, + ICD/pacer, 3 heart valves are stenotic with resulting  Thoracic radiating pain in to the abdomen, has had a CT chest recently which shows thoracic degeneration with autofusion  Can consider a dedicated thoracic CT scan once his heart issues are treated, cannot have MRI due to pacemaker/defib              Failed gabapentin 100 mg titration due to weakness, Lyrica due to itching and difficulty breathing  On ELILQUIS     Pain in right iliac crest area is somewhat better. Reviewed CT abdomen/pelvis reviewed. This has been sent to his PCP. Plan:     Continue Ztlido patches  Failed most recent caudal KRZYSZTOF  OARRS reviewed  Previously discussed risk of overdose with combining opioids with benzos, encouraged him to utilize the lowest dose of benzo as possible and wean off if possible  RF percocet 7.5/325 TID #90 - we have previously discussed limited dosing. No RF baclofen 10 mg daily #30  Continue lidocaine ointment  Continue diclofenac gel  Continue lactulose for OIC  He is not a candidate for SCS due to his quite frequent falls  Not candidate for TENS due to pacer/defib  Patient encouraged to stay active  Treatment plan discussed with the patient including medication side effects                      Controlled Substance Monitoring:    Acute and Chronic Pain Monitoring:   RX Monitoring 11/11/2022   Periodic Controlled Substance Monitoring Possible medication side effects, risk of tolerance/dependence & alternative treatments discussed. ;No signs of potential drug abuse or diversion identified. ;Assessed functional status. ;Obtaining appropriate analgesic effect of treatment. We discussed with the patient that combining opioids, benzodiazepines, alcohol, illicit drugs or sleep aids increases the risk of respiratory depression including death. We discussed that these medications may cause drowsiness, sedation or dizziness and have counseled the patient not to drive or operate machinery. We have discussed that these medications will be prescribed only by one provider. We have discussed with the patient about age related risk factors and have thoroughly discussed the importance of taking these medications as prescribed. The patient verbalizes understanding.     Patient advised regarding steps to help prevent the spread of COVID-19   SOURCE - https://teddy.info/. html     1-Stay home except to get medical care  2-Clean your hands often for atleast 20 secnds, avoid touching: Avoid touching your eyes, nose, and mouth with unwashed hands. 3-Seek medical attention: Seek prompt medical attention if your illness is worsening (e.g., difficulty breathing). Call you doctor first.  3-Wear a facemask if you are sick   4-Cover your coughs and sneezes        I affirm this is a Patient Initiated Episode with an Established Patient who has not had a related appointment within my department in the past 7 days or scheduled within the next 24 hours.     Total Time: minutes: 11-20 minutes    Note: not billable if this call serves to triage the patient into an appointment for the relevant concern

## 2022-11-11 NOTE — PROGRESS NOTES
Ermias Colindres was read the following message We want to confirm that, for purposes of billing, this is a virtual visit with your provider for which we will submit a claim for reimbursement with your insurance company. You will be responsible for any copays, coinsurance amounts or other amounts not covered by your insurance company. If you do not accept this, unfortunately we will not be able to schedule or proceed with a virtual visit with the provider. Do you accept? Norris Lira responded Yes .

## 2022-12-20 ENCOUNTER — TELEPHONE (OUTPATIENT)
Dept: PAIN MANAGEMENT | Age: 77
End: 2022-12-20

## 2022-12-20 DIAGNOSIS — M51.37 DEGENERATION OF LUMBAR OR LUMBOSACRAL INTERVERTEBRAL DISC: ICD-10-CM

## 2022-12-20 DIAGNOSIS — G89.4 CHRONIC PAIN SYNDROME: ICD-10-CM

## 2022-12-20 DIAGNOSIS — S33.9XXA CHRONIC OR OLD SPRAIN OF LUMBOSACRAL LIGAMENT: ICD-10-CM

## 2022-12-20 DIAGNOSIS — S33.5XXD LUMBAR SPRAIN, SUBSEQUENT ENCOUNTER: ICD-10-CM

## 2022-12-20 DIAGNOSIS — M51.26 DISPLACEMENT OF LUMBAR INTERVERTEBRAL DISC WITHOUT MYELOPATHY: ICD-10-CM

## 2022-12-20 RX ORDER — OXYCODONE AND ACETAMINOPHEN 7.5; 325 MG/1; MG/1
1 TABLET ORAL 3 TIMES DAILY PRN
Qty: 60 TABLET | Refills: 0 | Status: SHIPPED
Start: 2022-12-21 | End: 2023-01-13 | Stop reason: SDUPTHER

## 2022-12-20 NOTE — TELEPHONE ENCOUNTER
Dipesh Nortongo called into the office requesting a refill of Percocet. It was last filled on 11/21/2022 for a 30 day supply per the OARRS report. The OARRS report is  consistent. Their last office visit was on 11/11/2022. Their next office visit is scheduled for 01/10/2023. The reason they need a refill before their next appointment is because he had to cancel his appointment scheduled for today due to not feeling well.

## 2023-01-12 NOTE — PROGRESS NOTES
[facility] Pain Management  [facility] [address]  [facility], [facility]    Follow up Note      Elen Lange     Date of Visit:  2023    CC:  Patient presents for follow up   Chief Complaint   Patient presents with    Follow-up     Low back pain              HPI:    Pain is  unchanged. Having issues with right sided flank issues that continue. Appropriate analgesia with current medications regimen: yes. Change in quality of symptoms:no. Medication side effects:none. Recent diagnostic testing:none. Recent interventional procedures: None. He has been on anticoagulation medications to include Eliquis and has not been on herbal supplements. He is not diabetic. Imagin2022 CT abdomen/pelvis    IMPRESSION:   1. Multiple small nonobstructing bilateral calcified calyceal calculi. 2. No evidence for other calcified collecting system calculi or   obstruction. 3. Hypodense posterior right upper pole renal cortical lesion most   likely a cyst. No further evaluation is required*. 4. Streaky soft tissue densities in the bilateral perinephric fat   compatible with active or, more likely, remote inflammation. 5. No evidence of mass, lymphadenopathy or inflammatory process. 6. Dense atherosclerotic calcification throughout normal caliber   abdominal aorta. 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 Degenerative disc disease, spondylosis causing stenosis. Probable large extruded disc at L3-4.  Spinal listhesis L4 and L5 exacerbating the stenosis  EMG dated 3/24/2016     L5 radiculopathy  CT dated 2016 lumbar spine demonstrates degenerative spinal stenosis that is severe at L3-4 L4-5 and L5-S1 levels                                        Potential Aberrant Drug-Related Behavior: no     Urine Drug Screenin2018 buccal consistent  2019 buccal consistent  2019 consistent  10/2019 consistent for oxycodone, metabolites, and benzodiazapines  2/2020 consistent  05/2021 consistent  3/2022 consistent     OARRS report:  12/2018-01/2023  consistent     Opioid Agreement:  Date enacted:  05/24/2021  Renewal date:  Updated: 05/17/2022       Past Medical History:   Diagnosis Date    CAD (coronary artery disease)     Cardiac defibrillator in place     [employer]    CHF (congestive heart failure) (HCC)     Depression     Disease of pericardium     DJD (degenerative joint disease)     Erectile dysfunction     GERD (gastroesophageal reflux disease)     Hyperlipidemia     Hypertension     Ischemic cardiomyopathy     Ischemic heart disease     LBBB (left bundle branch block)     w/ wide Qrs    Mitral regurgitation     Nonrheumatic aortic valve disorder     SHELLEY on CPAP     setting 7    Presence of coronary angioplasty implant and graft     Ventricular tachycardia        Past Surgical History:   Procedure Laterality Date    ANKLE SURGERY      CARDIAC CATHETERIZATION  08/24/2020    CARDIAC DEFIBRILLATOR PLACEMENT  11/2009    Bi-vent    CARDIAC DEFIBRILLATOR PLACEMENT      CARDIAC PACEMAKER PLACEMENT      CARPAL TUNNEL RELEASE      CORONARY ANGIOPLASTY  09/2014    DIAGNOSTIC CARDIAC CATH LAB PROCEDURE Left 04/2006    DIAGNOSTIC CARDIAC CATH LAB PROCEDURE Left 09/2014    LEXY-RCA    ELBOW SURGERY      EPIDURAL STEROID INJECTION N/A 5/7/2019    CAUDAL EPIDURAL STEROID INJECTION performed by Samaria Bull DO at [address] [place] N/A 8/1/2019    CAUDAL EPIDURAL STEROID INJECTION performed by Samaria Bull DO at [facility]      foot, finger, left ankle, left leg,and right arm    JOINT REPLACEMENT Right 09/18/2018    knee    LEG SURGERY      LUMBAR FUSION  05/29/2018    OTHER SURGICAL HISTORY  04/2006    cardiac tamponade evacuation     Benson Lam N/A 12/26/2019    CAUDAL EPIDURAL STEROID INJECTION #2 performed by Hima Mccray DO at [facility] OR    TRANSESOPHAGEAL ECHOCARDIOGRAM      10/07    TRANSESOPHAGEAL ECHOCARDIOGRAM  08/24/2020       Prior to Admission medications    Medication Sig Start Date End Date Taking? Authorizing Provider   oxyCODONE-acetaminophen (PERCOCET) 7.5-325 MG per tablet Take 1 tablet by mouth 3 times daily as needed for Pain for up to 20 days. 1/20/23 2/9/23 Yes PEACE Thompson   baclofen (LIORESAL) 10 MG tablet Take 1 tablet by mouth daily 1/13/23 2/12/23 Yes PEACE Thompson   lidocaine (XYLOCAINE) 5 % ointment APPLY TO THE AFFECTED AREA(S) DAILY AS NEEDED FOR PAIN 1/13/23  Yes PEACE Thompson   FOLBIC 2.5-25-2 MG TABS TAKE 1 TABLET BY MOUTH ONCE DAILY 2/17/22  Yes Historical Provider, MD   empagliflozin (JARDIANCE) 10 MG tablet 1 tablet   Yes Historical Provider, MD   carvedilol (COREG) 25 MG tablet Take 25 mg by mouth 2 times daily 10/2/21  Yes Historical Provider, MD   tamsulosin (FLOMAX) 0.4 MG capsule Take 1 capsule by mouth daily 11/1/21  Yes Prince Juarez, APRN - CNP   nystatin-triamcinolone (MYCOLOG II) 296957-0.1 UNIT/GM-% cream Apply topically 2 times daily. 10/4/21  Yes Taty Marcial MD   nitroGLYCERIN (NITROSTAT) 0.4 MG SL tablet Place under the tongue 7/31/20  Yes Historical Provider, MD   sacubitril-valsartan (ENTRESTO) 49-51 MG per tablet Take 1 tablet by mouth 2 times daily    Yes Historical Provider, MD   atorvastatin (LIPITOR) 80 MG tablet Take 80 mg by mouth   Yes Historical Provider, MD   bumetanide (BUMEX) 1 MG tablet Take 1.5 mg by mouth in the morning. Once in the morning, and once in the afternoon on Monday, Wednesday, Friday, and Saturday.    Yes Historical Provider, MD   mirtazapine (REMERON) 30 MG tablet Take 30 mg by mouth nightly    Yes Historical Provider, MD   spironolactone (ALDACTONE) 25 MG tablet Take 12.5 mg by mouth daily    Yes Historical Provider, MD   pseudoephedrine (SUDAFED) 30 MG tablet Take 30 mg by mouth every 4 hours as needed for Congestion   Yes Historical Provider, MD   apixaban (ELIQUIS) 5 MG TABS tablet Take 1 tablet by mouth 2 times daily 2/2/21  Yes BARI Horan CNP   lactulose (CHRONULAC) 10 GM/15ML solution Take 15 mLs by mouth every evening 12/23/20  Yes Joe Ross DO   nystatin-triamcinolone [facility]) 197534-5.1 UNIT/GM-% cream Apply topically 2 times daily. 10/6/20  Yes BARI Fisher CNP   ezetimibe (ZETIA) 10 MG tablet Take 10 mg by mouth 10/23/19  Yes Historical Provider, MD   TRUE METRIX BLOOD GLUCOSE TEST strip USE AS DIRECTED to test blood glucose TWICE DAILY 7/30/19  Yes Historical Provider, MD   albuterol sulfate  (90 BASE) MCG/ACT inhaler Inhale 2 puffs into the lungs every 6 hours as needed for Wheezing    Yes Historical Provider, MD   venlafaxine (EFFEXOR XR) 150 MG extended release capsule Take 150 mg by mouth daily   Yes Historical Provider, MD   EPINEPHrine (EPIPEN IJ) Inject as directed Indications: as directed    Yes Historical Provider, MD   ferrous sulfate 325 (65 FE) MG tablet Take 325 mg by mouth as needed    Yes Historical Provider, MD   fluticasone (FLONASE) 50 MCG/ACT nasal spray 1 spray by Nasal route daily as needed    Yes Historical Provider, MD   pantoprazole (PROTONIX) 40 MG tablet Take 40 mg by mouth daily   Yes Historical Provider, MD   triamcinolone (KENALOG) 0.1 % cream Apply topically 2 times daily Apply topically 2 times daily. Yes Historical Provider, MD   diazepam (VALIUM) 5 MG tablet Take 5 mg by mouth as needed for Anxiety.     Yes Historical Provider, MD   allopurinol (ZYLOPRIM) 100 MG tablet TAKE 1 TABLET BY MOUTH DAILY  Patient not taking: Reported on 1/13/2023 8/23/22   Historical Provider, MD   tadalafil (CIALIS) 20 MG tablet Take 1 tablet by mouth as needed for Erectile Dysfunction 30 minutes prior to sexual intercourse 6/29/21 7/15/22  Viviana Cross MD   amiodarone (CORDARONE) 200 MG tablet Take 200 mg by mouth daily  Patient not taking: Reported on 2023    Historical Provider, MD   amoxicillin (AMOXIL) 500 MG capsule amoxicillin 500 mg capsule  Patient not taking: Reported on 2023    Historical Provider, MD   diclofenac sodium (VOLTAREN) 1 % GEL Apply topically 4 times daily as needed for Pain 12/23/20 7/15/22  Tanya Riley DO       Allergies   Allergen Reactions    Bee Venom Anaphylaxis    Cefaclor Anaphylaxis    Lyrica [Pregabalin] Shortness Of Breath     Itching     Pentazocine-Acetaminophen Hives and Rash     Rapid heart beats.      Bactrim [Sulfamethoxazole-Trimethoprim] Rash     Patient states he is not allergic and has taken     Statins Other (See Comments)     Myalgia      Toprol Xl [Metoprolol] Other (See Comments)     Fatigue         Social History     Socioeconomic History    Marital status:      Spouse name: Not on file    Number of children: Not on file    Years of education: Not on file    Highest education level: Not on file   Occupational History    Not on file   Tobacco Use    Smoking status: Former     Packs/day: 0.25     Years: 20.00     Pack years: 5.00     Types: Cigarettes     Start date:      Quit date:      Years since quittin.0    Smokeless tobacco: Never   Vaping Use    Vaping Use: Never used   Substance and Sexual Activity    Alcohol use: Yes     Comment: 6-8 beers weekly     Drug use: No     Comment: caffeine 2 cups coffee daily    Sexual activity: Not on file   Other Topics Concern    Not on file   Social History Narrative    Not on file     Social Determinants of Health     Financial Resource Strain: Not on file   Food Insecurity: Not on file   Transportation Needs: Not on file   Physical Activity: Not on file   Stress: Not on file   Social Connections: Not on file   Intimate Partner Violence: Not on file   Housing Stability: Not on file       Family History   Problem Relation Age of Onset    Heart Disease Mother     No Known Problems Father     Prostate Cancer Brother          Cancer Other     Diabetes Other     High Blood Pressure Other     Stroke Other     Tuberculosis Other        REVIEW OF SYSTEMS:     January Palacios denies fever/chills, chest pain, shortness of breath, new bowel or bladder complaints. All other review of systems was negative. PHYSICAL EXAMINATION:      /79   Pulse 83   Temp 98.4 °F (36.9 °C) (Infrared)   Resp 16   Ht 5' 4\" (1.626 m)   Wt 158 lb (71.7 kg)   SpO2 90%   BMI 27.12 kg/m²     General:      General appearance:   pleasant and well-hydrated. , in mild discomfort and A & O x3  Build:Normal Weight    HEENT:    Head:normocephalic and atraumatic  Sclera: icterus absent,    Lungs:    Breathing:Normal expansion. No wheezing. Abdomen:    Shape:non-distended and normal    Lumbar spine:    Range of motion:abnormal moderately Lateral bending, flexion, extension rotation bilateral and is painful. Extremities:    Tremors:None bilaterally upper and lower  Range of motion:Generally limited extension shoulder - right, pain with internal rotation of hips not done. Intact:Yes  Edema:Normal      Neurological:    [address]    Dermatology:    Skin:no unusual rashes, no skin lesions, no palpable subcutaneous nodules and good skin turgor    Impression:    xray shows solid fusion and hardware in good position 7/2019  Rt shoulder pain, is candidate for RTSA per Dr. Karina Garrison knee pain improving, s/p 9/2018 TKR  + SHELLEY on CPAP, + ICD/pacer, 3 heart valves are stenotic with resulting  Thoracic radiating pain in to the abdomen, has had a CT chest recently which shows thoracic degeneration with autofusion  Can consider a dedicated thoracic CT scan once his heart issues are treated, cannot have MRI due to pacemaker/defib              Failed gabapentin 100 mg titration due to weakness, Lyrica due to itching and difficulty breathing  On ELILQUIS     Pain in right iliac crest area is somewhat better. Reviewed CT abdomen/pelvis reviewed.   This has been sent to his PCP.  Patient reports that the pain is in his right front abdomen now. He has been seen by his PCP. ? Request referral to surgeon for opinion. Plan:     Continue Ztlido patches  Failed most recent caudal [id number]  OARRS reviewed  Buccal screen ordered today  Previously discussed risk of overdose with combining opioids with benzos, encouraged him to utilize the lowest dose of benzo as possible and wean off if possible  RF percocet 7.5/325 TID #90 - we have previously discussed limited dosing. RF baclofen 10 mg daily #30  Continue lidocaine ointment  Continue diclofenac gel  Continue lactulose for OIC  He is not a candidate for SCS due to his quite frequent falls  Not candidate for TENS due to pacer/defib  Patient encouraged to stay active  Treatment plan discussed with the patient including medication side effects           Controlled Substance Monitoring:    Acute and Chronic Pain Monitoring:   RX Monitoring 1/13/2023   Periodic Controlled Substance Monitoring Possible medication side effects, risk of tolerance/dependence & alternative treatments discussed. ;No signs of potential drug abuse or diversion identified. ;Assessed functional status. ;Obtaining appropriate analgesic effect of treatment. We discussed with the patient that combining opioids, benzodiazepines, alcohol, illicit drugs or sleep aids increases the risk of respiratory depression including death. We discussed that these medications may cause drowsiness, sedation or dizziness and have counseled the patient not to drive or operate machinery. We have discussed that these medications will be prescribed only by one provider. We have discussed with the patient about age related risk factors and have thoroughly discussed the importance of taking these medications as prescribed. The patient verbalizes understanding.     ccreferring physic

## 2023-01-13 ENCOUNTER — OFFICE VISIT (OUTPATIENT)
Dept: PAIN MANAGEMENT | Age: 78
End: 2023-01-13
Payer: MEDICARE

## 2023-01-13 VITALS
SYSTOLIC BLOOD PRESSURE: 137 MMHG | OXYGEN SATURATION: 90 % | RESPIRATION RATE: 16 BRPM | TEMPERATURE: 98.4 F | WEIGHT: 158 LBS | HEIGHT: 64 IN | DIASTOLIC BLOOD PRESSURE: 79 MMHG | HEART RATE: 83 BPM | BODY MASS INDEX: 26.98 KG/M2

## 2023-01-13 DIAGNOSIS — M51.37 DEGENERATION OF LUMBAR OR LUMBOSACRAL INTERVERTEBRAL DISC: ICD-10-CM

## 2023-01-13 DIAGNOSIS — M51.26 DISPLACEMENT OF LUMBAR INTERVERTEBRAL DISC WITHOUT MYELOPATHY: ICD-10-CM

## 2023-01-13 DIAGNOSIS — G89.4 CHRONIC PAIN SYNDROME: ICD-10-CM

## 2023-01-13 DIAGNOSIS — S33.5XXD LUMBAR SPRAIN, SUBSEQUENT ENCOUNTER: ICD-10-CM

## 2023-01-13 DIAGNOSIS — G89.4 CHRONIC PAIN SYNDROME: Primary | ICD-10-CM

## 2023-01-13 DIAGNOSIS — S33.9XXA CHRONIC OR OLD SPRAIN OF LUMBOSACRAL LIGAMENT: ICD-10-CM

## 2023-01-13 PROCEDURE — 99213 OFFICE O/P EST LOW 20 MIN: CPT | Performed by: PHYSICIAN ASSISTANT

## 2023-01-13 RX ORDER — OXYCODONE AND ACETAMINOPHEN 7.5; 325 MG/1; MG/1
1 TABLET ORAL 3 TIMES DAILY PRN
Qty: 60 TABLET | Refills: 0 | Status: SHIPPED | OUTPATIENT
Start: 2023-01-20 | End: 2023-02-09

## 2023-01-13 RX ORDER — BACLOFEN 10 MG/1
10 TABLET ORAL DAILY
Qty: 30 TABLET | Refills: 2 | Status: SHIPPED | OUTPATIENT
Start: 2023-01-13 | End: 2023-02-12

## 2023-01-13 RX ORDER — LIDOCAINE 50 MG/G
OINTMENT TOPICAL
Qty: 35.44 G | Refills: 2 | Status: SHIPPED | OUTPATIENT
Start: 2023-01-13

## 2023-01-13 NOTE — PROGRESS NOTES
Do you currently have any of the following:    Fever: No  Headache:  No  Cough: No  Shortness of breath: No  Exposed to anyone with these symptoms: No         Alcira Hutchinson presents to the [address] [place] [address] on 1/13/2023. Brent Crew is complaining of pain in his low back. The pain is constant. The pain is described as aching, stabbing, dull, sharp, and miserable. Pain is rated on his best day at a 6, on his worst day at a 10, and on average at a 7 on the VAS scale. Any procedures since your last visit: No    Pacemaker or defibrillator: No     He is not on NSAIDS and is  on anticoagulation medications to include Eliquis and is managed by Zac Minor MD  .     Medication Contract and Consent for Opioid Use Documents Filed       Patient Documents       Type of Document Status Date Received Received By Description    Medication Contract Received 5/24/2021  9:00 AM Patrice Leal Medication Contract    Medication Contract Received 5/17/2022  3:39 PM DANNI, [address] [place] [address] Pain Management                    /79   Pulse 83   Temp 98.4 °F (36.9 °C) (Infrared)   Resp 16   Ht 5' 4\" (1.626 m)   Wt 158 lb (71.7 kg)   SpO2 90%   BMI 27.12 kg/m²      No LMP for male patient.

## 2023-01-16 ENCOUNTER — TELEPHONE (OUTPATIENT)
Dept: PAIN MANAGEMENT | Age: 78
End: 2023-01-16

## 2023-01-16 DIAGNOSIS — S33.5XXD LUMBAR SPRAIN, SUBSEQUENT ENCOUNTER: ICD-10-CM

## 2023-01-16 DIAGNOSIS — G89.4 CHRONIC PAIN SYNDROME: ICD-10-CM

## 2023-01-16 DIAGNOSIS — M51.37 DEGENERATION OF LUMBAR OR LUMBOSACRAL INTERVERTEBRAL DISC: ICD-10-CM

## 2023-01-16 DIAGNOSIS — M51.26 DISPLACEMENT OF LUMBAR INTERVERTEBRAL DISC WITHOUT MYELOPATHY: ICD-10-CM

## 2023-01-16 DIAGNOSIS — S33.9XXA CHRONIC OR OLD SPRAIN OF LUMBOSACRAL LIGAMENT: ICD-10-CM

## 2023-01-16 LAB
6-MONOACETYLMORPHINE, URINE: NOT DETECTED
ALCOHOL URINE: NOT DETECTED
AMPHETAMINE SCREEN, URINE: NOT DETECTED
BARBITURATE SCREEN URINE: NOT DETECTED
BENZODIAZEPINE SCREEN, URINE: POSITIVE
BUPRENORPHINE URINE: NOT DETECTED
CANNABINOID SCREEN URINE: NOT DETECTED
COCAINE METABOLITE SCREEN URINE: NOT DETECTED
FENTANYL SCREEN, URINE: NOT DETECTED
INTEGRITY CHECK, CREATININE, URINE: 103.8
INTEGRITY CHECK, OXIDANT, URINE: 58
INTEGRITY CHECK, PH, URINE: 6 (ref 4.5–9)
INTEGRITY CHECK, SPECIFIC GRAVITY, URINE: 1.01 (ref 1–1.03)
INTEGRITY CHECK, SPECIMEN INTEGRITY, URINE: ABNORMAL
Lab: ABNORMAL
METHADONE SCREEN, URINE: NOT DETECTED
OPIATE SCREEN URINE: NOT DETECTED
OXYCODONE URINE: POSITIVE
PHENCYCLIDINE SCREEN URINE: NOT DETECTED
TRAMADOL SCREEN URINE: NOT DETECTED

## 2023-01-16 RX ORDER — OXYCODONE AND ACETAMINOPHEN 7.5; 325 MG/1; MG/1
1 TABLET ORAL 3 TIMES DAILY PRN
Qty: 90 TABLET | Refills: 0 | Status: SHIPPED
Start: 2023-01-16 | End: 2023-02-10 | Stop reason: SDUPTHER

## 2023-01-16 RX ORDER — OXYCODONE AND ACETAMINOPHEN 7.5; 325 MG/1; MG/1
1 TABLET ORAL 3 TIMES DAILY PRN
Qty: 90 TABLET | Refills: 0 | Status: SHIPPED
Start: 2023-01-20 | End: 2023-01-16 | Stop reason: SDUPTHER

## 2023-01-16 NOTE — TELEPHONE ENCOUNTER
Karri Comp wife Florecita Alcazar called regarding his percocet. She said that [facility] was out of his medication on the 1/13, but the earliest fill date was scheduled for 1/20/23. Wanted to know why. Please advise. Thanks.

## 2023-01-17 LAB
6-MAM, QUANTITATIVE, URINE: <10
7-AMINOCLONAZEPAM, QUANTITATIVE, URINE: <50
ALPHA-HYDROXYALPRAZOLAM, QUANTITATIVE, URINE: <50
ALPHA-HYDROXYMIDAZOLAM, QUANTITATIVE, URINE: <50
ALPHA-HYDROXYTRIAZOLAM, QUANTITATIVE, URINE: <50
ALPRAZOLAM URINE QUANT: <50
CHLORDIAZEPOXIDE, QUANTITATIVE, URINE: <50
CLONAZEPAM, QUANTITATIVE, URINE: <50
CODEINE, QUANTITATIVE, URINE: <50
COMMENT: NORMAL
DIAZEPAM URINE QUANT: <50
FLUNITRAZEPAM, QUANTITATIVE, URINE: <50
FLURAZEPAM, QUANTITATIVE, URINE: <50
HYDROCODONE, QUANTITATIVE, URINE: <50
HYDROMORPHONE, QUANTITATIVE, URINE: <50
LORAZEPAM URINE QUANT: <50
MIDAZOLAM URINE QUANT: <50
MORPHINE, QUANTITATIVE, URINE: <50
NORDIAZEPAM URINE QUANT: >1000
NORHYDROCODONE, QUANTITATIVE, URINE: <50
NOROXYCODONE, QUANTITATIVE, URINE: >1000
OXAZEPAM URINE QUANT: >1000
OXYCODONE URINE, QUANTITATIVE: >1000
OXYMORPHONE, QUANTITATIVE, URINE: <50
TEMAZEPAM, QUANTITATIVE, URINE: >1000

## 2023-02-10 ENCOUNTER — OFFICE VISIT (OUTPATIENT)
Dept: PAIN MANAGEMENT | Age: 78
End: 2023-02-10
Payer: COMMERCIAL

## 2023-02-10 VITALS
HEART RATE: 70 BPM | WEIGHT: 158 LBS | OXYGEN SATURATION: 95 % | SYSTOLIC BLOOD PRESSURE: 104 MMHG | TEMPERATURE: 97.9 F | BODY MASS INDEX: 26.98 KG/M2 | RESPIRATION RATE: 16 BRPM | HEIGHT: 64 IN | DIASTOLIC BLOOD PRESSURE: 66 MMHG

## 2023-02-10 DIAGNOSIS — G89.4 CHRONIC PAIN SYNDROME: ICD-10-CM

## 2023-02-10 DIAGNOSIS — G89.4 CHRONIC PAIN SYNDROME: Primary | ICD-10-CM

## 2023-02-10 DIAGNOSIS — S33.5XXD LUMBAR SPRAIN, SUBSEQUENT ENCOUNTER: ICD-10-CM

## 2023-02-10 DIAGNOSIS — M25.251: ICD-10-CM

## 2023-02-10 DIAGNOSIS — M51.37 DEGENERATION OF LUMBAR OR LUMBOSACRAL INTERVERTEBRAL DISC: ICD-10-CM

## 2023-02-10 DIAGNOSIS — M79.10 MYALGIA: ICD-10-CM

## 2023-02-10 DIAGNOSIS — M51.26 DISPLACEMENT OF LUMBAR INTERVERTEBRAL DISC WITHOUT MYELOPATHY: ICD-10-CM

## 2023-02-10 DIAGNOSIS — S33.9XXA CHRONIC OR OLD SPRAIN OF LUMBOSACRAL LIGAMENT: ICD-10-CM

## 2023-02-10 PROCEDURE — 99213 OFFICE O/P EST LOW 20 MIN: CPT | Performed by: PHYSICIAN ASSISTANT

## 2023-02-10 RX ORDER — OXYCODONE AND ACETAMINOPHEN 7.5; 325 MG/1; MG/1
1 TABLET ORAL 3 TIMES DAILY PRN
Qty: 90 TABLET | Refills: 0 | Status: SHIPPED | OUTPATIENT
Start: 2023-02-15 | End: 2023-03-17

## 2023-02-10 NOTE — PROGRESS NOTES
[facility] - BE[facility]AVIORAL HE[facility] SERVICES Pain Management  [address]  [facility] - [facility], [facility]    Follow up Note      Amber Patient     Date of Visit:  2/10/2023    CC:  Patient presents for follow up   Chief Complaint   Patient presents with    Follow-up     Low back pain                HPI:    Pain is  unchanged. Still having right sided abdominal pain. Appropriate analgesia with current medications regimen: yes. Change in quality of symptoms:no. Medication side effects:none. Recent diagnostic testing:none. Recent interventional procedures: None. He has been on anticoagulation medications to include Eliquis and has not been on herbal supplements. He is not diabetic. Imagin2022 CT abdomen/pelvis    IMPRESSION:   1. Multiple small nonobstructing bilateral calcified calyceal calculi. 2. No evidence for other calcified collecting system calculi or   obstruction. 3. Hypodense posterior right upper pole renal cortical lesion most   likely a cyst. No further evaluation is required*. 4. Streaky soft tissue densities in the bilateral perinephric fat   compatible with active or, more likely, remote inflammation. 5. No evidence of mass, lymphadenopathy or inflammatory process. 6. Dense atherosclerotic calcification throughout normal caliber   abdominal aorta. 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 Degenerative disc disease, spondylosis causing stenosis. Probable large extruded disc at L3-4.  Spinal listhesis L4 and L5 exacerbating the stenosis  EMG dated 3/24/2016     L5 radiculopathy  CT dated 2016 lumbar spine demonstrates degenerative spinal stenosis that is severe at L3-4 L4-5 and L5-S1 levels                                        Potential Aberrant Drug-Related Behavior: no     Urine Drug Screenin2018 buccal consistent  2019 buccal consistent  2019 consistent  10/2019 consistent for oxycodone, metabolites, and benzodiazapines  2/2020 consistent  05/2021 consistent  3/2022 consistent  01/2023 consistent     OARRS report:  12/2018-02/2023  consistent     Opioid Agreement:  Date enacted:  05/24/2021  Renewal date:  Updated: 05/17/2022       Past Medical History:   Diagnosis Date    CAD (coronary artery disease)     Cardiac defibrillator in place     [employer]    CHF (congestive heart failure) (HCC)     Depression     Disease of pericardium     DJD (degenerative joint disease)     Erectile dysfunction     GERD (gastroesophageal reflux disease)     Hyperlipidemia     Hypertension     Ischemic cardiomyopathy     Ischemic heart disease     LBBB (left bundle branch block)     w/ wide Qrs    Mitral regurgitation     Nonrheumatic aortic valve disorder     SHELLEY on CPAP     setting 7    Presence of coronary angioplasty implant and graft     Ventricular tachycardia        Past Surgical History:   Procedure Laterality Date    ANKLE SURGERY      CARDIAC CATHETERIZATION  08/24/2020    CARDIAC DEFIBRILLATOR PLACEMENT  11/2009    Bi-vent    CARDIAC DEFIBRILLATOR PLACEMENT      CARDIAC PACEMAKER PLACEMENT      CARPAL TUNNEL RELEASE      CORONARY ANGIOPLASTY  09/2014    DIAGNOSTIC CARDIAC CATH LAB PROCEDURE Left 04/2006    DIAGNOSTIC CARDIAC CATH LAB PROCEDURE Left 09/2014    LEXY-RCA    ELBOW SURGERY      EPIDURAL STEROID INJECTION N/A 5/7/2019    CAUDAL EPIDURAL STEROID INJECTION performed by Heriberto Palencia DO at [address] [place] [address] N/A 8/1/2019    CAUDAL EPIDURAL STEROID INJECTION performed by Heriberto Palencia DO at [place] [address]      foot, finger, left ankle, left leg,and right arm    JOINT REPLACEMENT Right 09/18/2018    knee    LEG SURGERY      LUMBAR FUSION  05/29/2018    OTHER SURGICAL HISTORY  04/2006    cardiac tamponade evacuation     Patel Milan N/A 12/26/2019    CAUDAL EPIDURAL STEROID INJECTION #2 performed by Wanda Henry DO at [facility] OR    TRANSESOPHAGEAL ECHOCARDIOGRAM      10/07    TRANSESOPHAGEAL ECHOCARDIOGRAM  08/24/2020       Prior to Admission medications    Medication Sig Start Date End Date Taking? Authorizing Provider   oxyCODONE-acetaminophen (PERCOCET) 7.5-325 MG per tablet Take 1 tablet by mouth 3 times daily as needed for Pain for up to 30 days. Max Daily Amount: 3 tablets 2/15/23 3/17/23 Yes PEACE Metz   baclofen (LIORESAL) 10 MG tablet Take 1 tablet by mouth daily 1/13/23 2/12/23 Yes PEACE Metz   lidocaine (XYLOCAINE) 5 % ointment APPLY TO THE AFFECTED AREA(S) DAILY AS NEEDED FOR PAIN 1/13/23  Yes PEACE Metz   allopurinol (ZYLOPRIM) 100 MG tablet  8/23/22  Yes Historical Provider, MD   FOLBIC 2.5-25-2 MG TABS TAKE 1 TABLET BY MOUTH ONCE DAILY 2/17/22  Yes Historical Provider, MD   empagliflozin (JARDIANCE) 10 MG tablet 1 tablet   Yes Historical Provider, MD   carvedilol (COREG) 25 MG tablet Take 25 mg by mouth 2 times daily 10/2/21  Yes Historical Provider, MD   tamsulosin (FLOMAX) 0.4 MG capsule Take 1 capsule by mouth daily 11/1/21  Yes Prince Juarez, APRN - CNP   nystatin-triamcinolone (MYCOLOG II) 850931-4.1 UNIT/GM-% cream Apply topically 2 times daily. 10/4/21  Yes Nathaly Beyer MD   nitroGLYCERIN (NITROSTAT) 0.4 MG SL tablet Place under the tongue 7/31/20  Yes Historical Provider, MD   sacubitril-valsartan (ENTRESTO) 49-51 MG per tablet Take 1 tablet by mouth 2 times daily    Yes Historical Provider, MD   amiodarone (CORDARONE) 200 MG tablet Take 200 mg by mouth daily   Yes Historical Provider, MD   atorvastatin (LIPITOR) 80 MG tablet Take 80 mg by mouth   Yes Historical Provider, MD   bumetanide (BUMEX) 1 MG tablet Take 1.5 mg by mouth in the morning. Once in the morning, and once in the afternoon on Monday, Wednesday, Friday, and Saturday.    Yes Historical Provider, MD   mirtazapine (REMERON) 30 MG tablet Take 30 mg by mouth nightly    Yes Historical Provider, MD   spironolactone (ALDACTONE) 25 MG tablet Take 12.5 mg by mouth daily    Yes Historical Provider, MD   pseudoephedrine (SUDAFED) 30 MG tablet Take 30 mg by mouth every 4 hours as needed for Congestion   Yes Historical Provider, MD   apixaban (ELIQUIS) 5 MG TABS tablet Take 1 tablet by mouth 2 times daily 2/2/21  Yes BARI Amaral - CNP   amoxicillin (AMOXIL) 500 MG capsule    Yes Historical Provider, MD   lactulose (CHRONULAC) 10 GM/15ML solution Take 15 mLs by mouth every evening 12/23/20  Yes Ksenia Valencia DO   nystatin-triamcinolone [facility]) 667498-2.1 UNIT/GM-% cream Apply topically 2 times daily. 10/6/20  Yes BARI Cuevas - CNP   ezetimibe (ZETIA) 10 MG tablet Take 10 mg by mouth 10/23/19  Yes Historical Provider, MD   TRUE METRIX BLOOD GLUCOSE TEST strip USE AS DIRECTED to test blood glucose TWICE DAILY 7/30/19  Yes Historical Provider, MD   albuterol sulfate  (90 BASE) MCG/ACT inhaler Inhale 2 puffs into the lungs every 6 hours as needed for Wheezing    Yes Historical Provider, MD   venlafaxine (EFFEXOR XR) 150 MG extended release capsule Take 150 mg by mouth daily   Yes Historical Provider, MD   EPINEPHrine (EPIPEN IJ) Inject as directed Indications: as directed    Yes Historical Provider, MD   ferrous sulfate 325 (65 FE) MG tablet Take 325 mg by mouth as needed    Yes Historical Provider, MD   fluticasone (FLONASE) 50 MCG/ACT nasal spray 1 spray by Nasal route daily as needed    Yes Historical Provider, MD   pantoprazole (PROTONIX) 40 MG tablet Take 40 mg by mouth daily   Yes Historical Provider, MD   triamcinolone (KENALOG) 0.1 % cream Apply topically 2 times daily Apply topically 2 times daily. Yes Historical Provider, MD   diazepam (VALIUM) 5 MG tablet Take 5 mg by mouth as needed for Anxiety.     Yes Historical Provider, MD   tadalafil (CIALIS) 20 MG tablet Take 1 tablet by mouth as needed for Erectile Dysfunction 30 minutes prior to sexual intercourse 6/29/21 7/15/22 Jessica Fowler MD   diclofenac sodium (VOLTAREN) 1 % GEL Apply topically 4 times daily as needed for Pain 12/23/20 7/15/22  Josh Beebe,        Allergies   Allergen Reactions    Bee Venom Anaphylaxis    Cefaclor Anaphylaxis    Lyrica [Pregabalin] Shortness Of Breath     Itching     Pentazocine-Acetaminophen Hives and Rash     Rapid heart beats.      Bactrim [Sulfamethoxazole-Trimethoprim] Rash     Patient states he is not allergic and has taken     Statins Other (See Comments)     Myalgia      Toprol Xl [Metoprolol] Other (See Comments)     Fatigue         Social History     Socioeconomic History    Marital status:      Spouse name: Not on file    Number of children: Not on file    Years of education: Not on file    Highest education level: Not on file   Occupational History    Not on file   Tobacco Use    Smoking status: Former     Packs/day: 0.25     Years: 20.00     Pack years: 5.00     Types: Cigarettes     Start date:      Quit date:      Years since quittin.1    Smokeless tobacco: Never   Vaping Use    Vaping Use: Never used   Substance and Sexual Activity    Alcohol use: Yes     Comment: 6-8 beers weekly     Drug use: No     Comment: caffeine 2 cups coffee daily    Sexual activity: Not on file   Other Topics Concern    Not on file   Social History Narrative    Not on file     Social Determinants of Health     Financial Resource Strain: Not on file   Food Insecurity: Not on file   Transportation Needs: Not on file   Physical Activity: Not on file   Stress: Not on file   Social Connections: Not on file   Intimate Partner Violence: Not on file   Housing Stability: Not on file       Family History   Problem Relation Age of Onset    Heart Disease Mother     No Known Problems Father     Prostate Cancer Brother             Cancer Other     Diabetes Other     High Blood Pressure Other     Stroke Other     Tuberculosis Other        REVIEW OF SYSTEMS:     Blanca Lewis denies fever/chills, chest pain, shortness of breath, new bowel or bladder complaints. All other review of systems was negative. PHYSICAL EXAMINATION:      /66   Pulse 70   Temp 97.9 °F (36.6 °C) (Infrared)   Resp 16   Ht 5' 4\" (1.626 m)   Wt 158 lb (71.7 kg)   SpO2 95%   BMI 27.12 kg/m²     General:      General appearance:   pleasant and well-hydrated. , in mild discomfort and A & O x3  Build:Normal Weight    HEENT:    Head:normocephalic and atraumatic  Sclera: icterus absent,    Lungs:    Breathing:Normal expansion. No wheezing. Abdomen:    Shape:non-distended and normal    Lumbar spine:    Range of motion:abnormal moderately Lateral bending, flexion, extension rotation bilateral and is painful. Extremities:    Tremors:None bilaterally upper and lower  Range of motion:Generally limited extension shoulder - right, pain with internal rotation of hips not done. Intact:Yes  Edema:Normal      Neurological:    [address]    Dermatology:    Skin:no unusual rashes, no skin lesions, no palpable subcutaneous nodules and good skin turgor    Impression:    xray shows solid fusion and hardware in good position 7/2019  Rt shoulder pain, is candidate for RTSA per Dr. Sandra Beal knee pain improving, s/p 9/2018 TKR  + SHELLEY on CPAP, + ICD/pacer, 3 heart valves are stenotic with resulting  Thoracic radiating pain in to the abdomen, has had a CT chest recently which shows thoracic degeneration with autofusion  Can consider a dedicated thoracic CT scan once his heart issues are treated, cannot have MRI due to pacemaker/defib              Failed gabapentin 100 mg titration due to weakness, Lyrica due to itching and difficulty breathing  On ELILQUIS     Continues with right abdominal pain. Has been worked up through his PCP. Recommend he call his PCP to get a referral to GI.        Plan:     Continue Ztlido patches  Failed most recent caudal [id number]  OARRS reviewed  Buccal screen reviewed and is consistent  Previously discussed risk of overdose with combining opioids with benzos, encouraged him to utilize the lowest dose of benzo as possible and wean off if possible  RF percocet 7.5/325 TID #90 - we have previously discussed limited dosing. No RF baclofen 10 mg daily #30  Continue lidocaine ointment  Continue diclofenac gel  Continue lactulose for OIC  He is not a candidate for SCS due to his quite frequent falls  Not candidate for TENS due to pacer/defib  Patient encouraged to stay active  Treatment plan discussed with the patient including medication side effects           Controlled Substance Monitoring:    Acute and Chronic Pain Monitoring:   RX Monitoring 2/10/2023   Periodic Controlled Substance Monitoring Possible medication side effects, risk of tolerance/dependence & alternative treatments discussed. ;No signs of potential drug abuse or diversion identified. ;Assessed functional status. ;Obtaining appropriate analgesic effect of treatment. We discussed with the patient that combining opioids, benzodiazepines, alcohol, illicit drugs or sleep aids increases the risk of respiratory depression including death. We discussed that these medications may cause drowsiness, sedation or dizziness and have counseled the patient not to drive or operate machinery. We have discussed that these medications will be prescribed only by one provider. We have discussed with the patient about age related risk factors and have thoroughly discussed the importance of taking these medications as prescribed. The patient verbalizes understanding.     ccreferring physic

## 2023-02-10 NOTE — PROGRESS NOTES
Do you currently have any of the following:    Fever: No  Headache:  No  Cough: No  Shortness of breath: No  Exposed to anyone with these symptoms: No         Mitesh Rubin presents to the [address] [place] [address] on 2/10/2023. Salty Melchor is complaining of pain low back pain. The pain is constant. The pain is described as aching and miserable. Pain is rated on his best day at a 7, on his worst day at a 10, and on average at a 8 on the VAS scale. He took his last dose of Percocet today. Any procedures since your last visit: No    Pacemaker or defibrillator: Yes managed by Dr. Chloe Kenny. He is not on NSAIDS and is  on anticoagulation medications to include Eliquis and is managed by Melanie Kyle MD  .     Medication Contract and Consent for Opioid Use Documents Filed       Patient Documents       Type of Document Status Date Received Received By Description    Medication Contract Received 5/24/2021  9:00 AM Armen Johnston Medication Contract    Medication Contract Received 5/17/2022  3:39 PM DANNI, [address] [place] [address] Pain Management                    /66   Pulse 70   Temp 97.9 °F (36.6 °C) (Infrared)   Resp 16   Ht 5' 4\" (1.626 m)   Wt 158 lb (71.7 kg)   SpO2 95%   BMI 27.12 kg/m²      No LMP for male patient.

## 2023-02-23 LAB
ALANINE AMINOTRANSFERASE (SGPT) (U/L) IN SER/PLAS: 14 U/L (ref 10–52)
ALBUMIN (G/DL) IN SER/PLAS: 4.3 G/DL (ref 3.4–5)
ALKALINE PHOSPHATASE (U/L) IN SER/PLAS: 57 U/L (ref 33–136)
ANION GAP IN SER/PLAS: 12 MMOL/L (ref 10–20)
ASPARTATE AMINOTRANSFERASE (SGOT) (U/L) IN SER/PLAS: 29 U/L (ref 9–39)
BASOPHILS (10*3/UL) IN BLOOD BY AUTOMATED COUNT: 0.1 X10E9/L (ref 0–0.1)
BASOPHILS/100 LEUKOCYTES IN BLOOD BY AUTOMATED COUNT: 1.4 % (ref 0–2)
BILIRUBIN TOTAL (MG/DL) IN SER/PLAS: 0.5 MG/DL (ref 0–1.2)
CALCIDIOL (25 OH VITAMIN D3) (NG/ML) IN SER/PLAS: 53 NG/ML
CALCIUM (MG/DL) IN SER/PLAS: 9.6 MG/DL (ref 8.6–10.3)
CARBON DIOXIDE, TOTAL (MMOL/L) IN SER/PLAS: 28 MMOL/L (ref 21–32)
CHLORIDE (MMOL/L) IN SER/PLAS: 101 MMOL/L (ref 98–107)
CHOLESTEROL (MG/DL) IN SER/PLAS: 108 MG/DL (ref 0–199)
CHOLESTEROL IN HDL (MG/DL) IN SER/PLAS: 58.3 MG/DL
CHOLESTEROL/HDL RATIO: 1.9
COBALAMIN (VITAMIN B12) (PG/ML) IN SER/PLAS: 2680 PG/ML (ref 211–911)
CREATININE (MG/DL) IN SER/PLAS: 1.91 MG/DL (ref 0.5–1.3)
EOSINOPHILS (10*3/UL) IN BLOOD BY AUTOMATED COUNT: 0.23 X10E9/L (ref 0–0.4)
EOSINOPHILS/100 LEUKOCYTES IN BLOOD BY AUTOMATED COUNT: 3.3 % (ref 0–6)
ERYTHROCYTE DISTRIBUTION WIDTH (RATIO) BY AUTOMATED COUNT: 13.2 % (ref 11.5–14.5)
ERYTHROCYTE MEAN CORPUSCULAR HEMOGLOBIN CONCENTRATION (G/DL) BY AUTOMATED: 30.8 G/DL (ref 32–36)
ERYTHROCYTE MEAN CORPUSCULAR VOLUME (FL) BY AUTOMATED COUNT: 105 FL (ref 80–100)
ERYTHROCYTES (10*6/UL) IN BLOOD BY AUTOMATED COUNT: 4.28 X10E12/L (ref 4.5–5.9)
GFR MALE: 35 ML/MIN/1.73M2
GLUCOSE (MG/DL) IN SER/PLAS: 114 MG/DL (ref 74–99)
HEMATOCRIT (%) IN BLOOD BY AUTOMATED COUNT: 45.1 % (ref 41–52)
HEMOGLOBIN (G/DL) IN BLOOD: 13.9 G/DL (ref 13.5–17.5)
IMMATURE GRANULOCYTES/100 LEUKOCYTES IN BLOOD BY AUTOMATED COUNT: 0.3 % (ref 0–0.9)
LDL: 34 MG/DL (ref 0–99)
LEUKOCYTES (10*3/UL) IN BLOOD BY AUTOMATED COUNT: 6.9 X10E9/L (ref 4.4–11.3)
LYMPHOCYTES (10*3/UL) IN BLOOD BY AUTOMATED COUNT: 1.37 X10E9/L (ref 0.8–3)
LYMPHOCYTES/100 LEUKOCYTES IN BLOOD BY AUTOMATED COUNT: 19.8 % (ref 13–44)
MONOCYTES (10*3/UL) IN BLOOD BY AUTOMATED COUNT: 0.55 X10E9/L (ref 0.05–0.8)
MONOCYTES/100 LEUKOCYTES IN BLOOD BY AUTOMATED COUNT: 7.9 % (ref 2–10)
NEUTROPHILS (10*3/UL) IN BLOOD BY AUTOMATED COUNT: 4.66 X10E9/L (ref 1.6–5.5)
NEUTROPHILS/100 LEUKOCYTES IN BLOOD BY AUTOMATED COUNT: 67.3 % (ref 40–80)
PLATELETS (10*3/UL) IN BLOOD AUTOMATED COUNT: 174 X10E9/L (ref 150–450)
POTASSIUM (MMOL/L) IN SER/PLAS: 5.3 MMOL/L (ref 3.5–5.3)
PROTEIN TOTAL: 7.3 G/DL (ref 6.4–8.2)
SODIUM (MMOL/L) IN SER/PLAS: 136 MMOL/L (ref 136–145)
THYROTROPIN (MIU/L) IN SER/PLAS BY DETECTION LIMIT <= 0.05 MIU/L: 1.21 MIU/L (ref 0.44–3.98)
TRIGLYCERIDE (MG/DL) IN SER/PLAS: 77 MG/DL (ref 0–149)
URATE (MG/DL) IN SER/PLAS: 8.6 MG/DL (ref 4–7.5)
UREA NITROGEN (MG/DL) IN SER/PLAS: 40 MG/DL (ref 6–23)
VLDL: 15 MG/DL (ref 0–40)

## 2023-02-24 LAB
ESTIMATED AVERAGE GLUCOSE FOR HBA1C: 111 MG/DL
HEMOGLOBIN A1C/HEMOGLOBIN TOTAL IN BLOOD: 5.5 %

## 2023-03-03 LAB
6-ACETYLMORPHINE: <25 NG/ML
7-AMINOCLONAZEPAM: <25 NG/ML
ALPHA-HYDROXYALPRAZOLAM: <25 NG/ML
ALPHA-HYDROXYMIDAZOLAM: <25 NG/ML
ALPRAZOLAM: <25 NG/ML
AMPHETAMINE (PRESENCE) IN URINE BY SCREEN METHOD: ABNORMAL
BARBITURATES PRESENCE IN URINE BY SCREEN METHOD: ABNORMAL
CANNABINOIDS IN URINE BY SCREEN METHOD: ABNORMAL
CHLORDIAZEPOXIDE: <25 NG/ML
CLONAZEPAM: <25 NG/ML
COCAINE (PRESENCE) IN URINE BY SCREEN METHOD: ABNORMAL
CODEINE: <50 NG/ML
CREATINE, URINE FOR DRUG: 60.6 MG/DL
DIAZEPAM: <25 NG/ML
DRUG SCREEN COMMENT URINE: ABNORMAL
EDDP: <25 NG/ML
FENTANYL CONFIRMATION, URINE: <2.5 NG/ML
HYDROCODONE: <25 NG/ML
HYDROMORPHONE: <25 NG/ML
LORAZEPAM: <25 NG/ML
METHADONE CONFIRMATION,URINE: <25 NG/ML
MIDAZOLAM: <25 NG/ML
MORPHINE URINE: <50 NG/ML
NORDIAZEPAM: 463 NG/ML
NORFENTANYL: <2.5 NG/ML
NORHYDROCODONE: <25 NG/ML
NOROXYCODONE: 970 NG/ML
O-DESMETHYLTRAMADOL: <50 NG/ML
OXAZEPAM: >1000 NG/ML
OXYCODONE: 732 NG/ML
OXYMORPHONE: 348 NG/ML
PHENCYCLIDINE (PRESENCE) IN URINE BY SCREEN METHOD: ABNORMAL
TEMAZEPAM: >1000 NG/ML
TRAMADOL: <50 NG/ML
ZOLPIDEM METABOLITE (ZCA): <25 NG/ML
ZOLPIDEM: <25 NG/ML

## 2023-03-07 ENCOUNTER — TELEPHONE (OUTPATIENT)
Dept: PRIMARY CARE | Facility: CLINIC | Age: 78
End: 2023-03-07
Payer: MEDICARE

## 2023-03-07 DIAGNOSIS — E11.69 TYPE 2 DIABETES MELLITUS WITH OTHER SPECIFIED COMPLICATION, WITHOUT LONG-TERM CURRENT USE OF INSULIN (MULTI): Primary | ICD-10-CM

## 2023-03-07 NOTE — TELEPHONE ENCOUNTER
Pt wife said pharmacy is wanting for the test strips rx to be printed out of this sent to the pharmacy in order for medicare to pay for it? It also needs to be changed to x1 test time daily NOT x3 daily.     [facility] [place]

## 2023-03-10 ENCOUNTER — OFFICE VISIT (OUTPATIENT)
Dept: PAIN MANAGEMENT | Age: 78
End: 2023-03-10
Payer: MEDICARE

## 2023-03-10 VITALS
BODY MASS INDEX: 26.98 KG/M2 | SYSTOLIC BLOOD PRESSURE: 124 MMHG | DIASTOLIC BLOOD PRESSURE: 60 MMHG | HEART RATE: 79 BPM | HEIGHT: 64 IN | RESPIRATION RATE: 16 BRPM | WEIGHT: 158 LBS | OXYGEN SATURATION: 94 % | TEMPERATURE: 98.2 F

## 2023-03-10 DIAGNOSIS — G89.29 CHRONIC FLANK PAIN: ICD-10-CM

## 2023-03-10 DIAGNOSIS — G89.4 CHRONIC PAIN SYNDROME: Primary | ICD-10-CM

## 2023-03-10 DIAGNOSIS — S33.9XXA CHRONIC OR OLD SPRAIN OF LUMBOSACRAL LIGAMENT: ICD-10-CM

## 2023-03-10 DIAGNOSIS — S33.5XXD LUMBAR SPRAIN, SUBSEQUENT ENCOUNTER: ICD-10-CM

## 2023-03-10 DIAGNOSIS — R10.9 CHRONIC FLANK PAIN: ICD-10-CM

## 2023-03-10 DIAGNOSIS — M25.551 RIGHT HIP PAIN: ICD-10-CM

## 2023-03-10 DIAGNOSIS — G89.4 CHRONIC PAIN SYNDROME: ICD-10-CM

## 2023-03-10 DIAGNOSIS — M51.37 DEGENERATION OF LUMBAR OR LUMBOSACRAL INTERVERTEBRAL DISC: ICD-10-CM

## 2023-03-10 DIAGNOSIS — M79.10 MYALGIA: ICD-10-CM

## 2023-03-10 DIAGNOSIS — M51.26 DISPLACEMENT OF LUMBAR INTERVERTEBRAL DISC WITHOUT MYELOPATHY: ICD-10-CM

## 2023-03-10 PROCEDURE — 99213 OFFICE O/P EST LOW 20 MIN: CPT | Performed by: PHYSICIAN ASSISTANT

## 2023-03-10 RX ORDER — CALCIUM CITRATE/VITAMIN D3 200MG-6.25
1 TABLET ORAL DAILY
Qty: 50 STRIP | Refills: 11 | Status: SHIPPED | OUTPATIENT
Start: 2023-03-10 | End: 2023-12-09 | Stop reason: HOSPADM

## 2023-03-10 RX ORDER — OXYCODONE AND ACETAMINOPHEN 7.5; 325 MG/1; MG/1
1 TABLET ORAL 3 TIMES DAILY PRN
Qty: 90 TABLET | Refills: 0 | Status: SHIPPED | OUTPATIENT
Start: 2023-03-17 | End: 2023-04-16

## 2023-03-10 RX ORDER — LIDOCAINE 50 MG/G
OINTMENT TOPICAL
Qty: 50 G | Refills: 2 | Status: SHIPPED | OUTPATIENT
Start: 2023-03-10

## 2023-03-10 RX ORDER — BACLOFEN 10 MG/1
10 TABLET ORAL DAILY
Qty: 30 TABLET | Refills: 2 | Status: SHIPPED | OUTPATIENT
Start: 2023-03-10 | End: 2023-04-09

## 2023-03-10 NOTE — PROGRESS NOTES
Do you currently have any of the following:    Fever: No  Headache:  No  Cough: No  Shortness of breath: No  Exposed to anyone with these symptoms: No         Kevyn Garcia presents to the [address] [place] [address] on 3/10/2023. Ignacia Zeng is complaining of pain in his right flank. The pain is constant. The pain is described as aching and miserable. Pain is rated on his best day at a 4, on his worst day at a 9, and on average at a 6 on the VAS scale. He took his last dose of Percocet today. Any procedures since your last visit: No    Pacemaker or defibrillator: yes    He is not on NSAIDS and is  on anticoagulation medications to include Eliquis and is managed by Gardenia Ma MD  .     Medication Contract and Consent for Opioid Use Documents Filed       Patient Documents       Type of Document Status Date Received Received By Description    Medication Contract Received 5/24/2021  9:00 AM Annelise Frey Medication Contract    Medication Contract Received 5/17/2022  3:39 PM DANNI, [address] [place] [address] Pain Management                    /60   Pulse 79   Temp 98.2 °F (36.8 °C) (Infrared)   Resp 16   Ht 5' 4\" (1.626 m)   Wt 158 lb (71.7 kg)   SpO2 94%   BMI 27.12 kg/m²      No LMP for male patient.

## 2023-03-10 NOTE — PROGRESS NOTES
[facility] Pain Management  [facility] [address]  [facility], [facility]    Follow up Note      Aileen Hui     Date of Visit:  3/10/2023    CC:  Patient presents for follow up   Chief Complaint   Patient presents with    Follow-up     Right flank pain                HPI:    Pain is  unchanged. Having right heel issues that have been worked up. Appropriate analgesia with current medications regimen: yes. Change in quality of symptoms:no. Medication side effects:none. Recent diagnostic testing:none. Recent interventional procedures: None. He has been on anticoagulation medications to include Eliquis and has not been on herbal supplements. He is not diabetic. Imagin2022 CT abdomen/pelvis    IMPRESSION:   1. Multiple small nonobstructing bilateral calcified calyceal calculi. 2. No evidence for other calcified collecting system calculi or   obstruction. 3. Hypodense posterior right upper pole renal cortical lesion most   likely a cyst. No further evaluation is required*. 4. Streaky soft tissue densities in the bilateral perinephric fat   compatible with active or, more likely, remote inflammation. 5. No evidence of mass, lymphadenopathy or inflammatory process. 6. Dense atherosclerotic calcification throughout normal caliber   abdominal aorta. 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 Degenerative disc disease, spondylosis causing stenosis. Probable large extruded disc at L3-4.  Spinal listhesis L4 and L5 exacerbating the stenosis  EMG dated 3/24/2016     L5 radiculopathy  CT dated 2016 lumbar spine demonstrates degenerative spinal stenosis that is severe at L3-4 L4-5 and L5-S1 levels                                        Potential Aberrant Drug-Related Behavior: no     Urine Drug Screenin2018 buccal consistent  2019 buccal consistent  2019 consistent  10/2019 consistent for oxycodone, metabolites, and benzodiazapines  2/2020 consistent  05/2021 consistent  3/2022 consistent  01/2023 consistent     OARRS report:  12/2018-03/2023  consistent     Opioid Agreement:  Date enacted:  05/24/2021  Renewal date:  Updated: 05/17/2022       Past Medical History:   Diagnosis Date    CAD (coronary artery disease)     Cardiac defibrillator in place     [employer]    CHF (congestive heart failure) (HCC)     Depression     Disease of pericardium     DJD (degenerative joint disease)     Erectile dysfunction     GERD (gastroesophageal reflux disease)     Hyperlipidemia     Hypertension     Ischemic cardiomyopathy     Ischemic heart disease     LBBB (left bundle branch block)     w/ wide Qrs    Mitral regurgitation     Nonrheumatic aortic valve disorder     SHELLEY on CPAP     setting 7    Presence of coronary angioplasty implant and graft     Ventricular tachycardia        Past Surgical History:   Procedure Laterality Date    ANKLE SURGERY      CARDIAC CATHETERIZATION  08/24/2020    CARDIAC DEFIBRILLATOR PLACEMENT  11/2009    Bi-vent    CARDIAC DEFIBRILLATOR PLACEMENT      CARDIAC PACEMAKER PLACEMENT      CARPAL TUNNEL RELEASE      CORONARY ANGIOPLASTY  09/2014    DIAGNOSTIC CARDIAC CATH LAB PROCEDURE Left 04/2006    DIAGNOSTIC CARDIAC CATH LAB PROCEDURE Left 09/2014    LEXY-RCA    ELBOW SURGERY      EPIDURAL STEROID INJECTION N/A 5/7/2019    CAUDAL EPIDURAL STEROID INJECTION performed by Chandni Sharma DO at [address] [place] [address] N/A 8/1/2019    CAUDAL EPIDURAL STEROID INJECTION performed by Chandni Sharma DO at [place] [address]      foot, finger, left ankle, left leg,and right arm    JOINT REPLACEMENT Right 09/18/2018    knee    LEG SURGERY      LUMBAR FUSION  05/29/2018    OTHER SURGICAL HISTORY  04/2006    cardiac tamponade evacuation     Eligio Bernstein N/A 12/26/2019    CAUDAL EPIDURAL STEROID INJECTION #2 performed by Ritesh Marquez DO at [facility] OR    TRANSESOPHAGEAL ECHOCARDIOGRAM      10/07    TRANSESOPHAGEAL ECHOCARDIOGRAM  08/24/2020       Prior to Admission medications    Medication Sig Start Date End Date Taking? Authorizing Provider   oxyCODONE-acetaminophen (PERCOCET) 7.5-325 MG per tablet Take 1 tablet by mouth 3 times daily as needed for Pain for up to 30 days. Max Daily Amount: 3 tablets 3/17/23 4/16/23 Yes PEACE Alcantara   lidocaine (XYLOCAINE) 5 % ointment APPLY TO THE AFFECTED AREA(S) DAILY AS NEEDED FOR PAIN 3/10/23  Yes PEACE Alcantara   baclofen (LIORESAL) 10 MG tablet Take 1 tablet by mouth daily 3/10/23 4/9/23 Yes PEACE Alcantara   allopurinol (ZYLOPRIM) 100 MG tablet  8/23/22  Yes Historical Provider, MD   empagliflozin (JARDIANCE) 10 MG tablet 1 tablet   Yes Historical Provider, MD   carvedilol (COREG) 25 MG tablet Take 25 mg by mouth 2 times daily 10/2/21  Yes Historical Provider, MD   tamsulosin (FLOMAX) 0.4 MG capsule Take 1 capsule by mouth daily 11/1/21  Yes Prince Juarez, APRN - CNP   nystatin-triamcinolone (MYCOLOG II) 569578-2.1 UNIT/GM-% cream Apply topically 2 times daily. 10/4/21  Yes James Turner MD   nitroGLYCERIN (NITROSTAT) 0.4 MG SL tablet Place under the tongue 7/31/20  Yes Historical Provider, MD   sacubitril-valsartan (ENTRESTO) 49-51 MG per tablet Take 1 tablet by mouth 2 times daily    Yes Historical Provider, MD   amiodarone (CORDARONE) 200 MG tablet Take 200 mg by mouth daily   Yes Historical Provider, MD   atorvastatin (LIPITOR) 80 MG tablet Take 80 mg by mouth   Yes Historical Provider, MD   bumetanide (BUMEX) 1 MG tablet Take 1.5 mg by mouth in the morning. Once in the morning, and once in the afternoon on Monday, Wednesday, Friday, and Saturday.    Yes Historical Provider, MD   mirtazapine (REMERON) 30 MG tablet Take 30 mg by mouth nightly    Yes Historical Provider, MD   spironolactone (ALDACTONE) 25 MG tablet Take 12.5 mg by mouth daily Yes Historical Provider, MD   pseudoephedrine (SUDAFED) 30 MG tablet Take 30 mg by mouth every 4 hours as needed for Congestion   Yes Historical Provider, MD   apixaban (ELIQUIS) 5 MG TABS tablet Take 1 tablet by mouth 2 times daily 2/2/21  Yes [facility] And[facility] CNP   amoxicillin (AMOXIL) 500 MG capsule    Yes Historical Provider, MD   lactulose (CHRONULAC) 10 GM/15ML solution Take 15 mLs by mouth every evening 12/23/20  Yes Jung Jewell DO   nystatin-triamcinolone [facility]) 705258-8.1 UNIT/GM-% cream Apply topically 2 times daily. 10/6/20  Yes Artist BARI Mcdowell CNP   ezetimibe (ZETIA) 10 MG tablet Take 10 mg by mouth 10/23/19  Yes Historical Provider, MD   TRUE METRIX BLOOD GLUCOSE TEST strip USE AS DIRECTED to test blood glucose TWICE DAILY 7/30/19  Yes Historical Provider, MD   albuterol sulfate  (90 BASE) MCG/ACT inhaler Inhale 2 puffs into the lungs every 6 hours as needed for Wheezing    Yes Historical Provider, MD   venlafaxine (EFFEXOR XR) 150 MG extended release capsule Take 150 mg by mouth daily   Yes Historical Provider, MD   EPINEPHrine (EPIPEN IJ) Inject as directed Indications: as directed    Yes Historical Provider, MD   ferrous sulfate 325 (65 FE) MG tablet Take 325 mg by mouth as needed    Yes Historical Provider, MD   fluticasone (FLONASE) 50 MCG/ACT nasal spray 1 spray by Nasal route daily as needed    Yes Historical Provider, MD   pantoprazole (PROTONIX) 40 MG tablet Take 40 mg by mouth daily   Yes Historical Provider, MD   triamcinolone (KENALOG) 0.1 % cream Apply topically 2 times daily Apply topically 2 times daily. Yes Historical Provider, MD   diazepam (VALIUM) 5 MG tablet Take 5 mg by mouth as needed for Anxiety.     Yes Historical Provider, MD   FOLBIC 2.5-25-2 MG TABS TAKE 1 TABLET BY MOUTH ONCE DAILY  Patient not taking: Reported on 3/10/2023 2/17/22   Historical Provider, MD   tadalafil (CIALIS) 20 MG tablet Take 1 tablet by mouth as needed for Erectile Dysfunction 30 minutes prior to sexual intercourse 6/29/21 7/15/22  Edmond Oshea MD   diclofenac sodium (VOLTAREN) 1 % GEL Apply topically 4 times daily as needed for Pain 12/23/20 7/15/22  Jocelynn Bevel, DO       Allergies   Allergen Reactions    Bee Venom Anaphylaxis    Cefaclor Anaphylaxis    Lyrica [Pregabalin] Shortness Of Breath     Itching     Pentazocine-Acetaminophen Hives and Rash     Rapid heart beats.      Bactrim [Sulfamethoxazole-Trimethoprim] Rash     Patient states he is not allergic and has taken     Statins Other (See Comments)     Myalgia      Toprol Xl [Metoprolol] Other (See Comments)     Fatigue         Social History     Socioeconomic History    Marital status:      Spouse name: Not on file    Number of children: Not on file    Years of education: Not on file    Highest education level: Not on file   Occupational History    Not on file   Tobacco Use    Smoking status: Former     Packs/day: 0.25     Years: 20.00     Pack years: 5.00     Types: Cigarettes     Start date:      Quit date:      Years since quittin.2    Smokeless tobacco: Never   Vaping Use    Vaping Use: Never used   Substance and Sexual Activity    Alcohol use: Yes     Comment: 6-8 beers weekly     Drug use: No     Comment: caffeine 2 cups coffee daily    Sexual activity: Not on file   Other Topics Concern    Not on file   Social History Narrative    Not on file     Social Determinants of Health     Financial Resource Strain: Not on file   Food Insecurity: Not on file   Transportation Needs: Not on file   Physical Activity: Not on file   Stress: Not on file   Social Connections: Not on file   Intimate Partner Violence: Not on file   Housing Stability: Not on file       Family History   Problem Relation Age of Onset    Heart Disease Mother     No Known Problems Father     Prostate Cancer Brother             Cancer Other     Diabetes Other     High Blood Pressure Other     Stroke Other     Tuberculosis Other REVIEW OF SYSTEMS:     Jose Krause denies fever/chills, chest pain, shortness of breath, new bowel or bladder complaints. All other review of systems was negative. PHYSICAL EXAMINATION:      /60   Pulse 79   Temp 98.2 °F (36.8 °C) (Infrared)   Resp 16   Ht 5' 4\" (1.626 m)   Wt 158 lb (71.7 kg)   SpO2 94%   BMI 27.12 kg/m²     General:      General appearance:   pleasant and well-hydrated. , in mild discomfort and A & O x3  Build:Normal Weight    HEENT:    Head:normocephalic and atraumatic  Sclera: icterus absent,    Lungs:    Breathing:Normal expansion. No wheezing. Abdomen:    Shape:non-distended and normal    Lumbar spine:    Range of motion:abnormal moderately Lateral bending, flexion, extension rotation bilateral and is painful. Extremities:    Tremors:None bilaterally upper and lower  Range of motion:Generally limited extension shoulder - right, pain with internal rotation of hips not done. Intact:Yes  Edema:Normal      Neurological:    [address]    Dermatology:    Skin:no unusual rashes, no skin lesions, no palpable subcutaneous nodules and good skin turgor    Impression:    xray shows solid fusion and hardware in good position 7/2019  Rt shoulder pain, is candidate for RTSA per Dr. Kelly Pruett knee pain improving, s/p 9/2018 TKR  + SHELLEY on CPAP, + ICD/pacer, 3 heart valves are stenotic with resulting  Thoracic radiating pain in to the abdomen, has had a CT chest recently which shows thoracic degeneration with autofusion  Can consider a dedicated thoracic CT scan once his heart issues are treated, cannot have MRI due to pacemaker/defib              Failed gabapentin 100 mg titration due to weakness, Lyrica due to itching and difficulty breathing  On ELILQUIS     Continues with right abdominal pain. Has been worked up through his PCP. Recommend he call his PCP to get a referral to GI.        Plan:     Continue Ztlido patches  Failed most recent caudal [id number]  OARRS reviewed  Previously discussed risk of overdose with combining opioids with benzos, encouraged him to utilize the lowest dose of benzo as possible and wean off if possible  RF percocet 7.5/325 TID #90 - we have previously discussed limited dosing. RF baclofen 10 mg daily #30  RF lidocaine ointment  Continue diclofenac gel  Continue lactulose for OIC  He is not a candidate for SCS due to his quite frequent falls  Not candidate for TENS due to pacer/defib  Patient encouraged to stay active  Treatment plan discussed with the patient including medication side effects           Controlled Substance Monitoring:    Acute and Chronic Pain Monitoring:   RX Monitoring 3/10/2023   Periodic Controlled Substance Monitoring Possible medication side effects, risk of tolerance/dependence & alternative treatments discussed. ;No signs of potential drug abuse or diversion identified. ;Assessed functional status. ;Obtaining appropriate analgesic effect of treatment. We discussed with the patient that combining opioids, benzodiazepines, alcohol, illicit drugs or sleep aids increases the risk of respiratory depression including death. We discussed that these medications may cause drowsiness, sedation or dizziness and have counseled the patient not to drive or operate machinery. We have discussed that these medications will be prescribed only by one provider. We have discussed with the patient about age related risk factors and have thoroughly discussed the importance of taking these medications as prescribed. The patient verbalizes understanding.     ccreferring physic

## 2023-03-23 ENCOUNTER — TELEPHONE (OUTPATIENT)
Dept: PRIMARY CARE | Facility: CLINIC | Age: 78
End: 2023-03-23
Payer: MEDICARE

## 2023-03-23 DIAGNOSIS — F41.1 GENERALIZED ANXIETY DISORDER: Primary | ICD-10-CM

## 2023-03-31 RX ORDER — DIAZEPAM 5 MG/1
5 TABLET ORAL EVERY 8 HOURS PRN
Qty: 90 TABLET | Refills: 0 | Status: SHIPPED | OUTPATIENT
Start: 2023-03-31 | End: 2023-05-05 | Stop reason: SDUPTHER

## 2023-04-14 LAB
ALBUMIN (G/DL) IN SER/PLAS: 4 G/DL (ref 3.4–5)
ALBUMIN (MG/L) IN URINE: NORMAL
ALBUMIN/CREATININE (UG/MG) IN URINE: NORMAL
AMORPHOUS CRYSTALS, URINE: NORMAL /HPF
ANION GAP IN SER/PLAS: 12 MMOL/L (ref 10–20)
APPEARANCE, URINE: NORMAL
ASCORBIC ACID: NORMAL MG/DL
BACTERIA, URINE: NORMAL /HPF
BILIRUBIN, URINE: NORMAL
BLOOD, URINE: NORMAL
BROAD CASTS, URINE: NORMAL /LPF
BUDDING YEAST, URINE: NORMAL /HPF
CALCIUM (MG/DL) IN SER/PLAS: 9.2 MG/DL (ref 8.6–10.3)
CALCIUM CARBONATE CRYSTALS, URINE: NORMAL /HPF
CALCIUM OXALATE CRYSTALS, URINE: NORMAL /HPF
CALCIUM PHOSPHATE CRYSTALS, URINE: NORMAL /HPF
CARBON DIOXIDE, TOTAL (MMOL/L) IN SER/PLAS: 27 MMOL/L (ref 21–32)
CHLORIDE (MMOL/L) IN SER/PLAS: 103 MMOL/L (ref 98–107)
COLOR, URINE: NORMAL
CREATININE (MG/DL) IN SER/PLAS: 1.85 MG/DL (ref 0.5–1.3)
CREATININE (MG/DL) IN URINE: NORMAL
CYSTINE CRYSTALS, URINE: NORMAL /HPF
EPITHELIAL CASTS, URINE: NORMAL /LPF
FAT, URINE: NORMAL /HPF
FATTY CASTS, URINE: NORMAL /LPF
FINE GRANULAR CASTS, URINE: NORMAL /LPF
GFR MALE: 37 ML/MIN/1.73M2
GLUCOSE (MG/DL) IN SER/PLAS: 144 MG/DL (ref 74–99)
GLUCOSE, URINE: NORMAL
HYALINE CASTS, URINE: NORMAL /LPF
KETONES, URINE: NORMAL
LEUCINE CRYSTALS, URINE: NORMAL /HPF
LEUKOCYTE ESTERASE, URINE: NORMAL
MIXED CELLULAR CASTS, URINE: NORMAL /LPF
MUCUS, URINE: NORMAL /LPF
NITRITE, URINE: NORMAL
OVAL FAT BODIES, URINE: NORMAL /HPF
PH, URINE: NORMAL
PHOSPHATE (MG/DL) IN SER/PLAS: 3.4 MG/DL (ref 2.5–4.9)
POTASSIUM (MMOL/L) IN SER/PLAS: 4.6 MMOL/L (ref 3.5–5.3)
PROTEIN, URINE: NORMAL
RBC CASTS, URINE: NORMAL /LPF
RBC CLUMPS, URINE: NORMAL /HPF
RBC, URINE: NORMAL
RENAL EPITHELIAL CELLS, URINE: NORMAL /HPF
SODIUM (MMOL/L) IN SER/PLAS: 137 MMOL/L (ref 136–145)
SPECIFIC GRAVITY, URINE: NORMAL
SPERMATOZOA, URINE: NORMAL /HPF
SQUAMOUS EPITHELIAL CELLS, URINE: NORMAL /HPF
TRANSITIONAL EPITHELIAL CELLS, URINE: NORMAL /HPF
TRICHOMONAS, URINE: NORMAL /HPF
TRIPLE PHOSPHATE CRYSTALS, URINE: NORMAL /HPF
TYROSINE CRYSTALS, URINE: NORMAL /HPF
UREA NITROGEN (MG/DL) IN SER/PLAS: 39 MG/DL (ref 6–23)
URIC ACID (URATE) CRYSTALS, URINE: NORMAL /HPF
URINALYSIS MICROSCOPIC COMMENT: NORMAL
UROBILINOGEN, URINE: NORMAL
WAXY CASTS, URINE: NORMAL /LPF
WBC CASTS, URINE: NORMAL /LPF
WBC CLUMPS, URINE: NORMAL /HPF
WBC, URINE: NORMAL
YEAST HYPHAE, URINE: NORMAL /HPF

## 2023-04-17 LAB
ALBUMIN (MG/L) IN URINE: 11.9 MG/L
ALBUMIN/CREATININE (UG/MG) IN URINE: 29.8 UG/MG CRT (ref 0–30)
APPEARANCE, URINE: CLEAR
BILIRUBIN, URINE: NEGATIVE
BLOOD, URINE: NEGATIVE
COLOR, URINE: ABNORMAL
CREATININE (MG/DL) IN URINE: 39.9 MG/DL (ref 20–370)
GLUCOSE, URINE: ABNORMAL MG/DL
KETONES, URINE: NEGATIVE MG/DL
LEUKOCYTE ESTERASE, URINE: NEGATIVE
NITRITE, URINE: NEGATIVE
PH, URINE: 6 (ref 5–8)
PROTEIN, URINE: NEGATIVE MG/DL
RBC, URINE: NORMAL /HPF (ref 0–5)
SPECIFIC GRAVITY, URINE: 1.01 (ref 1–1.03)
UROBILINOGEN, URINE: <2 MG/DL (ref 0–1.9)
WBC, URINE: <1 /HPF (ref 0–5)

## 2023-04-26 ENCOUNTER — OFFICE VISIT (OUTPATIENT)
Dept: PAIN MANAGEMENT | Age: 78
End: 2023-04-26
Payer: MEDICARE

## 2023-04-26 VITALS
HEART RATE: 80 BPM | TEMPERATURE: 97.6 F | BODY MASS INDEX: 26.98 KG/M2 | OXYGEN SATURATION: 93 % | DIASTOLIC BLOOD PRESSURE: 69 MMHG | SYSTOLIC BLOOD PRESSURE: 125 MMHG | WEIGHT: 158 LBS | RESPIRATION RATE: 16 BRPM | HEIGHT: 64 IN

## 2023-04-26 DIAGNOSIS — S33.5XXD LUMBAR SPRAIN, SUBSEQUENT ENCOUNTER: ICD-10-CM

## 2023-04-26 DIAGNOSIS — G89.4 CHRONIC PAIN SYNDROME: Primary | ICD-10-CM

## 2023-04-26 DIAGNOSIS — M51.26 DISPLACEMENT OF LUMBAR INTERVERTEBRAL DISC WITHOUT MYELOPATHY: ICD-10-CM

## 2023-04-26 DIAGNOSIS — M51.37 DEGENERATION OF LUMBAR OR LUMBOSACRAL INTERVERTEBRAL DISC: ICD-10-CM

## 2023-04-26 DIAGNOSIS — M79.10 MYALGIA: ICD-10-CM

## 2023-04-26 DIAGNOSIS — S33.9XXA CHRONIC OR OLD SPRAIN OF LUMBOSACRAL LIGAMENT: ICD-10-CM

## 2023-04-26 DIAGNOSIS — G89.4 CHRONIC PAIN SYNDROME: ICD-10-CM

## 2023-04-26 PROCEDURE — 1123F ACP DISCUSS/DSCN MKR DOCD: CPT | Performed by: PHYSICIAN ASSISTANT

## 2023-04-26 PROCEDURE — 1036F TOBACCO NON-USER: CPT | Performed by: PHYSICIAN ASSISTANT

## 2023-04-26 PROCEDURE — 3078F DIAST BP <80 MM HG: CPT | Performed by: PHYSICIAN ASSISTANT

## 2023-04-26 PROCEDURE — 99213 OFFICE O/P EST LOW 20 MIN: CPT | Performed by: PHYSICIAN ASSISTANT

## 2023-04-26 PROCEDURE — G8427 DOCREV CUR MEDS BY ELIG CLIN: HCPCS | Performed by: PHYSICIAN ASSISTANT

## 2023-04-26 PROCEDURE — 3074F SYST BP LT 130 MM HG: CPT | Performed by: PHYSICIAN ASSISTANT

## 2023-04-26 PROCEDURE — G8417 CALC BMI ABV UP PARAM F/U: HCPCS | Performed by: PHYSICIAN ASSISTANT

## 2023-04-26 RX ORDER — OXYCODONE AND ACETAMINOPHEN 7.5; 325 MG/1; MG/1
1 TABLET ORAL 3 TIMES DAILY PRN
Qty: 90 TABLET | Refills: 0 | Status: SHIPPED | OUTPATIENT
Start: 2023-04-27 | End: 2023-05-27

## 2023-04-26 NOTE — PROGRESS NOTES
[facility] Pain Management  [address]  [facility]    Follow up Note      Guilherme Valdez     Date of Visit:  2023    CC:  Patient presents for follow up   Chief Complaint   Patient presents with    Follow-up    Back Pain    Shoulder Pain     Right shoulder               HPI:    Pain is  unchanged. Appropriate analgesia with current medications regimen: yes. Change in quality of symptoms:no. Medication side effects:none. Recent diagnostic testing:none. Recent interventional procedures: None. He has been on anticoagulation medications to include Eliquis and has not been on herbal supplements. He is not diabetic. Imagin2022 CT abdomen/pelvis    IMPRESSION:   1. Multiple small nonobstructing bilateral calcified calyceal calculi. 2. No evidence for other calcified collecting system calculi or   obstruction. 3. Hypodense posterior right upper pole renal cortical lesion most   likely a cyst. No further evaluation is required*. 4. Streaky soft tissue densities in the bilateral perinephric fat   compatible with active or, more likely, remote inflammation. 5. No evidence of mass, lymphadenopathy or inflammatory process. 6. Dense atherosclerotic calcification throughout normal caliber   abdominal aorta. 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 Degenerative disc disease, spondylosis causing stenosis. Probable large extruded disc at L3-4.  Spinal listhesis L4 and L5 exacerbating the stenosis  EMG dated 3/24/2016     L5 radiculopathy  CT dated 2016 lumbar spine demonstrates degenerative spinal stenosis that is severe at L3-4 L4-5 and L5-S1 levels                                        Potential Aberrant Drug-Related Behavior: no     Urine Drug Screenin2018 buccal consistent  2019 buccal consistent  2019 consistent  10/2019 consistent for oxycodone, metabolites, and

## 2023-04-26 NOTE — PROGRESS NOTES
Diane Palacios presents to the [facility] on 4/26/2023. January Palacios is complaining of pain lower back and right shoulder. The pain is constant. The pain is described as aching and sharp. Pain is rated on his best day at a 5, on his worst day at a 10, and on average at a 7 on the VAS scale. He took his last dose of Percocet today. Any procedures since your last visit: No  Pacemaker or defibrillator: Yes managed by Dr. Murali Orosco. He is not on NSAIDS and is  on anticoagulation medications to include Eliquis and is managed by DR. Shon Mcguire. Medication Contract and Consent for Opioid Use Documents Filed       Patient Documents       Type of Document Status Date Received Received By Description    Medication Contract Received 5/24/2021  9:00 AM Leeann Boucher Medication Contract    Medication Contract Received 5/17/2022  3:39 PM DANNI, [address] [place] [address] Pain Management                    /69   Pulse 80   Temp 97.6 °F (36.4 °C) (Infrared)   Resp 16   Ht 5' 4\" (1.626 m)   Wt 158 lb (71.7 kg)   SpO2 93%   BMI 27.12 kg/m²      No LMP for male patient.

## 2023-04-27 ENCOUNTER — TELEPHONE (OUTPATIENT)
Dept: PAIN MANAGEMENT | Age: 78
End: 2023-04-27

## 2023-04-27 DIAGNOSIS — S33.9XXA CHRONIC OR OLD SPRAIN OF LUMBOSACRAL LIGAMENT: ICD-10-CM

## 2023-04-27 DIAGNOSIS — M51.26 DISPLACEMENT OF LUMBAR INTERVERTEBRAL DISC WITHOUT MYELOPATHY: ICD-10-CM

## 2023-04-27 DIAGNOSIS — G89.4 CHRONIC PAIN SYNDROME: ICD-10-CM

## 2023-04-27 DIAGNOSIS — S33.5XXD LUMBAR SPRAIN, SUBSEQUENT ENCOUNTER: ICD-10-CM

## 2023-04-27 DIAGNOSIS — M51.37 DEGENERATION OF LUMBAR OR LUMBOSACRAL INTERVERTEBRAL DISC: ICD-10-CM

## 2023-04-27 RX ORDER — LIDOCAINE 50 MG/G
OINTMENT TOPICAL
Qty: 70.88 G | Refills: 2 | Status: SHIPPED | OUTPATIENT
Start: 2023-04-27

## 2023-04-27 NOTE — TELEPHONE ENCOUNTER
Wife called questioning prescribing quanties of Lidocaine ointment. I advised them I would let James Rodriguez know.

## 2023-05-05 ENCOUNTER — TELEPHONE (OUTPATIENT)
Dept: PRIMARY CARE | Facility: CLINIC | Age: 78
End: 2023-05-05
Payer: MEDICARE

## 2023-05-05 DIAGNOSIS — H93.12 TINNITUS OF LEFT EAR: ICD-10-CM

## 2023-05-05 DIAGNOSIS — F51.01 PRIMARY INSOMNIA: Primary | ICD-10-CM

## 2023-05-05 PROBLEM — K59.00 CONSTIPATION: Status: ACTIVE | Noted: 2023-05-05

## 2023-05-05 PROBLEM — D64.9 ANEMIA: Status: ACTIVE | Noted: 2023-05-05

## 2023-05-05 PROBLEM — N18.9 CHRONIC RENAL IMPAIRMENT: Status: ACTIVE | Noted: 2023-05-05

## 2023-05-05 PROBLEM — I50.23: Status: ACTIVE | Noted: 2023-05-05

## 2023-05-05 PROBLEM — E79.0 HYPERURICEMIA: Status: ACTIVE | Noted: 2023-05-05

## 2023-05-05 PROBLEM — B37.81 ESOPHAGEAL CANDIDIASIS (MULTI): Status: ACTIVE | Noted: 2023-05-05

## 2023-05-05 PROBLEM — I31.39 PERICARDIAL EFFUSION (HHS-HCC): Status: ACTIVE | Noted: 2023-05-05

## 2023-05-05 PROBLEM — R20.0 NUMBNESS OF LEFT HAND: Status: ACTIVE | Noted: 2023-05-05

## 2023-05-05 PROBLEM — R25.2 MUSCLE CRAMPS: Status: ACTIVE | Noted: 2023-05-05

## 2023-05-05 PROBLEM — I63.9 CVA (CEREBRAL VASCULAR ACCIDENT) (MULTI): Status: ACTIVE | Noted: 2023-05-05

## 2023-05-05 PROBLEM — N52.9 ERECTILE DYSFUNCTION: Status: ACTIVE | Noted: 2023-05-05

## 2023-05-05 PROBLEM — G47.00 INSOMNIA: Status: ACTIVE | Noted: 2023-05-05

## 2023-05-05 PROBLEM — M10.9 GOUT ATTACK: Status: ACTIVE | Noted: 2023-05-05

## 2023-05-05 PROBLEM — E34.9 TESTOSTERONE DEFICIENCY: Status: ACTIVE | Noted: 2023-05-05

## 2023-05-05 PROBLEM — J01.90 ACUTE SINUSITIS: Status: ACTIVE | Noted: 2023-05-05

## 2023-05-05 PROBLEM — R21 RASH: Status: ACTIVE | Noted: 2023-05-05

## 2023-05-05 PROBLEM — H91.90 DISTURBED HEARING: Status: ACTIVE | Noted: 2023-05-05

## 2023-05-05 PROBLEM — I48.19 PERSISTENT ATRIAL FIBRILLATION (MULTI): Status: ACTIVE | Noted: 2023-05-05

## 2023-05-05 PROBLEM — I45.89 AV NODE DYSFUNCTION: Status: ACTIVE | Noted: 2023-05-05

## 2023-05-05 PROBLEM — F41.9 ANXIETY DISORDER: Status: ACTIVE | Noted: 2023-05-05

## 2023-05-05 PROBLEM — M19.90 DJD (DEGENERATIVE JOINT DISEASE): Status: ACTIVE | Noted: 2023-05-05

## 2023-05-05 PROBLEM — M19.011 OSTEOARTHRITIS OF RIGHT GLENOHUMERAL JOINT: Status: ACTIVE | Noted: 2023-05-05

## 2023-05-05 PROBLEM — R11.0 NAUSEA IN ADULT: Status: ACTIVE | Noted: 2023-05-05

## 2023-05-05 PROBLEM — L40.9 PSORIASIS OF SCALP: Status: ACTIVE | Noted: 2023-05-05

## 2023-05-05 PROBLEM — J98.11 COMPRESSIVE ATELECTASIS: Status: ACTIVE | Noted: 2023-05-05

## 2023-05-05 PROBLEM — M54.50 LOW BACK PAIN: Status: ACTIVE | Noted: 2023-05-05

## 2023-05-05 PROBLEM — J90 PLEURAL EFFUSION, BILATERAL: Status: ACTIVE | Noted: 2023-05-05

## 2023-05-05 PROBLEM — J18.9 ACUTE PNEUMONIA: Status: ACTIVE | Noted: 2023-05-05

## 2023-05-05 PROBLEM — K21.9 GERD (GASTROESOPHAGEAL REFLUX DISEASE): Status: ACTIVE | Noted: 2023-05-05

## 2023-05-05 PROBLEM — H92.09 EAR PAIN: Status: ACTIVE | Noted: 2023-05-05

## 2023-05-05 PROBLEM — N30.90 BLADDER DIVERTICULITIS: Status: ACTIVE | Noted: 2023-05-05

## 2023-05-05 PROBLEM — J06.9 URI, ACUTE: Status: ACTIVE | Noted: 2023-05-05

## 2023-05-05 PROBLEM — M25.511 RIGHT SHOULDER PAIN: Status: ACTIVE | Noted: 2023-05-05

## 2023-05-05 PROBLEM — G47.30 SLEEP APNEA: Status: ACTIVE | Noted: 2023-05-05

## 2023-05-05 PROBLEM — I34.0 SEVERE MITRAL REGURGITATION: Status: ACTIVE | Noted: 2023-05-05

## 2023-05-05 PROBLEM — R19.5 POSITIVE OCCULT STOOL BLOOD TEST: Status: ACTIVE | Noted: 2023-05-05

## 2023-05-05 PROBLEM — H90.3 BILATERAL SENSORINEURAL HEARING LOSS: Status: ACTIVE | Noted: 2023-05-05

## 2023-05-05 PROBLEM — I50.9 CHRONIC CONGESTIVE HEART FAILURE (MULTI): Status: ACTIVE | Noted: 2023-05-05

## 2023-05-05 PROBLEM — E78.5 HYPERLIPIDEMIA: Status: ACTIVE | Noted: 2023-05-05

## 2023-05-05 PROBLEM — I35.0 AORTIC VALVE STENOSIS: Status: ACTIVE | Noted: 2023-05-05

## 2023-05-05 PROBLEM — R04.2 COUGH WITH HEMOPTYSIS: Status: ACTIVE | Noted: 2023-05-05

## 2023-05-05 PROBLEM — R10.30 GROIN PAIN: Status: ACTIVE | Noted: 2023-05-05

## 2023-05-05 PROBLEM — F32.A DEPRESSION: Status: ACTIVE | Noted: 2023-05-05

## 2023-05-05 PROBLEM — R30.0 DYSURIA: Status: ACTIVE | Noted: 2023-05-05

## 2023-05-05 PROBLEM — N39.0 ACUTE UTI: Status: ACTIVE | Noted: 2023-05-05

## 2023-05-05 PROBLEM — G47.33 OSA ON CPAP: Status: ACTIVE | Noted: 2023-05-05

## 2023-05-05 PROBLEM — R10.9 ABDOMINAL PAIN: Status: ACTIVE | Noted: 2023-05-05

## 2023-05-05 PROBLEM — F41.0 PANIC DISORDER: Status: ACTIVE | Noted: 2023-05-05

## 2023-05-05 PROBLEM — I50.9 CHF, ACUTE (MULTI): Status: ACTIVE | Noted: 2023-05-05

## 2023-05-05 PROBLEM — I25.10 CAD (CORONARY ARTERY DISEASE): Status: ACTIVE | Noted: 2023-05-05

## 2023-05-05 PROBLEM — M50.90 CERVICAL NECK PAIN WITH EVIDENCE OF DISC DISEASE: Status: ACTIVE | Noted: 2023-05-05

## 2023-05-05 PROBLEM — K13.70 UNSPECIFIED LESIONS OF ORAL MUCOSA: Status: ACTIVE | Noted: 2023-05-05

## 2023-05-05 PROBLEM — Z95.0 PACEMAKER: Status: ACTIVE | Noted: 2023-05-05

## 2023-05-05 PROBLEM — I50.42 CHRONIC COMBINED SYSTOLIC AND DIASTOLIC HEART FAILURE, NYHA CLASS 3 (MULTI): Status: ACTIVE | Noted: 2023-05-05

## 2023-05-05 PROBLEM — E11.9 DIABETES (MULTI): Status: ACTIVE | Noted: 2023-05-05

## 2023-05-05 PROBLEM — I65.29 CAROTID STENOSIS: Status: ACTIVE | Noted: 2023-05-05

## 2023-05-05 PROBLEM — S99.921A RIGHT FOOT INJURY, INITIAL ENCOUNTER: Status: ACTIVE | Noted: 2023-05-05

## 2023-05-05 PROBLEM — R06.02 SHORTNESS OF BREATH AT REST: Status: ACTIVE | Noted: 2023-05-05

## 2023-05-05 PROBLEM — H91.93 BILATERAL HEARING LOSS: Status: ACTIVE | Noted: 2023-05-05

## 2023-05-05 PROBLEM — I62.9 INTRACRANIAL BLEED (MULTI): Status: ACTIVE | Noted: 2023-05-05

## 2023-05-05 PROBLEM — I10 HTN (HYPERTENSION): Status: ACTIVE | Noted: 2023-05-05

## 2023-05-05 PROBLEM — R53.83 TIREDNESS: Status: ACTIVE | Noted: 2023-05-05

## 2023-05-05 PROBLEM — G93.89 CEREBRAL VENTRICULOMEGALY: Status: ACTIVE | Noted: 2023-05-05

## 2023-05-05 RX ORDER — NITROFURANTOIN 25; 75 MG/1; MG/1
CAPSULE ORAL
COMMUNITY
Start: 2022-12-20 | End: 2023-10-30 | Stop reason: ALTCHOICE

## 2023-05-05 RX ORDER — DOCUSATE SODIUM 100 MG/1
CAPSULE, LIQUID FILLED ORAL DAILY PRN
Status: ON HOLD | COMMUNITY
End: 2023-12-06 | Stop reason: ENTERED-IN-ERROR

## 2023-05-05 RX ORDER — ATORVASTATIN CALCIUM 80 MG/1
1 TABLET, FILM COATED ORAL NIGHTLY
COMMUNITY
Start: 2021-03-15 | End: 2023-10-30 | Stop reason: ALTCHOICE

## 2023-05-05 RX ORDER — ATORVASTATIN CALCIUM 80 MG/1
80 TABLET, FILM COATED ORAL NIGHTLY
COMMUNITY
End: 2024-01-09 | Stop reason: SDUPTHER

## 2023-05-05 RX ORDER — ERYTHROMYCIN 5 MG/G
OINTMENT OPHTHALMIC
Status: ON HOLD | COMMUNITY
Start: 2023-03-23 | End: 2023-12-06 | Stop reason: ENTERED-IN-ERROR

## 2023-05-05 RX ORDER — FEBUXOSTAT 40 MG/1
40 TABLET, FILM COATED ORAL DAILY
Status: ON HOLD | COMMUNITY
Start: 2022-10-05 | End: 2023-12-06 | Stop reason: ENTERED-IN-ERROR

## 2023-05-05 RX ORDER — AMOXICILLIN AND CLAVULANATE POTASSIUM 500; 125 MG/1; MG/1
TABLET, FILM COATED ORAL
COMMUNITY
Start: 2022-12-31 | End: 2023-10-30 | Stop reason: ALTCHOICE

## 2023-05-05 RX ORDER — OXYCODONE AND ACETAMINOPHEN 7.5; 325 MG/1; MG/1
TABLET ORAL
COMMUNITY

## 2023-05-05 RX ORDER — ALBUTEROL SULFATE 90 UG/1
AEROSOL, METERED RESPIRATORY (INHALATION)
COMMUNITY
End: 2023-10-27

## 2023-05-05 RX ORDER — BACLOFEN 10 MG/1
10 TABLET ORAL 2 TIMES DAILY PRN
COMMUNITY

## 2023-05-05 RX ORDER — NYSTATIN 100000 [USP'U]/G
POWDER TOPICAL
COMMUNITY
End: 2023-10-30 | Stop reason: ALTCHOICE

## 2023-05-05 RX ORDER — DIAZEPAM 5 MG/1
5 TABLET ORAL 3 TIMES DAILY PRN
Qty: 90 TABLET | Refills: 0 | Status: SHIPPED | OUTPATIENT
Start: 2023-05-05 | End: 2023-06-15 | Stop reason: SDUPTHER

## 2023-05-05 RX ORDER — SPIRONOLACTONE 25 MG/1
0.5 TABLET ORAL DAILY
COMMUNITY
Start: 2022-04-15 | End: 2023-11-20 | Stop reason: SDUPTHER

## 2023-05-05 RX ORDER — TRIAMCINOLONE ACETONIDE OINTMENT USP, 0.05% 0.5 MG/G
OINTMENT TOPICAL 2 TIMES DAILY
COMMUNITY
End: 2023-06-01 | Stop reason: DRUGHIGH

## 2023-05-05 RX ORDER — CLOBETASOL PROPIONATE 0.46 MG/ML
SOLUTION TOPICAL
Status: ON HOLD | COMMUNITY
Start: 2022-05-16 | End: 2023-12-06 | Stop reason: ENTERED-IN-ERROR

## 2023-05-05 RX ORDER — LIDOCAINE 50 MG/G
OINTMENT TOPICAL
COMMUNITY

## 2023-05-05 RX ORDER — MULTIVITAMIN
1 TABLET ORAL DAILY
Status: ON HOLD | COMMUNITY
Start: 2022-05-16 | End: 2024-03-02 | Stop reason: ENTERED-IN-ERROR

## 2023-05-05 RX ORDER — CALCIUM CITRATE/VITAMIN D3 200MG-6.25
TABLET ORAL
COMMUNITY
Start: 2021-03-26 | End: 2024-04-19 | Stop reason: SDUPTHER

## 2023-05-05 RX ORDER — MIRTAZAPINE 30 MG/1
30 TABLET, FILM COATED ORAL NIGHTLY
COMMUNITY

## 2023-05-05 RX ORDER — ALLOPURINOL 100 MG/1
1 TABLET ORAL DAILY
Status: ON HOLD | COMMUNITY
Start: 2022-08-23 | End: 2023-12-06 | Stop reason: ENTERED-IN-ERROR

## 2023-05-05 RX ORDER — FLUCONAZOLE 150 MG/1
TABLET ORAL
COMMUNITY
Start: 2022-11-21 | End: 2023-09-01 | Stop reason: ALTCHOICE

## 2023-05-05 RX ORDER — AMOXICILLIN 500 MG/1
CAPSULE ORAL
COMMUNITY
Start: 2023-04-08 | End: 2023-10-30 | Stop reason: ALTCHOICE

## 2023-05-05 RX ORDER — TAMSULOSIN HYDROCHLORIDE 0.4 MG/1
0.4 CAPSULE ORAL EVERY MORNING
COMMUNITY
End: 2024-02-07 | Stop reason: SDUPTHER

## 2023-05-05 RX ORDER — BUMETANIDE 1 MG/1
0.5 TABLET ORAL
COMMUNITY
End: 2023-11-03 | Stop reason: SDUPTHER

## 2023-05-05 RX ORDER — FLUTICASONE PROPIONATE 50 MCG
SPRAY, SUSPENSION (ML) NASAL
Status: ON HOLD | COMMUNITY
Start: 2022-02-25 | End: 2023-12-06 | Stop reason: ENTERED-IN-ERROR

## 2023-05-05 RX ORDER — TADALAFIL 20 MG/1
TABLET ORAL
COMMUNITY
End: 2023-07-31

## 2023-05-05 RX ORDER — CARVEDILOL 25 MG/1
25 TABLET ORAL 2 TIMES DAILY
COMMUNITY
End: 2024-03-12 | Stop reason: SDUPTHER

## 2023-05-05 RX ORDER — LANCETS 33 GAUGE
EACH MISCELLANEOUS
COMMUNITY
Start: 2023-02-28

## 2023-05-05 RX ORDER — EZETIMIBE 10 MG/1
10 TABLET ORAL DAILY
COMMUNITY
End: 2024-01-09 | Stop reason: SDUPTHER

## 2023-05-05 RX ORDER — TRIAMCINOLONE ACETONIDE 1 MG/ML
LOTION TOPICAL
COMMUNITY
End: 2023-06-01

## 2023-05-05 RX ORDER — VENLAFAXINE HYDROCHLORIDE 150 MG/1
CAPSULE, EXTENDED RELEASE ORAL
COMMUNITY

## 2023-05-05 RX ORDER — APIXABAN 5 MG/1
5 TABLET, FILM COATED ORAL 2 TIMES DAILY
COMMUNITY
End: 2024-01-09 | Stop reason: SDUPTHER

## 2023-05-05 RX ORDER — DIAZEPAM 5 MG/1
TABLET ORAL 3 TIMES DAILY PRN
COMMUNITY
End: 2023-05-05 | Stop reason: SDUPTHER

## 2023-05-05 RX ORDER — SULFAMETHOXAZOLE AND TRIMETHOPRIM 400; 80 MG/1; MG/1
1 TABLET ORAL 2 TIMES DAILY
COMMUNITY
Start: 2022-05-26 | End: 2023-10-30 | Stop reason: ALTCHOICE

## 2023-05-05 RX ORDER — CLINDAMYCIN HYDROCHLORIDE 300 MG/1
300 CAPSULE ORAL EVERY 6 HOURS
Qty: 28 CAPSULE | Refills: 0 | COMMUNITY
Start: 2022-12-22 | End: 2022-12-29

## 2023-05-05 RX ORDER — EMPAGLIFLOZIN 10 MG/1
10 TABLET, FILM COATED ORAL DAILY
COMMUNITY
End: 2024-01-18 | Stop reason: SDUPTHER

## 2023-05-05 RX ORDER — SODIUM FLUORIDE 5 MG/G
PASTE, DENTIFRICE DENTAL
Status: ON HOLD | COMMUNITY
Start: 2021-11-23 | End: 2023-12-06 | Stop reason: ENTERED-IN-ERROR

## 2023-05-05 RX ORDER — FOLIC ACID-PYRIDOXINE-CYANOCOBALAMIN TAB 2.5-25-2 MG 2.5-25-2 MG
1 TAB ORAL DAILY
COMMUNITY
End: 2023-10-30 | Stop reason: ALTCHOICE

## 2023-05-05 RX ORDER — METHYLPREDNISOLONE 4 MG/1
TABLET ORAL
COMMUNITY
Start: 2022-08-30 | End: 2023-10-30 | Stop reason: ALTCHOICE

## 2023-05-05 RX ORDER — CLOBETASOL PROPIONATE 0.5 MG/ML
LOTION TOPICAL 2 TIMES DAILY PRN
COMMUNITY
End: 2023-10-30 | Stop reason: ALTCHOICE

## 2023-05-05 RX ORDER — DOXYCYCLINE 100 MG/1
CAPSULE ORAL
COMMUNITY
Start: 2022-12-31 | End: 2023-10-30 | Stop reason: ALTCHOICE

## 2023-05-05 RX ORDER — SACUBITRIL AND VALSARTAN 49; 51 MG/1; MG/1
1 TABLET, FILM COATED ORAL 2 TIMES DAILY
COMMUNITY

## 2023-05-05 RX ORDER — LIDOCAINE 50 MG/G
PATCH TOPICAL
Status: ON HOLD | COMMUNITY
End: 2023-12-06 | Stop reason: ENTERED-IN-ERROR

## 2023-05-05 RX ORDER — PANTOPRAZOLE SODIUM 40 MG/1
40 TABLET, DELAYED RELEASE ORAL DAILY
Status: ON HOLD | COMMUNITY
End: 2023-12-06

## 2023-05-05 RX ORDER — TESTOSTERONE 30 MG/1.5ML
2 SOLUTION TOPICAL DAILY
COMMUNITY

## 2023-05-05 NOTE — TELEPHONE ENCOUNTER
Pt is wanting Diazepam to [facility] in [place].   [facility]  (A Division of [address] [place] [address])      [id number]]  (Abnormal) Collected: 23 0728    Order Status: Completed Specimen:

## 2023-05-24 ENCOUNTER — OFFICE VISIT (OUTPATIENT)
Dept: PAIN MANAGEMENT | Age: 78
End: 2023-05-24
Payer: MEDICARE

## 2023-05-24 VITALS
HEART RATE: 87 BPM | SYSTOLIC BLOOD PRESSURE: 100 MMHG | HEIGHT: 64 IN | OXYGEN SATURATION: 93 % | TEMPERATURE: 99 F | BODY MASS INDEX: 26.98 KG/M2 | DIASTOLIC BLOOD PRESSURE: 64 MMHG | WEIGHT: 158 LBS | RESPIRATION RATE: 16 BRPM

## 2023-05-24 DIAGNOSIS — S33.9XXA CHRONIC OR OLD SPRAIN OF LUMBOSACRAL LIGAMENT: ICD-10-CM

## 2023-05-24 DIAGNOSIS — S33.5XXD LUMBAR SPRAIN, SUBSEQUENT ENCOUNTER: ICD-10-CM

## 2023-05-24 DIAGNOSIS — M51.26 DISPLACEMENT OF LUMBAR INTERVERTEBRAL DISC WITHOUT MYELOPATHY: ICD-10-CM

## 2023-05-24 DIAGNOSIS — M51.37 DEGENERATION OF LUMBAR OR LUMBOSACRAL INTERVERTEBRAL DISC: ICD-10-CM

## 2023-05-24 DIAGNOSIS — G89.4 CHRONIC PAIN SYNDROME: ICD-10-CM

## 2023-05-24 DIAGNOSIS — G89.4 CHRONIC PAIN SYNDROME: Primary | ICD-10-CM

## 2023-05-24 PROCEDURE — 3074F SYST BP LT 130 MM HG: CPT | Performed by: PHYSICIAN ASSISTANT

## 2023-05-24 PROCEDURE — 99213 OFFICE O/P EST LOW 20 MIN: CPT | Performed by: PHYSICIAN ASSISTANT

## 2023-05-24 PROCEDURE — G8417 CALC BMI ABV UP PARAM F/U: HCPCS | Performed by: PHYSICIAN ASSISTANT

## 2023-05-24 PROCEDURE — 1123F ACP DISCUSS/DSCN MKR DOCD: CPT | Performed by: PHYSICIAN ASSISTANT

## 2023-05-24 PROCEDURE — 3078F DIAST BP <80 MM HG: CPT | Performed by: PHYSICIAN ASSISTANT

## 2023-05-24 PROCEDURE — G8427 DOCREV CUR MEDS BY ELIG CLIN: HCPCS | Performed by: PHYSICIAN ASSISTANT

## 2023-05-24 PROCEDURE — 1036F TOBACCO NON-USER: CPT | Performed by: PHYSICIAN ASSISTANT

## 2023-05-24 RX ORDER — OXYCODONE AND ACETAMINOPHEN 7.5; 325 MG/1; MG/1
1 TABLET ORAL 3 TIMES DAILY PRN
Qty: 90 TABLET | Refills: 0 | Status: SHIPPED | OUTPATIENT
Start: 2023-05-26 | End: 2023-06-25

## 2023-05-24 RX ORDER — BACLOFEN 10 MG/1
10 TABLET ORAL DAILY
Qty: 30 TABLET | Refills: 2 | Status: SHIPPED | OUTPATIENT
Start: 2023-05-24 | End: 2023-06-23

## 2023-05-24 NOTE — PROGRESS NOTES
Liseth Allen presents to the [facility] on 5/24/2023. Emily Cockayne is complaining of pain in his low back. The pain is constant. The pain is described as aching, dull, and sharp. Pain is rated on his best day at a 4, on his worst day at a 10, and on average at a 6 on the VAS scale. He took his last dose of Percocet today. Any procedures since your last visit: No    Pacemaker or defibrillator: Yes     He is not on NSAIDS and is  on anticoagulation medications to include Eliquis and is managed by Abiel Wright MD  .     Medication Contract and Consent for Opioid Use Documents Filed       Patient Documents       Type of Document Status Date Received Received By Description    Medication Contract Received 5/24/2021  9:00 AM Lauren Abdul Medication Contract    Medication Contract Received 5/17/2022  3:39 PM Kaylee RAZO [facility] Pain Management    Medication Contract Received 4/26/2023  4:43 PM AMY ROBLES medication contract                    /64   Pulse 87   Temp 99 °F (37.2 °C) (Infrared)   Resp 16   Ht 5' 4\" (1.626 m)   Wt 158 lb (71.7 kg)   SpO2 93%   BMI 27.12 kg/m²      No LMP for male patient.

## 2023-05-24 NOTE — PROGRESS NOTES
NICOLASA NAVEEN [facility] - BEHAVIORAL HEALTH SERVICES Pain Management  [facility] [address]  [facility] - BEHAVIORAL HEALTH SERVICES, [facility]    Follow up Note      Mary Jane Mauricio     Date of Visit:  2023    CC:  Patient presents for follow up   Chief Complaint   Patient presents with    Follow-up     Low back pain                HPI:    Pain is  unchanged. Appropriate analgesia with current medications regimen: yes. Change in quality of symptoms:no. Medication side effects:none. Recent diagnostic testing:none. Recent interventional procedures: None. He has been on anticoagulation medications to include Eliquis and has not been on herbal supplements. He is not diabetic. Imagin2022 CT abdomen/pelvis    IMPRESSION:   1. Multiple small nonobstructing bilateral calcified calyceal calculi. 2. No evidence for other calcified collecting system calculi or   obstruction. 3. Hypodense posterior right upper pole renal cortical lesion most   likely a cyst. No further evaluation is required*. 4. Streaky soft tissue densities in the bilateral perinephric fat   compatible with active or, more likely, remote inflammation. 5. No evidence of mass, lymphadenopathy or inflammatory process. 6. Dense atherosclerotic calcification throughout normal caliber   abdominal aorta. 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 Degenerative disc disease, spondylosis causing stenosis. Probable large extruded disc at L3-4.  Spinal listhesis L4 and L5 exacerbating the stenosis  EMG dated 3/24/2016     L5 radiculopathy  CT dated 2016 lumbar spine demonstrates degenerative spinal stenosis that is severe at L3-4 L4-5 and L5-S1 levels                                        Potential Aberrant Drug-Related Behavior: no     Urine Drug Screenin2018 buccal consistent  2019 buccal consistent  2019 consistent  10/2019 consistent for oxycodone, metabolites, and benzodiazapines  2020

## 2023-05-31 ENCOUNTER — APPOINTMENT (OUTPATIENT)
Dept: PRIMARY CARE | Facility: CLINIC | Age: 78
End: 2023-05-31
Payer: MEDICARE

## 2023-05-31 DIAGNOSIS — R21 RASH AND OTHER NONSPECIFIC SKIN ERUPTION: ICD-10-CM

## 2023-06-01 RX ORDER — TRIAMCINOLONE ACETONIDE 1 MG/ML
LOTION TOPICAL
Qty: 60 ML | Refills: 3 | Status: SHIPPED | OUTPATIENT
Start: 2023-06-01 | End: 2023-12-09 | Stop reason: HOSPADM

## 2023-06-15 DIAGNOSIS — H93.12 TINNITUS OF LEFT EAR: ICD-10-CM

## 2023-06-15 DIAGNOSIS — F51.01 PRIMARY INSOMNIA: ICD-10-CM

## 2023-06-15 RX ORDER — DIAZEPAM 5 MG/1
5 TABLET ORAL 3 TIMES DAILY PRN
Qty: 90 TABLET | Refills: 0 | Status: SHIPPED | OUTPATIENT
Start: 2023-06-15 | End: 2023-07-27 | Stop reason: SDUPTHER

## 2023-06-15 NOTE — TELEPHONE ENCOUNTER
Patient is requesting a refill of diazapam    [facility] [place]

## 2023-06-22 ENCOUNTER — OFFICE VISIT (OUTPATIENT)
Dept: PAIN MANAGEMENT | Age: 78
End: 2023-06-22
Payer: MEDICARE

## 2023-06-22 VITALS
HEIGHT: 64 IN | DIASTOLIC BLOOD PRESSURE: 72 MMHG | SYSTOLIC BLOOD PRESSURE: 114 MMHG | RESPIRATION RATE: 18 BRPM | HEART RATE: 83 BPM | BODY MASS INDEX: 26.98 KG/M2 | TEMPERATURE: 98.2 F | WEIGHT: 158 LBS | OXYGEN SATURATION: 95 %

## 2023-06-22 DIAGNOSIS — G89.4 CHRONIC PAIN SYNDROME: Primary | ICD-10-CM

## 2023-06-22 DIAGNOSIS — M79.10 MYALGIA: ICD-10-CM

## 2023-06-22 DIAGNOSIS — M51.37 DEGENERATION OF LUMBAR OR LUMBOSACRAL INTERVERTEBRAL DISC: ICD-10-CM

## 2023-06-22 DIAGNOSIS — M51.26 DISPLACEMENT OF LUMBAR INTERVERTEBRAL DISC WITHOUT MYELOPATHY: ICD-10-CM

## 2023-06-22 DIAGNOSIS — S33.9XXA CHRONIC OR OLD SPRAIN OF LUMBOSACRAL LIGAMENT: ICD-10-CM

## 2023-06-22 DIAGNOSIS — S33.5XXD LUMBAR SPRAIN, SUBSEQUENT ENCOUNTER: ICD-10-CM

## 2023-06-22 PROCEDURE — G8427 DOCREV CUR MEDS BY ELIG CLIN: HCPCS | Performed by: PHYSICIAN ASSISTANT

## 2023-06-22 PROCEDURE — 99213 OFFICE O/P EST LOW 20 MIN: CPT | Performed by: PHYSICIAN ASSISTANT

## 2023-06-22 PROCEDURE — G8417 CALC BMI ABV UP PARAM F/U: HCPCS | Performed by: PHYSICIAN ASSISTANT

## 2023-06-22 PROCEDURE — 3074F SYST BP LT 130 MM HG: CPT | Performed by: PHYSICIAN ASSISTANT

## 2023-06-22 PROCEDURE — 3078F DIAST BP <80 MM HG: CPT | Performed by: PHYSICIAN ASSISTANT

## 2023-06-22 PROCEDURE — 1123F ACP DISCUSS/DSCN MKR DOCD: CPT | Performed by: PHYSICIAN ASSISTANT

## 2023-06-22 PROCEDURE — 1036F TOBACCO NON-USER: CPT | Performed by: PHYSICIAN ASSISTANT

## 2023-06-22 RX ORDER — OXYCODONE AND ACETAMINOPHEN 7.5; 325 MG/1; MG/1
1 TABLET ORAL 3 TIMES DAILY PRN
Qty: 90 TABLET | Refills: 0 | Status: SHIPPED | OUTPATIENT
Start: 2023-06-28 | End: 2023-07-28

## 2023-06-22 NOTE — PROGRESS NOTES
[facility]  [address]  [facility]    Follow up Note      Lindsey Johnston     Date of Visit:  2023    CC:  Patient presents for follow up   Chief Complaint   Patient presents with    Pain    Back Pain               HPI:    Pain is  unchanged. Appropriate analgesia with current medications regimen: yes. Change in quality of symptoms:no. Medication side effects:none. Recent diagnostic testing:none. Recent interventional procedures: None. He has been on anticoagulation medications to include Eliquis and has not been on herbal supplements. He is not diabetic. Imagin2022 CT abdomen/pelvis    IMPRESSION:   1. Multiple small nonobstructing bilateral calcified calyceal calculi. 2. No evidence for other calcified collecting system calculi or   obstruction. 3. Hypodense posterior right upper pole renal cortical lesion most   likely a cyst. No further evaluation is required*. 4. Streaky soft tissue densities in the bilateral perinephric fat   compatible with active or, more likely, remote inflammation. 5. No evidence of mass, lymphadenopathy or inflammatory process. 6. Dense atherosclerotic calcification throughout normal caliber   abdominal aorta. 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 Degenerative disc disease, spondylosis causing stenosis. Probable large extruded disc at L3-4.  Spinal listhesis L4 and L5 exacerbating the stenosis  EMG dated 3/24/2016     L5 radiculopathy  CT dated 2016 lumbar spine demonstrates degenerative spinal stenosis that is severe at L3-4 L4-5 and L5-S1 levels                                        Potential Aberrant Drug-Related Behavior: no     Urine Drug Screenin2018 buccal consistent  2019 buccal consistent  2019 consistent  10/2019 consistent for oxycodone, metabolites, and benzodiazapines  2020 consistent  2021

## 2023-06-22 NOTE — PROGRESS NOTES
Christi Gonsalez presents to the [address] [address] on 6/22/2023. Shereen Michael is complaining of pain back. The pain is constant. The pain is described as aching, throbbing, stabbing, sharp, exhausting, penetrating, nagging, and miserable. Pain is rated on his best day at a 7, on his worst day at a 10, and on average at a 7 on the VAS scale. He took his last dose of Percocet and Baclofen  today. What number best describes how, during the past week, pain has interfered with your enjoyment of life 8    What number best describes how, during the past week, pain has interfered with your general activity 8    Any procedures since your last visit: No  He is not on NSAIDS and  is  on anticoagulation medications to include Eliquis and is managed by Cardiology. Pacemaker or defibrillator: Yes Physician managing device is Cardiology. Medication Contract and Consent for Opioid Use Documents Filed       Patient Documents       Type of Document Status Date Received Received By Description    Medication Contract Received 5/24/2021  9:00 AM Shonda Roach Medication Contract    Medication Contract Received 5/17/2022  3:39 PM Kaylee RAZO [facility]    Medication Contract Received 4/26/2023  4:43 PM AMY ROBLES medication contract                       Pulse 83   Temp 98.2 °F (36.8 °C) (Infrared)   Resp 18   Ht 5' 4\" (1.626 m)   Wt 158 lb (71.7 kg)   SpO2 95%   BMI 27.12 kg/m²      No LMP for male patient.

## 2023-07-24 LAB
ALANINE AMINOTRANSFERASE (SGPT) (U/L) IN SER/PLAS: 12 U/L (ref 10–52)
ALBUMIN (G/DL) IN SER/PLAS: 4.1 G/DL (ref 3.4–5)
ALBUMIN (MG/L) IN URINE: 25.8 MG/L
ALBUMIN/CREATININE (UG/MG) IN URINE: 36.8 UG/MG CRT (ref 0–30)
ALKALINE PHOSPHATASE (U/L) IN SER/PLAS: 57 U/L (ref 33–136)
ANION GAP IN SER/PLAS: 12 MMOL/L (ref 10–20)
ASPARTATE AMINOTRANSFERASE (SGOT) (U/L) IN SER/PLAS: 19 U/L (ref 9–39)
BASOPHILS (10*3/UL) IN BLOOD BY AUTOMATED COUNT: 0.09 X10E9/L (ref 0–0.1)
BASOPHILS/100 LEUKOCYTES IN BLOOD BY AUTOMATED COUNT: 1.3 % (ref 0–2)
BILIRUBIN TOTAL (MG/DL) IN SER/PLAS: 0.6 MG/DL (ref 0–1.2)
CALCIUM (MG/DL) IN SER/PLAS: 9.2 MG/DL (ref 8.6–10.3)
CARBON DIOXIDE, TOTAL (MMOL/L) IN SER/PLAS: 25 MMOL/L (ref 21–32)
CHLORIDE (MMOL/L) IN SER/PLAS: 105 MMOL/L (ref 98–107)
CHOLESTEROL (MG/DL) IN SER/PLAS: 97 MG/DL (ref 0–199)
CHOLESTEROL IN HDL (MG/DL) IN SER/PLAS: 52.7 MG/DL
CHOLESTEROL/HDL RATIO: 1.8
COBALAMIN (VITAMIN B12) (PG/ML) IN SER/PLAS: 578 PG/ML (ref 211–911)
CREATININE (MG/DL) IN SER/PLAS: 1.7 MG/DL (ref 0.5–1.3)
CREATININE (MG/DL) IN URINE: 70.2 MG/DL (ref 20–370)
EOSINOPHILS (10*3/UL) IN BLOOD BY AUTOMATED COUNT: 0.18 X10E9/L (ref 0–0.4)
EOSINOPHILS/100 LEUKOCYTES IN BLOOD BY AUTOMATED COUNT: 2.7 % (ref 0–6)
ERYTHROCYTE DISTRIBUTION WIDTH (RATIO) BY AUTOMATED COUNT: 14.7 % (ref 11.5–14.5)
ERYTHROCYTE MEAN CORPUSCULAR HEMOGLOBIN CONCENTRATION (G/DL) BY AUTOMATED: 31.3 G/DL (ref 32–36)
ERYTHROCYTE MEAN CORPUSCULAR VOLUME (FL) BY AUTOMATED COUNT: 103 FL (ref 80–100)
ERYTHROCYTES (10*6/UL) IN BLOOD BY AUTOMATED COUNT: 4.69 X10E12/L (ref 4.5–5.9)
FERRITIN (UG/LL) IN SER/PLAS: 52 UG/L (ref 20–300)
GFR MALE: 40 ML/MIN/1.73M2
GLUCOSE (MG/DL) IN SER/PLAS: 111 MG/DL (ref 74–99)
HEMATOCRIT (%) IN BLOOD BY AUTOMATED COUNT: 48.5 % (ref 41–52)
HEMOGLOBIN (G/DL) IN BLOOD: 15.2 G/DL (ref 13.5–17.5)
IMMATURE GRANULOCYTES/100 LEUKOCYTES IN BLOOD BY AUTOMATED COUNT: 0.3 % (ref 0–0.9)
IRON (UG/DL) IN SER/PLAS: 82 UG/DL (ref 35–150)
IRON BINDING CAPACITY (UG/DL) IN SER/PLAS: 325 UG/DL (ref 240–445)
IRON SATURATION (%) IN SER/PLAS: 25 % (ref 25–45)
LDL: 25 MG/DL (ref 0–99)
LEUKOCYTES (10*3/UL) IN BLOOD BY AUTOMATED COUNT: 6.8 X10E9/L (ref 4.4–11.3)
LYMPHOCYTES (10*3/UL) IN BLOOD BY AUTOMATED COUNT: 1.44 X10E9/L (ref 0.8–3)
LYMPHOCYTES/100 LEUKOCYTES IN BLOOD BY AUTOMATED COUNT: 21.2 % (ref 13–44)
MAGNESIUM (MG/DL) IN SER/PLAS: 2.16 MG/DL (ref 1.6–2.4)
MONOCYTES (10*3/UL) IN BLOOD BY AUTOMATED COUNT: 0.55 X10E9/L (ref 0.05–0.8)
MONOCYTES/100 LEUKOCYTES IN BLOOD BY AUTOMATED COUNT: 8.1 % (ref 2–10)
NEUTROPHILS (10*3/UL) IN BLOOD BY AUTOMATED COUNT: 4.5 X10E9/L (ref 1.6–5.5)
NEUTROPHILS/100 LEUKOCYTES IN BLOOD BY AUTOMATED COUNT: 66.4 % (ref 40–80)
PLATELETS (10*3/UL) IN BLOOD AUTOMATED COUNT: 158 X10E9/L (ref 150–450)
POTASSIUM (MMOL/L) IN SER/PLAS: 4.4 MMOL/L (ref 3.5–5.3)
PROTEIN TOTAL: 6.9 G/DL (ref 6.4–8.2)
SODIUM (MMOL/L) IN SER/PLAS: 138 MMOL/L (ref 136–145)
THYROTROPIN (MIU/L) IN SER/PLAS BY DETECTION LIMIT <= 0.05 MIU/L: 0.68 MIU/L (ref 0.44–3.98)
TRIGLYCERIDE (MG/DL) IN SER/PLAS: 95 MG/DL (ref 0–149)
UREA NITROGEN (MG/DL) IN SER/PLAS: 25 MG/DL (ref 6–23)
VLDL: 19 MG/DL (ref 0–40)

## 2023-07-25 ENCOUNTER — OFFICE VISIT (OUTPATIENT)
Dept: PAIN MANAGEMENT | Age: 78
End: 2023-07-25
Payer: MEDICARE

## 2023-07-25 VITALS
SYSTOLIC BLOOD PRESSURE: 113 MMHG | TEMPERATURE: 98.1 F | DIASTOLIC BLOOD PRESSURE: 75 MMHG | OXYGEN SATURATION: 93 % | RESPIRATION RATE: 16 BRPM | HEIGHT: 64 IN | BODY MASS INDEX: 26.29 KG/M2 | WEIGHT: 154 LBS | HEART RATE: 71 BPM

## 2023-07-25 DIAGNOSIS — G89.4 CHRONIC PAIN SYNDROME: ICD-10-CM

## 2023-07-25 DIAGNOSIS — G89.4 CHRONIC PAIN SYNDROME: Primary | ICD-10-CM

## 2023-07-25 DIAGNOSIS — M51.37 DEGENERATION OF LUMBAR OR LUMBOSACRAL INTERVERTEBRAL DISC: ICD-10-CM

## 2023-07-25 DIAGNOSIS — M51.26 DISPLACEMENT OF LUMBAR INTERVERTEBRAL DISC WITHOUT MYELOPATHY: ICD-10-CM

## 2023-07-25 DIAGNOSIS — M79.10 MYALGIA: ICD-10-CM

## 2023-07-25 DIAGNOSIS — S33.5XXD LUMBAR SPRAIN, SUBSEQUENT ENCOUNTER: ICD-10-CM

## 2023-07-25 DIAGNOSIS — S33.9XXA CHRONIC OR OLD SPRAIN OF LUMBOSACRAL LIGAMENT: ICD-10-CM

## 2023-07-25 PROCEDURE — 3074F SYST BP LT 130 MM HG: CPT | Performed by: PHYSICIAN ASSISTANT

## 2023-07-25 PROCEDURE — G8427 DOCREV CUR MEDS BY ELIG CLIN: HCPCS | Performed by: PHYSICIAN ASSISTANT

## 2023-07-25 PROCEDURE — 3078F DIAST BP <80 MM HG: CPT | Performed by: PHYSICIAN ASSISTANT

## 2023-07-25 PROCEDURE — 1036F TOBACCO NON-USER: CPT | Performed by: PHYSICIAN ASSISTANT

## 2023-07-25 PROCEDURE — 1123F ACP DISCUSS/DSCN MKR DOCD: CPT | Performed by: PHYSICIAN ASSISTANT

## 2023-07-25 PROCEDURE — G8417 CALC BMI ABV UP PARAM F/U: HCPCS | Performed by: PHYSICIAN ASSISTANT

## 2023-07-25 PROCEDURE — 99213 OFFICE O/P EST LOW 20 MIN: CPT | Performed by: PHYSICIAN ASSISTANT

## 2023-07-25 RX ORDER — OXYCODONE AND ACETAMINOPHEN 7.5; 325 MG/1; MG/1
1 TABLET ORAL 3 TIMES DAILY PRN
Qty: 90 TABLET | Refills: 0 | Status: SHIPPED | OUTPATIENT
Start: 2023-07-28 | End: 2023-08-27

## 2023-07-25 RX ORDER — LIDOCAINE 50 MG/G
OINTMENT TOPICAL
Qty: 70.88 G | Refills: 2 | Status: SHIPPED | OUTPATIENT
Start: 2023-07-25

## 2023-07-25 NOTE — PROGRESS NOTES
Patient presented to the office today with a small skin tear to his right forearm that was caused from his dog while riding in the car. Pressure was applied until bleeding stopped and then the wound was cleaned with chloprep and a pressure dressing was applied. Patient has no other complaints at this time.

## 2023-07-25 NOTE — PROGRESS NOTES
[facility] - [facility] Pain Management  [address] - BEHAVIORAL HEALTH SERVICES, [address] [facility]    Follow up Note      Jenn Ernandez     Date of Visit:  2023    CC:  Patient presents for follow up   Chief Complaint   Patient presents with    Follow-up    Shoulder Pain     Right shoulder    Back Pain               HPI:    Pain is  unchanged. Appropriate analgesia with current medications regimen: yes. Change in quality of symptoms:no. Medication side effects:none. Recent diagnostic testing:none. Recent interventional procedures: None. He has been on anticoagulation medications to include Eliquis and has not been on herbal supplements. He is not diabetic. Imagin2022 CT abdomen/pelvis    IMPRESSION:   1. Multiple small nonobstructing bilateral calcified calyceal calculi. 2. No evidence for other calcified collecting system calculi or   obstruction. 3. Hypodense posterior right upper pole renal cortical lesion most   likely a cyst. No further evaluation is required*. 4. Streaky soft tissue densities in the bilateral perinephric fat   compatible with active or, more likely, remote inflammation. 5. No evidence of mass, lymphadenopathy or inflammatory process. 6. Dense atherosclerotic calcification throughout normal caliber   abdominal aorta. 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 Degenerative disc disease, spondylosis causing stenosis. Probable large extruded disc at L3-4.  Spinal listhesis L4 and L5 exacerbating the stenosis  EMG dated 3/24/2016     L5 radiculopathy  CT dated 2016 lumbar spine demonstrates degenerative spinal stenosis that is severe at L3-4 L4-5 and L5-S1 levels                                        Potential Aberrant Drug-Related Behavior: no     Urine Drug Screenin2018 buccal consistent  2019 buccal consistent  2019 consistent  10/2019 consistent for oxycodone, metabolites, and

## 2023-07-27 ENCOUNTER — OFFICE VISIT (OUTPATIENT)
Dept: PRIMARY CARE | Facility: CLINIC | Age: 78
End: 2023-07-27
Payer: MEDICARE

## 2023-07-27 VITALS
WEIGHT: 157 LBS | BODY MASS INDEX: 27.82 KG/M2 | DIASTOLIC BLOOD PRESSURE: 47 MMHG | SYSTOLIC BLOOD PRESSURE: 89 MMHG | HEART RATE: 68 BPM | TEMPERATURE: 97 F | HEIGHT: 63 IN | RESPIRATION RATE: 16 BRPM | OXYGEN SATURATION: 96 %

## 2023-07-27 DIAGNOSIS — I50.42 CHRONIC COMBINED SYSTOLIC AND DIASTOLIC HEART FAILURE, NYHA CLASS 3 (MULTI): ICD-10-CM

## 2023-07-27 DIAGNOSIS — E11.59 TYPE 2 DIABETES MELLITUS WITH OTHER CIRCULATORY COMPLICATION, WITHOUT LONG-TERM CURRENT USE OF INSULIN (MULTI): ICD-10-CM

## 2023-07-27 DIAGNOSIS — I48.19 PERSISTENT ATRIAL FIBRILLATION (MULTI): ICD-10-CM

## 2023-07-27 DIAGNOSIS — E08.21 DIABETES MELLITUS DUE TO UNDERLYING CONDITION WITH DIABETIC NEPHROPATHY, WITHOUT LONG-TERM CURRENT USE OF INSULIN (MULTI): ICD-10-CM

## 2023-07-27 DIAGNOSIS — Z00.00 ROUTINE GENERAL MEDICAL EXAMINATION AT HEALTH CARE FACILITY: Primary | ICD-10-CM

## 2023-07-27 DIAGNOSIS — I25.10 CORONARY ARTERY DISEASE INVOLVING NATIVE CORONARY ARTERY OF NATIVE HEART, UNSPECIFIED WHETHER ANGINA PRESENT: ICD-10-CM

## 2023-07-27 DIAGNOSIS — N18.9 CHRONIC RENAL IMPAIRMENT, UNSPECIFIED CKD STAGE: ICD-10-CM

## 2023-07-27 DIAGNOSIS — E78.5 HYPERLIPIDEMIA, UNSPECIFIED HYPERLIPIDEMIA TYPE: ICD-10-CM

## 2023-07-27 DIAGNOSIS — H93.12 TINNITUS OF LEFT EAR: ICD-10-CM

## 2023-07-27 DIAGNOSIS — F51.01 PRIMARY INSOMNIA: ICD-10-CM

## 2023-07-27 DIAGNOSIS — Z91.030 BEE STING ALLERGY: ICD-10-CM

## 2023-07-27 DIAGNOSIS — I63.9 CEREBROVASCULAR ACCIDENT (CVA), UNSPECIFIED MECHANISM (MULTI): ICD-10-CM

## 2023-07-27 DIAGNOSIS — I10 PRIMARY HYPERTENSION: ICD-10-CM

## 2023-07-27 PROBLEM — S33.9XXA: Status: ACTIVE | Noted: 2018-12-04

## 2023-07-27 PROBLEM — S33.5XXA LUMBAR SPRAIN: Status: ACTIVE | Noted: 2018-12-04

## 2023-07-27 PROBLEM — M51.37 DEGENERATION OF LUMBAR OR LUMBOSACRAL INTERVERTEBRAL DISC: Status: ACTIVE | Noted: 2018-12-04

## 2023-07-27 PROBLEM — H90.2 CONDUCTIVE HEARING LOSS: Status: ACTIVE | Noted: 2023-07-27

## 2023-07-27 PROBLEM — F10.20 CONTINUOUS ALCOHOL DEPENDENCE (MULTI): Status: ACTIVE | Noted: 2023-07-27

## 2023-07-27 PROBLEM — J32.0 CHRONIC MAXILLARY SINUSITIS: Status: ACTIVE | Noted: 2023-07-27

## 2023-07-27 PROBLEM — I47.10 SVT (SUPRAVENTRICULAR TACHYCARDIA) (CMS-HCC): Status: ACTIVE | Noted: 2019-01-29

## 2023-07-27 PROBLEM — L08.9 SKIN INFECTION: Status: ACTIVE | Noted: 2021-06-29

## 2023-07-27 PROBLEM — N48.1 BALANITIS: Status: ACTIVE | Noted: 2020-08-31

## 2023-07-27 PROBLEM — M51.26 DISPLACEMENT OF LUMBAR INTERVERTEBRAL DISC WITHOUT MYELOPATHY: Status: ACTIVE | Noted: 2018-12-04

## 2023-07-27 PROBLEM — R97.20 ELEVATED PROSTATE SPECIFIC ANTIGEN (PSA): Status: ACTIVE | Noted: 2017-06-21

## 2023-07-27 PROBLEM — N28.1 ACQUIRED CYST OF KIDNEY: Status: ACTIVE | Noted: 2017-06-21

## 2023-07-27 PROBLEM — N41.9 PROSTATITIS: Status: ACTIVE | Noted: 2017-06-21

## 2023-07-27 PROBLEM — I47.20 VENTRICULAR TACHYCARDIA (MULTI): Chronic | Status: ACTIVE | Noted: 2017-05-09

## 2023-07-27 PROBLEM — N42.9 DISORDER OF PROSTATE: Status: ACTIVE | Noted: 2017-06-21

## 2023-07-27 PROBLEM — M51.379 DEGENERATION OF LUMBAR OR LUMBOSACRAL INTERVERTEBRAL DISC: Status: ACTIVE | Noted: 2018-12-04

## 2023-07-27 PROBLEM — F43.21 ADJUSTMENT DISORDER WITH DEPRESSED MOOD: Status: ACTIVE | Noted: 2023-07-27

## 2023-07-27 PROBLEM — D63.1 ANEMIA DUE TO STAGE 4 CHRONIC KIDNEY DISEASE TREATED WITH ERYTHROPOIETIN (MULTI): Status: ACTIVE | Noted: 2021-08-12

## 2023-07-27 PROBLEM — R39.15 URGENCY OF URINATION: Status: ACTIVE | Noted: 2017-06-21

## 2023-07-27 PROBLEM — N18.4 ANEMIA DUE TO STAGE 4 CHRONIC KIDNEY DISEASE TREATED WITH ERYTHROPOIETIN (MULTI): Status: ACTIVE | Noted: 2021-08-12

## 2023-07-27 PROCEDURE — 1170F FXNL STATUS ASSESSED: CPT | Performed by: INTERNAL MEDICINE

## 2023-07-27 PROCEDURE — 3074F SYST BP LT 130 MM HG: CPT | Performed by: INTERNAL MEDICINE

## 2023-07-27 PROCEDURE — 3078F DIAST BP <80 MM HG: CPT | Performed by: INTERNAL MEDICINE

## 2023-07-27 PROCEDURE — 1159F MED LIST DOCD IN RCRD: CPT | Performed by: INTERNAL MEDICINE

## 2023-07-27 PROCEDURE — G0009 ADMIN PNEUMOCOCCAL VACCINE: HCPCS | Performed by: INTERNAL MEDICINE

## 2023-07-27 PROCEDURE — G0439 PPPS, SUBSEQ VISIT: HCPCS | Performed by: INTERNAL MEDICINE

## 2023-07-27 PROCEDURE — 99214 OFFICE O/P EST MOD 30 MIN: CPT | Performed by: INTERNAL MEDICINE

## 2023-07-27 PROCEDURE — 1125F AMNT PAIN NOTED PAIN PRSNT: CPT | Performed by: INTERNAL MEDICINE

## 2023-07-27 PROCEDURE — 1036F TOBACCO NON-USER: CPT | Performed by: INTERNAL MEDICINE

## 2023-07-27 PROCEDURE — 1160F RVW MEDS BY RX/DR IN RCRD: CPT | Performed by: INTERNAL MEDICINE

## 2023-07-27 PROCEDURE — 90677 PCV20 VACCINE IM: CPT | Performed by: INTERNAL MEDICINE

## 2023-07-27 RX ORDER — DIAZEPAM 5 MG/1
5 TABLET ORAL 3 TIMES DAILY PRN
Qty: 90 TABLET | Refills: 0 | Status: SHIPPED | OUTPATIENT
Start: 2023-07-27 | End: 2023-09-01 | Stop reason: SDUPTHER

## 2023-07-27 RX ORDER — ASPIRIN 81 MG/1
81 TABLET ORAL DAILY
Status: ON HOLD | COMMUNITY
Start: 2009-10-08 | End: 2023-12-06 | Stop reason: ENTERED-IN-ERROR

## 2023-07-27 RX ORDER — EPINEPHRINE 0.3 MG/.3ML
1 INJECTION SUBCUTANEOUS AS NEEDED
Qty: 2 EACH | Refills: 3 | Status: SHIPPED | OUTPATIENT
Start: 2023-07-27 | End: 2024-05-08 | Stop reason: SDUPTHER

## 2023-07-27 RX ORDER — EPINEPHRINE 0.3 MG/.3ML
1 INJECTION SUBCUTANEOUS AS NEEDED
COMMUNITY
Start: 2013-05-13 | End: 2023-07-27 | Stop reason: SDUPTHER

## 2023-07-27 RX ORDER — LACTULOSE 10 G/15ML
15 SOLUTION ORAL; RECTAL DAILY
Status: ON HOLD | COMMUNITY
Start: 2017-01-04 | End: 2023-12-06 | Stop reason: ENTERED-IN-ERROR

## 2023-07-27 RX ORDER — FERROUS SULFATE 325(65) MG
1 TABLET ORAL AS NEEDED
Status: ON HOLD | COMMUNITY
End: 2023-12-06 | Stop reason: ENTERED-IN-ERROR

## 2023-07-27 SDOH — ECONOMIC STABILITY: FOOD INSECURITY: WITHIN THE PAST 12 MONTHS, YOU WORRIED THAT YOUR FOOD WOULD RUN OUT BEFORE YOU GOT MONEY TO BUY MORE.: NEVER TRUE

## 2023-07-27 SDOH — ECONOMIC STABILITY: FOOD INSECURITY: WITHIN THE PAST 12 MONTHS, THE FOOD YOU BOUGHT JUST DIDN'T LAST AND YOU DIDN'T HAVE MONEY TO GET MORE.: NEVER TRUE

## 2023-07-27 ASSESSMENT — LIFESTYLE VARIABLES
HOW OFTEN DO YOU HAVE SIX OR MORE DRINKS ON ONE OCCASION: NEVER
AUDIT-C TOTAL SCORE: 4
HOW MANY STANDARD DRINKS CONTAINING ALCOHOL DO YOU HAVE ON A TYPICAL DAY: 1 OR 2
SKIP TO QUESTIONS 9-10: 1
HOW OFTEN DO YOU HAVE A DRINK CONTAINING ALCOHOL: 4 OR MORE TIMES A WEEK

## 2023-07-27 ASSESSMENT — ACTIVITIES OF DAILY LIVING (ADL)
DRESSING: INDEPENDENT
TAKING_MEDICATION: INDEPENDENT
MANAGING_FINANCES: INDEPENDENT
GROCERY_SHOPPING: INDEPENDENT
BATHING: INDEPENDENT
DOING_HOUSEWORK: INDEPENDENT

## 2023-07-27 ASSESSMENT — PAIN SCALES - GENERAL: PAINLEVEL: 6

## 2023-07-27 ASSESSMENT — ENCOUNTER SYMPTOMS
LOSS OF SENSATION IN FEET: 1
OCCASIONAL FEELINGS OF UNSTEADINESS: 0
DEPRESSION: 0

## 2023-07-27 ASSESSMENT — PATIENT HEALTH QUESTIONNAIRE - PHQ9
1. LITTLE INTEREST OR PLEASURE IN DOING THINGS: NOT AT ALL
2. FEELING DOWN, DEPRESSED OR HOPELESS: NOT AT ALL
SUM OF ALL RESPONSES TO PHQ9 QUESTIONS 1 & 2: 0

## 2023-07-27 NOTE — PROGRESS NOTES
Patient presents for a follow up visit and Medicare Annual Wellness exam. He was last seen in February 2023. Most recent labs done 7/24/23.    Mood and affect: Normal. feels tired. Subjective   Reason for Visit: Jony Javier is an 78 y.o. male here for a Medicare Wellness visit.     Past Medical, Surgical, and Family History reviewed and updated in chart.    Reviewed all medications by prescribing practitioner or clinical pharmacist (such as prescriptions, OTCs, herbal therapies and supplements) and documented in the medical record.    HPI  HTN: patient takes Entresto, carvedilol , Bumex, Spironolactone. BP today in the office is 89/47. He does check his blood pressure at home and reports it is normally high in the morning, and then low after he takes the medication. He reports that the last time he saw the Heart Failure clinic, they were concerned about his low blood pressure. They advised he discontinue his daily half Bumex, and instead take it twice a week. He developed shortness of breath when he did this, so went back to half a pill daily. Patient has chronic hypotension    hyperlipidemia: takes Zetia, he takes atorvastatin. Most recent lipid panel shows cholesterol 97, LDL 25, TG 95.    history of DM: patient currently takes Jardiance for CHF, he does check glucoses every morning (usually runs around 100).     carotid stenosis/ CAD/ aortic stenosis/ atrial fibrillation/ Cardiac arrest/PEA and mitral regurgitation/ CHF : patient sees Dr Portillo, patient has a pacer/ defibrillator. He takes Eliquis, he does not take ASA , He goes to [facility] Failure Clinic also. He now takes spironolactone, Entresto, carvedilol. Patient saw Dr Ortiz, he is to follow up with Dr Ragsdale to be assessed for stent procedure.     CKD: patient sees nephrology in [place] now, he saw him in April. Most recent labs show BUN at 25 and sCr at 1.70.     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 Patient has not followed with hematololgy     HECTOR: patient has used CPAP at home, patient is using new machine at his home. He reports that he is sleeping ok, though he does find the hoses uncomfortable.    hypogonadism: patient takes testosterone injections per Dr Barboza    Chronic pain issues: patient takes oxycodone per Dr Aguirre at pain management in [place]. He also takes baclofen. He reports that his back pain really bothers him today, and it is radiating into his right hip.      Depression/anxiety: he takes Effexor and mirtazapine. He takes diazepam for anxiety, he takes this daily. He does require a refill on this medicine today.  I have personally reviewed the OARRS report.  This report is scanned into the electronic medical record.  I have considered the risks of abuse, dependence, addiction and diversion.  I believe that it is clinically appropriate to prescribe this medication. Edgardo Gandara MD      anemia: patient had EGD and colonoscopy per Dr Romano, the anemia has been stable, he does take iron therapy. Iron studies within normal limits on most recent labs.     Healthcare maintenance: Due for pneumonia vaccine. It is ordered today.       Patient Care Team:  Edgardo Gandara MD as PCP - General  Edgardo Gandara MD as PCP - [facility] Attributed Provider     Review of Systems  Constitutional: feeling tired, but not feeling poorly, no fever, no recent weight gain and no recent weight loss.   Eyes: had eye exam , will get new glasses, he saw the eye doctor in January, but no blurred vision, no diplopia and no eyesight problems.   ENT: hearing loss.   Cardiovascular: sees cardiology, he goes to [facility] (Dr Portillo and Dr Ledezma), patient has carotid stenosis, prior CVA, is to see Dr Ragsdale, [facility], but no chest pain, no shortness of breath, no palpitations and no lower extremity edema.  He did have one day of sharp pain over his  "pacemaker/defibrillator.   Respiratory: shortness of breath during exertion, but no cough, not coughing up sputum, no wheezing that is consistent with asthma and no chronic cough.   Gastrointestinal: takes stool softeners if needed, but no diarrhea, no constipation, no nausea and no vomiting.   Genitourinary: urinary frequency and urine has cleared, gets testosterone injections per Dr Barboza, urology at [facility]  Musculoskeletal: arthralgias, back pain and right shoulder pain, has seen Dr Ayon in the past, he gets low back pain and pain in the right lower abdomen, had spine x ray at [facility]. He still has shoulder pain.   Neurological: he sees Dr Ortiz, patient has carotid stenosis, stent placement was recommended by Dr Portillo, He is to see Dr Ragsdale at [facility] for evaluation, but no headaches and no dizziness.   Psychiatric: anxiety and he uses diazepam as needed.   Endocrine: diabetes mellitus, but no thyroid disorder.   Other Symptoms: patent is weak.     Objective   Vitals:  BP (!) 89/47 (BP Location: Left arm, Patient Position: Sitting, BP Cuff Size: Adult)   Pulse 68   Temp 36.1 °C (97 °F)   Resp 16   Ht 1.588 m (5' 2.5\")   Wt 71.2 kg (157 lb)   SpO2 96%   BMI 28.26 kg/m²       Physical Exam    Constitutional   General appearance: Alert and in no acute distress. well developed, well nourished, within normal limits of ideal weight, appears tired, accompanied by wife, walks with a cane. Pleasant male, NAD   Eyes   Inspection of eyes: Sclera and conjunctiva were normal.   Ears, Nose, Mouth, and Throat   Ears: Auricles: Normal.   Pulmonary   Respiratory assessment: No respiratory distress, normal respiratory rhythm and effort.    Auscultation of lungs: Abnormal.  no rales or crackles were heard bilaterally. no rhonchi. no wheezing. diminished breath sounds throughout all lung fields   Cardiovascular   Auscultation of heart: Abnormal.  The heart rate was normal. The rhythm was regular. Heart sounds: the heart " sounds were distant, but normal S1 and normal S2. A grade 2 systolic murmur was heard at the RUSB. A grade 1 systolic murmur was heard at the LUSB. Murmur radiates to the carotids, this is not new.    Exam for edema:  trace ankle edema on the bilateral lower extremities, no pretibial edema, no knee edema and no sacrum edema. no carotid bruits noted. Significant bruising over UE s, especially the RUE  Lymphatic   Palpation of lymph nodes in neck: No cervical lymphadenopathy.   Musculoskeletal pain in the right para lumbar region.   Neurologic   Cranial nerves: Nerves 2-12 were intact, no focal neuro defects. no facial asymmetry is present.   Psychiatric   Orientation: Oriented to person, place, and time.    Mood and affect: Normal. feels tired.  Assessment/Plan   Problem List Items Addressed This Visit       CAD (coronary artery disease)    Current Assessment & Plan     Sees cardiology, he also goes to heart failure clinic         Relevant Medications    EPINEPHrine 0.3 mg/0.3 mL injection syringe    Other Relevant Orders    Comprehensive Metabolic Panel    Chronic combined systolic and diastolic heart failure, NYHA class 3 (CMS/[facility])    Current Assessment & Plan     Continue current treatment, follow up with cardiology         Relevant Medications    EPINEPHrine 0.3 mg/0.3 mL injection syringe    Other Relevant Orders    Comprehensive Metabolic Panel    Chronic renal impairment    Current Assessment & Plan     Clinically stable, continue to follow up with nephrology         Relevant Orders    TSH with reflex to Free T4 if abnormal    Vitamin D 1,25 Dihydroxy    Comprehensive Metabolic Panel    CVA (cerebral vascular accident) (CMS/[facility])    Current Assessment & Plan     He is clinically stable         Relevant Orders    Comprehensive Metabolic Panel    Diabetes (CMS/[facility])    Current Assessment & Plan     Glucoses have been stable, check labs as ordered         Relevant Orders    TSH with reflex to Free T4 if abnormal     Hemoglobin A1C    Albumin , Urine Random    Comprehensive Metabolic Panel    HTN (hypertension)    Current Assessment & Plan     BP is low, no med changes         Relevant Orders    Comprehensive Metabolic Panel    Hyperlipidemia    Current Assessment & Plan     No med changes, check labs as  ordered         Relevant Orders    Comprehensive Metabolic Panel    Lipid Panel    Insomnia    Relevant Medications    diazePAM (Valium) 5 mg tablet    Other Relevant Orders    Comprehensive Metabolic Panel    Persistent atrial fibrillation (CMS/HCC)    Relevant Medications    EPINEPHrine 0.3 mg/0.3 mL injection syringe    Other Relevant Orders    Comprehensive Metabolic Panel    Tinnitus of left ear    Relevant Medications    diazePAM (Valium) 5 mg tablet    Other Relevant Orders    Comprehensive Metabolic Panel    Routine general medical examination at health care facility - Primary    Current Assessment & Plan     Prevnar 20 vaccine given today          Relevant Orders    Comprehensive Metabolic Panel     Other Visit Diagnoses       Bee sting allergy        Relevant Medications    EPINEPHrine 0.3 mg/0.3 mL injection syringe    Other Relevant Orders    Comprehensive Metabolic Panel          Check labs in 6 months  Follow up in 3 months, I have personally reviewed the OARRS report.  This report is scanned into the electronic medical record.  I have considered the risks of abuse, dependence, addiction and diversion.  I believe that it is clinically appropriate to prescribe this medication. Diazepam is ordered. Edgardo Gandara MD     Medicare wellness exam completed

## 2023-07-31 ENCOUNTER — TELEPHONE (OUTPATIENT)
Dept: PRIMARY CARE | Facility: CLINIC | Age: 78
End: 2023-07-31
Payer: COMMERCIAL

## 2023-07-31 ENCOUNTER — TELEPHONE (OUTPATIENT)
Dept: PAIN MANAGEMENT | Age: 78
End: 2023-07-31

## 2023-07-31 DIAGNOSIS — M51.26 DISPLACEMENT OF LUMBAR INTERVERTEBRAL DISC WITHOUT MYELOPATHY: ICD-10-CM

## 2023-07-31 DIAGNOSIS — S33.9XXA CHRONIC OR OLD SPRAIN OF LUMBOSACRAL LIGAMENT: ICD-10-CM

## 2023-07-31 DIAGNOSIS — M51.37 DEGENERATION OF LUMBAR OR LUMBOSACRAL INTERVERTEBRAL DISC: ICD-10-CM

## 2023-07-31 DIAGNOSIS — N52.9 ERECTILE DYSFUNCTION, UNSPECIFIED ERECTILE DYSFUNCTION TYPE: Primary | ICD-10-CM

## 2023-07-31 DIAGNOSIS — G47.33 OSA ON CPAP: Primary | ICD-10-CM

## 2023-07-31 DIAGNOSIS — S33.5XXD LUMBAR SPRAIN, SUBSEQUENT ENCOUNTER: ICD-10-CM

## 2023-07-31 DIAGNOSIS — G89.4 CHRONIC PAIN SYNDROME: ICD-10-CM

## 2023-07-31 PROBLEM — H74.90 DISORDER OF MIDDLE EAR: Status: ACTIVE | Noted: 2023-07-31

## 2023-07-31 RX ORDER — TADALAFIL 20 MG/1
TABLET ORAL
Qty: 30 TABLET | Refills: 4 | Status: SHIPPED | OUTPATIENT
Start: 2023-07-31 | End: 2023-10-30 | Stop reason: SDUPTHER

## 2023-07-31 RX ORDER — LIDOCAINE 50 MG/G
1 PATCH TOPICAL DAILY
Qty: 30 PATCH | Refills: 5 | Status: SHIPPED | OUTPATIENT
Start: 2023-07-31 | End: 2023-08-30

## 2023-07-31 NOTE — TELEPHONE ENCOUNTER
Naren Conley wife called in requesting a refill on his lidocaine patch 5%. Patient wanted to know what over the counter medication can she take for pain and can she take a bath,pt was told that she can take Tylenol or Aleve,and that she can shower no baths,pt voiced understanding.

## 2023-07-31 NOTE — TELEPHONE ENCOUNTER
Pt wife called in and stated pt is needing a new script for CPAP supplies sent over to [facility].     [employer] [employer] phone # - [phone]

## 2023-08-02 NOTE — TELEPHONE ENCOUNTER
Faxed on 8/2. LMOM for patient to call office, when call is returned please give patient provider's message.

## 2023-08-25 ENCOUNTER — TELEPHONE (OUTPATIENT)
Dept: PAIN MANAGEMENT | Age: 78
End: 2023-08-25

## 2023-08-25 DIAGNOSIS — G89.4 CHRONIC PAIN SYNDROME: ICD-10-CM

## 2023-08-25 DIAGNOSIS — S33.9XXA CHRONIC OR OLD SPRAIN OF LUMBOSACRAL LIGAMENT: ICD-10-CM

## 2023-08-25 DIAGNOSIS — M51.37 DEGENERATION OF LUMBAR OR LUMBOSACRAL INTERVERTEBRAL DISC: ICD-10-CM

## 2023-08-25 DIAGNOSIS — S33.5XXD LUMBAR SPRAIN, SUBSEQUENT ENCOUNTER: ICD-10-CM

## 2023-08-25 DIAGNOSIS — M51.26 DISPLACEMENT OF LUMBAR INTERVERTEBRAL DISC WITHOUT MYELOPATHY: ICD-10-CM

## 2023-08-25 RX ORDER — OXYCODONE AND ACETAMINOPHEN 7.5; 325 MG/1; MG/1
1 TABLET ORAL 3 TIMES DAILY PRN
Qty: 18 TABLET | Refills: 0 | Status: SHIPPED
Start: 2023-08-25 | End: 2023-08-31

## 2023-08-25 NOTE — TELEPHONE ENCOUNTER
He is technically not due until 8/27 but I put it in for today because his pharmacy may not be open on weekends.

## 2023-08-25 NOTE — TELEPHONE ENCOUNTER
Patient called in asking for enough percocet to get him to his appointment on 8/31/23.  Please advise

## 2023-08-29 ENCOUNTER — TELEPHONE (OUTPATIENT)
Dept: PRIMARY CARE | Facility: CLINIC | Age: 78
End: 2023-08-29
Payer: COMMERCIAL

## 2023-08-31 ENCOUNTER — OFFICE VISIT (OUTPATIENT)
Dept: PAIN MANAGEMENT | Age: 78
End: 2023-08-31
Payer: MEDICARE

## 2023-08-31 VITALS
OXYGEN SATURATION: 92 % | DIASTOLIC BLOOD PRESSURE: 67 MMHG | BODY MASS INDEX: 26.29 KG/M2 | WEIGHT: 154 LBS | HEART RATE: 81 BPM | HEIGHT: 64 IN | SYSTOLIC BLOOD PRESSURE: 102 MMHG

## 2023-08-31 DIAGNOSIS — S33.9XXA CHRONIC OR OLD SPRAIN OF LUMBOSACRAL LIGAMENT: ICD-10-CM

## 2023-08-31 DIAGNOSIS — Z79.891 ENCOUNTER FOR LONG-TERM OPIATE ANALGESIC USE: ICD-10-CM

## 2023-08-31 DIAGNOSIS — M51.26 DISPLACEMENT OF LUMBAR INTERVERTEBRAL DISC WITHOUT MYELOPATHY: ICD-10-CM

## 2023-08-31 DIAGNOSIS — M25.551 RIGHT HIP PAIN: ICD-10-CM

## 2023-08-31 DIAGNOSIS — G89.4 CHRONIC PAIN SYNDROME: ICD-10-CM

## 2023-08-31 DIAGNOSIS — S33.9XXA CHRONIC OR OLD SPRAIN OF LUMBOSACRAL LIGAMENT: Primary | ICD-10-CM

## 2023-08-31 DIAGNOSIS — S33.5XXD LUMBAR SPRAIN, SUBSEQUENT ENCOUNTER: ICD-10-CM

## 2023-08-31 DIAGNOSIS — M79.10 MYALGIA: ICD-10-CM

## 2023-08-31 DIAGNOSIS — M51.37 DEGENERATION OF LUMBAR OR LUMBOSACRAL INTERVERTEBRAL DISC: ICD-10-CM

## 2023-08-31 PROCEDURE — 99213 OFFICE O/P EST LOW 20 MIN: CPT | Performed by: PHYSICIAN ASSISTANT

## 2023-08-31 PROCEDURE — 1123F ACP DISCUSS/DSCN MKR DOCD: CPT | Performed by: PHYSICIAN ASSISTANT

## 2023-08-31 PROCEDURE — G8417 CALC BMI ABV UP PARAM F/U: HCPCS | Performed by: PHYSICIAN ASSISTANT

## 2023-08-31 PROCEDURE — 3078F DIAST BP <80 MM HG: CPT | Performed by: PHYSICIAN ASSISTANT

## 2023-08-31 PROCEDURE — G8427 DOCREV CUR MEDS BY ELIG CLIN: HCPCS | Performed by: PHYSICIAN ASSISTANT

## 2023-08-31 PROCEDURE — 3074F SYST BP LT 130 MM HG: CPT | Performed by: PHYSICIAN ASSISTANT

## 2023-08-31 PROCEDURE — 1036F TOBACCO NON-USER: CPT | Performed by: PHYSICIAN ASSISTANT

## 2023-08-31 RX ORDER — BACLOFEN 10 MG/1
10 TABLET ORAL DAILY
Qty: 30 TABLET | Refills: 2 | Status: SHIPPED | OUTPATIENT
Start: 2023-08-31 | End: 2023-09-30

## 2023-08-31 RX ORDER — OXYCODONE AND ACETAMINOPHEN 7.5; 325 MG/1; MG/1
1 TABLET ORAL 3 TIMES DAILY PRN
Qty: 90 TABLET | Refills: 0 | Status: SHIPPED | OUTPATIENT
Start: 2023-08-31 | End: 2023-09-30

## 2023-08-31 NOTE — PROGRESS NOTES
[facility]  [address], [address] [facility]    Follow up Note      Trev Bentley     Date of Visit:  2023    CC:  Patient presents for follow up   Chief Complaint   Patient presents with    Pain               HPI:    Pain is  unchanged. Appropriate analgesia with current medications regimen: yes. Change in quality of symptoms:no. Medication side effects:none. Recent diagnostic testing:none. Recent interventional procedures: None. He has been on anticoagulation medications to include Eliquis and has not been on herbal supplements. He is not diabetic. Imagin2022 CT abdomen/pelvis    IMPRESSION:   1. Multiple small nonobstructing bilateral calcified calyceal calculi. 2. No evidence for other calcified collecting system calculi or   obstruction. 3. Hypodense posterior right upper pole renal cortical lesion most   likely a cyst. No further evaluation is required*. 4. Streaky soft tissue densities in the bilateral perinephric fat   compatible with active or, more likely, remote inflammation. 5. No evidence of mass, lymphadenopathy or inflammatory process. 6. Dense atherosclerotic calcification throughout normal caliber   abdominal aorta. 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 Degenerative disc disease, spondylosis causing stenosis. Probable large extruded disc at L3-4.  Spinal listhesis L4 and L5 exacerbating the stenosis  EMG dated 3/24/2016     L5 radiculopathy  CT dated 2016 lumbar spine demonstrates degenerative spinal stenosis that is severe at L3-4 L4-5 and L5-S1 levels                                        Potential Aberrant Drug-Related Behavior: no     Urine Drug Screenin2018 buccal consistent  2019 buccal consistent  2019 consistent  10/2019 consistent for oxycodone, metabolites, and benzodiazapines  2020 consistent  2021 consistent  3/2022

## 2023-09-01 DIAGNOSIS — F51.01 PRIMARY INSOMNIA: ICD-10-CM

## 2023-09-01 DIAGNOSIS — H93.12 TINNITUS OF LEFT EAR: ICD-10-CM

## 2023-09-01 LAB
6-MAM, QUANTITATIVE, URINE: <10 NG/ML
6-MONOACETYLMORPHINE, URINE: NEGATIVE
7-AMINOCLONAZEPAM, QUANTITATIVE, URINE: <50 NG/ML
ABNORMAL SPECIMEN VALIDITY TEST: ABNORMAL
ALCOHOL URINE: NOT DETECTED MG/DL
ALPHA-HYDROXYALPRAZOLAM, QUANTITATIVE, URINE: <50 NG/ML
ALPHA-HYDROXYMIDAZOLAM, QUANTITATIVE, URINE: <50 NG/ML
ALPHA-HYDROXYTRIAZOLAM, QUANTITATIVE, URINE: <50 NG/ML
ALPRAZOLAM URINE QUANT: <50 NG/ML
AMPHETAMINE SCREEN URINE: NEGATIVE
BARBITURATE SCREEN URINE: NEGATIVE
BENZODIAZEPINE SCREEN, URINE: POSITIVE
BUPRENORPHINE URINE: NEGATIVE
CANNABINOID SCREEN URINE: NEGATIVE
CHLORDIAZEPOXIDE, QUANTITATIVE, URINE: <50 NG/ML
CLONAZEPAM, QUANTITATIVE, URINE: <50 NG/ML
COCAINE METABOLITE, URINE: NEGATIVE
CODEINE, QUANTITATIVE, URINE: <50 NG/ML
COMPLIANCE DRUG ANALYSIS, URINE: NORMAL
DIAZEPAM URINE QUANT: <50 NG/ML
FENTANYL URINE: NEGATIVE
FLUNITRAZEPAM, QUANTITATIVE, URINE: <50 NG/ML
FLURAZEPAM, QUANTITATIVE, URINE: <50 NG/ML
HYDROCODONE, QUANTITATIVE, URINE: <50 NG/ML
HYDROMORPHONE, QUANTITATIVE, URINE: <50 NG/ML
INTEGRITY CHECK, CREATININE, URINE: 75.1
INTEGRITY CHECK, OXIDANT, URINE: <40
INTEGRITY CHECK, PH, URINE: 5.6
INTEGRITY CHECK, SPECIFIC GRAVITY, URINE: 1.01
LORAZEPAM URINE QUANT: <50 NG/ML
METHADONE SCREEN, URINE: NEGATIVE
MIDAZOLAM URINE QUANT: <50 NG/ML
MORPHINE, QUANTITATIVE, URINE: <50 NG/ML
NORDIAZEPAM URINE QUANT: 711.5 NG/ML
NORHYDROCODONE, QUANTITATIVE, URINE: <50 NG/ML
NOROXYCODONE, QUANTITATIVE, URINE: >1000 NG/ML
OPIATES, URINE: NEGATIVE
OXAZEPAM URINE QUANT: >1000 NG/ML
OXYCODONE SCREEN URINE: POSITIVE
OXYCODONE URINE, QUANTITATIVE: 740.1 NG/ML
OXYMORPHONE, QUANTITATIVE, URINE: <50 NG/ML
PHENCYCLIDINE, URINE: NEGATIVE
TEMAZEPAM, QUANTITATIVE, URINE: 995 NG/ML
TEST INFORMATION: ABNORMAL
TRAMADOL, URINE: NEGATIVE

## 2023-09-01 RX ORDER — DIAZEPAM 5 MG/1
5 TABLET ORAL 3 TIMES DAILY PRN
Qty: 90 TABLET | Refills: 0 | Status: SHIPPED | OUTPATIENT
Start: 2023-09-01 | End: 2023-10-19 | Stop reason: SDUPTHER

## 2023-09-01 NOTE — TELEPHONE ENCOUNTER
Wife called back, they will be faxing another form as the rx did not meet insurance guidelines. Form is sent to my attention. Please give to ZANDRA SABILLON.

## 2023-09-05 NOTE — TELEPHONE ENCOUNTER
Called wife, notified of message, wife expressed verbal understanding had no questions at this time.

## 2023-09-19 LAB
ERYTHROCYTE DISTRIBUTION WIDTH (RATIO) BY AUTOMATED COUNT: 14.1 % (ref 11.5–14.5)
ERYTHROCYTE MEAN CORPUSCULAR HEMOGLOBIN CONCENTRATION (G/DL) BY AUTOMATED: 31.8 G/DL (ref 32–36)
ERYTHROCYTE MEAN CORPUSCULAR VOLUME (FL) BY AUTOMATED COUNT: 105 FL (ref 80–100)
ERYTHROCYTES (10*6/UL) IN BLOOD BY AUTOMATED COUNT: 4.46 X10E12/L (ref 4.5–5.9)
HEMATOCRIT (%) IN BLOOD BY AUTOMATED COUNT: 46.8 % (ref 41–52)
HEMOGLOBIN (G/DL) IN BLOOD: 14.9 G/DL (ref 13.5–17.5)
LEUKOCYTES (10*3/UL) IN BLOOD BY AUTOMATED COUNT: 8.8 X10E9/L (ref 4.4–11.3)
PLATELETS (10*3/UL) IN BLOOD AUTOMATED COUNT: 137 X10E9/L (ref 150–450)

## 2023-09-28 ENCOUNTER — OFFICE VISIT (OUTPATIENT)
Dept: PAIN MANAGEMENT | Age: 78
End: 2023-09-28
Payer: MEDICARE

## 2023-09-28 VITALS
TEMPERATURE: 97.6 F | OXYGEN SATURATION: 92 % | HEART RATE: 79 BPM | BODY MASS INDEX: 26.29 KG/M2 | SYSTOLIC BLOOD PRESSURE: 127 MMHG | RESPIRATION RATE: 16 BRPM | HEIGHT: 64 IN | WEIGHT: 154 LBS | DIASTOLIC BLOOD PRESSURE: 74 MMHG

## 2023-09-28 DIAGNOSIS — S33.9XXA CHRONIC OR OLD SPRAIN OF LUMBOSACRAL LIGAMENT: ICD-10-CM

## 2023-09-28 DIAGNOSIS — G89.4 CHRONIC PAIN SYNDROME: ICD-10-CM

## 2023-09-28 DIAGNOSIS — M51.26 DISPLACEMENT OF LUMBAR INTERVERTEBRAL DISC WITHOUT MYELOPATHY: ICD-10-CM

## 2023-09-28 DIAGNOSIS — Z79.891 ENCOUNTER FOR LONG-TERM OPIATE ANALGESIC USE: ICD-10-CM

## 2023-09-28 DIAGNOSIS — S33.5XXD LUMBAR SPRAIN, SUBSEQUENT ENCOUNTER: ICD-10-CM

## 2023-09-28 DIAGNOSIS — M51.37 DEGENERATION OF LUMBAR OR LUMBOSACRAL INTERVERTEBRAL DISC: ICD-10-CM

## 2023-09-28 DIAGNOSIS — R10.9 CHRONIC FLANK PAIN: ICD-10-CM

## 2023-09-28 DIAGNOSIS — G89.29 CHRONIC FLANK PAIN: ICD-10-CM

## 2023-09-28 DIAGNOSIS — M25.551 RIGHT HIP PAIN: ICD-10-CM

## 2023-09-28 DIAGNOSIS — M79.10 MYALGIA: ICD-10-CM

## 2023-09-28 DIAGNOSIS — G89.4 CHRONIC PAIN SYNDROME: Primary | ICD-10-CM

## 2023-09-28 PROCEDURE — 1036F TOBACCO NON-USER: CPT | Performed by: PHYSICIAN ASSISTANT

## 2023-09-28 PROCEDURE — 3078F DIAST BP <80 MM HG: CPT | Performed by: PHYSICIAN ASSISTANT

## 2023-09-28 PROCEDURE — G8417 CALC BMI ABV UP PARAM F/U: HCPCS | Performed by: PHYSICIAN ASSISTANT

## 2023-09-28 PROCEDURE — G8427 DOCREV CUR MEDS BY ELIG CLIN: HCPCS | Performed by: PHYSICIAN ASSISTANT

## 2023-09-28 PROCEDURE — 99213 OFFICE O/P EST LOW 20 MIN: CPT

## 2023-09-28 PROCEDURE — 1123F ACP DISCUSS/DSCN MKR DOCD: CPT | Performed by: PHYSICIAN ASSISTANT

## 2023-09-28 PROCEDURE — 99213 OFFICE O/P EST LOW 20 MIN: CPT | Performed by: PHYSICIAN ASSISTANT

## 2023-09-28 PROCEDURE — 3074F SYST BP LT 130 MM HG: CPT | Performed by: PHYSICIAN ASSISTANT

## 2023-09-28 RX ORDER — OXYCODONE AND ACETAMINOPHEN 7.5; 325 MG/1; MG/1
1 TABLET ORAL 3 TIMES DAILY PRN
Qty: 90 TABLET | Refills: 0 | Status: SHIPPED | OUTPATIENT
Start: 2023-09-29 | End: 2023-10-29

## 2023-09-29 ENCOUNTER — TELEPHONE (OUTPATIENT)
Dept: PAIN MANAGEMENT | Age: 78
End: 2023-09-29

## 2023-10-16 ENCOUNTER — TELEPHONE (OUTPATIENT)
Dept: PRIMARY CARE | Facility: CLINIC | Age: 78
End: 2023-10-16
Payer: COMMERCIAL

## 2023-10-16 DIAGNOSIS — F51.01 PRIMARY INSOMNIA: ICD-10-CM

## 2023-10-16 DIAGNOSIS — H93.12 TINNITUS OF LEFT EAR: ICD-10-CM

## 2023-10-16 NOTE — TELEPHONE ENCOUNTER
Pt says he is out of his diazePAM (Valium) 5 mg tablet . Can you please send a refill to [facility]

## 2023-10-19 ENCOUNTER — TELEPHONE (OUTPATIENT)
Dept: INFUSION THERAPY | Facility: HOSPITAL | Age: 78
End: 2023-10-19
Payer: COMMERCIAL

## 2023-10-19 RX ORDER — DIAZEPAM 5 MG/1
5 TABLET ORAL 3 TIMES DAILY PRN
Qty: 90 TABLET | Refills: 0 | Status: SHIPPED | OUTPATIENT
Start: 2023-10-19 | End: 2023-12-09 | Stop reason: HOSPADM

## 2023-10-19 NOTE — TELEPHONE ENCOUNTER
Spoke to patient's wife, instructions given to decrease the Bumex to 0.5mg 4 days per week per [place] regarding cardio mems reading. Wife verbalizes understanding of dose change.

## 2023-10-19 NOTE — PROGRESS NOTES
Cardiomems readings still indicate that patient is a little dry. Will decrease Bumex to 0.5 mg on 4 days per week and when needed for increased shortness of breath or swelling or weight gain 3 # or more.  Will monitor.

## 2023-10-24 ENCOUNTER — LAB (OUTPATIENT)
Dept: LAB | Facility: LAB | Age: 78
End: 2023-10-24
Payer: MEDICARE

## 2023-10-24 DIAGNOSIS — N18.32 CHRONIC KIDNEY DISEASE, STAGE 3B (MULTI): Primary | ICD-10-CM

## 2023-10-24 DIAGNOSIS — N18.32 CHRONIC KIDNEY DISEASE, STAGE 3B (MULTI): ICD-10-CM

## 2023-10-24 LAB
25(OH)D3 SERPL-MCNC: 55 NG/ML (ref 30–100)
ALBUMIN SERPL BCP-MCNC: 4.3 G/DL (ref 3.4–5)
ANION GAP SERPL CALC-SCNC: 11 MMOL/L (ref 10–20)
APPEARANCE UR: CLEAR
BILIRUB UR STRIP.AUTO-MCNC: NEGATIVE MG/DL
BUN SERPL-MCNC: 30 MG/DL (ref 6–23)
CALCIUM SERPL-MCNC: 9.6 MG/DL (ref 8.6–10.3)
CHLORIDE SERPL-SCNC: 104 MMOL/L (ref 98–107)
CO2 SERPL-SCNC: 27 MMOL/L (ref 21–32)
COLOR UR: ABNORMAL
CREAT SERPL-MCNC: 1.76 MG/DL (ref 0.5–1.3)
GFR SERPL CREATININE-BSD FRML MDRD: 39 ML/MIN/1.73M*2
GLUCOSE SERPL-MCNC: 129 MG/DL (ref 74–99)
GLUCOSE UR STRIP.AUTO-MCNC: ABNORMAL MG/DL
KETONES UR STRIP.AUTO-MCNC: NEGATIVE MG/DL
LEUKOCYTE ESTERASE UR QL STRIP.AUTO: NEGATIVE
NITRITE UR QL STRIP.AUTO: NEGATIVE
PH UR STRIP.AUTO: 5 [PH]
PHOSPHATE SERPL-MCNC: 3.8 MG/DL (ref 2.5–4.9)
POTASSIUM SERPL-SCNC: 4.8 MMOL/L (ref 3.5–5.3)
PROT UR STRIP.AUTO-MCNC: NEGATIVE MG/DL
RBC # UR STRIP.AUTO: NEGATIVE /UL
SODIUM SERPL-SCNC: 137 MMOL/L (ref 136–145)
SP GR UR STRIP.AUTO: 1.01
UROBILINOGEN UR STRIP.AUTO-MCNC: <2 MG/DL

## 2023-10-24 PROCEDURE — 36415 COLL VENOUS BLD VENIPUNCTURE: CPT

## 2023-10-24 PROCEDURE — 80069 RENAL FUNCTION PANEL: CPT

## 2023-10-24 PROCEDURE — 83970 ASSAY OF PARATHORMONE: CPT

## 2023-10-24 PROCEDURE — 82306 VITAMIN D 25 HYDROXY: CPT

## 2023-10-24 PROCEDURE — 81003 URINALYSIS AUTO W/O SCOPE: CPT

## 2023-10-25 ENCOUNTER — OFFICE VISIT (OUTPATIENT)
Dept: PAIN MANAGEMENT | Age: 78
End: 2023-10-25
Payer: MEDICARE

## 2023-10-25 VITALS
DIASTOLIC BLOOD PRESSURE: 62 MMHG | OXYGEN SATURATION: 94 % | RESPIRATION RATE: 16 BRPM | HEIGHT: 64 IN | WEIGHT: 154 LBS | HEART RATE: 94 BPM | TEMPERATURE: 97.2 F | SYSTOLIC BLOOD PRESSURE: 113 MMHG | BODY MASS INDEX: 26.29 KG/M2

## 2023-10-25 DIAGNOSIS — Z79.891 ENCOUNTER FOR LONG-TERM OPIATE ANALGESIC USE: ICD-10-CM

## 2023-10-25 DIAGNOSIS — S33.9XXA CHRONIC OR OLD SPRAIN OF LUMBOSACRAL LIGAMENT: ICD-10-CM

## 2023-10-25 DIAGNOSIS — M51.26 DISPLACEMENT OF LUMBAR INTERVERTEBRAL DISC WITHOUT MYELOPATHY: ICD-10-CM

## 2023-10-25 DIAGNOSIS — M51.37 DEGENERATION OF LUMBAR OR LUMBOSACRAL INTERVERTEBRAL DISC: ICD-10-CM

## 2023-10-25 DIAGNOSIS — M25.551 RIGHT HIP PAIN: ICD-10-CM

## 2023-10-25 DIAGNOSIS — R10.9 CHRONIC FLANK PAIN: ICD-10-CM

## 2023-10-25 DIAGNOSIS — G89.4 CHRONIC PAIN SYNDROME: Primary | ICD-10-CM

## 2023-10-25 DIAGNOSIS — S33.5XXD LUMBAR SPRAIN, SUBSEQUENT ENCOUNTER: ICD-10-CM

## 2023-10-25 DIAGNOSIS — M79.10 MYALGIA: ICD-10-CM

## 2023-10-25 DIAGNOSIS — G89.4 CHRONIC PAIN SYNDROME: ICD-10-CM

## 2023-10-25 DIAGNOSIS — G89.29 CHRONIC FLANK PAIN: ICD-10-CM

## 2023-10-25 LAB — PTH-INTACT SERPL-MCNC: 107.3 PG/ML (ref 18.5–88)

## 2023-10-25 PROCEDURE — 3078F DIAST BP <80 MM HG: CPT | Performed by: PHYSICIAN ASSISTANT

## 2023-10-25 PROCEDURE — 1036F TOBACCO NON-USER: CPT | Performed by: PHYSICIAN ASSISTANT

## 2023-10-25 PROCEDURE — 99213 OFFICE O/P EST LOW 20 MIN: CPT | Performed by: PHYSICIAN ASSISTANT

## 2023-10-25 PROCEDURE — G8417 CALC BMI ABV UP PARAM F/U: HCPCS | Performed by: PHYSICIAN ASSISTANT

## 2023-10-25 PROCEDURE — 1123F ACP DISCUSS/DSCN MKR DOCD: CPT | Performed by: PHYSICIAN ASSISTANT

## 2023-10-25 PROCEDURE — G8484 FLU IMMUNIZE NO ADMIN: HCPCS | Performed by: PHYSICIAN ASSISTANT

## 2023-10-25 PROCEDURE — 3074F SYST BP LT 130 MM HG: CPT | Performed by: PHYSICIAN ASSISTANT

## 2023-10-25 PROCEDURE — G8427 DOCREV CUR MEDS BY ELIG CLIN: HCPCS | Performed by: PHYSICIAN ASSISTANT

## 2023-10-25 RX ORDER — OXYCODONE AND ACETAMINOPHEN 7.5; 325 MG/1; MG/1
1 TABLET ORAL 3 TIMES DAILY PRN
Qty: 90 TABLET | Refills: 0 | Status: SHIPPED | OUTPATIENT
Start: 2023-10-29 | End: 2023-11-28

## 2023-10-25 RX ORDER — BACLOFEN 10 MG/1
10 TABLET ORAL 2 TIMES DAILY
Qty: 60 TABLET | Refills: 2 | Status: SHIPPED | OUTPATIENT
Start: 2023-10-25 | End: 2023-11-24

## 2023-10-25 RX ORDER — LIDOCAINE 50 MG/G
OINTMENT TOPICAL
Qty: 70.88 G | Refills: 2 | Status: SHIPPED | OUTPATIENT
Start: 2023-10-25

## 2023-10-27 DIAGNOSIS — J18.9 PNEUMONIA, UNSPECIFIED ORGANISM: ICD-10-CM

## 2023-10-27 RX ORDER — ALBUTEROL SULFATE 90 UG/1
AEROSOL, METERED RESPIRATORY (INHALATION)
Qty: 8.5 G | Refills: 8 | Status: SHIPPED | OUTPATIENT
Start: 2023-10-27

## 2023-10-30 ENCOUNTER — OFFICE VISIT (OUTPATIENT)
Dept: PRIMARY CARE | Facility: CLINIC | Age: 78
End: 2023-10-30
Payer: MEDICARE

## 2023-10-30 VITALS
DIASTOLIC BLOOD PRESSURE: 76 MMHG | SYSTOLIC BLOOD PRESSURE: 110 MMHG | HEIGHT: 63 IN | TEMPERATURE: 97.6 F | BODY MASS INDEX: 26.93 KG/M2 | WEIGHT: 152 LBS | RESPIRATION RATE: 16 BRPM

## 2023-10-30 DIAGNOSIS — I48.19 PERSISTENT ATRIAL FIBRILLATION (MULTI): ICD-10-CM

## 2023-10-30 DIAGNOSIS — N18.9 CHRONIC RENAL IMPAIRMENT, UNSPECIFIED CKD STAGE: ICD-10-CM

## 2023-10-30 DIAGNOSIS — E79.0 HYPERURICEMIA: ICD-10-CM

## 2023-10-30 DIAGNOSIS — M54.50 CHRONIC LOW BACK PAIN, UNSPECIFIED BACK PAIN LATERALITY, UNSPECIFIED WHETHER SCIATICA PRESENT: ICD-10-CM

## 2023-10-30 DIAGNOSIS — G95.19 OTHER VASCULAR MYELOPATHIES (MULTI): ICD-10-CM

## 2023-10-30 DIAGNOSIS — Z79.899 HIGH RISK MEDICATION USE: ICD-10-CM

## 2023-10-30 DIAGNOSIS — F41.0 PANIC DISORDER: ICD-10-CM

## 2023-10-30 DIAGNOSIS — G89.29 CHRONIC LOW BACK PAIN, UNSPECIFIED BACK PAIN LATERALITY, UNSPECIFIED WHETHER SCIATICA PRESENT: ICD-10-CM

## 2023-10-30 DIAGNOSIS — J96.02 ACUTE RESPIRATORY FAILURE WITH HYPERCAPNIA (MULTI): ICD-10-CM

## 2023-10-30 DIAGNOSIS — D63.1 ANEMIA DUE TO STAGE 4 CHRONIC KIDNEY DISEASE TREATED WITH ERYTHROPOIETIN (MULTI): ICD-10-CM

## 2023-10-30 DIAGNOSIS — N52.9 ERECTILE DYSFUNCTION, UNSPECIFIED ERECTILE DYSFUNCTION TYPE: ICD-10-CM

## 2023-10-30 DIAGNOSIS — I65.22 STENOSIS OF LEFT CAROTID ARTERY: Primary | ICD-10-CM

## 2023-10-30 DIAGNOSIS — E11.59 TYPE 2 DIABETES MELLITUS WITH OTHER CIRCULATORY COMPLICATION, WITHOUT LONG-TERM CURRENT USE OF INSULIN (MULTI): ICD-10-CM

## 2023-10-30 DIAGNOSIS — N18.4 ANEMIA DUE TO STAGE 4 CHRONIC KIDNEY DISEASE TREATED WITH ERYTHROPOIETIN (MULTI): ICD-10-CM

## 2023-10-30 DIAGNOSIS — I25.10 CAD S/P PERCUTANEOUS CORONARY ANGIOPLASTY: ICD-10-CM

## 2023-10-30 DIAGNOSIS — S06.309A: ICD-10-CM

## 2023-10-30 DIAGNOSIS — E11.51 TYPE 2 DIABETES MELLITUS WITH DIABETIC PERIPHERAL ANGIOPATHY WITHOUT GANGRENE, WITHOUT LONG-TERM CURRENT USE OF INSULIN (MULTI): ICD-10-CM

## 2023-10-30 DIAGNOSIS — E78.5 HYPERLIPIDEMIA, UNSPECIFIED HYPERLIPIDEMIA TYPE: ICD-10-CM

## 2023-10-30 DIAGNOSIS — J44.9 CHRONIC OBSTRUCTIVE PULMONARY DISEASE, UNSPECIFIED COPD TYPE (MULTI): ICD-10-CM

## 2023-10-30 DIAGNOSIS — F10.20: ICD-10-CM

## 2023-10-30 DIAGNOSIS — I10 PRIMARY HYPERTENSION: ICD-10-CM

## 2023-10-30 DIAGNOSIS — Z98.61 CAD S/P PERCUTANEOUS CORONARY ANGIOPLASTY: ICD-10-CM

## 2023-10-30 PROCEDURE — 1036F TOBACCO NON-USER: CPT | Performed by: INTERNAL MEDICINE

## 2023-10-30 PROCEDURE — 1160F RVW MEDS BY RX/DR IN RCRD: CPT | Performed by: INTERNAL MEDICINE

## 2023-10-30 PROCEDURE — G0008 ADMIN INFLUENZA VIRUS VAC: HCPCS | Performed by: INTERNAL MEDICINE

## 2023-10-30 PROCEDURE — 90662 IIV NO PRSV INCREASED AG IM: CPT | Performed by: INTERNAL MEDICINE

## 2023-10-30 PROCEDURE — 3078F DIAST BP <80 MM HG: CPT | Performed by: INTERNAL MEDICINE

## 2023-10-30 PROCEDURE — 3074F SYST BP LT 130 MM HG: CPT | Performed by: INTERNAL MEDICINE

## 2023-10-30 PROCEDURE — 99214 OFFICE O/P EST MOD 30 MIN: CPT | Performed by: INTERNAL MEDICINE

## 2023-10-30 PROCEDURE — 1159F MED LIST DOCD IN RCRD: CPT | Performed by: INTERNAL MEDICINE

## 2023-10-30 PROCEDURE — 1125F AMNT PAIN NOTED PAIN PRSNT: CPT | Performed by: INTERNAL MEDICINE

## 2023-10-30 RX ORDER — TADALAFIL 20 MG/1
20 TABLET ORAL DAILY PRN
Qty: 90 TABLET | Refills: 4 | Status: SHIPPED | OUTPATIENT
Start: 2023-10-30 | End: 2024-05-08 | Stop reason: SDUPTHER

## 2023-10-30 RX ORDER — TADALAFIL 20 MG/1
20 TABLET ORAL DAILY PRN
Qty: 90 TABLET | Refills: 4 | Status: SHIPPED | OUTPATIENT
Start: 2023-10-30 | End: 2023-10-30 | Stop reason: SDUPTHER

## 2023-10-30 NOTE — PROGRESS NOTES
Chief Complaint/HPI:    HTN: patient takes Entresto, carvedilol , Bumex, Spironolactone. BP today in the office is 110/76. He does check his blood pressure at home and reports it is normally high in the morning, and then low after he takes the medication. [facility], he only takes Bumex 4 times per week now     hyperlipidemia: takes Zetia, he takes atorvastatin.      history of DM: patient currently takes Jardiance for CHF, he does check glucoses every morning (usually runs around 100).     carotid stenosis/ CAD/ aortic stenosis/ atrial fibrillation/ Cardiac arrest/PEA and mitral regurgitation/ CHF : patient sees Dr Portillo, patient has a pacer/ defibrillator. He takes Eliquis, he does not take ASA , [facility]. He now takes spironolactone, Entresto, carvedilol. Patient saw Dr Ortiz, he was to follow up with Dr Ragsdale to be assessed for stent procedure, he did follow up with Dr Ragsdale.     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 Patient has not followed with hematololgy     HECTOR: patient has used CPAP at home, patient is using new machine at his home. He reports that he is sleeping ok, though he does find the hoses uncomfortable.     hypogonadism: patient takes testosterone injections per Dr Barboza     Chronic pain issues: patient takes oxycodone per Dr Aguirre at pain management in [place]. He also takes baclofen. He reports that his back pain really bothers him today, and it is radiating into his right hip. He also has right shoulder pain and knee pain (left knee)     Depression/anxiety: he takes Effexor and mirtazapine. He takes diazepam for anxiety, he takes this daily. He does require a refill on this medicine today.  I have personally reviewed the OARRS report.  This report is scanned into the electronic medical record.  I have  considered the risks of abuse, dependence, addiction and diversion.  I believe that it is clinically appropriate to prescribe this medication. Edgardo Gandara MD      anemia: patient had EGD and colonoscopy per Dr Romano, the anemia has been stable, he does take iron therapy. Iron studies within normal limits on most recent labs.     ROS otherwise negative aside from what was mentioned above in HPI.      Patient Active Problem List   Diagnosis    Abdominal pain    Acute pneumonia    Acute sinusitis    Acute UTI    Anemia    Anxiety disorder    Panic disorder    Aortic valve stenosis    AV node dysfunction    Bilateral hearing loss    Bilateral sensorineural hearing loss    Bladder diverticulitis    CAD (coronary artery disease)    Carotid stenosis    Cerebral ventriculomegaly    Cervical neck pain with evidence of disc disease    Acute on chronic systolic CHF (congestive heart failure), NYHA class 3 (CMS/HCC)    Chronic combined systolic and diastolic heart failure, NYHA class 3 (CMS/HCC)    CHF, acute (CMS/HCC)    Chronic congestive heart failure (CMS/HCC)    Chronic renal impairment    Compressive atelectasis    CVA (cerebral vascular accident) (CMS/HCC)    Depression    Diabetes (CMS/HCC)    Disturbed hearing    DJD (degenerative joint disease)    Dysuria    Ear pain    Erectile dysfunction    Esophageal candidiasis (CMS/HCC)    GERD (gastroesophageal reflux disease)    Gout attack    Groin pain    HTN (hypertension)    Hyperlipidemia    Hyperuricemia    Insomnia    Cough with hemoptysis    Intracranial bleed (CMS/HCC)    Low back pain    Muscle cramps    Constipation    Nausea in adult    Numbness of left hand    Osteoarthritis of right glenohumeral joint    Pacemaker    Pericardial effusion    Persistent atrial fibrillation (CMS/HCC)    Pleural effusion, bilateral    Positive occult stool blood test    Psoriasis of scalp    Rash    Right foot injury, initial encounter    Right shoulder pain    Severe  mitral regurgitation    Shortness of breath at rest    HECTOR on CPAP    Sleep apnea    Testosterone deficiency    Tinnitus of left ear    Tiredness    Unspecified lesions of oral mucosa    URI, acute    Acquired cyst of kidney    Adjustment disorder with depressed mood    Alcohol abuse    Allergic rhinitis    Anemia due to stage 4 chronic kidney disease treated with erythropoietin (CMS/[facility])    Automatic implantable cardioverter-defibrillator in situ    Balanitis    Bundle branch block, left    CAD S/P percutaneous coronary angioplasty    Central perforation of tympanic membrane    Chronic maxillary sinusitis    Chronic or old sprain of lumbosacral ligament    Lumbar sprain    Congenital insufficiency of aortic valve    Continuous alcohol dependence (CMS/[facility])    Degeneration of lumbar or lumbosacral intervertebral disc    Deviated nasal septum    Conductive hearing loss    Displacement of lumbar intervertebral disc without myelopathy    Dysfunction of eustachian tube    Elevated prostate specific antigen (PSA)    Hypertrophy of nasal turbinates    Loss of weight    Mild intermittent asthma without complication    Palpitation    Disorder of prostate    Prostatitis    S/P UPPP (uvulopalatopharyngoplasty)    Skin infection    SVT (supraventricular tachycardia)    Tympanic membrane conductive hearing loss    Urgency of urination    Ventricular tachycardia (CMS/[facility])    Routine general medical examination at health care facility    Disorder of middle ear    Male erectile dysfunction, unspecified    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HCC)    Chronic obstructive pulmonary disease, unspecified COPD type (CMS/[facility])    Type 2 diabetes mellitus with diabetic peripheral angiopathy without gangrene, without long-term current use of insulin (CMS/[facility])    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 of unspecified duration, initial encounter (CMS/[facility])         Past Medical History:   Diagnosis Date    Acute on  chronic systolic (congestive) heart failure (CMS/[facility]) 09/08/2022    Acute on chronic systolic CHF (congestive heart failure), NYHA class 3    Nonrheumatic aortic (valve) stenosis 01/10/2022    Severe aortic stenosis    Nonrheumatic mitral (valve) insufficiency 09/14/2021    Severe mitral regurgitation by prior echocardiogram    Nonrheumatic mitral (valve) insufficiency 12/07/2022    Severe mitral regurgitation    Other long term (current) drug therapy     Long term current use of amiodarone    Personal history of diseases of the blood and blood-forming organs and certain disorders involving the immune mechanism     History of hemorrhagic diathesis    Personal history of other diseases of the circulatory system 09/20/2022    History of intraventricular hemorrhage    Personal history of other diseases of the circulatory system     History of cardiac disorder    Personal history of other diseases of the circulatory system     History of hypertension    Personal history of other diseases of the musculoskeletal system and connective tissue     History of arthritis    Unspecified intracranial injury with loss of consciousness status unknown, initial encounter (CMS/[facility]) 09/20/2022    Closed TBI (traumatic brain injury)     Past Surgical History:   Procedure Laterality Date    OTHER SURGICAL HISTORY  01/08/2020    Pacemaker insertion    OTHER SURGICAL HISTORY  01/08/2020    Knee replacement    OTHER SURGICAL HISTORY  01/08/2020    Cardiac catheterization with stent placement    OTHER SURGICAL HISTORY  12/06/2021    Cardioverter defibrillator insertion    OTHER SURGICAL HISTORY  12/06/2021    Angioplasty    OTHER SURGICAL HISTORY  01/30/2020    Spinal surgery     Social History     Social History Narrative    Not on file         ALLERGIES  Bee venom protein (honey bee), Cefaclor, Pentazocine, Pentazocine-acetaminophen, Pregabalin, Allopurinol, Ciprofloxacin, Sulfamethoxazole-trimethoprim, Metoprolol, and Statins-hmg-coa  reductase inhibitors      MEDICATIONS  Current Outpatient Medications on File Prior to Visit   Medication Sig Dispense Refill    albuterol 90 mcg/actuation inhaler INHALE 1 (ONE) PUFF EVERY 4 HOURS AS NEEDED 8.5 g 8    aspirin 81 mg EC tablet Take 1 tablet (81 mg) by mouth once daily.      atorvastatin (Lipitor) 80 mg tablet Take 1 tablet (80 mg) by mouth once daily at bedtime.      baclofen (Lioresal) 10 mg tablet Take 1 tablet (10 mg) by mouth once daily.      blood sugar diagnostic (True Metrix Glucose Test Strip) strip 1 each once daily. 50 strip 11    blood sugar diagnostic (True Metrix Glucose Test Strip) strip USE 1 STRIP 3 times daily      bumetanide (Bumex) 1 mg tablet Take 0.5 tablets (0.5 mg) by mouth 4 times a week. May take additional dose for weight gain 3#, increased swelling or shortness of breath.      carvedilol (Coreg) 25 mg tablet Take 1 tablet (25 mg) by mouth 2 times a day.      clobetasol (Temovate) 0.05 % external solution APPLY thin layer to psoriasis of scalp once a day as needed for itching or redness.      diazePAM (Valium) 5 mg tablet Take 1 tablet (5 mg) by mouth 3 times a day as needed for anxiety. 90 tablet 0    diclofenac sodium 1 % kit Apply topically 4 times a day as needed.      docusate sodium (Colace) 100 mg capsule Take by mouth once daily as needed.      Eliquis 5 mg tablet Take 1 tablet (5 mg) by mouth 2 times a day.      Entresto 49-51 mg tablet Take 1 tablet by mouth 2 times a day.      EPINEPHrine 0.3 mg/0.3 mL injection syringe Inject 0.3 mL (0.3 mg) as directed if needed for anaphylaxis. 2 each 3    ezetimibe (Zetia) 10 mg tablet Take 1 tablet (10 mg) by mouth once daily.      febuxostat (Uloric) 40 mg tablet Take 1 tablet (40 mg) by mouth once daily. as directed      ferrous sulfate 325 (65 Fe) MG tablet Take 1 tablet (325 mg) by mouth if needed.      fluoride, sodium, (SF 5000 Plus) 1.1 % dental cream Apply to teeth.      fluticasone (Flonase) 50 mcg/actuation nasal  spray Administer into affected nostril(s).      Jardiance 10 mg Take 1 tablet (10 mg) by mouth once daily.      lactulose 20 gram/30 mL oral solution Take 15 mL (10 g) by mouth once daily.      lidocaine (Lidoderm) 5 % patch Place 1 patch onto the skin daily 12 hours on, 12 hours off.      lidocaine (Xylocaine) 5 % ointment APPLY TO THE AFFECTED AREA(S) AS NEEDED FOR PAIN to last 30 days      mirtazapine (Remeron) 30 mg tablet Take 1 tablet (30 mg) by mouth once daily at bedtime.      multivitamin with folic acid (One Daily Multivitamin) 400 mcg tablet Take 1 tablet by mouth once daily.      OneTouch Delica Plus Lancet 33 gauge misc Test THREE TIMES DAILY AS DIRECTED      oxyCODONE-acetaminophen (Percocet) 7.5-325 mg tablet Take 1 tablet by mouth 3 times daily as needed for Pain for up to 30 days. Max Daily Amount 3 tablets      pantoprazole (ProtoNix) 40 mg EC tablet Take 1 tablet (40 mg) by mouth once daily.      spirometer,drug delivery adapt device USE AS DIRECTED. use 4-6 times daily as needed      spironolactone (Aldactone) 25 mg tablet Take 0.5 tablets (12.5 mg) by mouth once daily.      tamsulosin (Flomax) 0.4 mg 24 hr capsule Take 1 capsule (0.4 mg) by mouth once daily in the morning.      testosterone (Axiron) 30 mg/actuation (1.5 mL) topical solution Place on the skin.      triamcinolone (Kenalog) 0.1 % lotion APPLY 2-3 TIMES DAILY TO AFFECTED AREA(S) AS NEEDED 60 mL 3    venlafaxine XR (Effexor-XR) 150 mg 24 hr capsule TAKE 1 CAPSULE BY MOUTH AFTER BREAKFAST      [DISCONTINUED] tadalafil 20 mg tablet TAKE 1 TABLET BY MOUTH EVERY DAY 1 (ONE) HOUR before needed 30 tablet 4    allopurinol (Zyloprim) 100 mg tablet Take 1 tablet (100 mg) by mouth once daily.      erythromycin (Romycin) 5 mg/gram (0.5 %) ophthalmic ointment Apply a small amount Both Eyes every evening      [DISCONTINUED] albuterol 90 mcg/actuation inhaler INHALE 1 (ONE) PUFF EVERY 4 HOURS AS NEEDED      [DISCONTINUED] amoxicillin (Amoxil) 500  "mg capsule TAKE 4 CAPSULES BY MOUTH ONE HOUR PRIOR TO DENTAL APPOINTMENT.      [DISCONTINUED] amoxicillin-pot clavulanate (Augmentin) 500-125 mg tablet       [DISCONTINUED] atorvastatin (Lipitor) 80 mg tablet Take 1 tablet (80 mg) by mouth once daily at bedtime.      [DISCONTINUED] clobetasoL 0.05 % lotion Apply topically 2 times a day as needed.      [DISCONTINUED] doxycycline (Vibramycin) 100 mg capsule       [DISCONTINUED] FLUTICASONE FUROATE NASL Inhale.      [DISCONTINUED] Folbic 2.5-25-2 mg tablet Take 1 tablet by mouth once daily.      [DISCONTINUED] methylPREDNISolone (Medrol Dospak) 4 mg tablets TAKE BY MOUTH AS DIRECTED ON PACKAGE      [DISCONTINUED] multivitamin with minerals (multivitamin-iron-folic acid) tablet Take 1 tablet by mouth once daily.      [DISCONTINUED] nitrofurantoin, macrocrystal-monohydrate, (Macrobid) 100 mg capsule Take 1 (ONE) capsule twice daily, start after urine culture is obtained      [DISCONTINUED] nystatin (Mycostatin) 100,000 unit/gram powder apply to the affected area(s) twice daily      [DISCONTINUED] sulfamethoxazole-trimethoprim (Bactrim) 400-80 mg tablet Take 1 tablet by mouth 2 times a day.       No current facility-administered medications on file prior to visit.         PHYSICAL EXAM  /76 (BP Location: Left arm, Patient Position: Sitting, BP Cuff Size: Adult)   Temp 36.4 °C (97.6 °F)   Resp 16   Ht 1.588 m (5' 2.5\")   Wt 68.9 kg (152 lb)   BMI 27.36 kg/m²   Body mass index is 27.36 kg/m².  Constitutional   General appearance: Alert and in no acute distress. well developed, well nourished, within normal limits of ideal weight, appears tired, accompanied by wife, walks with a cane. Pleasant male, NAD   Eyes   Inspection of eyes: Sclera and conjunctiva were normal.   Ears, Nose, Mouth, and Throat   Ears: Auricles: Normal.   Pulmonary   Respiratory assessment: No respiratory distress, normal respiratory rhythm and effort.    Auscultation of lungs: Abnormal.  no " rales or crackles were heard bilaterally. no rhonchi. no wheezing. diminished breath sounds throughout all lung fields   Cardiovascular   Auscultation of heart: Abnormal.  The heart rate was normal. The rhythm was regular. Heart sounds: the heart sounds were distant, but normal S1 and normal S2. A grade 2 systolic murmur was heard at the RUSB. A grade 1 systolic murmur was heard at the LUSB. Murmur radiates to the carotids, this is not new.    Exam for edema:  trace ankle edema on the bilateral lower extremities, no pretibial edema, no knee edema and no sacrum edema. no carotid bruits noted. Significant bruising over UE s, especially the RUE  Lymphatic   Palpation of lymph nodes in neck: No cervical lymphadenopathy.   Musculoskeletal pain in the right para lumbar region.   Neurologic   Cranial nerves: Nerves 2-12 were intact, no focal neuro defects. no facial asymmetry is present.   Psychiatric   Orientation: Oriented to person, place, and time.    Mood and affect: Normal. feels tired.    ASSESSMENT/PLAN  Problem List Items Addressed This Visit       Panic disorder    Relevant Orders    Opiate/Opioid/Benzo Extended Prescription Compliance    Carotid stenosis - Primary    Relevant Orders    Referral to Vascular Surgery    Chronic renal impairment    Current Assessment & Plan     Sees nephrology per routine         Diabetes (CMS/[facility])    Current Assessment & Plan     Takes Jardiance         Erectile dysfunction    Relevant Medications    tadalafil 20 mg tablet    HTN (hypertension)    Current Assessment & Plan     BP is stable today, no changes         Hyperlipidemia    Current Assessment & Plan     Continue current med therapy, check labs as ordered         Hyperuricemia    Current Assessment & Plan     Take no med therapy now         Low back pain    Current Assessment & Plan     Sees Dr Aguirre for follow up          Relevant Orders    Opiate/Opioid/Benzo Extended Prescription Compliance    Persistent atrial  fibrillation (CMS/HCC)    Current Assessment & Plan     Continue Eliquis         Relevant Medications    tadalafil 20 mg tablet    Anemia due to stage 4 chronic kidney disease treated with erythropoietin (CMS/HCC)    CAD S/P percutaneous coronary angioplasty    Current Assessment & Plan     To follow up with Dr Portillo          Relevant Medications    tadalafil 20 mg tablet    Continuous alcohol dependence (CMS/HCC)    Current Assessment & Plan     Patient drinks 2 beers per day, this has not changed         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HCC)    Chronic obstructive pulmonary disease, unspecified COPD type (CMS/HCC)    Current Assessment & Plan     No changes now, follow up in 3 months         Type 2 diabetes mellitus with diabetic peripheral angiopathy without gangrene, without long-term current use of insulin (CMS/HCC)    Current Assessment & Plan     No med changes,  labs to be completed prior to next visit         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.    Flu vaccine ordered today  Follow up as scheduled in 3 months    Follow up in 3 months   Edgardo Gandara MD

## 2023-11-03 DIAGNOSIS — I50.22 CHRONIC SYSTOLIC HEART FAILURE (MULTI): Primary | ICD-10-CM

## 2023-11-03 RX ORDER — BUMETANIDE 1 MG/1
0.5 TABLET ORAL 2 TIMES WEEKLY
Qty: 30 TABLET | Refills: 1 | Status: SHIPPED | OUTPATIENT
Start: 2023-11-06 | End: 2024-02-20 | Stop reason: SDUPTHER

## 2023-11-03 NOTE — PROGRESS NOTES
Cardiomems readings are low.  Spoke with wife who states he has no signs of congestion.   Will decrease the Bumex 1 mg to 0.5 tablet twice a week only.   May be able to completely discontinue the loop diuretic if readings stay stable on current medications.   Wife verbalizes understanding and will make the requested adjustment to medications.

## 2023-11-06 ENCOUNTER — HOSPITAL ENCOUNTER (OUTPATIENT)
Dept: CT IMAGING | Age: 78
Discharge: HOME | End: 2023-11-08
Payer: MEDICARE

## 2023-11-06 DIAGNOSIS — S33.5XXD LUMBAR SPRAIN, SUBSEQUENT ENCOUNTER: ICD-10-CM

## 2023-11-06 DIAGNOSIS — M51.26 DISPLACEMENT OF LUMBAR INTERVERTEBRAL DISC WITHOUT MYELOPATHY: ICD-10-CM

## 2023-11-06 DIAGNOSIS — S33.9XXA CHRONIC OR OLD SPRAIN OF LUMBOSACRAL LIGAMENT: ICD-10-CM

## 2023-11-06 PROCEDURE — 72131 CT LUMBAR SPINE W/O DYE: CPT

## 2023-11-16 ENCOUNTER — HOSPITAL ENCOUNTER (OUTPATIENT)
Dept: CARDIOLOGY | Facility: HOSPITAL | Age: 78
Discharge: HOME | End: 2023-11-16
Payer: MEDICARE

## 2023-11-16 DIAGNOSIS — I47.29 OTHER VENTRICULAR TACHYCARDIA (MULTI): ICD-10-CM

## 2023-11-16 DIAGNOSIS — Z95.810 PRESENCE OF AUTOMATIC (IMPLANTABLE) CARDIAC DEFIBRILLATOR: ICD-10-CM

## 2023-11-16 DIAGNOSIS — Z95.810 PRESENCE OF AUTOMATIC (IMPLANTABLE) CARDIAC DEFIBRILLATOR: Primary | ICD-10-CM

## 2023-11-16 PROCEDURE — 93284 PRGRMG EVAL IMPLANTABLE DFB: CPT | Performed by: INTERNAL MEDICINE

## 2023-11-16 PROCEDURE — 93284 PRGRMG EVAL IMPLANTABLE DFB: CPT

## 2023-11-16 PROCEDURE — 93290 INTERROG DEV EVAL ICPMS IP: CPT | Performed by: INTERNAL MEDICINE

## 2023-11-20 ENCOUNTER — TELEPHONE (OUTPATIENT)
Dept: CARDIOLOGY | Facility: HOSPITAL | Age: 78
End: 2023-11-20
Payer: COMMERCIAL

## 2023-11-20 DIAGNOSIS — I50.42 CHRONIC COMBINED SYSTOLIC AND DIASTOLIC HEART FAILURE, NYHA CLASS 3 (MULTI): Primary | ICD-10-CM

## 2023-11-21 RX ORDER — SPIRONOLACTONE 25 MG/1
12.5 TABLET ORAL DAILY
Qty: 45 TABLET | Refills: 1 | Status: SHIPPED | OUTPATIENT
Start: 2023-11-21 | End: 2024-05-19

## 2023-11-27 ENCOUNTER — HOSPITAL ENCOUNTER (OUTPATIENT)
Dept: CARDIOLOGY | Facility: HOSPITAL | Age: 78
Discharge: HOME | End: 2023-11-27
Payer: MEDICARE

## 2023-11-27 DIAGNOSIS — I35.0 NONRHEUMATIC AORTIC (VALVE) STENOSIS: ICD-10-CM

## 2023-11-27 DIAGNOSIS — I50.22 CHRONIC SYSTOLIC (CONGESTIVE) HEART FAILURE (MULTI): ICD-10-CM

## 2023-11-27 PROCEDURE — 93306 TTE W/DOPPLER COMPLETE: CPT | Performed by: INTERNAL MEDICINE

## 2023-11-27 PROCEDURE — 93306 TTE W/DOPPLER COMPLETE: CPT

## 2023-11-28 ENCOUNTER — HOSPITAL ENCOUNTER (OUTPATIENT)
Dept: CARDIOLOGY | Facility: HOSPITAL | Age: 78
Discharge: HOME | End: 2023-11-28
Payer: MEDICARE

## 2023-11-28 ENCOUNTER — OFFICE VISIT (OUTPATIENT)
Dept: PAIN MANAGEMENT | Age: 78
End: 2023-11-28
Payer: COMMERCIAL

## 2023-11-28 VITALS
DIASTOLIC BLOOD PRESSURE: 62 MMHG | TEMPERATURE: 97.2 F | RESPIRATION RATE: 16 BRPM | SYSTOLIC BLOOD PRESSURE: 102 MMHG | OXYGEN SATURATION: 94 % | HEIGHT: 64 IN | BODY MASS INDEX: 26.29 KG/M2 | WEIGHT: 154 LBS | HEART RATE: 68 BPM

## 2023-11-28 DIAGNOSIS — M51.37 DEGENERATION OF LUMBAR OR LUMBOSACRAL INTERVERTEBRAL DISC: ICD-10-CM

## 2023-11-28 DIAGNOSIS — G89.4 CHRONIC PAIN SYNDROME: Primary | ICD-10-CM

## 2023-11-28 DIAGNOSIS — Z79.891 ENCOUNTER FOR LONG-TERM OPIATE ANALGESIC USE: ICD-10-CM

## 2023-11-28 DIAGNOSIS — M51.26 DISPLACEMENT OF LUMBAR INTERVERTEBRAL DISC WITHOUT MYELOPATHY: ICD-10-CM

## 2023-11-28 DIAGNOSIS — I47.29 OTHER VENTRICULAR TACHYCARDIA (MULTI): ICD-10-CM

## 2023-11-28 DIAGNOSIS — Z95.810 PRESENCE OF AUTOMATIC (IMPLANTABLE) CARDIAC DEFIBRILLATOR: ICD-10-CM

## 2023-11-28 DIAGNOSIS — M79.10 MYALGIA: ICD-10-CM

## 2023-11-28 DIAGNOSIS — G89.4 CHRONIC PAIN SYNDROME: ICD-10-CM

## 2023-11-28 DIAGNOSIS — S33.5XXD LUMBAR SPRAIN, SUBSEQUENT ENCOUNTER: ICD-10-CM

## 2023-11-28 DIAGNOSIS — S33.9XXA CHRONIC OR OLD SPRAIN OF LUMBOSACRAL LIGAMENT: ICD-10-CM

## 2023-11-28 LAB
AORTIC VALVE MEAN GRADIENT: 9.2
AORTIC VALVE PEAK VELOCITY: 2.26
AV PEAK GRADIENT: 20.5
AVA (PEAK VEL): 1.69
AVA (VTI): 1.71
EJECTION FRACTION APICAL 4 CHAMBER: 68.7
EJECTION FRACTION: 68
LEFT ATRIUM VOLUME AREA LENGTH INDEX BSA: 45.6
LEFT VENTRICLE INTERNAL DIMENSION DIASTOLE: 4.34 (ref 3.5–6)
LEFT VENTRICULAR OUTFLOW TRACT DIAMETER: 2.03
MITRAL VALVE E/A RATIO: 1
MITRAL VALVE E/E' RATIO: 13.92
RIGHT VENTRICLE FREE WALL PEAK S': 12.66
RIGHT VENTRICLE PEAK SYSTOLIC PRESSURE: 33
TRICUSPID ANNULAR PLANE SYSTOLIC EXCURSION: 1.5

## 2023-11-28 PROCEDURE — 1036F TOBACCO NON-USER: CPT | Performed by: PHYSICIAN ASSISTANT

## 2023-11-28 PROCEDURE — 99213 OFFICE O/P EST LOW 20 MIN: CPT | Performed by: PHYSICIAN ASSISTANT

## 2023-11-28 PROCEDURE — G8484 FLU IMMUNIZE NO ADMIN: HCPCS | Performed by: PHYSICIAN ASSISTANT

## 2023-11-28 PROCEDURE — 3078F DIAST BP <80 MM HG: CPT | Performed by: PHYSICIAN ASSISTANT

## 2023-11-28 PROCEDURE — G8417 CALC BMI ABV UP PARAM F/U: HCPCS | Performed by: PHYSICIAN ASSISTANT

## 2023-11-28 PROCEDURE — G8427 DOCREV CUR MEDS BY ELIG CLIN: HCPCS | Performed by: PHYSICIAN ASSISTANT

## 2023-11-28 PROCEDURE — 3074F SYST BP LT 130 MM HG: CPT | Performed by: PHYSICIAN ASSISTANT

## 2023-11-28 PROCEDURE — 1123F ACP DISCUSS/DSCN MKR DOCD: CPT | Performed by: PHYSICIAN ASSISTANT

## 2023-11-28 RX ORDER — OXYCODONE AND ACETAMINOPHEN 7.5; 325 MG/1; MG/1
1 TABLET ORAL 3 TIMES DAILY PRN
Qty: 90 TABLET | Refills: 0 | Status: SHIPPED | OUTPATIENT
Start: 2023-11-29 | End: 2023-12-29

## 2023-11-29 NOTE — RESULT ENCOUNTER NOTE
Stable findings.  RN to call patient. Follow up as usual to discuss.  Paramedian Forehead Flap Text: A decision was made to reconstruct the defect utilizing an interpolation axial flap and a staged reconstruction.  A telfa template was made of the defect.  This telfa template was then used to outline the paramedian forehead pedicle flap.  The donor area for the pedicle flap was then injected with anesthesia.  The flap was excised through the skin and subcutaneous tissue down to the layer of the underlying musculature.  The pedicle flap was carefully excised within this deep plane to maintain its blood supply.  The edges of the donor site were undermined.   The donor site was closed in a primary fashion.  The pedicle was then rotated into position and sutured.  Once the tube was sutured into place, adequate blood supply was confirmed with blanching and refill.  The pedicle was then wrapped with xeroform gauze and dressed appropriately with a telfa and gauze bandage to ensure continued blood supply and protect the attached pedicle.

## 2023-12-04 ENCOUNTER — HOSPITAL ENCOUNTER (EMERGENCY)
Facility: HOSPITAL | Age: 78
Discharge: HOME | DRG: 071 | End: 2023-12-04
Attending: EMERGENCY MEDICINE
Payer: MEDICARE

## 2023-12-04 ENCOUNTER — APPOINTMENT (OUTPATIENT)
Dept: RADIOLOGY | Facility: HOSPITAL | Age: 78
DRG: 071 | End: 2023-12-04
Payer: MEDICARE

## 2023-12-04 ENCOUNTER — HOSPITAL ENCOUNTER (INPATIENT)
Facility: HOSPITAL | Age: 78
LOS: 4 days | Discharge: HOME | DRG: 071 | End: 2023-12-09
Attending: STUDENT IN AN ORGANIZED HEALTH CARE EDUCATION/TRAINING PROGRAM | Admitting: INTERNAL MEDICINE
Payer: MEDICARE

## 2023-12-04 ENCOUNTER — TELEPHONE (OUTPATIENT)
Dept: PRIMARY CARE | Facility: CLINIC | Age: 78
End: 2023-12-04
Payer: COMMERCIAL

## 2023-12-04 VITALS
HEIGHT: 65 IN | HEART RATE: 72 BPM | BODY MASS INDEX: 25.83 KG/M2 | TEMPERATURE: 97.9 F | DIASTOLIC BLOOD PRESSURE: 106 MMHG | SYSTOLIC BLOOD PRESSURE: 123 MMHG | WEIGHT: 155 LBS | RESPIRATION RATE: 20 BRPM | OXYGEN SATURATION: 98 %

## 2023-12-04 DIAGNOSIS — N39.0 UTI (URINARY TRACT INFECTION), UNCOMPLICATED: ICD-10-CM
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
## 2023-12-04 DIAGNOSIS — N39.0 URINARY TRACT INFECTION WITHOUT HEMATURIA, SITE UNSPECIFIED: Primary | ICD-10-CM

## 2023-12-04 DIAGNOSIS — G93.40 ENCEPHALOPATHY ACUTE: ICD-10-CM

## 2023-12-04 DIAGNOSIS — R53.1 GENERALIZED WEAKNESS: ICD-10-CM

## 2023-12-04 DIAGNOSIS — E11.51 TYPE 2 DIABETES MELLITUS WITH DIABETIC PERIPHERAL ANGIOPATHY WITHOUT GANGRENE, WITHOUT LONG-TERM CURRENT USE OF INSULIN (MULTI): ICD-10-CM

## 2023-12-04 DIAGNOSIS — R26.2 AMBULATORY DYSFUNCTION: ICD-10-CM

## 2023-12-04 DIAGNOSIS — G93.40 ENCEPHALOPATHY: ICD-10-CM

## 2023-12-04 LAB
ABO GROUP (TYPE) IN BLOOD: NORMAL
ALBUMIN SERPL BCP-MCNC: 3.8 G/DL (ref 3.4–5)
ALBUMIN SERPL BCP-MCNC: 3.9 G/DL (ref 3.4–5)
ALP SERPL-CCNC: 56 U/L (ref 33–136)
ALP SERPL-CCNC: 60 U/L (ref 33–136)
ALT SERPL W P-5'-P-CCNC: 11 U/L (ref 10–52)
ALT SERPL W P-5'-P-CCNC: 9 U/L (ref 10–52)
ANION GAP SERPL CALC-SCNC: 14 MMOL/L (ref 10–20)
ANION GAP SERPL CALC-SCNC: 9 MMOL/L (ref 10–20)
ANTIBODY SCREEN: NORMAL
APPEARANCE UR: CLEAR
AST SERPL W P-5'-P-CCNC: 17 U/L (ref 9–39)
AST SERPL W P-5'-P-CCNC: 19 U/L (ref 9–39)
BASOPHILS # BLD AUTO: 0.05 X10*3/UL (ref 0–0.1)
BASOPHILS # BLD AUTO: 0.09 X10*3/UL (ref 0–0.1)
BASOPHILS NFR BLD AUTO: 0.6 %
BASOPHILS NFR BLD AUTO: 1.1 %
BILIRUB SERPL-MCNC: 0.5 MG/DL (ref 0–1.2)
BILIRUB SERPL-MCNC: 0.5 MG/DL (ref 0–1.2)
BILIRUB UR STRIP.AUTO-MCNC: NEGATIVE MG/DL
BUN SERPL-MCNC: 31 MG/DL (ref 6–23)
BUN SERPL-MCNC: 34 MG/DL (ref 6–23)
CALCIUM SERPL-MCNC: 8.6 MG/DL (ref 8.6–10.3)
CALCIUM SERPL-MCNC: 9.3 MG/DL (ref 8.6–10.3)
CHLORIDE SERPL-SCNC: 108 MMOL/L (ref 98–107)
CHLORIDE SERPL-SCNC: 108 MMOL/L (ref 98–107)
CO2 SERPL-SCNC: 22 MMOL/L (ref 21–32)
CO2 SERPL-SCNC: 23 MMOL/L (ref 21–32)
COLOR UR: ABNORMAL
CREAT SERPL-MCNC: 1.84 MG/DL (ref 0.5–1.3)
CREAT SERPL-MCNC: 1.99 MG/DL (ref 0.5–1.3)
EOSINOPHIL # BLD AUTO: 0.19 X10*3/UL (ref 0–0.4)
EOSINOPHIL # BLD AUTO: 0.31 X10*3/UL (ref 0–0.4)
EOSINOPHIL NFR BLD AUTO: 2.4 %
EOSINOPHIL NFR BLD AUTO: 3.8 %
ERYTHROCYTE [DISTWIDTH] IN BLOOD BY AUTOMATED COUNT: 15.9 % (ref 11.5–14.5)
ERYTHROCYTE [DISTWIDTH] IN BLOOD BY AUTOMATED COUNT: 15.9 % (ref 11.5–14.5)
ETHANOL SERPL-MCNC: <10 MG/DL
GFR SERPL CREATININE-BSD FRML MDRD: 34 ML/MIN/1.73M*2
GFR SERPL CREATININE-BSD FRML MDRD: 37 ML/MIN/1.73M*2
GLUCOSE SERPL-MCNC: 102 MG/DL (ref 74–99)
GLUCOSE SERPL-MCNC: 107 MG/DL (ref 74–99)
GLUCOSE UR STRIP.AUTO-MCNC: NEGATIVE MG/DL
HCT VFR BLD AUTO: 41.4 % (ref 41–52)
HCT VFR BLD AUTO: 43.9 % (ref 41–52)
HGB BLD-MCNC: 13.3 G/DL (ref 13.5–17.5)
HGB BLD-MCNC: 14.3 G/DL (ref 13.5–17.5)
HOLD SPECIMEN: NORMAL
IMM GRANULOCYTES # BLD AUTO: 0.04 X10*3/UL (ref 0–0.5)
IMM GRANULOCYTES # BLD AUTO: 0.04 X10*3/UL (ref 0–0.5)
IMM GRANULOCYTES NFR BLD AUTO: 0.5 % (ref 0–0.9)
IMM GRANULOCYTES NFR BLD AUTO: 0.5 % (ref 0–0.9)
INR PPP: 1.3 (ref 0.9–1.1)
KETONES UR STRIP.AUTO-MCNC: NEGATIVE MG/DL
LACTATE SERPL-SCNC: 0.8 MMOL/L (ref 0.4–2)
LEUKOCYTE ESTERASE UR QL STRIP.AUTO: NEGATIVE
LYMPHOCYTES # BLD AUTO: 1.17 X10*3/UL (ref 0.8–3)
LYMPHOCYTES # BLD AUTO: 1.27 X10*3/UL (ref 0.8–3)
LYMPHOCYTES NFR BLD AUTO: 14.5 %
LYMPHOCYTES NFR BLD AUTO: 15.5 %
MAGNESIUM SERPL-MCNC: 2.06 MG/DL (ref 1.6–2.4)
MCH RBC QN AUTO: 34.3 PG (ref 26–34)
MCH RBC QN AUTO: 34.5 PG (ref 26–34)
MCHC RBC AUTO-ENTMCNC: 32.1 G/DL (ref 32–36)
MCHC RBC AUTO-ENTMCNC: 32.6 G/DL (ref 32–36)
MCV RBC AUTO: 106 FL (ref 80–100)
MCV RBC AUTO: 107 FL (ref 80–100)
MONOCYTES # BLD AUTO: 0.59 X10*3/UL (ref 0.05–0.8)
MONOCYTES # BLD AUTO: 0.73 X10*3/UL (ref 0.05–0.8)
MONOCYTES NFR BLD AUTO: 7.2 %
MONOCYTES NFR BLD AUTO: 9.1 %
NEUTROPHILS # BLD AUTO: 5.88 X10*3/UL (ref 1.6–5.5)
NEUTROPHILS # BLD AUTO: 5.89 X10*3/UL (ref 1.6–5.5)
NEUTROPHILS NFR BLD AUTO: 71.9 %
NEUTROPHILS NFR BLD AUTO: 72.9 %
NITRITE UR QL STRIP.AUTO: NEGATIVE
NRBC BLD-RTO: 0 /100 WBCS (ref 0–0)
NRBC BLD-RTO: 0 /100 WBCS (ref 0–0)
PH UR STRIP.AUTO: 5 [PH]
PHOSPHATE SERPL-MCNC: 3 MG/DL (ref 2.5–4.9)
PLATELET # BLD AUTO: 132 X10*3/UL (ref 150–450)
PLATELET # BLD AUTO: 134 X10*3/UL (ref 150–450)
POTASSIUM SERPL-SCNC: 4.4 MMOL/L (ref 3.5–5.3)
POTASSIUM SERPL-SCNC: 4.4 MMOL/L (ref 3.5–5.3)
PROT SERPL-MCNC: 6.5 G/DL (ref 6.4–8.2)
PROT SERPL-MCNC: 6.8 G/DL (ref 6.4–8.2)
PROT UR STRIP.AUTO-MCNC: NEGATIVE MG/DL
PROTHROMBIN TIME: 14.5 SECONDS (ref 9.8–12.8)
RBC # BLD AUTO: 3.88 X10*6/UL (ref 4.5–5.9)
RBC # BLD AUTO: 4.15 X10*6/UL (ref 4.5–5.9)
RBC # UR STRIP.AUTO: ABNORMAL /UL
RBC #/AREA URNS AUTO: ABNORMAL /HPF
RH FACTOR (ANTIGEN D): NORMAL
SODIUM SERPL-SCNC: 136 MMOL/L (ref 136–145)
SODIUM SERPL-SCNC: 140 MMOL/L (ref 136–145)
SP GR UR STRIP.AUTO: 1.01
UROBILINOGEN UR STRIP.AUTO-MCNC: <2 MG/DL
WBC # BLD AUTO: 8.1 X10*3/UL (ref 4.4–11.3)
WBC # BLD AUTO: 8.2 X10*3/UL (ref 4.4–11.3)
WBC #/AREA URNS AUTO: ABNORMAL /HPF

## 2023-12-04 PROCEDURE — 71045 X-RAY EXAM CHEST 1 VIEW: CPT | Mod: FY

## 2023-12-04 PROCEDURE — 2500000004 HC RX 250 GENERAL PHARMACY W/ HCPCS (ALT 636 FOR OP/ED): Performed by: STUDENT IN AN ORGANIZED HEALTH CARE EDUCATION/TRAINING PROGRAM

## 2023-12-04 PROCEDURE — 72125 CT NECK SPINE W/O DYE: CPT

## 2023-12-04 PROCEDURE — 83735 ASSAY OF MAGNESIUM: CPT | Performed by: STUDENT IN AN ORGANIZED HEALTH CARE EDUCATION/TRAINING PROGRAM

## 2023-12-04 PROCEDURE — 85610 PROTHROMBIN TIME: CPT | Performed by: EMERGENCY MEDICINE

## 2023-12-04 PROCEDURE — 70450 CT HEAD/BRAIN W/O DYE: CPT | Performed by: RADIOLOGY

## 2023-12-04 PROCEDURE — 70450 CT HEAD/BRAIN W/O DYE: CPT

## 2023-12-04 PROCEDURE — 84100 ASSAY OF PHOSPHORUS: CPT | Performed by: STUDENT IN AN ORGANIZED HEALTH CARE EDUCATION/TRAINING PROGRAM

## 2023-12-04 PROCEDURE — 81001 URINALYSIS AUTO W/SCOPE: CPT | Performed by: EMERGENCY MEDICINE

## 2023-12-04 PROCEDURE — 83605 ASSAY OF LACTIC ACID: CPT | Performed by: EMERGENCY MEDICINE

## 2023-12-04 PROCEDURE — 99285 EMERGENCY DEPT VISIT HI MDM: CPT | Performed by: EMERGENCY MEDICINE

## 2023-12-04 PROCEDURE — 80053 COMPREHEN METABOLIC PANEL: CPT | Performed by: EMERGENCY MEDICINE

## 2023-12-04 PROCEDURE — 72125 CT NECK SPINE W/O DYE: CPT | Performed by: RADIOLOGY

## 2023-12-04 PROCEDURE — 82077 ASSAY SPEC XCP UR&BREATH IA: CPT | Performed by: EMERGENCY MEDICINE

## 2023-12-04 PROCEDURE — 87086 URINE CULTURE/COLONY COUNT: CPT | Mod: PORLAB | Performed by: EMERGENCY MEDICINE

## 2023-12-04 PROCEDURE — 99285 EMERGENCY DEPT VISIT HI MDM: CPT | Performed by: STUDENT IN AN ORGANIZED HEALTH CARE EDUCATION/TRAINING PROGRAM

## 2023-12-04 PROCEDURE — 85025 COMPLETE CBC W/AUTO DIFF WBC: CPT | Performed by: STUDENT IN AN ORGANIZED HEALTH CARE EDUCATION/TRAINING PROGRAM

## 2023-12-04 PROCEDURE — 36415 COLL VENOUS BLD VENIPUNCTURE: CPT | Performed by: STUDENT IN AN ORGANIZED HEALTH CARE EDUCATION/TRAINING PROGRAM

## 2023-12-04 PROCEDURE — 85025 COMPLETE CBC W/AUTO DIFF WBC: CPT | Performed by: EMERGENCY MEDICINE

## 2023-12-04 PROCEDURE — 2500000004 HC RX 250 GENERAL PHARMACY W/ HCPCS (ALT 636 FOR OP/ED): Performed by: EMERGENCY MEDICINE

## 2023-12-04 PROCEDURE — 36415 COLL VENOUS BLD VENIPUNCTURE: CPT | Performed by: EMERGENCY MEDICINE

## 2023-12-04 PROCEDURE — 96365 THER/PROPH/DIAG IV INF INIT: CPT

## 2023-12-04 PROCEDURE — G0390 TRAUMA RESPONS W/HOSP CRITI: HCPCS

## 2023-12-04 PROCEDURE — 86901 BLOOD TYPING SEROLOGIC RH(D): CPT | Performed by: EMERGENCY MEDICINE

## 2023-12-04 PROCEDURE — 86900 BLOOD TYPING SEROLOGIC ABO: CPT | Performed by: EMERGENCY MEDICINE

## 2023-12-04 PROCEDURE — 80053 COMPREHEN METABOLIC PANEL: CPT | Performed by: STUDENT IN AN ORGANIZED HEALTH CARE EDUCATION/TRAINING PROGRAM

## 2023-12-04 PROCEDURE — 71045 X-RAY EXAM CHEST 1 VIEW: CPT | Performed by: RADIOLOGY

## 2023-12-04 RX ORDER — VENLAFAXINE HYDROCHLORIDE 150 MG/1
150 CAPSULE, EXTENDED RELEASE ORAL
Status: DISCONTINUED | OUTPATIENT
Start: 2023-12-05 | End: 2023-12-09 | Stop reason: HOSPADM

## 2023-12-04 RX ORDER — ONDANSETRON HYDROCHLORIDE 2 MG/ML
INJECTION, SOLUTION INTRAVENOUS
Status: DISCONTINUED
Start: 2023-12-04 | End: 2023-12-04 | Stop reason: HOSPADM

## 2023-12-04 RX ORDER — EZETIMIBE 10 MG/1
10 TABLET ORAL DAILY
Status: DISCONTINUED | OUTPATIENT
Start: 2023-12-05 | End: 2023-12-09 | Stop reason: HOSPADM

## 2023-12-04 RX ORDER — SODIUM CHLORIDE, SODIUM LACTATE, POTASSIUM CHLORIDE, CALCIUM CHLORIDE 600; 310; 30; 20 MG/100ML; MG/100ML; MG/100ML; MG/100ML
75 INJECTION, SOLUTION INTRAVENOUS CONTINUOUS
Status: DISCONTINUED | OUTPATIENT
Start: 2023-12-04 | End: 2023-12-04

## 2023-12-04 RX ORDER — DOXYCYCLINE HYCLATE 100 MG
100 TABLET ORAL 2 TIMES DAILY
Qty: 14 TABLET | Refills: 0 | Status: SHIPPED | OUTPATIENT
Start: 2023-12-04 | End: 2023-12-09 | Stop reason: HOSPADM

## 2023-12-04 RX ORDER — ASPIRIN 81 MG/1
81 TABLET ORAL DAILY
Status: DISCONTINUED | OUTPATIENT
Start: 2023-12-05 | End: 2023-12-09 | Stop reason: HOSPADM

## 2023-12-04 RX ORDER — INSULIN LISPRO 100 [IU]/ML
0-5 INJECTION, SOLUTION INTRAVENOUS; SUBCUTANEOUS
Status: DISCONTINUED | OUTPATIENT
Start: 2023-12-05 | End: 2023-12-09 | Stop reason: HOSPADM

## 2023-12-04 RX ORDER — PANTOPRAZOLE SODIUM 40 MG/1
40 TABLET, DELAYED RELEASE ORAL DAILY
Status: DISCONTINUED | OUTPATIENT
Start: 2023-12-05 | End: 2023-12-07

## 2023-12-04 RX ORDER — CEFTRIAXONE 2 G/50ML
2 INJECTION, SOLUTION INTRAVENOUS ONCE
Status: DISCONTINUED | OUTPATIENT
Start: 2023-12-04 | End: 2023-12-04

## 2023-12-04 RX ORDER — ALBUTEROL SULFATE 90 UG/1
2 AEROSOL, METERED RESPIRATORY (INHALATION) EVERY 6 HOURS PRN
Status: DISCONTINUED | OUTPATIENT
Start: 2023-12-04 | End: 2023-12-09 | Stop reason: HOSPADM

## 2023-12-04 RX ORDER — ONDANSETRON HYDROCHLORIDE 2 MG/ML
4 INJECTION, SOLUTION INTRAVENOUS ONCE
Status: COMPLETED | OUTPATIENT
Start: 2023-12-04 | End: 2023-12-04

## 2023-12-04 RX ORDER — ACETAMINOPHEN 325 MG/1
650 TABLET ORAL EVERY 4 HOURS PRN
Status: DISCONTINUED | OUTPATIENT
Start: 2023-12-04 | End: 2023-12-09 | Stop reason: HOSPADM

## 2023-12-04 RX ORDER — CARVEDILOL 25 MG/1
25 TABLET ORAL 2 TIMES DAILY
Status: DISCONTINUED | OUTPATIENT
Start: 2023-12-04 | End: 2023-12-09 | Stop reason: HOSPADM

## 2023-12-04 RX ORDER — DEXTROSE 50 % IN WATER (D50W) INTRAVENOUS SYRINGE
25
Status: DISCONTINUED | OUTPATIENT
Start: 2023-12-04 | End: 2023-12-09 | Stop reason: HOSPADM

## 2023-12-04 RX ORDER — DEXTROSE MONOHYDRATE 100 MG/ML
0.3 INJECTION, SOLUTION INTRAVENOUS ONCE AS NEEDED
Status: DISCONTINUED | OUTPATIENT
Start: 2023-12-04 | End: 2023-12-09 | Stop reason: HOSPADM

## 2023-12-04 RX ORDER — MEROPENEM 1 G/1
1 INJECTION, POWDER, FOR SOLUTION INTRAVENOUS ONCE
Status: COMPLETED | OUTPATIENT
Start: 2023-12-04 | End: 2023-12-04

## 2023-12-04 RX ORDER — ONDANSETRON HYDROCHLORIDE 2 MG/ML
4 INJECTION, SOLUTION INTRAVENOUS EVERY 6 HOURS PRN
Status: DISCONTINUED | OUTPATIENT
Start: 2023-12-04 | End: 2023-12-09 | Stop reason: HOSPADM

## 2023-12-04 RX ORDER — TAMSULOSIN HYDROCHLORIDE 0.4 MG/1
0.4 CAPSULE ORAL EVERY MORNING
Status: DISCONTINUED | OUTPATIENT
Start: 2023-12-05 | End: 2023-12-09 | Stop reason: HOSPADM

## 2023-12-04 RX ADMIN — ONDANSETRON 4 MG: 2 INJECTION INTRAMUSCULAR; INTRAVENOUS at 10:55

## 2023-12-04 RX ADMIN — MEROPENEM 1 G: 1 INJECTION, POWDER, FOR SOLUTION INTRAVENOUS at 22:12

## 2023-12-04 RX ADMIN — DOXYCYCLINE 100 MG: 100 INJECTION, POWDER, LYOPHILIZED, FOR SOLUTION INTRAVENOUS at 14:07

## 2023-12-04 ASSESSMENT — LIFESTYLE VARIABLES
EVER HAD A DRINK FIRST THING IN THE MORNING TO STEADY YOUR NERVES TO GET RID OF A HANGOVER: NO
HAVE PEOPLE ANNOYED YOU BY CRITICIZING YOUR DRINKING: NO
HAVE PEOPLE ANNOYED YOU BY CRITICIZING YOUR DRINKING: NO
REASON UNABLE TO ASSESS: NO
REASON UNABLE TO ASSESS: NO
EVER FELT BAD OR GUILTY ABOUT YOUR DRINKING: NO
HAVE YOU EVER FELT YOU SHOULD CUT DOWN ON YOUR DRINKING: NO
EVER HAD A DRINK FIRST THING IN THE MORNING TO STEADY YOUR NERVES TO GET RID OF A HANGOVER: NO
EVER FELT BAD OR GUILTY ABOUT YOUR DRINKING: NO
HAVE YOU EVER FELT YOU SHOULD CUT DOWN ON YOUR DRINKING: NO

## 2023-12-04 ASSESSMENT — PAIN SCALES - GENERAL
PAINLEVEL_OUTOF10: 0 - NO PAIN
PAINLEVEL_OUTOF10: 0 - NO PAIN

## 2023-12-04 ASSESSMENT — COLUMBIA-SUICIDE SEVERITY RATING SCALE - C-SSRS
2. HAVE YOU ACTUALLY HAD ANY THOUGHTS OF KILLING YOURSELF?: NO
6. HAVE YOU EVER DONE ANYTHING, STARTED TO DO ANYTHING, OR PREPARED TO DO ANYTHING TO END YOUR LIFE?: NO
1. IN THE PAST MONTH, HAVE YOU WISHED YOU WERE DEAD OR WISHED YOU COULD GO TO SLEEP AND NOT WAKE UP?: NO

## 2023-12-04 ASSESSMENT — PAIN - FUNCTIONAL ASSESSMENT: PAIN_FUNCTIONAL_ASSESSMENT: 0-10

## 2023-12-04 NOTE — ED PROVIDER NOTES
HPI   Chief Complaint   Patient presents with   • HIA       Patient presents after a fall.  Apparently at 6 AM this morning he fell and hit his head.  He is on Eliquis for history of atrial fibrillation.  He was well at about 3 hours later when he became confused.  Wife called 911 because of his confusion.  Patient denies any pain.  He is alert to self only which is a new finding for him.  He was trying to use the urinal this morning when he fell and it was a mechanical fall.  EMS was called and noted equal use of his arms or legs but did note the confusion as well.                          [place] Coma Scale Score: 14                  Patient History   Past Medical History:   Diagnosis Date   • Acute on chronic systolic (congestive) heart failure (CMS/[facility]) 09/08/2022    Acute on chronic systolic CHF (congestive heart failure), NYHA class 3   • Nonrheumatic aortic (valve) stenosis 01/10/2022    Severe aortic stenosis   • Nonrheumatic mitral (valve) insufficiency 09/14/2021    Severe mitral regurgitation by prior echocardiogram   • Nonrheumatic mitral (valve) insufficiency 12/07/2022    Severe mitral regurgitation   • Other long term (current) drug therapy     Long term current use of amiodarone   • Personal history of diseases of the blood and blood-forming organs and certain disorders involving the immune mechanism     History of hemorrhagic diathesis   • Personal history of other diseases of the circulatory system 09/20/2022    History of intraventricular hemorrhage   • Personal history of other diseases of the circulatory system     History of cardiac disorder   • Personal history of other diseases of the circulatory system     History of hypertension   • Personal history of other diseases of the musculoskeletal system and connective tissue     History of arthritis   • Unspecified intracranial injury with loss of consciousness status unknown, initial encounter (CMS/[facility]) 09/20/2022    Closed TBI (traumatic brain  injury)     Past Surgical History:   Procedure Laterality Date   • OTHER SURGICAL HISTORY  01/08/2020    Pacemaker insertion   • OTHER SURGICAL HISTORY  01/08/2020    Knee replacement   • OTHER SURGICAL HISTORY  01/08/2020    Cardiac catheterization with stent placement   • OTHER SURGICAL HISTORY  12/06/2021    Cardioverter defibrillator insertion   • OTHER SURGICAL HISTORY  12/06/2021    Angioplasty   • OTHER SURGICAL HISTORY  01/30/2020    Spinal surgery     Family History   Problem Relation Name Age of Onset   • No Known Problems Mother     • No Known Problems Father     • Other (malignant neoplsm) Other     • Hypertension Other     • Other (cardiac disorder) Other       Social History     Tobacco Use   • Smoking status: Former     Types: Cigarettes   • Smokeless tobacco: Never   Vaping Use   • Vaping Use: Never used   Substance Use Topics   • Alcohol use: Yes     Alcohol/week: 14.0 standard drinks of alcohol     Types: 14 Cans of beer per week     Comment: 2 a night   • Drug use: Never       Physical Exam   ED Triage Vitals   Temp Heart Rate Resp BP   12/04/23 1019 12/04/23 1019 12/04/23 1019 12/04/23 1019   36.6 °C (97.9 °F) 88 20 148/84      SpO2 Temp src Heart Rate Source Patient Position   12/04/23 1019 -- -- --   95 %         BP Location FiO2 (%)     -- --             Physical Exam  Vitals and nursing note reviewed.   Constitutional:       Appearance: Normal appearance.   HENT:      Head: Normocephalic and atraumatic.      Mouth/Throat:      Mouth: Mucous membranes are moist.   Eyes:      Extraocular Movements: Extraocular movements intact.      Pupils: Pupils are equal, round, and reactive to light.   Cardiovascular:      Rate and Rhythm: Normal rate and regular rhythm.      Heart sounds: No murmur heard.  Pulmonary:      Effort: Pulmonary effort is normal. No respiratory distress.      Breath sounds: Normal breath sounds.   Abdominal:      General: There is no distension.      Palpations: Abdomen is soft.       Tenderness: There is no abdominal tenderness.   Musculoskeletal:         General: No tenderness or deformity. Normal range of motion.      Right lower leg: No edema.      Left lower leg: No edema.   Skin:     General: Skin is warm and dry.      Findings: No lesion or rash.   Neurological:      Mental Status: He is alert.      Sensory: No sensory deficit.      Motor: No weakness.      Comments: Alert to self only which is a new finding.  He moves all 4 extremities equally.   Psychiatric:         Behavior: Behavior normal.       Labs Reviewed   CBC WITH AUTO DIFFERENTIAL   COMPREHENSIVE METABOLIC PANEL   ALCOHOL   LACTATE   PROTIME-INR   TYPE AND SCREEN   URINALYSIS WITH REFLEX MICROSCOPIC AND CULTURE     XR chest 1 view    (Results Pending)   CT head W O contrast trauma protocol    (Results Pending)   CT cervical spine wo IV contrast    (Results Pending)     ED Course & MDM   Diagnoses as of 12/04/23 0235   Fall, initial encounter   UTI (urinary tract infection), uncomplicated       Medical Decision Making  Differentials include concussion, intracranial hemorrhage. Imaging studies, if performed, were independently reviewed and interpreted by myself and confirmed by radiologist. EKG(s), if performed, were interpreted by myself.Patient was made in HIA due to the fall.  Labs show a urinary tract infection.  Otherwise unremarkable except for INR 1.3.  CAT scan of the head does not reveal anything acute.  CAT scan of the C-spine shows degenerative changes without fracture.  Chest x-ray shows no evidence of pneumonia.  Wife is now at bedside and states that he is back to his baseline.  I will treat him with doxycycline for the urinary tract infection due to his multiple allergies.  Wife is critical taking the patient home.  She will continue the doxycycline orally at home.  He will follow-up with his PCP.        Procedure  Procedures     Kenan Rodriguez MD  12/04/23 [id number]

## 2023-12-04 NOTE — TELEPHONE ENCOUNTER
"Patient's wife called answering service with a message for Dr DE PAZ, called patient's wife back, she stated that patient fell this morning and that they went to the ER, he has a UTI/bladder infection she states per the dr at the ER. She also states that they seemed to \"push\" them out as if they needed the room. Patient's wife states that she is worried about him falling, he is getting worse this is his second fall, he fell the day before thanksgiving, stating that his legs are weak and he has been falling more, she states that he will deny this as he doesn't think he is getting worse. She would like for some type of inpatient rehab for him to help with these issues, she states that it is just getting to be too much and that she can't keep an eye on him as well as she would like, please advise on what we can do, thanks   "

## 2023-12-05 PROBLEM — R53.81 PHYSICAL DECONDITIONING: Status: ACTIVE | Noted: 2023-12-05

## 2023-12-05 PROBLEM — G93.40 ENCEPHALOPATHY: Status: ACTIVE | Noted: 2023-12-05

## 2023-12-05 PROBLEM — N18.32 STAGE 3B CHRONIC KIDNEY DISEASE (MULTI): Status: ACTIVE | Noted: 2023-12-05

## 2023-12-05 PROBLEM — R26.2 AMBULATORY DYSFUNCTION: Status: ACTIVE | Noted: 2023-12-05

## 2023-12-05 PROBLEM — I50.20 HFREF (HEART FAILURE WITH REDUCED EJECTION FRACTION) (MULTI): Status: ACTIVE | Noted: 2023-12-05

## 2023-12-05 PROBLEM — R41.82 ALTERED MENTAL STATE: Status: ACTIVE | Noted: 2023-12-05

## 2023-12-05 PROBLEM — N17.9 AKI (ACUTE KIDNEY INJURY) (CMS-HCC): Status: ACTIVE | Noted: 2023-12-05

## 2023-12-05 PROBLEM — D69.6 THROMBOCYTOPENIA (CMS-HCC): Status: ACTIVE | Noted: 2023-12-05

## 2023-12-05 LAB
ALBUMIN SERPL BCP-MCNC: 3.2 G/DL (ref 3.4–5)
ANION GAP SERPL CALC-SCNC: 12 MMOL/L (ref 10–20)
BACTERIA UR CULT: NORMAL
BUN SERPL-MCNC: 34 MG/DL (ref 6–23)
CALCIUM SERPL-MCNC: 8 MG/DL (ref 8.6–10.3)
CHLORIDE SERPL-SCNC: 111 MMOL/L (ref 98–107)
CO2 SERPL-SCNC: 21 MMOL/L (ref 21–32)
CREAT SERPL-MCNC: 1.82 MG/DL (ref 0.5–1.3)
ERYTHROCYTE [DISTWIDTH] IN BLOOD BY AUTOMATED COUNT: 15.8 % (ref 11.5–14.5)
GFR SERPL CREATININE-BSD FRML MDRD: 37 ML/MIN/1.73M*2
GLUCOSE BLD MANUAL STRIP-MCNC: 103 MG/DL (ref 74–99)
GLUCOSE BLD MANUAL STRIP-MCNC: 157 MG/DL (ref 74–99)
GLUCOSE BLD MANUAL STRIP-MCNC: 86 MG/DL (ref 74–99)
GLUCOSE BLD MANUAL STRIP-MCNC: 95 MG/DL (ref 74–99)
GLUCOSE SERPL-MCNC: 95 MG/DL (ref 74–99)
HCT VFR BLD AUTO: 35.9 % (ref 41–52)
HGB BLD-MCNC: 11.4 G/DL (ref 13.5–17.5)
MAGNESIUM SERPL-MCNC: 1.97 MG/DL (ref 1.6–2.4)
MCH RBC QN AUTO: 34.1 PG (ref 26–34)
MCHC RBC AUTO-ENTMCNC: 31.8 G/DL (ref 32–36)
MCV RBC AUTO: 108 FL (ref 80–100)
NRBC BLD-RTO: 0 /100 WBCS (ref 0–0)
PHOSPHATE SERPL-MCNC: 3.1 MG/DL (ref 2.5–4.9)
PLATELET # BLD AUTO: 119 X10*3/UL (ref 150–450)
POTASSIUM SERPL-SCNC: 4.1 MMOL/L (ref 3.5–5.3)
RBC # BLD AUTO: 3.34 X10*6/UL (ref 4.5–5.9)
SODIUM SERPL-SCNC: 140 MMOL/L (ref 136–145)
WBC # BLD AUTO: 6.7 X10*3/UL (ref 4.4–11.3)

## 2023-12-05 PROCEDURE — 94660 CPAP INITIATION&MGMT: CPT

## 2023-12-05 PROCEDURE — 2500000002 HC RX 250 W HCPCS SELF ADMINISTERED DRUGS (ALT 637 FOR MEDICARE OP, ALT 636 FOR OP/ED)

## 2023-12-05 PROCEDURE — 1200000002 HC GENERAL ROOM WITH TELEMETRY DAILY

## 2023-12-05 PROCEDURE — 2500000004 HC RX 250 GENERAL PHARMACY W/ HCPCS (ALT 636 FOR OP/ED): Performed by: INTERNAL MEDICINE

## 2023-12-05 PROCEDURE — 85027 COMPLETE CBC AUTOMATED: CPT | Performed by: INTERNAL MEDICINE

## 2023-12-05 PROCEDURE — 82947 ASSAY GLUCOSE BLOOD QUANT: CPT

## 2023-12-05 PROCEDURE — 83735 ASSAY OF MAGNESIUM: CPT | Performed by: INTERNAL MEDICINE

## 2023-12-05 PROCEDURE — 80069 RENAL FUNCTION PANEL: CPT | Performed by: INTERNAL MEDICINE

## 2023-12-05 PROCEDURE — 36415 COLL VENOUS BLD VENIPUNCTURE: CPT | Performed by: INTERNAL MEDICINE

## 2023-12-05 PROCEDURE — 2500000002 HC RX 250 W HCPCS SELF ADMINISTERED DRUGS (ALT 637 FOR MEDICARE OP, ALT 636 FOR OP/ED): Performed by: INTERNAL MEDICINE

## 2023-12-05 PROCEDURE — 99223 1ST HOSP IP/OBS HIGH 75: CPT | Performed by: INTERNAL MEDICINE

## 2023-12-05 PROCEDURE — 2500000001 HC RX 250 WO HCPCS SELF ADMINISTERED DRUGS (ALT 637 FOR MEDICARE OP): Performed by: INTERNAL MEDICINE

## 2023-12-05 RX ORDER — IPRATROPIUM BROMIDE AND ALBUTEROL SULFATE 2.5; .5 MG/3ML; MG/3ML
SOLUTION RESPIRATORY (INHALATION)
Status: COMPLETED
Start: 2023-12-05 | End: 2023-12-05

## 2023-12-05 RX ORDER — IPRATROPIUM BROMIDE AND ALBUTEROL SULFATE 2.5; .5 MG/3ML; MG/3ML
3 SOLUTION RESPIRATORY (INHALATION) ONCE AS NEEDED
Status: COMPLETED | OUTPATIENT
Start: 2023-12-05 | End: 2023-12-05

## 2023-12-05 RX ADMIN — PANTOPRAZOLE SODIUM 40 MG: 40 TABLET, DELAYED RELEASE ORAL at 14:37

## 2023-12-05 RX ADMIN — APIXABAN 5 MG: 5 TABLET, FILM COATED ORAL at 20:44

## 2023-12-05 RX ADMIN — CARVEDILOL 25 MG: 25 TABLET, FILM COATED ORAL at 14:37

## 2023-12-05 RX ADMIN — APIXABAN 5 MG: 5 TABLET, FILM COATED ORAL at 01:17

## 2023-12-05 RX ADMIN — CARVEDILOL 25 MG: 25 TABLET, FILM COATED ORAL at 01:17

## 2023-12-05 RX ADMIN — APIXABAN 5 MG: 5 TABLET, FILM COATED ORAL at 14:37

## 2023-12-05 RX ADMIN — CARVEDILOL 25 MG: 25 TABLET, FILM COATED ORAL at 20:44

## 2023-12-05 RX ADMIN — EZETIMIBE 10 MG: 10 TABLET ORAL at 14:37

## 2023-12-05 RX ADMIN — IPRATROPIUM BROMIDE AND ALBUTEROL SULFATE 3 ML: 2.5; .5 SOLUTION RESPIRATORY (INHALATION) at 05:23

## 2023-12-05 RX ADMIN — TAMSULOSIN HYDROCHLORIDE 0.4 MG: 0.4 CAPSULE ORAL at 14:37

## 2023-12-05 RX ADMIN — VENLAFAXINE HYDROCHLORIDE 150 MG: 150 CAPSULE, EXTENDED RELEASE ORAL at 14:37

## 2023-12-05 RX ADMIN — ASPIRIN 81 MG: 81 TABLET, COATED ORAL at 14:37

## 2023-12-05 ASSESSMENT — COGNITIVE AND FUNCTIONAL STATUS - GENERAL
CLIMB 3 TO 5 STEPS WITH RAILING: A LOT
STANDING UP FROM CHAIR USING ARMS: A LOT
WALKING IN HOSPITAL ROOM: A LOT
DAILY ACTIVITIY SCORE: 19
MOVING FROM LYING ON BACK TO SITTING ON SIDE OF FLAT BED WITH BEDRAILS: A LITTLE
PERSONAL GROOMING: A LITTLE
MOBILITY SCORE: 14
DRESSING REGULAR LOWER BODY CLOTHING: A LITTLE
MOVING TO AND FROM BED TO CHAIR: A LOT
DRESSING REGULAR UPPER BODY CLOTHING: A LITTLE
TOILETING: A LITTLE
HELP NEEDED FOR BATHING: A LITTLE
TURNING FROM BACK TO SIDE WHILE IN FLAT BAD: A LITTLE

## 2023-12-05 ASSESSMENT — ENCOUNTER SYMPTOMS
FREQUENCY: 0
CONFUSION: 1
DYSURIA: 0
FLANK PAIN: 0
FATIGUE: 1
ACTIVITY CHANGE: 1
WEAKNESS: 1
HEMATURIA: 0

## 2023-12-05 ASSESSMENT — ACTIVITIES OF DAILY LIVING (ADL)
TOILETING: NEEDS ASSISTANCE
DRESSING YOURSELF: NEEDS ASSISTANCE
BATHING: NEEDS ASSISTANCE
LACK_OF_TRANSPORTATION: NO
WALKS IN HOME: NEEDS ASSISTANCE
PATIENT'S MEMORY ADEQUATE TO SAFELY COMPLETE DAILY ACTIVITIES?: YES
HEARING - LEFT EAR: FUNCTIONAL
JUDGMENT_ADEQUATE_SAFELY_COMPLETE_DAILY_ACTIVITIES: YES
HEARING - RIGHT EAR: FUNCTIONAL
ADEQUATE_TO_COMPLETE_ADL: YES
FEEDING YOURSELF: INDEPENDENT
GROOMING: NEEDS ASSISTANCE
ASSISTIVE_DEVICE: DENTURES UPPER

## 2023-12-05 ASSESSMENT — PAIN SCALES - GENERAL: PAINLEVEL_OUTOF10: 0 - NO PAIN

## 2023-12-05 NOTE — PROGRESS NOTES
12/05/23 1535   Discharge Planning   Living Arrangements Spouse/significant other   Support Systems Spouse/significant other   Assistance Needed min assist   Type of Residence Private residence   Home or Post Acute Services None   Patient expects to be discharged to: SNF vs HHC   Does the patient need discharge transport arranged? Yes   RoundTrip coordination needed? Yes     Met with patient at bedside, introduced self and role as RN TCC. Patient appears lethargic and confused somewhat, call placed to spouse. Per spouse patient generally able to do some for himself, although she states that it is becoming more difficult. She does assist as he allows. He has had many recent falls. PT OT Pending. Discussed possible SNF placement. Will await evals and see patient again tomorrow to see if mentation improves.     12/6/23  Met with patient at bedside, he is more awake and interactive today, alert and oriented. TCC discussed DC planning to SNF., Patient wants wife to be involved in decision and will think about it over night. He feels that if he works with therapy again he would do better, he has been to [facility] in past and would go back if he ultimately felt he needed to or if spouse would like him too. Will follow up tomorrow.     12/7/23  Met with patient , IMM done, discussed discharge planning and patient states he is doing well with his walker and minimal assist to the bathroom, TCC discussed with PCA who confirmed patient doing well. PT OT should work with patient again today. He wants to discharge home with home health, he does not want SNF. He has used [facility] in the past and would like to use them again. Offered list of agencies and patient declined. Will update spouse as well.

## 2023-12-05 NOTE — H&P
"History Of Present Illness  Jony Javier is a 78 y.o.  male with a past medical history significant for HTN, HLD, CAD, status post ICD/pacer, aortic stenosis, atrial fibrillation on Eliquis, CHF (combined dysfunction), HECTOR on CPAP, anxiety, depression, GERD, CKD, MGUS, chronic anemia, history of UTI, gout, chronic ambulatory dysfunction, BPH and NIDDM 2.  Patient had originally presented in the early morning of 12/04/2023 as an HIA after he had fallen and hit his head on Eliquis.  When the patient had fallen it seems like he was able to get up on his own accord and he was well for about 3 hours prior to his wife calling EMS.  She had stated that he was increasingly confused and only alert to himself which is a new concern for him.  Per family the patient was trying to urinate when he fell and when EMS was called he was noted to have equal use of his arms/legs but they have also noted confusion.  Patient was evaluated in the ED with an unremarkable work-up except for a UTI.  Per ED documentation the patient's wife was okay with taking him home and caring for him with follow-up with his PCP.  Per outpatient documentation the patient's wife had called her PCP in the evening and had noted she felt like the ER had \"push them out\" because they needed a bed.  Patient's wife is afraid that her  will continue to fall and that she is not able to care for him.  She had also noted to her PCP that the patient has fallen several times since Thanksgiving stating that his legs feel weak and he has been falling but had not noted any other symptoms.  She had apparently noted her physician that she would like some type of inpatient rehab for him to help with these issues as she cannot constantly keep an eye on him.  Per the patient he was adamant that he has passed out over 20 times over the past couple of days.  He had not noted any other symptoms.  He denies any recent sick contacts, chemical/environmental " exposures, changes in dietary habits or any recent traumatic events/falls other than noted above.  He states that he did not fall at all until today which she did acknowledge but states that he passed out.  Per ED documentation on initial presentation in the early morning of 12/4/2023 as well as again this evening there was no mention of passing out.    The patient presented back to the ED in the evening of 12/4/2023 due to the progressively worsening weakness.  At home the patient was trying to sit on his couch when he slid off but did not hit his head or lose consciousness according to his wife who saw the episode occurred.  Per EMS the patient's wife no longer feels comfortable caring for him at this time. He denies any fevers, chills, night sweats, vision changes, auditory changes, change in taste/smell, loss of bowel/bladder control, loss of consciousness, dizziness, vertigo, syncope, seizure-like activity, chest pain, palpitations, shortness of breath, coughing, wheezing, congestion, hemoptysis, hematemesis, abdominal pain, nausea, vomiting, diarrhea, constipation, dysuria, hematuria, dyschezia, hematochezia or any lateralizing motor/sensory deficits other than noted above.    ED course:  1.  Vital signs on presentation: Heart rate of 80, respirations 20, blood pressure 137/84, pulse ox 96% on room air  2.  Per ED documentation the patient was alert and oriented x4 but still remains somewhat encephalopathic regarding 2 prior events to presentation and apparently he was ambulatory in the ED without any assistance.  3.  Lab work from initial presentation in the morning of 12/4/2023 is noted in the EMR.  4.  White blood cell count of 8.1  5.  Hemoglobin of 13.3  6.  Platelet count of 132 with a baseline of around 150s to 170s  7.  BUN/Creatinine = 31/1.84 --> 34/1.99 -->  8.  Baseline creatinine of around 1.5-1.7 but this is based on very few data points in 2023 and majority of them being in 2022 and 2021.  9.   Urinalysis on initial presentation on 12//23: 3+ blood, negative nitrite/negative leukocyte Estrace, 6-10 urine RBCs, 11-20 urine WBCs  10.  XR chest: No focal consolidative opacities, questionable mild vascular congestion versus bronchovascular crowding most pronounced in the right upper lung zone per the final read.  11.  CT head without contrast noted no acute intracranial processes or infarcts, mild to moderate chronic small vessel ischemic disease with similar brain atrophy/disproportionate ventricular prominence unchanged from prior with also a remote right anterior basal ganglia lacunar infarct, unchanged calcified thrombus of the right MCA bifurcation and chronic obstructive processes within the sinuses.    A 12 point ROS was performed with the patient denying any complaints at this time aside from those listed in the HPI above though completed may not be the most reliable as the patient does still seem somewhat confused with regards to the events prior to presentation.    Past Medical History  He has a past medical history of Acute on chronic systolic (congestive) heart failure (CMS/[facility]) (09/08/2022), Nonrheumatic aortic (valve) stenosis (01/10/2022), Nonrheumatic mitral (valve) insufficiency (09/14/2021), Nonrheumatic mitral (valve) insufficiency (12/07/2022), Other long term (current) drug therapy, Personal history of diseases of the blood and blood-forming organs and certain disorders involving the immune mechanism, Personal history of other diseases of the circulatory system (09/20/2022), Personal history of other diseases of the circulatory system, Personal history of other diseases of the circulatory system, Personal history of other diseases of the musculoskeletal system and connective tissue, and Unspecified intracranial injury with loss of consciousness status unknown, initial encounter (CMS/[facility]) (09/20/2022).    Surgical History  He has a past surgical history that includes Other surgical history  (01/08/2020); Other surgical history (01/08/2020); Other surgical history (01/08/2020); Other surgical history (12/06/2021); Other surgical history (12/06/2021); and Other surgical history (01/30/2020).     Social History  He reports that he has quit smoking. His smoking use included cigarettes. He has never used smokeless tobacco. He reports current alcohol use of about 14.0 standard drinks of alcohol per week. He reports that he does not use drugs.    Family History  Family History   Problem Relation Name Age of Onset    No Known Problems Mother      No Known Problems Father      Other (malignant neoplsm) Other      Hypertension Other      Other (cardiac disorder) Other          Allergies  Bee venom protein (honey bee), Cefaclor, Pentazocine, Pentazocine-acetaminophen, Pregabalin, Allopurinol, Ciprofloxacin, Sulfamethoxazole-trimethoprim, Metoprolol, and Statins-hmg-coa reductase inhibitors    Review of Systems   Unable to perform ROS: Mental status change   Constitutional:  Positive for activity change and fatigue.   Genitourinary:  Negative for decreased urine volume, dysuria, flank pain, frequency, hematuria and urgency.   Neurological:  Positive for weakness.   Psychiatric/Behavioral:  Positive for confusion.    All other systems reviewed and are negative.       Physical Exam  Vitals and nursing note reviewed.   Constitutional:       General: He is not in acute distress.     Appearance: Normal appearance. He is not ill-appearing or diaphoretic.   HENT:      Head: Normocephalic and atraumatic.      Mouth/Throat:      Mouth: Mucous membranes are dry.      Pharynx: Oropharynx is clear.   Eyes:      Extraocular Movements: Extraocular movements intact.      Conjunctiva/sclera: Conjunctivae normal.      Pupils: Pupils are equal, round, and reactive to light.   Neck:      Vascular: No carotid bruit.   Cardiovascular:      Rate and Rhythm: Normal rate and regular rhythm.      Pulses: Normal pulses.      Heart sounds:  No murmur heard.  Pulmonary:      Effort: Pulmonary effort is normal.      Breath sounds: Normal breath sounds.      Comments: Noted crackles in the mid to upper lung fields on the right but otherwise diminished without other adevntitious sounds elsewhere, nonlabored on room air  Abdominal:      General: Abdomen is flat. There is no distension.      Palpations: Abdomen is soft. There is no mass.      Tenderness: There is no abdominal tenderness. There is no guarding or rebound.   Musculoskeletal:         General: Deformity present. No swelling, tenderness or signs of injury.      Cervical back: Normal range of motion and neck supple. No rigidity or tenderness.      Right lower leg: No edema.      Left lower leg: No edema.      Comments: Noted crepitus of the right should and bilateral knees with flexion/extension and no joints with instability, strength at about 4-5/5,   Skin:     General: Skin is warm.      Capillary Refill: Capillary refill takes less than 2 seconds.      Findings: No bruising, erythema, lesion or rash.   Neurological:      General: No focal deficit present.      Mental Status: He is alert and oriented to person, place, and time.      Cranial Nerves: No cranial nerve deficit.      Sensory: No sensory deficit.      Motor: No weakness.   Psychiatric:         Mood and Affect: Mood normal.         Behavior: Behavior normal.         Thought Content: Thought content normal.         Judgment: Judgment normal.         SCHEDULED MEDICATIONS  apixaban, 5 mg, oral, BID  aspirin, 81 mg, oral, Daily  carvedilol, 25 mg, oral, BID  ezetimibe, 10 mg, oral, Daily  insulin lispro, 0-5 Units, subcutaneous, After meals & nightly  pantoprazole, 40 mg, oral, Daily  tamsulosin, 0.4 mg, oral, q AM  venlafaxine XR, 150 mg, oral, Daily with breakfast           CONTINUOUS MEDICATIONS          PRN MEDICATIONS  PRN medications: acetaminophen, albuterol, dextrose 10 % in water (D10W), dextrose, glucagon, ondansetron      Last  "Recorded Vitals  Blood pressure 124/64, pulse 66, resp. rate 18, height 1.651 m (5' 5\"), weight 73.5 kg (162 lb), SpO2 95 %.    Relevant Results    Results for orders placed or performed during the hospital encounter of 12/04/23 (from the past 24 hour(s))   CBC and Auto Differential   Result Value Ref Range    WBC 8.1 4.4 - 11.3 x10*3/uL    nRBC 0.0 0.0 - 0.0 /100 WBCs    RBC 3.88 (L) 4.50 - 5.90 x10*6/uL    Hemoglobin 13.3 (L) 13.5 - 17.5 g/dL    Hematocrit 41.4 41.0 - 52.0 %     (H) 80 - 100 fL    MCH 34.3 (H) 26.0 - 34.0 pg    MCHC 32.1 32.0 - 36.0 g/dL    RDW 15.9 (H) 11.5 - 14.5 %    Platelets 132 (L) 150 - 450 x10*3/uL    Neutrophils % 72.9 40.0 - 80.0 %    Immature Granulocytes %, Automated 0.5 0.0 - 0.9 %    Lymphocytes % 14.5 13.0 - 44.0 %    Monocytes % 9.1 2.0 - 10.0 %    Eosinophils % 2.4 0.0 - 6.0 %    Basophils % 0.6 0.0 - 2.0 %    Neutrophils Absolute 5.88 (H) 1.60 - 5.50 x10*3/uL    Immature Granulocytes Absolute, Automated 0.04 0.00 - 0.50 x10*3/uL    Lymphocytes Absolute 1.17 0.80 - 3.00 x10*3/uL    Monocytes Absolute 0.73 0.05 - 0.80 x10*3/uL    Eosinophils Absolute 0.19 0.00 - 0.40 x10*3/uL    Basophils Absolute 0.05 0.00 - 0.10 x10*3/uL   Phosphorus   Result Value Ref Range    Phosphorus 3.0 2.5 - 4.9 mg/dL   Magnesium   Result Value Ref Range    Magnesium 2.06 1.60 - 2.40 mg/dL   Comprehensive metabolic panel   Result Value Ref Range    Glucose 102 (H) 74 - 99 mg/dL    Sodium 140 136 - 145 mmol/L    Potassium 4.4 3.5 - 5.3 mmol/L    Chloride 108 (H) 98 - 107 mmol/L    Bicarbonate 22 21 - 32 mmol/L    Anion Gap 14 10 - 20 mmol/L    Urea Nitrogen 34 (H) 6 - 23 mg/dL    Creatinine 1.99 (H) 0.50 - 1.30 mg/dL    eGFR 34 (L) >60 mL/min/1.73m*2    Calcium 8.6 8.6 - 10.3 mg/dL    Albumin 3.8 3.4 - 5.0 g/dL    Alkaline Phosphatase 56 33 - 136 U/L    Total Protein 6.5 6.4 - 8.2 g/dL    AST 19 9 - 39 U/L    Bilirubin, Total 0.5 0.0 - 1.2 mg/dL    ALT 11 10 - 52 U/L          CT cervical spine wo IV " contrast    Result Date: 12/4/2023  Interpreted By:  Ishaan Pavon, STUDY: CT CERVICAL SPINE WO IV CONTRAST;  12/4/2023 10:44 am   INDICATION: Signs/Symptoms:fall.   COMPARISON: CTA head and neck dated 05/03/2022.   ACCESSION NUMBER(S): [id number]   ORDERING CLINICIAN: JAGUAR BRIZUELA   TECHNIQUE: Axial CT images of the cervical spine are obtained. Axial, coronal and sagittal reconstructions are provided for review.   FINDINGS: Trauma: Diffusely decreased bone mineralization. There is no evidence for an acute fracture of the cervical spine. No prevertebral soft tissue swelling.   Alignment: 2 mm anterolisthesis of C2 on C3 and C3 on C4. Stairstep estimated 2 mm retrolisthesis of C4 on C5 and C5 on C6. Additional 2 mm anterolisthesis of C6 on C7 and estimated 3 mm anterolisthesis of C7 on T1 with ankylosis of the C7 and T1 vertebral bodies.   Craniocervical Junction: The occipital condyles, odontoid process, and craniocervical junction are intact.   Vertebral Body Heights: The cervical vertebral body heights are grossly intact.   Intervertebral Disc Spaces: Severe degenerative discogenic change involving the C3-T1 levels with severe degenerative disc height loss, varying degrees of endplate sclerosis and subchondral cystic change, as well as uncovertebral and endplate spurring.   Degenerative Change: Multilevel spinal canal stenosis, moderate to severe at C3-C4, C4-C5, and C5-C6 with right lateral recess effacement at this level. Multilevel facet arthropathy and uncovertebral spurring with high-grade neural foraminal narrowing.   Prevertebral/Paraspinal Soft Tissues: Severe atherosclerotic calcification of the carotid bifurcations bilaterally. The prevertebral and paraspinal soft tissues are otherwise unremarkable.   Lung Apices: Unremarkable.       Diffusely decreased bone mineralization with no definite acute osseous abnormality of the cervical spine.   Advanced multilevel degenerative spondylolisthesis with severe  cervical spondylosis and facet arthropathy, overall similar to slightly worsened from comparison examination. There is moderate to severe multilevel spinal canal stenosis and neural foraminal narrowing.   MACRO: None   Signed by: Ishaan Pavon 12/4/2023 11:11 AM Dictation workstation:   [id number]    CT head W O contrast trauma protocol    Result Date: 12/4/2023  Interpreted By:  Ishaan Pavon, STUDY: CT HEAD W/O CONTRAST TRAUMA PROTOCOL;  12/4/2023 10:44 am   INDICATION: Signs/Symptoms:fall.   COMPARISON: CT head dated 05/09/2022   ACCESSION NUMBER(S): [id number]   ORDERING CLINICIAN: JAGUAR BRIZUELA   TECHNIQUE: Noncontrast axial CT scan of head was performed. Coronal and sagittal reformats provided.   FINDINGS: Parenchyma: There is no intracranial hemorrhage. The grey-white differentiation is intact. There is no mass effect or midline shift. Mild-to-moderate chronic small vessel ischemic disease. Similar appearance of remote right anterior basal ganglia lacunar infarct. Additional tiny remote bilateral basal ganglia lacunar infarcts versus prominent perivascular spaces. Unchanged calcified thrombus components suggested near the right MCA bifurcation (series 4, image 62).   CSF Spaces: The ventricles, sulci and basal cisterns are similar in appearance with disproportionate prominence of the ventricular collecting system, favor central atrophy.   Extra-Axial Fluid: There is no extraaxial fluid collection.   Calvarium: No acute osseous abnormality. Incidental metopic suture.   Paranasal sinuses: Chronic appearing right ostiomeatal pattern of obstruction with hyperdense secretions and complete opacification of the right maxillary paranasal sinus, right anterior ethmoidal air cells, and a majority of the right posterior ethmoids. Aplastic right frontal sinus. There is mild hyperostosis, particularly involving the right posterior ethmoid region. There is also hyperostosis involving the right sphenoid sinus with mild  mucosal thickening. Chronic appearing widening of the right ostiomeatal unit.   Mastoids: Small amount of fluid on the left.   Orbits: Normal.   Soft tissues: Unremarkable.     Brain Injury (BIG) guidelines CT values:   Skull fracture: No SDH (subdural hematoma): None detected EDH (epidural hematoma): None detected IPH (intraparenchymal hemorrhage): None detected SAH (subarachnoid hemorrhage): None detected IVH (intraventricular hemorrhage): No   Reference: Marco DUMONT, Camron RS, Robert M, et al. The BIG (brain injury guidelines) project: defining the management of traumatic brain injury by acute care surgeons. J Trauma Acute Care Surg. 2014;76:827b423.       No acute intracranial hemorrhage, mass effect, calvarial fracture, or CT apparent acute infarct.   Mild-to-moderate chronic small vessel ischemic disease with similar brain atrophy with disproportionate ventricular prominence. Unchanged remote right anterior basal ganglia lacunar infarct. Unchanged calcified thrombus component suggested near the right MCA bifurcation.   Chronic appearing right ostiomeatal pattern of paranasal sinus obstruction as detailed above; underlying polyp or sinonasal mass lesion not excluded.   MACRO: None   Signed by: Ishaan Pavon 12/4/2023 11:02 AM Dictation workstation:   [id number]    XR chest 1 view    Result Date: 12/4/2023  Interpreted By:  Ishaan Pavon, STUDY: XR CHEST 1 VIEW;  12/4/2023 10:31 am   INDICATION: Signs/Symptoms:cough.   COMPARISON: Chest radiograph dated 05/03/2022   ACCESSION NUMBER(S): [id number]   ORDERING CLINICIAN: JAGUAR BRIZUELA   FINDINGS: AP radiograph of the chest was provided.   DEVICES: Left-sided approach biventricular AICD. Additional tiny device along the left heart border. Fracture of the 2nd superior most sternotomy wire. Otherwise intact appearing sternotomy wires.   CARDIOMEDIASTINAL SILHOUETTE: Cardiomediastinal silhouette is normal in size and configuration.   LUNGS: Mild bronchovascular  crowding versus congestion involving the right upper lung zone. No focal consolidative airspace opacity. No pneumothorax. No pleural effusion.   ABDOMEN: No remarkable upper abdominal findings.   BONES: No acute osseous changes. Advanced osteoarthrosis of the right shoulder.       No focal consolidative airspace opacity. Questionable mild vascular congestion versus bronchovascular crowding, most pronounced within the right upper lung zone.   MACRO: None   Signed by: Ishaan Pavon 12/4/2023 10:54 AM Dictation workstation:   [id number]    Transthoracic Echo (TTE) Complete    Result Date: 11/28/2023              [place] [id number]      Phone [phone] Fax [phone] TRANSTHORACIC ECHOCARDIOGRAM REPORT  Patient Name:      KARAN Paige Physician:    [id number] Cardenas MD Study Date:        11/27/2023           Ordering Provider:    Stevenson CARDENAS MRN/PID:           [id number]             Fellow: Accession#:        [id number]         Nurse: Date of Birth/Age: 1945 / 78 years Sonographer:          Elizabeth Navarro [facility] Gender:            M                    Additional Staff: Height:            157.48 cm            Admit Date:           11/27/2023 Weight:            68.95 kg             Admission Status:     Outpatient BSA:               1.70 m2              Department Location:  [facility] Echo                                                               Lab Blood Pressure: 110 /76 mmHg Study Type:    TRANSTHORACIC ECHO (TTE) COMPLETE Diagnosis/ICD: Chronic systolic (congestive) heart failure (CHF)-I50.22 Indication:    Congestive Heart Failure CPT Codes:     Echo Complete w Full Doppler-[id number]  Study Detail: The following Echo studies were performed: 2D, M-Mode, Doppler and               color flow.  PHYSICIAN  INTERPRETATION: Left Ventricle: Left ventricular systolic function is mildly decreased, with an estimated ejection fraction of 45%. Wall motion is abnormal. The left ventricular cavity size is normal. The left ventricular septal wall thickness is mildly increased. There is moderately increased left ventricular posterior wall thickness. Spectral Doppler shows a pseudonormal pattern of left ventricular diastolic filling. Akinetic inferior and inferolateral wall. Left Atrium: The left atrium is moderately dilated. Right Ventricle: The right ventricle is normal in size. There is reduced right ventricular systolic function. A device is visualized in the right ventricle. Right Atrium: The right atrium is normal in size. Aortic Valve: The aortic valve was not well visualized. There is evidence of mild aortic valve stenosis. The aortic valve dimensionless index is 0.54. There is trivial aortic valve regurgitation. The peak instantaneous gradient of the aortic valve is 20.5 mmHg. The mean gradient of the aortic valve is 9.2 mmHg. TAVR?. Mitral Valve: The mitral valve is normal in structure. There is mild mitral annular calcification. There is trace to mild mitral valve regurgitation. Tricuspid Valve: The tricuspid valve is structurally normal. There is trace tricuspid regurgitation. Pulmonic Valve: The pulmonic valve is structurally normal. There is trace pulmonic valve regurgitation. Pericardium: There is no pericardial effusion noted. Aorta: The aortic root is normal.  CONCLUSIONS:  1. Left ventricular systolic function is mildly decreased with a 45% estimated ejection fraction.  2. Spectral Doppler shows a pseudonormal pattern of left ventricular diastolic filling.  3. The left ventricular posterior wall thickness is moderately increased.  4. There is reduced right ventricular systolic function.  5. The left atrium is moderately dilated.  6. Mild aortic valve stenosis. QUANTITATIVE DATA SUMMARY: 2D MEASUREMENTS:                            Normal Ranges: LAs:           4.34 cm    (2.7-4.0cm) IVSd:          1.23 cm    (0.6-1.1cm) LVPWd:         1.39 cm    (0.6-1.1cm) LVIDd:         4.34 cm    (3.9-5.9cm) LVIDs:         3.38 cm LV Mass Index: 125.5 g/m2 LV % FS        21.9 % LA VOLUME:                               Normal Ranges: LA Vol A4C:        85.1 ml    (22+/-6mL/m2) LA Vol A2C:        65.6 ml LA Vol BP:         77.6 ml LA Vol Index A4C:  50.0ml/m2 LA Vol Index A2C:  38.6 ml/m2 LA Vol Index BP:   45.6 ml/m2 LA Area A4C:       22.6 cm2 LA Area A2C:       20.6 cm2 LA Major Axis A4C: 5.1 cm LA Major Axis A2C: 5.5 cm LA Volume Index:   39.3 ml/m2 LA Vol A4C:        68.6 ml LA Vol A2C:        62.1 ml RA VOLUME BY A/L METHOD:                       Normal Ranges: RA Area A4C: 13.6 cm2 M-MODE MEASUREMENTS:                  Normal Ranges: Ao Root: 2.05 cm (2.0-3.7cm) AORTA MEASUREMENTS:                    Normal Ranges: Asc Ao, d: 3.40 cm (2.1-3.4cm) LV SYSTOLIC FUNCTION BY 2D PLANIMETRY (MOD):                     Normal Ranges: EF-A4C View: 68.7 % (>=55%) EF-A2C View: 70.6 % EF-Biplane:  68.5 % LV DIASTOLIC FUNCTION:                        Normal Ranges: MV Peak E:    0.83 m/s (0.7-1.2 m/s) MV Peak A:    0.84 m/s (0.42-0.7 m/s) E/A Ratio:    1.00     (1.0-2.2) MV e'         0.06 m/s (>8.0) MV lateral e' 0.07 m/s MV medial e'  0.05 m/s E/e' Ratio:   13.92    (<8.0) MITRAL VALVE:                 Normal Ranges: MV DT: 226 msec (150-240msec) AORTIC VALVE:                                    Normal Ranges: AoV Vmax:                2.26 m/s  (<=1.7m/s) AoV Peak P.5 mmHg (<20mmHg) AoV Mean P.2 mmHg  (1.7-11.5mmHg) LVOT Max Tam:            1.20 m/s  (<=1.1m/s) AoV VTI:                 45.83 cm  (18-25cm) LVOT VTI:                24.66 cm LVOT Diameter:           2.03 cm   (1.8-2.4cm) AoV Area, VTI:           1.71 cm2  (2.5-5.5cm2) AoV Area,Vmax:           1.69 cm2  (2.5-4.5cm2) AoV Dimensionless Index: 0.54 AORTIC  INSUFFICIENCY: AI Vmax:       3.62 m/s AI Half-time:  515 msec AI Decel Time: 1776 msec AI Decel Rate: 203.92 cm/s2  RIGHT VENTRICLE: RV Basal 2.74 cm RV Mid   2.30 cm RV Major 7.2 cm TAPSE:   15.2 mm RV s'    0.13 m/s TRICUSPID VALVE/RVSP:                             Normal Ranges: Peak TR Velocity: 2.74 m/s RV Syst Pressure: 33.0 mmHg (< 30mmHg) IVC Diam:         0.97 cm TV e'             0.1 m/s AORTA: Asc Ao Diam 3.39 cm  [id number] Narendra Portillo MD Electronically signed on 11/28/2023 at 5:35:04 PM  ** Final **     CT lumbar spine wo IV contrast    Result Date: 11/9/2023  EXAMINATION: CT OF THE LUMBAR SPINE WITHOUT CONTRAST  11/6/2023 TECHNIQUE: CT of the lumbar spine was performed without the administration of intravenous contrast. Multiplanar reformatted images are provided for review. Adjustment of mA and/or kV according to patient size was utilized.  Automated exposure control, iterative reconstruction, and/or weight based adjustment of the mA/kV was utilized to reduce the radiation dose to as low as reasonably achievable. COMPARISON: None HISTORY: ORDERING SYSTEM PROVIDED HISTORY: Chronic or old sprain of lumbosacral ligament FINDINGS: There is hardware transfixing L3-L5.  There is some interbody fusion.  There is a grade 1 anterior subluxation of L4 with respect L5. Vertebral body height is unremarkable. At T11-T12 there is disc space narrowing and anterior spurs.  T12-L1 is unremarkable. At L1-2 there is disc space narrowing endplate sclerosis and vacuum phenomena.  There are significant anterior spurs.  There is moderate diffuse posterior disc bulge.  There is facet arthrosis.  There is moderate stenosis. At L2-3 there is mild diffuse posterior disc bulge and mild stenosis. There are significant artifacts from the hardware from L3-L5.  There have been laminectomies.  There is disc space narrowing at L3-4.  There is no significant stenosis. At L4-5 there are some posterior spurring.  There is mild  stenosis.  There is disc space narrowing.  At L5-S1 there is disc space narrowing and vacuum phenomena.  There is facet arthrosis.  There is no significant stenosis. There is a moderate amount of vascular plaque in the abdominal aorta and common iliac arteries.    Hardware transfixing L3-L5.  There is a grade 1 anterior subluxation of L4 with respect L5.  There is multilevel disc space narrowing in the lumbar region. At L1-2 there is moderate diffuse posterior disc bulge.  There is moderate stenosis. Moderate amount of generalized vascular plaque          Assessment/Plan     1.  UTI  -UA as noted above  -Given notable antibiotic allergies patient did receive 1 g of IV meropenem in the ED and based on for renal dysfunction would need to be dosed at twice daily dosing  -We will discuss with the day team with regards to antibiotic management and continued therapies.    2.  Acute metabolic encephalopathy/AMS  -Likely in setting of underlying urinary tract infection  -Continue antibiotics as noted above    3.  HTN, HLD, CAD, HFrEF  -Continued on home antihypertensives/Zetia  -It mentions that the patient has a history of an allergy to statins with myalgias and it is unclear if he is still taking atorvastatin.  Given the patient's altered mentation and generalized weakness hold off right now until further confirmed  -Holding diuretics in setting of CASSIDY    4.   CASSIDY on CKD3  -BUN/Creatinine = 31/1.84 --> 34/1.99 -->  -Baseline creatinine of around 1.5-1.7 but this is based on very few data points in 2023 and majority of them being in 2022 and 2021.  -We will hold diuretics for now but monitor closely for volume overload  -We will continue to encourage adequate p.o. intake of food/water    4.  Atrial fibrillation  -Continued on home Eliquis  -Continued on home medications    5.  GERD  -Continued on PPI    6.  BPH  Continued on Flomax    7.  Anxiety/depression  -Continued on home Effexor    8.  Acute on chronic  deconditioning/ambulatory dysfunction  -PT/OT appreciated    9.  Thrombocytopenia  -Platelet count of 132 with a baseline of around 150s to 170s  -Continued outpatient follow-up with hematology             Lokesh Robison DO

## 2023-12-05 NOTE — ED PROVIDER NOTES
"HPI   Chief Complaint   Patient presents with    Weakness, Gen     Pt returned via EMS after discharge from this ED earlier this evening for UTI. Pt was unsteady on feet and slid to floor from couch this evening. Pt denies hitting head. On eliquis. Disoriented to year, but answers all other questions appropriately.       This is a 78-year-old male presenting to the emergency department with generalized weakness and episode of \"passing out\" that occurred earlier this evening.  Patient states that he was seen in this emergency department earlier today because he had a fall yesterday.  He says that he was diagnosed with a UTI and that he got home from the emergency room around 5:30 PM.  He was sitting on the couch when he had an episode of \"passing out.\"  Per EMS, patient partially slid off the couch but did not hit his head or lose consciousness according to his wife who saw the episode.  Per EMS, his wife does not feel comfortable caring for him at home given the fact that the patient is much weaker and more confused than normal.  At baseline, he is ambulatory and oriented x 3.  Patient himself feels \"not himself\" but denies any focal numbness, tingling, or weakness.  Otherwise denies any complaints.       History provided by:  Patient and EMS personnel   used: No                   [place] Coma Scale Score: 14                  Patient History   Past Medical History:   Diagnosis Date    Acute on chronic systolic (congestive) heart failure (CMS/[facility]) 09/08/2022    Acute on chronic systolic CHF (congestive heart failure), NYHA class 3    Nonrheumatic aortic (valve) stenosis 01/10/2022    Severe aortic stenosis    Nonrheumatic mitral (valve) insufficiency 09/14/2021    Severe mitral regurgitation by prior echocardiogram    Nonrheumatic mitral (valve) insufficiency 12/07/2022    Severe mitral regurgitation    Other long term (current) drug therapy     Long term current use of amiodarone    Personal " history of diseases of the blood and blood-forming organs and certain disorders involving the immune mechanism     History of hemorrhagic diathesis    Personal history of other diseases of the circulatory system 09/20/2022    History of intraventricular hemorrhage    Personal history of other diseases of the circulatory system     History of cardiac disorder    Personal history of other diseases of the circulatory system     History of hypertension    Personal history of other diseases of the musculoskeletal system and connective tissue     History of arthritis    Unspecified intracranial injury with loss of consciousness status unknown, initial encounter (CMS/[facility]) 09/20/2022    Closed TBI (traumatic brain injury)     Past Surgical History:   Procedure Laterality Date    OTHER SURGICAL HISTORY  01/08/2020    Pacemaker insertion    OTHER SURGICAL HISTORY  01/08/2020    Knee replacement    OTHER SURGICAL HISTORY  01/08/2020    Cardiac catheterization with stent placement    OTHER SURGICAL HISTORY  12/06/2021    Cardioverter defibrillator insertion    OTHER SURGICAL HISTORY  12/06/2021    Angioplasty    OTHER SURGICAL HISTORY  01/30/2020    Spinal surgery     Family History   Problem Relation Name Age of Onset    No Known Problems Mother      No Known Problems Father      Other (malignant neoplsm) Other      Hypertension Other      Other (cardiac disorder) Other       Social History     Tobacco Use    Smoking status: Former     Types: Cigarettes    Smokeless tobacco: Never   Vaping Use    Vaping Use: Never used   Substance Use Topics    Alcohol use: Yes     Alcohol/week: 14.0 standard drinks of alcohol     Types: 14 Cans of beer per week     Comment: 2 a night    Drug use: Never       Physical Exam     Physical Exam  Constitutional:       General: He is not in acute distress.     Appearance: He is well-developed. He is not toxic-appearing.   HENT:      Head: Normocephalic and atraumatic.      Mouth/Throat:      Mouth:  Mucous membranes are moist.   Eyes:      Conjunctiva/sclera: Conjunctivae normal.   Cardiovascular:      Rate and Rhythm: Normal rate and regular rhythm.      Pulses: Normal pulses.   Pulmonary:      Effort: Pulmonary effort is normal.   Abdominal:      General: There is no distension.      Palpations: Abdomen is soft.      Tenderness: There is no abdominal tenderness.   Genitourinary:     Penis: Normal.       Testes: Normal.   Musculoskeletal:         General: Normal range of motion.      Cervical back: Neck supple.   Skin:     General: Skin is warm and dry.   Neurological:      Mental Status: He is alert.      Cranial Nerves: No cranial nerve deficit.      Motor: Motor function is intact. No weakness.      Comments: Oriented to self and place only, not year         Labs Reviewed   CBC WITH AUTO DIFFERENTIAL - Abnormal       Result Value    WBC 8.1      nRBC 0.0      RBC 3.88 (*)     Hemoglobin 13.3 (*)     Hematocrit 41.4       (*)     MCH 34.3 (*)     MCHC 32.1      RDW 15.9 (*)     Platelets 132 (*)     Neutrophils % 72.9      Immature Granulocytes %, Automated 0.5      Lymphocytes % 14.5      Monocytes % 9.1      Eosinophils % 2.4      Basophils % 0.6      Neutrophils Absolute 5.88 (*)     Immature Granulocytes Absolute, Automated 0.04      Lymphocytes Absolute 1.17      Monocytes Absolute 0.73      Eosinophils Absolute 0.19      Basophils Absolute 0.05     PHOSPHORUS   MAGNESIUM   COMPREHENSIVE METABOLIC PANEL       ED Course & MDM   ED Course as of 12/04/23 2240   Mon Dec 04, 2023   2157 Twelve-lead EKG on my interpretation: Paced rhythm, rate 77 bpm, left axis deviation, normal UT and QTc intervals, no STEMI. [EC]      ED Course User Index  [EC] Elena Sanders DO         Diagnoses as of 12/04/23 2240   Urinary tract infection without hematuria, site unspecified   Generalized weakness   Encephalopathy acute       Medical Decision Making  This is a 78-year-old male who presents to the emergency department  with weakness and difficulty ambulating in the setting of UTI that was diagnosed in this emergency department earlier today.  Wife does not feel comfortable taking care of him at home given his weakness.  Here, patient is alert and hemodynamically stable without fever or tachycardia.  He answers most questions appropriately however is mildly encephalopathic and is not oriented to year whereas normally he is reportedly ANO x 4 and is ambulatory without assistance.  Otherwise, no signs of trauma on exam.  Basic labs demonstrate a chronic creatinine elevation that is at baseline.  WBC normal at 8.1.  No significant electrolyte derangements.  Given his generalized weakness and acute encephalopathy in the setting of UTI, will broaden coverage to IV antibiotics.  Patient does have anaphylactic reactions to both penicillins as well as cephalosporins therefore will place on IV meropenem.  Care discussed with hospitalist service who is agreeable to admission for IV antibiotics and PT/OT.  Remains hemodynamically stable in the ED.      Procedure  Procedures      12/04/23 at 10:41 PM - Elena Sanders, DO      Elena Sanders, DO  12/04/23 2242

## 2023-12-05 NOTE — PROGRESS NOTES
Please review night physician H&P for today's notes.    Patient was admitted due to worsening mental status/confusion.  Recurrent falls  Suspecting urinary tract infection-patient received 1 dose of meropenem as he is penicillin allergic  Urine analysis is unremarkable less likely infection.  Monitoring off antibiotics.  PT OT evaluation pending  Vitals are within normal limits  Follow-up CBC BMP ordered.

## 2023-12-05 NOTE — PROGRESS NOTES
Physical Therapy                 Therapy Communication Note    Patient Name: Jony Javier  MRN: [id number]  Today's Date: 12/5/2023     Discipline: Physical Therapy    Missed Visit Reason: Missed Visit Reason: Patient sleeping (PER RN, PT. JUSTUP FROM ED AND WANTS TO SLEEP WILL CHECK BACK AT A LATER TIME FOR EVAL)    Missed Time: Attempt    Comment:

## 2023-12-05 NOTE — PROGRESS NOTES
Occupational Therapy                 Therapy Communication Note    Patient Name: Jony Javier  MRN: [id number]  Today's Date: 12/5/2023     Discipline: Occupational Therapy     Missed Visit Reason: Patient sleeping    Per RN, patient just up from E.R. and has requested to rest this a.m.  Will attempt at a later time.     ELOISE again.

## 2023-12-06 DIAGNOSIS — D64.9 ANEMIA, UNSPECIFIED: ICD-10-CM

## 2023-12-06 LAB
ANION GAP SERPL CALC-SCNC: 10 MMOL/L (ref 10–20)
BUN SERPL-MCNC: 23 MG/DL (ref 6–23)
CALCIUM SERPL-MCNC: 8.1 MG/DL (ref 8.6–10.3)
CHLORIDE SERPL-SCNC: 112 MMOL/L (ref 98–107)
CO2 SERPL-SCNC: 24 MMOL/L (ref 21–32)
CREAT SERPL-MCNC: 1.42 MG/DL (ref 0.5–1.3)
ERYTHROCYTE [DISTWIDTH] IN BLOOD BY AUTOMATED COUNT: 16.2 % (ref 11.5–14.5)
GFR SERPL CREATININE-BSD FRML MDRD: 50 ML/MIN/1.73M*2
GLUCOSE BLD MANUAL STRIP-MCNC: 116 MG/DL (ref 74–99)
GLUCOSE BLD MANUAL STRIP-MCNC: 119 MG/DL (ref 74–99)
GLUCOSE BLD MANUAL STRIP-MCNC: 159 MG/DL (ref 74–99)
GLUCOSE BLD MANUAL STRIP-MCNC: 95 MG/DL (ref 74–99)
GLUCOSE SERPL-MCNC: 97 MG/DL (ref 74–99)
HCT VFR BLD AUTO: 39.1 % (ref 41–52)
HGB BLD-MCNC: 12 G/DL (ref 13.5–17.5)
MCH RBC QN AUTO: 33.6 PG (ref 26–34)
MCHC RBC AUTO-ENTMCNC: 30.7 G/DL (ref 32–36)
MCV RBC AUTO: 110 FL (ref 80–100)
NRBC BLD-RTO: 0 /100 WBCS (ref 0–0)
PLATELET # BLD AUTO: 133 X10*3/UL (ref 150–450)
POTASSIUM SERPL-SCNC: 3.9 MMOL/L (ref 3.5–5.3)
RBC # BLD AUTO: 3.57 X10*6/UL (ref 4.5–5.9)
SODIUM SERPL-SCNC: 142 MMOL/L (ref 136–145)
WBC # BLD AUTO: 8.4 X10*3/UL (ref 4.4–11.3)

## 2023-12-06 PROCEDURE — 97162 PT EVAL MOD COMPLEX 30 MIN: CPT | Mod: GP

## 2023-12-06 PROCEDURE — 2500000004 HC RX 250 GENERAL PHARMACY W/ HCPCS (ALT 636 FOR OP/ED): Performed by: INTERNAL MEDICINE

## 2023-12-06 PROCEDURE — 94660 CPAP INITIATION&MGMT: CPT

## 2023-12-06 PROCEDURE — 1200000002 HC GENERAL ROOM WITH TELEMETRY DAILY

## 2023-12-06 PROCEDURE — 2500000001 HC RX 250 WO HCPCS SELF ADMINISTERED DRUGS (ALT 637 FOR MEDICARE OP): Performed by: INTERNAL MEDICINE

## 2023-12-06 PROCEDURE — 85027 COMPLETE CBC AUTOMATED: CPT | Performed by: INTERNAL MEDICINE

## 2023-12-06 PROCEDURE — 99233 SBSQ HOSP IP/OBS HIGH 50: CPT | Performed by: INTERNAL MEDICINE

## 2023-12-06 PROCEDURE — 82435 ASSAY OF BLOOD CHLORIDE: CPT | Performed by: INTERNAL MEDICINE

## 2023-12-06 PROCEDURE — 82947 ASSAY GLUCOSE BLOOD QUANT: CPT

## 2023-12-06 PROCEDURE — 2500000002 HC RX 250 W HCPCS SELF ADMINISTERED DRUGS (ALT 637 FOR MEDICARE OP, ALT 636 FOR OP/ED): Performed by: INTERNAL MEDICINE

## 2023-12-06 PROCEDURE — 36415 COLL VENOUS BLD VENIPUNCTURE: CPT | Performed by: INTERNAL MEDICINE

## 2023-12-06 PROCEDURE — 97166 OT EVAL MOD COMPLEX 45 MIN: CPT | Mod: GO | Performed by: OCCUPATIONAL THERAPIST

## 2023-12-06 RX ORDER — PANTOPRAZOLE SODIUM 40 MG/1
40 TABLET, DELAYED RELEASE ORAL DAILY
Qty: 90 TABLET | Refills: 3 | Status: SHIPPED | OUTPATIENT
Start: 2023-12-06

## 2023-12-06 RX ADMIN — ASPIRIN 81 MG: 81 TABLET, COATED ORAL at 08:59

## 2023-12-06 RX ADMIN — APIXABAN 5 MG: 5 TABLET, FILM COATED ORAL at 20:00

## 2023-12-06 RX ADMIN — VENLAFAXINE HYDROCHLORIDE 150 MG: 150 CAPSULE, EXTENDED RELEASE ORAL at 08:59

## 2023-12-06 RX ADMIN — TAMSULOSIN HYDROCHLORIDE 0.4 MG: 0.4 CAPSULE ORAL at 08:59

## 2023-12-06 RX ADMIN — APIXABAN 5 MG: 5 TABLET, FILM COATED ORAL at 08:59

## 2023-12-06 RX ADMIN — EZETIMIBE 10 MG: 10 TABLET ORAL at 08:59

## 2023-12-06 RX ADMIN — PANTOPRAZOLE SODIUM 40 MG: 40 TABLET, DELAYED RELEASE ORAL at 08:59

## 2023-12-06 RX ADMIN — CARVEDILOL 25 MG: 25 TABLET, FILM COATED ORAL at 08:59

## 2023-12-06 RX ADMIN — CARVEDILOL 25 MG: 25 TABLET, FILM COATED ORAL at 20:00

## 2023-12-06 ASSESSMENT — COGNITIVE AND FUNCTIONAL STATUS - GENERAL
STANDING UP FROM CHAIR USING ARMS: A LOT
DRESSING REGULAR LOWER BODY CLOTHING: A LOT
WALKING IN HOSPITAL ROOM: TOTAL
WALKING IN HOSPITAL ROOM: A LOT
EATING MEALS: A LITTLE
CLIMB 3 TO 5 STEPS WITH RAILING: TOTAL
MOVING TO AND FROM BED TO CHAIR: A LOT
MOBILITY SCORE: 10
TURNING FROM BACK TO SIDE WHILE IN FLAT BAD: A LOT
TOILETING: TOTAL
DRESSING REGULAR UPPER BODY CLOTHING: A LITTLE
TURNING FROM BACK TO SIDE WHILE IN FLAT BAD: A LITTLE
HELP NEEDED FOR BATHING: A LOT
DRESSING REGULAR UPPER BODY CLOTHING: A LITTLE
MOVING TO AND FROM BED TO CHAIR: A LOT
DRESSING REGULAR LOWER BODY CLOTHING: A LITTLE
PERSONAL GROOMING: A LITTLE
DAILY ACTIVITIY SCORE: 14
CLIMB 3 TO 5 STEPS WITH RAILING: A LOT
MOBILITY SCORE: 14
DAILY ACTIVITIY SCORE: 19
TOILETING: A LITTLE
MOVING FROM LYING ON BACK TO SITTING ON SIDE OF FLAT BED WITH BEDRAILS: A LOT
PERSONAL GROOMING: A LITTLE
MOVING FROM LYING ON BACK TO SITTING ON SIDE OF FLAT BED WITH BEDRAILS: A LITTLE
HELP NEEDED FOR BATHING: A LITTLE
STANDING UP FROM CHAIR USING ARMS: A LOT

## 2023-12-06 ASSESSMENT — PAIN SCALES - GENERAL: PAINLEVEL_OUTOF10: 0 - NO PAIN

## 2023-12-06 NOTE — PROGRESS NOTES
Physical Therapy    Physical Therapy Evaluation    Patient Name: Jony Javier  MRN: [id number]  Today's Date: 12/6/2023   Time Calculation  Start Time: 0940  Stop Time: 1005  Time Calculation (min): 25 min    Assessment/Plan   PT Assessment  PT Assessment Results: Decreased strength, Decreased endurance, Impaired balance, Decreased mobility, Decreased cognition, Decreased coordination, Decreased safety awareness, Impaired judgement, Impaired hearing  Rehab Prognosis: Fair  Evaluation/Treatment Tolerance: Patient limited by fatigue  Strengths: Support and attitude of living partners  End of Session Communication: Bedside nurse  Assessment Comment:  (MOD RETRO LEAN  DECREASED SAFETY AWARENESS, NEEDS FWWAND 1-2 A FOR AMB, HIGH FALLRISK WILLNEED SKILLED REHABON DISCH)  End of Session Patient Position: Up in chair, Alarm off, caregiver present (CALL LIGHTIN REACH, PCA IN ROOM TO BATHE PT.)  IP OR SWING BED PT PLAN  Inpatient or Swing Bed: Inpatient  PT Plan  Treatment/Interventions: Bed mobility, Transfer training, Gait training, Balance training, Strengthening  PT Plan: Skilled PT  PT Frequency: 3 times per week  PT Discharge Recommendations: Moderate intensity level of continued care  Equipment Recommended upon Discharge:  (TBD)  PT Recommended Transfer Status: Assist x1 (FWW, TRANSFSERS ONLY TODAY)  PT - OK to Discharge: Yes (TO NEXT LOC WHEN MEDICALLY STABLE)      Subjective   General Visit Information:  General  Reason for Referral: impaired mobility, impaired cognition/safety awareness, gait training  Referred By: HEMANTH  Past Medical History Relevant to Rehab: FELL HIT HEAD CONFUSION; DX: UTI, METABOLIC ENCEPHALOPATHY, CASSIDY/CKD; HX: HF, AORTIC STENOSIS, HEMORRHAGIC DIATHESIS, INTRAVENTRICULAR HEMORRHAGE, HTN, OA, TBI, PACER, TKA, AFIB, 14 CANS OF BEER A WK  Missed Visit: Yes  Missed Visit Reason: Patient sleeping (PER RN, PT. 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)       Precautions:  Precautions  Hearing/Visual Limitations: [facility]  Medical Precautions: Fall precautions, Oxygen therapy device and L/min (3LNC)         Objective   Pain:  Pain Assessment  Pain Score:  (C/O BACKPAIN POST MOBILTY, DID NOT RATE)  Cognition:  Cognition  Overall Cognitive Status: Impaired  Attention: Exceptions to WFL  Sustained Attention: Impaired  Safety/Judgement: Exceptions to WFL  Complex Functional Tasks: Maximal  Novel Situations: Moderate  Routine Tasks: Moderate  Insight: Moderate  Impulsive: Moderately  Planning: Reduced planning skills  Organization: Moderately disorganized  Processing Speed: Delayed                              Strength  Strength Comments: LE ROM WFL, STRENGTH IS 3/5, POOR MUSCULAR ENDURANCE                     Static Sitting Balance  Static Sitting-Comment/Number of Minutes: FAIR  Dynamic Sitting Balance  Dynamic Sitting-Comments: FAIR-    Dynamic Standing Balance  Dynamic Standing-Comments: POOR  Functional Assessments:  Bed Mobility  Bed Mobility:  (SUPINE>SIT MIN A HOB 45 DEGREES, SITS EOB SBA FOR 6 MIN)    Transfers  Transfer:  (MOD X1 TO STAND AT EOB TAKES SUPPORT FROM BED ON BACK OF LEGS, MOD RETRO LEAN, WIDE PHIL,USED FWW)    Ambulation/Gait Training  Ambulation/Gait Training Performed:  (AMB 3 FT TO CHAIR AT BEDSIDE, FWW MOD X1-2 A, WIDE PHIL, FOOT FLAT  HALTING GAIT, MOD RETRO LEAN)                      Outcome Measures:  [facility] Basic Mobility  Turning from your back to your side while in a flat bed without using bedrails: A lot  Moving from lying " on your back to sitting on the side of a flat bed without using bedrails: A lot  Moving to and from bed to chair (including a wheelchair): A lot  Standing up from a chair using your arms (e.g. wheelchair or bedside chair): A lot  To walk in hospital room: Total  Climbing 3-5 steps with railing: Total  Basic Mobility - Total Score: 10    Encounter Problems       Encounter Problems (Active)       Mobility       STG - Patient will ambulate (Not Progressing)       Start:  12/06/23    Expected End:  12/20/23       FWW 75 FT CGA         STRENGTHENING (Not Progressing)       Start:  12/06/23    Expected End:  12/27/23       20+ REPS RROM INCREASING STRENGTH AND BALANCE ACTIVITIES CGA TO IMPROVE GAIT STABILITY            Pain - Adult          Transfers       STG - Patient to transfer to and from sit to supine (Not Progressing)       Start:  12/06/23    Expected End:  12/15/23       SBA HOB FLAT NO RAIL         STG - Patient will transfer sit to and from stand (Not Progressing)       Start:  12/06/23    Expected End:  12/20/23       FWW CGA USING PROPER TECHNIQUE                Education Documentation  Mobility Training, taught by Colleen Viera PT at 12/6/2023 12:33 PM.  Learner: Patient  Readiness: Acceptance  Method: Explanation  Response: Needs Reinforcement  Comment: FWW SAFETY    Education Comments  No comments found.

## 2023-12-06 NOTE — PROGRESS NOTES
Jony Javier is a 78 y.o. male admitted for Urinary tract infection without hematuria, site unspecified. Pharmacy reviewed the patient's tyhec-ek-hesbwyrny medications and allergies for accuracy.    The list below reflects the PTA list prior to pharmacy medication history. A summary a changes to the PTA medication list has been listed below. Please review each medication in order reconciliation for additional clarification and justification.    Medications added:  Ferrous Gluconate 27mg every day     Medications modified:  Baclofen 10mg every day --> 10mg bid prn    Medications to be removed:  Allpurinol 100mg  Clobetasol 0.05%  Diclofenac 1%  Dok 100mg  Doxycycline 100mg  Erythromycin 0.5%  Febuxostat 40mg  Ferrous Sulfate 325mg  Fluoride 1.1%  Flonase 50mcg  Lactulose 20g/30ml  Lidocaine Patch 5%  Spirometer  Triamcinolone 0.1%  Aspirin 81mg-- pt wife said she stopped ~2 weeks ago d/t bleeding  Medications of concern:      Prior to Admission Medications   Prescriptions Last Dose Informant Patient Reported? Taking?   EPINEPHrine 0.3 mg/0.3 mL injection syringe   No No   Sig: Inject 0.3 mL (0.3 mg) as directed if needed for anaphylaxis.   Eliquis 5 mg tablet   Yes No   Sig: Take 1 tablet (5 mg) by mouth 2 times a day.   Entresto 49-51 mg tablet   Yes No   Sig: Take 1 tablet by mouth 2 times a day.   Jardiance 10 mg   Yes No   Sig: Take 1 tablet (10 mg) by mouth once daily.   OneTouch Delica Plus Lancet 33 gauge misc   Yes No   Sig: Test THREE TIMES DAILY AS DIRECTED   albuterol 90 mcg/actuation inhaler   No No   Sig: INHALE 1 (ONE) PUFF EVERY 4 HOURS AS NEEDED   allopurinol (Zyloprim) 100 mg tablet   Yes No   Sig: Take 1 tablet (100 mg) by mouth once daily.   aspirin 81 mg EC tablet   Yes No   Sig: Take 1 tablet (81 mg) by mouth once daily.   atorvastatin (Lipitor) 80 mg tablet   Yes No   Sig: Take 1 tablet (80 mg) by mouth once daily at bedtime.   baclofen (Lioresal) 10 mg tablet   Yes No   Sig: Take 1 tablet  (10 mg) by mouth once daily.   blood sugar diagnostic (True Metrix Glucose Test Strip) strip   No No   Si each once daily.   blood sugar diagnostic (True Metrix Glucose Test Strip) strip   Yes No   Sig: USE 1 STRIP 3 times daily   bumetanide (Bumex) 1 mg tablet   No No   Sig: Take 0.5 tablets (0.5 mg) by mouth 2 times a week. May take additional dose for weight gain 3#, increased swelling or shortness of breath.   carvedilol (Coreg) 25 mg tablet   Yes No   Sig: Take 1 tablet (25 mg) by mouth 2 times a day.   clobetasol (Temovate) 0.05 % external solution   Yes No   Sig: APPLY thin layer to psoriasis of scalp once a day as needed for itching or redness.   diazePAM (Valium) 5 mg tablet   No No   Sig: Take 1 tablet (5 mg) by mouth 3 times a day as needed for anxiety.   diclofenac sodium 1 % kit   Yes No   Sig: Apply topically 4 times a day as needed.   docusate sodium (Colace) 100 mg capsule   Yes No   Sig: Take by mouth once daily as needed.   doxycycline (Vibra-Tabs) 100 mg tablet   No No   Sig: Take 1 tablet (100 mg) by mouth 2 times a day. Take with a full glass of water and do not lie down for at least 30 minutes after.   erythromycin (Romycin) 5 mg/gram (0.5 %) ophthalmic ointment   Yes No   Sig: Apply a small amount Both Eyes every evening   ezetimibe (Zetia) 10 mg tablet   Yes No   Sig: Take 1 tablet (10 mg) by mouth once daily.   febuxostat (Uloric) 40 mg tablet   Yes No   Sig: Take 1 tablet (40 mg) by mouth once daily. as directed   ferrous sulfate 325 (65 Fe) MG tablet   Yes No   Sig: Take 1 tablet (325 mg) by mouth if needed.   fluoride, sodium, (SF 5000 Plus) 1.1 % dental cream   Yes No   Sig: Apply to teeth.   fluticasone (Flonase) 50 mcg/actuation nasal spray   Yes No   Sig: Administer into affected nostril(s).   lactulose 20 gram/30 mL oral solution   Yes No   Sig: Take 15 mL (10 g) by mouth once daily.   lidocaine (Lidoderm) 5 % patch   Yes No   Sig: Place 1 patch onto the skin daily 12 hours on,  12 hours off.   lidocaine (Xylocaine) 5 % ointment   Yes No   Sig: APPLY TO THE AFFECTED AREA(S) AS NEEDED FOR PAIN to last 30 days   mirtazapine (Remeron) 30 mg tablet   Yes No   Sig: Take 1 tablet (30 mg) by mouth once daily at bedtime.   multivitamin with folic acid (One Daily Multivitamin) 400 mcg tablet   Yes No   Sig: Take 1 tablet by mouth once daily.   oxyCODONE-acetaminophen (Percocet) 7.5-325 mg tablet   Yes No   Sig: Take 1 tablet by mouth 3 times daily as needed for Pain for up to 30 days. Max Daily Amount 3 tablets   pantoprazole (ProtoNix) 40 mg EC tablet   Yes No   Sig: Take 1 tablet (40 mg) by mouth once daily.   spirometer,drug delivery adapt device   Yes No   Sig: USE AS DIRECTED. use 4-6 times daily as needed   spironolactone (Aldactone) 25 mg tablet   No No   Sig: Take 0.5 tablets (12.5 mg) by mouth once daily.   tadalafil 20 mg tablet   No No   Sig: Take 1 tablet (20 mg) by mouth once daily as needed for erectile dysfunction.   tamsulosin (Flomax) 0.4 mg 24 hr capsule   Yes No   Sig: Take 1 capsule (0.4 mg) by mouth once daily in the morning.   testosterone (Axiron) 30 mg/actuation (1.5 mL) topical solution   Yes No   Sig: Place on the skin.   triamcinolone (Kenalog) 0.1 % lotion   No No   Sig: APPLY 2-3 TIMES DAILY TO AFFECTED AREA(S) AS NEEDED   venlafaxine XR (Effexor-XR) 150 mg 24 hr capsule   Yes No   Sig: TAKE 1 CAPSULE BY MOUTH AFTER BREAKFAST      Facility-Administered Medications: None       Martha Foley CPhT

## 2023-12-06 NOTE — PROGRESS NOTES
Subjective   Patient is lying on the bed, talking to the family on the phone, answering my questions appropriately denies any abdominal pain nausea or vomitings  Tolerating p.o. diet well.  No other significant overnight issues.    Objective     Intake/Output last 3 shifts:  I/O last 3 completed shifts:  In: - (0 mL/kg)   Out: 300 (4.1 mL/kg) [Urine:300 (0.1 mL/kg/hr)]  Weight: 73.5 kg     Intake/Output this shift:  I/O this shift:  In: 200 [P.O.:200]  Out: 300 [Urine:300]    Recent Results (from the past 48 hour(s))   CBC and Auto Differential    Collection Time: 12/04/23  9:44 PM   Result Value Ref Range    WBC 8.1 4.4 - 11.3 x10*3/uL    nRBC 0.0 0.0 - 0.0 /100 WBCs    RBC 3.88 (L) 4.50 - 5.90 x10*6/uL    Hemoglobin 13.3 (L) 13.5 - 17.5 g/dL    Hematocrit 41.4 41.0 - 52.0 %     (H) 80 - 100 fL    MCH 34.3 (H) 26.0 - 34.0 pg    MCHC 32.1 32.0 - 36.0 g/dL    RDW 15.9 (H) 11.5 - 14.5 %    Platelets 132 (L) 150 - 450 x10*3/uL    Neutrophils % 72.9 40.0 - 80.0 %    Immature Granulocytes %, Automated 0.5 0.0 - 0.9 %    Lymphocytes % 14.5 13.0 - 44.0 %    Monocytes % 9.1 2.0 - 10.0 %    Eosinophils % 2.4 0.0 - 6.0 %    Basophils % 0.6 0.0 - 2.0 %    Neutrophils Absolute 5.88 (H) 1.60 - 5.50 x10*3/uL    Immature Granulocytes Absolute, Automated 0.04 0.00 - 0.50 x10*3/uL    Lymphocytes Absolute 1.17 0.80 - 3.00 x10*3/uL    Monocytes Absolute 0.73 0.05 - 0.80 x10*3/uL    Eosinophils Absolute 0.19 0.00 - 0.40 x10*3/uL    Basophils Absolute 0.05 0.00 - 0.10 x10*3/uL   Phosphorus    Collection Time: 12/04/23  9:44 PM   Result Value Ref Range    Phosphorus 3.0 2.5 - 4.9 mg/dL   Magnesium    Collection Time: 12/04/23  9:44 PM   Result Value Ref Range    Magnesium 2.06 1.60 - 2.40 mg/dL   Comprehensive metabolic panel    Collection Time: 12/04/23  9:44 PM   Result Value Ref Range    Glucose 102 (H) 74 - 99 mg/dL    Sodium 140 136 - 145 mmol/L    Potassium 4.4 3.5 - 5.3 mmol/L    Chloride 108 (H) 98 - 107 mmol/L     Bicarbonate 22 21 - 32 mmol/L    Anion Gap 14 10 - 20 mmol/L    Urea Nitrogen 34 (H) 6 - 23 mg/dL    Creatinine 1.99 (H) 0.50 - 1.30 mg/dL    eGFR 34 (L) >60 mL/min/1.73m*2    Calcium 8.6 8.6 - 10.3 mg/dL    Albumin 3.8 3.4 - 5.0 g/dL    Alkaline Phosphatase 56 33 - 136 U/L    Total Protein 6.5 6.4 - 8.2 g/dL    AST 19 9 - 39 U/L    Bilirubin, Total 0.5 0.0 - 1.2 mg/dL    ALT 11 10 - 52 U/L   CBC    Collection Time: 12/05/23  4:05 AM   Result Value Ref Range    WBC 6.7 4.4 - 11.3 x10*3/uL    nRBC 0.0 0.0 - 0.0 /100 WBCs    RBC 3.34 (L) 4.50 - 5.90 x10*6/uL    Hemoglobin 11.4 (L) 13.5 - 17.5 g/dL    Hematocrit 35.9 (L) 41.0 - 52.0 %     (H) 80 - 100 fL    MCH 34.1 (H) 26.0 - 34.0 pg    MCHC 31.8 (L) 32.0 - 36.0 g/dL    RDW 15.8 (H) 11.5 - 14.5 %    Platelets 119 (L) 150 - 450 x10*3/uL   Renal function panel    Collection Time: 12/05/23  4:05 AM   Result Value Ref Range    Glucose 95 74 - 99 mg/dL    Sodium 140 136 - 145 mmol/L    Potassium 4.1 3.5 - 5.3 mmol/L    Chloride 111 (H) 98 - 107 mmol/L    Bicarbonate 21 21 - 32 mmol/L    Anion Gap 12 10 - 20 mmol/L    Urea Nitrogen 34 (H) 6 - 23 mg/dL    Creatinine 1.82 (H) 0.50 - 1.30 mg/dL    eGFR 37 (L) >60 mL/min/1.73m*2    Calcium 8.0 (L) 8.6 - 10.3 mg/dL    Phosphorus 3.1 2.5 - 4.9 mg/dL    Albumin 3.2 (L) 3.4 - 5.0 g/dL   Magnesium    Collection Time: 12/05/23  4:05 AM   Result Value Ref Range    Magnesium 1.97 1.60 - 2.40 mg/dL   POCT GLUCOSE    Collection Time: 12/05/23 10:00 AM   Result Value Ref Range    POCT Glucose 95 74 - 99 mg/dL   POCT GLUCOSE    Collection Time: 12/05/23  2:29 PM   Result Value Ref Range    POCT Glucose 86 74 - 99 mg/dL   POCT GLUCOSE    Collection Time: 12/05/23  5:40 PM   Result Value Ref Range    POCT Glucose 157 (H) 74 - 99 mg/dL   POCT GLUCOSE    Collection Time: 12/05/23 10:28 PM   Result Value Ref Range    POCT Glucose 103 (H) 74 - 99 mg/dL   CBC    Collection Time: 12/06/23  4:32 AM   Result Value Ref Range    WBC 8.4 4.4 -  11.3 x10*3/uL    nRBC 0.0 0.0 - 0.0 /100 WBCs    RBC 3.57 (L) 4.50 - 5.90 x10*6/uL    Hemoglobin 12.0 (L) 13.5 - 17.5 g/dL    Hematocrit 39.1 (L) 41.0 - 52.0 %     (H) 80 - 100 fL    MCH 33.6 26.0 - 34.0 pg    MCHC 30.7 (L) 32.0 - 36.0 g/dL    RDW 16.2 (H) 11.5 - 14.5 %    Platelets 133 (L) 150 - 450 x10*3/uL   Basic Metabolic Panel    Collection Time: 12/06/23  4:32 AM   Result Value Ref Range    Glucose 97 74 - 99 mg/dL    Sodium 142 136 - 145 mmol/L    Potassium 3.9 3.5 - 5.3 mmol/L    Chloride 112 (H) 98 - 107 mmol/L    Bicarbonate 24 21 - 32 mmol/L    Anion Gap 10 10 - 20 mmol/L    Urea Nitrogen 23 6 - 23 mg/dL    Creatinine 1.42 (H) 0.50 - 1.30 mg/dL    eGFR 50 (L) >60 mL/min/1.73m*2    Calcium 8.1 (L) 8.6 - 10.3 mg/dL   POCT GLUCOSE    Collection Time: 12/06/23  6:35 AM   Result Value Ref Range    POCT Glucose 119 (H) 74 - 99 mg/dL   POCT GLUCOSE    Collection Time: 12/06/23 11:04 AM   Result Value Ref Range    POCT Glucose 159 (H) 74 - 99 mg/dL        CT cervical spine wo IV contrast  Narrative: Interpreted By:  Ishaan Pavon,   STUDY:  CT CERVICAL SPINE WO IV CONTRAST;  12/4/2023 10:44 am      INDICATION:  Signs/Symptoms:fall.      COMPARISON:  CTA head and neck dated 05/03/2022.      ACCESSION NUMBER(S):  [id number]      ORDERING CLINICIAN:  JAGUAR BRIZUELA      TECHNIQUE:  Axial CT images of the cervical spine are obtained. Axial, coronal  and sagittal reconstructions are provided for review.      FINDINGS:  Trauma: Diffusely decreased bone mineralization. There is no evidence  for an acute fracture of the cervical spine. No prevertebral soft  tissue swelling.      Alignment: 2 mm anterolisthesis of C2 on C3 and C3 on C4. Stairstep  estimated 2 mm retrolisthesis of C4 on C5 and C5 on C6. Additional 2  mm anterolisthesis of C6 on C7 and estimated 3 mm anterolisthesis of  C7 on T1 with ankylosis of the C7 and T1 vertebral bodies.      Craniocervical Junction: The occipital condyles, odontoid  process,  and craniocervical junction are intact.      Vertebral Body Heights: The cervical vertebral body heights are  grossly intact.      Intervertebral Disc Spaces: Severe degenerative discogenic change  involving the C3-T1 levels with severe degenerative disc height loss,  varying degrees of endplate sclerosis and subchondral cystic change,  as well as uncovertebral and endplate spurring.      Degenerative Change: Multilevel spinal canal stenosis, moderate to  severe at C3-C4, C4-C5, and C5-C6 with right lateral recess  effacement at this level. Multilevel facet arthropathy and  uncovertebral spurring with high-grade neural foraminal narrowing.      Prevertebral/Paraspinal Soft Tissues: Severe atherosclerotic  calcification of the carotid bifurcations bilaterally. The  prevertebral and paraspinal soft tissues are otherwise unremarkable.      Lung Apices: Unremarkable.      Impression: Diffusely decreased bone mineralization with no definite acute  osseous abnormality of the cervical spine.      Advanced multilevel degenerative spondylolisthesis with severe  cervical spondylosis and facet arthropathy, overall similar to  slightly worsened from comparison examination. There is moderate to  severe multilevel spinal canal stenosis and neural foraminal  narrowing.      MACRO:  None      Signed by: Ishaan Pavon 12/4/2023 11:11 AM  Dictation workstation:   [id number]  CT head W O contrast trauma protocol  Narrative: Interpreted By:  Ishaan Pavon,   STUDY:  CT HEAD W/O CONTRAST TRAUMA PROTOCOL;  12/4/2023 10:44 am      INDICATION:  Signs/Symptoms:fall.      COMPARISON:  CT head dated 05/09/2022      ACCESSION NUMBER(S):  [id number]      ORDERING CLINICIAN:  JAGUAR BRIZUELA      TECHNIQUE:  Noncontrast axial CT scan of head was performed. Coronal and sagittal  reformats provided.      FINDINGS:  Parenchyma: There is no intracranial hemorrhage. The grey-white  differentiation is intact. There is no mass effect or  midline shift.  Mild-to-moderate chronic small vessel ischemic disease. Similar  appearance of remote right anterior basal ganglia lacunar infarct.  Additional tiny remote bilateral basal ganglia lacunar infarcts  versus prominent perivascular spaces. Unchanged calcified thrombus  components suggested near the right MCA bifurcation (series 4, image  62).      CSF Spaces: The ventricles, sulci and basal cisterns are similar in  appearance with disproportionate prominence of the ventricular  collecting system, favor central atrophy.      Extra-Axial Fluid: There is no extraaxial fluid collection.      Calvarium: No acute osseous abnormality. Incidental metopic suture.      Paranasal sinuses: Chronic appearing right ostiomeatal pattern of  obstruction with hyperdense secretions and complete opacification of  the right maxillary paranasal sinus, right anterior ethmoidal air  cells, and a majority of the right posterior ethmoids. Aplastic right  frontal sinus. There is mild hyperostosis, particularly involving the  right posterior ethmoid region. There is also hyperostosis involving  the right sphenoid sinus with mild mucosal thickening. Chronic  appearing widening of the right ostiomeatal unit.      Mastoids: Small amount of fluid on the left.      Orbits: Normal.      Soft tissues: Unremarkable.          Brain Injury (BIG) guidelines CT values:      Skull fracture: No  SDH (subdural hematoma): None detected  EDH (epidural hematoma): None detected  IPH (intraparenchymal hemorrhage): None detected  SAH (subarachnoid hemorrhage): None detected  IVH (intraventricular hemorrhage): No      Reference:  Marco DUMONT, Camron RS, Robert M, et al. The BIG (brain injury  guidelines) project: defining the management of traumatic brain  injury by acute care surgeons. J Trauma Acute Care Surg.  2014;76:553l958.      Impression: No acute intracranial hemorrhage, mass effect, calvarial fracture, or  CT apparent acute infarct.       Mild-to-moderate chronic small vessel ischemic disease with similar  brain atrophy with disproportionate ventricular prominence. Unchanged  remote right anterior basal ganglia lacunar infarct. Unchanged  calcified thrombus component suggested near the right MCA bifurcation.      Chronic appearing right ostiomeatal pattern of paranasal sinus  obstruction as detailed above; underlying polyp or sinonasal mass  lesion not excluded.      MACRO:  None      Signed by: Ishaan Pavon 12/4/2023 11:02 AM  Dictation workstation:   [id number]  XR chest 1 view  Narrative: Interpreted By:  Ishaan Pavon,   STUDY:  XR CHEST 1 VIEW;  12/4/2023 10:31 am      INDICATION:  Signs/Symptoms:cough.      COMPARISON:  Chest radiograph dated 05/03/2022      ACCESSION NUMBER(S):  [id number]      ORDERING CLINICIAN:  JAGUAR BRIZUELA      FINDINGS:  AP radiograph of the chest was provided.      DEVICES:  Left-sided approach biventricular AICD. Additional tiny device along  the left heart border. Fracture of the 2nd superior most sternotomy  wire. Otherwise intact appearing sternotomy wires.      CARDIOMEDIASTINAL SILHOUETTE:  Cardiomediastinal silhouette is normal in size and configuration.      LUNGS:  Mild bronchovascular crowding versus congestion involving the right  upper lung zone. No focal consolidative airspace opacity. No  pneumothorax. No pleural effusion.      ABDOMEN:  No remarkable upper abdominal findings.      BONES:  No acute osseous changes. Advanced osteoarthrosis of the right  shoulder.      Impression: No focal consolidative airspace opacity. Questionable mild vascular  congestion versus bronchovascular crowding, most pronounced within  the right upper lung zone.      MACRO:  None      Signed by: Ishaan Pavon 12/4/2023 10:54 AM  Dictation workstation:   [id number]       Vital signs in last 24 hours:  Temp:  [36.4 °C (97.6 °F)-37.2 °C (99 °F)] 36.5 °C (97.7 °F)  Heart Rate:  [69-77] 71  Resp:  [16-23] 20  BP:  (107-151)/(65-77) 151/77  FiO2 (%):  [32 %] 32 %    Physical Exam  General: No distress  Neck: Supple.  HEENT: Normocephalic atraumatic pupils equal  Heart: S1-S2 heard no murmurs gallops regurgitation  Lungs: Clear to auscultation bilaterally no wheezing rales rhonchi  Abdomen: Nondistended nontender bowel sounds are present  Neuro: No focal deficits  Assessment/Plan   Acute metabolic encephalopathy.  Generalized weakness/debility  Rule out urinary tract infection.  Acute on chronic kidney disease stage III.  Anxiety/depression.  Thrombocytopenia-no signs of bleeding, platelets close to baseline.    History of:  Hypertension  Hyperlipidemia  Coronary artery disease  Heart failure with reduced ejection fraction.  Chronic kidney disease stage III.  Atrial fibrillation on Eliquis.  Gastroesophageal reflux disease  BPH.    Plan:  Patient mental status is significantly better.  Answering my questions appropriately, as per family mental status is back to baseline.  As mentioned above urinary tract infection ruled out-discontinued antibiotics  Continuing IV fluids, creatinine improving close to baseline-will consider deseeding IV fluids and tomorrow.  Patient is tolerating p.o. diet well.  Heart rate is well-controlled, with Coreg and anticoagulation with Eliquis.  For depression continuing Effexor.  PT OT evaluated the patient-mobility score 10.    DVT prophylaxis:  Patient is on Eliquis    Disposition:  Given his mobility score.  Needs placement.  Possible discharge in next 1 to 2 days.       LOS: 1 day

## 2023-12-06 NOTE — CARE PLAN
Problem: Mobility  Goal: STG - Patient will ambulate  Description: FWW 75 FT CGA  Outcome: Not Progressing  Goal: STRENGTHENING  Description: 20+ REPS RROM INCREASING STRENGTH AND BALANCE ACTIVITIES CGA TO IMPROVE GAIT STABILITY  Outcome: Not Progressing     Problem: Transfers  Goal: STG - Patient to transfer to and from sit to supine  Description: SBA HOB FLAT NO RAIL  Outcome: Not Progressing  Goal: STG - Patient will transfer sit to and from stand  Description: FWW CGA USING PROPER TECHNIQUE  Outcome: Not Progressing

## 2023-12-06 NOTE — CONSULTS
Nutrition Initial Assessment:   Nutrition Assessment    Reason for Assessment: Admission nursing screening    Medical history per chart:   HTN, HLD, CAD, status post ICD/pacer, aortic stenosis, atrial fibrillation on Eliquis, CHF (combined dysfunction), HECTOR on CPAP, anxiety, depression, GERD, CKD, MGUS, chronic anemia, history of UTI, gout, chronic ambulatory dysfunction, BPH and NIDDM 2     HPI:  Patient presents with AMS, recurrent falls, UTI    12/6:  Patient receiving care at time of visit, history obtained from chart review.            Current Diet: Adult diet Carb Controlled; 75 gram carb/meal, 45 gram Carb evening snack  Supplement(s): No  Average meal Intake during admission:  75% x 1 meal noted    Average supplement intake during admission: none ordered at this time     Nutrition Related Findings:   Oral Symptoms: unable to assess at this time     GI symptoms: RD unable to assess GI symptoms at this time.   BM:    Wound Type: none (nursing/wound notes provide further details)    Food allergies: NKFA. is allergic to bee venom protein (honey bee), cefaclor, pentazocine, pentazocine-acetaminophen, pregabalin, allopurinol, ciprofloxacin, sulfamethoxazole-trimethoprim, metoprolol, and statins-hmg-coa reductase inhibitors.  Meds/Labs reviewed.  apixaban, 5 mg, oral, BID  aspirin, 81 mg, oral, Daily  carvedilol, 25 mg, oral, BID  ezetimibe, 10 mg, oral, Daily  insulin lispro, 0-5 Units, subcutaneous, After meals & nightly  pantoprazole, 40 mg, oral, Daily  tamsulosin, 0.4 mg, oral, q AM  venlafaxine XR, 150 mg, oral, Daily with breakfast             Nutrition Significant Labs:    Results from last 7 days   Lab Units 12/06/23  0432 12/05/23  0405 12/04/23  2144   GLUCOSE mg/dL 97 95 102*   SODIUM mmol/L 142 140 140   POTASSIUM mmol/L 3.9 4.1 4.4   CHLORIDE mmol/L 112* 111* 108*   CO2 mmol/L 24 21 22   BUN mg/dL 23 34* 34*   CREATININE mg/dL 1.42* 1.82* 1.99*   EGFR mL/min/1.73m*2 50* 37* 34*   CALCIUM mg/dL 8.1*  "8.0* 8.6   PHOSPHORUS mg/dL  --  3.1 3.0   MAGNESIUM mg/dL  --  1.97 2.06     Lab Results   Component Value Date    HGBA1C 5.5 02/23/2023    HGBA1C 6.0 (A) 09/26/2022     Results from last 7 days   Lab Units 12/06/23  1649 12/06/23  1104 12/06/23  0635 12/05/23  2228 12/05/23  1740 12/05/23  1429 12/05/23  1000   POCT GLUCOSE mg/dL 116* 159* 119* 103* 157* 86 95       Anthropometrics:  Height: 165.1 cm (5' 5\")   Weight: 73.5 kg (162 lb)   BMI (Calculated): 26.96  IBW/kg (Dietitian Calculated): 61.8 kg          Weight History:   Wt Readings from Last 10 Encounters:   12/05/23 73.5 kg (162 lb)   12/04/23 70.3 kg (155 lb)   10/30/23 68.9 kg (152 lb)   07/27/23 71.2 kg (157 lb)   06/05/23 72 kg (158 lb 11.2 oz)   02/28/23 71.7 kg (158 lb)   01/20/23 72.2 kg (159 lb 3.2 oz)   01/10/23 73 kg (161 lb)   12/07/22 73.8 kg (162 lb 9.6 oz)   11/29/22 69.9 kg (154 lb)        Weight Change %:  Weight History / % Weight Change: Variable weights per history, no significant changes indicated.             Nutrition Focused Physical Exam Findings:  defer: Patient receiving care at time of visit       Estimated Needs:   Total Energy Estimated Needs (kCal):  (6595-4136)     Total Protein Estimated Needs (g):  (70-75)     Total Fluid Estimated Needs (mL):  (1 mL, kcal)           Nutrition Diagnosis   Nutrition Diagnosis:  Malnutrition Diagnosis  Patient has Malnutrition Diagnosis: No    Nutrition Diagnosis  Patient has Nutrition Diagnosis: No       Nutrition Interventions/Recommendations   Nutrition Interventions and Recommendations:        Nutrition Prescription:  Individualized Nutrition Prescription Provided for : Continue carbohydrate consistent diet, Trial Glucerna once daily to promote adequate protein-calorie intake        Nutrition Interventions:   Food and/or Nutrient Delivery Interventions  Interventions: Meals and snacks, Medical food supplement  Meals and Snacks: Carbohydrate-modified diet  Goal: >75% intake of " meals  Medical Food Supplement: Commercial beverage  Goal: >75% of Glucerna supplement once daily         Nutrition Education:   Education Documentation  No documentation found.      Nutrition Counseling  Counseling Theoretical Approach: Other (Comment)  Goal: N/A patient unavailable       Nutrition Monitoring and Evaluation   Monitoring/Evaluation:   Food/Nutrient Related History Monitoring  Monitoring and Evaluation Plan: Energy intake  Energy Intake: Estimated energy intake  Criteria: Meet >75% of estimated needs from diet and ONS    Body Composition/Growth/Weight History  Monitoring and Evaluation Plan: Weight  Weight: Weight change  Criteria: Maintain stable weight    Biochemical Data, Medical Tests and Procedures  Monitoring and Evaluation Plan: Electrolyte/renal panel, Glucose/endocrine profile  Criteria: WNL    Nutrition Focused Physical Findings  Monitoring and Evaluation Plan: Skin  Criteria: Maintain skin integrity            Time Spent/Follow-up Reminder:   Follow Up  Time Spent (min): 45 minutes  Last Date of Nutrition Visit: 12/06/23  Nutrition Follow-Up Needed?: Dietitian to reassess per policy  Follow up Comment: 12/11-12/13

## 2023-12-06 NOTE — PROGRESS NOTES
Occupational Therapy    Evaluation    Patient Name: Jony Javier  MRN: [id number]  Today's Date: 12/6/2023  Time Calculation  Start Time: 0941  Stop Time: 1002  Time Calculation (min): 21 min        Assessment:  OT Assessment: Pt. would benefit from continued O.T. to improve ADL indep. and mobility safety, decrease falls  Prognosis: Good  End of Session Communication: PCT/NA/CTA  End of Session Patient Position: Alarm on, Up in chair  Prognosis: Good  Plan:  Treatment Interventions: ADL retraining, Functional transfer training, Endurance training, Cognitive reorientation  OT Frequency: 3 times per week  OT Discharge Recommendations: Moderate intensity level of continued care  OT - OK to Discharge: Yes  Treatment Interventions: ADL retraining, Functional transfer training, Endurance training, Cognitive reorientation    Subjective   Current Problem:  1. Urinary tract infection without hematuria, site unspecified        2. Generalized weakness        3. Encephalopathy acute        4. Type 2 diabetes mellitus with diabetic peripheral angiopathy without gangrene, without long-term current use of insulin (CMS/[facility])          General:  General  Reason for Referral: fall, UTI  Referred By: Ricki  Past Medical History Relevant to Rehab: UTI, met. enceph., Hx. of CAD, CHF, HECTOR, CKD, gout, anemia  Missed Visit: Yes  Missed Visit Reason: Patient sleeping  Co-Treatment: PT  Co-Treatment Reason: assist of 2 for safe mobility  Prior to Session Communication: Bedside nurse  Patient Position Received: Bed, 3 rail up, Alarm on (pt. slouched in semi-supine position with tray on belly trying to eat breakfast, states finished when entered room)  General Comment: very pleasant , cooperative, 2 liters O2, O X 2, looked to board for month and year  Precautions:  Medical Precautions: Fall precautions, Oxygen therapy device and L/min  Vital Signs: see nurses notes      Pain:  Pain Assessment  Pain Assessment:  (states lower back R side  "hurting)    Objective   Cognition:  Overall Cognitive Status: Impaired  Memory:  (poor historian, unaware that bed soaked with urine, stated that he probably spilled some water)  Insight: Moderate           Home Living:  Type of Home: House  Lives With: Spouse  Home Adaptive Equipment: Walker rolling or standard, Cane  Home Layout: One level  Home Access: Stairs to enter with rails  Prior Function:  Level of [place]: Independent with ADLs and functional transfers, Independent with homemaking with ambulation  Receives Help From:  (wife has been assisting more recently with ADLs)  Ambulatory Assistance:  (pt. states uses cane or FWW, \"whatever is handy\")  Prior Function Comments: states drives between [place] and [place] only  IADL History:  Homemaking Responsibilities: Yes  ADL:  LE Dressing Assistance:  (Mod.A to don socks and brief)  Toileting Assistance with Device: Total (bed soaked in urine despite purewick on)  Activity Tolerance:  Endurance: Decreased tolerance for upright activites  Bed Mobility/Transfers: Bed Mobility  Bed Mobility: Yes (Min.A to sit EOB)    Transfers  Transfer: Yes (Min.A for trfr. with safety cuing and FWW)      Ambulation/Gait Training:  Ambulation/Gait Training  Ambulation/Gait Training Performed: Yes (Mod.A with FWW for few steps and to chair with FWW, unsteady, quick to sit in chair before aligned properly)  Sitting Balance: WFL, SBA      Standing Balance: Min.A static stand, pt. leaning LEs against bed for balance          Strength:  Strength Comments: L UE WNL, R UE limited to approx. 90 shoulder flex. 2/2 \"torn rotator cuff\"     Coordination:  Movements are Fluid and Coordinated: Yes   Outcome Measures:[facility] Daily Activity  Putting on and taking off regular lower body clothing: A lot  Bathing (including washing, rinsing, drying): A lot  Putting on and taking off regular upper body clothing: A little  Toileting, which includes using toilet, bedpan or urinal: Total  Taking care of " personal grooming such as brushing teeth: A little  Eating Meals: A little  Daily Activity - Total Score: 14        Education Documentation  ADL Training, taught by Nga Burger OT at 12/6/2023 11:07 AM.  Learner: Patient  Readiness: Acceptance  Method: Demonstration  Response: Needs Reinforcement  Comment: use of call light for mobility assist, orientation    Education Comments  No comments found.             Goals:  Encounter Problems       Encounter Problems (Active)       ADLs       Demo. UB and LB bathing and grooming with set-up and cuing at EOB  (Progressing)       Start:  12/06/23    Expected End:  12/13/23               BALANCE       Demo. at least 3 mins. dyn. stand with FWW with CGA.  (Progressing)       Start:  12/06/23    Expected End:  12/13/23               EXERCISE/STRENGTHENING       [place]. bilat. UE ex. 15 reps./plane without fatigue  (Progressing)       Start:  12/06/23    Expected End:  12/13/23               TRANSFERS       Demo.CGA func. trfrs. and mobility with FWW for toileting  (Progressing)       Start:  12/06/23    Expected End:  12/13/23

## 2023-12-07 ENCOUNTER — APPOINTMENT (OUTPATIENT)
Dept: RADIOLOGY | Facility: HOSPITAL | Age: 78
DRG: 071 | End: 2023-12-07
Payer: MEDICARE

## 2023-12-07 LAB
ANION GAP SERPL CALC-SCNC: 8 MMOL/L (ref 10–20)
BUN SERPL-MCNC: 23 MG/DL (ref 6–23)
CALCIUM SERPL-MCNC: 8.6 MG/DL (ref 8.6–10.3)
CHLORIDE SERPL-SCNC: 110 MMOL/L (ref 98–107)
CO2 SERPL-SCNC: 25 MMOL/L (ref 21–32)
CREAT SERPL-MCNC: 1.62 MG/DL (ref 0.5–1.3)
ERYTHROCYTE [DISTWIDTH] IN BLOOD BY AUTOMATED COUNT: 15.9 % (ref 11.5–14.5)
GFR SERPL CREATININE-BSD FRML MDRD: 43 ML/MIN/1.73M*2
GLUCOSE BLD MANUAL STRIP-MCNC: 109 MG/DL (ref 74–99)
GLUCOSE BLD MANUAL STRIP-MCNC: 116 MG/DL (ref 74–99)
GLUCOSE BLD MANUAL STRIP-MCNC: 139 MG/DL (ref 74–99)
GLUCOSE BLD MANUAL STRIP-MCNC: 150 MG/DL (ref 74–99)
GLUCOSE SERPL-MCNC: 100 MG/DL (ref 74–99)
HCT VFR BLD AUTO: 36.4 % (ref 41–52)
HGB BLD-MCNC: 11.5 G/DL (ref 13.5–17.5)
MCH RBC QN AUTO: 33.7 PG (ref 26–34)
MCHC RBC AUTO-ENTMCNC: 31.6 G/DL (ref 32–36)
MCV RBC AUTO: 107 FL (ref 80–100)
NRBC BLD-RTO: 0 /100 WBCS (ref 0–0)
PLATELET # BLD AUTO: 123 X10*3/UL (ref 150–450)
POTASSIUM SERPL-SCNC: 3.8 MMOL/L (ref 3.5–5.3)
RBC # BLD AUTO: 3.41 X10*6/UL (ref 4.5–5.9)
SODIUM SERPL-SCNC: 139 MMOL/L (ref 136–145)
WBC # BLD AUTO: 7.9 X10*3/UL (ref 4.4–11.3)

## 2023-12-07 PROCEDURE — 2500000001 HC RX 250 WO HCPCS SELF ADMINISTERED DRUGS (ALT 637 FOR MEDICARE OP): Performed by: INTERNAL MEDICINE

## 2023-12-07 PROCEDURE — 2500000002 HC RX 250 W HCPCS SELF ADMINISTERED DRUGS (ALT 637 FOR MEDICARE OP, ALT 636 FOR OP/ED): Performed by: INTERNAL MEDICINE

## 2023-12-07 PROCEDURE — 2500000005 HC RX 250 GENERAL PHARMACY W/O HCPCS: Performed by: INTERNAL MEDICINE

## 2023-12-07 PROCEDURE — 94760 N-INVAS EAR/PLS OXIMETRY 1: CPT

## 2023-12-07 PROCEDURE — 36415 COLL VENOUS BLD VENIPUNCTURE: CPT | Performed by: INTERNAL MEDICINE

## 2023-12-07 PROCEDURE — 94660 CPAP INITIATION&MGMT: CPT

## 2023-12-07 PROCEDURE — 2500000004 HC RX 250 GENERAL PHARMACY W/ HCPCS (ALT 636 FOR OP/ED): Performed by: INTERNAL MEDICINE

## 2023-12-07 PROCEDURE — 85027 COMPLETE CBC AUTOMATED: CPT | Performed by: INTERNAL MEDICINE

## 2023-12-07 PROCEDURE — 1200000002 HC GENERAL ROOM WITH TELEMETRY DAILY

## 2023-12-07 PROCEDURE — 74176 CT ABD & PELVIS W/O CONTRAST: CPT | Performed by: STUDENT IN AN ORGANIZED HEALTH CARE EDUCATION/TRAINING PROGRAM

## 2023-12-07 PROCEDURE — 74176 CT ABD & PELVIS W/O CONTRAST: CPT

## 2023-12-07 PROCEDURE — 82947 ASSAY GLUCOSE BLOOD QUANT: CPT

## 2023-12-07 PROCEDURE — 97530 THERAPEUTIC ACTIVITIES: CPT | Mod: GO,CO

## 2023-12-07 PROCEDURE — 99233 SBSQ HOSP IP/OBS HIGH 50: CPT | Performed by: INTERNAL MEDICINE

## 2023-12-07 PROCEDURE — 80048 BASIC METABOLIC PNL TOTAL CA: CPT | Performed by: INTERNAL MEDICINE

## 2023-12-07 RX ORDER — BACLOFEN 10 MG/1
10 TABLET ORAL 2 TIMES DAILY PRN
Status: DISCONTINUED | OUTPATIENT
Start: 2023-12-07 | End: 2023-12-09 | Stop reason: HOSPADM

## 2023-12-07 RX ORDER — MIRTAZAPINE 15 MG/1
30 TABLET, FILM COATED ORAL NIGHTLY
Status: DISCONTINUED | OUTPATIENT
Start: 2023-12-07 | End: 2023-12-09 | Stop reason: HOSPADM

## 2023-12-07 RX ORDER — SPIRONOLACTONE 25 MG/1
12.5 TABLET ORAL DAILY
Status: DISCONTINUED | OUTPATIENT
Start: 2023-12-07 | End: 2023-12-09 | Stop reason: HOSPADM

## 2023-12-07 RX ORDER — PANTOPRAZOLE SODIUM 40 MG/1
40 TABLET, DELAYED RELEASE ORAL DAILY
Status: DISCONTINUED | OUTPATIENT
Start: 2023-12-07 | End: 2023-12-09 | Stop reason: HOSPADM

## 2023-12-07 RX ORDER — FERROUS GLUCONATE 325 MG
324 TABLET ORAL DAILY
Status: DISCONTINUED | OUTPATIENT
Start: 2023-12-07 | End: 2023-12-09 | Stop reason: HOSPADM

## 2023-12-07 RX ORDER — OXYCODONE AND ACETAMINOPHEN 5; 325 MG/1; MG/1
1 TABLET ORAL EVERY 8 HOURS PRN
Status: DISCONTINUED | OUTPATIENT
Start: 2023-12-07 | End: 2023-12-09 | Stop reason: HOSPADM

## 2023-12-07 RX ADMIN — VENLAFAXINE HYDROCHLORIDE 150 MG: 150 CAPSULE, EXTENDED RELEASE ORAL at 10:32

## 2023-12-07 RX ADMIN — APIXABAN 5 MG: 5 TABLET, FILM COATED ORAL at 10:32

## 2023-12-07 RX ADMIN — MIRTAZAPINE 30 MG: 15 TABLET, FILM COATED ORAL at 21:49

## 2023-12-07 RX ADMIN — CARVEDILOL 25 MG: 25 TABLET, FILM COATED ORAL at 21:49

## 2023-12-07 RX ADMIN — EZETIMIBE 10 MG: 10 TABLET ORAL at 10:31

## 2023-12-07 RX ADMIN — SACUBITRIL AND VALSARTAN 1 TABLET: 49; 51 TABLET, FILM COATED ORAL at 21:49

## 2023-12-07 RX ADMIN — EMPAGLIFLOZIN 10 MG: 10 TABLET, FILM COATED ORAL at 17:10

## 2023-12-07 RX ADMIN — BACLOFEN 10 MG: 10 TABLET ORAL at 17:10

## 2023-12-07 RX ADMIN — CARVEDILOL 25 MG: 25 TABLET, FILM COATED ORAL at 10:32

## 2023-12-07 RX ADMIN — OXYCODONE HYDROCHLORIDE AND ACETAMINOPHEN 1 TABLET: 5; 325 TABLET ORAL at 17:09

## 2023-12-07 RX ADMIN — ASPIRIN 81 MG: 81 TABLET, COATED ORAL at 10:31

## 2023-12-07 RX ADMIN — TAMSULOSIN HYDROCHLORIDE 0.4 MG: 0.4 CAPSULE ORAL at 10:32

## 2023-12-07 RX ADMIN — PANTOPRAZOLE SODIUM 40 MG: 40 TABLET, DELAYED RELEASE ORAL at 10:31

## 2023-12-07 RX ADMIN — Medication 21 PERCENT: at 15:36

## 2023-12-07 RX ADMIN — ACETAMINOPHEN 650 MG: 325 TABLET ORAL at 10:31

## 2023-12-07 RX ADMIN — SPIRONOLACTONE 12.5 MG: 25 TABLET ORAL at 17:10

## 2023-12-07 RX ADMIN — APIXABAN 5 MG: 5 TABLET, FILM COATED ORAL at 21:49

## 2023-12-07 ASSESSMENT — COGNITIVE AND FUNCTIONAL STATUS - GENERAL
DAILY ACTIVITIY SCORE: 18
HELP NEEDED FOR BATHING: A LITTLE
CLIMB 3 TO 5 STEPS WITH RAILING: A LOT
DAILY ACTIVITIY SCORE: 19
TURNING FROM BACK TO SIDE WHILE IN FLAT BAD: A LITTLE
DRESSING REGULAR UPPER BODY CLOTHING: A LITTLE
STANDING UP FROM CHAIR USING ARMS: A LITTLE
PERSONAL GROOMING: A LITTLE
WALKING IN HOSPITAL ROOM: A LOT
WALKING IN HOSPITAL ROOM: A LOT
DRESSING REGULAR UPPER BODY CLOTHING: A LITTLE
CLIMB 3 TO 5 STEPS WITH RAILING: A LOT
TURNING FROM BACK TO SIDE WHILE IN FLAT BAD: A LITTLE
TOILETING: A LOT
DRESSING REGULAR LOWER BODY CLOTHING: A LOT
HELP NEEDED FOR BATHING: A LOT
MOBILITY SCORE: 16
TOILETING: A LITTLE
MOVING TO AND FROM BED TO CHAIR: A LITTLE
MOVING FROM LYING ON BACK TO SITTING ON SIDE OF FLAT BED WITH BEDRAILS: A LITTLE
MOVING FROM LYING ON BACK TO SITTING ON SIDE OF FLAT BED WITH BEDRAILS: A LITTLE
DRESSING REGULAR UPPER BODY CLOTHING: A LITTLE
MOBILITY SCORE: 16
DRESSING REGULAR LOWER BODY CLOTHING: A LOT
DRESSING REGULAR LOWER BODY CLOTHING: A LOT
HELP NEEDED FOR BATHING: A LOT
MOVING TO AND FROM BED TO CHAIR: A LITTLE
TOILETING: A LITTLE
STANDING UP FROM CHAIR USING ARMS: A LITTLE
DAILY ACTIVITIY SCORE: 16

## 2023-12-07 ASSESSMENT — PAIN - FUNCTIONAL ASSESSMENT
PAIN_FUNCTIONAL_ASSESSMENT: 0-10

## 2023-12-07 ASSESSMENT — PAIN SCALES - GENERAL
PAINLEVEL_OUTOF10: 3
PAINLEVEL_OUTOF10: 4
PAINLEVEL_OUTOF10: 5 - MODERATE PAIN
PAINLEVEL_OUTOF10: 0 - NO PAIN
PAINLEVEL_OUTOF10: 5 - MODERATE PAIN
PAINLEVEL_OUTOF10: 7

## 2023-12-07 NOTE — PROGRESS NOTES
Subjective   Patient is sitting on the bed, alert and oriented x 3 answering my questions appropriately.  Complaining of couple of episodes of diarrhea.  Also complaining of left and lower quadrant abdominal pains.  Denies any nausea or vomitings  No fever chills or rigors  Tolerating p.o. diet well.  No other significant overnight issues.    Objective     Intake/Output last 3 shifts:  I/O last 3 completed shifts:  In: 350 (4.8 mL/kg) [P.O.:350]  Out: 925 (12.6 mL/kg) [Urine:925 (0.3 mL/kg/hr)]  Weight: 73.5 kg     Intake/Output this shift:  I/O this shift:  In: 100 [P.O.:100]  Out: 250 [Urine:250]    Recent Results (from the past 48 hour(s))   POCT GLUCOSE    Collection Time: 12/05/23  5:40 PM   Result Value Ref Range    POCT Glucose 157 (H) 74 - 99 mg/dL   POCT GLUCOSE    Collection Time: 12/05/23 10:28 PM   Result Value Ref Range    POCT Glucose 103 (H) 74 - 99 mg/dL   CBC    Collection Time: 12/06/23  4:32 AM   Result Value Ref Range    WBC 8.4 4.4 - 11.3 x10*3/uL    nRBC 0.0 0.0 - 0.0 /100 WBCs    RBC 3.57 (L) 4.50 - 5.90 x10*6/uL    Hemoglobin 12.0 (L) 13.5 - 17.5 g/dL    Hematocrit 39.1 (L) 41.0 - 52.0 %     (H) 80 - 100 fL    MCH 33.6 26.0 - 34.0 pg    MCHC 30.7 (L) 32.0 - 36.0 g/dL    RDW 16.2 (H) 11.5 - 14.5 %    Platelets 133 (L) 150 - 450 x10*3/uL   Basic Metabolic Panel    Collection Time: 12/06/23  4:32 AM   Result Value Ref Range    Glucose 97 74 - 99 mg/dL    Sodium 142 136 - 145 mmol/L    Potassium 3.9 3.5 - 5.3 mmol/L    Chloride 112 (H) 98 - 107 mmol/L    Bicarbonate 24 21 - 32 mmol/L    Anion Gap 10 10 - 20 mmol/L    Urea Nitrogen 23 6 - 23 mg/dL    Creatinine 1.42 (H) 0.50 - 1.30 mg/dL    eGFR 50 (L) >60 mL/min/1.73m*2    Calcium 8.1 (L) 8.6 - 10.3 mg/dL   POCT GLUCOSE    Collection Time: 12/06/23  6:35 AM   Result Value Ref Range    POCT Glucose 119 (H) 74 - 99 mg/dL   POCT GLUCOSE    Collection Time: 12/06/23 11:04 AM   Result Value Ref Range    POCT Glucose 159 (H) 74 - 99 mg/dL   POCT  GLUCOSE    Collection Time: 12/06/23  4:49 PM   Result Value Ref Range    POCT Glucose 116 (H) 74 - 99 mg/dL   POCT GLUCOSE    Collection Time: 12/06/23  9:04 PM   Result Value Ref Range    POCT Glucose 95 74 - 99 mg/dL   CBC    Collection Time: 12/07/23  4:52 AM   Result Value Ref Range    WBC 7.9 4.4 - 11.3 x10*3/uL    nRBC 0.0 0.0 - 0.0 /100 WBCs    RBC 3.41 (L) 4.50 - 5.90 x10*6/uL    Hemoglobin 11.5 (L) 13.5 - 17.5 g/dL    Hematocrit 36.4 (L) 41.0 - 52.0 %     (H) 80 - 100 fL    MCH 33.7 26.0 - 34.0 pg    MCHC 31.6 (L) 32.0 - 36.0 g/dL    RDW 15.9 (H) 11.5 - 14.5 %    Platelets 123 (L) 150 - 450 x10*3/uL   Basic Metabolic Panel    Collection Time: 12/07/23  4:52 AM   Result Value Ref Range    Glucose 100 (H) 74 - 99 mg/dL    Sodium 139 136 - 145 mmol/L    Potassium 3.8 3.5 - 5.3 mmol/L    Chloride 110 (H) 98 - 107 mmol/L    Bicarbonate 25 21 - 32 mmol/L    Anion Gap 8 (L) 10 - 20 mmol/L    Urea Nitrogen 23 6 - 23 mg/dL    Creatinine 1.62 (H) 0.50 - 1.30 mg/dL    eGFR 43 (L) >60 mL/min/1.73m*2    Calcium 8.6 8.6 - 10.3 mg/dL   POCT GLUCOSE    Collection Time: 12/07/23  6:41 AM   Result Value Ref Range    POCT Glucose 109 (H) 74 - 99 mg/dL   POCT GLUCOSE    Collection Time: 12/07/23 11:12 AM   Result Value Ref Range    POCT Glucose 139 (H) 74 - 99 mg/dL        CT cervical spine wo IV contrast  Narrative: Interpreted By:  Ishaan Pavon,   STUDY:  CT CERVICAL SPINE WO IV CONTRAST;  12/4/2023 10:44 am      INDICATION:  Signs/Symptoms:fall.      COMPARISON:  CTA head and neck dated 05/03/2022.      ACCESSION NUMBER(S):  [id number]      ORDERING CLINICIAN:  JAGUAR BRIZUELA      TECHNIQUE:  Axial CT images of the cervical spine are obtained. Axial, coronal  and sagittal reconstructions are provided for review.      FINDINGS:  Trauma: Diffusely decreased bone mineralization. There is no evidence  for an acute fracture of the cervical spine. No prevertebral soft  tissue swelling.      Alignment: 2 mm anterolisthesis  of C2 on C3 and C3 on C4. Stairstep  estimated 2 mm retrolisthesis of C4 on C5 and C5 on C6. Additional 2  mm anterolisthesis of C6 on C7 and estimated 3 mm anterolisthesis of  C7 on T1 with ankylosis of the C7 and T1 vertebral bodies.      Craniocervical Junction: The occipital condyles, odontoid process,  and craniocervical junction are intact.      Vertebral Body Heights: The cervical vertebral body heights are  grossly intact.      Intervertebral Disc Spaces: Severe degenerative discogenic change  involving the C3-T1 levels with severe degenerative disc height loss,  varying degrees of endplate sclerosis and subchondral cystic change,  as well as uncovertebral and endplate spurring.      Degenerative Change: Multilevel spinal canal stenosis, moderate to  severe at C3-C4, C4-C5, and C5-C6 with right lateral recess  effacement at this level. Multilevel facet arthropathy and  uncovertebral spurring with high-grade neural foraminal narrowing.      Prevertebral/Paraspinal Soft Tissues: Severe atherosclerotic  calcification of the carotid bifurcations bilaterally. The  prevertebral and paraspinal soft tissues are otherwise unremarkable.      Lung Apices: Unremarkable.      Impression: Diffusely decreased bone mineralization with no definite acute  osseous abnormality of the cervical spine.      Advanced multilevel degenerative spondylolisthesis with severe  cervical spondylosis and facet arthropathy, overall similar to  slightly worsened from comparison examination. There is moderate to  severe multilevel spinal canal stenosis and neural foraminal  narrowing.      MACRO:  None      Signed by: Ishaan Pavon 12/4/2023 11:11 AM  Dictation workstation:   [id number]  CT head W O contrast trauma protocol  Narrative: Interpreted By:  Ishaan Pavon,   STUDY:  CT HEAD W/O CONTRAST TRAUMA PROTOCOL;  12/4/2023 10:44 am      INDICATION:  Signs/Symptoms:fall.      COMPARISON:  CT head dated 05/09/2022      ACCESSION  NUMBER(S):  [id number]      ORDERING CLINICIAN:  JAGUAR BRIZUELA      TECHNIQUE:  Noncontrast axial CT scan of head was performed. Coronal and sagittal  reformats provided.      FINDINGS:  Parenchyma: There is no intracranial hemorrhage. The grey-white  differentiation is intact. There is no mass effect or midline shift.  Mild-to-moderate chronic small vessel ischemic disease. Similar  appearance of remote right anterior basal ganglia lacunar infarct.  Additional tiny remote bilateral basal ganglia lacunar infarcts  versus prominent perivascular spaces. Unchanged calcified thrombus  components suggested near the right MCA bifurcation (series 4, image  62).      CSF Spaces: The ventricles, sulci and basal cisterns are similar in  appearance with disproportionate prominence of the ventricular  collecting system, favor central atrophy.      Extra-Axial Fluid: There is no extraaxial fluid collection.      Calvarium: No acute osseous abnormality. Incidental metopic suture.      Paranasal sinuses: Chronic appearing right ostiomeatal pattern of  obstruction with hyperdense secretions and complete opacification of  the right maxillary paranasal sinus, right anterior ethmoidal air  cells, and a majority of the right posterior ethmoids. Aplastic right  frontal sinus. There is mild hyperostosis, particularly involving the  right posterior ethmoid region. There is also hyperostosis involving  the right sphenoid sinus with mild mucosal thickening. Chronic  appearing widening of the right ostiomeatal unit.      Mastoids: Small amount of fluid on the left.      Orbits: Normal.      Soft tissues: Unremarkable.          Brain Injury (BIG) guidelines CT values:      Skull fracture: No  SDH (subdural hematoma): None detected  EDH (epidural hematoma): None detected  IPH (intraparenchymal hemorrhage): None detected  SAH (subarachnoid hemorrhage): None detected  IVH (intraventricular hemorrhage): No      Reference:  Marco DUMONT, Camron STEVENS,  Robert CARRIZALES, et al. The BIG (brain injury  guidelines) project: defining the management of traumatic brain  injury by acute care surgeons. J Trauma Acute Care Surg.  2014;76:389n619.      Impression: No acute intracranial hemorrhage, mass effect, calvarial fracture, or  CT apparent acute infarct.      Mild-to-moderate chronic small vessel ischemic disease with similar  brain atrophy with disproportionate ventricular prominence. Unchanged  remote right anterior basal ganglia lacunar infarct. Unchanged  calcified thrombus component suggested near the right MCA bifurcation.      Chronic appearing right ostiomeatal pattern of paranasal sinus  obstruction as detailed above; underlying polyp or sinonasal mass  lesion not excluded.      MACRO:  None      Signed by: Ishaan Pavon 12/4/2023 11:02 AM  Dictation workstation:   [id number]  XR chest 1 view  Narrative: Interpreted By:  Ishaan Pavon,   STUDY:  XR CHEST 1 VIEW;  12/4/2023 10:31 am      INDICATION:  Signs/Symptoms:cough.      COMPARISON:  Chest radiograph dated 05/03/2022      ACCESSION NUMBER(S):  [id number]      ORDERING CLINICIAN:  JAGUAR BRIZUELA      FINDINGS:  AP radiograph of the chest was provided.      DEVICES:  Left-sided approach biventricular AICD. Additional tiny device along  the left heart border. Fracture of the 2nd superior most sternotomy  wire. Otherwise intact appearing sternotomy wires.      CARDIOMEDIASTINAL SILHOUETTE:  Cardiomediastinal silhouette is normal in size and configuration.      LUNGS:  Mild bronchovascular crowding versus congestion involving the right  upper lung zone. No focal consolidative airspace opacity. No  pneumothorax. No pleural effusion.      ABDOMEN:  No remarkable upper abdominal findings.      BONES:  No acute osseous changes. Advanced osteoarthrosis of the right  shoulder.      Impression: No focal consolidative airspace opacity. Questionable mild vascular  congestion versus bronchovascular crowding, most pronounced  within  the right upper lung zone.      MACRO:  None      Signed by: Ishaan Pavon 12/4/2023 10:54 AM  Dictation workstation:   [id number]       Vital signs in last 24 hours:  Temp:  [36.3 °C (97.3 °F)-37.2 °C (98.9 °F)] 36.7 °C (98.1 °F)  Heart Rate:  [64-82] 82  Resp:  [18-20] 19  BP: (135-168)/(76-99) 168/99  FiO2 (%):  [32 %] 32 %    Physical Exam  General: No distress  Neck: Supple.  HEENT: Normocephalic atraumatic pupils equal  Heart: S1-S2 heard no murmurs gallops regurgitation  Lungs: Clear to auscultation bilaterally no wheezing rales rhonchi  Abdomen: Nondistended nontender bowel sounds are present  Neuro: No focal deficits  Assessment/Plan   Acute metabolic encephalopathy.  Generalized weakness/debility  Rule out urinary tract infection.  Acute on chronic kidney disease stage III.  Anxiety/depression.  Thrombocytopenia-no signs of bleeding, platelets close to baseline.  Intractable abdominal pain.  2 episodes of diarrhea.    History of:  Hypertension  Hyperlipidemia  Coronary artery disease  Heart failure with reduced ejection fraction.  Chronic kidney disease stage III.  Atrial fibrillation on Eliquis.  Gastroesophageal reflux disease  BPH.    Plan:  Patient mental status is significantly better.  Answering my questions appropriately, as per family mental status is back to baseline.  As mentioned above urinary tract infection ruled out-discontinued antibiotics  Continuing IV fluids, creatinine improving close to baseline-discontinued IV fluids  Patient is tolerating p.o. diet well.  Heart rate is well-controlled, with Coreg and anticoagulation with Eliquis.  For depression continuing Effexor.  As patient is complaining of abdominal pain, ordered a CT abdomen pelvis without contrast.  Stool pathogen PCR [id number] on the results  Repeat PT OT evaluation mobility score improved to 18    DVT prophylaxis:  Patient is on Eliquis    Disposition:  Awaiting CT abdomen results.  Stool pathogen PCR  panel.  Possible discharge home tomorrow depending on results       LOS: 2 days

## 2023-12-07 NOTE — PROGRESS NOTES
Occupational Therapy    OT Treatment    Patient Name: Jony Javier  MRN: [id number]  Today's Date: 12/7/2023  Time Calculation  Start Time: 1145  Stop Time: 1208  Time Calculation (min): 23 min         Assessment:  Evaluation/Treatment Tolerance: Patient limited by pain  End of Session Communication: Physician  End of Session Patient Position: Bed, 3 rail up, Alarm off, not on at start of session  OT Assessment Results: Decreased ADL status, Decreased safe judgment during ADL, Decreased endurance, Decreased functional mobility  Evaluation/Treatment Tolerance: Patient limited by pain    Plan:  Treatment Interventions: Functional transfer training, Endurance training, Patient/family training  Treatment Interventions: Functional transfer training, Endurance training, Patient/family training  Subjective Pt.agreeable to OT tx.     Current Problem:  Patient Active Problem List   Diagnosis    Abdominal pain    Acute pneumonia    Acute sinusitis    Acute UTI    Anemia    Anxiety disorder    Panic disorder    Aortic valve stenosis    AV node dysfunction    Bilateral hearing loss    Bilateral sensorineural hearing loss    Bladder diverticulitis    CAD (coronary artery disease)    Carotid stenosis    Cerebral ventriculomegaly    Cervical neck pain with evidence of disc disease    Acute on chronic systolic CHF (congestive heart failure), NYHA class 3 (CMS/HCC)    Chronic combined systolic and diastolic heart failure, NYHA class 3 (CMS/HCC)    CHF, acute (CMS/HCC)    Chronic congestive heart failure (CMS/HCC)    Chronic renal impairment    Compressive atelectasis    CVA (cerebral vascular accident) (CMS/HCC)    Depression    Diabetes (CMS/HCC)    Disturbed hearing    DJD (degenerative joint disease)    Dysuria    Ear pain    Erectile dysfunction    Esophageal candidiasis (CMS/HCC)    GERD (gastroesophageal reflux disease)    Gout attack    Groin pain    HTN (hypertension)    Hyperlipidemia    Hyperuricemia    Insomnia     Cough with hemoptysis    Intracranial bleed (CMS/HCC)    Low back pain    Muscle cramps    Constipation    Nausea in adult    Numbness of left hand    Osteoarthritis of right glenohumeral joint    Pacemaker    Pericardial effusion    Persistent atrial fibrillation (CMS/HCC)    Pleural effusion, bilateral    Positive occult stool blood test    Psoriasis of scalp    Rash    Right foot injury, initial encounter    Right shoulder pain    Severe mitral regurgitation    Shortness of breath at rest    HECTOR on CPAP    Sleep apnea    Testosterone deficiency    Tinnitus of left ear    Tiredness    Unspecified lesions of oral mucosa    URI, acute    Acquired cyst of kidney    Adjustment disorder with depressed mood    Alcohol abuse    Allergic rhinitis    Anemia due to stage 4 chronic kidney disease treated with erythropoietin (CMS/HCC)    Automatic implantable cardioverter-defibrillator in situ    Balanitis    Bundle branch block, left    CAD S/P percutaneous coronary angioplasty    Central perforation of tympanic membrane    Chronic maxillary sinusitis    Chronic or old sprain of lumbosacral ligament    Lumbar sprain    Congenital insufficiency of aortic valve    Continuous alcohol dependence (CMS/HCC)    Degeneration of lumbar or lumbosacral intervertebral disc    Deviated nasal septum    Conductive hearing loss    Displacement of lumbar intervertebral disc without myelopathy    Dysfunction of eustachian tube    Elevated prostate specific antigen (PSA)    Hypertrophy of nasal turbinates    Loss of weight    Mild intermittent asthma without complication    Palpitation    Disorder of prostate    Prostatitis    S/P UPPP (uvulopalatopharyngoplasty)    Skin infection    SVT (supraventricular tachycardia)    Tympanic membrane conductive hearing loss    Urgency of urination    Ventricular tachycardia (CMS/HCC)    Routine general medical examination at health care facility    Disorder of middle ear    Male erectile dysfunction,  unspecified    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[facility])    Chronic obstructive pulmonary disease, unspecified COPD type (CMS/[facility])    Type 2 diabetes mellitus with diabetic peripheral angiopathy without gangrene, without long-term current use of insulin (CMS/[facility])    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 of unspecified duration, initial encounter (CMS/[facility])    Urinary tract infection without hematuria, site unspecified    HFrEF (heart failure with reduced ejection fraction) (CMS/[facility])    CASSIDY (acute kidney injury) (CMS/[facility])    Stage 3b chronic kidney disease (CMS/[facility])    Thrombocytopenia (CMS/[facility])    Physical deconditioning    Ambulatory dysfunction    Altered mental state    Encephalopathy       General:        Pain:  Pain Assessment  Pain Assessment: 0-10  Pain Score: 5 - Moderate pain  Pain Type: Chronic pain  Pain Location: Abdomen  Effect of Pain on Daily Activities: Decreased bending for LE ADLs  Objective            Functional Standing Tolerance:  Time: static standing for 1 min intervals.  Activity: BUE supported on FWW    Bed Mobility/Transfers: Bed Mobility  Bed Mobility: Yes  Bed Mobility 1  Bed Mobility 1: Supine to sitting, Sitting to supine  Level of Assistance 1: Minimum assistance  Transfers  Transfer: Yes  Transfer 1  Transfer Device 1: Walker  Transfer Level of Assistance 1: Contact guard             Therapy/Activity:    Therapeutic Activity  Therapeutic Activity Performed: Yes  Therapeutic Activity 1: Functional ambulation house hold distances with CGA x 1.       Strength:  Strength Comments: generalized weakness    Other Activity:   Ambulation with FWW household distances with CGA x 1 and cues to slow his pace.     Outcome Measures:[facility] Daily Activity  Putting on and taking off regular lower body clothing: A lot  Bathing (including washing, rinsing, drying): A lot  Putting on and taking off regular upper body clothing: A little  Toileting, which includes using toilet,  bedpan or urinal: A little  Taking care of personal grooming such as brushing teeth: None  Eating Meals: None  Daily Activity - Total Score: 18  Education Documentation  Mobility Training, taught by DEEPALI Whitaker at 12/7/2023 12:19 PM.  Learner: Patient  Readiness: Acceptance  Method: Explanation  Response: Verbalizes Understanding, Needs Reinforcement    ADL Training, taught by DEEPALI Whitaker at 12/7/2023 12:19 PM.  Learner: Patient  Readiness: Acceptance  Method: Explanation  Response: Verbalizes Understanding, Needs Reinforcement    Education Comments  No comments found.        EDUCATION:  Education  Individual(s) Educated: Patient    Goals:  Encounter Problems       Encounter Problems (Active)       ADLs       Demo. UB and LB bathing and grooming with set-up and cuing at EOB  (Progressing)       Start:  12/06/23    Expected End:  12/13/23               BALANCE       Demo. at least 3 mins. dyn. stand with FWW with CGA.  (Progressing)       Start:  12/06/23    Expected End:  12/13/23               EXERCISE/STRENGTHENING       [place]. bilat. UE ex. 15 reps./plane without fatigue  (Progressing)       Start:  12/06/23    Expected End:  12/13/23               TRANSFERS       Demo.CGA func. trfrs. and mobility with FWW for toileting  (Progressing)       Start:  12/06/23    Expected End:  12/13/23

## 2023-12-07 NOTE — PROGRESS NOTES
Physical Therapy                 Therapy Communication Note    Patient Name: Jony Javier  MRN: [id number]  Today's Date: 12/7/2023     Discipline: Physical Therapy    Missed Visit Reason: Missed Visit Reason: Other (Comment) (pt. eating)    Missed Time: Attempt    Comment:Pt. Eating

## 2023-12-08 ENCOUNTER — TELEPHONE (OUTPATIENT)
Dept: CARDIOLOGY | Facility: HOSPITAL | Age: 78
End: 2023-12-08

## 2023-12-08 LAB
ANION GAP SERPL CALC-SCNC: 12 MMOL/L (ref 10–20)
BUN SERPL-MCNC: 23 MG/DL (ref 6–23)
CALCIUM SERPL-MCNC: 9 MG/DL (ref 8.6–10.3)
CHLORIDE SERPL-SCNC: 110 MMOL/L (ref 98–107)
CO2 SERPL-SCNC: 26 MMOL/L (ref 21–32)
CREAT SERPL-MCNC: 1.45 MG/DL (ref 0.5–1.3)
ERYTHROCYTE [DISTWIDTH] IN BLOOD BY AUTOMATED COUNT: 15.6 % (ref 11.5–14.5)
GFR SERPL CREATININE-BSD FRML MDRD: 49 ML/MIN/1.73M*2
GLUCOSE BLD MANUAL STRIP-MCNC: 109 MG/DL (ref 74–99)
GLUCOSE BLD MANUAL STRIP-MCNC: 113 MG/DL (ref 74–99)
GLUCOSE BLD MANUAL STRIP-MCNC: 123 MG/DL (ref 74–99)
GLUCOSE BLD MANUAL STRIP-MCNC: 94 MG/DL (ref 74–99)
GLUCOSE SERPL-MCNC: 83 MG/DL (ref 74–99)
HCT VFR BLD AUTO: 39.5 % (ref 41–52)
HGB BLD-MCNC: 12.4 G/DL (ref 13.5–17.5)
MCH RBC QN AUTO: 34.3 PG (ref 26–34)
MCHC RBC AUTO-ENTMCNC: 31.4 G/DL (ref 32–36)
MCV RBC AUTO: 109 FL (ref 80–100)
NRBC BLD-RTO: 0 /100 WBCS (ref 0–0)
PLATELET # BLD AUTO: 123 X10*3/UL (ref 150–450)
POTASSIUM SERPL-SCNC: 4 MMOL/L (ref 3.5–5.3)
RBC # BLD AUTO: 3.62 X10*6/UL (ref 4.5–5.9)
SODIUM SERPL-SCNC: 144 MMOL/L (ref 136–145)
WBC # BLD AUTO: 9 X10*3/UL (ref 4.4–11.3)

## 2023-12-08 PROCEDURE — 99222 1ST HOSP IP/OBS MODERATE 55: CPT | Performed by: SURGERY

## 2023-12-08 PROCEDURE — 87506 IADNA-DNA/RNA PROBE TQ 6-11: CPT | Mod: PORLAB | Performed by: INTERNAL MEDICINE

## 2023-12-08 PROCEDURE — 80048 BASIC METABOLIC PNL TOTAL CA: CPT | Performed by: INTERNAL MEDICINE

## 2023-12-08 PROCEDURE — 85027 COMPLETE CBC AUTOMATED: CPT | Performed by: INTERNAL MEDICINE

## 2023-12-08 PROCEDURE — 2500000002 HC RX 250 W HCPCS SELF ADMINISTERED DRUGS (ALT 637 FOR MEDICARE OP, ALT 636 FOR OP/ED): Performed by: INTERNAL MEDICINE

## 2023-12-08 PROCEDURE — 2500000004 HC RX 250 GENERAL PHARMACY W/ HCPCS (ALT 636 FOR OP/ED): Performed by: INTERNAL MEDICINE

## 2023-12-08 PROCEDURE — 36415 COLL VENOUS BLD VENIPUNCTURE: CPT | Performed by: INTERNAL MEDICINE

## 2023-12-08 PROCEDURE — 1100000001 HC PRIVATE ROOM DAILY

## 2023-12-08 PROCEDURE — 99232 SBSQ HOSP IP/OBS MODERATE 35: CPT | Performed by: INTERNAL MEDICINE

## 2023-12-08 PROCEDURE — 82947 ASSAY GLUCOSE BLOOD QUANT: CPT

## 2023-12-08 PROCEDURE — 94660 CPAP INITIATION&MGMT: CPT

## 2023-12-08 PROCEDURE — 2500000001 HC RX 250 WO HCPCS SELF ADMINISTERED DRUGS (ALT 637 FOR MEDICARE OP): Performed by: INTERNAL MEDICINE

## 2023-12-08 RX ADMIN — APIXABAN 5 MG: 5 TABLET, FILM COATED ORAL at 08:38

## 2023-12-08 RX ADMIN — FERROUS GLUCONATE 324 MG: 324 TABLET ORAL at 08:38

## 2023-12-08 RX ADMIN — VENLAFAXINE HYDROCHLORIDE 150 MG: 150 CAPSULE, EXTENDED RELEASE ORAL at 08:38

## 2023-12-08 RX ADMIN — CARVEDILOL 25 MG: 25 TABLET, FILM COATED ORAL at 08:38

## 2023-12-08 RX ADMIN — SACUBITRIL AND VALSARTAN 1 TABLET: 49; 51 TABLET, FILM COATED ORAL at 21:44

## 2023-12-08 RX ADMIN — TAMSULOSIN HYDROCHLORIDE 0.4 MG: 0.4 CAPSULE ORAL at 08:43

## 2023-12-08 RX ADMIN — APIXABAN 5 MG: 5 TABLET, FILM COATED ORAL at 21:45

## 2023-12-08 RX ADMIN — MIRTAZAPINE 30 MG: 15 TABLET, FILM COATED ORAL at 21:45

## 2023-12-08 RX ADMIN — CARVEDILOL 25 MG: 25 TABLET, FILM COATED ORAL at 21:44

## 2023-12-08 RX ADMIN — EZETIMIBE 10 MG: 10 TABLET ORAL at 08:38

## 2023-12-08 RX ADMIN — EMPAGLIFLOZIN 10 MG: 10 TABLET, FILM COATED ORAL at 08:38

## 2023-12-08 RX ADMIN — SACUBITRIL AND VALSARTAN 1 TABLET: 49; 51 TABLET, FILM COATED ORAL at 08:43

## 2023-12-08 RX ADMIN — OXYCODONE HYDROCHLORIDE AND ACETAMINOPHEN 1 TABLET: 5; 325 TABLET ORAL at 16:57

## 2023-12-08 RX ADMIN — OXYCODONE HYDROCHLORIDE AND ACETAMINOPHEN 1 TABLET: 5; 325 TABLET ORAL at 05:23

## 2023-12-08 RX ADMIN — PANTOPRAZOLE SODIUM 40 MG: 40 TABLET, DELAYED RELEASE ORAL at 08:38

## 2023-12-08 RX ADMIN — SPIRONOLACTONE 12.5 MG: 25 TABLET ORAL at 08:38

## 2023-12-08 RX ADMIN — ASPIRIN 81 MG: 81 TABLET, COATED ORAL at 08:38

## 2023-12-08 ASSESSMENT — ENCOUNTER SYMPTOMS
SHORTNESS OF BREATH: 0
PALPITATIONS: 0
CHILLS: 0
NAUSEA: 0
COUGH: 0
VOMITING: 0
CONSTIPATION: 0
DIARRHEA: 1
ABDOMINAL PAIN: 1
FEVER: 0
HEADACHES: 0
BLOOD IN STOOL: 1
DIZZINESS: 0

## 2023-12-08 ASSESSMENT — PAIN - FUNCTIONAL ASSESSMENT: PAIN_FUNCTIONAL_ASSESSMENT: 0-10

## 2023-12-08 ASSESSMENT — COGNITIVE AND FUNCTIONAL STATUS - GENERAL
MOVING TO AND FROM BED TO CHAIR: A LITTLE
DAILY ACTIVITIY SCORE: 19
PERSONAL GROOMING: A LITTLE
TOILETING: A LITTLE
MOVING TO AND FROM BED TO CHAIR: A LITTLE
STANDING UP FROM CHAIR USING ARMS: A LITTLE
HELP NEEDED FOR BATHING: A LITTLE
PERSONAL GROOMING: A LITTLE
DAILY ACTIVITIY SCORE: 19
MOBILITY SCORE: 19
TOILETING: A LITTLE
CLIMB 3 TO 5 STEPS WITH RAILING: A LOT
CLIMB 3 TO 5 STEPS WITH RAILING: A LOT
MOBILITY SCORE: 19
STANDING UP FROM CHAIR USING ARMS: A LITTLE
WALKING IN HOSPITAL ROOM: A LITTLE
DRESSING REGULAR UPPER BODY CLOTHING: A LITTLE
DRESSING REGULAR LOWER BODY CLOTHING: A LITTLE
HELP NEEDED FOR BATHING: A LITTLE
WALKING IN HOSPITAL ROOM: A LITTLE
DRESSING REGULAR UPPER BODY CLOTHING: A LITTLE
DRESSING REGULAR LOWER BODY CLOTHING: A LITTLE

## 2023-12-08 ASSESSMENT — PAIN SCALES - GENERAL
PAINLEVEL_OUTOF10: 0 - NO PAIN
PAINLEVEL_OUTOF10: 7
PAINLEVEL_OUTOF10: 0 - NO PAIN

## 2023-12-08 ASSESSMENT — PAIN DESCRIPTION - LOCATION: LOCATION: ABDOMEN

## 2023-12-08 NOTE — PROGRESS NOTES
Physical Therapy                 Therapy Communication Note    Patient Name: Jony Javier  MRN: [id number]  Today's Date: 12/8/2023     Discipline: Physical Therapy    Missed Visit Reason: Missed Visit Reason: Patient refused (stating he waiting  to sspeak with physician. expalined we don't have to leave room, but patient stated he is not up to it)    Missed Time: Attempt    Comment:

## 2023-12-08 NOTE — PROGRESS NOTES
Subjective   Patient is sitting on the bed, abdominal pain is slightly better compared to yesterday.  Also his frequency of loose stools improved.  Tolerating p.o. diet well.  No fever chills or rigors.  No other significant overnight issues.    Objective     Intake/Output last 3 shifts:  I/O last 3 completed shifts:  In: 320 (4.4 mL/kg) [P.O.:320]  Out: 1875 (25.5 mL/kg) [Urine:1875 (0.7 mL/kg/hr)]  Weight: 73.5 kg     Intake/Output this shift:  I/O this shift:  In: 360 [P.O.:360]  Out: 100 [Urine:100]    Recent Results (from the past 48 hour(s))   POCT GLUCOSE    Collection Time: 12/06/23  4:49 PM   Result Value Ref Range    POCT Glucose 116 (H) 74 - 99 mg/dL   POCT GLUCOSE    Collection Time: 12/06/23  9:04 PM   Result Value Ref Range    POCT Glucose 95 74 - 99 mg/dL   CBC    Collection Time: 12/07/23  4:52 AM   Result Value Ref Range    WBC 7.9 4.4 - 11.3 x10*3/uL    nRBC 0.0 0.0 - 0.0 /100 WBCs    RBC 3.41 (L) 4.50 - 5.90 x10*6/uL    Hemoglobin 11.5 (L) 13.5 - 17.5 g/dL    Hematocrit 36.4 (L) 41.0 - 52.0 %     (H) 80 - 100 fL    MCH 33.7 26.0 - 34.0 pg    MCHC 31.6 (L) 32.0 - 36.0 g/dL    RDW 15.9 (H) 11.5 - 14.5 %    Platelets 123 (L) 150 - 450 x10*3/uL   Basic Metabolic Panel    Collection Time: 12/07/23  4:52 AM   Result Value Ref Range    Glucose 100 (H) 74 - 99 mg/dL    Sodium 139 136 - 145 mmol/L    Potassium 3.8 3.5 - 5.3 mmol/L    Chloride 110 (H) 98 - 107 mmol/L    Bicarbonate 25 21 - 32 mmol/L    Anion Gap 8 (L) 10 - 20 mmol/L    Urea Nitrogen 23 6 - 23 mg/dL    Creatinine 1.62 (H) 0.50 - 1.30 mg/dL    eGFR 43 (L) >60 mL/min/1.73m*2    Calcium 8.6 8.6 - 10.3 mg/dL   POCT GLUCOSE    Collection Time: 12/07/23  6:41 AM   Result Value Ref Range    POCT Glucose 109 (H) 74 - 99 mg/dL   POCT GLUCOSE    Collection Time: 12/07/23 11:12 AM   Result Value Ref Range    POCT Glucose 139 (H) 74 - 99 mg/dL   POCT GLUCOSE    Collection Time: 12/07/23  4:26 PM   Result Value Ref Range    POCT Glucose 150 (H) 74  - 99 mg/dL   POCT GLUCOSE    Collection Time: 12/07/23  8:25 PM   Result Value Ref Range    POCT Glucose 116 (H) 74 - 99 mg/dL   Basic Metabolic Panel    Collection Time: 12/08/23  5:22 AM   Result Value Ref Range    Glucose 83 74 - 99 mg/dL    Sodium 144 136 - 145 mmol/L    Potassium 4.0 3.5 - 5.3 mmol/L    Chloride 110 (H) 98 - 107 mmol/L    Bicarbonate 26 21 - 32 mmol/L    Anion Gap 12 10 - 20 mmol/L    Urea Nitrogen 23 6 - 23 mg/dL    Creatinine 1.45 (H) 0.50 - 1.30 mg/dL    eGFR 49 (L) >60 mL/min/1.73m*2    Calcium 9.0 8.6 - 10.3 mg/dL   CBC    Collection Time: 12/08/23  5:22 AM   Result Value Ref Range    WBC 9.0 4.4 - 11.3 x10*3/uL    nRBC 0.0 0.0 - 0.0 /100 WBCs    RBC 3.62 (L) 4.50 - 5.90 x10*6/uL    Hemoglobin 12.4 (L) 13.5 - 17.5 g/dL    Hematocrit 39.5 (L) 41.0 - 52.0 %     (H) 80 - 100 fL    MCH 34.3 (H) 26.0 - 34.0 pg    MCHC 31.4 (L) 32.0 - 36.0 g/dL    RDW 15.6 (H) 11.5 - 14.5 %    Platelets 123 (L) 150 - 450 x10*3/uL   POCT GLUCOSE    Collection Time: 12/08/23  6:51 AM   Result Value Ref Range    POCT Glucose 94 74 - 99 mg/dL   POCT GLUCOSE    Collection Time: 12/08/23 11:14 AM   Result Value Ref Range    POCT Glucose 109 (H) 74 - 99 mg/dL   POCT GLUCOSE    Collection Time: 12/08/23  4:22 PM   Result Value Ref Range    POCT Glucose 113 (H) 74 - 99 mg/dL        CT abdomen pelvis wo IV contrast  Narrative: Interpreted By:  Sofie Goldstein,   STUDY:  CT ABDOMEN PELVIS WO IV CONTRAST;  12/7/2023 8:53 pm      INDICATION:  Right and left lower quadrant tenderness/bloating.      COMPARISON:  CT abdomen 01/07/2022      ACCESSION NUMBER(S):  [id number]      ORDERING CLINICIAN:  CHATA OCONNOR      TECHNIQUE:  Axial CT of the abdomen and pelvis was performed without intravenous  contrast with coronal and sagittal reconstructions.      FINDINGS:  LOWER CHEST:  No focal consolidation or pleural effusion.      ABDOMEN:      LIVER:  No mass.      BILE DUCTS:  No intrahepatic or extrahepatic bile duct  dilation.      GALLBLADDER:  No cholelithiasis or gallbladder wall thickening.      PANCREAS:  No mass or duct dilation.      SPLEEN:  No mass.No splenomegaly.      ADRENAL GLANDS:  Normal.      KIDNEYS AND URETERS:  Right upper pole cyst. Nonobstructing bilateral midpole calculi  without hydronephrosis. Redemonstrated nonspecific perinephric edema.      PELVIS:      BLADDER:  No wall thickening or calculus.      REPRODUCTIVE ORGANS:  Enlarged prostate, 4.4 cm in the transverse dimension.      BOWEL:  Small hiatal hernia. No bowel wall thickening or dilation. The  appendix is normal in caliber with trace adjacent free fluid.      VESSELS:  Severe aorto bi-iliac atherosclerotic calcifications without  aneurysmal dilation.      PERITONEUM/RETROPERITONEUM/LYMPH NODES:  Trace pelvic free fluid. No free air or fluid collection. No  abdominopelvic lymphadenopathy.      ABDOMINAL WALL AND BONES:  Bilateral fat containing inguinal hernias, moderate on the right and  small on the left with trace free fluid in the hernia sacs  bilaterally.L3-L5 posterior decompression and instrumented fusion.  Multilevel degenerative disc disease, severe L5-S1.      Impression: Bilateral fat containing inguinal hernias, moderate on the right and  small on the left, with trace fluid within the hernia sacs. Early  inflammatory change with evidence of strangulation cannot be excluded.      Normal caliber appendix with trace adjacent free fluid. Possibility  of developing acute appendicitis is less likely but in the  differential.      No bowel obstruction.      MACRO:  Sofie Goldstein discussed the significance and urgency of this  critical finding by telephone with  CHATA OCONNOR on 12/8/2023 at  8:18 am.  (**-RCF-**) Findings:  See findings.      Signed by: Sofie Goldstein 12/8/2023 8:20 AM  Dictation workstation:   [id number]       Vital signs in last 24 hours:  Temp:  [36.2 °C (97.2 °F)-36.6 °C (97.9 °F)] 36.4 °C (97.6 °F)  Heart Rate:   [59-86] 63  Resp:  [18-20] 20  BP: (105-167)/(65-94) 167/94    Physical Exam  General: No distress  Neck: Supple.  HEENT: Normocephalic atraumatic pupils equal  Heart: S1-S2 heard no murmurs gallops regurgitation  Lungs: Clear to auscultation bilaterally no wheezing rales rhonchi  Abdomen: Nondistended nontender bowel sounds are present  Neuro: No focal deficits  Assessment/Plan   Acute metabolic encephalopathy.  Generalized weakness/debility  Rule out urinary tract infection.  Acute on chronic kidney disease stage III.  Anxiety/depression.  Thrombocytopenia-no signs of bleeding, platelets close to baseline.  Intractable abdominal pain.  2 episodes of diarrhea.    History of:  Hypertension  Hyperlipidemia  Coronary artery disease  Heart failure with reduced ejection fraction.  Chronic kidney disease stage III.  Atrial fibrillation on Eliquis.  Gastroesophageal reflux disease  BPH.    Plan:  Patient mental status is significantly better.  Answering my questions appropriately, as per family mental status is back to baseline.  As mentioned above urinary tract infection ruled out-discontinued antibiotics  Continuing IV fluids, creatinine improving close to baseline-discontinued IV fluids  Patient is tolerating p.o. diet well.  Heart rate is well-controlled, with Coreg and anticoagulation with Eliquis.  For depression continuing Effexor.  CT abdomen results reviewed as there is a haziness around the appendix and and inguinal hernia, general surgery was consulted evaluated the patient, no requirement of surgical intervention at this point, ruled out appendicitis.  Continue symptomatic management  Stool pathogen PCR [id number] on the results  Repeat PT OT evaluation mobility score improved to 18    DVT prophylaxis:  Patient is on Eliquis    Disposition:  Stool pathogen PCR panel.  Possible discharge home tomorrow depending on results       LOS: 3 days

## 2023-12-08 NOTE — CONSULTS
Viral Upper Respiratory Illness (Adult)  You have a viral upper respiratory illness (URI), which is another term for the common cold. This illness is contagious during the first few days. It is spread through the air by coughing and sneezing. It may also be spread by direct contact (touching the sick person and then touching your own eyes, nose, or mouth). Frequent handwashing will decrease risk of spread. Most viral illnesses go away within 7 to 10 days with rest and simple home remedies. Sometimes the illness may last for several weeks. Antibiotics will not kill a virus, and they are generally not prescribed for this condition.    Home care  · If symptoms are severe, rest at home for the first 2 to 3 days. When you resume activity, don't let yourself get too tired.  · Avoid being exposed to cigarette smoke (yours or others’).  · You may use acetaminophen or ibuprofen to control pain and fever, unless another medicine was prescribed. (Note: If you have chronic liver or kidney disease, have ever had a stomach ulcer or gastrointestinal bleeding, or are taking blood-thinning medicines, talk with your healthcare provider before using these medicines.) Aspirin should never be given to anyone under 18 years of age who is ill with a viral infection or fever. It may cause severe liver or brain damage.  · Your appetite may be poor, so a light diet is fine. Avoid dehydration by drinking 6 to 8 glasses of fluids per day (water, soft drinks, juices, tea, or soup). Extra fluids will help loosen secretions in the nose and lungs.  · Over-the-counter cold medicines will not shorten the length of time you’re sick, but they may be helpful for the following symptoms: cough, sore throat, and nasal and sinus congestion. (Note: Do not use decongestants if you have high blood pressure.)  Follow-up care  Follow up with your healthcare provider, or as advised.  When to seek medical advice  Call your healthcare provider right away if any  GENERAL SURGERY CONSULTATION NOTE    Jony Javier   1945   [id number]     Inpatient consult to Acute Care Surgery  Consult performed by: Valery Bae MD  Consult ordered by: Patrice MATHUR MD        Reason For Consult  Abdominal pain    History Of Present Illness  Jony Javier is a 78 y.o. male presented to the ED 12/4/23 as an HIA after he fell and hit his head. His workup in the ED was significant for a UTI. Due to an increased number of falls since Thanksgiving, he was admitted for treatment. PT recommended SNF. Surgery was consulted today due to a 1.5 year history of R lateral/lower abdominal pain. CT was obtained yesterday that showed bilateral fat-containing inguinal hernias and some haziness near the appendix. The patient's pain is chronic, he feels bloated, and has experienced at least one episode of blood in his stool in the last few weeks. His last colonoscopy was in the last few years. He has been tolerating a diet and having normal bowel movements, although it has been mostly diarrhea the last few days while admitted.     His wife was present with him in the room.     Past Medical History  He has a past medical history of Acute on chronic systolic (congestive) heart failure (CMS/HCC) (09/08/2022), Nonrheumatic aortic (valve) stenosis (01/10/2022), Nonrheumatic mitral (valve) insufficiency (09/14/2021), Nonrheumatic mitral (valve) insufficiency (12/07/2022), Other long term (current) drug therapy, Personal history of diseases of the blood and blood-forming organs and certain disorders involving the immune mechanism, Personal history of other diseases of the circulatory system (09/20/2022), Personal history of other diseases of the circulatory system, Personal history of other diseases of the circulatory system, Personal history of other diseases of the musculoskeletal system and connective tissue, and Unspecified intracranial injury with loss of consciousness status unknown, initial  encounter (CMS/[facility]) (09/20/2022).    Surgical History  He has a past surgical history that includes Other surgical history (01/08/2020); Other surgical history (01/08/2020); Other surgical history (01/08/2020); Other surgical history (12/06/2021); Other surgical history (12/06/2021); and Other surgical history (01/30/2020).    Medications  No current facility-administered medications on file prior to encounter.     Current Outpatient Medications on File Prior to Encounter   Medication Sig Dispense Refill    albuterol 90 mcg/actuation inhaler INHALE 1 (ONE) PUFF EVERY 4 HOURS AS NEEDED 8.5 g 8    atorvastatin (Lipitor) 80 mg tablet Take 1 tablet (80 mg) by mouth once daily at bedtime.      baclofen (Lioresal) 10 mg tablet Take 1 tablet (10 mg) by mouth 2 times a day as needed.      blood sugar diagnostic (True Metrix Glucose Test Strip) strip 1 each once daily. 50 strip 11    blood sugar diagnostic (True Metrix Glucose Test Strip) strip USE 1 STRIP 3 times daily      bumetanide (Bumex) 1 mg tablet Take 0.5 tablets (0.5 mg) by mouth 2 times a week. May take additional dose for weight gain 3#, increased swelling or shortness of breath. 30 tablet 1    carvedilol (Coreg) 25 mg tablet Take 1 tablet (25 mg) by mouth 2 times a day.      diazePAM (Valium) 5 mg tablet Take 1 tablet (5 mg) by mouth 3 times a day as needed for anxiety. 90 tablet 0    doxycycline (Vibra-Tabs) 100 mg tablet Take 1 tablet (100 mg) by mouth 2 times a day. Take with a full glass of water and do not lie down for at least 30 minutes after. 14 tablet 0    Eliquis 5 mg tablet Take 1 tablet (5 mg) by mouth 2 times a day.      Entresto 49-51 mg tablet Take 1 tablet by mouth 2 times a day.      EPINEPHrine 0.3 mg/0.3 mL injection syringe Inject 0.3 mL (0.3 mg) as directed if needed for anaphylaxis. 2 each 3    ezetimibe (Zetia) 10 mg tablet Take 1 tablet (10 mg) by mouth once daily.      ferrous gluconate 270 mg (27 mg iron) tablet Take 1 tablet by mouth  of these occur:  · Cough with lots of colored sputum (mucus)  · Severe headache; face, neck, or ear pain  · Difficulty swallowing due to throat pain  · Fever of 100.4°F (38°C)  Call 911, or get immediate medical care  Call emergency services right away if any of these occur:  · Chest pain, shortness of breath, wheezing, or difficulty breathing  · Coughing up blood  · Inability to swallow due to throat pain  © 6112-9950 [employer]. [address] [place] [address], [place] [address]. All rights reserved. This information is not intended as a substitute for professional medical care. Always follow your healthcare professional's instructions.         once daily.      Jardiance 10 mg Take 1 tablet (10 mg) by mouth once daily.      lidocaine (Xylocaine) 5 % ointment APPLY TO THE AFFECTED AREA(S) AS NEEDED FOR PAIN to last 30 days      mirtazapine (Remeron) 30 mg tablet Take 1 tablet (30 mg) by mouth once daily at bedtime.      multivitamin with folic acid (One Daily Multivitamin) 400 mcg tablet Take 1 tablet by mouth once daily.      OneTouch Delica Plus Lancet 33 gauge misc Test THREE TIMES DAILY AS DIRECTED      oxyCODONE-acetaminophen (Percocet) 7.5-325 mg tablet Take 1 tablet by mouth 3 times daily as needed for Pain for up to 30 days. Max Daily Amount 3 tablets      spironolactone (Aldactone) 25 mg tablet Take 0.5 tablets (12.5 mg) by mouth once daily. 45 tablet 1    tadalafil 20 mg tablet Take 1 tablet (20 mg) by mouth once daily as needed for erectile dysfunction. 90 tablet 4    tamsulosin (Flomax) 0.4 mg 24 hr capsule Take 1 capsule (0.4 mg) by mouth once daily in the morning.      testosterone (Axiron) 30 mg/actuation (1.5 mL) topical solution Place 2 Pump on the skin once daily.      triamcinolone (Kenalog) 0.1 % lotion APPLY 2-3 TIMES DAILY TO AFFECTED AREA(S) AS NEEDED 60 mL 3    venlafaxine XR (Effexor-XR) 150 mg 24 hr capsule TAKE 1 CAPSULE BY MOUTH AFTER BREAKFAST      [DISCONTINUED] allopurinol (Zyloprim) 100 mg tablet Take 1 tablet (100 mg) by mouth once daily.      [DISCONTINUED] aspirin 81 mg EC tablet Take 1 tablet (81 mg) by mouth once daily.      [DISCONTINUED] clobetasol (Temovate) 0.05 % external solution APPLY thin layer to psoriasis of scalp once a day as needed for itching or redness.      [DISCONTINUED] diclofenac sodium 1 % kit Apply topically 4 times a day as needed.      [DISCONTINUED] docusate sodium (Colace) 100 mg capsule Take by mouth once daily as needed.      [DISCONTINUED] erythromycin (Romycin) 5 mg/gram (0.5 %) ophthalmic ointment Apply a small amount Both Eyes every evening      [DISCONTINUED] febuxostat (Uloric) 40 mg  tablet Take 1 tablet (40 mg) by mouth once daily. as directed      [DISCONTINUED] ferrous sulfate 325 (65 Fe) MG tablet Take 1 tablet by mouth if needed.      [DISCONTINUED] fluoride, sodium, (SF 5000 Plus) 1.1 % dental cream Apply to teeth.      [DISCONTINUED] fluticasone (Flonase) 50 mcg/actuation nasal spray Administer into affected nostril(s).      [DISCONTINUED] lactulose 20 gram/30 mL oral solution Take 15 mL (10 g) by mouth once daily.      [DISCONTINUED] lidocaine (Lidoderm) 5 % patch Place 1 patch onto the skin daily 12 hours on, 12 hours off.      [DISCONTINUED] pantoprazole (ProtoNix) 40 mg EC tablet Take 1 tablet (40 mg) by mouth once daily.      [DISCONTINUED] spirometer,drug delivery adapt device USE AS DIRECTED. use 4-6 times daily as needed         Allergies  Bee venom protein (honey bee), Cefaclor, Pentazocine, Pentazocine-acetaminophen, Pregabalin, Allopurinol, Ciprofloxacin, Sulfamethoxazole-trimethoprim, Metoprolol, and Statins-hmg-coa reductase inhibitors     Social History  He reports that he has quit smoking. His smoking use included cigarettes. He has never used smokeless tobacco. He reports current alcohol use of about 14.0 standard drinks of alcohol per week. He reports that he does not use drugs.    Family History  Family History   Problem Relation Name Age of Onset    No Known Problems Mother      No Known Problems Father      Other (malignant neoplsm) Other      Hypertension Other      Other (cardiac disorder) Other          Review of Systems   Constitutional:  Negative for chills and fever.   Respiratory:  Negative for cough and shortness of breath.    Cardiovascular:  Negative for chest pain and palpitations.   Gastrointestinal:  Positive for abdominal pain, blood in stool and diarrhea. Negative for constipation, nausea and vomiting.   Neurological:  Negative for dizziness and headaches.   All other systems reviewed and are negative.      Last Recorded Vitals  Blood pressure (!)  "167/94, pulse 63, temperature 36.4 °C (97.6 °F), temperature source Temporal, resp. rate 20, height 1.651 m (5' 5\"), weight 73.5 kg (162 lb), SpO2 96 %.     Physical Exam  Constitutional:       General: He is not in acute distress.     Appearance: Normal appearance. He is not ill-appearing.   HENT:      Head: Normocephalic and atraumatic.   Cardiovascular:      Rate and Rhythm: Normal rate and regular rhythm.   Pulmonary:      Effort: Pulmonary effort is normal. No respiratory distress.      Breath sounds: Normal breath sounds.   Abdominal:      General: There is distension.      Palpations: Abdomen is soft.      Tenderness: There is abdominal tenderness. There is no guarding.      Comments: Diffuse mild distention and tympany. Mildly tender in the right lateral abdomen without guarding   Musculoskeletal:         General: No swelling.   Skin:     General: Skin is warm and dry.   Neurological:      Mental Status: He is alert and oriented to person, place, and time. Mental status is at baseline.   Psychiatric:         Mood and Affect: Mood normal.         Behavior: Behavior normal.          Labs  Results for orders placed or performed during the hospital encounter of 12/04/23 (from the past 24 hour(s))   POCT GLUCOSE   Result Value Ref Range    POCT Glucose 150 (H) 74 - 99 mg/dL   POCT GLUCOSE   Result Value Ref Range    POCT Glucose 116 (H) 74 - 99 mg/dL   Basic Metabolic Panel   Result Value Ref Range    Glucose 83 74 - 99 mg/dL    Sodium 144 136 - 145 mmol/L    Potassium 4.0 3.5 - 5.3 mmol/L    Chloride 110 (H) 98 - 107 mmol/L    Bicarbonate 26 21 - 32 mmol/L    Anion Gap 12 10 - 20 mmol/L    Urea Nitrogen 23 6 - 23 mg/dL    Creatinine 1.45 (H) 0.50 - 1.30 mg/dL    eGFR 49 (L) >60 mL/min/1.73m*2    Calcium 9.0 8.6 - 10.3 mg/dL   CBC   Result Value Ref Range    WBC 9.0 4.4 - 11.3 x10*3/uL    nRBC 0.0 0.0 - 0.0 /100 WBCs    RBC 3.62 (L) 4.50 - 5.90 x10*6/uL    Hemoglobin 12.4 (L) 13.5 - 17.5 g/dL    Hematocrit 39.5 " (L) 41.0 - 52.0 %     (H) 80 - 100 fL    MCH 34.3 (H) 26.0 - 34.0 pg    MCHC 31.4 (L) 32.0 - 36.0 g/dL    RDW 15.6 (H) 11.5 - 14.5 %    Platelets 123 (L) 150 - 450 x10*3/uL   POCT GLUCOSE   Result Value Ref Range    POCT Glucose 94 74 - 99 mg/dL   POCT GLUCOSE   Result Value Ref Range    POCT Glucose 109 (H) 74 - 99 mg/dL       Radiology  CT abdomen pelvis wo IV contrast    Bilateral fat containing inguinal hernias, moderate on the right and small on the left, with trace fluid within the hernia sacs. Early inflammatory change with evidence of strangulation cannot be excluded.   Normal caliber appendix with trace adjacent free fluid. Possibility of developing acute appendicitis is less likely but in the differential.   No bowel obstruction.   MACRO: Sofie Goldstein discussed the significance and urgency of this critical finding by telephone with  CHATA OCONNOR on 12/8/2023 at 8:18 am.  (**-RCF-**) Findings:  See findings.   Signed by: Sofie Goldstein 12/8/2023 8:20 AM Dictation workstation:   [id number]    Assessment and Plan  Principal Problem:    Urinary tract infection without hematuria, site unspecified  Active Problems:    CAD (coronary artery disease)    GERD (gastroesophageal reflux disease)    Right foot injury, initial encounter    Right shoulder pain    HFrEF (heart failure with reduced ejection fraction) (CMS/HCC)    CASSIDY (acute kidney injury) (CMS/HCC)    Stage 3b chronic kidney disease (CMS/HCC)    Thrombocytopenia (CMS/HCC)    Physical deconditioning    Ambulatory dysfunction    Altered mental state    Encephalopathy    78 y.o. male admitted following a fall on Eliquis who has had chronic mild abdominal pain on his right side for over a year. I discussed that the inguinal hernias are currently asymptomatic and do not need to be addressed at this time. The haziness around the appendix is also present in the retroperitoneum - this may be a result of his UTI. I do not think he has a clinically  significant appendicitis. I asked him to follow up with GI to discuss his bloating, blood in the stool, and they may consider endoscopic evaluation. No further surgical recommendations/needs at this time. We will sign off - please page with questions.    Valery Bae MD, FACS  General Surgery

## 2023-12-08 NOTE — CARE PLAN
The patient's goals for the shift include      The clinical goals for the shift include Patient's oxygeen saturation wwill remain 92% or above throughout shift

## 2023-12-09 ENCOUNTER — HOME HEALTH ADMISSION (OUTPATIENT)
Dept: HOME HEALTH SERVICES | Facility: HOME HEALTH | Age: 78
End: 2023-12-09
Payer: MEDICARE

## 2023-12-09 VITALS
WEIGHT: 162 LBS | TEMPERATURE: 97.8 F | HEART RATE: 73 BPM | BODY MASS INDEX: 26.99 KG/M2 | DIASTOLIC BLOOD PRESSURE: 80 MMHG | SYSTOLIC BLOOD PRESSURE: 146 MMHG | HEIGHT: 65 IN | OXYGEN SATURATION: 94 % | RESPIRATION RATE: 18 BRPM

## 2023-12-09 PROBLEM — M25.511 RIGHT SHOULDER PAIN: Status: RESOLVED | Noted: 2023-05-05 | Resolved: 2023-12-09

## 2023-12-09 PROBLEM — N39.0 URINARY TRACT INFECTION WITHOUT HEMATURIA, SITE UNSPECIFIED: Status: RESOLVED | Noted: 2023-12-04 | Resolved: 2023-12-09

## 2023-12-09 PROBLEM — G93.40 ENCEPHALOPATHY: Status: RESOLVED | Noted: 2023-12-05 | Resolved: 2023-12-09

## 2023-12-09 PROBLEM — R26.2 AMBULATORY DYSFUNCTION: Status: RESOLVED | Noted: 2023-12-05 | Resolved: 2023-12-09

## 2023-12-09 PROBLEM — R41.82 ALTERED MENTAL STATE: Status: RESOLVED | Noted: 2023-12-05 | Resolved: 2023-12-09

## 2023-12-09 PROBLEM — S99.921A RIGHT FOOT INJURY, INITIAL ENCOUNTER: Status: RESOLVED | Noted: 2023-05-05 | Resolved: 2023-12-09

## 2023-12-09 PROBLEM — R53.81 PHYSICAL DECONDITIONING: Status: RESOLVED | Noted: 2023-12-05 | Resolved: 2023-12-09

## 2023-12-09 PROBLEM — N17.9 AKI (ACUTE KIDNEY INJURY) (CMS-HCC): Status: RESOLVED | Noted: 2023-12-05 | Resolved: 2023-12-09

## 2023-12-09 LAB
ANION GAP SERPL CALC-SCNC: 11 MMOL/L (ref 10–20)
BUN SERPL-MCNC: 22 MG/DL (ref 6–23)
C COLI+JEJ+UPSA DNA STL QL NAA+PROBE: NOT DETECTED
CALCIUM SERPL-MCNC: 9.2 MG/DL (ref 8.6–10.3)
CHLORIDE SERPL-SCNC: 107 MMOL/L (ref 98–107)
CO2 SERPL-SCNC: 28 MMOL/L (ref 21–32)
CREAT SERPL-MCNC: 1.48 MG/DL (ref 0.5–1.3)
EC STX1 GENE STL QL NAA+PROBE: NOT DETECTED
EC STX2 GENE STL QL NAA+PROBE: NOT DETECTED
ERYTHROCYTE [DISTWIDTH] IN BLOOD BY AUTOMATED COUNT: 15.4 % (ref 11.5–14.5)
GFR SERPL CREATININE-BSD FRML MDRD: 48 ML/MIN/1.73M*2
GLUCOSE BLD MANUAL STRIP-MCNC: 121 MG/DL (ref 74–99)
GLUCOSE BLD MANUAL STRIP-MCNC: 150 MG/DL (ref 74–99)
GLUCOSE BLD MANUAL STRIP-MCNC: 76 MG/DL (ref 74–99)
GLUCOSE SERPL-MCNC: 81 MG/DL (ref 74–99)
HCT VFR BLD AUTO: 41 % (ref 41–52)
HGB BLD-MCNC: 13.2 G/DL (ref 13.5–17.5)
MCH RBC QN AUTO: 34.3 PG (ref 26–34)
MCHC RBC AUTO-ENTMCNC: 32.2 G/DL (ref 32–36)
MCV RBC AUTO: 107 FL (ref 80–100)
NOROVIRUS GI + GII RNA STL NAA+PROBE: NOT DETECTED
NRBC BLD-RTO: 0 /100 WBCS (ref 0–0)
PLATELET # BLD AUTO: 120 X10*3/UL (ref 150–450)
POTASSIUM SERPL-SCNC: 3.8 MMOL/L (ref 3.5–5.3)
RBC # BLD AUTO: 3.85 X10*6/UL (ref 4.5–5.9)
RV RNA STL NAA+PROBE: NOT DETECTED
SALMONELLA DNA STL QL NAA+PROBE: NOT DETECTED
SHIGELLA DNA SPEC QL NAA+PROBE: NOT DETECTED
SODIUM SERPL-SCNC: 142 MMOL/L (ref 136–145)
V CHOLERAE DNA STL QL NAA+PROBE: NOT DETECTED
WBC # BLD AUTO: 6.8 X10*3/UL (ref 4.4–11.3)
Y ENTEROCOL DNA STL QL NAA+PROBE: NOT DETECTED

## 2023-12-09 PROCEDURE — 2500000002 HC RX 250 W HCPCS SELF ADMINISTERED DRUGS (ALT 637 FOR MEDICARE OP, ALT 636 FOR OP/ED): Performed by: INTERNAL MEDICINE

## 2023-12-09 PROCEDURE — 36415 COLL VENOUS BLD VENIPUNCTURE: CPT | Performed by: INTERNAL MEDICINE

## 2023-12-09 PROCEDURE — 2500000001 HC RX 250 WO HCPCS SELF ADMINISTERED DRUGS (ALT 637 FOR MEDICARE OP): Performed by: INTERNAL MEDICINE

## 2023-12-09 PROCEDURE — 82947 ASSAY GLUCOSE BLOOD QUANT: CPT

## 2023-12-09 PROCEDURE — 2500000004 HC RX 250 GENERAL PHARMACY W/ HCPCS (ALT 636 FOR OP/ED): Performed by: INTERNAL MEDICINE

## 2023-12-09 PROCEDURE — 80048 BASIC METABOLIC PNL TOTAL CA: CPT | Performed by: INTERNAL MEDICINE

## 2023-12-09 PROCEDURE — 94660 CPAP INITIATION&MGMT: CPT

## 2023-12-09 PROCEDURE — 85027 COMPLETE CBC AUTOMATED: CPT | Performed by: INTERNAL MEDICINE

## 2023-12-09 PROCEDURE — 99239 HOSP IP/OBS DSCHRG MGMT >30: CPT | Performed by: INTERNAL MEDICINE

## 2023-12-09 RX ADMIN — SPIRONOLACTONE 12.5 MG: 25 TABLET ORAL at 08:27

## 2023-12-09 RX ADMIN — APIXABAN 5 MG: 5 TABLET, FILM COATED ORAL at 08:27

## 2023-12-09 RX ADMIN — EMPAGLIFLOZIN 10 MG: 10 TABLET, FILM COATED ORAL at 08:27

## 2023-12-09 RX ADMIN — CARVEDILOL 25 MG: 25 TABLET, FILM COATED ORAL at 08:27

## 2023-12-09 RX ADMIN — FERROUS GLUCONATE 324 MG: 324 TABLET ORAL at 08:27

## 2023-12-09 RX ADMIN — EZETIMIBE 10 MG: 10 TABLET ORAL at 08:27

## 2023-12-09 RX ADMIN — TAMSULOSIN HYDROCHLORIDE 0.4 MG: 0.4 CAPSULE ORAL at 08:27

## 2023-12-09 RX ADMIN — ASPIRIN 81 MG: 81 TABLET, COATED ORAL at 08:27

## 2023-12-09 RX ADMIN — VENLAFAXINE HYDROCHLORIDE 150 MG: 150 CAPSULE, EXTENDED RELEASE ORAL at 08:27

## 2023-12-09 RX ADMIN — SACUBITRIL AND VALSARTAN 1 TABLET: 49; 51 TABLET, FILM COATED ORAL at 08:27

## 2023-12-09 RX ADMIN — PANTOPRAZOLE SODIUM 40 MG: 40 TABLET, DELAYED RELEASE ORAL at 08:27

## 2023-12-09 RX ADMIN — OXYCODONE HYDROCHLORIDE AND ACETAMINOPHEN 1 TABLET: 5; 325 TABLET ORAL at 08:30

## 2023-12-09 ASSESSMENT — COGNITIVE AND FUNCTIONAL STATUS - GENERAL
MOBILITY SCORE: 19
PERSONAL GROOMING: A LITTLE
TOILETING: A LITTLE
MOVING TO AND FROM BED TO CHAIR: A LITTLE
DAILY ACTIVITIY SCORE: 19
DRESSING REGULAR UPPER BODY CLOTHING: A LITTLE
WALKING IN HOSPITAL ROOM: A LITTLE
STANDING UP FROM CHAIR USING ARMS: A LITTLE
CLIMB 3 TO 5 STEPS WITH RAILING: A LOT
HELP NEEDED FOR BATHING: A LITTLE
DRESSING REGULAR LOWER BODY CLOTHING: A LITTLE

## 2023-12-09 ASSESSMENT — PAIN - FUNCTIONAL ASSESSMENT: PAIN_FUNCTIONAL_ASSESSMENT: 0-10

## 2023-12-09 ASSESSMENT — PAIN DESCRIPTION - LOCATION: LOCATION: ABDOMEN

## 2023-12-09 ASSESSMENT — PAIN SCALES - GENERAL
PAINLEVEL_OUTOF10: 3
PAINLEVEL_OUTOF10: 7

## 2023-12-09 ASSESSMENT — PAIN DESCRIPTION - ORIENTATION: ORIENTATION: RIGHT

## 2023-12-09 NOTE — NURSING NOTE
Patient being discharged home today. Discharge instructions given to patient, verbalized understanding. IV d/c'd, insyte intact. Patient discharged in satisfactory condition.

## 2023-12-09 NOTE — DISCHARGE SUMMARY
Discharge Diagnosis  Acute metabolic encephalopathy.  Generalized weakness/debility  Rule out urinary tract infection.  Acute on chronic kidney disease stage III.  Anxiety/depression.  Thrombocytopenia-no signs of bleeding, platelets close to baseline.  Intractable abdominal pain- chronic  2 episodes of diarrhea-resolved     History of:  Hypertension  Hyperlipidemia  Coronary artery disease  Heart failure with reduced ejection fraction.  Chronic kidney disease stage III.  Atrial fibrillation on Eliquis.  Gastroesophageal reflux disease  BPH.      Hospital Course  Patient mental status is significantly better.  Answering my questions appropriately, as per family mental status is back to baseline.  As mentioned above urinary tract infection ruled out-discontinued antibiotics  Creatinine is back to baseline, discontinued IV fluids.  Patient is tolerating p.o. diet well.  Heart rate is well-controlled, with Coreg and anticoagulation with Eliquis.  For depression continuing Effexor.  CT abdomen results reviewed as there is a haziness around the appendix and and inguinal hernia, general surgery was consulted evaluated the patient, no requirement of surgical intervention at this point, ruled out appendicitis.  Continue symptomatic management  Stool pathogen PCR panel-negative.  Repeat PT OT evaluation mobility score improved to 18   Called patient's wife, updated about patient's current condition.  He needs to follow-up with gastroenterology as an outpatient.  Discharging patient home in stable condition.    Pertinent Physical Exam At Time of Discharge  Physical Exam  General: No distress  Neck: Supple.  HEENT: Normocephalic atraumatic pupils equal  Heart: S1-S2 heard no murmurs gallops regurgitation  Lungs: Clear to auscultation bilaterally no wheezing rales rhonchi  Abdomen: Nondistended nontender bowel sounds are present  Neuro: No focal deficits  Home Medications     Medication List      CHANGE how you take these  medications     True Metrix Glucose Test Strip strip; Generic drug: blood sugar   diagnostic; What changed: Another medication with the same name was   removed. Continue taking this medication, and follow the directions you   see here.     CONTINUE taking these medications     albuterol 90 mcg/actuation inhaler; INHALE 1 (ONE) PUFF EVERY 4 HOURS AS   NEEDED   atorvastatin 80 mg tablet; Commonly known as: Lipitor   baclofen 10 mg tablet; Commonly known as: Lioresal   bumetanide 1 mg tablet; Commonly known as: Bumex; Take 0.5 tablets (0.5   mg) by mouth 2 times a week. May take additional dose for weight gain 3#,   increased swelling or shortness of breath.   carvedilol 25 mg tablet; Commonly known as: Coreg   Eliquis 5 mg tablet; Generic drug: apixaban   Entresto 49-51 mg tablet; Generic drug: sacubitriL-valsartan   EPINEPHrine 0.3 mg/0.3 mL injection syringe; Commonly known as: Epipen;   Inject 0.3 mL (0.3 mg) as directed if needed for anaphylaxis.   ezetimibe 10 mg tablet; Commonly known as: Zetia   ferrous gluconate 270 mg (27 mg iron) tablet   Jardiance 10 mg; Generic drug: empagliflozin   lidocaine 5 % ointment; Commonly known as: Xylocaine   mirtazapine 30 mg tablet; Commonly known as: Remeron   One Daily Multivitamin tablet; Generic drug: multivitamin   OneTouch Delica Plus Lancet 33 gauge misc; Generic drug: lancets   oxyCODONE-acetaminophen 7.5-325 mg tablet; Commonly known as: Percocet   pantoprazole 40 mg EC tablet; Commonly known as: ProtoNix; TAKE 1 TABLET   BY MOUTH EVERY DAY   spironolactone 25 mg tablet; Commonly known as: Aldactone; Take 0.5   tablets (12.5 mg) by mouth once daily.   tadalafil 20 mg tablet; Commonly known as: Cialis; Take 1 tablet (20 mg)   by mouth once daily as needed for erectile dysfunction.   tamsulosin 0.4 mg 24 hr capsule; Commonly known as: Flomax   testosterone 30 mg/actuation (1.5 mL) topical solution; Commonly known   as: Axiron   venlafaxine  mg 24 hr capsule;  Commonly known as: Effexor-XR     STOP taking these medications     diazePAM 5 mg tablet; Commonly known as: Valium   doxycycline 100 mg tablet; Commonly known as: Vibra-Tabs   triamcinolone 0.1 % lotion; Commonly known as: Kenalog       Outpatient Follow-Up  Future Appointments   Date Time Provider Department [place]   12/19/2023  2:00 PM Narendra Portillo MD [id number] [facility]   1/9/2024  2:00 PM HEART FAILURE CLINIC POR  CARD1 [id number] [facility]   2/7/2024  2:30 PM Edgardo Gandara MD [id number] [facility]       Patrice Sam MD

## 2023-12-11 ENCOUNTER — PATIENT OUTREACH (OUTPATIENT)
Dept: CARE COORDINATION | Facility: CLINIC | Age: 78
End: 2023-12-11
Payer: COMMERCIAL

## 2023-12-11 ENCOUNTER — APPOINTMENT (OUTPATIENT)
Dept: HOME HEALTH SERVICES | Facility: HOME HEALTH | Age: 78
End: 2023-12-11
Payer: COMMERCIAL

## 2023-12-11 ENCOUNTER — HOME CARE VISIT (OUTPATIENT)
Dept: HOME HEALTH SERVICES | Facility: HOME HEALTH | Age: 78
End: 2023-12-11

## 2023-12-11 NOTE — PROGRESS NOTES
Discharge Facility:[facility]  Discharge Diagnosis:Acute notable encephalopathy,UTI  Admission Date:12/05/23  Discharge Date: 12/09/23    PCP Appointment Date:none available sent to pcp ofice  Specialist Appointment Date:   Hospital Encounter and Summary: Linked   See discharge assessment below for further details    Engagement  Call Start Time: 0121 (12/11/2023  1:21 PM)    Medications  Medications reviewed with patient/caregiver?: Yes (12/11/2023  1:21 PM)  Is the patient having any side effects they believe may be caused by any medication additions or changes?: No (12/11/2023  1:21 PM)  Does the patient have all medications ordered at discharge?: Not applicable (12/11/2023  1:21 PM)  Is the patient taking all medications as directed (includes completed medication regime)?: Yes (12/11/2023  1:21 PM)    Appointments  Does the patient have a primary care provider?: Yes (12/11/2023  1:21 PM)  Care Management Interventions: Advised patient to make appointment (12/11/2023  1:21 PM)    Self Management  Has home health visited the patient within 72 hours of discharge?: Not applicable (12/11/2023  1:21 PM)  Has all Durable Medical Equipment (DME) been delivered?: No (12/11/2023  1:21 PM)    Patient Teaching  Does the patient have access to their discharge instructions?: Yes (12/11/2023  1:21 PM)  Care Management Interventions: Reviewed instructions with patient (12/11/2023  1:21 PM)  What is the patient's perception of their health status since discharge?: Improving (12/11/2023  1:21 PM)    Wrap Up  Call End Time: 0130 (12/11/2023  1:21 PM)

## 2023-12-11 NOTE — CASE COMMUNICATION
Call to patient's home to schedule PT admit. Spoke with wife Sally. She declined my visit. Too much going on in the home around the holiday as well as a new puppy. Refused my visit. She has my contact info.

## 2023-12-19 ENCOUNTER — APPOINTMENT (OUTPATIENT)
Dept: CARDIOLOGY | Facility: CLINIC | Age: 78
End: 2023-12-19
Payer: COMMERCIAL

## 2023-12-22 ENCOUNTER — PATIENT OUTREACH (OUTPATIENT)
Dept: CARE COORDINATION | Facility: CLINIC | Age: 78
End: 2023-12-22

## 2023-12-22 NOTE — PROGRESS NOTES
Unable to reach patient for call back after patient's follow up appointment with PCP.   SANDIPM with call back number for patient to call if needed   If no voicemail available call attempts x 2 were made to contact the patient to assist with any questions or concerns patient may have.

## 2023-12-28 ENCOUNTER — OFFICE VISIT (OUTPATIENT)
Dept: PAIN MANAGEMENT | Age: 78
End: 2023-12-28
Payer: MEDICARE

## 2023-12-28 VITALS
HEART RATE: 74 BPM | SYSTOLIC BLOOD PRESSURE: 129 MMHG | HEIGHT: 64 IN | TEMPERATURE: 97.2 F | WEIGHT: 152 LBS | DIASTOLIC BLOOD PRESSURE: 73 MMHG | OXYGEN SATURATION: 95 % | BODY MASS INDEX: 25.95 KG/M2 | RESPIRATION RATE: 16 BRPM

## 2023-12-28 DIAGNOSIS — G89.29 CHRONIC FLANK PAIN: ICD-10-CM

## 2023-12-28 DIAGNOSIS — G89.4 CHRONIC PAIN SYNDROME: Primary | ICD-10-CM

## 2023-12-28 DIAGNOSIS — M51.26 DISPLACEMENT OF LUMBAR INTERVERTEBRAL DISC WITHOUT MYELOPATHY: ICD-10-CM

## 2023-12-28 DIAGNOSIS — S33.5XXD LUMBAR SPRAIN, SUBSEQUENT ENCOUNTER: ICD-10-CM

## 2023-12-28 DIAGNOSIS — R10.9 CHRONIC FLANK PAIN: ICD-10-CM

## 2023-12-28 DIAGNOSIS — G89.4 CHRONIC PAIN SYNDROME: ICD-10-CM

## 2023-12-28 DIAGNOSIS — M79.10 MYALGIA: ICD-10-CM

## 2023-12-28 DIAGNOSIS — Z79.891 ENCOUNTER FOR LONG-TERM OPIATE ANALGESIC USE: ICD-10-CM

## 2023-12-28 DIAGNOSIS — S33.9XXA CHRONIC OR OLD SPRAIN OF LUMBOSACRAL LIGAMENT: ICD-10-CM

## 2023-12-28 DIAGNOSIS — M51.37 DEGENERATION OF LUMBAR OR LUMBOSACRAL INTERVERTEBRAL DISC: ICD-10-CM

## 2023-12-28 PROCEDURE — G8427 DOCREV CUR MEDS BY ELIG CLIN: HCPCS | Performed by: PHYSICIAN ASSISTANT

## 2023-12-28 PROCEDURE — 1123F ACP DISCUSS/DSCN MKR DOCD: CPT | Performed by: PHYSICIAN ASSISTANT

## 2023-12-28 PROCEDURE — 3074F SYST BP LT 130 MM HG: CPT | Performed by: PHYSICIAN ASSISTANT

## 2023-12-28 PROCEDURE — 1036F TOBACCO NON-USER: CPT | Performed by: PHYSICIAN ASSISTANT

## 2023-12-28 PROCEDURE — G8417 CALC BMI ABV UP PARAM F/U: HCPCS | Performed by: PHYSICIAN ASSISTANT

## 2023-12-28 PROCEDURE — 99213 OFFICE O/P EST LOW 20 MIN: CPT | Performed by: PHYSICIAN ASSISTANT

## 2023-12-28 PROCEDURE — 3078F DIAST BP <80 MM HG: CPT | Performed by: PHYSICIAN ASSISTANT

## 2023-12-28 PROCEDURE — G8484 FLU IMMUNIZE NO ADMIN: HCPCS | Performed by: PHYSICIAN ASSISTANT

## 2023-12-28 RX ORDER — OXYCODONE AND ACETAMINOPHEN 7.5; 325 MG/1; MG/1
1 TABLET ORAL 3 TIMES DAILY PRN
Qty: 90 TABLET | Refills: 0 | Status: SHIPPED | OUTPATIENT
Start: 2023-12-29 | End: 2024-01-28

## 2024-01-03 RX ORDER — AMOXICILLIN 500 MG/1
500 CAPSULE ORAL ONCE
COMMUNITY
Start: 2023-12-22 | End: 2024-02-07 | Stop reason: WASHOUT

## 2024-01-05 ENCOUNTER — LAB (OUTPATIENT)
Dept: LAB | Facility: LAB | Age: 79
End: 2024-01-05
Payer: MEDICARE

## 2024-01-05 DIAGNOSIS — F41.0 PANIC DISORDER: ICD-10-CM

## 2024-01-05 DIAGNOSIS — G89.29 CHRONIC LOW BACK PAIN, UNSPECIFIED BACK PAIN LATERALITY, UNSPECIFIED WHETHER SCIATICA PRESENT: ICD-10-CM

## 2024-01-05 DIAGNOSIS — M54.50 CHRONIC LOW BACK PAIN, UNSPECIFIED BACK PAIN LATERALITY, UNSPECIFIED WHETHER SCIATICA PRESENT: ICD-10-CM

## 2024-01-05 DIAGNOSIS — Z79.899 HIGH RISK MEDICATION USE: ICD-10-CM

## 2024-01-05 PROCEDURE — 80358 DRUG SCREENING METHADONE: CPT

## 2024-01-05 PROCEDURE — 80361 OPIATES 1 OR MORE: CPT

## 2024-01-05 PROCEDURE — 80307 DRUG TEST PRSMV CHEM ANLYZR: CPT

## 2024-01-05 PROCEDURE — 80368 SEDATIVE HYPNOTICS: CPT

## 2024-01-05 PROCEDURE — 82570 ASSAY OF URINE CREATININE: CPT

## 2024-01-05 PROCEDURE — 80365 DRUG SCREENING OXYCODONE: CPT

## 2024-01-05 PROCEDURE — 80373 DRUG SCREENING TRAMADOL: CPT

## 2024-01-05 PROCEDURE — 80346 BENZODIAZEPINES1-12: CPT

## 2024-01-05 PROCEDURE — 80354 DRUG SCREENING FENTANYL: CPT

## 2024-01-06 LAB
AMPHETAMINES UR QL SCN: NORMAL
BARBITURATES UR QL SCN: NORMAL
BZE UR QL SCN: NORMAL
CANNABINOIDS UR QL SCN: NORMAL
CREAT UR-MCNC: 35 MG/DL (ref 20–370)
PCP UR QL SCN: NORMAL

## 2024-01-08 ENCOUNTER — PATIENT OUTREACH (OUTPATIENT)
Dept: CARE COORDINATION | Facility: CLINIC | Age: 79
End: 2024-01-08

## 2024-01-09 ENCOUNTER — OFFICE VISIT (OUTPATIENT)
Dept: CARDIOLOGY | Facility: CLINIC | Age: 79
End: 2024-01-09
Payer: MEDICARE

## 2024-01-09 ENCOUNTER — OFFICE VISIT (OUTPATIENT)
Dept: CARDIOLOGY | Facility: HOSPITAL | Age: 79
End: 2024-01-09
Payer: MEDICARE

## 2024-01-09 VITALS
SYSTOLIC BLOOD PRESSURE: 120 MMHG | WEIGHT: 153 LBS | DIASTOLIC BLOOD PRESSURE: 60 MMHG | BODY MASS INDEX: 25.46 KG/M2 | RESPIRATION RATE: 17 BRPM | HEART RATE: 61 BPM | OXYGEN SATURATION: 95 %

## 2024-01-09 VITALS
HEART RATE: 66 BPM | WEIGHT: 153.8 LBS | RESPIRATION RATE: 20 BRPM | BODY MASS INDEX: 25.59 KG/M2 | SYSTOLIC BLOOD PRESSURE: 127 MMHG | DIASTOLIC BLOOD PRESSURE: 61 MMHG | OXYGEN SATURATION: 94 %

## 2024-01-09 DIAGNOSIS — I25.10 CORONARY ARTERY DISEASE INVOLVING NATIVE CORONARY ARTERY OF NATIVE HEART, UNSPECIFIED WHETHER ANGINA PRESENT: ICD-10-CM

## 2024-01-09 DIAGNOSIS — I25.10 CAD S/P PERCUTANEOUS CORONARY ANGIOPLASTY: ICD-10-CM

## 2024-01-09 DIAGNOSIS — I48.19 PERSISTENT ATRIAL FIBRILLATION (MULTI): ICD-10-CM

## 2024-01-09 DIAGNOSIS — I10 PRIMARY HYPERTENSION: ICD-10-CM

## 2024-01-09 DIAGNOSIS — Z98.61 CAD S/P PERCUTANEOUS CORONARY ANGIOPLASTY: ICD-10-CM

## 2024-01-09 DIAGNOSIS — I35.0 NONRHEUMATIC AORTIC VALVE STENOSIS: ICD-10-CM

## 2024-01-09 DIAGNOSIS — Z95.0 PACEMAKER: ICD-10-CM

## 2024-01-09 DIAGNOSIS — I47.20 VENTRICULAR TACHYCARDIA (MULTI): Primary | Chronic | ICD-10-CM

## 2024-01-09 DIAGNOSIS — I50.42 CHRONIC COMBINED SYSTOLIC AND DIASTOLIC HEART FAILURE, NYHA CLASS 3 (MULTI): Primary | ICD-10-CM

## 2024-01-09 DIAGNOSIS — I50.42 CHRONIC COMBINED SYSTOLIC AND DIASTOLIC HEART FAILURE, NYHA CLASS 3 (MULTI): ICD-10-CM

## 2024-01-09 DIAGNOSIS — Z95.810 AUTOMATIC IMPLANTABLE CARDIOVERTER-DEFIBRILLATOR IN SITU: ICD-10-CM

## 2024-01-09 PROBLEM — I34.0 SEVERE MITRAL REGURGITATION: Status: RESOLVED | Noted: 2023-05-05 | Resolved: 2024-01-09

## 2024-01-09 PROBLEM — I50.9 CHF, ACUTE (MULTI): Status: RESOLVED | Noted: 2023-05-05 | Resolved: 2024-01-09

## 2024-01-09 PROBLEM — I50.23: Status: RESOLVED | Noted: 2023-05-05 | Resolved: 2024-01-09

## 2024-01-09 PROCEDURE — 3078F DIAST BP <80 MM HG: CPT | Performed by: NURSE PRACTITIONER

## 2024-01-09 PROCEDURE — 99213 OFFICE O/P EST LOW 20 MIN: CPT | Mod: ZK | Performed by: NURSE PRACTITIONER

## 2024-01-09 PROCEDURE — 1126F AMNT PAIN NOTED NONE PRSNT: CPT | Performed by: NURSE PRACTITIONER

## 2024-01-09 PROCEDURE — 3074F SYST BP LT 130 MM HG: CPT | Performed by: INTERNAL MEDICINE

## 2024-01-09 PROCEDURE — 99213 OFFICE O/P EST LOW 20 MIN: CPT | Performed by: NURSE PRACTITIONER

## 2024-01-09 PROCEDURE — 1036F TOBACCO NON-USER: CPT | Performed by: INTERNAL MEDICINE

## 2024-01-09 PROCEDURE — 1159F MED LIST DOCD IN RCRD: CPT | Performed by: NURSE PRACTITIONER

## 2024-01-09 PROCEDURE — 1160F RVW MEDS BY RX/DR IN RCRD: CPT | Performed by: INTERNAL MEDICINE

## 2024-01-09 PROCEDURE — 1160F RVW MEDS BY RX/DR IN RCRD: CPT | Performed by: NURSE PRACTITIONER

## 2024-01-09 PROCEDURE — 1126F AMNT PAIN NOTED NONE PRSNT: CPT | Performed by: INTERNAL MEDICINE

## 2024-01-09 PROCEDURE — 1036F TOBACCO NON-USER: CPT | Performed by: NURSE PRACTITIONER

## 2024-01-09 PROCEDURE — 1159F MED LIST DOCD IN RCRD: CPT | Performed by: INTERNAL MEDICINE

## 2024-01-09 PROCEDURE — 3078F DIAST BP <80 MM HG: CPT | Performed by: INTERNAL MEDICINE

## 2024-01-09 PROCEDURE — 3074F SYST BP LT 130 MM HG: CPT | Performed by: NURSE PRACTITIONER

## 2024-01-09 PROCEDURE — 93000 ELECTROCARDIOGRAM COMPLETE: CPT | Performed by: INTERNAL MEDICINE

## 2024-01-09 PROCEDURE — 99214 OFFICE O/P EST MOD 30 MIN: CPT | Performed by: INTERNAL MEDICINE

## 2024-01-09 RX ORDER — EZETIMIBE 10 MG/1
10 TABLET ORAL DAILY
Qty: 90 TABLET | Refills: 3 | Status: SHIPPED | OUTPATIENT
Start: 2024-01-09 | End: 2025-01-08

## 2024-01-09 RX ORDER — APIXABAN 5 MG/1
5 TABLET, FILM COATED ORAL 2 TIMES DAILY
Qty: 180 TABLET | Refills: 3 | Status: SHIPPED | OUTPATIENT
Start: 2024-01-09 | End: 2025-01-08

## 2024-01-09 RX ORDER — ATORVASTATIN CALCIUM 80 MG/1
80 TABLET, FILM COATED ORAL NIGHTLY
Qty: 90 TABLET | Refills: 3 | Status: SHIPPED | OUTPATIENT
Start: 2024-01-09 | End: 2025-01-08

## 2024-01-09 ASSESSMENT — ENCOUNTER SYMPTOMS
ABDOMINAL DISTENTION: 0
BLOOD IN STOOL: 0
WHEEZING: 0
CONFUSION: 0
DEPRESSION: 0
OCCASIONAL FEELINGS OF UNSTEADINESS: 0
WEAKNESS: 0
FEVER: 0
COUGH: 1
HEMATURIA: 0
CHEST TIGHTNESS: 0
LIGHT-HEADEDNESS: 0
PALPITATIONS: 0
SHORTNESS OF BREATH: 1
CHILLS: 0
ACTIVITY CHANGE: 0
EYES NEGATIVE: 1
LOSS OF SENSATION IN FEET: 0

## 2024-01-09 ASSESSMENT — PATIENT HEALTH QUESTIONNAIRE - PHQ9
2. FEELING DOWN, DEPRESSED OR HOPELESS: NOT AT ALL
SUM OF ALL RESPONSES TO PHQ9 QUESTIONS 1 AND 2: 0
1. LITTLE INTEREST OR PLEASURE IN DOING THINGS: NOT AT ALL

## 2024-01-09 ASSESSMENT — PAIN SCALES - GENERAL: PAINLEVEL: 0-NO PAIN

## 2024-01-09 ASSESSMENT — COLUMBIA-SUICIDE SEVERITY RATING SCALE - C-SSRS
1. IN THE PAST MONTH, HAVE YOU WISHED YOU WERE DEAD OR WISHED YOU COULD GO TO SLEEP AND NOT WAKE UP?: NO
6. HAVE YOU EVER DONE ANYTHING, STARTED TO DO ANYTHING, OR PREPARED TO DO ANYTHING TO END YOUR LIFE?: NO
2. HAVE YOU ACTUALLY HAD ANY THOUGHTS OF KILLING YOURSELF?: NO

## 2024-01-09 NOTE — PROGRESS NOTES
Chief Complaint:   Annual Exam     History Of Present Illness:    Jony Javier is a 78 y.o. male with history of aortic stenosis, mitral regurgitation, hypertension, hyperlipidemia, CVA, and GERD, coronary artery disease, chronic systolic heart failure, persistent atrial fibrillation, cardiogenic shock. He has seen Dr Tatum at [facility]. He was told that he is not a candidate for valve replacement. Patient initally came here to get a 2nd opinion.   He has coronary artery disease and a chronically occluded anomalous left circumflex. Several years ago while trying to intervene on that he suffered from myocardial or coronary rupture, possible other complication and needed open heart surgery.      More recently he has been having heart failure symptoms with echo done at our facility (2020) showing moderate LV dysfunction with ejection fraction of 40%, severe mitral regurgitation, restrictive diastolic function, pulmonary hypertension, moderate to severe aortic valve stenosis.     This was followed by a SUPA done at [facility], that showed the cause of mitral regurgitation to be a dilated left ventricle. Aortic valve appeared moderately severely stenosed.     He had a right and left heart catheterization done at [facility] facility, showing chronically occluded anomalous left circumflex stent, moderate disease elsewhere. He has a severely calcified ascending aorta and abdominal aorta and both iliacs.     Right heart catheterization showed a mean right atrial pressure of 6 mmHg, mean PA pressure of 26, wedge pressure of 19, LVEDP of 18, Damián cardiac output of 4.7 L/min and cardiac index of 2.8 L/min/m2.  SVR was 1315.  He was admitted to our facility in early 2021 with acute heart failure and cardiogenic shock. Treated in intensive care unit for several days, suffered from a PEA arrest and was successfully resuscitated, attempted cardioversion x2 but did not maintain sinus rhythm. Started on amiodarone and EP  was consulted. After long hospital stay he has been discharged home. Subsequently he underwent CardioMEMS placement and eventually AV andry ablation and atrial fibrillation ablation on May 12, 2021.      He had a BiV ICD placed in 2009, subsequently in 2014 had ventricular tachycardia and several ICD shocks. He was temporarily on amiodarone, but then weaned off of it by his electrophysiologist at [facility] but now he is back on this medication because of persistent atrial fibrillation. Denies having any adverse effects to amiodarone. More recently Dr. Mayberry has again discontinued amiodarone.     His EKG today shows BiV paced rhythm, underlying rhythm appears to be sinus.     He was readmitted to the hospital with acute heart failure in mid October 2021. He was treated medically. He was also found to have an incidental subdural hematoma reviewed by neurologist who felt he may also have normal pressure hydrocephalus contributing to falls and subdural hematoma. Nevertheless his oral anticoagulants were restarted. He was referred to [facility] for evaluation by valve team for moderate to severe mitral regurgitation and moderate aortic valve stenosis. The valve team did not feel he is a candidate for any intervention.      He had an echo in October 23, 2021 at [facility]. This showed left ventricle ejection fraction of 40%, mild to moderate mitral regurgitation and moderate aortic valve stenosis. PA pressures were normal at that time.     He still feels quite fatigued still and has some shortness of breath on exertion. No symptoms at rest. No chest pain. Quite bothered by right shoulder pain that keeps him up at night. He states this is his rotator cuff and he intends to get it fixed. Echo from 2022 shows LV function has returned to normal no significant mitral regurgitation and there is at least moderate aortic valve stenosis. Echo 2023 LVEF 45%, MILD as, mild MR.     He tells me he had stroke in  May 2022 and was started on aspirin by the neurologist.   .     Last Recorded Vitals:  Vitals:    01/09/24 1531   BP: 120/60   Pulse: 61   Resp: 17   SpO2: 95%   Weight: 69.4 kg (153 lb)       Past Medical History:  He has a past medical history of Acute on chronic systolic (congestive) heart failure (CMS/[facility]) (09/08/2022), Acute on chronic systolic CHF (congestive heart failure), NYHA class 3 (CMS/HCC) (05/05/2023), CHF, acute (CMS/HCC) (05/05/2023), Nonrheumatic aortic (valve) stenosis (01/10/2022), Nonrheumatic mitral (valve) insufficiency (09/14/2021), Nonrheumatic mitral (valve) insufficiency (12/07/2022), Other long term (current) drug therapy, Personal history of diseases of the blood and blood-forming organs and certain disorders involving the immune mechanism, Personal history of other diseases of the circulatory system (09/20/2022), Personal history of other diseases of the circulatory system, Personal history of other diseases of the circulatory system, Personal history of other diseases of the musculoskeletal system and connective tissue, Severe mitral regurgitation (05/05/2023), and Unspecified intracranial injury with loss of consciousness status unknown, initial encounter (CMS/HCC) (09/20/2022).    Past Surgical History:  He has a past surgical history that includes Other surgical history (01/08/2020); Other surgical history (01/08/2020); Other surgical history (01/08/2020); Other surgical history (12/06/2021); Other surgical history (12/06/2021); and Other surgical history (01/30/2020).      Social History:  He reports that he quit smoking about 54 years ago. His smoking use included cigarettes. He has never used smokeless tobacco. He reports current alcohol use of about 14.0 standard drinks of alcohol per week. He reports that he does not use drugs.    Family History:  Family History   Problem Relation Name Age of Onset    No Known Problems Mother      No Known Problems Father      Other (malignant  neoplsm) Other      Hypertension Other      Other (cardiac disorder) Other          Allergies:  Bee venom protein (honey bee), Cefaclor, Pentazocine, Pentazocine-acetaminophen, Pregabalin, Allopurinol, Ciprofloxacin, Sulfamethoxazole-trimethoprim, Metoprolol, and Statins-hmg-coa reductase inhibitors    Outpatient Medications:  Current Outpatient Medications   Medication Instructions    albuterol 90 mcg/actuation inhaler INHALE 1 (ONE) PUFF EVERY 4 HOURS AS NEEDED    amoxicillin (AMOXIL) 500 mg, oral, Once    atorvastatin (LIPITOR) 80 mg, oral, Nightly    baclofen (LIORESAL) 10 mg, oral, 2 times daily PRN    blood sugar diagnostic (True Metrix Glucose Test Strip) strip USE 1 STRIP 3 times daily    bumetanide (BUMEX) 0.5 mg, oral, 2 times weekly, May take additional dose for weight gain 3#, increased swelling or shortness of breath.    carvedilol (COREG) 25 mg, oral, 2 times daily    Eliquis 5 mg, oral, 2 times daily    Entresto 49-51 mg tablet 1 tablet, oral, 2 times daily    EPINEPHrine (EPIPEN) 0.3 mg, injection, As needed    ezetimibe (ZETIA) 10 mg, oral, Daily    ferrous gluconate 270 mg (27 mg iron) tablet 1 tablet, oral, Daily    Jardiance 10 mg, oral, Daily    lidocaine (Xylocaine) 5 % ointment APPLY TO THE AFFECTED AREA(S) AS NEEDED FOR PAIN to last 30 days    mirtazapine (REMERON) 30 mg, oral, Nightly    multivitamin with folic acid (One Daily Multivitamin) 400 mcg tablet 1 tablet, oral, Daily    OneTouch Delica Plus Lancet 33 gauge misc Test THREE TIMES DAILY AS DIRECTED    oxyCODONE-acetaminophen (Percocet) 7.5-325 mg tablet Take 1 tablet by mouth 3 times daily as needed for Pain for up to 30 days. Max Daily Amount 3 tablets    pantoprazole (PROTONIX) 40 mg, oral, Daily    spironolactone (ALDACTONE) 12.5 mg, oral, Daily    tadalafil (CIALIS) 20 mg, oral, Daily PRN    tamsulosin (FLOMAX) 0.4 mg, oral, Every morning    testosterone (Axiron) 30 mg/actuation (1.5 mL) topical solution 2 Pump, transdermal, Daily     venlafaxine XR (Effexor-XR) 150 mg 24 hr capsule TAKE 1 CAPSULE BY MOUTH AFTER BREAKFAST       Physical Exam:  Physical Exam  Vitals reviewed.   Constitutional:       Appearance: Normal appearance.   Neck:      Vascular: No carotid bruit or JVD.   Cardiovascular:      Rate and Rhythm: Normal rate and regular rhythm.      Heart sounds: S1 normal and S2 normal. Murmur heard.      Systolic murmur is present.   Pulmonary:      Effort: Pulmonary effort is normal.      Breath sounds: Normal breath sounds.   Abdominal:      General: Abdomen is flat. Bowel sounds are normal.      Palpations: Abdomen is soft.   Musculoskeletal:      Right lower leg: No edema.      Left lower leg: No edema.   Skin:     General: Skin is warm.   Neurological:      Mental Status: He is alert. Mental status is at baseline.   Psychiatric:         Mood and Affect: Mood normal.         Behavior: Behavior normal.           Last Labs:  CBC -  Lab Results   Component Value Date    WBC 6.8 12/09/2023    HGB 13.2 (L) 12/09/2023    HCT 41.0 12/09/2023     (H) 12/09/2023     (L) 12/09/2023       CMP -  Lab Results   Component Value Date    CALCIUM 9.2 12/09/2023    PHOS 3.1 12/05/2023    PROT 6.5 12/04/2023    ALBUMIN 3.2 (L) 12/05/2023    AST 19 12/04/2023    ALT 11 12/04/2023    ALKPHOS 56 12/04/2023    BILITOT 0.5 12/04/2023       LIPID PANEL -   Lab Results   Component Value Date    CHOL 97 07/24/2023    TRIG 95 07/24/2023    HDL 52.7 07/24/2023    CHHDL 1.8 07/24/2023    LDLF 25 07/24/2023    VLDL 19 07/24/2023       RENAL FUNCTION PANEL -   Lab Results   Component Value Date    GLUCOSE 81 12/09/2023     12/09/2023    K 3.8 12/09/2023     12/09/2023    CO2 28 12/09/2023    ANIONGAP 11 12/09/2023    BUN 22 12/09/2023    CREATININE 1.48 (H) 12/09/2023    GFRMALE 40 (A) 07/24/2023    CALCIUM 9.2 12/09/2023    PHOS 3.1 12/05/2023    ALBUMIN 3.2 (L) 12/05/2023        Lab Results   Component Value Date     (H) 05/16/2022     HGBA1C 5.5 02/23/2023       Last Cardiology Tests:  ECG:  No results found for this or any previous visit from the past 1095 days.      Echo:  Transthoracic Echo (TTE) Complete 11/27/2023      Ejection Fractions:  EF   Date/Time Value Ref Range Status   11/27/2023 03:45 PM 68         Cath:  No results found for this or any previous visit from the past 1095 days.      Stress Test:  No results found for this or any previous visit from the past 1095 days.      Cardiac Imaging:  No results found for this or any previous visit from the past 1095 days.          Assessment/Plan   In summary Mr. Javier is a 78-year-old gentleman with dilated ischemic cardiomyopathy, moderate left ventricular systolic dysfunction, severe mitral regurgitation due to dilated annulus, now improved, moderate to severe aortic valve stenosis, paroxysmal atrial fibrillation. He has chronic systolic heart failure. He has coronary artery disease. He has severely calcified aorta and iliacs. He has prior history of CVA. He has chronic subdural hematoma.     1-severe mitral regurgitation-told not to be a surgical candidate, I believe the best treatment is medical with intensive treatment of systolic heart failure. Likely caused by dilated LV and improving with CRT, working on medical therapy. Continue current medical therapy. Repeat echo shows improvement in mitral regurgitation. Has been evaluated by valve team not a candidate.  He has CardioMEMS in place and readings have been stable, being monitored by CHF clinic.     Following up closely in CHF clinic. Being seen by palliative care team at home as well. May need to be transitioned to hospice in future. Prognosis is poor, with multiple comorbidities complicating clinical course.     2-“moderate to severe aortic valve stenosis- DI 0.33, will wait and observe. I have reviewed the SUPA done at [facility] personally, the aortic valve stenosis does not appear to be severe. Valve is opening well  on my review of the images. His echo in October 2022 showed moderate aortic valve stenosis. Repeat echo 2023- mild aS.     3-Chronic systolic heart failure-please refer to problem #1. Currently well compensated with New [place] Heart Association class III symptoms, monitor clinically. Also follows in advanced heart failure clinic. He has not been interested in LVAD in the past. He is on appropriate medical therapy and well compensated. His LV function has now returned to normal.     4-paroxysmal atrial fibrillation-failed to maintain rhythm S/P AV andry ablation and A. fib ablation with Dr. Mayberry as above. Remains chronically anticoagulated. Heart rate well controlled at present. He has been taken off amiodarone by Dr. Mayberry.     5-coronary artery disease-secondary prevention with statins.     Lipid profile favorable.     6-ventricular tachycardia-patient has ICD in place. Taken off amiodarone. Continue beta-blockers only, no recurrent tachycardia or shock recently. He follows with EP as well.      Narendra Portillo MD

## 2024-01-09 NOTE — PATIENT INSTRUCTIONS
Thank you for coming in today.  If you have any questions you may contact the office Monday through Friday at [phone] or on week ends at [phone].    Continue same medications.      Lab work in 1-2 weeks.     Please follow  a 2 GM sodium diet and limit fluid intake to 2 liters per day or 8 servings ( serving size = 8 oz. = 1 cup = 240 ml) per day.   Please avoid processed meat products (luncheon meats, sausages, gonsales, hot dogs for example) eat 4 servings of vegetables and 1-2 whole servings of whole fruits per day.   Please weigh daily and call [phone] for weight gain of 3 pounds in 24 hours or 5 pounds or if you experience increased swelling or shortness of breath.     Follow up: 3 months.     Please be sure to follow up with your cardiologist at [facility] (formerly [facility] once every year. Call [phone] to schedule appointment if you do not have a follow up appointment scheduled already.

## 2024-01-09 NOTE — PROGRESS NOTES
Subjective   Patient ID: Jony Javier is a 78 y.o. male who presents for follow-up of congestive heart failure.     Current Outpatient Medications:     albuterol 90 mcg/actuation inhaler, INHALE 1 (ONE) PUFF EVERY 4 HOURS AS NEEDED, Disp: 8.5 g, Rfl: 8    amoxicillin (Amoxil) 500 mg capsule, Take 1 capsule (500 mg) by mouth 1 time., Disp: , Rfl:     atorvastatin (Lipitor) 80 mg tablet, Take 1 tablet (80 mg) by mouth once daily at bedtime., Disp: , Rfl:     baclofen (Lioresal) 10 mg tablet, Take 1 tablet (10 mg) by mouth 2 times a day as needed., Disp: , Rfl:     blood sugar diagnostic (True Metrix Glucose Test Strip) strip, USE 1 STRIP 3 times daily, Disp: , Rfl:     bumetanide (Bumex) 1 mg tablet, Take 0.5 tablets (0.5 mg) by mouth 2 times a week. May take additional dose for weight gain 3#, increased swelling or shortness of breath., Disp: 30 tablet, Rfl: 1    carvedilol (Coreg) 25 mg tablet, Take 1 tablet (25 mg) by mouth 2 times a day., Disp: , Rfl:     Eliquis 5 mg tablet, Take 1 tablet (5 mg) by mouth 2 times a day., Disp: , Rfl:     Entresto 49-51 mg tablet, Take 1 tablet by mouth 2 times a day., Disp: , Rfl:     EPINEPHrine 0.3 mg/0.3 mL injection syringe, Inject 0.3 mL (0.3 mg) as directed if needed for anaphylaxis., Disp: 2 each, Rfl: 3    ezetimibe (Zetia) 10 mg tablet, Take 1 tablet (10 mg) by mouth once daily., Disp: , Rfl:     ferrous gluconate 270 mg (27 mg iron) tablet, Take 1 tablet by mouth once daily., Disp: , Rfl:     Jardiance 10 mg, Take 1 tablet (10 mg) by mouth once daily., Disp: , Rfl:     lidocaine (Xylocaine) 5 % ointment, APPLY TO THE AFFECTED AREA(S) AS NEEDED FOR PAIN to last 30 days, Disp: , Rfl:     mirtazapine (Remeron) 30 mg tablet, Take 1 tablet (30 mg) by mouth once daily at bedtime., Disp: , Rfl:     multivitamin with folic acid (One Daily Multivitamin) 400 mcg tablet, Take 1 tablet by mouth once daily., Disp: , Rfl:     OneTouch Delica Plus Lancet 33 gauge misc, Test THREE  TIMES DAILY AS DIRECTED, Disp: , Rfl:     oxyCODONE-acetaminophen (Percocet) 7.5-325 mg tablet, Take 1 tablet by mouth 3 times daily as needed for Pain for up to 30 days. Max Daily Amount 3 tablets, Disp: , Rfl:     pantoprazole (ProtoNix) 40 mg EC tablet, TAKE 1 TABLET BY MOUTH EVERY DAY, Disp: 90 tablet, Rfl: 3    spironolactone (Aldactone) 25 mg tablet, Take 0.5 tablets (12.5 mg) by mouth once daily., Disp: 45 tablet, Rfl: 1    tadalafil 20 mg tablet, Take 1 tablet (20 mg) by mouth once daily as needed for erectile dysfunction., Disp: 90 tablet, Rfl: 4    tamsulosin (Flomax) 0.4 mg 24 hr capsule, Take 1 capsule (0.4 mg) by mouth once daily in the morning., Disp: , Rfl:     testosterone (Axiron) 30 mg/actuation (1.5 mL) topical solution, Place 2 Pump on the skin once daily., Disp: , Rfl:     venlafaxine XR (Effexor-XR) 150 mg 24 hr capsule, TAKE 1 CAPSULE BY MOUTH AFTER BREAKFAST, Disp: , Rfl:      HPI   Past medical history consists significant for history of heart failure reduced ejection fraction.  He initially his ejection fraction was around 40%.  He has a history of coronary artery disease, persistent atrial fibrillation, GERD, history of carotid artery stenosis and CVA in the past.  He has CRT-D in place.  He has not had any defibrillations from device.  He also has CardioMEMS in place and his readings have been consistently within desired parameters.  Last heart cath was in 2020.  Results are noted below.  Most recent echocardiogram shows ejection fraction of 45% which is actually decreased from his echocardiogram in 2022 which showed that he had improvement in EF to 60 to 65%.  Patient denies chest pain or palpitations he does have short of breath with exertion however he is attributing that to not being very active generally.  Last hospitalization was in December after he had a mechanical fall.  He also had some encephalopathy at that time.    Review of Systems   Constitutional:  Negative for activity  change, chills and fever.   HENT:  Negative for hearing loss.    Eyes: Negative.    Respiratory:  Positive for cough and shortness of breath. Negative for chest tightness and wheezing.    Cardiovascular:  Negative for chest pain, palpitations and leg swelling.   Gastrointestinal:  Negative for abdominal distention and blood in stool.   Genitourinary:  Negative for hematuria.        Easy bruising.   Neurological:  Negative for syncope, weakness and light-headedness.   Psychiatric/Behavioral:  Negative for confusion.        Objective     /61 (BP Location: Left arm, Patient Position: Sitting)   Pulse 66   Resp 20   Wt 69.8 kg (153 lb 12.8 oz)   SpO2 94%   BMI 25.59 kg/m²     2020 [facility]   LMT 20% moderate calcification  LAD 50% osteial  Lcx mild luminal irregularities Lcx stent with chronic occlusion.   RCA  20% diffuse disease with mid stent patent.     Transthoracic Echo (TTE) Complete    Result Date: 11/28/2023              [place] [address]      Phone [phone] Fax [phone] TRANSTHORACIC ECHOCARDIOGRAM REPORT  Patient Name:      KARAN Paige Physician:    [id number] Portillo MD Study Date:        11/27/2023           Ordering Provider:    Stevenson PORTILLO MRN/PID:           [id number]             Fellow: Accession#:        [id number]         Nurse: Date of Birth/Age: 1945 / 78 years Sonographer:          Elizabeth Navarro RDCS Gender:            M                    Additional Staff: Height:            157.48 cm            Admit Date:           11/27/2023 Weight:            68.95 kg             Admission Status:     Outpatient BSA:               1.70 m2              Department Location:  [facility] Echo                                                               Lab Blood Pressure: 110 /76 mmHg  Study Type:    TRANSTHORACIC ECHO (TTE) COMPLETE Diagnosis/ICD: Chronic systolic (congestive) heart failure (CHF)-I50.22 Indication:    Congestive Heart Failure CPT Codes:     Echo Complete w Full Doppler-[id number]  Study Detail: The following Echo studies were performed: 2D, M-Mode, Doppler and               color flow.  PHYSICIAN INTERPRETATION: Left Ventricle: Left ventricular systolic function is mildly decreased, with an estimated ejection fraction of 45%. Wall motion is abnormal. The left ventricular cavity size is normal. The left ventricular septal wall thickness is mildly increased. There is moderately increased left ventricular posterior wall thickness. Spectral Doppler shows a pseudonormal pattern of left ventricular diastolic filling. Akinetic inferior and inferolateral wall. Left Atrium: The left atrium is moderately dilated. Right Ventricle: The right ventricle is normal in size. There is reduced right ventricular systolic function. A device is visualized in the right ventricle. Right Atrium: The right atrium is normal in size. Aortic Valve: The aortic valve was not well visualized. There is evidence of mild aortic valve stenosis. The aortic valve dimensionless index is 0.54. There is trivial aortic valve regurgitation. The peak instantaneous gradient of the aortic valve is 20.5 mmHg. The mean gradient of the aortic valve is 9.2 mmHg. TAVR?. Mitral Valve: The mitral valve is normal in structure. There is mild mitral annular calcification. There is trace to mild mitral valve regurgitation. Tricuspid Valve: The tricuspid valve is structurally normal. There is trace tricuspid regurgitation. Pulmonic Valve: The pulmonic valve is structurally normal. There is trace pulmonic valve regurgitation. Pericardium: There is no pericardial effusion noted. Aorta: The aortic root is normal.  CONCLUSIONS:  1. Left ventricular systolic function is mildly decreased with a 45% estimated ejection fraction.  2. Spectral Doppler  shows a pseudonormal pattern of left ventricular diastolic filling.  3. The left ventricular posterior wall thickness is moderately increased.  4. There is reduced right ventricular systolic function.  5. The left atrium is moderately dilated.  6. Mild aortic valve stenosis. QUANTITATIVE DATA SUMMARY: 2D MEASUREMENTS:                           Normal Ranges: LAs:           4.34 cm    (2.7-4.0cm) IVSd:          1.23 cm    (0.6-1.1cm) LVPWd:         1.39 cm    (0.6-1.1cm) LVIDd:         4.34 cm    (3.9-5.9cm) LVIDs:         3.38 cm LV Mass Index: 125.5 g/m2 LV % FS        21.9 % LA VOLUME:                               Normal Ranges: LA Vol A4C:        85.1 ml    (22+/-6mL/m2) LA Vol A2C:        65.6 ml LA Vol BP:         77.6 ml LA Vol Index A4C:  50.0ml/m2 LA Vol Index A2C:  38.6 ml/m2 LA Vol Index BP:   45.6 ml/m2 LA Area A4C:       22.6 cm2 LA Area A2C:       20.6 cm2 LA Major Axis A4C: 5.1 cm LA Major Axis A2C: 5.5 cm LA Volume Index:   39.3 ml/m2 LA Vol A4C:        68.6 ml LA Vol A2C:        62.1 ml RA VOLUME BY A/L METHOD:                       Normal Ranges: RA Area A4C: 13.6 cm2 M-MODE MEASUREMENTS:                  Normal Ranges: Ao Root: 2.05 cm (2.0-3.7cm) AORTA MEASUREMENTS:                    Normal Ranges: Asc Ao, d: 3.40 cm (2.1-3.4cm) LV SYSTOLIC FUNCTION BY 2D PLANIMETRY (MOD):                     Normal Ranges: EF-A4C View: 68.7 % (>=55%) EF-A2C View: 70.6 % EF-Biplane:  68.5 % LV DIASTOLIC FUNCTION:                        Normal Ranges: MV Peak E:    0.83 m/s (0.7-1.2 m/s) MV Peak A:    0.84 m/s (0.42-0.7 m/s) E/A Ratio:    1.00     (1.0-2.2) MV e'         0.06 m/s (>8.0) MV lateral e' 0.07 m/s MV medial e'  0.05 m/s E/e' Ratio:   13.92    (<8.0) MITRAL VALVE:                 Normal Ranges: MV DT: 226 msec (150-240msec) AORTIC VALVE:                                    Normal Ranges: AoV Vmax:                2.26 m/s  (<=1.7m/s) AoV Peak P.5 mmHg (<20mmHg) AoV Mean PG:              9.2 mmHg  (1.7-11.5mmHg) LVOT Max Tam:            1.20 m/s  (<=1.1m/s) AoV VTI:                 45.83 cm  (18-25cm) LVOT VTI:                24.66 cm LVOT Diameter:           2.03 cm   (1.8-2.4cm) AoV Area, VTI:           1.71 cm2  (2.5-5.5cm2) AoV Area,Vmax:           1.69 cm2  (2.5-4.5cm2) AoV Dimensionless Index: 0.54 AORTIC INSUFFICIENCY: AI Vmax:       3.62 m/s AI Half-time:  515 msec AI Decel Time: 1776 msec AI Decel Rate: 203.92 cm/s2  RIGHT VENTRICLE: RV Basal 2.74 cm RV Mid   2.30 cm RV Major 7.2 cm TAPSE:   15.2 mm RV s'    0.13 m/s TRICUSPID VALVE/RVSP:                             Normal Ranges: Peak TR Velocity: 2.74 m/s RV Syst Pressure: 33.0 mmHg (< 30mmHg) IVC Diam:         0.97 cm TV e'             0.1 m/s AORTA: Asc Ao Diam 3.39 cm  [id number] Narendra Portillo MD Electronically signed on 11/28/2023 at 5:35:04 PM  ** Final **       Lab Results   Component Value Date    BUN 22 12/09/2023    CREATININE 1.48 (H) 12/09/2023     (H) 05/16/2022    MG 1.97 12/05/2023    K 3.8 12/09/2023     12/09/2023       Constitutional:       General: NAD  HENT:   Normocephalic.  No other gross abnormality.   No JVD or hepatojugular reflex.  Cardiovascular:      Rate and Rhythm: Normal rate and regular rhythm.      Heart sounds: S1, S2 normal, no murmurs, no S3 or S4    Pulmonary:      Effort: No increased respiratory effort     Breath sounds:  diminished excursion with prolonged expiratory phase.  He does not have rales   Abdominal:      General: Abdomen is softly distended. Bowel sounds are normal.      Palpations: Abdomen is soft.   Musculoskeletal:         General:  KHALIL well. No swelling.   Skin:     General: Skin is warm and dry.      Assessment/Plan     Problem List Items Addressed This Visit       Chronic combined systolic and diastolic heart failure, NYHA class 3 (CMS/HCC) - Primary    Persistent atrial fibrillation (CMS/HCC)    CAD S/P percutaneous coronary angioplasty        Chronic systolic diastolic  heart failure:   Etiology   AHA Stage: C  NYHA class:  2-3  - CPAP a night   Volume Status:  Euvolemic    GFR: 48    GDMT:  BB- Carvedilol 25 mg 1 tablet twice a day   ARB/ACEI/ARNI - Entresto 49/51 mg 1 tablet twice a day   MRA -  Spironolactone 25 mg  1/2 tablet daily   SGLT2i -  Jardiance 10 mg once a day   Diuretic - Bumex 0.5 mg twice a week   Device Therapy: CRT-D / Cardiomems readings are stable and controlled.     CHF: no significant medication side effects noted and reasonably well controlled.  EF shows decrease in EF from 60% to 45 %.  Taking medications as noted above.  No significant edema or signs of congestion today.     Emphasized salt restriction.  Encouraged daily monitoring of the patient's weight.  Encouraged regular exercise.  Follow up in 3 months.    2. Atrial fibrillation :   RRR today. OAC with Eliquis. Denies obvious signs of bleeding.     3. CKD3:  December labs with GFR 48 ml/min  He follows with nephrology in [place].      4 ASHD:  Denies angina.   Does have chronic sob which he feels is due to lack of activity.  Lipitor 80 mg daily/ BB/ARNI.   They stopped the ASA due to easy bruising.  He has follow up with Dr. Portillo today.     5. COPD:  Stable.     LARRY Keene-CNP

## 2024-01-13 LAB
1OH-MIDAZOLAM UR CFM-MCNC: <25 NG/ML
6MAM UR CFM-MCNC: <25 NG/ML
7AMINOCLONAZEPAM UR CFM-MCNC: <25 NG/ML
A-OH ALPRAZ UR CFM-MCNC: <25 NG/ML
ALPRAZ UR CFM-MCNC: <25 NG/ML
CHLORDIAZEP UR CFM-MCNC: <25 NG/ML
CLONAZEPAM UR CFM-MCNC: <25 NG/ML
CODEINE UR CFM-MCNC: <50 NG/ML
DIAZEPAM UR CFM-MCNC: <25 NG/ML
EDDP UR CFM-MCNC: <25 NG/ML
FENTANYL UR CFM-MCNC: <2.5 NG/ML
HYDROCODONE CTO UR CFM-MCNC: <25 NG/ML
HYDROMORPHONE UR CFM-MCNC: <25 NG/ML
LORAZEPAM UR CFM-MCNC: <25 NG/ML
METHADONE UR CFM-MCNC: <25 NG/ML
MIDAZOLAM UR CFM-MCNC: <25 NG/ML
MORPHINE UR CFM-MCNC: <50 NG/ML
NORDIAZEPAM UR CFM-MCNC: 195 NG/ML
NORFENTANYL UR CFM-MCNC: <2.5 NG/ML
NORHYDROCODONE UR CFM-MCNC: <25 NG/ML
NOROXYCODONE UR CFM-MCNC: >1000 NG/ML
NORTRAMADOL UR-MCNC: <50 NG/ML
OXAZEPAM UR CFM-MCNC: 524 NG/ML
OXYCODONE UR CFM-MCNC: 732 NG/ML
OXYMORPHONE UR CFM-MCNC: 322 NG/ML
TEMAZEPAM UR CFM-MCNC: 483 NG/ML
TRAMADOL UR CFM-MCNC: <50 NG/ML
ZOLPIDEM UR CFM-MCNC: <25 NG/ML
ZOLPIDEM UR-MCNC: <25 NG/ML

## 2024-01-17 ENCOUNTER — TELEPHONE (OUTPATIENT)
Dept: PRIMARY CARE | Facility: CLINIC | Age: 79
End: 2024-01-17
Payer: COMMERCIAL

## 2024-01-17 ENCOUNTER — TELEPHONE (OUTPATIENT)
Dept: CARDIOLOGY | Facility: HOSPITAL | Age: 79
End: 2024-01-17

## 2024-01-17 NOTE — TELEPHONE ENCOUNTER
Patient called in stating that he needs a refill on his   Diazepam (Valium) 5 mg tablet . Patient would like this sent to [facility]. Please advise.

## 2024-01-18 DIAGNOSIS — I50.9 CHRONIC CONGESTIVE HEART FAILURE, UNSPECIFIED HEART FAILURE TYPE (MULTI): Primary | ICD-10-CM

## 2024-01-18 RX ORDER — EMPAGLIFLOZIN 10 MG/1
10 TABLET, FILM COATED ORAL DAILY
Qty: 30 TABLET | Refills: 11 | Status: SHIPPED | OUTPATIENT
Start: 2024-01-18 | End: 2025-01-17

## 2024-01-23 DIAGNOSIS — F41.9 ANXIETY DISORDER, UNSPECIFIED TYPE: Primary | ICD-10-CM

## 2024-01-23 RX ORDER — DIAZEPAM 5 MG/1
5 TABLET ORAL EVERY 8 HOURS PRN
Qty: 90 TABLET | Refills: 0 | Status: SHIPPED | OUTPATIENT
Start: 2024-01-23 | End: 2024-05-08 | Stop reason: SDUPTHER

## 2024-01-23 RX ORDER — DIAZEPAM 5 MG/1
5 TABLET ORAL EVERY 8 HOURS PRN
COMMUNITY
End: 2024-01-23 | Stop reason: SDUPTHER

## 2024-01-31 ENCOUNTER — LAB (OUTPATIENT)
Dept: LAB | Facility: LAB | Age: 79
End: 2024-01-31
Payer: MEDICARE

## 2024-01-31 DIAGNOSIS — H93.12 TINNITUS OF LEFT EAR: ICD-10-CM

## 2024-01-31 DIAGNOSIS — E78.5 HYPERLIPIDEMIA, UNSPECIFIED HYPERLIPIDEMIA TYPE: ICD-10-CM

## 2024-01-31 DIAGNOSIS — I50.42 CHRONIC COMBINED SYSTOLIC AND DIASTOLIC HEART FAILURE, NYHA CLASS 3 (MULTI): ICD-10-CM

## 2024-01-31 DIAGNOSIS — I25.10 CORONARY ARTERY DISEASE INVOLVING NATIVE CORONARY ARTERY OF NATIVE HEART, UNSPECIFIED WHETHER ANGINA PRESENT: ICD-10-CM

## 2024-01-31 DIAGNOSIS — I63.9 CEREBROVASCULAR ACCIDENT (CVA), UNSPECIFIED MECHANISM (MULTI): ICD-10-CM

## 2024-01-31 DIAGNOSIS — E11.59 TYPE 2 DIABETES MELLITUS WITH OTHER CIRCULATORY COMPLICATION, WITHOUT LONG-TERM CURRENT USE OF INSULIN (MULTI): ICD-10-CM

## 2024-01-31 DIAGNOSIS — I48.19 PERSISTENT ATRIAL FIBRILLATION (MULTI): ICD-10-CM

## 2024-01-31 DIAGNOSIS — Z00.00 ROUTINE GENERAL MEDICAL EXAMINATION AT HEALTH CARE FACILITY: ICD-10-CM

## 2024-01-31 DIAGNOSIS — N18.9 CHRONIC RENAL IMPAIRMENT, UNSPECIFIED CKD STAGE: ICD-10-CM

## 2024-01-31 DIAGNOSIS — Z91.030 BEE STING ALLERGY: ICD-10-CM

## 2024-01-31 DIAGNOSIS — I10 PRIMARY HYPERTENSION: ICD-10-CM

## 2024-01-31 DIAGNOSIS — F51.01 PRIMARY INSOMNIA: ICD-10-CM

## 2024-01-31 DIAGNOSIS — E08.21 DIABETES MELLITUS DUE TO UNDERLYING CONDITION WITH DIABETIC NEPHROPATHY, WITHOUT LONG-TERM CURRENT USE OF INSULIN (MULTI): ICD-10-CM

## 2024-01-31 LAB
ALBUMIN SERPL BCP-MCNC: 4 G/DL (ref 3.4–5)
ALP SERPL-CCNC: 53 U/L (ref 33–136)
ALT SERPL W P-5'-P-CCNC: 11 U/L (ref 10–52)
ANION GAP SERPL CALC-SCNC: 12 MMOL/L (ref 10–20)
AST SERPL W P-5'-P-CCNC: 17 U/L (ref 9–39)
BILIRUB SERPL-MCNC: 0.4 MG/DL (ref 0–1.2)
BNP SERPL-MCNC: 334 PG/ML (ref 0–99)
BUN SERPL-MCNC: 32 MG/DL (ref 6–23)
CALCIUM SERPL-MCNC: 8.8 MG/DL (ref 8.6–10.3)
CHLORIDE SERPL-SCNC: 108 MMOL/L (ref 98–107)
CHOLEST SERPL-MCNC: 126 MG/DL (ref 0–199)
CHOLESTEROL/HDL RATIO: 1.9
CO2 SERPL-SCNC: 24 MMOL/L (ref 21–32)
CREAT SERPL-MCNC: 1.64 MG/DL (ref 0.5–1.3)
CREAT UR-MCNC: 50.3 MG/DL (ref 20–370)
EGFRCR SERPLBLD CKD-EPI 2021: 42 ML/MIN/1.73M*2
GLUCOSE SERPL-MCNC: 153 MG/DL (ref 74–99)
HDLC SERPL-MCNC: 65.6 MG/DL
LDLC SERPL CALC-MCNC: 37 MG/DL
MAGNESIUM SERPL-MCNC: 2.32 MG/DL (ref 1.6–2.4)
MICROALBUMIN UR-MCNC: 12.3 MG/L
MICROALBUMIN/CREAT UR: 24.5 UG/MG CREAT
NON HDL CHOLESTEROL: 60 MG/DL (ref 0–149)
POTASSIUM SERPL-SCNC: 4.5 MMOL/L (ref 3.5–5.3)
PROT SERPL-MCNC: 6.3 G/DL (ref 6.4–8.2)
SODIUM SERPL-SCNC: 139 MMOL/L (ref 136–145)
TRIGL SERPL-MCNC: 118 MG/DL (ref 0–149)
TSH SERPL-ACNC: 0.5 MIU/L (ref 0.44–3.98)
VLDL: 24 MG/DL (ref 0–40)

## 2024-01-31 PROCEDURE — 83880 ASSAY OF NATRIURETIC PEPTIDE: CPT

## 2024-01-31 PROCEDURE — 84443 ASSAY THYROID STIM HORMONE: CPT

## 2024-01-31 PROCEDURE — 82043 UR ALBUMIN QUANTITATIVE: CPT

## 2024-01-31 PROCEDURE — 83036 HEMOGLOBIN GLYCOSYLATED A1C: CPT

## 2024-01-31 PROCEDURE — 36415 COLL VENOUS BLD VENIPUNCTURE: CPT

## 2024-01-31 PROCEDURE — 80061 LIPID PANEL: CPT

## 2024-01-31 PROCEDURE — 80053 COMPREHEN METABOLIC PANEL: CPT

## 2024-01-31 PROCEDURE — 83735 ASSAY OF MAGNESIUM: CPT

## 2024-01-31 PROCEDURE — 82652 VIT D 1 25-DIHYDROXY: CPT

## 2024-01-31 PROCEDURE — 82570 ASSAY OF URINE CREATININE: CPT

## 2024-02-01 LAB
EST. AVERAGE GLUCOSE BLD GHB EST-MCNC: 128 MG/DL
HBA1C MFR BLD: 6.1 %

## 2024-02-02 LAB — 1,25(OH)2D3 SERPL-MCNC: 32.2 PG/ML (ref 19.9–79.3)

## 2024-02-06 ENCOUNTER — OFFICE VISIT (OUTPATIENT)
Dept: PAIN MANAGEMENT | Age: 79
End: 2024-02-06
Payer: MEDICARE

## 2024-02-06 VITALS
SYSTOLIC BLOOD PRESSURE: 129 MMHG | RESPIRATION RATE: 16 BRPM | OXYGEN SATURATION: 93 % | HEIGHT: 64 IN | WEIGHT: 151.9 LBS | TEMPERATURE: 98 F | DIASTOLIC BLOOD PRESSURE: 73 MMHG | HEART RATE: 73 BPM | BODY MASS INDEX: 25.93 KG/M2

## 2024-02-06 DIAGNOSIS — Z79.891 ENCOUNTER FOR LONG-TERM OPIATE ANALGESIC USE: ICD-10-CM

## 2024-02-06 DIAGNOSIS — M79.10 MYALGIA: ICD-10-CM

## 2024-02-06 DIAGNOSIS — R10.9 CHRONIC FLANK PAIN: ICD-10-CM

## 2024-02-06 DIAGNOSIS — M51.37 DEGENERATION OF LUMBAR OR LUMBOSACRAL INTERVERTEBRAL DISC: ICD-10-CM

## 2024-02-06 DIAGNOSIS — S33.9XXA CHRONIC OR OLD SPRAIN OF LUMBOSACRAL LIGAMENT: ICD-10-CM

## 2024-02-06 DIAGNOSIS — S33.5XXD LUMBAR SPRAIN, SUBSEQUENT ENCOUNTER: ICD-10-CM

## 2024-02-06 DIAGNOSIS — G89.29 CHRONIC FLANK PAIN: ICD-10-CM

## 2024-02-06 DIAGNOSIS — G89.4 CHRONIC PAIN SYNDROME: Primary | ICD-10-CM

## 2024-02-06 DIAGNOSIS — M51.26 DISPLACEMENT OF LUMBAR INTERVERTEBRAL DISC WITHOUT MYELOPATHY: ICD-10-CM

## 2024-02-06 PROCEDURE — 1036F TOBACCO NON-USER: CPT | Performed by: PHYSICIAN ASSISTANT

## 2024-02-06 PROCEDURE — 3074F SYST BP LT 130 MM HG: CPT | Performed by: PHYSICIAN ASSISTANT

## 2024-02-06 PROCEDURE — 99213 OFFICE O/P EST LOW 20 MIN: CPT | Performed by: PHYSICIAN ASSISTANT

## 2024-02-06 PROCEDURE — G8417 CALC BMI ABV UP PARAM F/U: HCPCS | Performed by: PHYSICIAN ASSISTANT

## 2024-02-06 PROCEDURE — 3078F DIAST BP <80 MM HG: CPT | Performed by: PHYSICIAN ASSISTANT

## 2024-02-06 PROCEDURE — G8484 FLU IMMUNIZE NO ADMIN: HCPCS | Performed by: PHYSICIAN ASSISTANT

## 2024-02-06 PROCEDURE — G8427 DOCREV CUR MEDS BY ELIG CLIN: HCPCS | Performed by: PHYSICIAN ASSISTANT

## 2024-02-06 PROCEDURE — 1123F ACP DISCUSS/DSCN MKR DOCD: CPT | Performed by: PHYSICIAN ASSISTANT

## 2024-02-06 RX ORDER — BACLOFEN 10 MG/1
10 TABLET ORAL 2 TIMES DAILY
Qty: 60 TABLET | Refills: 2 | Status: SHIPPED | OUTPATIENT
Start: 2024-02-06 | End: 2024-03-07

## 2024-02-06 RX ORDER — LIDOCAINE 50 MG/G
OINTMENT TOPICAL
Qty: 70.88 G | Refills: 2 | Status: SHIPPED | OUTPATIENT
Start: 2024-02-06

## 2024-02-06 RX ORDER — OXYCODONE AND ACETAMINOPHEN 7.5; 325 MG/1; MG/1
1 TABLET ORAL 3 TIMES DAILY PRN
Qty: 90 TABLET | Refills: 0 | Status: SHIPPED | OUTPATIENT
Start: 2024-02-06 | End: 2024-03-07

## 2024-02-06 NOTE — PROGRESS NOTES
[facility]  [address] [place] [id number]    Follow up Note      Geoff Hardy     Date of Visit:  2024    CC:  Patient presents for follow up   Chief Complaint   Patient presents with    Follow-up     Low back pain               HPI:    Pain is  unchanged.    Appropriate analgesia with current medications regimen: yes.    Change in quality of symptoms:no.    Medication side effects:none.   Recent diagnostic testing:none.   Recent interventional procedures: None.    He has been on anticoagulation medications to include Eliquis and has not been on herbal supplements.  He is not diabetic.     Imagin2022 CT abdomen/pelvis    IMPRESSION:   1. Multiple small nonobstructing bilateral calcified calyceal calculi.   2. No evidence for other calcified collecting system calculi or   obstruction.   3. Hypodense posterior right upper pole renal cortical lesion most   likely a cyst. No further evaluation is required*.   4. Streaky soft tissue densities in the bilateral perinephric fat   compatible with active or, more likely, remote inflammation.   5. No evidence of mass, lymphadenopathy or inflammatory process.   6. Dense atherosclerotic calcification throughout normal caliber   abdominal aorta.       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 Degenerative disc disease, spondylosis causing stenosis. Probable large extruded disc at L3-4. Spinal listhesis L4 and L5 exacerbating the stenosis  EMG dated 3/24/2016     L5 radiculopathy  CT dated 2016 lumbar spine demonstrates degenerative spinal stenosis that is severe at L3-4 L4-5 and L5-S1 levels                                        Potential Aberrant Drug-Related Behavior: no     Urine Drug Screenin2018 buccal consistent  2019 buccal consistent  2019 consistent  10/2019 consistent for oxycodone, metabolites, and benzodiazapines  2020

## 2024-02-06 NOTE — PROGRESS NOTES
Amy Hardy presents to the [facility] on 2/6/2024. Amy is complaining of pain low back. The pain is constant. The pain is described as shooting, stabbing, sharp, and numb. Pain is rated on his best day at a 6, on his worst day at a 10, and on average at a 8 on the VAS scale. He took his last dose of Voltaren Gel and Lidocaine ointment, Percocet  today.     Any procedures since your last visit: No.    Pacemaker or defibrillator: Yes managed by Dr. Beasley.    He is not on NSAIDS and is  on anticoagulation medications to include Eliquis and is managed by Dr. Chan.     Medication Contract and Consent for Opioid Use Documents Filed       Patient Documents       Type of Document Status Date Received Received By Description    Medication Contract Received 5/24/2021  9:00 AM PANCHO ORELLANA Medication Contract    Medication Contract Received 5/17/2022  3:39 PM [facility]    Medication Contract Received 4/26/2023  4:43 PM AMY ROBLES medication contract                    /73   Pulse 73   Temp 98 °F (36.7 °C) (Infrared)   Resp 16   Ht 1.626 m (5' 4\")   Wt 68.9 kg (151 lb 14.4 oz)   SpO2 93%   BMI 26.07 kg/m²      No LMP for male patient.

## 2024-02-07 ENCOUNTER — OFFICE VISIT (OUTPATIENT)
Dept: PRIMARY CARE | Facility: CLINIC | Age: 79
End: 2024-02-07
Payer: MEDICARE

## 2024-02-07 VITALS
DIASTOLIC BLOOD PRESSURE: 67 MMHG | OXYGEN SATURATION: 98 % | TEMPERATURE: 97.6 F | BODY MASS INDEX: 26.09 KG/M2 | SYSTOLIC BLOOD PRESSURE: 122 MMHG | RESPIRATION RATE: 16 BRPM | HEIGHT: 65 IN | WEIGHT: 156.6 LBS | HEART RATE: 64 BPM

## 2024-02-07 DIAGNOSIS — E34.9 TESTOSTERONE DEFICIENCY: ICD-10-CM

## 2024-02-07 DIAGNOSIS — E11.51 TYPE 2 DIABETES MELLITUS WITH DIABETIC PERIPHERAL ANGIOPATHY WITHOUT GANGRENE, WITHOUT LONG-TERM CURRENT USE OF INSULIN (MULTI): ICD-10-CM

## 2024-02-07 DIAGNOSIS — S06.309A: ICD-10-CM

## 2024-02-07 DIAGNOSIS — E78.5 HYPERLIPIDEMIA, UNSPECIFIED HYPERLIPIDEMIA TYPE: ICD-10-CM

## 2024-02-07 DIAGNOSIS — D63.1 ANEMIA DUE TO STAGE 4 CHRONIC KIDNEY DISEASE TREATED WITH ERYTHROPOIETIN (MULTI): ICD-10-CM

## 2024-02-07 DIAGNOSIS — J44.9 CHRONIC OBSTRUCTIVE PULMONARY DISEASE, UNSPECIFIED COPD TYPE (MULTI): ICD-10-CM

## 2024-02-07 DIAGNOSIS — I50.84 END STAGE HEART FAILURE (MULTI): ICD-10-CM

## 2024-02-07 DIAGNOSIS — G95.19 OTHER VASCULAR MYELOPATHIES (MULTI): ICD-10-CM

## 2024-02-07 DIAGNOSIS — D69.6 THROMBOCYTOPENIA (CMS-HCC): ICD-10-CM

## 2024-02-07 DIAGNOSIS — N18.32 STAGE 3B CHRONIC KIDNEY DISEASE (MULTI): ICD-10-CM

## 2024-02-07 DIAGNOSIS — J96.02 ACUTE RESPIRATORY FAILURE WITH HYPERCAPNIA (MULTI): Primary | ICD-10-CM

## 2024-02-07 DIAGNOSIS — N18.4 ANEMIA DUE TO STAGE 4 CHRONIC KIDNEY DISEASE TREATED WITH ERYTHROPOIETIN (MULTI): ICD-10-CM

## 2024-02-07 PROBLEM — Z86.79 HISTORY OF HYPERTENSION: Status: ACTIVE | Noted: 2024-02-07

## 2024-02-07 PROBLEM — A41.9 SEPSIS (MULTI): Status: ACTIVE | Noted: 2024-02-07

## 2024-02-07 PROBLEM — R54 FRAILTY: Status: ACTIVE | Noted: 2024-02-07
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
## 2024-02-07 PROBLEM — D75.89 MACROCYTOSIS: Status: ACTIVE | Noted: 2024-02-07

## 2024-02-07 PROBLEM — F10.20 CONTINUOUS ALCOHOL DEPENDENCE (MULTI): Status: RESOLVED | Noted: 2023-07-27 | Resolved: 2024-02-07

## 2024-02-07 PROBLEM — T50.901A ACCIDENTAL OVERDOSE: Status: ACTIVE | Noted: 2024-02-07

## 2024-02-07 PROBLEM — M79.673 HEEL PAIN: Status: ACTIVE | Noted: 2024-02-07

## 2024-02-07 PROBLEM — R09.89 CAROTID BRUIT: Status: ACTIVE | Noted: 2024-02-07

## 2024-02-07 PROBLEM — Z86.79 HISTORY OF INTRAVENTRICULAR HEMORRHAGE: Status: ACTIVE | Noted: 2024-02-07

## 2024-02-07 PROBLEM — K76.9 LESION OF LIVER: Status: ACTIVE | Noted: 2024-02-07

## 2024-02-07 PROCEDURE — 1160F RVW MEDS BY RX/DR IN RCRD: CPT | Performed by: INTERNAL MEDICINE

## 2024-02-07 PROCEDURE — 1126F AMNT PAIN NOTED NONE PRSNT: CPT | Performed by: INTERNAL MEDICINE

## 2024-02-07 PROCEDURE — 1036F TOBACCO NON-USER: CPT | Performed by: INTERNAL MEDICINE

## 2024-02-07 PROCEDURE — 99215 OFFICE O/P EST HI 40 MIN: CPT | Performed by: INTERNAL MEDICINE

## 2024-02-07 PROCEDURE — 90471 IMMUNIZATION ADMIN: CPT | Performed by: INTERNAL MEDICINE

## 2024-02-07 PROCEDURE — 1159F MED LIST DOCD IN RCRD: CPT | Performed by: INTERNAL MEDICINE

## 2024-02-07 PROCEDURE — 90715 TDAP VACCINE 7 YRS/> IM: CPT | Performed by: INTERNAL MEDICINE

## 2024-02-07 PROCEDURE — 3078F DIAST BP <80 MM HG: CPT | Performed by: INTERNAL MEDICINE

## 2024-02-07 PROCEDURE — 1157F ADVNC CARE PLAN IN RCRD: CPT | Performed by: INTERNAL MEDICINE

## 2024-02-07 PROCEDURE — 3074F SYST BP LT 130 MM HG: CPT | Performed by: INTERNAL MEDICINE

## 2024-02-07 PROCEDURE — 1123F ACP DISCUSS/DSCN MKR DOCD: CPT | Performed by: INTERNAL MEDICINE

## 2024-02-07 RX ORDER — TAMSULOSIN HYDROCHLORIDE 0.4 MG/1
0.4 CAPSULE ORAL EVERY MORNING
Qty: 90 CAPSULE | Refills: 1 | Status: SHIPPED | OUTPATIENT
Start: 2024-02-07

## 2024-02-07 SDOH — ECONOMIC STABILITY: FOOD INSECURITY: WITHIN THE PAST 12 MONTHS, THE FOOD YOU BOUGHT JUST DIDN'T LAST AND YOU DIDN'T HAVE MONEY TO GET MORE.: NEVER TRUE

## 2024-02-07 SDOH — ECONOMIC STABILITY: FOOD INSECURITY: WITHIN THE PAST 12 MONTHS, YOU WORRIED THAT YOUR FOOD WOULD RUN OUT BEFORE YOU GOT MONEY TO BUY MORE.: NEVER TRUE

## 2024-02-07 ASSESSMENT — ENCOUNTER SYMPTOMS
DEPRESSION: 1
LOSS OF SENSATION IN FEET: 1
OCCASIONAL FEELINGS OF UNSTEADINESS: 1

## 2024-02-07 ASSESSMENT — LIFESTYLE VARIABLES
HOW MANY STANDARD DRINKS CONTAINING ALCOHOL DO YOU HAVE ON A TYPICAL DAY: 1 OR 2
HOW OFTEN DO YOU HAVE SIX OR MORE DRINKS ON ONE OCCASION: NEVER
HOW OFTEN DO YOU HAVE A DRINK CONTAINING ALCOHOL: 4 OR MORE TIMES A WEEK
SKIP TO QUESTIONS 9-10: 1
AUDIT-C TOTAL SCORE: 4

## 2024-02-07 ASSESSMENT — PATIENT HEALTH QUESTIONNAIRE - PHQ9
SUM OF ALL RESPONSES TO PHQ9 QUESTIONS 1 & 2: 0
1. LITTLE INTEREST OR PLEASURE IN DOING THINGS: NOT AT ALL
2. FEELING DOWN, DEPRESSED OR HOPELESS: NOT AT ALL

## 2024-02-07 NOTE — ASSESSMENT & PLAN NOTE
Patient will discuss options with urology. Patient may be a candidate for oral testosterone, he will follow up with Dr Barboza.

## 2024-02-07 NOTE — PROGRESS NOTES
Chief Complaint/HPI:    Patient was hospitalized 12/4  through 12/9. He did not require rehab. Patient had an acute metabolic encephalopathy per discharge, patient was advised to follow up with gastroenterology. He  has not seen a gastroenterologist. Patient continues to have right upper abdominal pain.     HTN: patient takes Entresto, carvedilol , Bumex, Spironolactone.  He does check his blood pressure at home and reports it is normally high in the morning, and then low after he takes the medication. [facility], he only takes Bumex 2 times per week now     hyperlipidemia: takes Zetia, he takes atorvastatin.      history of DM: patient currently takes Jardiance for CHF, he does check glucoses every morning (usually runs around 100).     carotid stenosis/ CAD/ aortic stenosis/ atrial fibrillation/ Cardiac arrest/PEA and mitral regurgitation/ CHF : patient sees Dr Portillo, patient has a pacer/ defibrillator. He takes Eliquis, he does not take ASA , [facility]. He now takes spironolactone, Entresto, carvedilol. 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 Patient has not followed with hematololgy     HECTOR: patient has used CPAP at home, patient is using new machine at his home. He reports that he is sleeping ok, though he does find the hoses uncomfortable.     hypogonadism: patient took testosterone injections per Dr Barboza, patient would like to follow up locally, he has not been able to get an alternative treatment. Patient wonders about alternative treatment. Patient has not had injections for several weeks.          Chronic pain issues: patient takes oxycodone per  Dr Aguirre at pain management in [place]. He also takes baclofen. He reports that his back pain really bothers him today, and it is radiating into his right hip. He also has right shoulder pain and knee pain (left knee)     Depression/anxiety: he takes Effexor and mirtazapine. He takes diazepam for anxiety, he takes this daily.  I have personally reviewed the OARRS report.  This report is scanned into the electronic medical record.  I have considered the risks of abuse, dependence, addiction and diversion.  I believe that it is clinically appropriate to prescribe this medication. Edgardo Gandara MD      anemia: patient had EGD and colonoscopy per Dr Romano, the anemia has been stable, he does take iron therapy. Iron studies within normal limits on most recent labs.  Patient wonders if he is anemic    ROS otherwise negative aside from what was mentioned above in HPI.      Patient Active Problem List   Diagnosis    Abdominal pain    Acute pneumonia    Acute sinusitis    Acute UTI    Anemia    Anxiety disorder    Panic disorder    Aortic valve stenosis    AV node dysfunction    Bilateral hearing loss    Bilateral sensorineural hearing loss    Bladder diverticulitis    CAD (coronary artery disease)    Carotid stenosis    Cerebral ventriculomegaly    Cervical neck pain with evidence of disc disease    Chronic combined systolic and diastolic heart failure, NYHA class 3 (CMS/HCC)    Chronic congestive heart failure (CMS/HCC)    Chronic renal impairment    Compressive atelectasis    CVA (cerebral vascular accident) (CMS/HCC)    Depression    Diabetes (CMS/HCC)    Disturbed hearing    DJD (degenerative joint disease)    Dysuria    Ear pain    Erectile dysfunction    Esophageal candidiasis (CMS/HCC)    GERD (gastroesophageal reflux disease)    Gout attack    Groin pain    HTN (hypertension)    Hyperlipidemia    Hyperuricemia    Insomnia    Cough with hemoptysis    Intracranial bleed (CMS/HCC)    Low back pain    Muscle  cramps    Constipation    Nausea in adult    Numbness of left hand    Osteoarthritis of right glenohumeral joint    Pacemaker    Pericardial effusion    Persistent atrial fibrillation (CMS/HCC)    Pleural effusion, bilateral    Positive occult stool blood test    Psoriasis of scalp    Rash    Shortness of breath at rest    HECTOR on CPAP    Sleep apnea    Testosterone deficiency    Tinnitus of left ear    Tiredness    Unspecified lesions of oral mucosa    URI, acute    Acquired cyst of kidney    Adjustment disorder with depressed mood    Alcohol abuse    Allergic rhinitis    Anemia due to stage 4 chronic kidney disease treated with erythropoietin (CMS/HCC)    Automatic implantable cardioverter-defibrillator in situ    Balanitis    Bundle branch block, left    CAD S/P percutaneous coronary angioplasty    Central perforation of tympanic membrane    Chronic maxillary sinusitis    Chronic or old sprain of lumbosacral ligament    Lumbar sprain    Congenital insufficiency of aortic valve    Degeneration of lumbar or lumbosacral intervertebral disc    Deviated nasal septum    Conductive hearing loss    Displacement of lumbar intervertebral disc without myelopathy    Dysfunction of eustachian tube    Elevated prostate specific antigen (PSA)    Hypertrophy of nasal turbinates    Loss of weight    Mild intermittent asthma without complication    Palpitation    Disorder of prostate    Prostatitis    S/P UPPP (uvulopalatopharyngoplasty)    Skin infection    SVT (supraventricular tachycardia)    Tympanic membrane conductive hearing loss    Urgency of urination    Ventricular tachycardia (CMS/HCC)    Routine general medical examination at health care facility    Disorder of middle ear    Male erectile dysfunction, unspecified    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HCC)    Chronic obstructive pulmonary disease, unspecified COPD type (CMS/HCC)    Type 2 diabetes mellitus with diabetic peripheral angiopathy without gangrene, without  long-term current use of insulin (CMS/[facility])    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 of unspecified duration, initial encounter (CMS/HCC)    End stage heart failure (CMS/[facility])    Stage 3b chronic kidney disease (CMS/[facility])    Thrombocytopenia (CMS/[facility])    Accidental overdose    Carotid bruit    Fall    Frailty    Heel pain    History of hypertension    History of intraventricular hemorrhage    Lesion of liver    Macrocytosis    Sepsis (CMS/[facility])    Acute respiratory failure with hypercapnia (CMS/[facility])         Past Medical History:   Diagnosis Date    Acute on chronic systolic (congestive) heart failure (CMS/HCC) 09/08/2022    Acute on chronic systolic CHF (congestive heart failure), NYHA class 3    Acute on chronic systolic CHF (congestive heart failure), NYHA class 3 (CMS/[facility]) 05/05/2023    CHF, acute (CMS/HCC) 05/05/2023    Nonrheumatic aortic (valve) stenosis 01/10/2022    Severe aortic stenosis    Nonrheumatic mitral (valve) insufficiency 09/14/2021    Severe mitral regurgitation by prior echocardiogram    Nonrheumatic mitral (valve) insufficiency 12/07/2022    Severe mitral regurgitation    Other long term (current) drug therapy     Long term current use of amiodarone    Personal history of diseases of the blood and blood-forming organs and certain disorders involving the immune mechanism     History of hemorrhagic diathesis    Personal history of other diseases of the circulatory system 09/20/2022    History of intraventricular hemorrhage    Personal history of other diseases of the circulatory system     History of cardiac disorder    Personal history of other diseases of the circulatory system     History of hypertension    Personal history of other diseases of the musculoskeletal system and connective tissue     History of arthritis    Severe mitral regurgitation 05/05/2023    Unspecified intracranial injury with loss of consciousness status unknown, initial encounter (CMS/HCC)  09/20/2022    Closed TBI (traumatic brain injury)     Past Surgical History:   Procedure Laterality Date    OTHER SURGICAL HISTORY  01/08/2020    Pacemaker insertion    OTHER SURGICAL HISTORY  01/08/2020    Knee replacement    OTHER SURGICAL HISTORY  01/08/2020    Cardiac catheterization with stent placement    OTHER SURGICAL HISTORY  12/06/2021    Cardioverter defibrillator insertion    OTHER SURGICAL HISTORY  12/06/2021    Angioplasty    OTHER SURGICAL HISTORY  01/30/2020    Spinal surgery     Social History     Social History Narrative    Not on file         ALLERGIES  Bee venom protein (honey bee), Cefaclor, Pentazocine, Pentazocine-acetaminophen, Pregabalin, Allopurinol, Ciprofloxacin, Sulfamethoxazole-trimethoprim, Metoprolol, and Statins-hmg-coa reductase inhibitors      MEDICATIONS  Current Outpatient Medications on File Prior to Visit   Medication Sig Dispense Refill    albuterol 90 mcg/actuation inhaler INHALE 1 (ONE) PUFF EVERY 4 HOURS AS NEEDED 8.5 g 8    atorvastatin (Lipitor) 80 mg tablet Take 1 tablet (80 mg) by mouth once daily at bedtime. 90 tablet 3    baclofen (Lioresal) 10 mg tablet Take 1 tablet (10 mg) by mouth 2 times a day as needed.      blood sugar diagnostic (True Metrix Glucose Test Strip) strip USE 1 STRIP 3 times daily      bumetanide (Bumex) 1 mg tablet Take 0.5 tablets (0.5 mg) by mouth 2 times a week. May take additional dose for weight gain 3#, increased swelling or shortness of breath. 30 tablet 1    carvedilol (Coreg) 25 mg tablet Take 1 tablet (25 mg) by mouth 2 times a day.      diazePAM (Valium) 5 mg tablet Take 1 tablet (5 mg) by mouth every 8 hours if needed for anxiety. 90 tablet 0    Eliquis 5 mg tablet Take 1 tablet (5 mg) by mouth 2 times a day. 180 tablet 3    Entresto 49-51 mg tablet Take 1 tablet by mouth 2 times a day.      EPINEPHrine 0.3 mg/0.3 mL injection syringe Inject 0.3 mL (0.3 mg) as directed if needed for anaphylaxis. 2 each 3    ezetimibe (Zetia) 10 mg  "tablet Take 1 tablet (10 mg) by mouth once daily. 90 tablet 3    ferrous gluconate 270 mg (27 mg iron) tablet Take 1 tablet by mouth once daily.      Jardiance 10 mg Take 1 tablet (10 mg) by mouth once daily. 30 tablet 11    lidocaine (Xylocaine) 5 % ointment APPLY TO THE AFFECTED AREA(S) AS NEEDED FOR PAIN to last 30 days      mirtazapine (Remeron) 30 mg tablet Take 1 tablet (30 mg) by mouth once daily at bedtime.      multivitamin with folic acid (One Daily Multivitamin) 400 mcg tablet Take 1 tablet by mouth once daily.      OneTouch Delica Plus Lancet 33 gauge misc Test THREE TIMES DAILY AS DIRECTED      oxyCODONE-acetaminophen (Percocet) 7.5-325 mg tablet Take 1 tablet by mouth 3 times daily as needed for Pain for up to 30 days. Max Daily Amount 3 tablets      pantoprazole (ProtoNix) 40 mg EC tablet TAKE 1 TABLET BY MOUTH EVERY DAY 90 tablet 3    spironolactone (Aldactone) 25 mg tablet Take 0.5 tablets (12.5 mg) by mouth once daily. 45 tablet 1    tadalafil 20 mg tablet Take 1 tablet (20 mg) by mouth once daily as needed for erectile dysfunction. 90 tablet 4    testosterone (Axiron) 30 mg/actuation (1.5 mL) topical solution Place 2 Pump on the skin once daily.      venlafaxine XR (Effexor-XR) 150 mg 24 hr capsule TAKE 1 CAPSULE BY MOUTH AFTER BREAKFAST      [DISCONTINUED] tamsulosin (Flomax) 0.4 mg 24 hr capsule Take 1 capsule (0.4 mg) by mouth once daily in the morning.      [DISCONTINUED] amoxicillin (Amoxil) 500 mg capsule Take 1 capsule (500 mg) by mouth 1 time.       No current facility-administered medications on file prior to visit.         PHYSICAL EXAM  /67 (BP Location: Left arm, Patient Position: Sitting, BP Cuff Size: Adult)   Pulse 64   Temp 36.4 °C (97.6 °F)   Resp 16   Ht 1.651 m (5' 5\")   Wt 71 kg (156 lb 9.6 oz)   SpO2 98%   BMI 26.06 kg/m²   Body mass index is 26.06 kg/m².  Constitutional   General appearance: Alert and in no acute distress. well developed, well nourished, within " normal limits of ideal weight,  accompanied by wife, walks with a cane. Pleasant male, NAD   Eyes   Inspection of eyes: Sclera and conjunctiva were normal.   Ears, Nose, Mouth, and Throat   Ears: Auricles:  Normal. No thyromegaly or neck masses, TM s: appear clear, small amount of cerumen removed from both external canals.   Pulmonary   Respiratory assessment: No respiratory distress, normal respiratory rhythm and effort.    Auscultation of lungs:  no rales or crackles were heard bilaterally. no rhonchi. no wheezing. diminished breath sounds throughout all lung fields   Cardiovascular   Auscultation of heart: The heart rate was normal. The rhythm was regular. Heart sounds: the heart sounds were distant, but normal S1 and normal S2. A grade 2 systolic murmur was heard at the RUSB. A grade 1 systolic murmur was heard at the LUSB. Murmur radiates to the carotids, this is not new.    Exam for edema:  no ankle edema on the bilateral lower extremities, no pretibial edema, no knee edema and no sacrum edema.   Lymphatic   Palpation of lymph nodes in neck: No cervical lymphadenopathy.   Musculoskeletal pain in the right para lumbar region.   Neurologic   Cranial nerves: Nerves 2-12 were intact, no focal neuro defects. no facial asymmetry is present.   Psychiatric   Orientation: Oriented to person, place, and time.    Mood and affect: Normal. feels tired.    ASSESSMENT/PLAN  Problem List Items Addressed This Visit       Hyperlipidemia    Current Assessment & Plan     No med changes at present time         Relevant Orders    Lipid Panel    Comprehensive Metabolic Panel    CBC and Auto Differential    Testosterone deficiency    Current Assessment & Plan     Patient will discuss options with urology. Patient may be a candidate for oral testosterone, he will follow up with Dr Barboza.          Relevant Medications    tamsulosin (Flomax) 0.4 mg 24 hr capsule    Other Relevant Orders    Comprehensive Metabolic Panel    CBC and Auto  Differential    TSH with reflex to Free T4 if abnormal    Vitamin D 25-Hydroxy,Total (for eval of Vitamin D levels)    Anemia due to stage 4 chronic kidney disease treated with erythropoietin (CMS/HCC)    Current Assessment & Plan     Creatinine has improved, continue to monitor         Relevant Orders    Comprehensive Metabolic Panel    CBC and Auto Differential    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HCC)    Relevant Orders    Comprehensive Metabolic Panel    CBC and Auto Differential    Chronic obstructive pulmonary disease, unspecified COPD type (CMS/HCC)    Relevant Orders    Comprehensive Metabolic Panel    CBC and Auto Differential    Type 2 diabetes mellitus with diabetic peripheral angiopathy without gangrene, without long-term current use of insulin (CMS/HCC)    Current Assessment & Plan     He is stable, recheck labs as scheduled         Relevant Orders    Hemoglobin A1C    Comprehensive Metabolic Panel    CBC and Auto Differential    TSH with reflex to Free T4 if abnormal    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 of unspecified duration, initial encounter (CMS/HCC)    Relevant Orders    Comprehensive Metabolic Panel    CBC and Auto Differential    End stage heart failure (CMS/HCC)    Current Assessment & Plan     Patient is stable, no med changes         Relevant Medications    tamsulosin (Flomax) 0.4 mg 24 hr capsule    Other Relevant Orders    Comprehensive Metabolic Panel    CBC and Auto Differential    Albumin , Urine Random    Stage 3b chronic kidney disease (CMS/HCC)    Current Assessment & Plan     Renal function appears stable today, continue to monitor labs         Relevant Orders    Comprehensive Metabolic Panel    CBC and Auto Differential    Vitamin D 25-Hydroxy,Total (for eval of Vitamin D levels)    Thrombocytopenia (CMS/HCC)    Relevant Orders    Comprehensive Metabolic Panel    CBC and Auto Differential    Acute respiratory failure with hypercapnia (CMS/HCC) -  Primary    Relevant Orders    Comprehensive Metabolic Panel    CBC and Auto Differential     Follow up In 3 months, check labs prior to visit, follow up with urology    Edgardo Gandara MD

## 2024-02-15 ENCOUNTER — DOCUMENTATION (OUTPATIENT)
Dept: INFUSION THERAPY | Facility: HOSPITAL | Age: 79
End: 2024-02-15
Payer: COMMERCIAL

## 2024-02-15 ENCOUNTER — TELEPHONE (OUTPATIENT)
Dept: INFUSION THERAPY | Facility: HOSPITAL | Age: 79
End: 2024-02-15

## 2024-02-16 ENCOUNTER — HOSPITAL ENCOUNTER (OUTPATIENT)
Dept: CARDIOLOGY | Facility: HOSPITAL | Age: 79
Discharge: HOME | End: 2024-02-16
Payer: MEDICARE

## 2024-02-16 DIAGNOSIS — I47.29 OTHER VENTRICULAR TACHYCARDIA (MULTI): ICD-10-CM

## 2024-02-16 DIAGNOSIS — Z95.810 PRESENCE OF AUTOMATIC (IMPLANTABLE) CARDIAC DEFIBRILLATOR: ICD-10-CM

## 2024-02-16 PROCEDURE — 93295 DEV INTERROG REMOTE 1/2/MLT: CPT | Performed by: INTERNAL MEDICINE

## 2024-02-16 PROCEDURE — 93296 REM INTERROG EVL PM/IDS: CPT

## 2024-02-16 NOTE — PROGRESS NOTES
Spoke with patient today concerning diuretic dose.   I believe he should take  the bumex 1 mg 1  whole tablet twice a week.   He has swelling in only one foot.  He is not more sob.   Optivol did show fluid elevation but Cardiomems readings are stable.   He denies bleeding.    He needs follow up with PCP for ongoing issues with diarrhea.

## 2024-02-20 ENCOUNTER — APPOINTMENT (OUTPATIENT)
Dept: CARDIOLOGY | Facility: HOSPITAL | Age: 79
End: 2024-02-20
Payer: COMMERCIAL

## 2024-02-20 DIAGNOSIS — I50.22 CHRONIC SYSTOLIC HEART FAILURE (MULTI): ICD-10-CM

## 2024-02-20 RX ORDER — BUMETANIDE 1 MG/1
1 TABLET ORAL 2 TIMES WEEKLY
Qty: 24 TABLET | Refills: 3 | Status: SHIPPED | OUTPATIENT
Start: 2024-02-22 | End: 2025-02-21

## 2024-02-20 NOTE — TELEPHONE ENCOUNTER
Message was received by Sally, Patient's wife. Patient's Bumex has increased to 1 mg twice a week. He is also instructed to take an an extra dose today (3 doses total this week).  Jony's wife, Sally, verbalized understanding of this message.

## 2024-03-01 ENCOUNTER — APPOINTMENT (OUTPATIENT)
Dept: RADIOLOGY | Facility: HOSPITAL | Age: 79
DRG: 073 | End: 2024-03-01
Payer: MEDICARE

## 2024-03-01 ENCOUNTER — HOSPITAL ENCOUNTER (INPATIENT)
Facility: HOSPITAL | Age: 79
LOS: 2 days | Discharge: HOME | DRG: 073 | End: 2024-03-05
Attending: EMERGENCY MEDICINE | Admitting: FAMILY MEDICINE
Payer: MEDICARE

## 2024-03-01 ENCOUNTER — APPOINTMENT (OUTPATIENT)
Dept: CARDIOLOGY | Facility: HOSPITAL | Age: 79
DRG: 073 | End: 2024-03-01
Payer: MEDICARE

## 2024-03-01 DIAGNOSIS — I50.84 END STAGE HEART FAILURE (MULTI): ICD-10-CM
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
## 2024-03-01 DIAGNOSIS — R55 SYNCOPE, UNSPECIFIED SYNCOPE TYPE: ICD-10-CM

## 2024-03-01 DIAGNOSIS — R11.0 NAUSEA: ICD-10-CM

## 2024-03-01 DIAGNOSIS — R41.0 CONFUSION: Primary | ICD-10-CM

## 2024-03-01 LAB
ALBUMIN SERPL BCP-MCNC: 4.3 G/DL (ref 3.4–5)
ALP SERPL-CCNC: 60 U/L (ref 33–136)
ALT SERPL W P-5'-P-CCNC: 11 U/L (ref 10–52)
ANION GAP BLDV CALCULATED.4IONS-SCNC: 13 MMOL/L (ref 10–25)
ANION GAP SERPL CALC-SCNC: 12 MMOL/L (ref 10–20)
AST SERPL W P-5'-P-CCNC: 19 U/L (ref 9–39)
BASE EXCESS BLDV CALC-SCNC: -1.1 MMOL/L (ref -2–3)
BASOPHILS # BLD AUTO: 0.04 X10*3/UL (ref 0–0.1)
BASOPHILS NFR BLD AUTO: 0.7 %
BILIRUB SERPL-MCNC: 0.4 MG/DL (ref 0–1.2)
BNP SERPL-MCNC: 481 PG/ML (ref 0–99)
BODY TEMPERATURE: 37 DEGREES CELSIUS
BUN SERPL-MCNC: 28 MG/DL (ref 6–23)
CA-I BLDV-SCNC: 1.27 MMOL/L (ref 1.1–1.33)
CALCIUM SERPL-MCNC: 9.4 MG/DL (ref 8.6–10.3)
CARDIAC TROPONIN I PNL SERPL HS: 20 NG/L (ref 0–20)
CHLORIDE BLDV-SCNC: 105 MMOL/L (ref 98–107)
CHLORIDE SERPL-SCNC: 106 MMOL/L (ref 98–107)
CO2 SERPL-SCNC: 25 MMOL/L (ref 21–32)
CREAT SERPL-MCNC: 1.89 MG/DL (ref 0.5–1.3)
EGFRCR SERPLBLD CKD-EPI 2021: 36 ML/MIN/1.73M*2
EOSINOPHIL # BLD AUTO: 0.25 X10*3/UL (ref 0–0.4)
EOSINOPHIL NFR BLD AUTO: 4.2 %
ERYTHROCYTE [DISTWIDTH] IN BLOOD BY AUTOMATED COUNT: 13.7 % (ref 11.5–14.5)
GLUCOSE BLDV-MCNC: 126 MG/DL (ref 74–99)
GLUCOSE SERPL-MCNC: 130 MG/DL (ref 74–99)
HCO3 BLDV-SCNC: 25.3 MMOL/L (ref 22–26)
HCT VFR BLD AUTO: 43.3 % (ref 41–52)
HCT VFR BLD EST: 38 % (ref 41–52)
HGB BLD-MCNC: 14.5 G/DL (ref 13.5–17.5)
HGB BLDV-MCNC: 12.8 G/DL (ref 13.5–17.5)
IMM GRANULOCYTES # BLD AUTO: 0.02 X10*3/UL (ref 0–0.5)
IMM GRANULOCYTES NFR BLD AUTO: 0.3 % (ref 0–0.9)
INHALED O2 CONCENTRATION: 21 %
LACTATE BLDV-SCNC: 1 MMOL/L (ref 0.4–2)
LYMPHOCYTES # BLD AUTO: 1.36 X10*3/UL (ref 0.8–3)
LYMPHOCYTES NFR BLD AUTO: 22.8 %
MAGNESIUM SERPL-MCNC: 2.18 MG/DL (ref 1.6–2.4)
MCH RBC QN AUTO: 36 PG (ref 26–34)
MCHC RBC AUTO-ENTMCNC: 33.5 G/DL (ref 32–36)
MCV RBC AUTO: 107 FL (ref 80–100)
MONOCYTES # BLD AUTO: 0.52 X10*3/UL (ref 0.05–0.8)
MONOCYTES NFR BLD AUTO: 8.7 %
NEUTROPHILS # BLD AUTO: 3.77 X10*3/UL (ref 1.6–5.5)
NEUTROPHILS NFR BLD AUTO: 63.3 %
NRBC BLD-RTO: 0 /100 WBCS (ref 0–0)
OXYHGB MFR BLDV: 60.6 % (ref 45–75)
PCO2 BLDV: 48 MM HG (ref 41–51)
PH BLDV: 7.33 PH (ref 7.33–7.43)
PLATELET # BLD AUTO: 134 X10*3/UL (ref 150–450)
PO2 BLDV: 41 MM HG (ref 35–45)
POTASSIUM BLDV-SCNC: 4 MMOL/L (ref 3.5–5.3)
POTASSIUM SERPL-SCNC: 4.2 MMOL/L (ref 3.5–5.3)
PROT SERPL-MCNC: 7.2 G/DL (ref 6.4–8.2)
RBC # BLD AUTO: 4.03 X10*6/UL (ref 4.5–5.9)
SAO2 % BLDV: 62 % (ref 45–75)
SODIUM BLDV-SCNC: 139 MMOL/L (ref 136–145)
SODIUM SERPL-SCNC: 139 MMOL/L (ref 136–145)
WBC # BLD AUTO: 6 X10*3/UL (ref 4.4–11.3)

## 2024-03-01 PROCEDURE — 84132 ASSAY OF SERUM POTASSIUM: CPT | Performed by: EMERGENCY MEDICINE

## 2024-03-01 PROCEDURE — 70450 CT HEAD/BRAIN W/O DYE: CPT | Performed by: RADIOLOGY

## 2024-03-01 PROCEDURE — 2500000004 HC RX 250 GENERAL PHARMACY W/ HCPCS (ALT 636 FOR OP/ED): Performed by: EMERGENCY MEDICINE

## 2024-03-01 PROCEDURE — 96374 THER/PROPH/DIAG INJ IV PUSH: CPT

## 2024-03-01 PROCEDURE — 85025 COMPLETE CBC W/AUTO DIFF WBC: CPT | Performed by: EMERGENCY MEDICINE

## 2024-03-01 PROCEDURE — 87636 SARSCOV2 & INF A&B AMP PRB: CPT | Performed by: EMERGENCY MEDICINE

## 2024-03-01 PROCEDURE — 84484 ASSAY OF TROPONIN QUANT: CPT | Performed by: EMERGENCY MEDICINE

## 2024-03-01 PROCEDURE — 70450 CT HEAD/BRAIN W/O DYE: CPT

## 2024-03-01 PROCEDURE — 81003 URINALYSIS AUTO W/O SCOPE: CPT | Performed by: EMERGENCY MEDICINE

## 2024-03-01 PROCEDURE — 83735 ASSAY OF MAGNESIUM: CPT | Performed by: EMERGENCY MEDICINE

## 2024-03-01 PROCEDURE — 93005 ELECTROCARDIOGRAM TRACING: CPT

## 2024-03-01 PROCEDURE — 99285 EMERGENCY DEPT VISIT HI MDM: CPT | Mod: 25

## 2024-03-01 PROCEDURE — 83880 ASSAY OF NATRIURETIC PEPTIDE: CPT | Performed by: EMERGENCY MEDICINE

## 2024-03-01 PROCEDURE — 36415 COLL VENOUS BLD VENIPUNCTURE: CPT | Performed by: EMERGENCY MEDICINE

## 2024-03-01 RX ORDER — ONDANSETRON HYDROCHLORIDE 2 MG/ML
4 INJECTION, SOLUTION INTRAVENOUS ONCE
Status: COMPLETED | OUTPATIENT
Start: 2024-03-01 | End: 2024-03-01

## 2024-03-01 RX ADMIN — ONDANSETRON 4 MG: 2 INJECTION INTRAMUSCULAR; INTRAVENOUS at 23:38

## 2024-03-01 ASSESSMENT — LIFESTYLE VARIABLES
HAVE PEOPLE ANNOYED YOU BY CRITICIZING YOUR DRINKING: NO
EVER HAD A DRINK FIRST THING IN THE MORNING TO STEADY YOUR NERVES TO GET RID OF A HANGOVER: NO
EVER FELT BAD OR GUILTY ABOUT YOUR DRINKING: NO
HAVE YOU EVER FELT YOU SHOULD CUT DOWN ON YOUR DRINKING: NO

## 2024-03-01 ASSESSMENT — COLUMBIA-SUICIDE SEVERITY RATING SCALE - C-SSRS
6. HAVE YOU EVER DONE ANYTHING, STARTED TO DO ANYTHING, OR PREPARED TO DO ANYTHING TO END YOUR LIFE?: NO
2. HAVE YOU ACTUALLY HAD ANY THOUGHTS OF KILLING YOURSELF?: NO
1. IN THE PAST MONTH, HAVE YOU WISHED YOU WERE DEAD OR WISHED YOU COULD GO TO SLEEP AND NOT WAKE UP?: NO

## 2024-03-01 ASSESSMENT — PAIN - FUNCTIONAL ASSESSMENT: PAIN_FUNCTIONAL_ASSESSMENT: 0-10

## 2024-03-01 ASSESSMENT — PAIN SCALES - GENERAL: PAINLEVEL_OUTOF10: 0 - NO PAIN

## 2024-03-02 ENCOUNTER — APPOINTMENT (OUTPATIENT)
Dept: RADIOLOGY | Facility: HOSPITAL | Age: 79
DRG: 073 | End: 2024-03-02
Payer: MEDICARE

## 2024-03-02 PROBLEM — R26.2 AMBULATORY DYSFUNCTION: Status: ACTIVE | Noted: 2024-03-02

## 2024-03-02 LAB
ANION GAP SERPL CALC-SCNC: 9 MMOL/L (ref 10–20)
APPEARANCE UR: CLEAR
BILIRUB UR STRIP.AUTO-MCNC: NEGATIVE MG/DL
BUN SERPL-MCNC: 27 MG/DL (ref 6–23)
CALCIUM SERPL-MCNC: 8.9 MG/DL (ref 8.6–10.3)
CHLORIDE SERPL-SCNC: 109 MMOL/L (ref 98–107)
CO2 SERPL-SCNC: 27 MMOL/L (ref 21–32)
COLOR UR: ABNORMAL
CREAT SERPL-MCNC: 1.86 MG/DL (ref 0.5–1.3)
EGFRCR SERPLBLD CKD-EPI 2021: 36 ML/MIN/1.73M*2
ERYTHROCYTE [DISTWIDTH] IN BLOOD BY AUTOMATED COUNT: 13.8 % (ref 11.5–14.5)
FLUAV RNA RESP QL NAA+PROBE: NOT DETECTED
FLUBV RNA RESP QL NAA+PROBE: NOT DETECTED
GLUCOSE BLD MANUAL STRIP-MCNC: 163 MG/DL (ref 74–99)
GLUCOSE SERPL-MCNC: 212 MG/DL (ref 74–99)
GLUCOSE UR STRIP.AUTO-MCNC: ABNORMAL MG/DL
HCT VFR BLD AUTO: 38.7 % (ref 41–52)
HGB BLD-MCNC: 12.3 G/DL (ref 13.5–17.5)
HOLD SPECIMEN: NORMAL
KETONES UR STRIP.AUTO-MCNC: NEGATIVE MG/DL
LEUKOCYTE ESTERASE UR QL STRIP.AUTO: NEGATIVE
MCH RBC QN AUTO: 34.6 PG (ref 26–34)
MCHC RBC AUTO-ENTMCNC: 31.8 G/DL (ref 32–36)
MCV RBC AUTO: 109 FL (ref 80–100)
NITRITE UR QL STRIP.AUTO: NEGATIVE
NRBC BLD-RTO: 0 /100 WBCS (ref 0–0)
PH UR STRIP.AUTO: 6 [PH]
PLATELET # BLD AUTO: 128 X10*3/UL (ref 150–450)
POTASSIUM SERPL-SCNC: 4 MMOL/L (ref 3.5–5.3)
PROT UR STRIP.AUTO-MCNC: NEGATIVE MG/DL
RBC # BLD AUTO: 3.55 X10*6/UL (ref 4.5–5.9)
RBC # UR STRIP.AUTO: NEGATIVE /UL
SARS-COV-2 RNA RESP QL NAA+PROBE: NOT DETECTED
SODIUM SERPL-SCNC: 141 MMOL/L (ref 136–145)
SP GR UR STRIP.AUTO: 1.01
UROBILINOGEN UR STRIP.AUTO-MCNC: <2 MG/DL
WBC # BLD AUTO: 6.8 X10*3/UL (ref 4.4–11.3)

## 2024-03-02 PROCEDURE — 85027 COMPLETE CBC AUTOMATED: CPT | Performed by: INTERNAL MEDICINE

## 2024-03-02 PROCEDURE — 71045 X-RAY EXAM CHEST 1 VIEW: CPT

## 2024-03-02 PROCEDURE — 97161 PT EVAL LOW COMPLEX 20 MIN: CPT | Mod: GP | Performed by: PHYSICAL THERAPIST

## 2024-03-02 PROCEDURE — 2500000001 HC RX 250 WO HCPCS SELF ADMINISTERED DRUGS (ALT 637 FOR MEDICARE OP): Performed by: FAMILY MEDICINE

## 2024-03-02 PROCEDURE — 97166 OT EVAL MOD COMPLEX 45 MIN: CPT | Mod: GO

## 2024-03-02 PROCEDURE — 74176 CT ABD & PELVIS W/O CONTRAST: CPT

## 2024-03-02 PROCEDURE — 2500000001 HC RX 250 WO HCPCS SELF ADMINISTERED DRUGS (ALT 637 FOR MEDICARE OP): Performed by: EMERGENCY MEDICINE

## 2024-03-02 PROCEDURE — 74176 CT ABD & PELVIS W/O CONTRAST: CPT | Performed by: RADIOLOGY

## 2024-03-02 PROCEDURE — 36415 COLL VENOUS BLD VENIPUNCTURE: CPT | Performed by: INTERNAL MEDICINE

## 2024-03-02 PROCEDURE — G0378 HOSPITAL OBSERVATION PER HR: HCPCS

## 2024-03-02 PROCEDURE — 99233 SBSQ HOSP IP/OBS HIGH 50: CPT | Performed by: FAMILY MEDICINE

## 2024-03-02 PROCEDURE — 80048 BASIC METABOLIC PNL TOTAL CA: CPT | Performed by: INTERNAL MEDICINE

## 2024-03-02 PROCEDURE — 2500000004 HC RX 250 GENERAL PHARMACY W/ HCPCS (ALT 636 FOR OP/ED): Performed by: INTERNAL MEDICINE

## 2024-03-02 PROCEDURE — 99222 1ST HOSP IP/OBS MODERATE 55: CPT | Performed by: INTERNAL MEDICINE

## 2024-03-02 PROCEDURE — 2500000001 HC RX 250 WO HCPCS SELF ADMINISTERED DRUGS (ALT 637 FOR MEDICARE OP): Performed by: INTERNAL MEDICINE

## 2024-03-02 PROCEDURE — 82947 ASSAY GLUCOSE BLOOD QUANT: CPT

## 2024-03-02 RX ORDER — OXYCODONE AND ACETAMINOPHEN 5; 325 MG/1; MG/1
1 TABLET ORAL ONCE
Status: COMPLETED | OUTPATIENT
Start: 2024-03-02 | End: 2024-03-02

## 2024-03-02 RX ORDER — TALC
3 POWDER (GRAM) TOPICAL NIGHTLY PRN
Status: DISCONTINUED | OUTPATIENT
Start: 2024-03-02 | End: 2024-03-05 | Stop reason: HOSPADM

## 2024-03-02 RX ORDER — GUAIFENESIN/DEXTROMETHORPHAN 100-10MG/5
5 SYRUP ORAL EVERY 4 HOURS PRN
Status: DISCONTINUED | OUTPATIENT
Start: 2024-03-02 | End: 2024-03-05 | Stop reason: HOSPADM

## 2024-03-02 RX ORDER — ONDANSETRON 4 MG/1
4 TABLET, FILM COATED ORAL EVERY 8 HOURS PRN
Status: DISCONTINUED | OUTPATIENT
Start: 2024-03-02 | End: 2024-03-05 | Stop reason: HOSPADM

## 2024-03-02 RX ORDER — POLYETHYLENE GLYCOL 3350 17 G/17G
17 POWDER, FOR SOLUTION ORAL 2 TIMES DAILY PRN
Status: DISCONTINUED | OUTPATIENT
Start: 2024-03-02 | End: 2024-03-05 | Stop reason: HOSPADM

## 2024-03-02 RX ORDER — ONDANSETRON HYDROCHLORIDE 2 MG/ML
4 INJECTION, SOLUTION INTRAVENOUS EVERY 8 HOURS PRN
Status: DISCONTINUED | OUTPATIENT
Start: 2024-03-02 | End: 2024-03-05 | Stop reason: HOSPADM

## 2024-03-02 RX ORDER — GUAIFENESIN 600 MG/1
600 TABLET, EXTENDED RELEASE ORAL EVERY 12 HOURS PRN
Status: DISCONTINUED | OUTPATIENT
Start: 2024-03-02 | End: 2024-03-05 | Stop reason: HOSPADM

## 2024-03-02 RX ORDER — OXYCODONE HYDROCHLORIDE 5 MG/1
7.5 TABLET ORAL EVERY 8 HOURS PRN
Status: DISCONTINUED | OUTPATIENT
Start: 2024-03-02 | End: 2024-03-05 | Stop reason: HOSPADM

## 2024-03-02 RX ORDER — DIAZEPAM 5 MG/1
5 TABLET ORAL EVERY 8 HOURS PRN
Status: DISCONTINUED | OUTPATIENT
Start: 2024-03-02 | End: 2024-03-05 | Stop reason: HOSPADM

## 2024-03-02 RX ORDER — ONDANSETRON 4 MG/1
4 TABLET, ORALLY DISINTEGRATING ORAL EVERY 8 HOURS PRN
Qty: 12 TABLET | Refills: 0 | Status: SHIPPED | OUTPATIENT
Start: 2024-03-02

## 2024-03-02 RX ORDER — NYSTATIN 100000 [USP'U]/G
1 POWDER TOPICAL 2 TIMES DAILY
COMMUNITY

## 2024-03-02 RX ORDER — BISMUTH SUBSALICYLATE 262 MG
1 TABLET,CHEWABLE ORAL DAILY
COMMUNITY

## 2024-03-02 RX ADMIN — ONDANSETRON 4 MG: 2 INJECTION INTRAMUSCULAR; INTRAVENOUS at 05:47

## 2024-03-02 RX ADMIN — OXYCODONE HYDROCHLORIDE AND ACETAMINOPHEN 1 TABLET: 5; 325 TABLET ORAL at 05:47

## 2024-03-02 RX ADMIN — OXYCODONE HYDROCHLORIDE 7.5 MG: 5 TABLET ORAL at 13:36

## 2024-03-02 RX ADMIN — APIXABAN 5 MG: 5 TABLET, FILM COATED ORAL at 18:28

## 2024-03-02 RX ADMIN — APIXABAN 5 MG: 5 TABLET, FILM COATED ORAL at 09:02

## 2024-03-02 SDOH — SOCIAL STABILITY: SOCIAL INSECURITY: DO YOU FEEL UNSAFE GOING BACK TO THE PLACE WHERE YOU ARE LIVING?: NO

## 2024-03-02 SDOH — SOCIAL STABILITY: SOCIAL INSECURITY: DOES ANYONE TRY TO KEEP YOU FROM HAVING/CONTACTING OTHER FRIENDS OR DOING THINGS OUTSIDE YOUR HOME?: NO

## 2024-03-02 SDOH — SOCIAL STABILITY: SOCIAL INSECURITY: HAVE YOU HAD THOUGHTS OF HARMING ANYONE ELSE?: NO

## 2024-03-02 SDOH — SOCIAL STABILITY: SOCIAL INSECURITY: DO YOU FEEL ANYONE HAS EXPLOITED OR TAKEN ADVANTAGE OF YOU FINANCIALLY OR OF YOUR PERSONAL PROPERTY?: NO

## 2024-03-02 SDOH — SOCIAL STABILITY: SOCIAL INSECURITY: ARE THERE ANY APPARENT SIGNS OF INJURIES/BEHAVIORS THAT COULD BE RELATED TO ABUSE/NEGLECT?: NO

## 2024-03-02 SDOH — SOCIAL STABILITY: SOCIAL INSECURITY: ARE YOU OR HAVE YOU BEEN THREATENED OR ABUSED PHYSICALLY, EMOTIONALLY, OR SEXUALLY BY ANYONE?: NO

## 2024-03-02 SDOH — SOCIAL STABILITY: SOCIAL INSECURITY: HAS ANYONE EVER THREATENED TO HURT YOUR FAMILY OR YOUR PETS?: NO

## 2024-03-02 SDOH — SOCIAL STABILITY: SOCIAL INSECURITY: WERE YOU ABLE TO COMPLETE ALL THE BEHAVIORAL HEALTH SCREENINGS?: YES

## 2024-03-02 SDOH — SOCIAL STABILITY: SOCIAL INSECURITY: ABUSE: ADULT

## 2024-03-02 ASSESSMENT — COGNITIVE AND FUNCTIONAL STATUS - GENERAL
WALKING IN HOSPITAL ROOM: A LOT
HELP NEEDED FOR BATHING: A LOT
EATING MEALS: A LITTLE
STANDING UP FROM CHAIR USING ARMS: A LOT
MOVING FROM LYING ON BACK TO SITTING ON SIDE OF FLAT BED WITH BEDRAILS: A LOT
STANDING UP FROM CHAIR USING ARMS: A LOT
DAILY ACTIVITIY SCORE: 15
TOILETING: A LOT
CLIMB 3 TO 5 STEPS WITH RAILING: TOTAL
EATING MEALS: A LITTLE
MOBILITY SCORE: 12
PERSONAL GROOMING: A LITTLE
DRESSING REGULAR LOWER BODY CLOTHING: A LOT
WALKING IN HOSPITAL ROOM: A LOT
DRESSING REGULAR UPPER BODY CLOTHING: A LITTLE
PERSONAL GROOMING: A LITTLE
DRESSING REGULAR UPPER BODY CLOTHING: A LITTLE
DAILY ACTIVITIY SCORE: 15
CLIMB 3 TO 5 STEPS WITH RAILING: A LOT
MOVING FROM LYING ON BACK TO SITTING ON SIDE OF FLAT BED WITH BEDRAILS: A LOT
PERSONAL GROOMING: A LOT
HELP NEEDED FOR BATHING: A LOT
MOVING TO AND FROM BED TO CHAIR: A LOT
EATING MEALS: A LITTLE
STANDING UP FROM CHAIR USING ARMS: A LOT
MOVING FROM LYING ON BACK TO SITTING ON SIDE OF FLAT BED WITH BEDRAILS: A LITTLE
MOVING TO AND FROM BED TO CHAIR: A LOT
MOVING TO AND FROM BED TO CHAIR: A LOT
MOBILITY SCORE: 12
TOILETING: A LOT
DAILY ACTIVITIY SCORE: 13
MOVING TO AND FROM BED TO CHAIR: A LOT
WALKING IN HOSPITAL ROOM: A LOT
HELP NEEDED FOR BATHING: A LOT
HELP NEEDED FOR BATHING: A LOT
CLIMB 3 TO 5 STEPS WITH RAILING: A LOT
TURNING FROM BACK TO SIDE WHILE IN FLAT BAD: A LOT
PATIENT BASELINE BEDBOUND: NO
DRESSING REGULAR LOWER BODY CLOTHING: A LOT
CLIMB 3 TO 5 STEPS WITH RAILING: A LOT
PERSONAL GROOMING: A LITTLE
TURNING FROM BACK TO SIDE WHILE IN FLAT BAD: A LOT
TURNING FROM BACK TO SIDE WHILE IN FLAT BAD: A LOT
DRESSING REGULAR LOWER BODY CLOTHING: A LOT
WALKING IN HOSPITAL ROOM: A LOT
MOVING FROM LYING ON BACK TO SITTING ON SIDE OF FLAT BED WITH BEDRAILS: A LOT
TOILETING: A LOT
EATING MEALS: A LITTLE
TURNING FROM BACK TO SIDE WHILE IN FLAT BAD: A LOT
MOBILITY SCORE: 12
MOBILITY SCORE: 12
STANDING UP FROM CHAIR USING ARMS: A LOT
DRESSING REGULAR UPPER BODY CLOTHING: A LITTLE
TOILETING: A LOT
DRESSING REGULAR UPPER BODY CLOTHING: A LOT
DAILY ACTIVITIY SCORE: 15
DRESSING REGULAR LOWER BODY CLOTHING: A LOT

## 2024-03-02 ASSESSMENT — PATIENT HEALTH QUESTIONNAIRE - PHQ9
2. FEELING DOWN, DEPRESSED OR HOPELESS: NOT AT ALL
1. LITTLE INTEREST OR PLEASURE IN DOING THINGS: NOT AT ALL
SUM OF ALL RESPONSES TO PHQ9 QUESTIONS 1 & 2: 0

## 2024-03-02 ASSESSMENT — LIFESTYLE VARIABLES
AUDIT-C TOTAL SCORE: 3
SKIP TO QUESTIONS 9-10: 1
AUDIT-C TOTAL SCORE: 3
HOW OFTEN DO YOU HAVE 6 OR MORE DRINKS ON ONE OCCASION: NEVER
HOW OFTEN DO YOU HAVE A DRINK CONTAINING ALCOHOL: 2-3 TIMES A WEEK
HOW MANY STANDARD DRINKS CONTAINING ALCOHOL DO YOU HAVE ON A TYPICAL DAY: 1 OR 2

## 2024-03-02 ASSESSMENT — PAIN SCALES - GENERAL
PAINLEVEL_OUTOF10: 6
PAINLEVEL_OUTOF10: 3
PAINLEVEL_OUTOF10: 4
PAINLEVEL_OUTOF10: 4
PAINLEVEL_OUTOF10: 0 - NO PAIN
PAINLEVEL_OUTOF10: 9

## 2024-03-02 ASSESSMENT — ACTIVITIES OF DAILY LIVING (ADL)
ASSISTIVE_DEVICE: DENTURES UPPER;WALKER
GROOMING: NEEDS ASSISTANCE
HEARING - RIGHT EAR: FUNCTIONAL
PATIENT'S MEMORY ADEQUATE TO SAFELY COMPLETE DAILY ACTIVITIES?: YES
BATHING: NEEDS ASSISTANCE
ADEQUATE_TO_COMPLETE_ADL: YES
JUDGMENT_ADEQUATE_SAFELY_COMPLETE_DAILY_ACTIVITIES: YES
TOILETING: NEEDS ASSISTANCE
HEARING - LEFT EAR: FUNCTIONAL
LACK_OF_TRANSPORTATION: NO
WALKS IN HOME: NEEDS ASSISTANCE
DRESSING YOURSELF: NEEDS ASSISTANCE
FEEDING YOURSELF: INDEPENDENT

## 2024-03-02 ASSESSMENT — PAIN - FUNCTIONAL ASSESSMENT
PAIN_FUNCTIONAL_ASSESSMENT: 0-10

## 2024-03-02 ASSESSMENT — PAIN DESCRIPTION - ORIENTATION: ORIENTATION: RIGHT

## 2024-03-02 ASSESSMENT — PAIN DESCRIPTION - LOCATION: LOCATION: SHOULDER

## 2024-03-02 NOTE — ED PROVIDER NOTES
"Jony Javier is a 78 y.o. patient presenting to the ED for increased confusion.  The patient has been more confused than usual last night and today.  Family state he was \"completely unresponsive\" all day today.  This is further described as talking less than normal however he was still able to go out to the garage and work today.  The patient states he remembers eating a sandwich for dinner but does not remember what was happening right before coming to the hospital.  He denies any recent fever or illness.  Family also expressed concern about a wound that he has had in his groin since he ripped a dressing off that he was discharged with from his last hospitalization.  He has had intermittent bleeding from this.  Patient is in pain management.  He is prescribed Percocet 7.5 mg tablets.  He has had 2 times half tablet over the course of the day today.  These are managed by his wife.  Last night he had 1/2 of a tablet before bed.  Patient has chronic pain in his back and right shoulder.  He reports that this is unchanged from baseline.    Additional History Obtained from: Wife at bedside  Limitations to History: Patient was not able to provide details which are also somewhat confusing from family members.  ------------------------------------------------------------------------------------------------------------------------------------------  Physical Exam:  Appearance: Alert, cooperative, oriented to self, location.  Unsure of the date.  Skin: Warm, dry, appropriate color for ethnicity.  Eyes: Cornea clear. No scleral icterus or injection.   ENT: Mucous membranes moist.  Pulmonary: No accessory muscle use or stridor. Clear lung sounds bilaterally without rhonchi or wheezing.   Cardiac: Heart sounds regular without murmur. B/L radial pulses full and symmetric.   Abdomen: Soft, not tender.  No rebound or guarding.   : Mild erythema in the groin creases.  No open wounds.  No bleeding.  No " swelling.  Musculoskeletal: No gross deformities.   Neurological: Face symmetrical. Voice clear. Appropriately conversant.  Oriented to self and location.  Equal  strength bilateral upper extremities.  No drift in upper or lower extremities.  Sensation grossly intact x 4.  Psychiatric: Appropriate mood and affect.    Medical Decision Making:  Chronic Medical Conditions Significantly Affecting Care:  has a past medical history of Acute on chronic systolic (congestive) heart failure (CMS/HCC) (09/08/2022), Acute on chronic systolic CHF (congestive heart failure), NYHA class 3 (CMS/HCC) (05/05/2023), CHF, acute (CMS/HCC) (05/05/2023), Nonrheumatic aortic (valve) stenosis (01/10/2022), Nonrheumatic mitral (valve) insufficiency (09/14/2021), Nonrheumatic mitral (valve) insufficiency (12/07/2022), Other long term (current) drug therapy, Personal history of diseases of the blood and blood-forming organs and certain disorders involving the immune mechanism, Personal history of other diseases of the circulatory system (09/20/2022), Personal history of other diseases of the circulatory system, Personal history of other diseases of the circulatory system, Personal history of other diseases of the musculoskeletal system and connective tissue, Severe mitral regurgitation (05/05/2023), and Unspecified intracranial injury with loss of consciousness status unknown, initial encounter (CMS/HCC) (09/20/2022).    Social Determinants of Health Significantly Affecting Care: poor historian    Differential Diagnosis Considered but not limited to: Infection, electrolyte disturbance.  The patient does not have any reported history of trauma and does not have any exam findings suggestive of this.      External Records Reviewed:   I reviewed recent and relevant outside records including:     Independent Interpretation of Studies: The following studies were ordered as part of the emergency department work up and independently interpreted by  "me. See ED Course for details.    ED Course as of 03/02/24 1709   Fri Mar 01, 2024   2121 EKG performed at 2119 shows ventricular paced rhythm at a rate of 74.  There is good capture.  2 PVCs.  There are no ST elevations or depressions.  No STEMI. [SP]   2209 Venous full panel is normal pH, pCO2, lactate.  Otherwise unremarkable. [SP]   2246 CBC with mild thrombocytopenia and platelets of 134.  Otherwise normal.  Chemistry is showing abnormal renal function with creatinine of 1.8 and BUN of 28.  This is slightly worse than the patient's baseline which has been uptrending and most recently has been 1.6. [SP]   2247 BNP is mildly elevated at 481.  Magnesium and troponin are normal. [SP]   2247 CT head by my interpretation does not show any evidence of hemorrhage.  Confirmed by radiology to be without acute findings. [SP]   Sat Mar 02, 2024   0452 Further discussion with the patient's wife, she feels uncomfortable caring for him at home. She states he is too confused to go home. The patient says he feels \"not right in the head\" and cannot further describe this. He is appropriately conversant. He was able to ambulate with a walker which is his baseline. He is willing to consider rehab/nursing home placement if needed, but states the last time this happened he was in the hospital for a few days and got better. He is hoping this happens again, but if not is willing to go to some type of nursing facility. [SP]      ED Course User Index  [SP] Ivy Germain DO         Diagnoses as of 03/02/24 1709   Confusion - resolved   Nausea       Discussion of Management with Other Providers:   I discussed the patient/results with: Dr. Isabelle Germian DO  03/02/24 1709    "

## 2024-03-02 NOTE — DISCHARGE INSTRUCTIONS
Patient should seek medical attention immediately if condition should worsen, new symptoms develop , no further improvement or recurrence of presenting symptomatology, patient warned.

## 2024-03-02 NOTE — CARE PLAN
The patient's goals for the shift include      The clinical goals for the shift include chronic pain addressed/improvement.

## 2024-03-02 NOTE — PROGRESS NOTES
Physical Therapy    Physical Therapy Evaluation    Patient Name: Jony Javier  MRN: [id number]  Today's Date: 3/2/2024   Time Calculation  Start Time: 1216  Stop Time: 1227  Time Calculation (min): 11 min    Assessment/Plan   PT Assessment  PT Assessment Results: Decreased strength, Decreased endurance, Impaired balance, Decreased mobility, Decreased cognition, Pain  Rehab Prognosis: Good  Barriers to Discharge: none  Evaluation/Treatment Tolerance: Patient limited by pain, Patient limited by fatigue  Medical Staff Made Aware: Yes  End of Session Communication: Bedside nurse  Assessment Comment: Patient requires 2 assist for mobility, would benefit from continued therapy at discharge  End of Session Patient Position: Bed, 3 rail up, Alarm on  IP OR SWING BED PT PLAN  Inpatient or Swing Bed: Inpatient  PT Plan  Treatment/Interventions: Bed mobility, Transfer training, Gait training, Balance training, Strengthening, Endurance training, Therapeutic exercise, Therapeutic activity  PT Plan: Skilled PT  PT Frequency: 3 times per week  PT Discharge Recommendations: Moderate intensity level of continued care  PT - OK to Discharge: Yes (when medically cleared)      General Visit Information:  General  Reason for Referral: Dx: Weakness, Nausea, Abd pain, confusion, fall.  Referred By: Sanjana  Past Medical History Relevant to Rehab: CKD, HF, aortic stenosis, A fib, CAD  Co-Treatment:  (with OT for safety and mobility)  Prior to Session Communication: Bedside nurse  Patient Position Received: Bed, 3 rail up, Alarm on  General Comment: Seen in room 2308, tele    Home Living:  Home Living  Type of Home:  (Lives with wife in 1 level home, 5 steps to enter. Patient amb with FWW prn, fairly independent with ADLs.)    Prior Level of Function:       Precautions:  Precautions  Precautions Comment: falls    Vital Signs:     Objective     Pain:  Pain Assessment  Pain Assessment:  (reported generalized pain R side - did not rate,  along with RADHA LE distal numbness)    Cognition:  Cognition  Overall Cognitive Status:  (Oriented x3, mild confusion/forgetful, vague when answering questions at times)    General Assessments:      Activity Tolerance  Endurance: Tolerates less than 10 min exercise, no significant change in vital signs     Strength  Strength Comments: RADHA LE quads grossly 4-/5        Postural Control  Postural Control:  (sitting balance fair/fair+; standing balance fair- with AD and assist)          Functional Assessments:     Bed Mobility  Bed Mobility:  (supine to/from sit with Min assist x 2; cues for sequencing safety, balance, posture)  Transfers  Transfer:  (sit to stand with Min assist x 2 and FWW; cues for sequencing safety, balance, posture, wt shifting, AD use)  Ambulation/Gait Training  Ambulation/Gait Training Performed:  (Amb few side steps with Min assist x 2 and FWW; cues for sequencing safety, balance, posture, wt shifting, AD use)          Extremity/Trunk Assessments:                Outcome Measures:  [facility] Basic Mobility  Turning from your back to your side while in a flat bed without using bedrails: A little  Moving from lying on your back to sitting on the side of a flat bed without using bedrails: A lot  Moving to and from bed to chair (including a wheelchair): A lot  Standing up from a chair using your arms (e.g. wheelchair or bedside chair): A lot  To walk in hospital room: A lot  Climbing 3-5 steps with railing: Total  Basic Mobility - Total Score: 12                            Goals:  Encounter Problems       Encounter Problems (Active)       PT Problem       transfers       Start:  03/02/24    Expected End:  03/16/24       Patient will perform all transfers with CGA x1          gait       Start:  03/02/24    Expected End:  03/16/24       Patient will amb 50+ feet with CGA x1           strengthening       Start:  03/02/24    Expected End:  03/16/24       Patient will perform 20+ reps of AROM/RROM for RADHA LE's  to improve safety and functional independence              Pain - Adult            Education Documentation  Precautions, taught by Mary Ellen Alexis, PT at 3/2/2024  1:40 PM.  Learner: Patient  Readiness: Acceptance  Method: Explanation  Response: Needs Reinforcement    Mobility Training, taught by Mary Ellen Alexis, PT at 3/2/2024  1:40 PM.  Learner: Patient  Readiness: Acceptance  Method: Explanation  Response: Needs Reinforcement    Education Comments  No comments found.

## 2024-03-02 NOTE — PROGRESS NOTES
Jony Javier is a 78 y.o. male admitted for Ambulatory dysfunction. Pharmacy reviewed the patient's [id number] medications and allergies for accuracy.    The list below reflects the PTA list prior to pharmacy medication history. A summary a changes to the PTA medication list has been listed below. Please review each medication in order reconciliation for additional clarification and justification.    Source of information:  T2P wife     Medications added:  Multivitamin every day   Nystatin Powder AAA bid    Medications modified:  Tadalfil 20mg every day prn for ED --> every day since it helps with using his urinal     Medications to be removed:  Multivitamin with folic Acid    Medications of concern:      Prior to Admission Medications   Prescriptions Last Dose Informant Patient Reported? Taking?   EPINEPHrine 0.3 mg/0.3 mL injection syringe   No No   Sig: Inject 0.3 mL (0.3 mg) as directed if needed for anaphylaxis.   Eliquis 5 mg tablet   No No   Sig: Take 1 tablet (5 mg) by mouth 2 times a day.   Entresto 49-51 mg tablet   Yes No   Sig: Take 1 tablet by mouth 2 times a day.   Jardiance 10 mg   No No   Sig: Take 1 tablet (10 mg) by mouth once daily.   OneTouch Delica Plus Lancet 33 gauge misc   Yes No   Sig: Test THREE TIMES DAILY AS DIRECTED   albuterol 90 mcg/actuation inhaler   No No   Sig: INHALE 1 (ONE) PUFF EVERY 4 HOURS AS NEEDED   atorvastatin (Lipitor) 80 mg tablet   No No   Sig: Take 1 tablet (80 mg) by mouth once daily at bedtime.   baclofen (Lioresal) 10 mg tablet   Yes No   Sig: Take 1 tablet (10 mg) by mouth 2 times a day as needed.   blood sugar diagnostic (True Metrix Glucose Test Strip) strip   Yes No   Sig: USE 1 STRIP 3 times daily   bumetanide (Bumex) 1 mg tablet   No No   Sig: Take 1 tablet (1 mg) by mouth 2 times a week. May take additional dose for weight gain 3#, increased swelling or shortness of breath.   carvedilol (Coreg) 25 mg tablet   Yes No   Sig: Take 1 tablet (25 mg)  by mouth 2 times a day.   diazePAM (Valium) 5 mg tablet   No No   Sig: Take 1 tablet (5 mg) by mouth every 8 hours if needed for anxiety.   ezetimibe (Zetia) 10 mg tablet   No No   Sig: Take 1 tablet (10 mg) by mouth once daily.   ferrous gluconate 270 mg (27 mg iron) tablet   Yes No   Sig: Take 1 tablet by mouth once daily.   lidocaine (Xylocaine) 5 % ointment   Yes No   Sig: APPLY TO THE AFFECTED AREA(S) AS NEEDED FOR PAIN to last 30 days   mirtazapine (Remeron) 30 mg tablet   Yes No   Sig: Take 1 tablet (30 mg) by mouth once daily at bedtime.   multivitamin with folic acid (One Daily Multivitamin) 400 mcg tablet   Yes No   Sig: Take 1 tablet by mouth once daily.   oxyCODONE-acetaminophen (Percocet) 7.5-325 mg tablet   Yes No   Sig: Take 1 tablet by mouth 3 times daily as needed for Pain for up to 30 days. Max Daily Amount 3 tablets   pantoprazole (ProtoNix) 40 mg EC tablet   No No   Sig: TAKE 1 TABLET BY MOUTH EVERY DAY   spironolactone (Aldactone) 25 mg tablet   No No   Sig: Take 0.5 tablets (12.5 mg) by mouth once daily.   tadalafil 20 mg tablet   No No   Sig: Take 1 tablet (20 mg) by mouth once daily as needed for erectile dysfunction.   tamsulosin (Flomax) 0.4 mg 24 hr capsule   No No   Sig: Take 1 capsule (0.4 mg) by mouth once daily in the morning.   testosterone (Axiron) 30 mg/actuation (1.5 mL) topical solution   Yes No   Sig: Place 2 Pump on the skin once daily.   venlafaxine XR (Effexor-XR) 150 mg 24 hr capsule   Yes No   Sig: TAKE 1 CAPSULE BY MOUTH AFTER BREAKFAST      Facility-Administered Medications: None       Martha Foley CPhT

## 2024-03-02 NOTE — H&P
[facility]    Hospital Medicine History & Physical     Assessment & Plan     ASSESSMENT    78M with hx of CKD Stage III, biventricular heart failure LV EF 45%, moderate aortic stenosis, afib on Eliquis, and CAD presents with generalized malaise, weakness, and unsteady gait.    PLAN    Generalized Weakness  Ambulatory Dysfunction  -Many vague complaints including generalized malaise, myalgias, nausea, and vague abdominal pain  -Workup in the ER including viral testing, UA, and routine labs unrevealing  -Unclear if any real pathology present, will obtain CT abdomen pelvis for workup of abdominal pain further  -Will need therapy consultations this patient is weaker than his baseline  PLAN  -CT abd/pelvis and CXR for further workup  -Hold diuretics today given concern for dehydration  -Physical therapy consultation  -Antiemetics, antipyretics, and pain medications as needed    Other Issues  -CKD Stage III: Cr at baseline  -Chronic biventricular heart failure LV EF 45%: Hold Bumex for today given concern for dehydration  -Afib: Home medications  -CAD: Home medications    DVT Prophy  -Home Eliquis    Disposition  -[facility]Sur      Luis Allan MD    History of Present Illness     Jony Javier is a 78 y.o. with hx of CKD Stage III, biventricular heart failure LV EF 45%, moderate aortic stenosis, afib on Eliquis, and CAD presents with generalized malaise.  Much of the history is taken from wife at bedside as patient is a poor historian.  Apparently patient was in the garage for a while yesterday and came in seemingly confused.  Was repeating himself a lot.  Wife is concerned that he may have fallen outside but patient is not sure if he did or not.  Currently patient is complaining of generalized abdominal pain worse in the right lower quadrant as well as myalgias and generalized malaise.  Denies fevers or chills.  Some nausea but no vomiting.  Denies diarrhea or constipation  issues.  No recent sick contacts.  In the ED, VSS.  Labs unrevealing.  Viral testing negative.  UA negative.  Tried to ambulate patient but did not do well due to generalized weakness.  Wife does not feel she can for patient currently.  Admitted to medicine.    Review of Systems     A Comprehensive greater than 10 system review of systems was carried out.  Pertinent positives and negatives are noted above.  Otherwise negative for contributory information.     Past Medical History     Past Medical History:   Diagnosis Date    Acute on chronic systolic (congestive) heart failure (CMS/HCC) 09/08/2022    Acute on chronic systolic CHF (congestive heart failure), NYHA class 3    Acute on chronic systolic CHF (congestive heart failure), NYHA class 3 (CMS/HCC) 05/05/2023    CHF, acute (CMS/HCC) 05/05/2023    Nonrheumatic aortic (valve) stenosis 01/10/2022    Severe aortic stenosis    Nonrheumatic mitral (valve) insufficiency 09/14/2021    Severe mitral regurgitation by prior echocardiogram    Nonrheumatic mitral (valve) insufficiency 12/07/2022    Severe mitral regurgitation    Other long term (current) drug therapy     Long term current use of amiodarone    Personal history of diseases of the blood and blood-forming organs and certain disorders involving the immune mechanism     History of hemorrhagic diathesis    Personal history of other diseases of the circulatory system 09/20/2022    History of intraventricular hemorrhage    Personal history of other diseases of the circulatory system     History of cardiac disorder    Personal history of other diseases of the circulatory system     History of hypertension    Personal history of other diseases of the musculoskeletal system and connective tissue     History of arthritis    Severe mitral regurgitation 05/05/2023    Unspecified intracranial injury with loss of consciousness status unknown, initial encounter (CMS/HCC) 09/20/2022    Closed TBI (traumatic brain injury)      Medications     No current facility-administered medications on file prior to encounter.     Current Outpatient Medications on File Prior to Encounter   Medication Sig Dispense Refill    albuterol 90 mcg/actuation inhaler INHALE 1 (ONE) PUFF EVERY 4 HOURS AS NEEDED 8.5 g 8    atorvastatin (Lipitor) 80 mg tablet Take 1 tablet (80 mg) by mouth once daily at bedtime. 90 tablet 3    baclofen (Lioresal) 10 mg tablet Take 1 tablet (10 mg) by mouth 2 times a day as needed.      blood sugar diagnostic (True Metrix Glucose Test Strip) strip USE 1 STRIP 3 times daily      bumetanide (Bumex) 1 mg tablet Take 1 tablet (1 mg) by mouth 2 times a week. May take additional dose for weight gain 3#, increased swelling or shortness of breath. 24 tablet 3    carvedilol (Coreg) 25 mg tablet Take 1 tablet (25 mg) by mouth 2 times a day.      diazePAM (Valium) 5 mg tablet Take 1 tablet (5 mg) by mouth every 8 hours if needed for anxiety. 90 tablet 0    Eliquis 5 mg tablet Take 1 tablet (5 mg) by mouth 2 times a day. 180 tablet 3    Entresto 49-51 mg tablet Take 1 tablet by mouth 2 times a day.      EPINEPHrine 0.3 mg/0.3 mL injection syringe Inject 0.3 mL (0.3 mg) as directed if needed for anaphylaxis. 2 each 3    ezetimibe (Zetia) 10 mg tablet Take 1 tablet (10 mg) by mouth once daily. 90 tablet 3    ferrous gluconate 270 mg (27 mg iron) tablet Take 1 tablet by mouth once daily.      Jardiance 10 mg Take 1 tablet (10 mg) by mouth once daily. 30 tablet 11    lidocaine (Xylocaine) 5 % ointment APPLY TO THE AFFECTED AREA(S) AS NEEDED FOR PAIN to last 30 days      mirtazapine (Remeron) 30 mg tablet Take 1 tablet (30 mg) by mouth once daily at bedtime.      multivitamin with folic acid (One Daily Multivitamin) 400 mcg tablet Take 1 tablet by mouth once daily.      OneTouch Delica Plus Lancet 33 gauge misc Test THREE TIMES DAILY AS DIRECTED      oxyCODONE-acetaminophen (Percocet) 7.5-325 mg tablet Take 1 tablet by mouth 3 times daily as  needed for Pain for up to 30 days. Max Daily Amount 3 tablets      pantoprazole (ProtoNix) 40 mg EC tablet TAKE 1 TABLET BY MOUTH EVERY DAY 90 tablet 3    spironolactone (Aldactone) 25 mg tablet Take 0.5 tablets (12.5 mg) by mouth once daily. 45 tablet 1    tadalafil 20 mg tablet Take 1 tablet (20 mg) by mouth once daily as needed for erectile dysfunction. 90 tablet 4    tamsulosin (Flomax) 0.4 mg 24 hr capsule Take 1 capsule (0.4 mg) by mouth once daily in the morning. 90 capsule 1    testosterone (Axiron) 30 mg/actuation (1.5 mL) topical solution Place 2 Pump on the skin once daily.      venlafaxine XR (Effexor-XR) 150 mg 24 hr capsule TAKE 1 CAPSULE BY MOUTH AFTER BREAKFAST        Past Surgical History     Past Surgical History:   Procedure Laterality Date    OTHER SURGICAL HISTORY  2020    Pacemaker insertion    OTHER SURGICAL HISTORY  2020    Knee replacement    OTHER SURGICAL HISTORY  2020    Cardiac catheterization with stent placement    OTHER SURGICAL HISTORY  2021    Cardioverter defibrillator insertion    OTHER SURGICAL HISTORY  2021    Angioplasty    OTHER SURGICAL HISTORY  2020    Spinal surgery     Family History   Reviewed and non-contributory to presenting complaints    Allergies     Allergies   Allergen Reactions    Bee Venom Protein (Honey Bee) Anaphylaxis and Swelling    Cefaclor Anaphylaxis    Pentazocine Hives and Rash     Rapid heart beats.    Pentazocine-Acetaminophen Hives and Rash     Rapid heart beats.     Rapid heart beats.    Pregabalin Anaphylaxis and Shortness of breath     Itching     Throat closing, puritis    Allopurinol Other    Ciprofloxacin Unknown    Sulfamethoxazole-Trimethoprim Other    Metoprolol Other     Fatigue    Statins-Hmg-Coa Reductase Inhibitors Other     Myalgia     Social History     Social History     Tobacco Use    Smoking status: Former     Types: Cigarettes     Quit date:      Years since quittin.2    Smokeless  "tobacco: Never   Substance Use Topics    Alcohol use: Yes     Alcohol/week: 14.0 standard drinks of alcohol     Types: 14 Cans of beer per week     Comment: 2 a night     Physical Exam   Blood pressure 136/82, pulse 88, temperature 36.8 °C (98.3 °F), temperature source Temporal, resp. rate 18, height 1.626 m (5' 4\"), weight 65.8 kg (145 lb), SpO2 98 %.    General: Ill appearing, cooperative with exam, in NAD.  HEENT: Atraumatic. No erythema in posterior pharynx.  Lymph: No cervical or inguinal lymphadenopathy.  Cardiac: RRR. No murmurs.  Lungs: CTAB. Nl WOB.  Abd: Non-tender. No rebound or gaurding. Nl bowel sounds.  Ext: No edema. 2+ pulses.  Skin: No rashes, abrasions, or contusions.  Psych: A&Ox3. Nl affect.  Neuro: 5/5 strength. Sensation intact.    Labs & Imaging     Reviewed and Pertinent results discussed in assessment and plan.      "

## 2024-03-02 NOTE — PROGRESS NOTES
Occupational Therapy    Evaluation    Patient Name: Jony Javier  MRN: [id number]  Today's Date: 3/2/2024  Time Calculation  Start Time: 1215  Stop Time: 1227  Time Calculation (min): 12 min        Assessment:  OT Assessment: OT eval completed. Pt .appears below functional baseline. Pt. current level of assist required  MIN A x2 and Min to Mod A. Pt. would benefit from further OT to ensure safe return to PLOF  Prognosis: Fair  Barriers to Discharge: None  Evaluation/Treatment Tolerance: Patient limited by fatigue, Patient limited by pain  End of Session Communication: Bedside nurse  End of Session Patient Position: Bed, 3 rail up, Alarm on  OT Assessment Results: Decreased ADL status, Decreased upper extremity strength, Decreased endurance, Decreased functional mobility  Prognosis: Fair  Barriers to Discharge: None  Evaluation/Treatment Tolerance: Patient limited by fatigue, Patient limited by pain  Strengths: Support and attitude of living partners  Barriers to Participation: Insight into problems    Past Medical History:   Diagnosis Date    Acute on chronic systolic (congestive) heart failure (CMS/Formerly [facility]) 09/08/2022    Acute on chronic systolic CHF (congestive heart failure), NYHA class 3    Acute on chronic systolic CHF (congestive heart failure), NYHA class 3 (CMS/Formerly [facility]) 05/05/2023    CHF, acute (CMS/Formerly [facility]) 05/05/2023    Nonrheumatic aortic (valve) stenosis 01/10/2022    Severe aortic stenosis    Nonrheumatic mitral (valve) insufficiency 09/14/2021    Severe mitral regurgitation by prior echocardiogram    Nonrheumatic mitral (valve) insufficiency 12/07/2022    Severe mitral regurgitation    Other long term (current) drug therapy     Long term current use of amiodarone    Personal history of diseases of the blood and blood-forming organs and certain disorders involving the immune mechanism     History of hemorrhagic diathesis    Personal history of other diseases of the circulatory system 09/20/2022    History of  intraventricular hemorrhage    Personal history of other diseases of the circulatory system     History of cardiac disorder    Personal history of other diseases of the circulatory system     History of hypertension    Personal history of other diseases of the musculoskeletal system and connective tissue     History of arthritis    Severe mitral regurgitation 05/05/2023    Unspecified intracranial injury with loss of consciousness status unknown, initial encounter (CMS/Formerly [facility]) 09/20/2022    Closed TBI (traumatic brain injury)     Past Surgical History:   Procedure Laterality Date    OTHER SURGICAL HISTORY  01/08/2020    Pacemaker insertion    OTHER SURGICAL HISTORY  01/08/2020    Knee replacement    OTHER SURGICAL HISTORY  01/08/2020    Cardiac catheterization with stent placement    OTHER SURGICAL HISTORY  12/06/2021    Cardioverter defibrillator insertion    OTHER SURGICAL HISTORY  12/06/2021    Angioplasty    OTHER SURGICAL HISTORY  01/30/2020    Spinal surgery       Plan:  Treatment Interventions: ADL retraining, Functional transfer training, UE strengthening/ROM, Endurance training, Patient/family training, Equipment evaluation/education, Compensatory technique education  OT Frequency: 3 times per week  OT Discharge Recommendations: Moderate intensity level of continued care  Equipment Recommended upon Discharge:  (TBD)  OT Recommended Transfer Status: Moderate assist  OT - OK to Discharge: Yes (yes. okay to dc to next level of care once medically clear)  Treatment Interventions: ADL retraining, Functional transfer training, UE strengthening/ROM, Endurance training, Patient/family training, Equipment evaluation/education, Compensatory technique education    Subjective   Current Problem:  1. Confusion      resolved      2. Nausea  ondansetron ODT (Zofran-ODT) 4 mg disintegrating tablet        General:  General  Reason for Referral: DxGeneralized Weakness  Ambulatory Dysfunction  , Nausea, Abd pain, confusion, fall.  (Impaired ADLs and Functional mobility)  Referred By: Sanjana  Past Medical History Relevant to Rehab: PMH: CKD, HF, aortic stenosis, A fib, CAD (79 y/o male  presents with generalized malaise, weakness, and unsteady gait.)  Co-Treatment: PT  Co-Treatment Reason: Co-eval to maximize safety with mobility while focusing on displine specific goals  Prior to Session Communication: Bedside nurse  Patient Position Received: Bed, 3 rail up, Alarm on (Pt alert , gowned and in bed. IV, tele,)  General Comment: Pt seen in room 2308 and agreeable to therapy.  Precautions:  Medical Precautions: Fall precautions  Vital Signs:     Pain:  Pain Assessment  Pain Assessment: 0-10  Pain Score:  (did not rate)  Pain Type:  (generalized pain UE/LE Rt. side primarily, groin)    Objective   Cognition:  Overall Cognitive Status: Impaired at baseline (A& Ox3 disorented to situation)  Safety/Judgement: Exceptions to WFL  Complex Functional Tasks: Moderate  Unable to Self-Monitor and Self-Correct Consistently: Moderate  Insight: Moderate  Processing Speed: Delayed           Home Living:  Type of Home:  (Lives with spouse in one level home w/ 5 TOÑA. Patient amb with FWW prn,  independent with ADLs. Spouse completes household chores, med mgmt and drives)  Bathroom Shower/Tub: Walk-in shower  Prior Function:  Level of [place]: Independent with ADLs and functional transfers, Needs assistance with homemaking  Homemaking Assistance: Needs assistance  IADL History:     ADL:  ADL Comments: Simulated UB ADL during UE AROM screen. Anticipate Min A for UB ADLs. Anticipate MAX A for  LB ADLs. Underlying limitations limted functional standing tolerance, impaired balance and pain  Activity Tolerance:  Endurance: Tolerates less than 10 min exercise, no significant change in vital signs  Bed Mobility/Transfers: Bed Mobility  Bed Mobility:  (supine <> sit with Min assist x 2; cues for safety, pacing self and sequencing)    Transfers  Transfer:  (sit  <>stand with Min assist x 2 and FWW; cues for walker mgmt during transfer)      Ambulation/Gait Training:     Sitting Balance:  Static Sitting Balance  Static Sitting-Balance Support: Bilateral upper extremity supported, Feet supported  Static Sitting-Level of Assistance: Close supervision  Standing Balance:  Static Standing Balance  Static Standing-Balance Support: Bilateral upper extremity supported  Static Standing-Level of Assistance: Minimum assistance (x2)   Modalities:     Vision:Vision - Basic Assessment  Current Vision:  (appears wfl)  Sensation:  Sensation Comment: c/o of bilateral below knee numbness. chronic numbness finger tips  Strength:  Strength Comments: Rt shoulder 2 (+)/5 2/2 to chronic RTC issue, distally WFL. LUE 4 (-)/5 gorssly  Perception:     Coordination:      Hand Function:  Gross Grasp: Functional  Coordination: Functional  Extremities: RUE   RUE :  (at baseline Right shoulder  1/4 range for flexion, distal AROM WFL.) and LUE   LUE: Within Functional Limits      Outcome Measures:[facility] Daily Activity  Putting on and taking off regular lower body clothing: A lot  Bathing (including washing, rinsing, drying): A lot  Putting on and taking off regular upper body clothing: A lot  Toileting, which includes using toilet, bedpan or urinal: A lot  Taking care of personal grooming such as brushing teeth: A lot  Eating Meals: A little  Daily Activity - Total Score: 13        Education Documentation  Body Mechanics, taught by Sima Ribeiro OT at 3/2/2024  3:24 PM.  Learner: Patient  Readiness: Acceptance  Method: Demonstration  Response: Needs Reinforcement    Precautions, taught by Sima Ribeiro OT at 3/2/2024  3:24 PM.  Learner: Patient  Readiness: Acceptance  Method: Demonstration  Response: Needs Reinforcement    ADL Training, taught by Sima Ribeiro OT at 3/2/2024  3:24 PM.  Learner: Patient  Readiness: Acceptance  Method: Demonstration  Response: Needs Reinforcement    Education Comments  No  comments found.        OP EDUCATION:       Goals:  Encounter Problems       Encounter Problems (Active)       ADLs       Patient with complete lower body dressing with minimal assist  level of assistance donning and doffing all LE clothes  with PRN adaptive equipment while edge of bed        Start:  03/02/24    Expected End:  03/11/24               TRANSFERS       Patient will complete sit to stand transfer with contact guard assist level of assistance and front wheeled walker in order to improve safety and prepare for out of bed mobility.       Start:  03/02/24    Expected End:  03/11/24

## 2024-03-02 NOTE — PROGRESS NOTES
Jony Javier is a 78 y.o. male on day 0 of admission presenting with Ambulatory dysfunction.      Subjective   78 y.o. with hx of CKD Stage III, biventricular heart failure LV EF 45%, moderate aortic stenosis, afib on Eliquis, and CAD presents with generalized malaise.  Much of the history is taken from wife at bedside as patient is a poor historian.  Apparently patient was in the garage for a while yesterday and came in seemingly confused.  Was repeating himself a lot.  Wife is concerned that he may have fallen outside but patient is not sure if he did or not.  Currently patient is complaining of generalized abdominal pain worse in the right lower quadrant as well as myalgias and generalized malaise.  Denies fevers or chills.  Some nausea but no vomiting.  Denies diarrhea or constipation issues.  No recent sick contacts.  In the ED, VSS.  Labs unrevealing.  Viral testing negative.  UA negative.  Tried to ambulate patient but did not do well due to generalized weakness.  Wife does not feel she can for patient currently.       3/2:              Today the patient is seen in bed.  He is awake alert and interactive appropriately.  Does not appear to have good recollection of the events leading to his admission yesterday.  Denies any tenderness of his head.  He was complaining of pain and review of OARRS reveals that he has been on Percocet 7.53 times a day regularly as well as Valium 5 3 times a day regularly.  These will be restarted.  He also complains of right lower quadrant pain.  Very mild without palpation but is tender on palpation with voluntary guarding but no involuntary guarding or rebound.  No mass appreciated.  He is uncertain of his last BM though states he does need to take stool softeners daily or he will be constipated.  WBC remains normal.  Otherwise denies interval new symptoms or complaints including chest pain palpitations pleuritic type pain unusual shortness of breath cough sputum nausea  flank pain dysuria diarrhea fever or chills.         Objective     Last Recorded Vitals  /78   Pulse 61   Temp 36.7 °C (98 °F)   Resp 16   Wt 70 kg (154 lb 5.2 oz)   SpO2 98%   Intake/Output last 3 Shifts:    Intake/Output Summary (Last 24 hours) at 3/2/2024 1701  Last data filed at 3/2/2024 0613  Gross per 24 hour   Intake 50 ml   Output 0 ml   Net 50 ml       Admission Weight  Weight: 65.8 kg (145 lb) (03/01/24 2119)    Daily Weight  03/02/24 : 70 kg (154 lb 5.2 oz)    Image Results  XR chest 1 view  Narrative: Interpreted By:  Valentina Davila,   STUDY:  XR CHEST 1 VIEW 3/2/2024 8:47 am      INDICATION:  Signs/Symptoms:shortness of breath      COMPARISON:  12/04/2023      ACCESSION NUMBER(S):  [id number]      ORDERING CLINICIAN:  KARAN SANCHES      TECHNIQUE:  Single AP view chest      FINDINGS:  Cardiomediastinal silhouette is within normal limits. Median  sternotomy wires in place. Left-sided pacer with 3 separate leads in  place in the region of the right atrium, right ventricle, and  coronary sinus. Lung fields are hypo inflated with component of  basilar bronchovascular crowding. No infiltrate or effusion  identified. There is severe osteoarthritic degenerative change right  glenohumeral articulation.                  Impression: Lung fields hypoinflated without acute process.      Signed by: Valentina Davila 3/2/2024 10:28 AM  Dictation workstation:   [id number]  CT abdomen pelvis wo IV contrast  Narrative: Interpreted By:  Gustavo Sung,   STUDY:  CT ABDOMEN PELVIS WO IV CONTRAST;  3/2/2024 7:17 am      INDICATION:  Signs/Symptoms:RLQ abdominal pain.      COMPARISON:  12/07/2023      ACCESSION NUMBER(S):  [id number]      ORDERING CLINICIAN:  KARAN SANCHES      TECHNIQUE:  Contiguous axial images were obtained through the abdomen and pelvis  without the use of contrast. Coronal and sagittal reconstructions  were performed.      FINDINGS:  LOWER CHEST:  Images through the lung bases   demonstrate minimal areas of  atelectasis.      ABDOMEN AND PELVIS:      LIVER:  Within normal limits.      BILE DUCTS:  Not abnormally dilated.      GALLBLADDER:  No calcified stones. No wall thickening.      PANCREAS:  Grossly unchanged in appearance. A few stable punctate scattered  calcifications.      SPLEEN:  Appears unremarkable.      ADRENAL GLANDS:  Appear unremarkable.      KIDNEYS, URETERS, AND BLADDER:  A few punctate calcifications are seen within the kidneys which may  relate to nonobstructing calculi or vascular calcifications,  unchanged. Nonspecific perinephric stranding is also unchanged. No  hydronephrosis. Cyst in the upper pole of the right kidney measuring  1.3 cm in size. Urinary bladder is distended but is otherwise  unremarkable. Prostate appears mildly enlarged.      BOWEL:  The small and large bowel are normal in caliber and demonstrate no  wall thickening.  There is no evidence of a bowel obstruction.  Appendix is normal in caliber.  Although CT has limited sensitivity  and specificity for gastric pathology, the stomach appears grossly  unremarkable.      RETROPERITONEUM, VESSELS:  There is no aneurysmal dilatation of the abdominal aorta. The IVC is  within normal limits.  There are atherosclerotic calcifications of  the aorta and its branches. No pathologically enlarged  retroperitoneal lymph nodes are noted.      PERITONEUM:  There is no evidence of pneumoperitoneum.  No ascites or loculated  fluid collection noted.  No pathologically enlarged mesenteric lymph  nodes are identified.      ABDOMINAL WALL, SOFT TISSUES:  Fat containing inguinal hernias bilaterally, larger on the right,  unchanged.      SKELETON:  Postsurgical changes relating to fusion of L3 through L5.  Degenerative changes. No acute process.      Impression: Distended urinary bladder.  Mild prostatomegaly.  Additional incidental findings as above.      MACRO:  None.      Signed by: Gustavo Sung 3/2/2024 9:14 AM  Dictation  workstation:   [id number]      Physical Exam  General: Mildly ill appearing, cooperative with exam, in NAD.  Awake alert overweight elderly  male  HEENT: Atraumatic. No erythema in posterior pharynx.  Lymph: No cervical or inguinal lymphadenopathy.  Cardiac: RRR. No murmurs.  Lungs: CTAB. Nl WOB.  Abd: Nondistended.  Tenderness to palpation right lower quadrant with voluntary guarding but no involuntary guarding.  Bowel sounds present and normal.  Ext: No edema. 2+ pulses.  Skin: No rashes, abrasions, or contusions.  Psych: A&Ox3. Nl affect.  Neuro: 5/5 strength. Sensation intact.  Relevant Results               Assessment/Plan                  Principal Problem:    Ambulatory dysfunction    Generalized Weakness  Ambulatory Dysfunction  -Many vague complaints including generalized malaise, myalgias, nausea, and vague abdominal pain  -Workup in the ER including viral testing, UA, and routine labs unrevealing  -Unclear if any real pathology present, will obtain CT abdomen pelvis for workup of abdominal pain further  -Will need therapy consultations this patient is weaker than his baseline  3/2: PT/OT evaluation reveals AM-PAC of 12.  Likely will need SNF at discharge.    Abdominal pain  -Uncertain etiology.  CT abdomen and pelvis describes normal-appearing appendix without other significant abnormalities.  Possibly related to constipation with his chronic opioid use.  Taking diet.  Continue to monitor.    Chronic pain and anxiety  -Patient takes Percocet 7.5 3 times daily and Valium 5 3 times daily routinely at home.  These will be continued at this point but watch for sedation.      Other Issues  -CKD Stage III: Cr at baseline  -Chronic biventricular heart failure LV EF 45%: Hold Bumex for today given concern for dehydration  -Afib: Home medications  -CAD: Home medications     DVT Prophy  -Home Eliquisacc Arauz MD

## 2024-03-03 PROBLEM — R41.0 CONFUSION: Status: ACTIVE | Noted: 2024-03-03

## 2024-03-03 LAB
ANION GAP SERPL CALC-SCNC: 10 MMOL/L (ref 10–20)
BUN SERPL-MCNC: 21 MG/DL (ref 6–23)
CALCIUM SERPL-MCNC: 8.8 MG/DL (ref 8.6–10.3)
CHLORIDE SERPL-SCNC: 109 MMOL/L (ref 98–107)
CO2 SERPL-SCNC: 25 MMOL/L (ref 21–32)
CREAT SERPL-MCNC: 1.51 MG/DL (ref 0.5–1.3)
EGFRCR SERPLBLD CKD-EPI 2021: 47 ML/MIN/1.73M*2
ERYTHROCYTE [DISTWIDTH] IN BLOOD BY AUTOMATED COUNT: 14.1 % (ref 11.5–14.5)
GLUCOSE BLD MANUAL STRIP-MCNC: 104 MG/DL (ref 74–99)
GLUCOSE BLD MANUAL STRIP-MCNC: 157 MG/DL (ref 74–99)
GLUCOSE SERPL-MCNC: 91 MG/DL (ref 74–99)
HCT VFR BLD AUTO: 37.5 % (ref 41–52)
HGB BLD-MCNC: 12.1 G/DL (ref 13.5–17.5)
MCH RBC QN AUTO: 34.9 PG (ref 26–34)
MCHC RBC AUTO-ENTMCNC: 32.3 G/DL (ref 32–36)
MCV RBC AUTO: 108 FL (ref 80–100)
NRBC BLD-RTO: 0 /100 WBCS (ref 0–0)
PLATELET # BLD AUTO: 128 X10*3/UL (ref 150–450)
POTASSIUM SERPL-SCNC: 4 MMOL/L (ref 3.5–5.3)
RBC # BLD AUTO: 3.47 X10*6/UL (ref 4.5–5.9)
SODIUM SERPL-SCNC: 140 MMOL/L (ref 136–145)
WBC # BLD AUTO: 8.6 X10*3/UL (ref 4.4–11.3)

## 2024-03-03 PROCEDURE — 85027 COMPLETE CBC AUTOMATED: CPT | Performed by: FAMILY MEDICINE

## 2024-03-03 PROCEDURE — 36415 COLL VENOUS BLD VENIPUNCTURE: CPT | Performed by: FAMILY MEDICINE

## 2024-03-03 PROCEDURE — 82947 ASSAY GLUCOSE BLOOD QUANT: CPT

## 2024-03-03 PROCEDURE — 2500000001 HC RX 250 WO HCPCS SELF ADMINISTERED DRUGS (ALT 637 FOR MEDICARE OP): Performed by: FAMILY MEDICINE

## 2024-03-03 PROCEDURE — 1210000001 HC SEMI-PRIVATE ROOM DAILY

## 2024-03-03 PROCEDURE — 80048 BASIC METABOLIC PNL TOTAL CA: CPT | Performed by: FAMILY MEDICINE

## 2024-03-03 PROCEDURE — 99233 SBSQ HOSP IP/OBS HIGH 50: CPT | Performed by: FAMILY MEDICINE

## 2024-03-03 PROCEDURE — 94660 CPAP INITIATION&MGMT: CPT

## 2024-03-03 PROCEDURE — 2500000001 HC RX 250 WO HCPCS SELF ADMINISTERED DRUGS (ALT 637 FOR MEDICARE OP): Performed by: INTERNAL MEDICINE

## 2024-03-03 RX ADMIN — OXYCODONE HYDROCHLORIDE 7.5 MG: 5 TABLET ORAL at 11:52

## 2024-03-03 RX ADMIN — OXYCODONE HYDROCHLORIDE 7.5 MG: 5 TABLET ORAL at 19:51

## 2024-03-03 RX ADMIN — APIXABAN 5 MG: 5 TABLET, FILM COATED ORAL at 18:02

## 2024-03-03 RX ADMIN — OXYCODONE HYDROCHLORIDE 7.5 MG: 5 TABLET ORAL at 01:27

## 2024-03-03 RX ADMIN — APIXABAN 5 MG: 5 TABLET, FILM COATED ORAL at 06:07

## 2024-03-03 ASSESSMENT — PAIN - FUNCTIONAL ASSESSMENT
PAIN_FUNCTIONAL_ASSESSMENT: 0-10

## 2024-03-03 ASSESSMENT — COGNITIVE AND FUNCTIONAL STATUS - GENERAL
DAILY ACTIVITIY SCORE: 21
MOBILITY SCORE: 12
MOVING TO AND FROM BED TO CHAIR: A LOT
DAILY ACTIVITIY SCORE: 20
TURNING FROM BACK TO SIDE WHILE IN FLAT BAD: A LOT
STANDING UP FROM CHAIR USING ARMS: A LOT
DRESSING REGULAR UPPER BODY CLOTHING: A LITTLE
TOILETING: A LITTLE
CLIMB 3 TO 5 STEPS WITH RAILING: A LOT
DRESSING REGULAR LOWER BODY CLOTHING: A LITTLE
HELP NEEDED FOR BATHING: A LITTLE
STANDING UP FROM CHAIR USING ARMS: A LOT
MOVING FROM LYING ON BACK TO SITTING ON SIDE OF FLAT BED WITH BEDRAILS: A LITTLE
WALKING IN HOSPITAL ROOM: A LOT
MOVING FROM LYING ON BACK TO SITTING ON SIDE OF FLAT BED WITH BEDRAILS: A LITTLE
WALKING IN HOSPITAL ROOM: A LOT
DRESSING REGULAR UPPER BODY CLOTHING: A LITTLE
MOBILITY SCORE: 14
HELP NEEDED FOR BATHING: A LITTLE
CLIMB 3 TO 5 STEPS WITH RAILING: TOTAL
MOVING TO AND FROM BED TO CHAIR: A LOT
DRESSING REGULAR LOWER BODY CLOTHING: A LITTLE
TURNING FROM BACK TO SIDE WHILE IN FLAT BAD: A LITTLE

## 2024-03-03 ASSESSMENT — PAIN DESCRIPTION - ORIENTATION: ORIENTATION: RIGHT

## 2024-03-03 ASSESSMENT — PAIN SCALES - GENERAL
PAINLEVEL_OUTOF10: 5 - MODERATE PAIN
PAINLEVEL_OUTOF10: 1
PAINLEVEL_OUTOF10: 2
PAINLEVEL_OUTOF10: 0 - NO PAIN
PAINLEVEL_OUTOF10: 6
PAINLEVEL_OUTOF10: 0 - NO PAIN
PAINLEVEL_OUTOF10: 6

## 2024-03-03 ASSESSMENT — PAIN DESCRIPTION - LOCATION: LOCATION: SHOULDER

## 2024-03-03 NOTE — PROGRESS NOTES
Jony Javier is a 78 y.o. male on day 0 of admission presenting with Ambulatory dysfunction.      Subjective   78 y.o. with hx of CKD Stage III, biventricular heart failure LV EF 45%, moderate aortic stenosis, afib on Eliquis, and CAD presents with generalized malaise.  Much of the history is taken from wife at bedside as patient is a poor historian.  Apparently patient was in the garage for a while yesterday and came in seemingly confused.  Was repeating himself a lot.  Wife is concerned that he may have fallen outside but patient is not sure if he did or not.  Currently patient is complaining of generalized abdominal pain worse in the right lower quadrant as well as myalgias and generalized malaise.  Denies fevers or chills.  Some nausea but no vomiting.  Denies diarrhea or constipation issues.  No recent sick contacts.  In the ED, VSS.  Labs unrevealing.  Viral testing negative.  UA negative.  Tried to ambulate patient but did not do well due to generalized weakness.  Wife does not feel she can for patient currently.       3/2:              Today the patient is seen in bed.  He is awake alert and interactive appropriately.  Does not appear to have good recollection of the events leading to his admission yesterday.  Denies any tenderness of his head.  He was complaining of pain and review of OARRS reveals that he has been on Percocet 7.53 times a day regularly as well as Valium 5 3 times a day regularly.  These will be restarted.  He also complains of right lower quadrant pain.  Very mild without palpation but is tender on palpation with voluntary guarding but no involuntary guarding or rebound.  No mass appreciated.  He is uncertain of his last BM though states he does need to take stool softeners daily or he will be constipated.  WBC remains normal.  Otherwise denies interval new symptoms or complaints including chest pain palpitations pleuritic type pain unusual shortness of breath cough sputum nausea  flank pain dysuria diarrhea fever or chills.    3/3:                Today the patient states he is feeling somewhat better with less abdominal discomfort.  He does have concerned about the weakness of his lower extremities.  He is open to SNF if that is the recommendation.  Describes that he feels altered sensation bilateral lower extremities pretibial area to the feet.  He has good AROM bilateral lower extremities. Otherwise denies interval new symptoms or complaints including chest pain palpitations pleuritic type pain unusual shortness of breath cough sputum nausea flank pain dysuria diarrhea fever or chills.       Objective     Last Recorded Vitals  /78 (BP Location: Left arm)   Pulse 67   Temp 36.8 °C (98.3 °F) (Temporal)   Resp 16   Wt 70 kg (154 lb 5.2 oz)   SpO2 93%   Intake/Output last 3 Shifts:    Intake/Output Summary (Last 24 hours) at 3/3/2024 1556  Last data filed at 3/3/2024 1355  Gross per 24 hour   Intake 320 ml   Output 500 ml   Net -180 ml         Admission Weight  Weight: 65.8 kg (145 lb) (03/01/24 2119)    Daily Weight  03/02/24 : 70 kg (154 lb 5.2 oz)    Image Results  XR chest 1 view  Narrative: Interpreted By:  Valentina Davila,   STUDY:  XR CHEST 1 VIEW 3/2/2024 8:47 am      INDICATION:  Signs/Symptoms:shortness of breath      COMPARISON:  12/04/2023      ACCESSION NUMBER(S):  [id number]      ORDERING CLINICIAN:  KARAN SANCHES      TECHNIQUE:  Single AP view chest      FINDINGS:  Cardiomediastinal silhouette is within normal limits. Median  sternotomy wires in place. Left-sided pacer with 3 separate leads in  place in the region of the right atrium, right ventricle, and  coronary sinus. Lung fields are hypo inflated with component of  basilar bronchovascular crowding. No infiltrate or effusion  identified. There is severe osteoarthritic degenerative change right  glenohumeral articulation.                  Impression: Lung fields hypoinflated without acute process.       Signed by: Valentina Davila 3/2/2024 10:28 AM  Dictation workstation:   [id number]  CT abdomen pelvis wo IV contrast  Narrative: Interpreted By:  Gustavo Sung,   STUDY:  CT ABDOMEN PELVIS WO IV CONTRAST;  3/2/2024 7:17 am      INDICATION:  Signs/Symptoms:RLQ abdominal pain.      COMPARISON:  12/07/2023      ACCESSION NUMBER(S):  [id number]      ORDERING CLINICIAN:  KARAN SANCHES      TECHNIQUE:  Contiguous axial images were obtained through the abdomen and pelvis  without the use of contrast. Coronal and sagittal reconstructions  were performed.      FINDINGS:  LOWER CHEST:  Images through the lung bases  demonstrate minimal areas of  atelectasis.      ABDOMEN AND PELVIS:      LIVER:  Within normal limits.      BILE DUCTS:  Not abnormally dilated.      GALLBLADDER:  No calcified stones. No wall thickening.      PANCREAS:  Grossly unchanged in appearance. A few stable punctate scattered  calcifications.      SPLEEN:  Appears unremarkable.      ADRENAL GLANDS:  Appear unremarkable.      KIDNEYS, URETERS, AND BLADDER:  A few punctate calcifications are seen within the kidneys which may  relate to nonobstructing calculi or vascular calcifications,  unchanged. Nonspecific perinephric stranding is also unchanged. No  hydronephrosis. Cyst in the upper pole of the right kidney measuring  1.3 cm in size. Urinary bladder is distended but is otherwise  unremarkable. Prostate appears mildly enlarged.      BOWEL:  The small and large bowel are normal in caliber and demonstrate no  wall thickening.  There is no evidence of a bowel obstruction.  Appendix is normal in caliber.  Although CT has limited sensitivity  and specificity for gastric pathology, the stomach appears grossly  unremarkable.      RETROPERITONEUM, VESSELS:  There is no aneurysmal dilatation of the abdominal aorta. The IVC is  within normal limits.  There are atherosclerotic calcifications of  the aorta and its branches. No pathologically  enlarged  retroperitoneal lymph nodes are noted.      PERITONEUM:  There is no evidence of pneumoperitoneum.  No ascites or loculated  fluid collection noted.  No pathologically enlarged mesenteric lymph  nodes are identified.      ABDOMINAL WALL, SOFT TISSUES:  Fat containing inguinal hernias bilaterally, larger on the right,  unchanged.      SKELETON:  Postsurgical changes relating to fusion of L3 through L5.  Degenerative changes. No acute process.      Impression: Distended urinary bladder.  Mild prostatomegaly.  Additional incidental findings as above.      MACRO:  None.      Signed by: Gustavo Sung 3/2/2024 9:14 AM  Dictation workstation:   [id number]      Physical Exam  General: Mildly ill appearing, cooperative with exam, in NAD.  Awake alert overweight elderly  male  HEENT: Atraumatic. No erythema in posterior pharynx.  Lymph: No cervical or inguinal lymphadenopathy.  Cardiac: RRR. No murmurs.  Lungs: CTAB. Nl WOB.  Abd: Nondistended.  Less tenderness to palpation right lower quadrant with less voluntary guarding but no involuntary guarding.  Bowel sounds present and normal.  Ext: No edema. 2+ pulses.  Skin: No rashes, abrasions, or contusions.  Psych: A&Ox3. Nl affect.  Neuro: 5/5 strength. Sensation intact upper extremities with some decreased sensation bilateral lower extremities from mid pretibial to the feet    Relevant Results               Assessment/Plan                  Principal Problem:    Ambulatory dysfunction  Active Problems:    Confusion    Generalized Weakness  Ambulatory Dysfunction  -Many vague complaints including generalized malaise, myalgias, nausea, and vague abdominal pain  -Workup in the ER including viral testing, UA, and routine labs unrevealing  -Unclear if any real pathology present, will obtain CT abdomen pelvis for workup of abdominal pain further  -Will need therapy consultations this patient is weaker than his baseline  3/2: PT/OT evaluation reveals AM-PAC of 12.   Likely will need SNF at discharge.  3/3: Appears to have bilateral lower extremity distal neuropathy which likely is related to his lumbar spine degenerative disease for which she has undergone surgical procedures.    Abdominal pain  -Uncertain etiology.  CT abdomen and pelvis describes normal-appearing appendix without other significant abnormalities.  Possibly related to constipation with his chronic opioid use.  Taking diet.  Continue to monitor.    Chronic pain and anxiety  -Patient takes Percocet 7.5 3 times daily and Valium 5 3 times daily routinely at home.  These will be continued at this point but watch for sedation.  3/3: Appears to be very stable and doing well with his usual home Percocet and Valium regimen.  Subjectively feeling much better with this.      Other Issues  -CKD Stage III: Cr at baseline  -Chronic biventricular heart failure LV EF 45%: Hold Bumex for today given concern for dehydration  -Afib: Home medications  -CAD: Home medications     DVT Prophy  -Home Leti Arauz MD

## 2024-03-03 NOTE — CARE PLAN
The patient's goals for the shift include      The clinical goals for the shift include pt will remain free from fall or injury    Over the shift, the patient did not make progress toward the following goals. Barriers to progression include . Recommendations to address these barriers include   Problem: Safety - Adult  Goal: Free from fall injury  Outcome: Progressing     Problem: Discharge Planning  Goal: Discharge to home or other facility with appropriate resources  Outcome: Progressing     Problem: Chronic Conditions and Co-morbidities  Goal: Patient's chronic conditions and co-morbidity symptoms are monitored and maintained or improved  Outcome: Progressing     Problem: Diabetes  Goal: Achieve decreasing blood glucose levels by end of shift  Outcome: Progressing  Goal: Increase stability of blood glucose readings by end of shift  Outcome: Progressing  Goal: Decrease in ketones present in urine by end of shift  Outcome: Progressing  Goal: Maintain electrolyte levels within acceptable range throughout shift  Outcome: Progressing  Goal: Maintain glucose levels >70mg/dl to <250mg/dl throughout shift  Outcome: Progressing  Goal: No changes in neurological exam by end of shift  Outcome: Progressing  Goal: Learn about and adhere to nutrition recommendations by end of shift  Outcome: Progressing  Goal: Vital signs within normal range for age by end of shift  Outcome: Progressing  Goal: Increase self care and/or family involovement by end of shift  Outcome: Progressing  Goal: Receive DSME education by end of shift  Outcome: Progressing     Problem: Pain  Goal: Takes deep breaths with improved pain control throughout the shift  Outcome: Progressing  Goal: Turns in bed with improved pain control throughout the shift  Outcome: Progressing  Goal: Walks with improved pain control throughout the shift  Outcome: Progressing  Goal: Performs ADL's with improved pain control throughout shift  Outcome: Progressing  Goal:  Participates in PT with improved pain control throughout the shift  Outcome: Progressing  Goal: Free from opioid side effects throughout the shift  Outcome: Progressing  Goal: Free from acute confusion related to pain meds throughout the shift  Outcome: Progressing     Problem: Skin  Goal: Decreased wound size/increased tissue granulation at next dressing change  Outcome: Progressing  Goal: Participates in plan/prevention/treatment measures  Outcome: Progressing  Goal: Prevent/manage excess moisture  Outcome: Progressing  Goal: Prevent/minimize sheer/friction injuries  Outcome: Progressing  Goal: Promote/optimize nutrition  Outcome: Progressing  Goal: Promote skin healing  Outcome: Progressing     Problem: Fall/Injury  Goal: Not fall by end of shift  Outcome: Progressing  Goal: Be free from injury by end of the shift  Outcome: Progressing  Goal: Verbalize understanding of personal risk factors for fall in the hospital  Outcome: Progressing  Goal: Verbalize understanding of risk factor reduction measures to prevent injury from fall in the home  Outcome: Progressing  Goal: Use assistive devices by end of the shift  Outcome: Progressing  Goal: Pace activities to prevent fatigue by end of the shift  Outcome: Progressing   .

## 2024-03-04 LAB
ANION GAP SERPL CALC-SCNC: 12 MMOL/L (ref 10–20)
BUN SERPL-MCNC: 23 MG/DL (ref 6–23)
CALCIUM SERPL-MCNC: 9.2 MG/DL (ref 8.6–10.3)
CHLORIDE SERPL-SCNC: 105 MMOL/L (ref 98–107)
CO2 SERPL-SCNC: 27 MMOL/L (ref 21–32)
CREAT SERPL-MCNC: 1.55 MG/DL (ref 0.5–1.3)
EGFRCR SERPLBLD CKD-EPI 2021: 45 ML/MIN/1.73M*2
ERYTHROCYTE [DISTWIDTH] IN BLOOD BY AUTOMATED COUNT: 13.8 % (ref 11.5–14.5)
GLUCOSE SERPL-MCNC: 87 MG/DL (ref 74–99)
HCT VFR BLD AUTO: 40.5 % (ref 41–52)
HGB BLD-MCNC: 13 G/DL (ref 13.5–17.5)
MCH RBC QN AUTO: 34.9 PG (ref 26–34)
MCHC RBC AUTO-ENTMCNC: 32.1 G/DL (ref 32–36)
MCV RBC AUTO: 109 FL (ref 80–100)
NRBC BLD-RTO: 0 /100 WBCS (ref 0–0)
PLATELET # BLD AUTO: 125 X10*3/UL (ref 150–450)
POTASSIUM SERPL-SCNC: 4.2 MMOL/L (ref 3.5–5.3)
RBC # BLD AUTO: 3.72 X10*6/UL (ref 4.5–5.9)
SODIUM SERPL-SCNC: 140 MMOL/L (ref 136–145)
WBC # BLD AUTO: 8.6 X10*3/UL (ref 4.4–11.3)

## 2024-03-04 PROCEDURE — 97535 SELF CARE MNGMENT TRAINING: CPT | Mod: GO,CO

## 2024-03-04 PROCEDURE — 82374 ASSAY BLOOD CARBON DIOXIDE: CPT | Performed by: FAMILY MEDICINE

## 2024-03-04 PROCEDURE — 2500000002 HC RX 250 W HCPCS SELF ADMINISTERED DRUGS (ALT 637 FOR MEDICARE OP, ALT 636 FOR OP/ED): Performed by: HOSPITALIST

## 2024-03-04 PROCEDURE — 94660 CPAP INITIATION&MGMT: CPT

## 2024-03-04 PROCEDURE — 1200000002 HC GENERAL ROOM WITH TELEMETRY DAILY

## 2024-03-04 PROCEDURE — 85027 COMPLETE CBC AUTOMATED: CPT | Performed by: FAMILY MEDICINE

## 2024-03-04 PROCEDURE — 99233 SBSQ HOSP IP/OBS HIGH 50: CPT | Performed by: HOSPITALIST

## 2024-03-04 PROCEDURE — 97116 GAIT TRAINING THERAPY: CPT | Mod: GP,CQ

## 2024-03-04 PROCEDURE — 2500000001 HC RX 250 WO HCPCS SELF ADMINISTERED DRUGS (ALT 637 FOR MEDICARE OP): Performed by: INTERNAL MEDICINE

## 2024-03-04 PROCEDURE — 2500000001 HC RX 250 WO HCPCS SELF ADMINISTERED DRUGS (ALT 637 FOR MEDICARE OP): Performed by: FAMILY MEDICINE

## 2024-03-04 PROCEDURE — 97530 THERAPEUTIC ACTIVITIES: CPT | Mod: GP,CQ

## 2024-03-04 PROCEDURE — 36415 COLL VENOUS BLD VENIPUNCTURE: CPT | Performed by: FAMILY MEDICINE

## 2024-03-04 PROCEDURE — 2500000001 HC RX 250 WO HCPCS SELF ADMINISTERED DRUGS (ALT 637 FOR MEDICARE OP): Performed by: HOSPITALIST

## 2024-03-04 PROCEDURE — C9113 INJ PANTOPRAZOLE SODIUM, VIA: HCPCS | Performed by: HOSPITALIST

## 2024-03-04 PROCEDURE — 2500000004 HC RX 250 GENERAL PHARMACY W/ HCPCS (ALT 636 FOR OP/ED): Performed by: HOSPITALIST

## 2024-03-04 PROCEDURE — 97530 THERAPEUTIC ACTIVITIES: CPT | Mod: GO,CO

## 2024-03-04 PROCEDURE — 94761 N-INVAS EAR/PLS OXIMETRY MLT: CPT

## 2024-03-04 RX ORDER — SPIRONOLACTONE 25 MG/1
12.5 TABLET ORAL
Status: DISCONTINUED | OUTPATIENT
Start: 2024-03-04 | End: 2024-03-05 | Stop reason: HOSPADM

## 2024-03-04 RX ORDER — PANTOPRAZOLE SODIUM 40 MG/10ML
40 INJECTION, POWDER, LYOPHILIZED, FOR SOLUTION INTRAVENOUS DAILY
Status: DISCONTINUED | OUTPATIENT
Start: 2024-03-04 | End: 2024-03-05 | Stop reason: HOSPADM

## 2024-03-04 RX ORDER — EZETIMIBE 10 MG/1
10 TABLET ORAL NIGHTLY
Status: DISCONTINUED | OUTPATIENT
Start: 2024-03-04 | End: 2024-03-05 | Stop reason: HOSPADM

## 2024-03-04 RX ORDER — TAMSULOSIN HYDROCHLORIDE 0.4 MG/1
0.4 CAPSULE ORAL DAILY
Status: DISCONTINUED | OUTPATIENT
Start: 2024-03-04 | End: 2024-03-05 | Stop reason: HOSPADM

## 2024-03-04 RX ORDER — DEXTROSE MONOHYDRATE 100 MG/ML
0.3 INJECTION, SOLUTION INTRAVENOUS ONCE AS NEEDED
Status: DISCONTINUED | OUTPATIENT
Start: 2024-03-04 | End: 2024-03-05 | Stop reason: HOSPADM

## 2024-03-04 RX ORDER — DEXTROSE 50 % IN WATER (D50W) INTRAVENOUS SYRINGE
25
Status: DISCONTINUED | OUTPATIENT
Start: 2024-03-04 | End: 2024-03-05 | Stop reason: HOSPADM

## 2024-03-04 RX ORDER — CARVEDILOL 12.5 MG/1
12.5 TABLET ORAL 2 TIMES DAILY
Status: DISCONTINUED | OUTPATIENT
Start: 2024-03-04 | End: 2024-03-05

## 2024-03-04 RX ORDER — VENLAFAXINE HYDROCHLORIDE 150 MG/1
150 CAPSULE, EXTENDED RELEASE ORAL
Status: DISCONTINUED | OUTPATIENT
Start: 2024-03-05 | End: 2024-03-05 | Stop reason: HOSPADM

## 2024-03-04 RX ORDER — MIRTAZAPINE 15 MG/1
30 TABLET, FILM COATED ORAL NIGHTLY
Status: DISCONTINUED | OUTPATIENT
Start: 2024-03-04 | End: 2024-03-05 | Stop reason: HOSPADM

## 2024-03-04 RX ORDER — ATORVASTATIN CALCIUM 40 MG/1
80 TABLET, FILM COATED ORAL NIGHTLY
Status: DISCONTINUED | OUTPATIENT
Start: 2024-03-04 | End: 2024-03-05 | Stop reason: HOSPADM

## 2024-03-04 RX ADMIN — SACUBITRIL AND VALSARTAN 1 TABLET: 49; 51 TABLET, FILM COATED ORAL at 21:28

## 2024-03-04 RX ADMIN — OXYCODONE HYDROCHLORIDE 7.5 MG: 5 TABLET ORAL at 04:30

## 2024-03-04 RX ADMIN — OXYCODONE HYDROCHLORIDE 7.5 MG: 5 TABLET ORAL at 11:46

## 2024-03-04 RX ADMIN — OXYCODONE HYDROCHLORIDE 7.5 MG: 5 TABLET ORAL at 21:25

## 2024-03-04 RX ADMIN — ATORVASTATIN CALCIUM 80 MG: 40 TABLET, FILM COATED ORAL at 21:26

## 2024-03-04 RX ADMIN — TAMSULOSIN HYDROCHLORIDE 0.4 MG: 0.4 CAPSULE ORAL at 21:27

## 2024-03-04 RX ADMIN — APIXABAN 5 MG: 5 TABLET, FILM COATED ORAL at 21:26

## 2024-03-04 RX ADMIN — EZETIMIBE 10 MG: 10 TABLET ORAL at 21:26

## 2024-03-04 RX ADMIN — PANTOPRAZOLE SODIUM 40 MG: 40 INJECTION, POWDER, FOR SOLUTION INTRAVENOUS at 17:48

## 2024-03-04 RX ADMIN — APIXABAN 5 MG: 5 TABLET, FILM COATED ORAL at 06:07

## 2024-03-04 RX ADMIN — CARVEDILOL 12.5 MG: 12.5 TABLET, FILM COATED ORAL at 21:28

## 2024-03-04 RX ADMIN — MIRTAZAPINE 30 MG: 15 TABLET, FILM COATED ORAL at 21:25

## 2024-03-04 RX ADMIN — EMPAGLIFLOZIN 10 MG: 10 TABLET, FILM COATED ORAL at 17:48

## 2024-03-04 ASSESSMENT — COGNITIVE AND FUNCTIONAL STATUS - GENERAL
DAILY ACTIVITIY SCORE: 19
HELP NEEDED FOR BATHING: A LITTLE
CLIMB 3 TO 5 STEPS WITH RAILING: A LOT
DRESSING REGULAR LOWER BODY CLOTHING: A LITTLE
TOILETING: A LITTLE
STANDING UP FROM CHAIR USING ARMS: A LOT
TOILETING: A LITTLE
CLIMB 3 TO 5 STEPS WITH RAILING: A LOT
DRESSING REGULAR LOWER BODY CLOTHING: A LITTLE
MOBILITY SCORE: 16
MOVING TO AND FROM BED TO CHAIR: A LITTLE
DRESSING REGULAR UPPER BODY CLOTHING: A LITTLE
PERSONAL GROOMING: A LITTLE
STANDING UP FROM CHAIR USING ARMS: A LOT
MOVING FROM LYING ON BACK TO SITTING ON SIDE OF FLAT BED WITH BEDRAILS: A LITTLE
MOVING FROM LYING ON BACK TO SITTING ON SIDE OF FLAT BED WITH BEDRAILS: A LITTLE
DAILY ACTIVITIY SCORE: 19
CLIMB 3 TO 5 STEPS WITH RAILING: A LOT
DRESSING REGULAR UPPER BODY CLOTHING: A LITTLE
STANDING UP FROM CHAIR USING ARMS: A LOT
HELP NEEDED FOR BATHING: A LITTLE
TURNING FROM BACK TO SIDE WHILE IN FLAT BAD: A LITTLE
MOBILITY SCORE: 16
MOVING FROM LYING ON BACK TO SITTING ON SIDE OF FLAT BED WITH BEDRAILS: A LITTLE
WALKING IN HOSPITAL ROOM: A LITTLE
WALKING IN HOSPITAL ROOM: A LITTLE
PERSONAL GROOMING: A LITTLE
MOBILITY SCORE: 16
TOILETING: A LITTLE
DRESSING REGULAR UPPER BODY CLOTHING: A LITTLE
MOVING TO AND FROM BED TO CHAIR: A LITTLE
DRESSING REGULAR LOWER BODY CLOTHING: A LITTLE
WALKING IN HOSPITAL ROOM: A LITTLE
HELP NEEDED FOR BATHING: A LITTLE
TURNING FROM BACK TO SIDE WHILE IN FLAT BAD: A LITTLE
DAILY ACTIVITIY SCORE: 20
TURNING FROM BACK TO SIDE WHILE IN FLAT BAD: A LITTLE
MOVING TO AND FROM BED TO CHAIR: A LITTLE

## 2024-03-04 ASSESSMENT — PAIN SCALES - GENERAL
PAINLEVEL_OUTOF10: 8
PAINLEVEL_OUTOF10: 4
PAINLEVEL_OUTOF10: 0 - NO PAIN
PAINLEVEL_OUTOF10: 6

## 2024-03-04 ASSESSMENT — PAIN - FUNCTIONAL ASSESSMENT
PAIN_FUNCTIONAL_ASSESSMENT: 0-10

## 2024-03-04 ASSESSMENT — ACTIVITIES OF DAILY LIVING (ADL): HOME_MANAGEMENT_TIME_ENTRY: 15

## 2024-03-04 NOTE — PROGRESS NOTES
Social work consult placed for positive medical risk screen. SW reviewed pt's chart and communicated with TCC. No SW needs foreseen at this time. SW signing off; available upon request.    Dre Balbuena, MSW, LSW ([id number])   Care Transitions

## 2024-03-04 NOTE — PROGRESS NOTES
03/04/24 1042   Discharge Planning   Living Arrangements Spouse/significant other   Support Systems Spouse/significant other   Assistance Needed minimal   Type of Residence Private residence   Number of Stairs to Enter Residence 4   Number of Stairs Within Residence 12   Home or Post Acute Services In home services   Type of Home Care Services Home OT;Home PT   Patient expects to be discharged to: home   Patient Choice   Provider Choice list and CMS website (https://medicare.gov/care-compare#search) for post-acute Quality and Resource Measure Data were provided and reviewed with: Patient   Patient / Family choosing to utilize agency / facility established prior to hospitalization No     Completed DC planning assessment with patient.  Pt states he is independent with ADLs and uses a walker for mobility. He also has a shower chair and grab bars in bathroom.  Pts wife assists with sorting medications, shopping, meals, and driving.  Pt denies falls and is current with PCP, Dr. Gandara.  I discussed PT rec for mod therapy but pt states he prefers to be home.  I offered him C instead and he was in agree with this.  Freedom of choice list was offered by creating list in [employer] in patient's preferred geographical area and in network with insurance.  Patient's preference is:  pending review of list.

## 2024-03-04 NOTE — PROGRESS NOTES
Occupational Therapy    OT Treatment    Patient Name: Jony Javier  MRN: [id number]  Today's Date: 3/4/2024  Time Calculation  Start Time: 0920  Stop Time: 0945  Time Calculation (min): 25 min         Assessment:  End of Session Communication: Bedside nurse  End of Session Patient Position: Alarm on, Up in chair     Plan:  Treatment Interventions: ADL retraining, Functional transfer training, Endurance training        Subjective Pt. Motivated and pleasant  Previous Visit Info:  OT Last Visit  OT Received On: 03/04/24  General:  General  Co-Treatment: PT  Co-Treatment Reason: to maximize activity toleance and safety with functional mobility  Prior to Session Communication: Bedside nurse  Patient Position Received: Bed, 3 rail up, Alarm on  General Comment: pt. doing exceptionally well today he is now MIN A for bed mobility and CGA for transfers and standing balance. Pt. with mild shortness of breath and needs cueing for rest periods,. Spo2 sats improved to 96% with activity. He is motivated and reports wanting to return home.       Vital Signs: spo2 sats increased to 96%     Pain: No pain  Pain Assessment  Pain Score: 0 - No pain    Objective    Cognition:  Cognition  Orientation Level: Oriented X4  Coordination:     Activities of Daily Living: Grooming  Grooming Level of Assistance: Contact guard, Minimum assistance  Grooming Where Assessed: Standing sinkside  Grooming Comments: assisted patient with wringing wash cloth out due to lines.  he was able to comb own hair  no loss of balance    LE Dressing  LE Dressing: Yes  Sock Level of Assistance: Minimum assistance  LE Dressing Where Assessed: Edge of bed (LE supported on bed.)       Functional Standing Tolerance:  Time: 2-3 mins  Activity: grooming task sink level  Functional Standing Tolerance Comments: CGA  no loss of balance  Bed Mobility/Transfers: Bed Mobility  Bed Mobility: Yes  Bed Mobility 1  Bed Mobility 1: Supine to sitting  Level of Assistance 1:  Minimum assistance    Transfers  Transfer: Yes  Transfer 1  Technique 1: Sit to stand  Transfer Device 1: Walker  Transfer Level of Assistance 1: Contact guard  Trials/Comments 1: from EOB then from chair         Standing Balance: standing balance is fair        Outcome Measures:[facility] Daily Activity  Putting on and taking off regular lower body clothing: A little  Bathing (including washing, rinsing, drying): A little  Putting on and taking off regular upper body clothing: A little  Toileting, which includes using toilet, bedpan or urinal: A little  Taking care of personal grooming such as brushing teeth: A little  Eating Meals: None  Daily Activity - Total Score: 19        Education Documentation  Body Mechanics, taught by DEEPALI Soler at 3/4/2024 11:18 AM.  Learner: Patient  Readiness: Acceptance  Method: Explanation  Response: Verbalizes Understanding, Needs Reinforcement    Precautions, taught by DEEPALI Soler at 3/4/2024 11:18 AM.  Learner: Patient  Readiness: Acceptance  Method: Explanation  Response: Verbalizes Understanding, Needs Reinforcement    ADL Training, taught by DEEPALI Soler at 3/4/2024 11:18 AM.  Learner: Patient  Readiness: Acceptance  Method: Explanation  Response: Verbalizes Understanding, Needs Reinforcement    Education Comments  No comments found.        OP EDUCATION:       Goals:  Encounter Problems       Encounter Problems (Active)       ADLs       Patient with complete lower body dressing with minimal assist  level of assistance donning and doffing all LE clothes  with PRN adaptive equipment while edge of bed  (Progressing)       Start:  03/02/24    Expected End:  03/11/24               TRANSFERS       Patient will complete sit to stand transfer with contact guard assist level of assistance and front wheeled walker in order to improve safety and prepare for out of bed mobility. (Progressing)       Start:  03/02/24    Expected End:  03/11/24

## 2024-03-04 NOTE — PROGRESS NOTES
Jony Javier [id number]   Service: Internal Medicine / Hospitalist Date of service: 3/4/2024                          Full Code          Subjective      78 y.o. with hx of CKD Stage III, biventricular heart failure LV EF 45%, moderate aortic stenosis, afib on Eliquis, and CAD presents with generalized malaise.  Much of the history is taken from wife at bedside as patient is a poor historian.  Apparently patient was in the garage for a while yesterday and came in seemingly confused.  Was repeating himself a lot.  Wife is concerned that he may have fallen outside but patient is not sure if he did or not.  Currently patient is complaining of generalized abdominal pain worse in the right lower quadrant as well as myalgias and generalized malaise.  Denies fevers or chills.  Some nausea but no vomiting.  Denies diarrhea or constipation issues.  No recent sick contacts.  In the ED, VSS.  Labs unrevealing.  Viral testing negative.  UA negative.  Tried to ambulate patient but did not do well due to generalized weakness.  Wife does not feel she can for patient currently.         3/2:              Today the patient is seen in bed.  He is awake alert and interactive appropriately.  Does not appear to have good recollection of the events leading to his admission yesterday.  Denies any tenderness of his head.  He was complaining of pain and review of OARRS reveals that he has been on Percocet 7.53 times a day regularly as well as Valium 5 3 times a day regularly.  These will be restarted.  He also complains of right lower quadrant pain.  Very mild without palpation but is tender on palpation with voluntary guarding but no involuntary guarding or rebound.  No mass appreciated.  He is uncertain of his last BM though states he does need to take stool softeners daily or he will be constipated.  WBC remains normal.  Otherwise denies interval new symptoms or complaints including chest pain palpitations pleuritic type pain unusual  shortness of breath cough sputum nausea flank pain dysuria diarrhea fever or chills.     3/3:                Today the patient states he is feeling somewhat better with less abdominal discomfort.  He does have concerned about the weakness of his lower extremities.  He is open to SNF if that is the recommendation.  Describes that he feels altered sensation bilateral lower extremities pretibial area to the feet.  He has good AROM bilateral lower extremities. Otherwise denies interval new symptoms or complaints including chest pain palpitations pleuritic type pain unusual shortness of breath cough sputum nausea flank pain dysuria diarrhea fever or chills.    3/4...............No reported: Hallucinations, syncope, presyncope, diaphoresis, chest pains, nausea or vomiting.Spoke with patient's wife she is unsure if patient actually passed out.  Patient felt that he passed out.  No reported device shock that he remembered.  Patient reported he has been more forgetful recently.      Patient seen and examined, lab data and diagnostics reviewed.  No reported: acute symptomatology at the time of evaluation including but not limited to :chest pain, dyspnea, constipation, abdominal pain, dysuria , nausea, vomiting ,headache, new focal weakness, diaphoresis,fever, chills, syncope,presyncope or palpitations.       Review of Systems:   Review of system otherwise negative if not aforementioned above in subjective.    Objective    Physical Exam     Constitutional:       Appearance: Patient appeared in no acute cardiopulmonary distress.     Comments: Patient alert and oriented to: person place time and situation.  HEENT:      Head: Normocephalic and atraumatic.Trachea midline      Nose:No observed congestion or rhinorrhea.     Mouth/Throat: Mucous membranes Moist, Trachea appeared  midline.  Eyes:      Extraocular Movements: Extraocular movements intact.      Pupils: Pupils are equal, round, and reactive to light.      Comments: No  scleral icterus or conjunctival injection appreciated.   Cardiovascular:      Rate and Rhythm: Normal rate and regular rhythm. No clicks rubs or gallops, normal S1 and S2.No peripheral stigmata of endocarditis appreciated.     Pulmonary:      Lungs appeared clear to auscultation, no adventitious sound appreciated.  Abdominal:      General: Abdomen soft, nontender, active bowel sounds, no involuntary guarding or rebound tenderness appreciated.     Comments: None   Musculoskeletal:       Patient appeared to have full active range of motion for upper and lower extremities, no acute apparent joint deformity appreciated on examination.   No pitting edema or cyanosis appreciated.       Lymphadenopathy:      No appreciable palpable lymphadenopathy  Skin:     General: Skin is warm.      Coloration:  No jaundice     Findings: No abnormal appearing skin rashes or lesions that appeared acute noted on unclothed area of the skin..   Neurological:      General: No focal sensory or motor deficits appreciated, no meningeal signs or dysmetria noted. Patient able to follow stream of thought but poor recall appreciated     Cranial Nerves: Cranial nerves II to XII appearing grossly intact.     Genitals:  Deferred  Psychiatric:         The patient appears to be displaying normal mood and affect at the time of evaluation.    Labs:     Lab Results   Component Value Date    GLUCOSE 87 03/04/2024    CALCIUM 9.2 03/04/2024     03/04/2024    K 4.2 03/04/2024    CO2 27 03/04/2024     03/04/2024    BUN 23 03/04/2024    CREATININE 1.55 (H) 03/04/2024      Lab Results   Component Value Date    WBC 8.6 03/04/2024    HGB 13.0 (L) 03/04/2024    HCT 40.5 (L) 03/04/2024     (H) 03/04/2024     (L) 03/04/2024      [unfilled]   [unfilled]   No results found for the last 90 days.              X-rays/ Images    [unfilled]   Radiology Results (last 21 days)    Procedure Component Value Units Date/Time   XR chest 1 view  [[id number]] Collected: 03/02/24 1030   Order Status: Completed Updated: 03/02/24 1030   Narrative:     Interpreted By:  Valentina Davila,  STUDY:  XR CHEST 1 VIEW 3/2/2024 8:47 am      INDICATION:  Signs/Symptoms:shortness of breath      COMPARISON:  12/04/2023      ACCESSION NUMBER(S):  [id number]      ORDERING CLINICIAN:  KARAN SANCHES      TECHNIQUE:  Single AP view chest      FINDINGS:  Cardiomediastinal silhouette is within normal limits. Median  sternotomy wires in place. Left-sided pacer with 3 separate leads in  place in the region of the right atrium, right ventricle, and  coronary sinus. Lung fields are hypo inflated with component of  basilar bronchovascular crowding. No infiltrate or effusion  identified. There is severe osteoarthritic degenerative change right  glenohumeral articulation.                   Impression:     Lung fields hypoinflated without acute process.      Signed by: Valentina Davila 3/2/2024 10:28 AM  Dictation workstation:   [id number]   CT abdomen pelvis wo IV contrast [[id number]] Collected: 03/02/24 0916   Order Status: Completed Updated: 03/02/24 0916   Narrative:     Interpreted By:  Gustavo Sung,  STUDY:  CT ABDOMEN PELVIS WO IV CONTRAST;  3/2/2024 7:17 am      INDICATION:  Signs/Symptoms:RLQ abdominal pain.      COMPARISON:  12/07/2023      ACCESSION NUMBER(S):  [id number]      ORDERING CLINICIAN:  KARAN SANCHES      TECHNIQUE:  Contiguous axial images were obtained through the abdomen and pelvis  without the use of contrast. Coronal and sagittal reconstructions  were performed.      FINDINGS:  LOWER CHEST:  Images through the lung bases  demonstrate minimal areas of  atelectasis.      ABDOMEN AND PELVIS:      LIVER:  Within normal limits.      BILE DUCTS:  Not abnormally dilated.      GALLBLADDER:  No calcified stones. No wall thickening.      PANCREAS:  Grossly unchanged in appearance. A few stable punctate scattered  calcifications.      SPLEEN:  Appears  unremarkable.      ADRENAL GLANDS:  Appear unremarkable.      KIDNEYS, URETERS, AND BLADDER:  A few punctate calcifications are seen within the kidneys which may  relate to nonobstructing calculi or vascular calcifications,  unchanged. Nonspecific perinephric stranding is also unchanged. No  hydronephrosis. Cyst in the upper pole of the right kidney measuring  1.3 cm in size. Urinary bladder is distended but is otherwise  unremarkable. Prostate appears mildly enlarged.      BOWEL:  The small and large bowel are normal in caliber and demonstrate no  wall thickening.  There is no evidence of a bowel obstruction.  Appendix is normal in caliber.  Although CT has limited sensitivity  and specificity for gastric pathology, the stomach appears grossly  unremarkable.      RETROPERITONEUM, VESSELS:  There is no aneurysmal dilatation of the abdominal aorta. The IVC is  within normal limits.  There are atherosclerotic calcifications of  the aorta and its branches. No pathologically enlarged  retroperitoneal lymph nodes are noted.      PERITONEUM:  There is no evidence of pneumoperitoneum.  No ascites or loculated  fluid collection noted.  No pathologically enlarged mesenteric lymph  nodes are identified.      ABDOMINAL WALL, SOFT TISSUES:  Fat containing inguinal hernias bilaterally, larger on the right,  unchanged.      SKELETON:  Postsurgical changes relating to fusion of L3 through L5.  Degenerative changes. No acute process.       Impression:     Distended urinary bladder.  Mild prostatomegaly.  Additional incidental findings as above.      MACRO:  None.      Signed by: Gustavo Sung 3/2/2024 9:14 AM  Dictation workstation:   [id number]   CT head wo IV contrast [[id number]] Collected: 03/01/24 2242   Order Status: Completed Updated: 03/01/24 2242   Narrative:     Interpreted By:  Marc Motley,  STUDY:  CT HEAD WO IV CONTRAST;  3/1/2024 10:02 pm      INDICATION:  Signs/Symptoms:ams, hx sdh.      COMPARISON:  CT scan of the  head 12/04/2023      ACCESSION NUMBER(S):  [id number]      ORDERING CLINICIAN:  ALEXEY MACKEY      TECHNIQUE:  Axial noncontrast CT images of the head.      FINDINGS:  BRAIN PARENCHYMA: Small focal hypodensities bilateral basal ganglia  likely sequela of remote lacunar infarct. ,Gray-white matter  interfaces are preserved. No mass, mass effect or midline shift. Mild  deep and periventricular white matter hypodensities are nonspecific,  but favored to represent chronic small vessel ischemic changes.      HEMORRHAGE: No acute intracranial hemorrhage.  VENTRICLES and EXTRA-AXIAL SPACES: Mild-to-moderate volume loss with  prominence of the ventricles and sulci. EXTRACRANIAL SOFT TISSUES:  Within normal limits. PARANASAL SINUSES/MASTOIDS: Complete  opacification of the right maxillary sinus with opacification of  multiple right ethmoid air cells. Partial opacification of the left  frontal sinus. Remainder of the visualized paranasal sinuses and  mastoid air cells are aerated. CALVARIUM: No depressed skull  fracture. No destructive osseous lesion.      OTHER FINDINGS: Atherosclerotic calcification of the carotid siphons.       Impression:     No acute intracranial abnormality. If symptoms persist or there is  high clinical suspicion further evaluation with MRI can be considered.      Chronic changes as noted above.      MACRO:  None      Signed by: Marc Motley 3/1/2024 10:41 PM  Dictation workstation:   [id number]           Medical Problems       Problem List       * (Principal) Ambulatory dysfunction    Abdominal pain    Acute pneumonia    Acute sinusitis    Acute UTI    Anemia    Anxiety disorder    Panic disorder    Aortic valve stenosis    AV node dysfunction    Bilateral hearing loss    Bilateral sensorineural hearing loss    Bladder diverticulitis    CAD (coronary artery disease)    Carotid stenosis    Cerebral ventriculomegaly    Cervical neck pain with evidence of disc disease    Chronic combined systolic and  diastolic heart failure, NYHA class 3 (CMS/HCC)    Chronic congestive heart failure (CMS/HCC)    Chronic renal impairment    Compressive atelectasis    CVA (cerebral vascular accident) (CMS/HCC)    Depression    Diabetes (CMS/HCC)    Disturbed hearing    DJD (degenerative joint disease)    Dysuria    Ear pain    Erectile dysfunction    Esophageal candidiasis (CMS/HCC)    GERD (gastroesophageal reflux disease)    Gout attack    Groin pain    HTN (hypertension)    Hyperlipidemia    Hyperuricemia    Insomnia    Cough with hemoptysis    Intracranial bleed (CMS/HCC)    Low back pain    Muscle cramps    Constipation    Nausea in adult    Numbness of left hand    Osteoarthritis of right glenohumeral joint    Pacemaker    Pericardial effusion    Persistent atrial fibrillation (CMS/HCC)    Pleural effusion, bilateral    Positive occult stool blood test    Psoriasis of scalp    Rash    Shortness of breath at rest    HECTOR on CPAP    Sleep apnea    Testosterone deficiency    Tinnitus of left ear    Tiredness    Unspecified lesions of oral mucosa    URI, acute    Acquired cyst of kidney    Adjustment disorder with depressed mood    Alcohol abuse    Allergic rhinitis    Anemia due to stage 4 chronic kidney disease treated with erythropoietin (CMS/[facility])    Automatic implantable cardioverter-defibrillator in situ    Overview Signed 7/27/2023  5:15 PM by Eddi Mariscal MA     Formatting of this note might be different from the original. ICD-Device [facility] Medtronic Model O143XLZ Concerto II CRT-D Serial Number [id number] Implant Date 11/17/2009 Implanted By Joshua Cadena M.D. BLANK _ [employer] Medtronic Model 6947 Serial Number [id number] Implant Date 11/17/2009 BLANK _ [employer] Medtronic Model 5076 Serial Number [id number] Implant Date 11/17/2009 BLANK _ [employer]3 [employer] Medtronic Model 4194 Serial Number [id number] Location Cardiac Vein (Left) Implant Date 11/17/2009         Balanitis    Bundle branch block, left    CAD S/P percutaneous  coronary angioplasty    Central perforation of tympanic membrane    Chronic maxillary sinusitis    Chronic or old sprain of lumbosacral ligament    Lumbar sprain    Congenital insufficiency of aortic valve    Degeneration of lumbar or lumbosacral intervertebral disc    Deviated nasal septum    Conductive hearing loss    Displacement of lumbar intervertebral disc without myelopathy    Dysfunction of eustachian tube    Elevated prostate specific antigen (PSA)    Hypertrophy of nasal turbinates    Loss of weight    Mild intermittent asthma without complication    Palpitation    Disorder of prostate    Overview Signed 7/27/2023  5:15 PM by Eddi Mariscal MA     Formatting of this note might be different from the original. Updating Deprecated Diagnoses Formatting of this note might be different from the original. Updating Deprecated Diagnoses         Prostatitis    Overview Signed 7/27/2023  5:15 PM by Eddi Mariscal MA     Formatting of this note might be different from the original. Updating Deprecated Diagnoses Formatting of this note might be different from the original. Updating Deprecated Diagnoses         S/P UPPP (uvulopalatopharyngoplasty)    Skin infection    SVT (supraventricular tachycardia)    Tympanic membrane conductive hearing loss    Urgency of urination    Ventricular tachycardia (CMS/HCC) (Chronic)    Routine general medical examination at health care facility    Disorder of middle ear    Male erectile dysfunction, unspecified    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HCC)    Chronic obstructive pulmonary disease, unspecified COPD type (CMS/HCC)    Type 2 diabetes mellitus with diabetic peripheral angiopathy without gangrene, without long-term current use of insulin (CMS/HCC)    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 of unspecified duration, initial encounter (CMS/HCC)    End stage heart failure (CMS/HCC)    Stage 3b chronic kidney disease (CMS/HCC)    Thrombocytopenia  (CMS/HCC)    Accidental overdose    Carotid bruit    Fall    Frailty    Heel pain    History of hypertension    History of intraventricular hemorrhage    Lesion of liver    Macrocytosis    Sepsis (CMS/HCC)    Acute respiratory failure with hypercapnia (CMS/HCC)    Confusion               Above medical problems may be reflective of historical medical problems that may have resolved and may not related to acute clinical condition/medical problems.    Clinical impression/plan:      Principal Problem:    Ambulatory dysfunction  Active Problems:    Confusion     Generalized Weakness  Ambulatory Dysfunction  -Many vague complaints including generalized malaise, myalgias, nausea, and vague abdominal pain  -Workup in the ER including viral testing, UA, and routine labs unrevealing  -Unclear if any real pathology present, will obtain CT abdomen pelvis for workup of abdominal pain further  -Will need therapy consultations this patient is weaker than his baseline  3/2: PT/OT evaluation reveals AM-PAC of 12.  Likely will need SNF at discharge.  3/3: Appears to have bilateral lower extremity distal neuropathy which likely is related to his lumbar spine degenerative disease for which she has undergone surgical procedures.     Abdominal pain  -Uncertain etiology.  CT abdomen and pelvis describes normal-appearing appendix without other significant abnormalities.  Possibly related to constipation with his chronic opioid use.  Taking diet.  Continue to monitor.     Chronic pain and anxiety  -Patient takes Percocet 7.5 3 times daily and Valium 5 3 times daily routinely at home.  These will be continued at this point but watch for sedation.  3/3: Appears to be very stable and doing well with his usual home Percocet and Valium regimen.  Subjectively feeling much better with this.        Other Issues  -CKD Stage III: Cr at baseline  -Chronic biventricular heart failure LV EF 45%: Hold Bumex for today given concern for dehydration  -Afib:  Home medications  -CAD: Home medications     DVT Prophy  -Home Eliquis    Disposition/additional care plan/interventions: 3/4/2024      Monitor renal function.    Verify home medications and resume accordingly.    Clinical suspicion for likely chronic cognitive impairment, accelerating.  Lower clinical suspicion of statin induced cognitive impairment however will monitor closely    Consultation to cardiology concerning syncope, may benefit from pacemaker defibrillator interrogation.    Continue CardioMEMS monitoring for volume status    Diuretics twice weekly    De-escalate Coreg temporarily    Care plan discussed in detail with patient's wife.    Vague right-sided pain and discomfort we will continue monitoring clinically, nonacute abdomen.,  Mindful of herpes zoster no skin manifestation apparent at this juncture will monitor    The patient was informed of differential diagnosis , work up , plan of care and possible sequelae of clinical disposition.Patient in agreement with plan of care. Further recommendations forthcoming in accordance with patient's clinical disposition and response to care.    Discharge planning:Discharge timing to be determined, will discuss further with [facility] concerning ECF availability.    Care time: > 55 mins           Dictation performed with assistance of voice recognition device therefore transcription errors are possible.

## 2024-03-04 NOTE — PROGRESS NOTES
Physical Therapy    Physical Therapy Treatment    Patient Name: Jony Javier  MRN: [id number]  Today's Date: 3/4/2024  Time Calculation  Start Time: 0921  Stop Time: 0945  Time Calculation (min): 24 min       Assessment/Plan   PT Assessment  PT Assessment Results: Decreased strength, Decreased endurance  Rehab Prognosis: Good  End of Session Communication: Bedside nurse  Assessment Comment: pt on continuous 02 monitor. pt maintain 93% and above during amb. pt had no LOB during amb. pt went to sink to perform hygiene. pt then amb in room. pt moved at a slow pace.  End of Session Patient Position: Alarm on, Up in chair     PT Plan  Treatment/Interventions: Bed mobility, Transfer training, Gait training, Balance training, Strengthening, Endurance training, Therapeutic exercise, Therapeutic activity  PT Plan: Skilled PT  PT Frequency: 3 times per week  PT Discharge Recommendations: Moderate intensity level of continued care  PT - OK to Discharge: Yes (when medically cleared)      General Visit Information:   PT  Visit  PT Received On: 03/04/24       Subjective   Precautions:     Vital Signs:       Objective   Pain:  Pain Assessment  Pain Score: 0 - No pain  Cognition:  Cognition  Orientation Level: Oriented X4       Treatments:  Therapeutic Exercise  Therapeutic Exercise Performed:  (LAQ, hip flexion x12 each)    Bed Mobility  Bed Mobility: Yes  Bed Mobility 1  Bed Mobility 1: Supine to sitting  Level of Assistance 1: Minimum assistance    Ambulation/Gait Training  Ambulation/Gait Training Performed: Yes  Ambulation/Gait Training 1  Surface 1: Level tile  Device 1: Rolling walker  Assistance 1: Minimum assistance  Quality of Gait 1: Decreased step length, Diminished heel strike  Comments/Distance (ft) 1: 10, 20  Transfers  Transfer: Yes  Transfer 1  Technique 1: Sit to stand  Transfer Device 1: Walker  Transfer Level of Assistance 1: Contact guard  Trials/Comments 1: 1x EOB, 1x chair    Outcome Measures:  [facility]  Basic Mobility  Turning from your back to your side while in a flat bed without using bedrails: A little  Moving from lying on your back to sitting on the side of a flat bed without using bedrails: A little  Moving to and from bed to chair (including a wheelchair): A little  Standing up from a chair using your arms (e.g. wheelchair or bedside chair): A lot  To walk in hospital room: A little  Climbing 3-5 steps with railing: A lot  Basic Mobility - Total Score: 16    Education Documentation  Precautions, taught by Teetee Adams PTA at 3/4/2024 10:02 AM.  Learner: Patient  Readiness: Acceptance  Method: Explanation  Response: Verbalizes Understanding    Mobility Training, taught by Teetee Adams PTA at 3/4/2024 10:02 AM.  Learner: Patient  Readiness: Acceptance  Method: Explanation  Response: Verbalizes Understanding    Education Comments  No comments found.        OP EDUCATION:       Encounter Problems       Encounter Problems (Active)       PT Problem       transfers (Progressing)       Start:  03/02/24    Expected End:  03/16/24       Patient will perform all transfers with CGA x1          gait (Progressing)       Start:  03/02/24    Expected End:  03/16/24       Patient will amb 50+ feet with CGA x1           strengthening (Progressing)       Start:  03/02/24    Expected End:  03/16/24       Patient will perform 20+ reps of AROM/RROM for RADHA LE's to improve safety and functional independence              Pain - Adult

## 2024-03-05 VITALS
HEIGHT: 64 IN | HEART RATE: 94 BPM | BODY MASS INDEX: 26.35 KG/M2 | OXYGEN SATURATION: 93 % | SYSTOLIC BLOOD PRESSURE: 174 MMHG | RESPIRATION RATE: 20 BRPM | TEMPERATURE: 97.5 F | DIASTOLIC BLOOD PRESSURE: 75 MMHG | WEIGHT: 154.32 LBS

## 2024-03-05 LAB
ANION GAP SERPL CALC-SCNC: 10 MMOL/L (ref 10–20)
BUN SERPL-MCNC: 19 MG/DL (ref 6–23)
CALCIUM SERPL-MCNC: 9.2 MG/DL (ref 8.6–10.3)
CHLORIDE SERPL-SCNC: 105 MMOL/L (ref 98–107)
CO2 SERPL-SCNC: 29 MMOL/L (ref 21–32)
CREAT SERPL-MCNC: 1.38 MG/DL (ref 0.5–1.3)
EGFRCR SERPLBLD CKD-EPI 2021: 52 ML/MIN/1.73M*2
GLUCOSE SERPL-MCNC: 103 MG/DL (ref 74–99)
LACTATE SERPL-SCNC: 1.4 MMOL/L (ref 0.4–2)
POTASSIUM SERPL-SCNC: 4.4 MMOL/L (ref 3.5–5.3)
SODIUM SERPL-SCNC: 140 MMOL/L (ref 136–145)

## 2024-03-05 PROCEDURE — 36415 COLL VENOUS BLD VENIPUNCTURE: CPT | Performed by: HOSPITALIST

## 2024-03-05 PROCEDURE — 2500000001 HC RX 250 WO HCPCS SELF ADMINISTERED DRUGS (ALT 637 FOR MEDICARE OP): Performed by: INTERNAL MEDICINE

## 2024-03-05 PROCEDURE — C9113 INJ PANTOPRAZOLE SODIUM, VIA: HCPCS | Performed by: HOSPITALIST

## 2024-03-05 PROCEDURE — 97530 THERAPEUTIC ACTIVITIES: CPT | Mod: GO,CO

## 2024-03-05 PROCEDURE — 99223 1ST HOSP IP/OBS HIGH 75: CPT | Performed by: INTERNAL MEDICINE

## 2024-03-05 PROCEDURE — 2500000001 HC RX 250 WO HCPCS SELF ADMINISTERED DRUGS (ALT 637 FOR MEDICARE OP): Performed by: HOSPITALIST

## 2024-03-05 PROCEDURE — 2500000001 HC RX 250 WO HCPCS SELF ADMINISTERED DRUGS (ALT 637 FOR MEDICARE OP): Performed by: FAMILY MEDICINE

## 2024-03-05 PROCEDURE — 99239 HOSP IP/OBS DSCHRG MGMT >30: CPT | Performed by: HOSPITALIST

## 2024-03-05 PROCEDURE — 2500000004 HC RX 250 GENERAL PHARMACY W/ HCPCS (ALT 636 FOR OP/ED): Performed by: HOSPITALIST

## 2024-03-05 PROCEDURE — 4B02XTZ MEASUREMENT OF CARDIAC DEFIBRILLATOR, EXTERNAL APPROACH: ICD-10-PCS | Performed by: HOSPITALIST

## 2024-03-05 PROCEDURE — 83605 ASSAY OF LACTIC ACID: CPT | Performed by: PHYSICIAN ASSISTANT

## 2024-03-05 PROCEDURE — 94660 CPAP INITIATION&MGMT: CPT

## 2024-03-05 PROCEDURE — 80048 BASIC METABOLIC PNL TOTAL CA: CPT | Performed by: HOSPITALIST

## 2024-03-05 RX ORDER — CARVEDILOL 25 MG/1
25 TABLET ORAL 2 TIMES DAILY
Status: DISCONTINUED | OUTPATIENT
Start: 2024-03-05 | End: 2024-03-05 | Stop reason: HOSPADM

## 2024-03-05 RX ADMIN — VENLAFAXINE HYDROCHLORIDE 150 MG: 150 CAPSULE, EXTENDED RELEASE ORAL at 09:43

## 2024-03-05 RX ADMIN — APIXABAN 5 MG: 5 TABLET, FILM COATED ORAL at 09:43

## 2024-03-05 RX ADMIN — OXYCODONE HYDROCHLORIDE 7.5 MG: 5 TABLET ORAL at 14:58

## 2024-03-05 RX ADMIN — SACUBITRIL AND VALSARTAN 1 TABLET: 49; 51 TABLET, FILM COATED ORAL at 09:43

## 2024-03-05 RX ADMIN — PANTOPRAZOLE SODIUM 40 MG: 40 INJECTION, POWDER, FOR SOLUTION INTRAVENOUS at 09:43

## 2024-03-05 RX ADMIN — EMPAGLIFLOZIN 10 MG: 10 TABLET, FILM COATED ORAL at 09:43

## 2024-03-05 ASSESSMENT — COGNITIVE AND FUNCTIONAL STATUS - GENERAL
DRESSING REGULAR LOWER BODY CLOTHING: A LITTLE
TOILETING: A LITTLE
DRESSING REGULAR LOWER BODY CLOTHING: A LITTLE
HELP NEEDED FOR BATHING: A LITTLE
PERSONAL GROOMING: A LITTLE
DAILY ACTIVITIY SCORE: 19
DAILY ACTIVITIY SCORE: 19
STANDING UP FROM CHAIR USING ARMS: A LOT
MOVING TO AND FROM BED TO CHAIR: A LITTLE
WALKING IN HOSPITAL ROOM: A LITTLE
MOVING FROM LYING ON BACK TO SITTING ON SIDE OF FLAT BED WITH BEDRAILS: A LITTLE
CLIMB 3 TO 5 STEPS WITH RAILING: A LOT
PERSONAL GROOMING: A LITTLE
DRESSING REGULAR UPPER BODY CLOTHING: A LITTLE
MOBILITY SCORE: 16
DRESSING REGULAR UPPER BODY CLOTHING: A LITTLE
HELP NEEDED FOR BATHING: A LITTLE
TURNING FROM BACK TO SIDE WHILE IN FLAT BAD: A LITTLE
TOILETING: A LITTLE

## 2024-03-05 ASSESSMENT — PAIN - FUNCTIONAL ASSESSMENT
PAIN_FUNCTIONAL_ASSESSMENT: 0-10

## 2024-03-05 ASSESSMENT — PAIN SCALES - GENERAL
PAINLEVEL_OUTOF10: 0 - NO PAIN
PAINLEVEL_OUTOF10: 0 - NO PAIN
PAINLEVEL_OUTOF10: 6
PAINLEVEL_OUTOF10: 4

## 2024-03-05 NOTE — PROGRESS NOTES
Physical Therapy                 Therapy Communication Note    Patient Name: Jony Javier  MRN: [id number]  Today's Date: 3/5/2024     Discipline: Physical Therapy    Missed Visit Reason: Missed Visit Reason:  (pt states that he hasnt slept all night and that is was a bad night for im and he just wants tto sleep right now.)

## 2024-03-05 NOTE — CARE PLAN
Problem: Safety - Adult  Goal: Free from fall injury  Outcome: Progressing     Problem: Discharge Planning  Goal: Discharge to home or other facility with appropriate resources  Outcome: Progressing     Problem: Chronic Conditions and Co-morbidities  Goal: Patient's chronic conditions and co-morbidity symptoms are monitored and maintained or improved  Outcome: Progressing     Problem: Diabetes  Goal: Achieve decreasing blood glucose levels by end of shift  Outcome: Progressing  Goal: Increase stability of blood glucose readings by end of shift  Outcome: Progressing  Goal: Decrease in ketones present in urine by end of shift  Outcome: Progressing  Goal: Maintain electrolyte levels within acceptable range throughout shift  Outcome: Progressing  Goal: Maintain glucose levels >70mg/dl to <250mg/dl throughout shift  Outcome: Progressing  Goal: No changes in neurological exam by end of shift  Outcome: Progressing  Goal: Learn about and adhere to nutrition recommendations by end of shift  Outcome: Progressing  Goal: Vital signs within normal range for age by end of shift  Outcome: Progressing  Goal: Increase self care and/or family involovement by end of shift  Outcome: Progressing  Goal: Receive DSME education by end of shift  Outcome: Progressing     Problem: Pain  Goal: Takes deep breaths with improved pain control throughout the shift  Outcome: Progressing  Goal: Turns in bed with improved pain control throughout the shift  Outcome: Progressing  Goal: Walks with improved pain control throughout the shift  Outcome: Progressing  Goal: Performs ADL's with improved pain control throughout shift  Outcome: Progressing  Goal: Participates in PT with improved pain control throughout the shift  Outcome: Progressing  Goal: Free from opioid side effects throughout the shift  Outcome: Progressing  Goal: Free from acute confusion related to pain meds throughout the shift  Outcome: Progressing     Problem: Skin  Goal: Decreased  wound size/increased tissue granulation at next dressing change  Outcome: Progressing  Goal: Participates in plan/prevention/treatment measures  Outcome: Progressing  Goal: Prevent/manage excess moisture  Outcome: Progressing  Goal: Prevent/minimize sheer/friction injuries  Outcome: Progressing  Goal: Promote/optimize nutrition  Outcome: Progressing  Goal: Promote skin healing  Outcome: Progressing     Problem: Fall/Injury  Goal: Not fall by end of shift  Outcome: Progressing  Goal: Be free from injury by end of the shift  Outcome: Progressing  Goal: Verbalize understanding of personal risk factors for fall in the hospital  Outcome: Progressing  Goal: Verbalize understanding of risk factor reduction measures to prevent injury from fall in the home  Outcome: Progressing  Goal: Use assistive devices by end of the shift  Outcome: Progressing  Goal: Pace activities to prevent fatigue by end of the shift  Outcome: Progressing

## 2024-03-05 NOTE — DISCHARGE SUMMARY
Discharge Summary    Jony Javier    [id number]     3/1/2024  9:15 PM , admit date    3/4/2024, discharge date      .      Discharge Diagnosis:        1 chronic back pain with referred pain to  the abdomen.    2.  Acute confusional state, resolved    3.  Suspected chronic cognitive impairment    4.  Chronic pain syndrome    5.  End-stage biventricular heart failure    6.  Paroxysmal atrial fibrillation    7.  Ambulatory dysfunction, generalized weakness, patient wish for outpatient home health care rather than ECF.    8. ?Syncope, low clinical suspicion for true syncopal event.        Problem List Items Addressed This Visit             ICD-10-CM       Neuro    Confusion - Primary R41.0     Other Visit Diagnoses         Codes    Nausea     R11.0    Relevant Medications    ondansetron ODT (Zofran-ODT) 4 mg disintegrating tablet    Syncope, unspecified syncope type     R55    Relevant Orders    Cardiac device check - Inpatient               Hospital Course:  38-year-old  male with known history of biventricular heart failure, end-stage heart failure, chronic kidney disease stage III, moderate aortic stenosis, A-fib on chronic Eliquis therapy presented with generalized malaise and right flank pain.  There was question of possible syncopal episode on presentation.  Completeness interrogation of patient's pacemaker defibrillator was done today and no reported cardiac dysrhythmia.  Patient did not display clinical signs of herpes zoster CT scan of the abdomen and pelvis was negative for abdominal aortic dissection findings, referred pain suspected from chronic back pain anteriorly to the abdomen.  Patient appears to responded well supportive interventions implemented he does not wish for skilled nursing facility and wished for discharge to home today.  However he is fully aware of his chronic cognitive impairments and right I recommend close follow-up with his family physician concerning suspected  dementia.    Patient heart failure disposition appeared compensated.  Recommend monitoring analgesics therapy.  Provisional report by patient's wife of reported hallucinations clinical suspicion of chronic cognitive impairment/dementia, recommend close follow-up with family physician.    No clinical signs of cardiogenic shock, continue chronic longitudinal therapy for congestive heart failure    Patient should seek medical attention immediately if condition should worsen, new symptoms develop , no further improvement or recurrence of presenting symptomatology, patient warned.          Progress note on the date of  discharge.    Jony LUURashaun Javier [id number]   Service: Internal Medicine / Hospitalist Date of service: 3/5/2024                                  Full Code            Subjective        78 y.o. with hx of CKD Stage III, biventricular heart failure LV EF 45%, moderate aortic stenosis, afib on Eliquis, and CAD presents with generalized malaise.  Much of the history is taken from wife at bedside as patient is a poor historian.  Apparently patient was in the garage for a while yesterday and came in seemingly confused.  Was repeating himself a lot.  Wife is concerned that he may have fallen outside but patient is not sure if he did or not.  Currently patient is complaining of generalized abdominal pain worse in the right lower quadrant as well as myalgias and generalized malaise.  Denies fevers or chills.  Some nausea but no vomiting.  Denies diarrhea or constipation issues.  No recent sick contacts.  In the ED, VSS.  Labs unrevealing.  Viral testing negative.  UA negative.  Tried to ambulate patient but did not do well due to generalized weakness.  Wife does not feel she can for patient currently.         3/2:              Today the patient is seen in bed.  He is awake alert and interactive appropriately.  Does not appear to have good recollection of the events leading to his admission yesterday.  Denies any  tenderness of his head.  He was complaining of pain and review of OARRS reveals that he has been on Percocet 7.53 times a day regularly as well as Valium 5 3 times a day regularly.  These will be restarted.  He also complains of right lower quadrant pain.  Very mild without palpation but is tender on palpation with voluntary guarding but no involuntary guarding or rebound.  No mass appreciated.  He is uncertain of his last BM though states he does need to take stool softeners daily or he will be constipated.  WBC remains normal.  Otherwise denies interval new symptoms or complaints including chest pain palpitations pleuritic type pain unusual shortness of breath cough sputum nausea flank pain dysuria diarrhea fever or chills.     3/3:                Today the patient states he is feeling somewhat better with less abdominal discomfort.  He does have concerned about the weakness of his lower extremities.  He is open to SNF if that is the recommendation.  Describes that he feels altered sensation bilateral lower extremities pretibial area to the feet.  He has good AROM bilateral lower extremities. Otherwise denies interval new symptoms or complaints including chest pain palpitations pleuritic type pain unusual shortness of breath cough sputum nausea flank pain dysuria diarrhea fever or chills.     3/4...............No reported: Hallucinations, syncope, presyncope, diaphoresis, chest pains, nausea or vomiting.Spoke with patient's wife she is unsure if patient actually passed out.  Patient felt that he passed out.  No reported device shock that he remembered.  Patient reported he has been more forgetful recently.     3/5............. Long discussion with patient's wife yesterday.  Unclear if patient really had a syncopal episode however patient despite his memory issues felt that he may have passed out.  No reported: Diplopia, headaches, chest pains, orthopnea or paroxysmal nocturnal dyspnea.           Review of  Systems:   Review of system otherwise negative if not aforementioned above in subjective.     Objective     Physical Exam      Constitutional:       Appearance: Patient appeared in no acute cardiopulmonary distress.     Comments: Patient alert and oriented to: person place time and situation.  HEENT:      Head: Normocephalic and atraumatic.Trachea midline      Nose:No observed congestion or rhinorrhea.     Mouth/Throat: Mucous membranes Moist, Trachea appeared  midline.  Eyes:      Extraocular Movements: Extraocular movements intact.      Pupils: Pupils are equal, round, and reactive to light.      Comments: No scleral icterus or conjunctival injection appreciated.   Cardiovascular:      Rate and Rhythm: Normal rate and regular rhythm. No clicks rubs or gallops, normal S1 and S2.No peripheral stigmata of endocarditis appreciated.     Pulmonary:      Lungs appeared clear to auscultation, no adventitious sound appreciated.  Abdominal:      General: Abdomen soft, nontender, active bowel sounds, no involuntary guarding or rebound tenderness appreciated.     Comments: None   Musculoskeletal:       Patient appeared to have full active range of motion for upper and lower extremities, no acute apparent joint deformity appreciated on examination.   No pitting edema or cyanosis appreciated.       Lymphadenopathy:      No appreciable palpable lymphadenopathy  Skin:     General: Skin is warm.      Coloration:  No jaundice     Findings: No abnormal appearing skin rashes or lesions that appeared acute noted on unclothed area of the skin..   Neurological:      General: No focal sensory or motor deficits appreciated, no meningeal signs or dysmetria noted. Patient able to follow stream of thought but poor recall appreciated     Cranial Nerves: Cranial nerves II to XII appearing grossly intact.     Genitals:  Deferred  Psychiatric:         The patient appears to be displaying normal mood and affect at the time of evaluation.      Labs:               Lab Results   Component Value Date     GLUCOSE 87 03/04/2024     CALCIUM 9.2 03/04/2024      03/04/2024     K 4.2 03/04/2024     CO2 27 03/04/2024      03/04/2024     BUN 23 03/04/2024     CREATININE 1.55 (H) 03/04/2024                Lab Results   Component Value Date     WBC 8.6 03/04/2024     HGB 13.0 (L) 03/04/2024     HCT 40.5 (L) 03/04/2024      (H) 03/04/2024      (L) 03/04/2024            Lab Results   Component Value Date     GLUCOSE 103 (H) 03/05/2024     CALCIUM 9.2 03/05/2024      03/05/2024     K 4.4 03/05/2024     CO2 29 03/05/2024      03/05/2024     BUN 19 03/05/2024     CREATININE 1.38 (H) 03/05/2024            Lab Results   Component Value Date     WBC 8.6 03/04/2024     HGB 13.0 (L) 03/04/2024     HCT 40.5 (L) 03/04/2024      (H) 03/04/2024      (L) 03/04/2024      [unfilled]   [unfilled]   No results found for the last 90 days.                  X-rays/ Images     [unfilled]   Radiology Results (last 21 days)     Procedure Component Value Units Date/Time   XR chest 1 view [[id number]] Collected: 03/02/24 1030   Order Status: Completed Updated: 03/02/24 1030   Narrative:     Interpreted By:  Valentina Davila,  STUDY:  XR CHEST 1 VIEW 3/2/2024 8:47 am      INDICATION:  Signs/Symptoms:shortness of breath      COMPARISON:  12/04/2023      ACCESSION NUMBER(S):  [id number]      ORDERING CLINICIAN:  KARAN SANCHES      TECHNIQUE:  Single AP view chest      FINDINGS:  Cardiomediastinal silhouette is within normal limits. Median  sternotomy wires in place. Left-sided pacer with 3 separate leads in  place in the region of the right atrium, right ventricle, and  coronary sinus. Lung fields are hypo inflated with component of  basilar bronchovascular crowding. No infiltrate or effusion  identified. There is severe osteoarthritic degenerative change right  glenohumeral articulation.                   Impression:     Lung  fields hypoinflated without acute process.      Signed by: Valentina Davila 3/2/2024 10:28 AM  Dictation workstation:   [id number]   CT abdomen pelvis wo IV contrast [[id number]] Collected: 03/02/24 0916   Order Status: Completed Updated: 03/02/24 0916   Narrative:     Interpreted By:  Gustavo Sung,  STUDY:  CT ABDOMEN PELVIS WO IV CONTRAST;  3/2/2024 7:17 am      INDICATION:  Signs/Symptoms:RLQ abdominal pain.      COMPARISON:  12/07/2023      ACCESSION NUMBER(S):  [id number]      ORDERING CLINICIAN:  KARAN SANCHES      TECHNIQUE:  Contiguous axial images were obtained through the abdomen and pelvis  without the use of contrast. Coronal and sagittal reconstructions  were performed.      FINDINGS:  LOWER CHEST:  Images through the lung bases  demonstrate minimal areas of  atelectasis.      ABDOMEN AND PELVIS:      LIVER:  Within normal limits.      BILE DUCTS:  Not abnormally dilated.      GALLBLADDER:  No calcified stones. No wall thickening.      PANCREAS:  Grossly unchanged in appearance. A few stable punctate scattered  calcifications.      SPLEEN:  Appears unremarkable.      ADRENAL GLANDS:  Appear unremarkable.      KIDNEYS, URETERS, AND BLADDER:  A few punctate calcifications are seen within the kidneys which may  relate to nonobstructing calculi or vascular calcifications,  unchanged. Nonspecific perinephric stranding is also unchanged. No  hydronephrosis. Cyst in the upper pole of the right kidney measuring  1.3 cm in size. Urinary bladder is distended but is otherwise  unremarkable. Prostate appears mildly enlarged.      BOWEL:  The small and large bowel are normal in caliber and demonstrate no  wall thickening.  There is no evidence of a bowel obstruction.  Appendix is normal in caliber.  Although CT has limited sensitivity  and specificity for gastric pathology, the stomach appears grossly  unremarkable.      RETROPERITONEUM, VESSELS:  There is no aneurysmal dilatation of the abdominal aorta. The  IVC is  within normal limits.  There are atherosclerotic calcifications of  the aorta and its branches. No pathologically enlarged  retroperitoneal lymph nodes are noted.      PERITONEUM:  There is no evidence of pneumoperitoneum.  No ascites or loculated  fluid collection noted.  No pathologically enlarged mesenteric lymph  nodes are identified.      ABDOMINAL WALL, SOFT TISSUES:  Fat containing inguinal hernias bilaterally, larger on the right,  unchanged.      SKELETON:  Postsurgical changes relating to fusion of L3 through L5.  Degenerative changes. No acute process.       Impression:     Distended urinary bladder.  Mild prostatomegaly.  Additional incidental findings as above.      MACRO:  None.      Signed by: Gustavo Sung 3/2/2024 9:14 AM  Dictation workstation:   [id number]   CT head wo IV contrast [[id number]] Collected: 03/01/24 2242   Order Status: Completed Updated: 03/01/24 2242   Narrative:     Interpreted By:  Marc Motley,  STUDY:  CT HEAD WO IV CONTRAST;  3/1/2024 10:02 pm      INDICATION:  Signs/Symptoms:ams, hx sdh.      COMPARISON:  CT scan of the head 12/04/2023      ACCESSION NUMBER(S):  [id number]      ORDERING CLINICIAN:  ALEXEY MACKEY      TECHNIQUE:  Axial noncontrast CT images of the head.      FINDINGS:  BRAIN PARENCHYMA: Small focal hypodensities bilateral basal ganglia  likely sequela of remote lacunar infarct. ,Gray-white matter  interfaces are preserved. No mass, mass effect or midline shift. Mild  deep and periventricular white matter hypodensities are nonspecific,  but favored to represent chronic small vessel ischemic changes.      HEMORRHAGE: No acute intracranial hemorrhage.  VENTRICLES and EXTRA-AXIAL SPACES: Mild-to-moderate volume loss with  prominence of the ventricles and sulci. EXTRACRANIAL SOFT TISSUES:  Within normal limits. PARANASAL SINUSES/MASTOIDS: Complete  opacification of the right maxillary sinus with opacification of  multiple right ethmoid air cells. Partial  opacification of the left  frontal sinus. Remainder of the visualized paranasal sinuses and  mastoid air cells are aerated. CALVARIUM: No depressed skull  fracture. No destructive osseous lesion.      OTHER FINDINGS: Atherosclerotic calcification of the carotid siphons.       Impression:     No acute intracranial abnormality. If symptoms persist or there is  high clinical suspicion further evaluation with MRI can be considered.      Chronic changes as noted above.      MACRO:  None      Signed by: Marc Motley 3/1/2024 10:41 PM  Dictation workstation:   [id number]               Medical Problems         Problem List         * (Principal) Ambulatory dysfunction     Abdominal pain     Acute pneumonia     Acute sinusitis     Acute UTI     Anemia     Anxiety disorder     Panic disorder     Aortic valve stenosis     AV node dysfunction     Bilateral hearing loss     Bilateral sensorineural hearing loss     Bladder diverticulitis     CAD (coronary artery disease)     Carotid stenosis     Cerebral ventriculomegaly     Cervical neck pain with evidence of disc disease     Chronic combined systolic and diastolic heart failure, NYHA class 3 (CMS/HCC)     Chronic congestive heart failure (CMS/HCC)     Chronic renal impairment     Compressive atelectasis     CVA (cerebral vascular accident) (CMS/HCC)     Depression     Diabetes (CMS/HCC)     Disturbed hearing     DJD (degenerative joint disease)     Dysuria     Ear pain     Erectile dysfunction     Esophageal candidiasis (CMS/HCC)     GERD (gastroesophageal reflux disease)     Gout attack     Groin pain     HTN (hypertension)     Hyperlipidemia     Hyperuricemia     Insomnia     Cough with hemoptysis     Intracranial bleed (CMS/HCC)     Low back pain     Muscle cramps     Constipation     Nausea in adult     Numbness of left hand     Osteoarthritis of right glenohumeral joint     Pacemaker     Pericardial effusion     Persistent atrial fibrillation (CMS/HCC)     Pleural  effusion, bilateral     Positive occult stool blood test     Psoriasis of scalp     Rash     Shortness of breath at rest     HECTOR on CPAP     Sleep apnea     Testosterone deficiency     Tinnitus of left ear     Tiredness     Unspecified lesions of oral mucosa     URI, acute     Acquired cyst of kidney     Adjustment disorder with depressed mood     Alcohol abuse     Allergic rhinitis     Anemia due to stage 4 chronic kidney disease treated with erythropoietin (CMS/HCC)     Automatic implantable cardioverter-defibrillator in situ     Overview Signed 7/27/2023  5:15 PM by Eddi Mariscal MA       Formatting of this note might be different from the original. ICD-Device Mfg Medtronic Model D121NHU Concerto II CRT-D Serial Number [id number] Implant Date 11/17/2009 Implanted By Joshua Cadena M.D. BLANK _ Lead1 Mfg Medtronic Model 6947 Serial Number [id number] Implant Date 11/17/2009 BLANK _ Lead2 Mfg Medtronic Model 5076 Serial Number [id number] Implant Date 11/17/2009 BLANK _ Lead3 Mfg Medtronic Model 4194 Serial Number [id number] Location Cardiac Vein (Left) Implant Date 11/17/2009           Balanitis     Bundle branch block, left     CAD S/P percutaneous coronary angioplasty     Central perforation of tympanic membrane     Chronic maxillary sinusitis     Chronic or old sprain of lumbosacral ligament     Lumbar sprain     Congenital insufficiency of aortic valve     Degeneration of lumbar or lumbosacral intervertebral disc     Deviated nasal septum     Conductive hearing loss     Displacement of lumbar intervertebral disc without myelopathy     Dysfunction of eustachian tube     Elevated prostate specific antigen (PSA)     Hypertrophy of nasal turbinates     Loss of weight     Mild intermittent asthma without complication     Palpitation     Disorder of prostate     Overview Signed 7/27/2023  5:15 PM by Eddi Mariscal MA       Formatting of this note might be different from the original. Updating Deprecated Diagnoses  Formatting of this note might be different from the original. Updating Deprecated Diagnoses           Prostatitis     Overview Signed 7/27/2023  5:15 PM by Eddi Mariscal MA       Formatting of this note might be different from the original. Updating Deprecated Diagnoses Formatting of this note might be different from the original. Updating Deprecated Diagnoses           S/P UPPP (uvulopalatopharyngoplasty)     Skin infection     SVT (supraventricular tachycardia)     Tympanic membrane conductive hearing loss     Urgency of urination     Ventricular tachycardia (CMS/HCC) (Chronic)     Routine general medical examination at health care facility     Disorder of middle ear     Male erectile dysfunction, unspecified     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HCC)     Chronic obstructive pulmonary disease, unspecified COPD type (CMS/HCC)     Type 2 diabetes mellitus with diabetic peripheral angiopathy without gangrene, without long-term current use of insulin (CMS/HCC)     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 of unspecified duration, initial encounter (CMS/HCC)     End stage heart failure (CMS/HCC)     Stage 3b chronic kidney disease (CMS/HCC)     Thrombocytopenia (CMS/HCC)     Accidental overdose     Carotid bruit     Fall     Frailty     Heel pain     History of hypertension     History of intraventricular hemorrhage     Lesion of liver     Macrocytosis     Sepsis (CMS/HCC)     Acute respiratory failure with hypercapnia (CMS/HCC)     Confusion                  Above medical problems may be reflective of historical medical problems that may have resolved and may not related to acute clinical condition/medical problems.     Clinical impression/plan:        Principal Problem:    Ambulatory dysfunction  Active Problems:    Confusion     Generalized Weakness  Ambulatory Dysfunction  -Many vague complaints including generalized malaise, myalgias, nausea, and vague abdominal pain  -Workup in the ER  including viral testing, UA, and routine labs unrevealing  -Unclear if any real pathology present, will obtain CT abdomen pelvis for workup of abdominal pain further  -Will need therapy consultations this patient is weaker than his baseline  3/2: PT/OT evaluation reveals AM-PAC of 12.  Likely will need SNF at discharge.  3/3: Appears to have bilateral lower extremity distal neuropathy which likely is related to his lumbar spine degenerative disease for which she has undergone surgical procedures.     Abdominal pain  -Uncertain etiology.  CT abdomen and pelvis describes normal-appearing appendix without other significant abnormalities.  Possibly related to constipation with his chronic opioid use.  Taking diet.  Continue to monitor.     Chronic pain and anxiety  -Patient takes Percocet 7.5 3 times daily and Valium 5 3 times daily routinely at home.  These will be continued at this point but watch for sedation.  3/3: Appears to be very stable and doing well with his usual home Percocet and Valium regimen.  Subjectively feeling much better with this.        Other Issues  -CKD Stage III: Cr at baseline  -Chronic biventricular heart failure LV EF 45%: Hold Bumex for today given concern for dehydration  -Afib: Home medications  -CAD: Home medications     DVT Prophy  -Home Eliquis     Disposition/additional care plan/interventions: 3/4/2024        Monitor renal function.     Verify home medications and resume accordingly.     Clinical suspicion for likely chronic cognitive impairment, accelerating.  Lower clinical suspicion of statin induced cognitive impairment however will monitor closely     Consultation to cardiology concerning syncope, may benefit from pacemaker defibrillator interrogation.     Continue CardioMEMS monitoring for volume status     Diuretics twice weekly     De-escalate Coreg temporarily     Care plan discussed in detail with patient's wife.     Vague right-sided pain and discomfort we will continue  monitoring clinically, nonacute abdomen.,  Mindful of herpes zoster no skin manifestation apparent at this juncture will monitor        Disposition/additional care plan/interventions: 3/5/2024     Patient reported chronic back pain,  clinical suspicion referred from pain from the back anteriorly to the right side but will monitor closely.  No clinical signs of zoster appreciated on examination.     Care plan discussed with patient's cardiologist Dr. Portillo, overall poor physiological reserve suspected.     Pacemaker/defibrillator interrogation     Lactic acid ordered, continue monitor hemodynamic status, blood pressure elevated at this time.  Lower clinical suspicion of cardiogenic shock at this juncture but still remains at risk, antihypertensive therapy optimization employed.     The patient was informed of differential diagnosis , work up , plan of care and possible sequelae of clinical disposition.Patient in agreement with plan of care. Further recommendations forthcoming in accordance with patient's clinical disposition and response to care.     Discharge planning:Patient wish for discharge to home with home health care assistance.Plan discharge if device interrogation/pacemaker interrogation/defibrillator interrogation negative for cardiac dysrhythmia.  Resume chronic antihypertensive therapy and chronic external therapy for end-stage heart failure     Care time: > 55 mins              Dictation performed with assistance of voice recognition device therefore transcription errors are possible.   Consultants    Cardiology           Surgeries/Procedures:  None               Your medication list        START taking these medications        Instructions Last Dose Given Next Dose Due   ondansetron ODT 4 mg disintegrating tablet  Commonly known as: Zofran-ODT      Take 1 tablet (4 mg) by mouth every 8 hours if needed for nausea or vomiting.              CONTINUE taking these medications        Instructions Last  Dose Given Next Dose Due   OneTouch Delica Plus Lancet 33 gauge misc  Generic drug: lancets                  ASK your doctor about these medications        Instructions Last Dose Given Next Dose Due   albuterol 90 mcg/actuation inhaler      INHALE 1 (ONE) PUFF EVERY 4 HOURS AS NEEDED       atorvastatin 80 mg tablet  Commonly known as: Lipitor      Take 1 tablet (80 mg) by mouth once daily at bedtime.       baclofen 10 mg tablet  Commonly known as: Lioresal           bumetanide 1 mg tablet  Commonly known as: Bumex      Take 1 tablet (1 mg) by mouth 2 times a week. May take additional dose for weight gain 3#, increased swelling or shortness of breath.       carvedilol 25 mg tablet  Commonly known as: Coreg           diazePAM 5 mg tablet  Commonly known as: Valium      Take 1 tablet (5 mg) by mouth every 8 hours if needed for anxiety.       Eliquis 5 mg tablet  Generic drug: apixaban      Take 1 tablet (5 mg) by mouth 2 times a day.       Entresto 49-51 mg tablet  Generic drug: sacubitriL-valsartan           EPINEPHrine 0.3 mg/0.3 mL injection syringe  Commonly known as: Epipen      Inject 0.3 mL (0.3 mg) as directed if needed for anaphylaxis.       ezetimibe 10 mg tablet  Commonly known as: Zetia      Take 1 tablet (10 mg) by mouth once daily.       ferrous gluconate 270 mg (27 mg iron) tablet           Jardiance 10 mg  Generic drug: empagliflozin      Take 1 tablet (10 mg) by mouth once daily.       lidocaine 5 % ointment  Commonly known as: Xylocaine           mirtazapine 30 mg tablet  Commonly known as: Remeron           One Daily Multivitamin tablet  Generic drug: multivitamin           oxyCODONE-acetaminophen 7.5-325 mg tablet  Commonly known as: Percocet           pantoprazole 40 mg EC tablet  Commonly known as: ProtoNix      TAKE 1 TABLET BY MOUTH EVERY DAY       spironolactone 25 mg tablet  Commonly known as: Aldactone      Take 0.5 tablets (12.5 mg) by mouth once daily.       tadalafil 20 mg tablet  Commonly  known as: Cialis      Take 1 tablet (20 mg) by mouth once daily as needed for erectile dysfunction.       tamsulosin 0.4 mg 24 hr capsule  Commonly known as: Flomax      Take 1 capsule (0.4 mg) by mouth once daily in the morning.       testosterone 30 mg/actuation (1.5 mL) topical solution  Commonly known as: Axiron           True Metrix Glucose Test Strip strip  Generic drug: blood sugar diagnostic           venlafaxine  mg 24 hr capsule  Commonly known as: Effexor-XR                     Where to Get Your Medications        These medications were sent to [facility] - [facility] [address]  [address] [address]      Phone: [phone]   ondansetron ODT 4 mg disintegrating tablet            Disposition;    Patient appeared hemodynamically stable and clinically fit for discharge.  Patient in agreement with discharge.  Patient should seek medical attention immediately if condition should worsen, new symptoms develop , no further improvement or recurrence of presenting symptomatology, patient warned.    Follow-up:    Follow -up with family physician within one week. Follow-up with cardiology as recommended.      Discharge Time : 34 mins      Patient should seek medical attention immediately if condition should worsen , presenting symptoms/conditions do not resolve or new symptoms should developed,patient warned.     Dictation performed with assistance of voice recognition device therefore transcription errors are possible.

## 2024-03-05 NOTE — CONSULTS
Inpatient consult to Cardiology  Consult performed by: Narendra Portillo MD  Consult ordered by: He Vasquez DO  Reason for consult: syncope with pacemaker in situ        History Of Present Illness:    Jony Javier is a 78 y.o. male presenting with vague symptoms and incoherence, altered mental status.  Has been having some declining memory recently.  Today seems to have improved.  Has a long complicated cardiovascular history see below.  Lasix was being held due to suspected dehydration and cardiac medications have been cut back however blood pressure is elevated today.    Reportedly he mentioned something about losing consciousness but was not completely sure.  Today he denies losing consciousness.    His EKG today shows BiV paced rhythm, underlying rhythm appears to be sinus.    See past medical history:  history of aortic stenosis, mitral regurgitation, hypertension, hyperlipidemia, CVA, and GERD, coronary artery disease, chronic systolic heart failure, persistent atrial fibrillation, cardiogenic shock. He has coronary artery disease and a chronically occluded anomalous left circumflex. Several years ago while trying to intervene on that he suffered from myocardial or coronary rupture, possible other complication and needed open heart surgery.      He had a right and left heart catheterization done at [facility], showing chronically occluded anomalous left circumflex stent, moderate disease elsewhere. He has a severely calcified ascending aorta and abdominal aorta and both iliacs.     He was admitted to our facility in early 2021 with acute heart failure and cardiogenic shock. Treated in intensive care unit for several days, suffered from a PEA arrest and was successfully resuscitated, attempted cardioversion x2 but did not maintain sinus rhythm. Started on amiodarone and EP was consulted. After long hospital stay he has been discharged home. Subsequently he underwent CardioMEMS  placement and eventually AV andry ablation and atrial fibrillation ablation on May 12, 2021.      He had a BiV ICD placed in 2009, subsequently in 2014 had ventricular tachycardia and several ICD shocks. He was temporarily on amiodarone, but then weaned off of it by his electrophysiologist at [facility] but now he is back on this medication because of persistent atrial fibrillation. More recently Dr. Mayberry has again discontinued amiodarone after ablation.        He was readmitted to the hospital with acute heart failure in mid October 2021. He was treated medically. He was also found to have an incidental subdural hematoma reviewed by neurologist who felt he may also have normal pressure hydrocephalus contributing to falls and subdural hematoma. Nevertheless his oral anticoagulants were restarted.       He still feels quite fatigued still and has some shortness of breath on exertion. No symptoms at rest. No chest pain. Quite bothered by right shoulder pain that keeps him up at night. He states this is his rotator cuff and he intends to get it fixed. Echo from 2022 shows LV function has returned to normal no significant mitral regurgitation and there is at least moderate aortic valve stenosis. Echo 2023 LVEF 45%, MILD as, mild MR.     He tells me he had stroke in May 2022 and was started on aspirin by the neurologist.   .        Last Recorded Vitals:  Vitals:    03/05/24 0800 03/05/24 0846 03/05/24 0854 03/05/24 0935   BP:  (!) 174/96 159/81 170/78   BP Location:  Left arm Left arm    Patient Position:  Lying Lying    Pulse:  72 75 69   Resp:  18 18 20   Temp:  35.9 °C (96.6 °F) 36.2 °C (97.2 °F) 36.7 °C (98 °F)   TempSrc:  Temporal Temporal    SpO2: 97% 98% 94% 96%   Weight:       Height:           Last Labs:  CBC - 3/4/2024:  4:38 AM  8.6 13.0 125    40.5      CMP - 3/5/2024:  5:11 AM  9.2 7.2 19 --- 0.4   3.1 4.3 11 60      PTT - No results in last year.  1.3   14.5 _     Troponin I, High Sensitivity    Date/Time Value Ref Range Status   03/01/2024 09:48 PM 20 0 - 20 ng/L Final     Troponin I   Date/Time Value Ref Range Status   05/07/2022 06:00 AM 28 (H) 0 - 20 ng/L Final     Comment:     .  Less than 99th percentile of normal range cutoff-  Female and children under 18 years old <14 ng/L; Male <21 ng/L: Negative  Repeat testing should be performed if clinically indicated.   .  Female and children under 18 years old 14-50 ng/L; Male 21-50 ng/L:  Consistent with possible cardiac damage and possible increased clinical   risk. Serial measurements may help to assess extent of myocardial damage.   .  >50 ng/L: Consistent with cardiac damage, increased clinical risk and  myocardial infarction. Serial measurements may help assess extent of   myocardial damage.   .   NOTE: Children less than 1 year old may have higher baseline troponin   levels and results should be interpreted in conjunction with the overall   clinical context.   .  NOTE: Troponin I testing is performed using a different   testing methodology at [facility] than at other   [facility]. Direct result comparisons should only   be made within the same method.     05/03/2022 02:48 PM 28 (H) 0 - 20 ng/L Final     Comment:     .  Less than 99th percentile of normal range cutoff-  Female and children under 18 years old <14 ng/L; Male <21 ng/L: Negative  Repeat testing should be performed if clinically indicated.   .  Female and children under 18 years old 14-50 ng/L; Male 21-50 ng/L:  Consistent with possible cardiac damage and possible increased clinical   risk. Serial measurements may help to assess extent of myocardial damage.   .  >50 ng/L: Consistent with cardiac damage, increased clinical risk and  myocardial infarction. Serial measurements may help assess extent of   myocardial damage.   .   NOTE: Children less than 1 year old may have higher baseline troponin   levels and results should be interpreted in conjunction with the overall    clinical context.   .  NOTE: Troponin I testing is performed using a different   testing methodology at [facility] than at other   system [facility]. Direct result comparisons should only   be made within the same method.     05/03/2022 11:19 AM 21 (H) 0 - 20 ng/L Final     Comment:     .  Less than 99th percentile of normal range cutoff-  Female and children under 18 years old <14 ng/L; Male <21 ng/L: Negative  Repeat testing should be performed if clinically indicated.   .  Female and children under 18 years old 14-50 ng/L; Male 21-50 ng/L:  Consistent with possible cardiac damage and possible increased clinical   risk. Serial measurements may help to assess extent of myocardial damage.   .  >50 ng/L: Consistent with cardiac damage, increased clinical risk and  myocardial infarction. Serial measurements may help assess extent of   myocardial damage.   .   NOTE: Children less than 1 year old may have higher baseline troponin   levels and results should be interpreted in conjunction with the overall   clinical context.   .  NOTE: Troponin I testing is performed using a different   testing methodology at [facility] than at other   [facility]. Direct result comparisons should only   be made within the same method.       BNP   Date/Time Value Ref Range Status   03/01/2024 09:48  (H) 0 - 99 pg/mL Final   01/31/2024 03:31  (H) 0 - 99 pg/mL Final     Hemoglobin A1C   Date/Time Value Ref Range Status   01/31/2024 03:31 PM 6.1 (H) see below % Final   02/23/2023 03:24 PM 5.5 % Final     Comment:          Diagnosis of Diabetes-Adults   Non-Diabetic: < or = 5.6%   Increased risk for developing diabetes: 5.7-6.4%   Diagnostic of diabetes: > or = 6.5%  .       Monitoring of Diabetes                Age (y)     Therapeutic Goal (%)   Adults:          >18           <7.0   Pediatrics:    13-18           <7.5                   7-12           <8.0                   0- 6             7.5-8.5   American Diabetes Association. Diabetes Care 33(S1), Jan 2010.     09/26/2022 01:01 PM 6.0 (A) % Final     Comment:          Diagnosis of Diabetes-Adults   Non-Diabetic: < or = 5.6%   Increased risk for developing diabetes: 5.7-6.4%   Diagnostic of diabetes: > or = 6.5%  .       Monitoring of Diabetes                Age (y)     Therapeutic Goal (%)   Adults:          >18           <7.0   Pediatrics:    13-18           <7.5                   7-12           <8.0                   0- 6            7.5-8.5   American Diabetes Association. Diabetes Care 33(S1), Jan 2010.       LDL Calculated   Date/Time Value Ref Range Status   01/31/2024 03:31 PM 37 <=99 mg/dL Final     Comment:                                 Near   Borderline      AGE      Desirable  Optimal    High     High     Very High     0-19 Y     0 - 109     ---    110-129   >/= 130     ----    20-24 Y     0 - 119     ---    120-159   >/= 160     ----      >24 Y     0 -  99   100-129  130-159   160-189     >/=190       VLDL   Date/Time Value Ref Range Status   01/31/2024 03:31 PM 24 0 - 40 mg/dL Final   07/24/2023 03:17 PM 19 0 - 40 mg/dL Final   02/23/2023 03:24 PM 15 0 - 40 mg/dL Final   09/26/2022 01:01 PM 16 0 - 40 mg/dL Final      Last I/O:  I/O last 3 completed shifts:  In: 440 (6.3 mL/kg) [P.O.:440]  Out: 1852 (26.5 mL/kg) [Urine:1852 (0.7 mL/kg/hr)]  Weight: 70 kg     Past Cardiology Tests (Last 3 Years):  EKG:  ECG 12 lead 03/01/2024 (Preliminary)      ECG 12 lead (Clinic Performed) 01/09/2024    Echo:  Transthoracic Echo (TTE) Complete 11/27/2023    Ejection Fractions:  EF   Date/Time Value Ref Range Status   11/27/2023 03:45 PM 68       Cath:  No results found for this or any previous visit from the past 1095 days.    Stress Test:  No results found for this or any previous visit from the past 1095 days.    Cardiac Imaging:  No results found for this or any previous visit from the past 1095 days.      Past Medical History:  He has a past  medical history of Acute on chronic systolic (congestive) heart failure (CMS/HCC) (09/08/2022), Acute on chronic systolic CHF (congestive heart failure), NYHA class 3 (CMS/HCC) (05/05/2023), CHF, acute (CMS/HCC) (05/05/2023), Nonrheumatic aortic (valve) stenosis (01/10/2022), Nonrheumatic mitral (valve) insufficiency (09/14/2021), Nonrheumatic mitral (valve) insufficiency (12/07/2022), Other long term (current) drug therapy, Personal history of diseases of the blood and blood-forming organs and certain disorders involving the immune mechanism, Personal history of other diseases of the circulatory system (09/20/2022), Personal history of other diseases of the circulatory system, Personal history of other diseases of the circulatory system, Personal history of other diseases of the musculoskeletal system and connective tissue, Severe mitral regurgitation (05/05/2023), and Unspecified intracranial injury with loss of consciousness status unknown, initial encounter (CMS/HCC) (09/20/2022).    Past Surgical History:  He has a past surgical history that includes Other surgical history (01/08/2020); Other surgical history (01/08/2020); Other surgical history (01/08/2020); Other surgical history (12/06/2021); Other surgical history (12/06/2021); and Other surgical history (01/30/2020).      Social History:  He reports that he quit smoking about 54 years ago. His smoking use included cigarettes. He has never used smokeless tobacco. He reports current alcohol use of about 14.0 standard drinks of alcohol per week. He reports that he does not use drugs.    Family History:  Family History   Problem Relation Name Age of Onset    No Known Problems Mother      No Known Problems Father      Other (malignant neoplsm) Other      Hypertension Other      Other (cardiac disorder) Other          Allergies:  Bee venom protein (honey bee), Cefaclor, Pentazocine, Pentazocine-acetaminophen, Pregabalin, Allopurinol, Ciprofloxacin,  Sulfamethoxazole-trimethoprim, and Metoprolol    Inpatient Medications:  Scheduled medications   Medication Dose Route Frequency    apixaban  5 mg oral q12h    atorvastatin  80 mg oral Nightly    carvedilol  25 mg oral BID    empagliflozin  10 mg oral Daily    ezetimibe  10 mg oral Nightly    mirtazapine  30 mg oral Nightly    pantoprazole  40 mg intravenous Daily    sacubitriL-valsartan  1 tablet oral BID    spironolactone  12.5 mg oral q48h    tamsulosin  0.4 mg oral Daily    venlafaxine XR  150 mg oral Daily with breakfast     PRN medications   Medication    benzocaine-menthol    dextromethorphan-guaifenesin    dextrose 10 % in water (D10W)    dextrose    diazePAM    glucagon    guaiFENesin    melatonin    ondansetron    Or    ondansetron    oxyCODONE    polyethylene glycol     Continuous Medications   Medication Dose Last Rate     Outpatient Medications:  Current Outpatient Medications   Medication Instructions    albuterol 90 mcg/actuation inhaler INHALE 1 (ONE) PUFF EVERY 4 HOURS AS NEEDED    atorvastatin (LIPITOR) 80 mg, oral, Nightly    baclofen (LIORESAL) 10 mg, oral, 2 times daily PRN    blood sugar diagnostic (True Metrix Glucose Test Strip) strip USE 1 STRIP 3 times daily    bumetanide (BUMEX) 1 mg, oral, 2 times weekly, May take additional dose for weight gain 3#, increased swelling or shortness of breath.    carvedilol (COREG) 25 mg, oral, 2 times daily    diazePAM (VALIUM) 5 mg, oral, Every 8 hours PRN    Eliquis 5 mg, oral, 2 times daily    Entresto 49-51 mg tablet 1 tablet, oral, 2 times daily    EPINEPHrine (EPIPEN) 0.3 mg, injection, As needed    ezetimibe (ZETIA) 10 mg, oral, Daily    ferrous gluconate 270 mg (27 mg iron) tablet 1 tablet, oral, Daily    Jardiance 10 mg, oral, Daily    lidocaine (Xylocaine) 5 % ointment APPLY TO THE AFFECTED AREA(S) AS NEEDED FOR PAIN to last 30 days    mirtazapine (REMERON) 30 mg, oral, Nightly    multivitamin tablet 1 tablet, oral, Daily    nystatin (Mycostatin)  100,000 unit/gram powder 1 Application, Topical, 2 times daily    ondansetron ODT (ZOFRAN-ODT) 4 mg, oral, Every 8 hours PRN    OneTouch Delica Plus Lancet 33 gauge misc Test THREE TIMES DAILY AS DIRECTED    oxyCODONE-acetaminophen (Percocet) 7.5-325 mg tablet Take 1 tablet by mouth 3 times daily as needed for Pain for up to 30 days. Max Daily Amount 3 tablets    pantoprazole (PROTONIX) 40 mg, oral, Daily    spironolactone (ALDACTONE) 12.5 mg, oral, Daily    tadalafil (CIALIS) 20 mg, oral, Daily PRN    tamsulosin (FLOMAX) 0.4 mg, oral, Every morning    testosterone (Axiron) 30 mg/actuation (1.5 mL) topical solution 2 Pump, transdermal, Daily    venlafaxine XR (Effexor-XR) 150 mg 24 hr capsule TAKE 1 CAPSULE BY MOUTH AFTER BREAKFAST       Physical Exam:  Physical Exam  Vitals reviewed.   Constitutional:       Appearance: Normal appearance.   Neck:      Vascular: No carotid bruit or JVD.   Cardiovascular:      Rate and Rhythm: Normal rate and regular rhythm.      Heart sounds: Normal heart sounds, S1 normal and S2 normal. No murmur heard.  Pulmonary:      Effort: Pulmonary effort is normal.      Breath sounds: Normal breath sounds.   Abdominal:      General: Abdomen is flat. Bowel sounds are normal.      Palpations: Abdomen is soft.   Musculoskeletal:      Right lower leg: No edema.      Left lower leg: No edema.   Skin:     General: Skin is warm.   Neurological:      Mental Status: He is alert. Mental status is at baseline.   Psychiatric:         Mood and Affect: Mood normal.         Behavior: Behavior normal.           Assessment/Plan   1-altered mental status-unclear cause.  Patient denying any history of syncope today to me.  If there is a high level of concern please order interrogation of the ICD.  Given prior history of cardiogenic shock I would check a lactate level today and restart home dose of cardiac medications.    2-Chronic systolic and diastolic heart failure-he appears well compensated today.  Blood  pressure high-would restart all cardiac medications.  Some of the doses were cut back in healthy due to dehydration concerns.    3-history of atrial fibrillation-status post AV andry ablation chronically anticoagulated.    4-coronary artery disease, secondary prevention.    Discussed with Dr. Vasquez.        Peripheral IV 03/04/24 20 G Left Forearm (Active)   Site Assessment Clean;Dry;Intact 03/05/24 0900   Dressing Status Clean;Dry 03/05/24 0900   Number of days: 1       Code Status:  Full Code        Narendra Portillo MD

## 2024-03-05 NOTE — PROGRESS NOTES
Occupational Therapy    Occupational Therapy Treatment    Name: Jony Javier  MRN: [id number]  : 1945  Date: 24  Time Calculation  Start Time: 1402  Stop Time: 1416  Time Calculation (min): 14 min    Assessment:  Prognosis: Good  End of Session Communication: Bedside nurse  End of Session Patient Position: Up in chair, Bed, 3 rail up  Plan:  Treatment Interventions: ADL retraining, Functional transfer training, UE strengthening/ROM, Endurance training, Neuromuscular reeducation  OT Frequency: 3 times per week  OT Discharge Recommendations: Moderate intensity level of continued care  Equipment Recommended upon Discharge:  (TBD)  OT Recommended Transfer Status: Moderate assist  OT - OK to Discharge: Yes (yes. okay to dc to next level of care once medically clear)    Subjective   Previous Visit Info:  OT Last Visit  OT Received On: 24  General:  General  Reason for Referral: Dx: Weakness, Nausea, Abd pain, confusion, fall.  Referred By: Sanjana  Past Medical History Relevant to Rehab: CKD, HF, aortic stenosis, A fib, CAD  Prior to Session Communication: Bedside nurse  Patient Position Received: Bed, 3 rail up, Alarm on  General Comment: Pt motivated and agreeable to OT tx session at this time. Pt requiring Melissa-cga for functional performance and demos improvement in mobility and functional t/fs.  Precautions:  Medical Precautions: Fall precautions  Precautions Comment: falls  Vitals:     Pain Assessment:  Pain Assessment  Pain Assessment: 0-10  Pain Score: 0 - No pain     Objective       Functional Standing Tolerance:  Functional Standing Tolerance  Time: 2-3 min standing bouts  Activity: functional t/fs and mobiltiy  Functional Standing Tolerance Comments: pt tolerated standing bouts well with FWW and cga  Bed Mobility/Transfers: Bed Mobility  Bed Mobility: Yes  Bed Mobility 1  Bed Mobility 1: Supine to sitting, Sitting to supine  Level of Assistance 1: Close  supervision    Transfers  Transfer: Yes  Transfer 1  Transfer From 1: Bed to  Transfer to 1: Stand  Technique 1: Sit to stand  Transfer Device 1: Walker  Transfer Level of Assistance 1: Contact guard  Trials/Comments 1: x1 STS from EOB      Ambulation/Gait Training:  Ambulation/Gait Training  Ambulation/Gait Training Performed: Yes  Ambulation/Gait Training 1  Comments/Distance (ft) 1: pt achieved functional mobility throughout room to armed chair to sit down. Pt use FWW and cga at this time. Notable SOB and required cues for PLB and pacing.  Sitting Balance:  Static Sitting Balance  Static Sitting-Balance Support: Bilateral upper extremity supported, Feet supported  Static Sitting-Level of Assistance: Close supervision  Standing Balance:  Static Standing Balance  Static Standing-Balance Support: Bilateral upper extremity supported  Static Standing-Level of Assistance: Minimum assistance       Therapy/Activity:      Therapeutic Activity  Therapeutic Activity Performed: Yes  Therapeutic Activity 1: therapist implemented STS trials at armed chair to improve functional performance and increase ease with sit to stands. Pt performed 2x5 STS with RB between each set. Pt tolerated ther activity well.      Outcome Measures:  [facility] Daily Activity  Putting on and taking off regular lower body clothing: A little  Bathing (including washing, rinsing, drying): A little  Putting on and taking off regular upper body clothing: A little  Toileting, which includes using toilet, bedpan or urinal: A little  Taking care of personal grooming such as brushing teeth: A little  Eating Meals: None  Daily Activity - Total Score: 19        Education Documentation  Body Mechanics, taught by DEEPALI Patrick at 3/5/2024  2:53 PM.  Learner: Patient  Readiness: Acceptance  Method: Explanation  Response: Verbalizes Understanding    Precautions, taught by DEEPALI Patrick at 3/5/2024  2:53 PM.  Learner: Patient  Readiness: Acceptance  Method:  Explanation  Response: Verbalizes Understanding    ADL Training, taught by DEEPALI Patrick at 3/5/2024  2:53 PM.  Learner: Patient  Readiness: Acceptance  Method: Explanation  Response: Verbalizes Understanding    Education Comments  No comments found.      Goals:  Encounter Problems       Encounter Problems (Active)       ADLs       Patient with complete lower body dressing with minimal assist  level of assistance donning and doffing all LE clothes  with PRN adaptive equipment while edge of bed  (Progressing)       Start:  03/02/24    Expected End:  03/11/24               TRANSFERS       Patient will complete sit to stand transfer with contact guard assist level of assistance and front wheeled walker in order to improve safety and prepare for out of bed mobility. (Progressing)       Start:  03/02/24    Expected End:  03/11/24

## 2024-03-05 NOTE — CARE PLAN
The patient's goals for the shift include      The clinical goals for the shift include pt will remain free from falls or injury this shift      Problem: Safety - Adult  Goal: Free from fall injury  Outcome: Progressing     Problem: Discharge Planning  Goal: Discharge to home or other facility with appropriate resources  Outcome: Progressing     Problem: Chronic Conditions and Co-morbidities  Goal: Patient's chronic conditions and co-morbidity symptoms are monitored and maintained or improved  Outcome: Progressing     Problem: Diabetes  Goal: Achieve decreasing blood glucose levels by end of shift  Outcome: Progressing  Goal: Increase stability of blood glucose readings by end of shift  Outcome: Progressing  Goal: Decrease in ketones present in urine by end of shift  Outcome: Progressing  Goal: Maintain electrolyte levels within acceptable range throughout shift  Outcome: Progressing  Goal: Maintain glucose levels >70mg/dl to <250mg/dl throughout shift  Outcome: Progressing  Goal: No changes in neurological exam by end of shift  Outcome: Progressing  Goal: Learn about and adhere to nutrition recommendations by end of shift  Outcome: Progressing  Goal: Vital signs within normal range for age by end of shift  Outcome: Progressing  Goal: Increase self care and/or family involovement by end of shift  Outcome: Progressing  Goal: Receive DSME education by end of shift  Outcome: Progressing     Problem: Pain  Goal: Takes deep breaths with improved pain control throughout the shift  Outcome: Progressing  Goal: Turns in bed with improved pain control throughout the shift  Outcome: Progressing  Goal: Walks with improved pain control throughout the shift  Outcome: Progressing  Goal: Performs ADL's with improved pain control throughout shift  Outcome: Progressing  Goal: Participates in PT with improved pain control throughout the shift  Outcome: Progressing  Goal: Free from opioid side effects throughout the shift  Outcome:  Progressing  Goal: Free from acute confusion related to pain meds throughout the shift  Outcome: Progressing     Problem: Skin  Goal: Decreased wound size/increased tissue granulation at next dressing change  Outcome: Progressing  Flowsheets (Taken 3/5/2024 1137)  Decreased wound size/increased tissue granulation at next dressing change: Promote sleep for wound healing  Goal: Participates in plan/prevention/treatment measures  Outcome: Progressing  Flowsheets (Taken 3/5/2024 1137)  Participates in plan/prevention/treatment measures: Elevate heels  Goal: Prevent/manage excess moisture  Outcome: Progressing  Flowsheets (Taken 3/5/2024 1137)  Prevent/manage excess moisture: Moisturize dry skin  Goal: Prevent/minimize sheer/friction injuries  Outcome: Progressing  Flowsheets (Taken 3/5/2024 1137)  Prevent/minimize sheer/friction injuries:   Use pull sheet   HOB 30 degrees or less   Turn/reposition every 2 hours/use positioning/transfer devices  Goal: Promote/optimize nutrition  Outcome: Progressing  Flowsheets (Taken 3/5/2024 1137)  Promote/optimize nutrition: Monitor/record intake including meals  Goal: Promote skin healing  Outcome: Progressing  Flowsheets (Taken 3/5/2024 1137)  Promote skin healing: Turn/reposition every 2 hours/use positioning/transfer devices     Problem: Fall/Injury  Goal: Not fall by end of shift  Outcome: Progressing  Goal: Be free from injury by end of the shift  Outcome: Progressing  Goal: Verbalize understanding of personal risk factors for fall in the hospital  Outcome: Progressing  Goal: Verbalize understanding of risk factor reduction measures to prevent injury from fall in the home  Outcome: Progressing  Goal: Use assistive devices by end of the shift  Outcome: Progressing  Goal: Pace activities to prevent fatigue by end of the shift  Outcome: Progressing

## 2024-03-05 NOTE — PROGRESS NOTES
Jony Javier [id number]   Service: Internal Medicine / Hospitalist Date of service: 3/5/2024                                  Full Code            Subjective        78 y.o. with hx of CKD Stage III, biventricular heart failure LV EF 45%, moderate aortic stenosis, afib on Eliquis, and CAD presents with generalized malaise.  Much of the history is taken from wife at bedside as patient is a poor historian.  Apparently patient was in the garage for a while yesterday and came in seemingly confused.  Was repeating himself a lot.  Wife is concerned that he may have fallen outside but patient is not sure if he did or not.  Currently patient is complaining of generalized abdominal pain worse in the right lower quadrant as well as myalgias and generalized malaise.  Denies fevers or chills.  Some nausea but no vomiting.  Denies diarrhea or constipation issues.  No recent sick contacts.  In the ED, VSS.  Labs unrevealing.  Viral testing negative.  UA negative.  Tried to ambulate patient but did not do well due to generalized weakness.  Wife does not feel she can for patient currently.         3/2:              Today the patient is seen in bed.  He is awake alert and interactive appropriately.  Does not appear to have good recollection of the events leading to his admission yesterday.  Denies any tenderness of his head.  He was complaining of pain and review of OARRS reveals that he has been on Percocet 7.53 times a day regularly as well as Valium 5 3 times a day regularly.  These will be restarted.  He also complains of right lower quadrant pain.  Very mild without palpation but is tender on palpation with voluntary guarding but no involuntary guarding or rebound.  No mass appreciated.  He is uncertain of his last BM though states he does need to take stool softeners daily or he will be constipated.  WBC remains normal.  Otherwise denies interval new symptoms or complaints including chest pain palpitations pleuritic type  pain unusual shortness of breath cough sputum nausea flank pain dysuria diarrhea fever or chills.     3/3:                Today the patient states he is feeling somewhat better with less abdominal discomfort.  He does have concerned about the weakness of his lower extremities.  He is open to SNF if that is the recommendation.  Describes that he feels altered sensation bilateral lower extremities pretibial area to the feet.  He has good AROM bilateral lower extremities. Otherwise denies interval new symptoms or complaints including chest pain palpitations pleuritic type pain unusual shortness of breath cough sputum nausea flank pain dysuria diarrhea fever or chills.     3/4...............No reported: Hallucinations, syncope, presyncope, diaphoresis, chest pains, nausea or vomiting.Spoke with patient's wife she is unsure if patient actually passed out.  Patient felt that he passed out.  No reported device shock that he remembered.  Patient reported he has been more forgetful recently.    3/5............. Long discussion with patient's wife yesterday.  Unclear if patient really had a syncopal episode however patient despite his memory issues felt that he may have passed out.  No reported: Diplopia, headaches, chest pains, orthopnea or paroxysmal nocturnal dyspnea.           Review of Systems:   Review of system otherwise negative if not aforementioned above in subjective.     Objective     Physical Exam      Constitutional:       Appearance: Patient appeared in no acute cardiopulmonary distress.     Comments: Patient alert and oriented to: person place time and situation.  HEENT:      Head: Normocephalic and atraumatic.Trachea midline      Nose:No observed congestion or rhinorrhea.     Mouth/Throat: Mucous membranes Moist, Trachea appeared  midline.  Eyes:      Extraocular Movements: Extraocular movements intact.      Pupils: Pupils are equal, round, and reactive to light.      Comments: No scleral icterus or  conjunctival injection appreciated.   Cardiovascular:      Rate and Rhythm: Normal rate and regular rhythm. No clicks rubs or gallops, normal S1 and S2.No peripheral stigmata of endocarditis appreciated.     Pulmonary:      Lungs appeared clear to auscultation, no adventitious sound appreciated.  Abdominal:      General: Abdomen soft, nontender, active bowel sounds, no involuntary guarding or rebound tenderness appreciated.     Comments: None   Musculoskeletal:       Patient appeared to have full active range of motion for upper and lower extremities, no acute apparent joint deformity appreciated on examination.   No pitting edema or cyanosis appreciated.       Lymphadenopathy:      No appreciable palpable lymphadenopathy  Skin:     General: Skin is warm.      Coloration:  No jaundice     Findings: No abnormal appearing skin rashes or lesions that appeared acute noted on unclothed area of the skin..   Neurological:      General: No focal sensory or motor deficits appreciated, no meningeal signs or dysmetria noted. Patient able to follow stream of thought but poor recall appreciated     Cranial Nerves: Cranial nerves II to XII appearing grossly intact.     Genitals:  Deferred  Psychiatric:         The patient appears to be displaying normal mood and affect at the time of evaluation.     Labs:           Lab Results   Component Value Date     GLUCOSE 87 03/04/2024     CALCIUM 9.2 03/04/2024      03/04/2024     K 4.2 03/04/2024     CO2 27 03/04/2024      03/04/2024     BUN 23 03/04/2024     CREATININE 1.55 (H) 03/04/2024            Lab Results   Component Value Date     WBC 8.6 03/04/2024     HGB 13.0 (L) 03/04/2024     HCT 40.5 (L) 03/04/2024      (H) 03/04/2024      (L) 03/04/2024      Lab Results   Component Value Date    GLUCOSE 103 (H) 03/05/2024    CALCIUM 9.2 03/05/2024     03/05/2024    K 4.4 03/05/2024    CO2 29 03/05/2024     03/05/2024    BUN 19 03/05/2024    CREATININE  1.38 (H) 03/05/2024      Lab Results   Component Value Date    WBC 8.6 03/04/2024    HGB 13.0 (L) 03/04/2024    HCT 40.5 (L) 03/04/2024     (H) 03/04/2024     (L) 03/04/2024      [unfilled]   [unfilled]   No results found for the last 90 days.                  X-rays/ Images     [unfilled]   Radiology Results (last 21 days)     Procedure Component Value Units Date/Time   XR chest 1 view [[id number]] Collected: 03/02/24 1030   Order Status: Completed Updated: 03/02/24 1030   Narrative:     Interpreted By:  Valentina Davila,  STUDY:  XR CHEST 1 VIEW 3/2/2024 8:47 am      INDICATION:  Signs/Symptoms:shortness of breath      COMPARISON:  12/04/2023      ACCESSION NUMBER(S):  [id number]      ORDERING CLINICIAN:  KARAN SANCHES      TECHNIQUE:  Single AP view chest      FINDINGS:  Cardiomediastinal silhouette is within normal limits. Median  sternotomy wires in place. Left-sided pacer with 3 separate leads in  place in the region of the right atrium, right ventricle, and  coronary sinus. Lung fields are hypo inflated with component of  basilar bronchovascular crowding. No infiltrate or effusion  identified. There is severe osteoarthritic degenerative change right  glenohumeral articulation.                   Impression:     Lung fields hypoinflated without acute process.      Signed by: Valentina Davila 3/2/2024 10:28 AM  Dictation workstation:   [id number]   CT abdomen pelvis wo IV contrast [[id number]] Collected: 03/02/24 0916   Order Status: Completed Updated: 03/02/24 0916   Narrative:     Interpreted By:  Gustavo Sung,  STUDY:  CT ABDOMEN PELVIS WO IV CONTRAST;  3/2/2024 7:17 am      INDICATION:  Signs/Symptoms:RLQ abdominal pain.      COMPARISON:  12/07/2023      ACCESSION NUMBER(S):  [id number]      ORDERING CLINICIAN:  KARAN SANCHES      TECHNIQUE:  Contiguous axial images were obtained through the abdomen and pelvis  without the use of contrast. Coronal and sagittal  reconstructions  were performed.      FINDINGS:  LOWER CHEST:  Images through the lung bases  demonstrate minimal areas of  atelectasis.      ABDOMEN AND PELVIS:      LIVER:  Within normal limits.      BILE DUCTS:  Not abnormally dilated.      GALLBLADDER:  No calcified stones. No wall thickening.      PANCREAS:  Grossly unchanged in appearance. A few stable punctate scattered  calcifications.      SPLEEN:  Appears unremarkable.      ADRENAL GLANDS:  Appear unremarkable.      KIDNEYS, URETERS, AND BLADDER:  A few punctate calcifications are seen within the kidneys which may  relate to nonobstructing calculi or vascular calcifications,  unchanged. Nonspecific perinephric stranding is also unchanged. No  hydronephrosis. Cyst in the upper pole of the right kidney measuring  1.3 cm in size. Urinary bladder is distended but is otherwise  unremarkable. Prostate appears mildly enlarged.      BOWEL:  The small and large bowel are normal in caliber and demonstrate no  wall thickening.  There is no evidence of a bowel obstruction.  Appendix is normal in caliber.  Although CT has limited sensitivity  and specificity for gastric pathology, the stomach appears grossly  unremarkable.      RETROPERITONEUM, VESSELS:  There is no aneurysmal dilatation of the abdominal aorta. The IVC is  within normal limits.  There are atherosclerotic calcifications of  the aorta and its branches. No pathologically enlarged  retroperitoneal lymph nodes are noted.      PERITONEUM:  There is no evidence of pneumoperitoneum.  No ascites or loculated  fluid collection noted.  No pathologically enlarged mesenteric lymph  nodes are identified.      ABDOMINAL WALL, SOFT TISSUES:  Fat containing inguinal hernias bilaterally, larger on the right,  unchanged.      SKELETON:  Postsurgical changes relating to fusion of L3 through L5.  Degenerative changes. No acute process.       Impression:     Distended urinary bladder.  Mild prostatomegaly.  Additional  incidental findings as above.      MACRO:  None.      Signed by: Gustavo Sung 3/2/2024 9:14 AM  Dictation workstation:   [id number]   CT head wo IV contrast [[id number]] Collected: 03/01/24 2242   Order Status: Completed Updated: 03/01/24 2242   Narrative:     Interpreted By:  Marc Motley,  STUDY:  CT HEAD WO IV CONTRAST;  3/1/2024 10:02 pm      INDICATION:  Signs/Symptoms:ams, hx sdh.      COMPARISON:  CT scan of the head 12/04/2023      ACCESSION NUMBER(S):  [id number]      ORDERING CLINICIAN:  ALEXEY MACKEY      TECHNIQUE:  Axial noncontrast CT images of the head.      FINDINGS:  BRAIN PARENCHYMA: Small focal hypodensities bilateral basal ganglia  likely sequela of remote lacunar infarct. ,Gray-white matter  interfaces are preserved. No mass, mass effect or midline shift. Mild  deep and periventricular white matter hypodensities are nonspecific,  but favored to represent chronic small vessel ischemic changes.      HEMORRHAGE: No acute intracranial hemorrhage.  VENTRICLES and EXTRA-AXIAL SPACES: Mild-to-moderate volume loss with  prominence of the ventricles and sulci. EXTRACRANIAL SOFT TISSUES:  Within normal limits. PARANASAL SINUSES/MASTOIDS: Complete  opacification of the right maxillary sinus with opacification of  multiple right ethmoid air cells. Partial opacification of the left  frontal sinus. Remainder of the visualized paranasal sinuses and  mastoid air cells are aerated. CALVARIUM: No depressed skull  fracture. No destructive osseous lesion.      OTHER FINDINGS: Atherosclerotic calcification of the carotid siphons.       Impression:     No acute intracranial abnormality. If symptoms persist or there is  high clinical suspicion further evaluation with MRI can be considered.      Chronic changes as noted above.      MACRO:  None      Signed by: Marc Motley 3/1/2024 10:41 PM  Dictation workstation:   [id number]               Medical Problems         Problem List         * (Principal) Ambulatory  dysfunction     Abdominal pain     Acute pneumonia     Acute sinusitis     Acute UTI     Anemia     Anxiety disorder     Panic disorder     Aortic valve stenosis     AV node dysfunction     Bilateral hearing loss     Bilateral sensorineural hearing loss     Bladder diverticulitis     CAD (coronary artery disease)     Carotid stenosis     Cerebral ventriculomegaly     Cervical neck pain with evidence of disc disease     Chronic combined systolic and diastolic heart failure, NYHA class 3 (CMS/HCC)     Chronic congestive heart failure (CMS/HCC)     Chronic renal impairment     Compressive atelectasis     CVA (cerebral vascular accident) (CMS/HCC)     Depression     Diabetes (CMS/HCC)     Disturbed hearing     DJD (degenerative joint disease)     Dysuria     Ear pain     Erectile dysfunction     Esophageal candidiasis (CMS/HCC)     GERD (gastroesophageal reflux disease)     Gout attack     Groin pain     HTN (hypertension)     Hyperlipidemia     Hyperuricemia     Insomnia     Cough with hemoptysis     Intracranial bleed (CMS/HCC)     Low back pain     Muscle cramps     Constipation     Nausea in adult     Numbness of left hand     Osteoarthritis of right glenohumeral joint     Pacemaker     Pericardial effusion     Persistent atrial fibrillation (CMS/[facility])     Pleural effusion, bilateral     Positive occult stool blood test     Psoriasis of scalp     Rash     Shortness of breath at rest     HECTOR on CPAP     Sleep apnea     Testosterone deficiency     Tinnitus of left ear     Tiredness     Unspecified lesions of oral mucosa     URI, acute     Acquired cyst of kidney     Adjustment disorder with depressed mood     Alcohol abuse     Allergic rhinitis     Anemia due to stage 4 chronic kidney disease treated with erythropoietin (CMS/HCC)     Automatic implantable cardioverter-defibrillator in situ     Overview Signed 7/27/2023  5:15 PM by Eddi Mariscal MA       Formatting of this note might be different from the original.  ICD-Device Mfg Medtronic Model B479VPD Concerto II CRT-D Serial Number [id number] Implant Date 11/17/2009 Implanted By Joshua Cadena M.D. BLANK _ Lead1 Mfg Medtronic Model 6947 Serial Number [id number] Implant Date 11/17/2009 BLANK _ Lead2 Mfg Medtronic Model 5076 Serial Number [id number] Implant Date 11/17/2009 BLANK _ Lead3 Mfg Medtronic Model 4194 Serial Number [id number] Location Cardiac Vein (Left) Implant Date 11/17/2009           Balanitis     Bundle branch block, left     CAD S/P percutaneous coronary angioplasty     Central perforation of tympanic membrane     Chronic maxillary sinusitis     Chronic or old sprain of lumbosacral ligament     Lumbar sprain     Congenital insufficiency of aortic valve     Degeneration of lumbar or lumbosacral intervertebral disc     Deviated nasal septum     Conductive hearing loss     Displacement of lumbar intervertebral disc without myelopathy     Dysfunction of eustachian tube     Elevated prostate specific antigen (PSA)     Hypertrophy of nasal turbinates     Loss of weight     Mild intermittent asthma without complication     Palpitation     Disorder of prostate     Overview Signed 7/27/2023  5:15 PM by Eddi Mariscal MA       Formatting of this note might be different from the original. Updating Deprecated Diagnoses Formatting of this note might be different from the original. Updating Deprecated Diagnoses           Prostatitis     Overview Signed 7/27/2023  5:15 PM by Eddi Mariscal MA       Formatting of this note might be different from the original. Updating Deprecated Diagnoses Formatting of this note might be different from the original. 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[facility])     Chronic obstructive pulmonary disease, unspecified COPD type (CMS/[facility])     Type 2 diabetes mellitus with diabetic peripheral angiopathy without gangrene, without long-term current use of insulin (CMS/[facility])     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 of unspecified duration, initial encounter (CMS/[facility])     End stage heart failure (CMS/[facility])     Stage 3b chronic kidney disease (CMS/[facility])     Thrombocytopenia (CMS/[facility])     Accidental overdose     Carotid bruit     Fall     Frailty     Heel pain     History of hypertension     History of intraventricular hemorrhage     Lesion of liver     Macrocytosis     Sepsis (CMS/[facility])     Acute respiratory failure with hypercapnia (CMS/[facility])     Confusion                  Above medical problems may be reflective of historical medical problems that may have resolved and may not related to acute clinical condition/medical problems.     Clinical impression/plan:        Principal Problem:    Ambulatory dysfunction  Active Problems:    Confusion     Generalized Weakness  Ambulatory Dysfunction  -Many vague complaints including generalized malaise, myalgias, nausea, and vague abdominal pain  -Workup in the ER including viral testing, UA, and routine labs unrevealing  -Unclear if any real pathology present, will obtain CT abdomen pelvis for workup of abdominal pain further  -Will need therapy consultations this patient is weaker than his baseline  3/2: PT/OT evaluation reveals AM-PAC of 12.  Likely will need SNF at discharge.  3/3: Appears to have bilateral lower extremity distal neuropathy which likely is related to his lumbar spine degenerative disease for which she has undergone surgical procedures.     Abdominal pain  -Uncertain etiology.  CT abdomen and pelvis describes normal-appearing appendix without other significant abnormalities.  Possibly related to  constipation with his chronic opioid use.  Taking diet.  Continue to monitor.     Chronic pain and anxiety  -Patient takes Percocet 7.5 3 times daily and Valium 5 3 times daily routinely at home.  These will be continued at this point but watch for sedation.  3/3: Appears to be very stable and doing well with his usual home Percocet and Valium regimen.  Subjectively feeling much better with this.        Other Issues  -CKD Stage III: Cr at baseline  -Chronic biventricular heart failure LV EF 45%: Hold Bumex for today given concern for dehydration  -Afib: Home medications  -CAD: Home medications     DVT Prophy  -Home Eliquis     Disposition/additional care plan/interventions: 3/4/2024        Monitor renal function.     Verify home medications and resume accordingly.     Clinical suspicion for likely chronic cognitive impairment, accelerating.  Lower clinical suspicion of statin induced cognitive impairment however will monitor closely     Consultation to cardiology concerning syncope, may benefit from pacemaker defibrillator interrogation.     Continue CardioMEMS monitoring for volume status     Diuretics twice weekly     De-escalate Coreg temporarily     Care plan discussed in detail with patient's wife.     Vague right-sided pain and discomfort we will continue monitoring clinically, nonacute abdomen.,  Mindful of herpes zoster no skin manifestation apparent at this juncture will monitor       Disposition/additional care plan/interventions: 3/5/2024    Patient reported chronic back pain,  clinical suspicion referred from pain from the back anteriorly to the right side but will monitor closely.  No clinical signs of zoster appreciated on examination.    Care plan discussed with patient's cardiologist Dr. Portillo, overall poor physiological reserve suspected.    Pacemaker/defibrillator interrogation    Lactic acid ordered, continue monitor hemodynamic status, blood pressure elevated at this time.  Lower clinical  suspicion of cardiogenic shock at this juncture but still remains at risk, antihypertensive therapy optimization employed.     The patient was informed of differential diagnosis , work up , plan of care and possible sequelae of clinical disposition.Patient in agreement with plan of care. Further recommendations forthcoming in accordance with patient's clinical disposition and response to care.     Discharge planning:Patient wish for discharge to home with home health care assistance.Plan discharge if device interrogation/pacemaker interrogation/defibrillator interrogation negative for cardiac dysrhythmia.  Resume chronic antihypertensive therapy and chronic external therapy for end-stage heart failure     Care time: > 55 mins              Dictation performed with assistance of voice recognition device therefore transcription errors are possible.

## 2024-03-05 NOTE — PROGRESS NOTES
I met with pt to obtain HC choice.  Pt chose [facility].  They are not in [facility] so faxed H&P, prog notes, PT/OT to them at .  Await response back.    Per Dr. Vasquez, pt need pacemaker interrogated and once this is completed, pt should be ready for discharge.      1335  I received TC from [facility] and they ARE able to accept pt.  I was told they are in [employer] but my end shows they are offline.  I did go ahead and send referral via [facility] while on phone with her but she could not see it come through.  They asked to be notified when ADOD is known which may have to be a phone call [phone].

## 2024-03-06 ENCOUNTER — PATIENT OUTREACH (OUTPATIENT)
Dept: CARE COORDINATION | Facility: CLINIC | Age: 79
End: 2024-03-06
Payer: COMMERCIAL

## 2024-03-06 ENCOUNTER — HOME HEALTH ADMISSION (OUTPATIENT)
Dept: HOME HEALTH SERVICES | Facility: HOME HEALTH | Age: 79
End: 2024-03-06
Payer: MEDICARE

## 2024-03-06 ENCOUNTER — TELEPHONE (OUTPATIENT)
Dept: PRIMARY CARE | Facility: CLINIC | Age: 79
End: 2024-03-06
Payer: COMMERCIAL

## 2024-03-06 ENCOUNTER — DOCUMENTATION (OUTPATIENT)
Dept: HOME HEALTH SERVICES | Facility: HOME HEALTH | Age: 79
End: 2024-03-06
Payer: COMMERCIAL

## 2024-03-06 NOTE — HH CARE COORDINATION
Home Care received a Referral for Nursing, Physical Therapy, and Occupational Therapy. We have processed the referral for a Start of Care on 3/7-3/8/24.     If you have any questions or concerns, please feel free to contact us at [phone]. Follow the prompts, enter your five digit zip code, and you will be directed to your care team on EAST 3.

## 2024-03-06 NOTE — PROGRESS NOTES
Discharge Facility: [facility]  Discharge Diagnosis:Ambulatory dysfunction and confusion  Admission Date:03/01/24  Discharge Date: 03/05/24    PCP Appointment Date:none available sent to pcp office  Specialist Appointment Date:   Hospital Encounter and Summary: Linked   See discharge assessment below for further details  Engagement  Call Start Time: 0122 (3/6/2024  1:22 PM)    Medications  Medications reviewed with patient/caregiver?: Yes (zofran) (3/6/2024  1:22 PM)  Is the patient having any side effects they believe may be caused by any medication additions or changes?: No (3/6/2024  1:22 PM)  Does the patient have all medications ordered at discharge?: Yes (3/6/2024  1:22 PM)    Appointments  Does the patient have a primary care provider?: Yes (3/6/2024  1:22 PM)  Care Management Interventions: Advised patient to make appointment (3/6/2024  1:22 PM)    Self Management  Has home health visited the patient within 72 hours of discharge?: Yes (3/6/2024  1:22 PM)  Has all Durable Medical Equipment (DME) been delivered?: No (3/6/2024  1:22 PM)    Patient Teaching  Does the patient have access to their discharge instructions?: Yes (3/6/2024  1:22 PM)  Care Management Interventions: Reviewed instructions with patient (just feeling a little weak) (3/6/2024  1:22 PM)  What is the patient's perception of their health status since discharge?: Improving (3/6/2024  1:22 PM)    Wrap Up  Call End Time: 0135 (3/6/2024  1:22 PM)

## 2024-03-06 NOTE — TELEPHONE ENCOUNTER
[facility] WOULD LIKE TO KNOW IF YOU ARE WILLING TO FOLLOW AND SIGN HOME CARE ORDERS? PLEASE ADVISE.    ALISTAIR - [phone]

## 2024-03-07 NOTE — TELEPHONE ENCOUNTER
Gaby from [facility] called back, notified of message, expressed verbal understanding had no questions at this time.

## 2024-03-08 ENCOUNTER — TELEPHONE (OUTPATIENT)
Dept: PAIN MANAGEMENT | Age: 79
End: 2024-03-08

## 2024-03-08 NOTE — TELEPHONE ENCOUNTER
Geoff Hardy called requesting a refill on percocet. His next office visit is scheduled on 3/13/24.

## 2024-03-11 DIAGNOSIS — G89.4 CHRONIC PAIN SYNDROME: ICD-10-CM

## 2024-03-11 DIAGNOSIS — M51.37 DEGENERATION OF LUMBAR OR LUMBOSACRAL INTERVERTEBRAL DISC: ICD-10-CM

## 2024-03-11 DIAGNOSIS — S33.5XXD LUMBAR SPRAIN, SUBSEQUENT ENCOUNTER: ICD-10-CM

## 2024-03-11 DIAGNOSIS — M51.26 DISPLACEMENT OF LUMBAR INTERVERTEBRAL DISC WITHOUT MYELOPATHY: ICD-10-CM

## 2024-03-11 DIAGNOSIS — S33.9XXA CHRONIC OR OLD SPRAIN OF LUMBOSACRAL LIGAMENT: ICD-10-CM

## 2024-03-11 RX ORDER — OXYCODONE AND ACETAMINOPHEN 7.5; 325 MG/1; MG/1
1 TABLET ORAL 3 TIMES DAILY PRN
Qty: 6 TABLET | Refills: 0 | Status: SHIPPED
Start: 2024-03-11 | End: 2024-03-13

## 2024-03-12 DIAGNOSIS — I10 PRIMARY HYPERTENSION: Primary | ICD-10-CM

## 2024-03-12 DIAGNOSIS — I50.22 CHRONIC SYSTOLIC (CONGESTIVE) HEART FAILURE (MULTI): ICD-10-CM

## 2024-03-12 LAB
ATRIAL RATE: 77 BPM
P AXIS: 0 DEGREES
PR INTERVAL: 128 MS
Q ONSET: 252 MS
QRS COUNT: 13 BEATS
QRS DURATION: 152 MS
QT INTERVAL: 441 MS
QTC CALCULATION(BAZETT): 490 MS
QTC FREDERICIA: 473 MS
R AXIS: 268 DEGREES
T AXIS: 94 DEGREES
T OFFSET: 472 MS
VENTRICULAR RATE: 74 BPM

## 2024-03-12 RX ORDER — CARVEDILOL 25 MG/1
25 TABLET ORAL 2 TIMES DAILY
Qty: 180 TABLET | Refills: 0 | Status: SHIPPED | OUTPATIENT
Start: 2024-03-12 | End: 2024-06-07 | Stop reason: SDUPTHER

## 2024-03-12 NOTE — TELEPHONE ENCOUNTER
----- Message from Dania Gongora sent at 3/11/2024  1:32 PM EDT -----  Regarding: Med Refill.  Pt needs [facility]il[facility]

## 2024-03-13 ENCOUNTER — OFFICE VISIT (OUTPATIENT)
Dept: PAIN MANAGEMENT | Age: 79
End: 2024-03-13
Payer: MEDICARE

## 2024-03-13 VITALS
DIASTOLIC BLOOD PRESSURE: 52 MMHG | BODY MASS INDEX: 25.93 KG/M2 | HEIGHT: 64 IN | RESPIRATION RATE: 16 BRPM | TEMPERATURE: 97.5 F | OXYGEN SATURATION: 96 % | WEIGHT: 151.9 LBS | HEART RATE: 73 BPM | SYSTOLIC BLOOD PRESSURE: 101 MMHG

## 2024-03-13 DIAGNOSIS — M51.26 DISPLACEMENT OF LUMBAR INTERVERTEBRAL DISC WITHOUT MYELOPATHY: ICD-10-CM

## 2024-03-13 DIAGNOSIS — S33.5XXD LUMBAR SPRAIN, SUBSEQUENT ENCOUNTER: ICD-10-CM

## 2024-03-13 DIAGNOSIS — J45.20 MILD INTERMITTENT ASTHMA WITHOUT COMPLICATION (HHS-HCC): ICD-10-CM

## 2024-03-13 DIAGNOSIS — I50.42 CHRONIC COMBINED SYSTOLIC AND DIASTOLIC HEART FAILURE, NYHA CLASS 3 (MULTI): Primary | ICD-10-CM

## 2024-03-13 DIAGNOSIS — S33.9XXA CHRONIC OR OLD SPRAIN OF LUMBOSACRAL LIGAMENT: ICD-10-CM

## 2024-03-13 DIAGNOSIS — G89.4 CHRONIC PAIN SYNDROME: Primary | ICD-10-CM

## 2024-03-13 DIAGNOSIS — Z79.891 ENCOUNTER FOR LONG-TERM OPIATE ANALGESIC USE: ICD-10-CM

## 2024-03-13 DIAGNOSIS — I63.9 CEREBROVASCULAR ACCIDENT (CVA), UNSPECIFIED MECHANISM (MULTI): ICD-10-CM

## 2024-03-13 DIAGNOSIS — J44.9 CHRONIC OBSTRUCTIVE PULMONARY DISEASE, UNSPECIFIED COPD TYPE (MULTI): ICD-10-CM

## 2024-03-13 DIAGNOSIS — M79.10 MYALGIA: ICD-10-CM

## 2024-03-13 DIAGNOSIS — M51.37 DEGENERATION OF LUMBAR OR LUMBOSACRAL INTERVERTEBRAL DISC: ICD-10-CM

## 2024-03-13 DIAGNOSIS — E11.51 TYPE 2 DIABETES MELLITUS WITH DIABETIC PERIPHERAL ANGIOPATHY WITHOUT GANGRENE, WITHOUT LONG-TERM CURRENT USE OF INSULIN (MULTI): ICD-10-CM

## 2024-03-13 PROCEDURE — 3074F SYST BP LT 130 MM HG: CPT | Performed by: PHYSICIAN ASSISTANT

## 2024-03-13 PROCEDURE — G8427 DOCREV CUR MEDS BY ELIG CLIN: HCPCS | Performed by: PHYSICIAN ASSISTANT

## 2024-03-13 PROCEDURE — G8417 CALC BMI ABV UP PARAM F/U: HCPCS | Performed by: PHYSICIAN ASSISTANT

## 2024-03-13 PROCEDURE — 1123F ACP DISCUSS/DSCN MKR DOCD: CPT | Performed by: PHYSICIAN ASSISTANT

## 2024-03-13 PROCEDURE — 1036F TOBACCO NON-USER: CPT | Performed by: PHYSICIAN ASSISTANT

## 2024-03-13 PROCEDURE — 99213 OFFICE O/P EST LOW 20 MIN: CPT | Performed by: PHYSICIAN ASSISTANT

## 2024-03-13 PROCEDURE — 3078F DIAST BP <80 MM HG: CPT | Performed by: PHYSICIAN ASSISTANT

## 2024-03-13 PROCEDURE — 99213 OFFICE O/P EST LOW 20 MIN: CPT

## 2024-03-13 PROCEDURE — G8484 FLU IMMUNIZE NO ADMIN: HCPCS | Performed by: PHYSICIAN ASSISTANT

## 2024-03-13 RX ORDER — TESTOSTERONE 30 MG/1.5ML
SOLUTION TOPICAL
COMMUNITY

## 2024-03-13 RX ORDER — ONDANSETRON 4 MG/1
4 TABLET, ORALLY DISINTEGRATING ORAL EVERY 8 HOURS PRN
COMMUNITY
Start: 2024-03-02

## 2024-03-13 RX ORDER — TESTOSTERONE 16.2 MG/G
2 GEL TRANSDERMAL DAILY
COMMUNITY
Start: 2023-10-12 | End: 2024-04-09

## 2024-03-13 RX ORDER — QUINIDINE GLUCONATE 324 MG
1 TABLET, EXTENDED RELEASE ORAL
COMMUNITY
Start: 2023-12-07

## 2024-03-13 RX ORDER — ACETAMINOPHEN 325 MG/1
650 TABLET ORAL
COMMUNITY
Start: 2023-12-04

## 2024-03-13 RX ORDER — ERYTHROMYCIN 5 MG/G
OINTMENT OPHTHALMIC
COMMUNITY
Start: 2023-03-23

## 2024-03-13 RX ORDER — POLYETHYLENE GLYCOL 3350
POWDER (GRAM) MISCELLANEOUS
COMMUNITY

## 2024-03-13 RX ORDER — OXYCODONE AND ACETAMINOPHEN 7.5; 325 MG/1; MG/1
1 TABLET ORAL 3 TIMES DAILY PRN
Qty: 90 TABLET | Refills: 0 | Status: SHIPPED | OUTPATIENT
Start: 2024-03-13 | End: 2024-04-12

## 2024-03-13 RX ORDER — NYSTATIN 100000 [USP'U]/G
1 POWDER TOPICAL 2 TIMES DAILY
COMMUNITY
Start: 2024-02-12

## 2024-03-13 NOTE — PROGRESS NOTES
Amy Hardy presents to the [facility] on 3/13/2024. Amy is complaining of pain low back. The pain is constant. The pain is described as shooting, stabbing, sharp, and numb. Pain is rated on his best day at a 6, on his worst day at a 9, and on average at a 7 on the VAS scale. He took his last dose of Percocet today, Lidocaine gel yesterday.     Any procedures since your last visit: No.    Pacemaker or defibrillator: Yes managed by Dr. Beasley.    He is not on NSAIDS and is  on anticoagulation medications to include Eliquis and is managed by Dr. Chan.     Medication Contract and Consent for Opioid Use Documents Filed       Patient Documents       Type of Document Status Date Received Received By Description    Medication Contract Received 5/24/2021  9:00 AM PANCHO ORELLANA Medication Contract    Medication Contract Received 5/17/2022  3:39 PM [facility]    Medication Contract Received 4/26/2023  4:43 PM AMY ROBLES medication contract                    BP (!) 101/52   Pulse 73   Temp 97.5 °F (36.4 °C) (Infrared)   Resp 16   Ht 1.626 m (5' 4\")   Wt 68.9 kg (151 lb 14.4 oz)   SpO2 96%   BMI 26.07 kg/m²      No LMP for male patient.    
and its interference with activities of daily living, quality of family life and social activities, and the physical activity);Obtaining appropriate analgesic effect of treatment.;Random urine drug screen sent today.          We discussed with the patient that combining opioids, benzodiazepines, alcohol, illicit drugs or sleep aids increases the risk of respiratory depression including death. We discussed that these medications may cause drowsiness, sedation or dizziness and have counseled the patient not to drive or operate machinery. We have discussed that these medications will be prescribed only by one provider. We have discussed with the patient about age related risk factors and have thoroughly discussed the importance of taking these medications as prescribed. The patient verbalizes understanding.    ccreferring physic

## 2024-03-13 NOTE — DOCUMENTATION CLARIFICATION NOTE
PATIENT:               KARAN RILEY  ACCT #:                  [id number]  MRN:                       [id number]  :                       1945  ADMIT DATE:       3/1/2024 9:15 PM  DISCH DATE:        3/5/2024 8:03 PM  RESPONDING PROVIDER #:        [id number]          PROVIDER RESPONSE TEXT:    weakness/ambulatory dysfunction related to neuropathy    CDI QUERY TEXT:    [facility]_Etiology Linkage        Instruction:    Based on your assessment of the patient and the clinical information, please provide the requested documentation by clicking on the appropriate radio button and enter any additional information if prompted.    Question: Please clarify if a relationship exists between        When answering this query, please exercise your independent professional judgment. The fact that a question is being asked, does not imply that any particular answer is desired or expected.    The patient's clinical indicators include:  Clinical Information: 77 yo admitted with weakness and ambulatory dysfunction.    Clinical Indicators:  3/1 Vital signs T36.8 HR76 R18 /85 100% RA    CT abd and pelvis  SKELETON:  Postsurgical changes relating to fusion of L3 through L5.  Degenerative changes. No acute process.    3/4 note; He was complaining of pain and review of OARRS reveals that he has been on Percocet 7.53 times a day regularly as well as Valium 5 3 times a day regularly. ?These will be restarted.  Describes that he feels altered sensation bilateral lower extremities pretibial area to the feet.  He has good AROM bilateral lower extremities.    Treatment:  PT/OT    Risk Factors: chronic pain syndrome,  HTN/CHF, spondylosis, DDD lumbar spine, neuropathy  Options provided:  -- weakness/ambulatory dysfunction related to chronic back pain syndrome  -- weakness/ambulatory dysfunction related to neuropathy  -- weakness/ambulatory dysfunction related to DDD lumbar spine  -- Other - I will add my own diagnosis  -- Refer to Clinical  Documentation Reviewer    Query created by: Jess Vaz on 3/12/2024 6:09 AM      Electronically signed by:  ALISHA VALENCIA DO 3/12/2024 8:07 PM

## 2024-03-15 LAB
6-MAM, QUANTITATIVE, URINE: <10 NG/ML
6-MONOACETYLMORPHINE, URINE: NEGATIVE
7-AMINOCLONAZEPAM, QUANTITATIVE, URINE: <50 NG/ML
ABNORMAL SPECIMEN VALIDITY TEST: ABNORMAL
ALCOHOL URINE: NOT DETECTED MG/DL
ALPHA-HYDROXYALPRAZOLAM, QUANTITATIVE, URINE: <50 NG/ML
ALPHA-HYDROXYMIDAZOLAM, QUANTITATIVE, URINE: <50 NG/ML
ALPHA-HYDROXYTRIAZOLAM, QUANTITATIVE, URINE: <50 NG/ML
ALPRAZOLAM URINE QUANT: <50 NG/ML
AMPHETAMINE SCREEN URINE: NEGATIVE
BARBITURATE SCREEN URINE: NEGATIVE
BENZODIAZEPINE SCREEN, URINE: POSITIVE
BUPRENORPHINE URINE: NEGATIVE
CANNABINOID SCREEN URINE: NEGATIVE
CHLORDIAZEPOXIDE, QUANTITATIVE, URINE: <50 NG/ML
CLONAZEPAM, QUANTITATIVE, URINE: <50 NG/ML
COCAINE METABOLITE, URINE: NEGATIVE
CODEINE, QUANTITATIVE, URINE: <50 NG/ML
COMPLIANCE DRUG ANALYSIS, URINE: NORMAL
DIAZEPAM URINE QUANT: <50 NG/ML
FENTANYL URINE: NEGATIVE
FLUNITRAZEPAM, QUANTITATIVE, URINE: <50 NG/ML
FLURAZEPAM, QUANTITATIVE, URINE: <50 NG/ML
HYDROCODONE, QUANTITATIVE, URINE: <50 NG/ML
HYDROMORPHONE, QUANTITATIVE, URINE: <50 NG/ML
INTEGRITY CHECK, CREATININE, URINE: 20.2 MG/DL (ref 22–250)
INTEGRITY CHECK, OXIDANT, URINE: <40 MG/L
INTEGRITY CHECK, PH, URINE: 5.3 (ref 4.5–9)
INTEGRITY CHECK, SPECIFIC GRAVITY, URINE: 1.01 (ref 1–1.03)
LORAZEPAM URINE QUANT: <50 NG/ML
METHADONE SCREEN, URINE: NEGATIVE
MIDAZOLAM URINE QUANT: <50 NG/ML
MORPHINE, QUANTITATIVE, URINE: <50 NG/ML
NORDIAZEPAM URINE QUANT: 265.8 NG/ML
NORHYDROCODONE, QUANTITATIVE, URINE: <50 NG/ML
NOROXYCODONE, QUANTITATIVE, URINE: 545.2 NG/ML
OPIATES, URINE: NEGATIVE
OXAZEPAM URINE QUANT: 517.3 NG/ML
OXYCODONE SCREEN URINE: POSITIVE
OXYCODONE URINE, QUANTITATIVE: 423.4 NG/ML
OXYMORPHONE, QUANTITATIVE, URINE: 398.8 NG/ML
PHENCYCLIDINE, URINE: NEGATIVE
TEMAZEPAM, QUANTITATIVE, URINE: 425.9 NG/ML
TEST INFORMATION: ABNORMAL
TRAMADOL, URINE: NEGATIVE

## 2024-03-18 ENCOUNTER — PATIENT OUTREACH (OUTPATIENT)
Dept: CARE COORDINATION | Facility: CLINIC | Age: 79
End: 2024-03-18

## 2024-03-18 NOTE — DOCUMENTATION CLARIFICATION NOTE
PATIENT:               KARAN RILEY  ACCT #:                  [id number]  MRN:                       [id number]  :                       1945  ADMIT DATE:       3/1/2024 9:15 PM  DISCH DATE:        3/5/2024 8:03 PM  RESPONDING PROVIDER #:        [id number]          PROVIDER RESPONSE TEXT:    acute on chronic systolic heart failure    CDI QUERY TEXT:    [facility]_Conflicting Documentation        Instruction:    Based on your assessment of the patient and the clinical information, please provide the requested documentation by clicking on the appropriate radio button and enter any additional information if prompted.    Question: Based on your medical judgment, can you please clarify which of these conditions is the most clinically supported    When answering this query, please exercise your independent professional judgment. The fact that a question is being asked, does not imply that any particular answer is desired or expected.    The patient's clinical indicators include:  Clinical Information: There is conflicting documentation in the medical record which requires clarification.    -The diagnosis of acute on chronic systolic heart failure was documented on 3/2 by  Karan Allan MD    -The diagnosis of chronic combined systolic diastolic heart failure was documented on 3/4 by  He Vasquez MD    -The diagnosis of biventricular heart failure was documented on 3/5 by  He Vasquez MD    Clinical Indicators:  3/1 Vital signs T36.8 HR76 R18 /85 100% RA  3/1   23 Echo, Left ventricular systolic function is mildly decreased, with an estimated ejection fraction of 45%. Wall motion is abnormal.    Treatment:  3/5 Lasix held due to dehydration  35 hold bumex  coreg    Risk Factors: CKD, HTN, DM, Afib  Options provided:  -- chronic combined systolic diastolic heart failure  -- acute on chronic systolic heart failure  -- biventricular heart failure  -- Other - I will add my own  diagnosis  -- Refer to Clinical Documentation Reviewer    Query created by: Jess Vaz on 3/13/2024 12:44 PM      Electronically signed by:  ALISHA VALENCIA DO 3/18/2024 7:15 PM

## 2024-04-02 ENCOUNTER — TELEMEDICINE (OUTPATIENT)
Dept: PHARMACY | Facility: HOSPITAL | Age: 79
End: 2024-04-02
Payer: MEDICARE

## 2024-04-02 DIAGNOSIS — J45.20 MILD INTERMITTENT ASTHMA WITHOUT COMPLICATION (HHS-HCC): ICD-10-CM

## 2024-04-02 DIAGNOSIS — I63.9 CEREBROVASCULAR ACCIDENT (CVA), UNSPECIFIED MECHANISM (MULTI): Primary | ICD-10-CM

## 2024-04-02 DIAGNOSIS — I50.42 CHRONIC COMBINED SYSTOLIC AND DIASTOLIC HEART FAILURE, NYHA CLASS 3 (MULTI): ICD-10-CM

## 2024-04-02 DIAGNOSIS — J44.9 CHRONIC OBSTRUCTIVE PULMONARY DISEASE, UNSPECIFIED COPD TYPE (MULTI): ICD-10-CM

## 2024-04-02 DIAGNOSIS — E11.51 TYPE 2 DIABETES MELLITUS WITH DIABETIC PERIPHERAL ANGIOPATHY WITHOUT GANGRENE, WITHOUT LONG-TERM CURRENT USE OF INSULIN (MULTI): ICD-10-CM

## 2024-04-02 RX ORDER — TRIAMCINOLONE ACETONIDE 1 MG/ML
1 LOTION TOPICAL AS NEEDED
COMMUNITY

## 2024-04-02 NOTE — ASSESSMENT & PLAN NOTE
Assessment of COPD Control  Patient's symptoms of COPD: None  Patient's use of rescue inhaler: 2-3 puffs total per week  Patient's smoking history: Former smoker, quit in 1970  Any recent exacerbations? No - has never had an exacerbation    Plan for this appointment  CONTINUE PRN Albuterol rescue inhaler for SOB  Symptoms - continue to monitor for SOB, cough, wheezing  Triggers - Rhinitis; use OTCs PRN for bothersome symptoms  FUV in 3 months to assess therapy tolerance

## 2024-04-02 NOTE — ASSESSMENT & PLAN NOTE
Assessment of Heart Failure Control  Patient's current ejection fraction is: 45%  Reported symptoms of heart failure: None    Plan for this appointment  CONTINUE GDMT for HF - Bumex, Entresto, Carvedilol, Spironolactone, Jardiance  If fluid still on top of feet consider using the PRN 3rd dose of Bumex to help with it (1/2-1 tablet)  Diet/lifestyle - limit salt & fat, lean meats & veggies, fluid limitations as directed by cardiologist  BP/Weight checks daily - dry weight, no clothing, AM after using restroom before breakfast  Symptom assessment - monitor for fatigue, SOB, physical activity intolerance  FUV in 3 months to assess therapy tolerance

## 2024-04-02 NOTE — ASSESSMENT & PLAN NOTE
Assessment of Stroke Prevention  Anticoagulation/antiplatelet meds on board:  Eliquis 5mg BID  HTN at goal? Yes, home BP <140/90  DM at goal? Yes, A1c 6.1%  HLD meds: Lipitor, Zetia  HLD at goal? Yes    Plan for this appointment  CONTINUE anticoagulation as directed - Eliquis 5mg BID  CONTINUE HTN management - meds on board for HF  CONTINUE HLD management - Lipitor & Zetia, watch for muscle pain with Lipitor  CONTINUE DM management - Jardiance for DM/HF  Diet/lifestyle - cardio diet; limit salts, fats - diet in lean proteins, exercise as tolerated 5x a week 30min/day  BP monitoring - should be once daily; continue keeping in log  FUV in 3 months for therapy tolerance

## 2024-04-02 NOTE — ASSESSMENT & PLAN NOTE
Assessment of Diabetes Mellitus Control  Patient's Personal Diabetes Goals: Maintain sugars at goal  Patient's goal A1c is < 7%  Is pt at goal? Yes  Current A1c: 6.1%  Patient's SMBGs are at goal, FBG between   Any complications? No    Plan for this appointment  CONTINUE Jardiance for HF/DM management - no concerns with dose at this time  Diet/lifestyle - continue monitoring carbs/sugars, increase lean fat/protein/veggie, physical activity as tolerated  Sugar checks 1-3 times a day - continue monitoring in log  FUV in 3 months to assess therapy tolerance

## 2024-04-02 NOTE — PROGRESS NOTES
Pharmacy Post-Discharge Visit    Jony Javier is a 78 y.o. male was referred to Clinical Pharmacy Team to complete a post-discharge medication optimization and monitoring visit.  The patient was referred for their Diabetes, Asthma, COPD, Congestive Heart Failure, and Cerebrovascular Accident.    Admission Date: 3/1/24  Discharge Date: 3/5/24    Referring Provider: Edgardo Gandara, *  PCP: Edgardo Gandara MD - last visit: 2/7/24, next visit: 5/8/24    Subjective   Allergies   Allergen Reactions    Bee Venom Protein (Honey Bee) Anaphylaxis    Cefaclor Anaphylaxis     Tolerated amoxicillin 4/2023 and 12/2023 outpatient    Ciprofloxacin Anaphylaxis    Pentazocine Hives, Palpitations and Rash    Pregabalin Anaphylaxis, Shortness of breath and Rash    Allopurinol Hives and Itching    Metoprolol Drowsiness    Sulfamethoxazole-Trimethoprim Itching and Rash       [facility] [address] [place] [id number]  Phone: [phone] Fax: [phone]    Medication System Management:  Affordability/Accessibility: No concerns  Adherence/Organization: No concerns    Social History     Social History Narrative    Not on file     HPI  PULMONARY ASSESSMENT  Patient has been diagnosed with: COPD and Asthma  does not see a pulmonologist    Current Regimen:  Albuterol rescue inhaler -1 puff Q4H PRN    Appropriate Inhaler Technique? yes  How many times per week do you use your rescue inhaler? <2-3 puffs in total per week    Symptom Assessment:  Current symptoms:  None  Symptoms are remain unchanged  Triggers: Rhinitis  Alleviating factors: Rescue inhaler    Exacerbation Hx:  When was your last hospitalization for an exacerbation? Never  When was the last time you were treated with antibiotics and/or steroids? December (IV Abx)     Immunization History: Up to Date    Smoking history:  He quit smoking approximately  50  years ago (1970)    CHF ASSESSMENT  Staging:  Most recent  ejection fraction: 45%  NYHA Stage: III    Symptom Assessment:  Weight changes/edema?: Yes - some edema on tops of feet, none in ankles  Dyspnea?: None  Dizziness/syncope/palpitations?: No    Current Regimen:  ARNI/ACEi/ARB: Yes - Entresto 49/51 BID  Beta Blocker: Yes - Carvedilol 25mg BID  MRA: Yes - Spironolactone 12.5mg daily  SGLT2i: Yes - Jardiance 10mg QD    Other therapy:  Bumetanide - 1 tablet BIW on Wednesday and Saturday   Eliquis 5mg BID  Ezetimibe 10mg every day    Secondary Prevention:  The ASCVD Risk score (Pollo CASTANO, et al., 2019) failed to calculate for the following reasons:    The patient has a prior MI or stroke diagnosis    Aspirin 81mg? no  Statin?: Yes - on Lipitor 80mg    ASCVD ASSESSMENT  Current Regimen:  Anticoagulant?: Yes - Eliquis 5mg BID  Antiplatelet?: No  Statin?: Yes - Lipitor 80mg     HTN history:  HTN diagnosis: yes  BP Cuff at home? yes  Checks daily, last Home BP: 137/69  : 112/62  HTN at goal? yes    HLD history:  Diagnosis? yes  At goal? yes  Current LDL: 37  Current T    DM history:  Diagnosis? yes  At goal? yes - A1c 6.1%  Home BG readings:  FBG average:  mg/dL  Last eye exam? 2023  Last foot exam? Never    DIABETES MELLITUS TYPE 2:    Known diabetic complications: None.  Last optometry exam: 2023; Most recent visit in Podiatry: Never; Patient has 0 sores or cuts on feet today      Current diabetic medications include:  Jardiance 10mg daily    Adherence to diabetic medications: 0 missed doses in the last week  Additional medication concerns: None    Patient tests SMBGS once daily    Home blood glucose records (mg/dL)  FBG Average:  mg/dL    Any episodes of hypoglycemia? No  Does pt have proteinuria? No    Pertinent PMH Review  PMH of Pancreatitis: No  PMH of Retinopathy: No  PMH of Urinary Tract Infections: No  PMH of MTC: No  PMH of Gastroparesis: No    Lifestyle  Diet:   Small breakfast (Bagel, fruit w/ peanut butter), Meal with  vegetable, protein, grain (well balanced)  Patient does not exercise throughout the week  Alcohol use  Does patient drink alcohol? Yes, 14 Drinks/Week (2 beers/day - drinks Jordan lite)    Review of Systems    Objective     There were no vitals taken for this visit.   BP Readings from Last 4 Encounters:   03/05/24 174/75   02/07/24 122/67   01/09/24 120/60   01/09/24 127/61      There were no vitals filed for this visit.     LAB  Lab Results   Component Value Date    BILITOT 0.4 03/01/2024    CALCIUM 9.2 03/05/2024    CO2 29 03/05/2024     03/05/2024    CREATININE 1.38 (H) 03/05/2024    GLUCOSE 103 (H) 03/05/2024    ALKPHOS 60 03/01/2024    K 4.4 03/05/2024    PROT 7.2 03/01/2024     03/05/2024    AST 19 03/01/2024    ALT 11 03/01/2024    BUN 19 03/05/2024    ANIONGAP 10 03/05/2024    MG 2.18 03/01/2024    PHOS 3.1 12/05/2023    ALBUMIN 4.3 03/01/2024    GFRMALE 40 (A) 07/24/2023     Lab Results   Component Value Date    TRIG 118 01/31/2024    CHOL 126 01/31/2024    LDLCALC 37 01/31/2024    HDL 65.6 01/31/2024     Lab Results   Component Value Date    HGBA1C 6.1 (H) 01/31/2024     Current Outpatient Medications   Medication Instructions    albuterol 90 mcg/actuation inhaler INHALE 1 (ONE) PUFF EVERY 4 HOURS AS NEEDED    atorvastatin (LIPITOR) 80 mg, oral, Nightly    baclofen (LIORESAL) 10 mg, oral, 2 times daily PRN    blood sugar diagnostic (True Metrix Glucose Test Strip) strip USE 1 STRIP 3 times daily    bumetanide (BUMEX) 1 mg, oral, 2 times weekly, May take additional dose for weight gain 3#, increased swelling or shortness of breath.    carvedilol (COREG) 25 mg, oral, 2 times daily    diazePAM (VALIUM) 5 mg, oral, Every 8 hours PRN    Eliquis 5 mg, oral, 2 times daily    Entresto 49-51 mg tablet 1 tablet, oral, 2 times daily    EPINEPHrine (EPIPEN) 0.3 mg, injection, As needed    ezetimibe (ZETIA) 10 mg, oral, Daily    ferrous gluconate 270 mg (27 mg iron) tablet 1 tablet, oral, Daily    Jardiance  10 mg, oral, Daily    lidocaine (Xylocaine) 5 % ointment APPLY TO THE AFFECTED AREA(S) AS NEEDED FOR PAIN to last 30 days    mirtazapine (REMERON) 30 mg, oral, Nightly    multivitamin tablet 1 tablet, oral, Daily    nystatin (Mycostatin) 100,000 unit/gram powder 1 Application, Topical, 2 times daily    ondansetron ODT (ZOFRAN-ODT) 4 mg, oral, Every 8 hours PRN    OneTouch Delica Plus Lancet 33 gauge misc Test THREE TIMES DAILY AS DIRECTED    oxyCODONE-acetaminophen (Percocet) 7.5-325 mg tablet Take 1 tablet by mouth 3 times daily as needed for Pain for up to 30 days. Max Daily Amount 3 tablets    pantoprazole (PROTONIX) 40 mg, oral, Daily    spironolactone (ALDACTONE) 12.5 mg, oral, Daily    tadalafil (CIALIS) 20 mg, oral, Daily PRN    tamsulosin (FLOMAX) 0.4 mg, oral, Every morning    testosterone (Axiron) 30 mg/actuation (1.5 mL) topical solution 2 Pump, transdermal, Daily    triamcinolone (Kenalog) 0.1 % lotion 1 Application, Topical, As needed    venlafaxine XR (Effexor-XR) 150 mg 24 hr capsule TAKE 1 CAPSULE BY MOUTH AFTER BREAKFAST      DRUG INTERACTIONS  None at time of review    Assessment/Plan   Problem List Items Addressed This Visit       Chronic combined systolic and diastolic heart failure, NYHA class 3 (CMS/[facility])     Assessment of Heart Failure Control  Patient's current ejection fraction is: 45%  Reported symptoms of heart failure: None    Plan for this appointment  CONTINUE GDMT for HF - Bumex, Entresto, Carvedilol, Spironolactone, Jardiance  If fluid still on top of feet consider using the PRN 3rd dose of Bumex to help with it (1/2-1 tablet)  Diet/lifestyle - limit salt & fat, lean meats & veggies, fluid limitations as directed by cardiologist  BP/Weight checks daily - dry weight, no clothing, AM after using restroom before breakfast  Symptom assessment - monitor for fatigue, SOB, physical activity intolerance  FUV in 3 months to assess therapy tolerance         Relevant Orders    Follow Up In  Clinical Pharmacy    CVA (cerebral vascular accident) (CMS/HCC) - Primary     Assessment of Stroke Prevention  Anticoagulation/antiplatelet meds on board:  Eliquis 5mg BID  HTN at goal? Yes, home BP <140/90  DM at goal? Yes, A1c 6.1%  HLD meds: Lipitor, Zetia  HLD at goal? Yes    Plan for this appointment  CONTINUE anticoagulation as directed - Eliquis 5mg BID  CONTINUE HTN management - meds on board for HF  CONTINUE HLD management - Lipitor & Zetia, watch for muscle pain with Lipitor  CONTINUE DM management - Jardiance for DM/HF  Diet/lifestyle - cardio diet; limit salts, fats - diet in lean proteins, exercise as tolerated 5x a week 30min/day  BP monitoring - should be once daily; continue keeping in log  FUV in 3 months for therapy tolerance          Relevant Orders    Follow Up In Clinical Pharmacy    Chronic obstructive pulmonary disease, unspecified COPD type (CMS/HCC)     Assessment of COPD Control  Patient's symptoms of COPD: None  Patient's use of rescue inhaler: 2-3 puffs total per week  Patient's smoking history: Former smoker, quit in 1970  Any recent exacerbations? No - has never had an exacerbation    Plan for this appointment  CONTINUE PRN Albuterol rescue inhaler for SOB  Symptoms - continue to monitor for SOB, cough, wheezing  Triggers - Rhinitis; use OTCs PRN for bothersome symptoms  FUV in 3 months to assess therapy tolerance         Relevant Orders    Follow Up In Clinical Pharmacy    Type 2 diabetes mellitus with diabetic peripheral angiopathy without gangrene, without long-term current use of insulin (CMS/HCC)     Assessment of Diabetes Mellitus Control  Patient's Personal Diabetes Goals: Maintain sugars at goal  Patient's goal A1c is < 7%  Is pt at goal? Yes  Current A1c: 6.1%  Patient's SMBGs are at goal, FBG between   Any complications? No    Plan for this appointment  CONTINUE Jardiance for HF/DM management - no concerns with dose at this time  Diet/lifestyle - continue monitoring  carbs/sugars, increase lean fat/protein/veggie, physical activity as tolerated  Sugar checks 1-3 times a day - continue monitoring in log  FUV in 3 months to assess therapy tolerance         Relevant Orders    Follow Up In Clinical Pharmacy     Patient Goals:  Diabetes:  A1c FBG (Sugar before meals) PPBG (Sugar after eating)   <7%  mg/dL <180 mg/dL     Blood Pressure: <140/90 mmHg    Follow Up: 3 months, 6/25/24 @ 2:30pm    Continue all meds under the continuation of care with the referring provider and clinical pharmacy team.    Raeann Romero, Anil     Verbal consent to manage patient's drug therapy was obtained from the patient. They were informed they may decline to participate or withdraw from participation in pharmacy services at any time.

## 2024-04-03 NOTE — PROGRESS NOTES
I reviewed the progress note and agree with the resident’s findings and plans as written. Case discussed with resident.    Simon Marinelli, PharmD

## 2024-04-04 ENCOUNTER — PATIENT OUTREACH (OUTPATIENT)
Dept: CARE COORDINATION | Facility: CLINIC | Age: 79
End: 2024-04-04
Payer: COMMERCIAL

## 2024-04-11 ENCOUNTER — TELEPHONE (OUTPATIENT)
Dept: PAIN MANAGEMENT | Age: 79
End: 2024-04-11

## 2024-04-11 DIAGNOSIS — S33.5XXD LUMBAR SPRAIN, SUBSEQUENT ENCOUNTER: ICD-10-CM

## 2024-04-11 DIAGNOSIS — G89.4 CHRONIC PAIN SYNDROME: ICD-10-CM

## 2024-04-11 DIAGNOSIS — S33.9XXA CHRONIC OR OLD SPRAIN OF LUMBOSACRAL LIGAMENT: ICD-10-CM

## 2024-04-11 DIAGNOSIS — M51.37 DEGENERATION OF LUMBAR OR LUMBOSACRAL INTERVERTEBRAL DISC: ICD-10-CM

## 2024-04-11 DIAGNOSIS — M51.26 DISPLACEMENT OF LUMBAR INTERVERTEBRAL DISC WITHOUT MYELOPATHY: ICD-10-CM

## 2024-04-11 RX ORDER — OXYCODONE AND ACETAMINOPHEN 7.5; 325 MG/1; MG/1
1 TABLET ORAL 3 TIMES DAILY PRN
Qty: 15 TABLET | Refills: 0 | Status: SHIPPED
Start: 2024-04-13 | End: 2024-04-18

## 2024-04-11 NOTE — TELEPHONE ENCOUNTER
Geoff Hardy called into the office to reschedule his appointment for today he is not feeling well. He is requesting a refill of Percocet. His next office visit is scheduled for 4/18/24.

## 2024-04-11 NOTE — TELEPHONE ENCOUNTER
E-scribed enough to get to his appt.  Due 4/13.  I put that he can  a day early if the pharmacy is closed on Saturday.  Thank  you!

## 2024-04-18 ENCOUNTER — OFFICE VISIT (OUTPATIENT)
Dept: PAIN MANAGEMENT | Age: 79
End: 2024-04-18
Payer: MEDICARE

## 2024-04-18 ENCOUNTER — APPOINTMENT (OUTPATIENT)
Dept: CARDIOLOGY | Facility: HOSPITAL | Age: 79
End: 2024-04-18
Payer: MEDICARE

## 2024-04-18 VITALS
TEMPERATURE: 97.4 F | BODY MASS INDEX: 25.93 KG/M2 | DIASTOLIC BLOOD PRESSURE: 62 MMHG | HEART RATE: 63 BPM | OXYGEN SATURATION: 95 % | WEIGHT: 151.9 LBS | HEIGHT: 64 IN | SYSTOLIC BLOOD PRESSURE: 99 MMHG | RESPIRATION RATE: 16 BRPM

## 2024-04-18 DIAGNOSIS — S33.5XXD LUMBAR SPRAIN, SUBSEQUENT ENCOUNTER: ICD-10-CM

## 2024-04-18 DIAGNOSIS — Z79.891 ENCOUNTER FOR LONG-TERM OPIATE ANALGESIC USE: ICD-10-CM

## 2024-04-18 DIAGNOSIS — M51.26 DISPLACEMENT OF LUMBAR INTERVERTEBRAL DISC WITHOUT MYELOPATHY: ICD-10-CM

## 2024-04-18 DIAGNOSIS — M79.10 MYALGIA: ICD-10-CM

## 2024-04-18 DIAGNOSIS — S33.9XXA CHRONIC OR OLD SPRAIN OF LUMBOSACRAL LIGAMENT: ICD-10-CM
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
## 2024-04-18 DIAGNOSIS — M51.37 DEGENERATION OF LUMBAR OR LUMBOSACRAL INTERVERTEBRAL DISC: ICD-10-CM

## 2024-04-18 DIAGNOSIS — G89.4 CHRONIC PAIN SYNDROME: Primary | ICD-10-CM

## 2024-04-18 PROCEDURE — 3074F SYST BP LT 130 MM HG: CPT | Performed by: PHYSICIAN ASSISTANT

## 2024-04-18 PROCEDURE — 99213 OFFICE O/P EST LOW 20 MIN: CPT | Performed by: PHYSICIAN ASSISTANT

## 2024-04-18 PROCEDURE — 3078F DIAST BP <80 MM HG: CPT | Performed by: PHYSICIAN ASSISTANT

## 2024-04-18 PROCEDURE — 1036F TOBACCO NON-USER: CPT | Performed by: PHYSICIAN ASSISTANT

## 2024-04-18 PROCEDURE — 99213 OFFICE O/P EST LOW 20 MIN: CPT

## 2024-04-18 PROCEDURE — G8417 CALC BMI ABV UP PARAM F/U: HCPCS | Performed by: PHYSICIAN ASSISTANT

## 2024-04-18 PROCEDURE — 1123F ACP DISCUSS/DSCN MKR DOCD: CPT | Performed by: PHYSICIAN ASSISTANT

## 2024-04-18 PROCEDURE — G8427 DOCREV CUR MEDS BY ELIG CLIN: HCPCS | Performed by: PHYSICIAN ASSISTANT

## 2024-04-18 RX ORDER — LACTULOSE 10 G/15ML
10 SOLUTION ORAL EVERY EVENING
Qty: 1 EACH | Refills: 0 | Status: SHIPPED | OUTPATIENT
Start: 2024-04-18

## 2024-04-18 RX ORDER — LIDOCAINE 50 MG/G
OINTMENT TOPICAL
Qty: 70.88 G | Refills: 2 | Status: SHIPPED | OUTPATIENT
Start: 2024-04-18

## 2024-04-18 RX ORDER — OXYCODONE AND ACETAMINOPHEN 7.5; 325 MG/1; MG/1
1 TABLET ORAL 3 TIMES DAILY PRN
Qty: 90 TABLET | Refills: 0 | Status: SHIPPED | OUTPATIENT
Start: 2024-04-18 | End: 2024-05-18

## 2024-04-18 RX ORDER — BACLOFEN 10 MG/1
10 TABLET ORAL 2 TIMES DAILY
Qty: 60 TABLET | Refills: 2 | Status: SHIPPED | OUTPATIENT
Start: 2024-04-18 | End: 2024-05-18

## 2024-04-18 RX ORDER — BACLOFEN 10 MG/1
10 TABLET ORAL 2 TIMES DAILY
COMMUNITY
Start: 2024-03-15 | End: 2024-04-18

## 2024-04-18 NOTE — PROGRESS NOTES
Amy Hardy presents to the [facility] on 4/18/2024. Amy is complaining of pain low back, bilateral feet. The pain is constant. The pain is described as shooting, stabbing, sharp, and numb. Pain is rated on his best day at a 8, on his worst day at a 10, and on average at a 9 on the VAS scale. He took his last dose of Percocet today.     Any procedures since your last visit: No.    Pacemaker or defibrillator: Yes managed by Dr. Beasley.    He is not on NSAIDS and is  on anticoagulation medications to include Eliquis and is managed by Dr. Dexter.     Medication Contract and Consent for Opioid Use Documents Filed       Patient Documents       Type of Document Status Date Received Received By Description    Medication Contract Received 5/24/2021  9:00 AM PANCHO ORELLANA Medication Contract    Medication Contract Received 5/17/2022  3:39 PM [facility]    Medication Contract Received 4/26/2023  4:43 PM AMY ROBLES medication contract                    BP 99/62   Pulse 63   Temp 97.4 °F (36.3 °C) (Infrared)   Resp 16   Ht 1.626 m (5' 4\")   Wt 68.9 kg (151 lb 14.4 oz)   SpO2 95%   BMI 26.07 kg/m²      No LMP for male patient.    
packs/day: 0.3 packs/day for 20.0 years (5.0 ttl pk-yrs)     Types: Cigarettes     Start date:      Quit date: 1980     Years since quittin.3    Smokeless tobacco: Never   Vaping Use    Vaping Use: Never used   Substance and Sexual Activity    Alcohol use: Yes     Comment: 6-8 beers weekly     Drug use: No     Comment: caffeine 2 cups coffee daily    Sexual activity: Not on file   Other Topics Concern    Not on file   Social History Narrative    Not on file     Social Determinants of Health     Financial Resource Strain: Not on file   Food Insecurity: Not on file   Transportation Needs: Not on file   Physical Activity: Not on file   Stress: Not on file   Social Connections: Not on file   Intimate Partner Violence: Not on file   Housing Stability: Not on file       Family History   Problem Relation Age of Onset    Heart Disease Mother     No Known Problems Father     Prostate Cancer Brother             Cancer Other     Diabetes Other     High Blood Pressure Other     Stroke Other     Tuberculosis Other        REVIEW OF SYSTEMS:     Geoff denies fever/chills, chest pain, shortness of breath, new bowel or bladder complaints. All other review of systems was negative.    PHYSICAL EXAMINATION:      BP 99/62   Pulse 63   Temp 97.4 °F (36.3 °C) (Infrared)   Resp 16   Ht 1.626 m (5' 4\")   Wt 68.9 kg (151 lb 14.4 oz)   SpO2 95%   BMI 26.07 kg/m²     General:      General appearance:   pleasant and well-hydrated.   , in mild discomfort and A & O x3  Build:Normal Weight    HEENT:    Head:normocephalic and atraumatic  Sclera: icterus absent,    Lungs:    Breathing:Normal expansion.  No wheezing.    Abdomen:    Shape:non-distended and normal    Lumbar spine:    Range of motion:abnormal moderately Lateral bending, flexion, extension rotation bilateral and is painful.  Ecchymosis midline and right paraspinal area.  No TTP over ecchymotic areas.      Extremities:    Tremors:None bilaterally upper and

## 2024-04-19 ENCOUNTER — OFFICE VISIT (OUTPATIENT)
Dept: CARDIOLOGY | Facility: HOSPITAL | Age: 79
End: 2024-04-19
Payer: MEDICARE

## 2024-04-19 ENCOUNTER — HOSPITAL ENCOUNTER (OUTPATIENT)
Dept: RADIOLOGY | Facility: HOSPITAL | Age: 79
Discharge: HOME | End: 2024-04-19
Payer: MEDICARE

## 2024-04-19 ENCOUNTER — TELEPHONE (OUTPATIENT)
Dept: PRIMARY CARE | Facility: CLINIC | Age: 79
End: 2024-04-19
Payer: COMMERCIAL

## 2024-04-19 ENCOUNTER — APPOINTMENT (OUTPATIENT)
Dept: LAB | Facility: LAB | Age: 79
End: 2024-04-19
Payer: MEDICARE

## 2024-04-19 VITALS
BODY MASS INDEX: 27.55 KG/M2 | OXYGEN SATURATION: 98 % | SYSTOLIC BLOOD PRESSURE: 105 MMHG | WEIGHT: 160.5 LBS | DIASTOLIC BLOOD PRESSURE: 61 MMHG | RESPIRATION RATE: 20 BRPM | HEART RATE: 60 BPM

## 2024-04-19 DIAGNOSIS — I50.42 CHRONIC COMBINED SYSTOLIC AND DIASTOLIC HEART FAILURE, NYHA CLASS 3 (MULTI): Primary | ICD-10-CM
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
## 2024-04-19 DIAGNOSIS — I25.10 CORONARY ARTERY DISEASE INVOLVING NATIVE CORONARY ARTERY OF NATIVE HEART WITHOUT ANGINA PECTORIS: ICD-10-CM

## 2024-04-19 DIAGNOSIS — E11.59 TYPE 2 DIABETES MELLITUS WITH OTHER CIRCULATORY COMPLICATION, WITHOUT LONG-TERM CURRENT USE OF INSULIN (MULTI): Primary | ICD-10-CM

## 2024-04-19 DIAGNOSIS — I48.19 PERSISTENT ATRIAL FIBRILLATION (MULTI): ICD-10-CM

## 2024-04-19 PROCEDURE — 72100 X-RAY EXAM L-S SPINE 2/3 VWS: CPT | Performed by: RADIOLOGY

## 2024-04-19 PROCEDURE — 99213 OFFICE O/P EST LOW 20 MIN: CPT | Performed by: NURSE PRACTITIONER

## 2024-04-19 PROCEDURE — 99213 OFFICE O/P EST LOW 20 MIN: CPT | Mod: ZK | Performed by: NURSE PRACTITIONER

## 2024-04-19 PROCEDURE — 1123F ACP DISCUSS/DSCN MKR DOCD: CPT | Performed by: NURSE PRACTITIONER

## 2024-04-19 PROCEDURE — 72100 X-RAY EXAM L-S SPINE 2/3 VWS: CPT

## 2024-04-19 PROCEDURE — 3078F DIAST BP <80 MM HG: CPT | Performed by: NURSE PRACTITIONER

## 2024-04-19 PROCEDURE — 1159F MED LIST DOCD IN RCRD: CPT | Performed by: NURSE PRACTITIONER

## 2024-04-19 PROCEDURE — 1157F ADVNC CARE PLAN IN RCRD: CPT | Performed by: NURSE PRACTITIONER

## 2024-04-19 PROCEDURE — 1160F RVW MEDS BY RX/DR IN RCRD: CPT | Performed by: NURSE PRACTITIONER

## 2024-04-19 PROCEDURE — 3074F SYST BP LT 130 MM HG: CPT | Performed by: NURSE PRACTITIONER

## 2024-04-19 PROCEDURE — 1125F AMNT PAIN NOTED PAIN PRSNT: CPT | Performed by: NURSE PRACTITIONER

## 2024-04-19 RX ORDER — CALCIUM CITRATE/VITAMIN D3 200MG-6.25
TABLET ORAL
Qty: 300 STRIP | Refills: 3 | Status: SHIPPED | OUTPATIENT
Start: 2024-04-19

## 2024-04-19 ASSESSMENT — ENCOUNTER SYMPTOMS
FEVER: 0
LOSS OF SENSATION IN FEET: 0
HEMATURIA: 0
CHILLS: 0
COUGH: 0
SHORTNESS OF BREATH: 0
OCCASIONAL FEELINGS OF UNSTEADINESS: 0
ACTIVITY CHANGE: 0
ABDOMINAL DISTENTION: 0
EYES NEGATIVE: 1
BLOOD IN STOOL: 0
LIGHT-HEADEDNESS: 0
PALPITATIONS: 0
CHEST TIGHTNESS: 0
DEPRESSION: 0
WHEEZING: 0
WEAKNESS: 0
CONFUSION: 0

## 2024-04-19 ASSESSMENT — PAIN SCALES - GENERAL: PAINLEVEL: 7

## 2024-04-19 NOTE — PROGRESS NOTES
Subjective   Patient ID: Jony Javier is a 78 y.o. male who presents for follow-up of congestive heart failure.     Current Outpatient Medications:   •  albuterol 90 mcg/actuation inhaler, INHALE 1 (ONE) PUFF EVERY 4 HOURS AS NEEDED, Disp: 8.5 g, Rfl: 8  •  atorvastatin (Lipitor) 80 mg tablet, Take 1 tablet (80 mg) by mouth once daily at bedtime., Disp: 90 tablet, Rfl: 3  •  baclofen (Lioresal) 10 mg tablet, Take 1 tablet (10 mg) by mouth 2 times a day as needed., Disp: , Rfl:   •  blood sugar diagnostic (True Metrix Glucose Test Strip) strip, USE 1 STRIP 3 times daily, Disp: , Rfl:   •  bumetanide (Bumex) 1 mg tablet, Take 1 tablet (1 mg) by mouth 2 times a week. May take additional dose for weight gain 3#, increased swelling or shortness of breath., Disp: 24 tablet, Rfl: 3  •  carvedilol (Coreg) 25 mg tablet, Take 1 tablet (25 mg) by mouth 2 times a day., Disp: 180 tablet, Rfl: 0  •  diazePAM (Valium) 5 mg tablet, Take 1 tablet (5 mg) by mouth every 8 hours if needed for anxiety., Disp: 90 tablet, Rfl: 0  •  Eliquis 5 mg tablet, Take 1 tablet (5 mg) by mouth 2 times a day., Disp: 180 tablet, Rfl: 3  •  Entresto 49-51 mg tablet, Take 1 tablet by mouth 2 times a day., Disp: , Rfl:   •  EPINEPHrine 0.3 mg/0.3 mL injection syringe, Inject 0.3 mL (0.3 mg) as directed if needed for anaphylaxis., Disp: 2 each, Rfl: 3  •  ezetimibe (Zetia) 10 mg tablet, Take 1 tablet (10 mg) by mouth once daily., Disp: 90 tablet, Rfl: 3  •  ferrous gluconate 270 mg (27 mg iron) tablet, Take 1 tablet by mouth once daily., Disp: , Rfl:   •  Jardiance 10 mg, Take 1 tablet (10 mg) by mouth once daily., Disp: 30 tablet, Rfl: 11  •  lidocaine (Xylocaine) 5 % ointment, APPLY TO THE AFFECTED AREA(S) AS NEEDED FOR PAIN to last 30 days, Disp: , Rfl:   •  mirtazapine (Remeron) 30 mg tablet, Take 1 tablet (30 mg) by mouth once daily at bedtime., Disp: , Rfl:   •  multivitamin tablet, Take 1 tablet by mouth once daily., Disp: , Rfl:   •   nystatin (Mycostatin) 100,000 unit/gram powder, Apply 1 Application topically 2 times a day., Disp: , Rfl:   •  ondansetron ODT (Zofran-ODT) 4 mg disintegrating tablet, Take 1 tablet (4 mg) by mouth every 8 hours if needed for nausea or vomiting., Disp: 12 tablet, Rfl: 0  •  OneTouch Delica Plus Lancet 33 gauge misc, Test THREE TIMES DAILY AS DIRECTED, Disp: , Rfl:   •  oxyCODONE-acetaminophen (Percocet) 7.5-325 mg tablet, Take 1 tablet by mouth 3 times daily as needed for Pain for up to 30 days. Max Daily Amount 3 tablets, Disp: , Rfl:   •  pantoprazole (ProtoNix) 40 mg EC tablet, TAKE 1 TABLET BY MOUTH EVERY DAY, Disp: 90 tablet, Rfl: 3  •  spironolactone (Aldactone) 25 mg tablet, Take 0.5 tablets (12.5 mg) by mouth once daily., Disp: 45 tablet, Rfl: 1  •  tadalafil 20 mg tablet, Take 1 tablet (20 mg) by mouth once daily as needed for erectile dysfunction., Disp: 90 tablet, Rfl: 4  •  tamsulosin (Flomax) 0.4 mg 24 hr capsule, Take 1 capsule (0.4 mg) by mouth once daily in the morning., Disp: 90 capsule, Rfl: 1  •  testosterone (Axiron) 30 mg/actuation (1.5 mL) topical solution, Place 2 Pump on the skin once daily., Disp: , Rfl:   •  triamcinolone (Kenalog) 0.1 % lotion, Apply 1 Application topically if needed (psoriasis)., Disp: , Rfl:   •  venlafaxine XR (Effexor-XR) 150 mg 24 hr capsule, TAKE 1 CAPSULE BY MOUTH AFTER BREAKFAST, Disp: , Rfl:      HPI   Past medical history consists significant for history of heart failure reduced ejection fraction. He initially his ejection fraction was around 40%. He has a history of coronary artery disease, persistent atrial fibrillation, GERD, history of carotid artery stenosis and CVA in the past. He has CRT-D in place. He has not had any defibrillations from device. He also has CardioMEMS in place and his readings have been consistently within desired parameters. Last heart cath was in 2020. Results are noted below. Most recent echocardiogram shows ejection fraction of 45%  which is actually decreased from his echocardiogram in 2022 which showed that he had improvement in EF to 60 to 65%.     Review of Systems   Constitutional:  Negative for activity change, chills and fever.   HENT:  Negative for hearing loss.    Eyes: Negative.    Respiratory:  Negative for cough, chest tightness, shortness of breath and wheezing.    Cardiovascular:  Negative for chest pain, palpitations and leg swelling.   Gastrointestinal:  Negative for abdominal distention and blood in stool.   Genitourinary:  Negative for hematuria.   Neurological:  Negative for syncope, weakness and light-headedness.   Psychiatric/Behavioral:  Negative for confusion.        Objective     /61 (BP Location: Right arm, Patient Position: Sitting)   Pulse 60   Resp 20   SpO2 98%       Transthoracic Echo (TTE) Complete    Result Date: 11/28/2023              [place] [address]      Phone [phone] Fax [phone] TRANSTHORACIC ECHOCARDIOGRAM REPORT  Patient Name:      KARAN Paige Physician:    [id number] Portillo MD Study Date:        11/27/2023           Ordering Provider:    [id number] PORTILLO MRN/PID:           [id number]             Fellow: Accession#:        [id number]         Nurse: Date of Birth/Age: 1945 / 78 years Sonographer:          Elizabeth Navarro RDCS Gender:            M                    Additional Staff: Height:            157.48 cm            Admit Date:           11/27/2023 Weight:            68.95 kg             Admission Status:     Outpatient BSA:               1.70 m2              Department Location:  [facility] Echo                                                               Lab Blood Pressure: 110 /76 mmHg Study Type:    TRANSTHORACIC ECHO (TTE) COMPLETE Diagnosis/ICD: Chronic systolic  (congestive) heart failure (CHF)-I50.22 Indication:    Congestive Heart Failure CPT Codes:     Echo Complete w Full [id number]  Study Detail: The following Echo studies were performed: 2D, M-Mode, Doppler and               color flow.  PHYSICIAN INTERPRETATION: Left Ventricle: Left ventricular systolic function is mildly decreased, with an estimated ejection fraction of 45%. Wall motion is abnormal. The left ventricular cavity size is normal. The left ventricular septal wall thickness is mildly increased. There is moderately increased left ventricular posterior wall thickness. Spectral Doppler shows a pseudonormal pattern of left ventricular diastolic filling. Akinetic inferior and inferolateral wall. Left Atrium: The left atrium is moderately dilated. Right Ventricle: The right ventricle is normal in size. There is reduced right ventricular systolic function. A device is visualized in the right ventricle. Right Atrium: The right atrium is normal in size. Aortic Valve: The aortic valve was not well visualized. There is evidence of mild aortic valve stenosis. The aortic valve dimensionless index is 0.54. There is trivial aortic valve regurgitation. The peak instantaneous gradient of the aortic valve is 20.5 mmHg. The mean gradient of the aortic valve is 9.2 mmHg. TAVR?. Mitral Valve: The mitral valve is normal in structure. There is mild mitral annular calcification. There is trace to mild mitral valve regurgitation. Tricuspid Valve: The tricuspid valve is structurally normal. There is trace tricuspid regurgitation. Pulmonic Valve: The pulmonic valve is structurally normal. There is trace pulmonic valve regurgitation. Pericardium: There is no pericardial effusion noted. Aorta: The aortic root is normal.  CONCLUSIONS:  1. Left ventricular systolic function is mildly decreased with a 45% estimated ejection fraction.  2. Spectral Doppler shows a pseudonormal pattern of left ventricular diastolic filling.  3. The left  ventricular posterior wall thickness is moderately increased.  4. There is reduced right ventricular systolic function.  5. The left atrium is moderately dilated.  6. Mild aortic valve stenosis. QUANTITATIVE DATA SUMMARY: 2D MEASUREMENTS:                           Normal Ranges: LAs:           4.34 cm    (2.7-4.0cm) IVSd:          1.23 cm    (0.6-1.1cm) LVPWd:         1.39 cm    (0.6-1.1cm) LVIDd:         4.34 cm    (3.9-5.9cm) LVIDs:         3.38 cm LV Mass Index: 125.5 g/m2 LV % FS        21.9 % LA VOLUME:                               Normal Ranges: LA Vol A4C:        85.1 ml    (22+/-6mL/m2) LA Vol A2C:        65.6 ml LA Vol BP:         77.6 ml LA Vol Index A4C:  50.0ml/m2 LA Vol Index A2C:  38.6 ml/m2 LA Vol Index BP:   45.6 ml/m2 LA Area A4C:       22.6 cm2 LA Area A2C:       20.6 cm2 LA Major Axis A4C: 5.1 cm LA Major Axis A2C: 5.5 cm LA Volume Index:   39.3 ml/m2 LA Vol A4C:        68.6 ml LA Vol A2C:        62.1 ml RA VOLUME BY A/L METHOD:                       Normal Ranges: RA Area A4C: 13.6 cm2 M-MODE MEASUREMENTS:                  Normal Ranges: Ao Root: 2.05 cm (2.0-3.7cm) AORTA MEASUREMENTS:                    Normal Ranges: Asc Ao, d: 3.40 cm (2.1-3.4cm) LV SYSTOLIC FUNCTION BY 2D PLANIMETRY (MOD):                     Normal Ranges: EF-A4C View: 68.7 % (>=55%) EF-A2C View: 70.6 % EF-Biplane:  68.5 % LV DIASTOLIC FUNCTION:                        Normal Ranges: MV Peak E:    0.83 m/s (0.7-1.2 m/s) MV Peak A:    0.84 m/s (0.42-0.7 m/s) E/A Ratio:    1.00     (1.0-2.2) MV e'         0.06 m/s (>8.0) MV lateral e' 0.07 m/s MV medial e'  0.05 m/s E/e' Ratio:   13.92    (<8.0) MITRAL VALVE:                 Normal Ranges: MV DT: 226 msec (150-240msec) AORTIC VALVE:                                    Normal Ranges: AoV Vmax:                2.26 m/s  (<=1.7m/s) AoV Peak P.5 mmHg (<20mmHg) AoV Mean P.2 mmHg  (1.7-11.5mmHg) LVOT Max Tam:            1.20 m/s  (<=1.1m/s) AoV VTI:                  45.83 cm  (18-25cm) LVOT VTI:                24.66 cm LVOT Diameter:           2.03 cm   (1.8-2.4cm) AoV Area, VTI:           1.71 cm2  (2.5-5.5cm2) AoV Area,Vmax:           1.69 cm2  (2.5-4.5cm2) AoV Dimensionless Index: 0.54 AORTIC INSUFFICIENCY: AI Vmax:       3.62 m/s AI Half-time:  515 msec AI Decel Time: 1776 msec AI Decel Rate: 203.92 cm/s2  RIGHT VENTRICLE: RV Basal 2.74 cm RV Mid   2.30 cm RV Major 7.2 cm TAPSE:   15.2 mm RV s'    0.13 m/s TRICUSPID VALVE/RVSP:                             Normal Ranges: Peak TR Velocity: 2.74 m/s RV Syst Pressure: 33.0 mmHg (< 30mmHg) IVC Diam:         0.97 cm TV e'             0.1 m/s AORTA: Asc Ao Diam 3.39 cm  [id number] Narendra Portillo MD Electronically signed on 11/28/2023 at 5:35:04 PM  ** Final **       Lab Results   Component Value Date    BUN 19 03/05/2024    CREATININE 1.38 (H) 03/05/2024     (H) 03/01/2024    MG 2.18 03/01/2024    K 4.4 03/05/2024     03/05/2024         Constitutional:       General: He is not in acute distress.  HENT:      Head: Normocephalic and atraumatic.      Mouth: Mucous membranes are moist.      Neck:  No JVD or HJR   Eyes:      Extraocular Movements: .      Conjunctiva/sclera: Conjunctivae normal.    Cardiovascular:      Rate and Rhythm: Normal rate and regular rhythm.      Heart sounds:  S1 S2 normal, no murmur, no S3 or S4   Pulmonary:      Effort: Pulmonary effort is normal. No respiratory distress.      Breath sounds: Normal breath sounds. No stridor. No wheezing or rales.   Abdominal:      General: Bowel sounds are normal. There is no distension.      Tenderness: There is no abdominal tenderness. There is no guarding or rebound.   Musculoskeletal:         General: No swelling, tenderness or deformity. Normal range of motion.      Comments:   Skin:     General: Skin is warm and dry.   Neurological:      General: No focal deficit present.      Mental Status: alert and oriented to person, place, and time. Mental  status is at baseline.     Psychiatric:         Mood and Affect: Mood normal.     Assessment/Plan     Problem List Items Addressed This Visit       CAD (coronary artery disease)    Chronic combined systolic and diastolic heart failure, NYHA class 3 (Multi) - Primary    Persistent atrial fibrillation (Multi)      Chronic systolic diastolic heart failure:   Etiology   AHA Stage: C  NYHA class:  2-3  - CPAP a night   Volume Status:  Euvolemic    GFR: 52     GDMT:  BB- Carvedilol 25 mg 1 tablet twice a day   ARB/ACEI/ARNI - Entresto 49/51 mg 1 tablet twice a day   MRA -  Spironolactone 25 mg  1/2 tablet daily   SGLT2i -  Jardiance 10 mg once a day   Diuretic - Bumex 0.5 mg twice a week   Device Therapy: CRT-D / Cardiomems readings are stable and controlled.      CHF: no significant medication side effects noted and reasonably well controlled.  EF 45 %.  Taking medications as noted above.  No significant edema or signs of congestion today.      Emphasized salt restriction.  Encouraged daily monitoring of the patient's weight.  Encouraged regular exercise.  Follow up in 3 months.     2. Atrial fibrillation :   RRR today. OAC with Eliquis. Denies obvious signs of bleeding.      3. CKD3:  December labs with GFR 52 ml/min  He follows with nephrology in [place].       4 ASHD:  Denies angina.   Does have chronic sob which he feels is due to lack of activity.  Lipitor 80 mg daily/ BB/ARNI.      5. COPD:  Stable.     Montserrat Wesley, APRN-CNP

## 2024-04-19 NOTE — TELEPHONE ENCOUNTER
Pt is requesting test strips to [facility] in [place]. This rx is not showing as active in EPIC. Please advise.

## 2024-04-19 NOTE — PATIENT INSTRUCTIONS
Thank you for coming in today.  If you have any questions you may contact the office Monday through Friday at [phone] or on week ends at [phone].    Continue medications    Lab work in 3 months.    Please follow  a 2 GM sodium diet and limit fluid intake to 2 liters per day or 8 servings ( serving size = 8 oz. = 1 cup = 240 ml) per day.   Please avoid processed meat products (luncheon meats, sausages, gonsales, hot dogs for example) eat 4 servings of vegetables and 1-2 whole servings of whole fruits per day.   Please weigh daily and call [phone] for weight gain of 3 pounds in 24 hours or 5 pounds or if you experience increased swelling or shortness of breath.     Follow up:  3 months.     Please be sure to follow up with your cardiologist at [facility] once every year. Call [phone] to schedule appointment if you do not have a follow up appointment scheduled already.

## 2024-04-25 ENCOUNTER — LAB (OUTPATIENT)
Dept: LAB | Facility: LAB | Age: 79
End: 2024-04-25
Payer: MEDICARE

## 2024-04-25 DIAGNOSIS — G95.19 OTHER VASCULAR MYELOPATHIES (MULTI): ICD-10-CM

## 2024-04-25 DIAGNOSIS — S06.309A: ICD-10-CM

## 2024-04-25 DIAGNOSIS — J44.9 CHRONIC OBSTRUCTIVE PULMONARY DISEASE, UNSPECIFIED COPD TYPE (MULTI): ICD-10-CM

## 2024-04-25 DIAGNOSIS — E11.51 TYPE 2 DIABETES MELLITUS WITH DIABETIC PERIPHERAL ANGIOPATHY WITHOUT GANGRENE, WITHOUT LONG-TERM CURRENT USE OF INSULIN (MULTI): ICD-10-CM

## 2024-04-25 DIAGNOSIS — N18.4 ANEMIA DUE TO STAGE 4 CHRONIC KIDNEY DISEASE TREATED WITH ERYTHROPOIETIN (MULTI): ICD-10-CM

## 2024-04-25 DIAGNOSIS — I50.84 END STAGE HEART FAILURE (MULTI): ICD-10-CM

## 2024-04-25 DIAGNOSIS — D63.1 ANEMIA DUE TO STAGE 4 CHRONIC KIDNEY DISEASE TREATED WITH ERYTHROPOIETIN (MULTI): ICD-10-CM

## 2024-04-25 DIAGNOSIS — N18.32 STAGE 3B CHRONIC KIDNEY DISEASE (MULTI): ICD-10-CM

## 2024-04-25 DIAGNOSIS — N18.32 CHRONIC KIDNEY DISEASE, STAGE 3B (MULTI): Primary | ICD-10-CM

## 2024-04-25 DIAGNOSIS — I50.22 CHRONIC SYSTOLIC (CONGESTIVE) HEART FAILURE (MULTI): ICD-10-CM

## 2024-04-25 DIAGNOSIS — D69.6 THROMBOCYTOPENIA (CMS-HCC): ICD-10-CM

## 2024-04-25 DIAGNOSIS — E78.5 HYPERLIPIDEMIA, UNSPECIFIED HYPERLIPIDEMIA TYPE: ICD-10-CM

## 2024-04-25 DIAGNOSIS — J96.02 ACUTE RESPIRATORY FAILURE WITH HYPERCAPNIA (MULTI): ICD-10-CM

## 2024-04-25 DIAGNOSIS — E34.9 TESTOSTERONE DEFICIENCY: ICD-10-CM

## 2024-04-25 LAB
25(OH)D3 SERPL-MCNC: 56 NG/ML (ref 30–100)
ALBUMIN SERPL BCP-MCNC: 4.1 G/DL (ref 3.4–5)
ALP SERPL-CCNC: 48 U/L (ref 33–136)
ALT SERPL W P-5'-P-CCNC: 9 U/L (ref 10–52)
ANION GAP SERPL CALC-SCNC: 11 MMOL/L (ref 10–20)
APPEARANCE UR: CLEAR
AST SERPL W P-5'-P-CCNC: 16 U/L (ref 9–39)
BASOPHILS # BLD AUTO: 0.05 X10*3/UL (ref 0–0.1)
BASOPHILS NFR BLD AUTO: 1 %
BILIRUB SERPL-MCNC: 0.5 MG/DL (ref 0–1.2)
BILIRUB UR STRIP.AUTO-MCNC: NEGATIVE MG/DL
BUN SERPL-MCNC: 28 MG/DL (ref 6–23)
CALCIUM SERPL-MCNC: 9.4 MG/DL (ref 8.6–10.3)
CHLORIDE SERPL-SCNC: 103 MMOL/L (ref 98–107)
CHOLEST SERPL-MCNC: 115 MG/DL (ref 0–199)
CHOLESTEROL/HDL RATIO: 1.5
CO2 SERPL-SCNC: 27 MMOL/L (ref 21–32)
COLOR UR: ABNORMAL
CREAT SERPL-MCNC: 1.66 MG/DL (ref 0.5–1.3)
CREAT UR-MCNC: 41.4 MG/DL (ref 20–370)
CREAT UR-MCNC: 41.5 MG/DL (ref 20–370)
EGFRCR SERPLBLD CKD-EPI 2021: 42 ML/MIN/1.73M*2
EOSINOPHIL # BLD AUTO: 0.17 X10*3/UL (ref 0–0.4)
EOSINOPHIL NFR BLD AUTO: 3.3 %
ERYTHROCYTE [DISTWIDTH] IN BLOOD BY AUTOMATED COUNT: 13.1 % (ref 11.5–14.5)
GLUCOSE SERPL-MCNC: 111 MG/DL (ref 74–99)
GLUCOSE UR STRIP.AUTO-MCNC: ABNORMAL MG/DL
HCT VFR BLD AUTO: 36.9 % (ref 41–52)
HDLC SERPL-MCNC: 75.4 MG/DL
HGB BLD-MCNC: 12 G/DL (ref 13.5–17.5)
IMM GRANULOCYTES # BLD AUTO: 0.02 X10*3/UL (ref 0–0.5)
IMM GRANULOCYTES NFR BLD AUTO: 0.4 % (ref 0–0.9)
KETONES UR STRIP.AUTO-MCNC: NEGATIVE MG/DL
LDLC SERPL CALC-MCNC: 25 MG/DL
LEUKOCYTE ESTERASE UR QL STRIP.AUTO: NEGATIVE
LYMPHOCYTES # BLD AUTO: 1.08 X10*3/UL (ref 0.8–3)
LYMPHOCYTES NFR BLD AUTO: 21.3 %
MCH RBC QN AUTO: 35.3 PG (ref 26–34)
MCHC RBC AUTO-ENTMCNC: 32.5 G/DL (ref 32–36)
MCV RBC AUTO: 109 FL (ref 80–100)
MICROALBUMIN UR-MCNC: 18.9 MG/L
MICROALBUMIN/CREAT UR: 45.5 UG/MG CREAT
MONOCYTES # BLD AUTO: 0.48 X10*3/UL (ref 0.05–0.8)
MONOCYTES NFR BLD AUTO: 9.4 %
NEUTROPHILS # BLD AUTO: 3.28 X10*3/UL (ref 1.6–5.5)
NEUTROPHILS NFR BLD AUTO: 64.6 %
NITRITE UR QL STRIP.AUTO: NEGATIVE
NON HDL CHOLESTEROL: 40 MG/DL (ref 0–149)
NRBC BLD-RTO: 0 /100 WBCS (ref 0–0)
PH UR STRIP.AUTO: 6 [PH]
PHOSPHATE SERPL-MCNC: 4 MG/DL (ref 2.5–4.9)
PLATELET # BLD AUTO: 125 X10*3/UL (ref 150–450)
POTASSIUM SERPL-SCNC: 4.3 MMOL/L (ref 3.5–5.3)
PROT SERPL-MCNC: 6.6 G/DL (ref 6.4–8.2)
PROT UR STRIP.AUTO-MCNC: NEGATIVE MG/DL
PROT UR-ACNC: 17 MG/DL (ref 5–25)
PROT/CREAT UR: 0.41 MG/MG CREAT (ref 0–0.17)
RBC # BLD AUTO: 3.4 X10*6/UL (ref 4.5–5.9)
RBC # UR STRIP.AUTO: NEGATIVE /UL
SODIUM SERPL-SCNC: 137 MMOL/L (ref 136–145)
SP GR UR STRIP.AUTO: 1.01
TRIGL SERPL-MCNC: 74 MG/DL (ref 0–149)
TSH SERPL-ACNC: 0.84 MIU/L (ref 0.44–3.98)
UROBILINOGEN UR STRIP.AUTO-MCNC: <2 MG/DL
VLDL: 15 MG/DL (ref 0–40)
WBC # BLD AUTO: 5.1 X10*3/UL (ref 4.4–11.3)

## 2024-04-25 PROCEDURE — 82570 ASSAY OF URINE CREATININE: CPT

## 2024-04-25 PROCEDURE — 80061 LIPID PANEL: CPT

## 2024-04-25 PROCEDURE — 36415 COLL VENOUS BLD VENIPUNCTURE: CPT

## 2024-04-25 PROCEDURE — 80053 COMPREHEN METABOLIC PANEL: CPT

## 2024-04-25 PROCEDURE — 81003 URINALYSIS AUTO W/O SCOPE: CPT

## 2024-04-25 PROCEDURE — 84443 ASSAY THYROID STIM HORMONE: CPT

## 2024-04-25 PROCEDURE — 82043 UR ALBUMIN QUANTITATIVE: CPT

## 2024-04-25 PROCEDURE — 84100 ASSAY OF PHOSPHORUS: CPT

## 2024-04-25 PROCEDURE — 83036 HEMOGLOBIN GLYCOSYLATED A1C: CPT

## 2024-04-25 PROCEDURE — 82306 VITAMIN D 25 HYDROXY: CPT

## 2024-04-25 PROCEDURE — 84156 ASSAY OF PROTEIN URINE: CPT

## 2024-04-25 PROCEDURE — 85025 COMPLETE CBC W/AUTO DIFF WBC: CPT

## 2024-04-26 LAB
EST. AVERAGE GLUCOSE BLD GHB EST-MCNC: 111 MG/DL
HBA1C MFR BLD: 5.5 %

## 2024-05-08 ENCOUNTER — OFFICE VISIT (OUTPATIENT)
Dept: PRIMARY CARE | Facility: CLINIC | Age: 79
End: 2024-05-08
Payer: MEDICARE

## 2024-05-08 VITALS
TEMPERATURE: 97.9 F | OXYGEN SATURATION: 95 % | HEART RATE: 92 BPM | BODY MASS INDEX: 26.46 KG/M2 | RESPIRATION RATE: 16 BRPM | WEIGHT: 155 LBS | HEIGHT: 64 IN | DIASTOLIC BLOOD PRESSURE: 53 MMHG | SYSTOLIC BLOOD PRESSURE: 107 MMHG

## 2024-05-08 DIAGNOSIS — F41.9 ANXIETY DISORDER, UNSPECIFIED TYPE: ICD-10-CM

## 2024-05-08 DIAGNOSIS — N52.9 ERECTILE DYSFUNCTION, UNSPECIFIED ERECTILE DYSFUNCTION TYPE: ICD-10-CM

## 2024-05-08 DIAGNOSIS — S91.112S: ICD-10-CM

## 2024-05-08 DIAGNOSIS — G57.93 NEUROPATHY OF BOTH FEET: ICD-10-CM

## 2024-05-08 DIAGNOSIS — I50.42 CHRONIC COMBINED SYSTOLIC AND DIASTOLIC HEART FAILURE, NYHA CLASS 3 (MULTI): Primary | ICD-10-CM

## 2024-05-08 DIAGNOSIS — E11.51 TYPE 2 DIABETES MELLITUS WITH DIABETIC PERIPHERAL ANGIOPATHY WITHOUT GANGRENE, WITHOUT LONG-TERM CURRENT USE OF INSULIN (MULTI): ICD-10-CM

## 2024-05-08 DIAGNOSIS — M54.50 CHRONIC LOW BACK PAIN, UNSPECIFIED BACK PAIN LATERALITY, UNSPECIFIED WHETHER SCIATICA PRESENT: ICD-10-CM

## 2024-05-08 DIAGNOSIS — Z91.030 BEE STING ALLERGY: ICD-10-CM

## 2024-05-08 DIAGNOSIS — R26.2 AMBULATORY DYSFUNCTION: ICD-10-CM

## 2024-05-08 DIAGNOSIS — G89.29 CHRONIC LOW BACK PAIN, UNSPECIFIED BACK PAIN LATERALITY, UNSPECIFIED WHETHER SCIATICA PRESENT: ICD-10-CM

## 2024-05-08 DIAGNOSIS — N18.32 STAGE 3B CHRONIC KIDNEY DISEASE (MULTI): ICD-10-CM

## 2024-05-08 DIAGNOSIS — M15.9 PRIMARY OSTEOARTHRITIS INVOLVING MULTIPLE JOINTS: ICD-10-CM

## 2024-05-08 PROCEDURE — 1160F RVW MEDS BY RX/DR IN RCRD: CPT | Performed by: INTERNAL MEDICINE

## 2024-05-08 PROCEDURE — 99215 OFFICE O/P EST HI 40 MIN: CPT | Performed by: INTERNAL MEDICINE

## 2024-05-08 PROCEDURE — 3078F DIAST BP <80 MM HG: CPT | Performed by: INTERNAL MEDICINE

## 2024-05-08 PROCEDURE — 1036F TOBACCO NON-USER: CPT | Performed by: INTERNAL MEDICINE

## 2024-05-08 PROCEDURE — 1159F MED LIST DOCD IN RCRD: CPT | Performed by: INTERNAL MEDICINE

## 2024-05-08 PROCEDURE — 3074F SYST BP LT 130 MM HG: CPT | Performed by: INTERNAL MEDICINE

## 2024-05-08 PROCEDURE — 1157F ADVNC CARE PLAN IN RCRD: CPT | Performed by: INTERNAL MEDICINE

## 2024-05-08 PROCEDURE — 1123F ACP DISCUSS/DSCN MKR DOCD: CPT | Performed by: INTERNAL MEDICINE

## 2024-05-08 RX ORDER — EPINEPHRINE 0.3 MG/.3ML
1 INJECTION SUBCUTANEOUS AS NEEDED
Qty: 1 EACH | Refills: 3 | Status: SHIPPED | OUTPATIENT
Start: 2024-05-08

## 2024-05-08 RX ORDER — DIAZEPAM 5 MG/1
5 TABLET ORAL EVERY 8 HOURS PRN
Qty: 90 TABLET | Refills: 0 | Status: SHIPPED | OUTPATIENT
Start: 2024-05-08

## 2024-05-08 RX ORDER — TALC
3 POWDER (GRAM) TOPICAL NIGHTLY PRN
COMMUNITY
Start: 2024-03-02

## 2024-05-08 RX ORDER — FEBUXOSTAT 40 MG/1
40 TABLET, FILM COATED ORAL DAILY
COMMUNITY

## 2024-05-08 RX ORDER — TADALAFIL 20 MG/1
20 TABLET ORAL DAILY PRN
Qty: 90 TABLET | Refills: 4 | Status: SHIPPED | OUTPATIENT
Start: 2024-05-08

## 2024-05-08 SDOH — ECONOMIC STABILITY: FOOD INSECURITY: WITHIN THE PAST 12 MONTHS, YOU WORRIED THAT YOUR FOOD WOULD RUN OUT BEFORE YOU GOT MONEY TO BUY MORE.: NEVER TRUE

## 2024-05-08 SDOH — ECONOMIC STABILITY: FOOD INSECURITY: WITHIN THE PAST 12 MONTHS, THE FOOD YOU BOUGHT JUST DIDN'T LAST AND YOU DIDN'T HAVE MONEY TO GET MORE.: NEVER TRUE

## 2024-05-08 ASSESSMENT — PATIENT HEALTH QUESTIONNAIRE - PHQ9
1. LITTLE INTEREST OR PLEASURE IN DOING THINGS: NOT AT ALL
SUM OF ALL RESPONSES TO PHQ9 QUESTIONS 1 & 2: 0
2. FEELING DOWN, DEPRESSED OR HOPELESS: NOT AT ALL

## 2024-05-08 ASSESSMENT — LIFESTYLE VARIABLES
SKIP TO QUESTIONS 9-10: 1
HOW MANY STANDARD DRINKS CONTAINING ALCOHOL DO YOU HAVE ON A TYPICAL DAY: 1 OR 2
AUDIT-C TOTAL SCORE: 4
HOW OFTEN DO YOU HAVE SIX OR MORE DRINKS ON ONE OCCASION: NEVER
HOW OFTEN DO YOU HAVE A DRINK CONTAINING ALCOHOL: 4 OR MORE TIMES A WEEK

## 2024-05-08 NOTE — PROGRESS NOTES
Chief Complaint/HPI:        Toe injury: Patient tried to cut toenails today, he has some bleeding on the left foot. Patient has    numbness   of the feet also. Patient  initially had numbness in the left foot, now is starting to get numbness in both feet.  Patient cut the left great toe when trimming nails.      HTN/ CHF: patient takes Entresto, carvedilol , Bumex, Jardiance, Spironolactone.  He does check his blood pressure at home and reports it is normally high in the morning, and then low after he takes the medication. [facility], he only takes Bumex 2 times per week now     hyperlipidemia: takes Zetia, he takes atorvastatin.      history of DM: patient currently takes Jardiance for CHF, he does check glucoses every morning (usually runs around 100).     carotid stenosis/ CAD/ aortic stenosis/ atrial fibrillation/ Cardiac arrest/PEA and mitral regurgitation/ CHF : patient sees Dr Portillo, patient has a pacer/ defibrillator. He takes Eliquis, he does not take ASA , [facility] also. He now takes spironolactone, Entresto, carvedilol, Jardiance. 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 Patient has not followed with hematololgy     HECTOR: patient has used CPAP at home, patient is using new machine at his home. He reports that he is sleeping ok, though he does find the hoses uncomfortable.     hypogonadism: patient took testosterone injections per Dr Barboza, patient would like to follow up locally, he has not been able to get an alternative treatment. Patient wonders about alternative treatment. Patient has not had injections for some time.      He has not able to get to office for lab testing.     Chronic pain issues: patient takes oxycodone per Dr Aguirre at pain management in [place]. He also takes baclofen. He reports that his back pain really bothers him , and it is radiating into his right hip. He also has right shoulder pain and knee pain (left knee). He had a back x ray completed recently     Depression/anxiety: he takes Effexor and mirtazapine. He takes diazepam for anxiety, he takes this daily.  I have personally reviewed the OARRS report.  This report is scanned into the electronic medical record.  I have considered the risks of abuse, dependence, addiction and diversion.  I believe that it is clinically appropriate to prescribe this medication. Edgardo Gandara MD      anemia: patient had EGD and colonoscopy per Dr Romano, the anemia has been stable, he does take iron therapy. Iron studies within normal limits on most recent labs.  Patient wonders if he is anemic      ROS otherwise negative aside from what was mentioned above in HPI.      Patient Active Problem List   Diagnosis    Abdominal pain    Acute pneumonia    Acute sinusitis    Acute UTI    Anemia    Anxiety disorder    Panic disorder    Aortic valve stenosis    AV node dysfunction    Bilateral hearing loss    Bilateral sensorineural hearing loss    Bladder diverticulitis    CAD (coronary artery disease)    Carotid stenosis    Cerebral ventriculomegaly    Cervical neck pain with evidence of disc disease    Chronic combined systolic and diastolic heart failure, NYHA class 3 (Multi)    Chronic congestive heart failure (Multi)    Chronic renal impairment    Compressive atelectasis    CVA (cerebral vascular accident) (Multi)    Depression    Diabetes (Multi)    Disturbed hearing    DJD (degenerative joint disease)    Dysuria    Ear pain    Erectile dysfunction    Esophageal candidiasis (Multi)    GERD (gastroesophageal reflux disease)    Gout attack    Groin pain    HTN  (hypertension)    Hyperlipidemia    Hyperuricemia    Insomnia    Cough with hemoptysis    Intracranial bleed (Multi)    Low back pain    Muscle cramps    Constipation    Nausea in adult    Numbness of left hand    Osteoarthritis of right glenohumeral joint    Pacemaker    Pericardial effusion (HHS-HCC)    Persistent atrial fibrillation (Multi)    Pleural effusion, bilateral    Positive occult stool blood test    Psoriasis of scalp    Rash    Shortness of breath at rest    HECTOR on CPAP    Sleep apnea    Testosterone deficiency    Tinnitus of left ear    Tiredness    Unspecified lesions of oral mucosa    URI, acute    Acquired cyst of kidney    Adjustment disorder with depressed mood    Alcohol abuse    Allergic rhinitis    Anemia due to stage 4 chronic kidney disease treated with erythropoietin (Multi)    Automatic implantable cardioverter-defibrillator in situ    Balanitis    Bundle branch block, left    CAD S/P percutaneous coronary angioplasty    Central perforation of tympanic membrane    Chronic maxillary sinusitis    Chronic or old sprain of lumbosacral ligament    Lumbar sprain    Congenital insufficiency of aortic valve ([facility])    Degeneration of lumbar or lumbosacral intervertebral disc    Deviated nasal septum    Conductive hearing loss    Displacement of lumbar intervertebral disc without myelopathy    Dysfunction of eustachian tube    Elevated prostate specific antigen (PSA)    Hypertrophy of nasal turbinates    Loss of weight    Mild intermittent asthma without complication ([facility])    Palpitation    Disorder of prostate    Prostatitis    S/P UPPP (uvulopalatopharyngoplasty)    Skin infection    SVT (supraventricular tachycardia) ([facility])    Tympanic membrane conductive hearing loss    Urgency of urination    Ventricular tachycardia (Multi)    Routine general medical examination at health care facility    Disorder of middle ear    Male erectile dysfunction, unspecified    Other vascular myelopathies  (Multi)    Chronic obstructive pulmonary disease, unspecified COPD type (Multi)    Type 2 diabetes mellitus with diabetic peripheral angiopathy without gangrene, without long-term current use of insulin (Multi)    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 of unspecified duration, initial encounter (Multi)    End stage heart failure (Multi)    Stage 3b chronic kidney disease (Multi)    Thrombocytopenia (CMS-HCC)    Accidental overdose    Carotid bruit    Fall    Frailty    Heel pain    History of hypertension    History of intraventricular hemorrhage    Lesion of liver    Macrocytosis    Sepsis (Multi)    Acute respiratory failure with hypercapnia (Multi)    Ambulatory dysfunction    Confusion    Neuropathy of both feet         Past Medical History:   Diagnosis Date    Acute on chronic systolic (congestive) heart failure (Multi) 09/08/2022    Acute on chronic systolic CHF (congestive heart failure), NYHA class 3    Acute on chronic systolic CHF (congestive heart failure), NYHA class 3 (Multi) 05/05/2023    CHF, acute (Multi) 05/05/2023    Nonrheumatic aortic (valve) stenosis 01/10/2022    Severe aortic stenosis    Nonrheumatic mitral (valve) insufficiency 09/14/2021    Severe mitral regurgitation by prior echocardiogram    Nonrheumatic mitral (valve) insufficiency 12/07/2022    Severe mitral regurgitation    Other long term (current) drug therapy     Long term current use of amiodarone    Personal history of diseases of the blood and blood-forming organs and certain disorders involving the immune mechanism     History of hemorrhagic diathesis    Personal history of other diseases of the circulatory system 09/20/2022    History of intraventricular hemorrhage    Personal history of other diseases of the circulatory system     History of cardiac disorder    Personal history of other diseases of the circulatory system     History of hypertension    Personal history of other diseases of the  musculoskeletal system and connective tissue     History of arthritis    Severe mitral regurgitation 05/05/2023    Unspecified intracranial injury with loss of consciousness status unknown, initial encounter (Multi) 09/20/2022    Closed TBI (traumatic brain injury)     Past Surgical History:   Procedure Laterality Date    OTHER SURGICAL HISTORY  01/08/2020    Pacemaker insertion    OTHER SURGICAL HISTORY  01/08/2020    Knee replacement    OTHER SURGICAL HISTORY  01/08/2020    Cardiac catheterization with stent placement    OTHER SURGICAL HISTORY  12/06/2021    Cardioverter defibrillator insertion    OTHER SURGICAL HISTORY  12/06/2021    Angioplasty    OTHER SURGICAL HISTORY  01/30/2020    Spinal surgery     Social History     Social History Narrative    Not on file         ALLERGIES  Bee venom protein (honey bee), Cefaclor, Ciprofloxacin, Pentazocine, Pregabalin, Allopurinol, Metoprolol, and Sulfamethoxazole-trimethoprim      MEDICATIONS  Current Outpatient Medications on File Prior to Visit   Medication Sig Dispense Refill    albuterol 90 mcg/actuation inhaler INHALE 1 (ONE) PUFF EVERY 4 HOURS AS NEEDED 8.5 g 8    atorvastatin (Lipitor) 80 mg tablet Take 1 tablet (80 mg) by mouth once daily at bedtime. 90 tablet 3    baclofen (Lioresal) 10 mg tablet Take 1 tablet (10 mg) by mouth 2 times a day as needed.      blood sugar diagnostic (True Metrix Glucose Test Strip) strip USE 1 STRIP 3 times daily 300 strip 3    bumetanide (Bumex) 1 mg tablet Take 1 tablet (1 mg) by mouth 2 times a week. May take additional dose for weight gain 3#, increased swelling or shortness of breath. 24 tablet 3    carvedilol (Coreg) 25 mg tablet Take 1 tablet (25 mg) by mouth 2 times a day. 180 tablet 0    Eliquis 5 mg tablet Take 1 tablet (5 mg) by mouth 2 times a day. 180 tablet 3    Entresto 49-51 mg tablet Take 1 tablet by mouth 2 times a day.      ezetimibe (Zetia) 10 mg tablet Take 1 tablet (10 mg) by mouth once daily. 90 tablet 3     febuxostat (Uloric) 40 mg tablet Take 1 tablet (40 mg) by mouth once daily.      ferrous gluconate 270 mg (27 mg iron) tablet Take 1 tablet by mouth once daily.      Jardiance 10 mg Take 1 tablet (10 mg) by mouth once daily. 30 tablet 11    lidocaine (Xylocaine) 5 % ointment APPLY TO THE AFFECTED AREA(S) AS NEEDED FOR PAIN to last 30 days      melatonin 3 mg tablet Take 1 tablet (3 mg) by mouth as needed at bedtime for sleep.      mirtazapine (Remeron) 30 mg tablet Take 1 tablet (30 mg) by mouth once daily at bedtime.      multivitamin tablet Take 1 tablet by mouth once daily.      nystatin (Mycostatin) 100,000 unit/gram powder Apply 1 Application topically 2 times a day.      ondansetron ODT (Zofran-ODT) 4 mg disintegrating tablet Take 1 tablet (4 mg) by mouth every 8 hours if needed for nausea or vomiting. 12 tablet 0    OneTouch Delica Plus Lancet 33 gauge misc Test THREE TIMES DAILY AS DIRECTED      oxyCODONE-acetaminophen (Percocet) 7.5-325 mg tablet Take 1 tablet by mouth 3 times daily as needed for Pain for up to 30 days. Max Daily Amount 3 tablets      pantoprazole (ProtoNix) 40 mg EC tablet TAKE 1 TABLET BY MOUTH EVERY DAY 90 tablet 3    spironolactone (Aldactone) 25 mg tablet Take 0.5 tablets (12.5 mg) by mouth once daily. 45 tablet 1    tamsulosin (Flomax) 0.4 mg 24 hr capsule Take 1 capsule (0.4 mg) by mouth once daily in the morning. 90 capsule 1    testosterone (Axiron) 30 mg/actuation (1.5 mL) topical solution Place 2 Pump on the skin once daily.      triamcinolone (Kenalog) 0.1 % lotion Apply 1 Application topically if needed (psoriasis).      venlafaxine XR (Effexor-XR) 150 mg 24 hr capsule TAKE 1 CAPSULE BY MOUTH AFTER BREAKFAST      [DISCONTINUED] diazePAM (Valium) 5 mg tablet Take 1 tablet (5 mg) by mouth every 8 hours if needed for anxiety. 90 tablet 0    [DISCONTINUED] EPINEPHrine 0.3 mg/0.3 mL injection syringe Inject 0.3 mL (0.3 mg) as directed if needed for anaphylaxis. 2 each 3     "[DISCONTINUED] tadalafil 20 mg tablet Take 1 tablet (20 mg) by mouth once daily as needed for erectile dysfunction. 90 tablet 4     No current facility-administered medications on file prior to visit.         PHYSICAL EXAM  /53 (BP Location: Right arm, Patient Position: Sitting, BP Cuff Size: Adult)   Pulse 92   Temp 36.6 °C (97.9 °F)   Resp 16   Ht 1.626 m (5' 4\")   Wt 70.3 kg (155 lb)   SpO2 95%   BMI 26.61 kg/m²   Body mass index is 26.61 kg/m².  Constitutional   General appearance: Alert and in no acute distress. well developed, well nourished, within normal limits of ideal weight,  accompanied by wife, walks with a cane. Pleasant male, NAD   Eyes   Inspection of eyes: Sclera and conjunctiva were normal.   Ears, Nose, Mouth, and Throat   Ears: Auricles:  Normal. No thyromegaly or neck masses  Pulmonary   Respiratory assessment: No respiratory distress, normal respiratory rhythm and effort.    Auscultation of lungs:  no rales or crackles were heard bilaterally. no rhonchi. no wheezing. diminished breath sounds throughout all lung fields   Cardiovascular   Auscultation of heart: The heart rate was normal. The rhythm was regular. Heart sounds: the heart sounds were distant, but normal S1 and normal S2. A grade 2 systolic murmur was heard at the RUSB. A grade 1 systolic murmur was heard at the LUSB. Murmur radiates to the carotids, this is not new.    Exam for edema:  1 + ankle edema on the bilateral lower extremities  Lymphatic   Palpation of lymph nodes in neck: No cervical lymphadenopathy.   Musculoskeletal   pain in the right para lumbar region.  He sees pain management  Neurologic   Cranial nerves: Nerves 2-12 were intact, no focal neuro defects. no facial asymmetry is present.   Psychiatric   Orientation: Oriented to person, place, and time.    Mood and affect: Normal. feels tired.      Skin      Laceration noted on the tip of the left great toe, the would was cleaned with alcohol and bacitracin " was applied, the wound was covered with a sterile dressing        ASSESSMENT/PLAN  Problem List Items Addressed This Visit       Anxiety disorder    Current Assessment & Plan     I have personally reviewed the OARRS report.  This report is scanned into the electronic medical record.  I have considered the risks of abuse, dependence, addiction and diversion.  I believe that it is clinically appropriate to prescribe this medication. Diazepam is refilled today, he follows up in 3 months. Edgardo Gandara MD          Relevant Medications    diazePAM (Valium) 5 mg tablet    Other Relevant Orders    CBC and Auto Differential    Comprehensive Metabolic Panel    Chronic combined systolic and diastolic heart failure, NYHA class 3 (Multi) - Primary    Current Assessment & Plan     Continue to follow up with cardiology and heart failure clinic         Relevant Medications    tadalafil 20 mg tablet    EPINEPHrine 0.3 mg/0.3 mL injection syringe    Other Relevant Orders    CBC and Auto Differential    Comprehensive Metabolic Panel    Lipid Panel    DJD (degenerative joint disease)    Relevant Orders    Referral to Spine Surgery    Erectile dysfunction    Relevant Medications    tadalafil 20 mg tablet    Other Relevant Orders    CBC and Auto Differential    Comprehensive Metabolic Panel    Low back pain    Relevant Orders    Referral to Spine Surgery    Type 2 diabetes mellitus with diabetic peripheral angiopathy without gangrene, without long-term current use of insulin (Multi)    Current Assessment & Plan     Glucose appears to be controlled, continue to monitor         Relevant Orders    CBC and Auto Differential    Comprehensive Metabolic Panel    Hemoglobin A1C    Albumin , Urine Random    Stage 3b chronic kidney disease (Multi)    Relevant Orders    CBC and Auto Differential    Comprehensive Metabolic Panel    Vitamin D 25-Hydroxy,Total (for eval of Vitamin D levels)    Albumin , Urine Random    Ambulatory dysfunction     Relevant Orders    Referral to Spine Surgery    Neuropathy of both feet    Current Assessment & Plan     Numbness of the feet has worsened, patient cannot feel the feet. Laceration was cleaned and covered. Refer to podiatry to assessment of the feet and nail care.           Relevant Orders    Referral to Podiatry    CBC and Auto Differential    Comprehensive Metabolic Panel    TSH with reflex to Free T4 if abnormal    Vitamin B12    Referral to Spine Surgery     Other Visit Diagnoses       Bee sting allergy        Relevant Medications    EPINEPHrine 0.3 mg/0.3 mL injection syringe    Other Relevant Orders    CBC and Auto Differential    Comprehensive Metabolic Panel    Laceration of left great toe without foreign body present, nail damage status unspecified, sequela        Relevant Orders    Referral to Podiatry    CBC and Auto Differential    Comprehensive Metabolic Panel          Check labs as ordered  Refer to podiatry as scheduled  Follow up in 3 months, spent at least 45 minutes with the patient today, left great toe was cleaned and dressed today    Edgardo Gandara MD

## 2024-05-08 NOTE — ASSESSMENT & PLAN NOTE
I have personally reviewed the OARRS report.  This report is scanned into the electronic medical record.  I have considered the risks of abuse, dependence, addiction and diversion.  I believe that it is clinically appropriate to prescribe this medication. Diazepam is refilled today, he follows up in 3 months. Edgardo Gandara MD

## 2024-05-08 NOTE — ASSESSMENT & PLAN NOTE
Numbness of the feet has worsened, patient cannot feel the feet. Laceration was cleaned and covered. Refer to podiatry to assessment of the feet and nail care.

## 2024-05-09 ENCOUNTER — HOSPITAL ENCOUNTER (OUTPATIENT)
Dept: CARDIOLOGY | Facility: HOSPITAL | Age: 79
Discharge: HOME | End: 2024-05-09
Payer: MEDICARE

## 2024-05-09 DIAGNOSIS — Z95.810 PRESENCE OF AUTOMATIC (IMPLANTABLE) CARDIAC DEFIBRILLATOR: ICD-10-CM

## 2024-05-09 DIAGNOSIS — I47.29 OTHER VENTRICULAR TACHYCARDIA (MULTI): ICD-10-CM

## 2024-05-14 ENCOUNTER — OFFICE VISIT (OUTPATIENT)
Dept: PAIN MANAGEMENT | Age: 79
End: 2024-05-14
Payer: MEDICARE

## 2024-05-14 VITALS
DIASTOLIC BLOOD PRESSURE: 62 MMHG | OXYGEN SATURATION: 92 % | HEART RATE: 60 BPM | WEIGHT: 151.9 LBS | HEIGHT: 64 IN | SYSTOLIC BLOOD PRESSURE: 101 MMHG | RESPIRATION RATE: 16 BRPM | BODY MASS INDEX: 25.93 KG/M2 | TEMPERATURE: 97.3 F

## 2024-05-14 DIAGNOSIS — S33.5XXD LUMBAR SPRAIN, SUBSEQUENT ENCOUNTER: ICD-10-CM

## 2024-05-14 DIAGNOSIS — M51.37 DEGENERATION OF LUMBAR OR LUMBOSACRAL INTERVERTEBRAL DISC: ICD-10-CM

## 2024-05-14 DIAGNOSIS — M51.26 DISPLACEMENT OF LUMBAR INTERVERTEBRAL DISC WITHOUT MYELOPATHY: ICD-10-CM

## 2024-05-14 DIAGNOSIS — G89.4 CHRONIC PAIN SYNDROME: Primary | ICD-10-CM

## 2024-05-14 DIAGNOSIS — S33.9XXA CHRONIC OR OLD SPRAIN OF LUMBOSACRAL LIGAMENT: ICD-10-CM

## 2024-05-14 DIAGNOSIS — M79.10 MYALGIA: ICD-10-CM

## 2024-05-14 DIAGNOSIS — Z79.891 ENCOUNTER FOR LONG-TERM OPIATE ANALGESIC USE: ICD-10-CM

## 2024-05-14 PROCEDURE — 99213 OFFICE O/P EST LOW 20 MIN: CPT | Performed by: PHYSICIAN ASSISTANT

## 2024-05-14 PROCEDURE — 3074F SYST BP LT 130 MM HG: CPT | Performed by: PHYSICIAN ASSISTANT

## 2024-05-14 PROCEDURE — G8417 CALC BMI ABV UP PARAM F/U: HCPCS | Performed by: PHYSICIAN ASSISTANT

## 2024-05-14 PROCEDURE — 3078F DIAST BP <80 MM HG: CPT | Performed by: PHYSICIAN ASSISTANT

## 2024-05-14 PROCEDURE — 1123F ACP DISCUSS/DSCN MKR DOCD: CPT | Performed by: PHYSICIAN ASSISTANT

## 2024-05-14 PROCEDURE — G8427 DOCREV CUR MEDS BY ELIG CLIN: HCPCS | Performed by: PHYSICIAN ASSISTANT

## 2024-05-14 PROCEDURE — 1036F TOBACCO NON-USER: CPT | Performed by: PHYSICIAN ASSISTANT

## 2024-05-14 RX ORDER — OXYCODONE AND ACETAMINOPHEN 7.5; 325 MG/1; MG/1
1 TABLET ORAL 3 TIMES DAILY PRN
Qty: 90 TABLET | Refills: 0 | Status: SHIPPED | OUTPATIENT
Start: 2024-05-17 | End: 2024-06-16

## 2024-05-14 NOTE — PROGRESS NOTES
Amy Hardy presents to the [facility] on 5/14/2024. Amy is complaining of pain low back, bilateral feet. The pain is constant. The pain is described as shooting, stabbing, sharp, and numb. Pain is rated on his best day at a 5, on his worst day at a 10, and on average at a 8 on the VAS scale. He took his last dose of Percocet today.     Any procedures since your last visit: No.    Pacemaker or defibrillator: Yes managed by Dr. Beasley.    He is not on NSAIDS and is  on anticoagulation medications to include Eliquis and is managed by Dr. Dexter.     Medication Contract and Consent for Opioid Use Documents Filed       Patient Documents       Type of Document Status Date Received Received By Description    Medication Contract Received 5/24/2021  9:00 AM PANCHO ORELLANA Medication Contract    Medication Contract Received 5/17/2022  3:39 PM [facility]    Medication Contract Received 4/26/2023  4:43 PM AMY ROBLES medication contract                    /62   Pulse 60   Temp 97.3 °F (36.3 °C) (Infrared)   Resp 16   Ht 1.626 m (5' 4\")   Wt 68.9 kg (151 lb 14.4 oz)   SpO2 92%   BMI 26.07 kg/m²      No LMP for male patient.

## 2024-05-14 NOTE — PROGRESS NOTES
[facility]  [address] [place] [id number]    Follow up Note      Geoff Hardy     Date of Visit:  2024    CC:  Patient presents for follow up   Chief Complaint   Patient presents with    Follow-up     Low back, bilateral feet pain               HPI:    Pain is unchanged.     Appropriate analgesia with current medications regimen: yes.    Change in quality of symptoms:no.    Medication side effects:none.   Recent diagnostic testing:none.   Recent interventional procedures: None.    He has been on anticoagulation medications to include Eliquis and has not been on herbal supplements.  He is not diabetic.     Imagin2022 CT abdomen/pelvis    IMPRESSION:   1. Multiple small nonobstructing bilateral calcified calyceal calculi.   2. No evidence for other calcified collecting system calculi or   obstruction.   3. Hypodense posterior right upper pole renal cortical lesion most   likely a cyst. No further evaluation is required*.   4. Streaky soft tissue densities in the bilateral perinephric fat   compatible with active or, more likely, remote inflammation.   5. No evidence of mass, lymphadenopathy or inflammatory process.   6. Dense atherosclerotic calcification throughout normal caliber   abdominal aorta.       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 Degenerative disc disease, spondylosis causing stenosis. Probable large extruded disc at L3-4. Spinal listhesis L4 and L5 exacerbating the stenosis  EMG dated 3/24/2016     L5 radiculopathy  CT dated 2016 lumbar spine demonstrates degenerative spinal stenosis that is severe at L3-4 L4-5 and L5-S1 levels                                        Potential Aberrant Drug-Related Behavior: no     Urine Drug Screenin2018 buccal consistent  2019 buccal consistent  2019 consistent  10/2019 consistent for oxycodone, metabolites, and benzodiazapines  2020

## 2024-05-28 ENCOUNTER — HOSPITAL ENCOUNTER (OUTPATIENT)
Dept: CARDIOLOGY | Facility: HOSPITAL | Age: 79
Discharge: HOME | End: 2024-05-28
Payer: MEDICARE

## 2024-05-28 DIAGNOSIS — Z95.810 PRESENCE OF AUTOMATIC (IMPLANTABLE) CARDIAC DEFIBRILLATOR: ICD-10-CM

## 2024-05-28 DIAGNOSIS — I47.29 OTHER VENTRICULAR TACHYCARDIA (MULTI): ICD-10-CM

## 2024-05-28 PROCEDURE — 93296 REM INTERROG EVL PM/IDS: CPT

## 2024-05-30 ENCOUNTER — PATIENT OUTREACH (OUTPATIENT)
Dept: CARE COORDINATION | Facility: CLINIC | Age: 79
End: 2024-05-30
Payer: COMMERCIAL

## 2024-05-30 ENCOUNTER — TELEPHONE (OUTPATIENT)
Dept: CARDIOLOGY | Facility: HOSPITAL | Age: 79
End: 2024-05-30
Payer: COMMERCIAL

## 2024-05-30 NOTE — TELEPHONE ENCOUNTER
Will be getting a new Cardiomems machine either Friday or Monday - will not be sending in readings until they get the new one.  No call  back needed.

## 2024-06-06 NOTE — LETTER
March 10, 2023       Geoff Hardy YOB: 1945   [address] [place] [address] Date of Visit:  3/10/2023       To Whom It May Concern:    It is my medical opinion that Geoff Hardy requires a disability parking placard for the following reasons:  He cannot walk without assistance from another person or the use of an assistance device (cane, crutch, prosthetic device, wheelchair, etc.).  Duration of need: 1 year    If you have any questions or concerns, please don't hesitate to call.    Sincerely,        PEACE Herring      
March 10, 2023       Xuan Desai YOB: 1945   [address] Date of Visit:  3/10/2023       To Whom It May Concern: It is my medical opinion that Vinod Solomon requires a disability parking placard for the following reasons:  He cannot walk without assistance from another person or the use of an assistance device (cane, crutch, prosthetic device, wheelchair, etc.). Duration of need: 1 year    If you have any questions or concerns, please don't hesitate to call.     Sincerely,        PEACE Hernandez
Statement Selected

## 2024-06-07 DIAGNOSIS — I50.22 CHRONIC SYSTOLIC (CONGESTIVE) HEART FAILURE (MULTI): ICD-10-CM

## 2024-06-07 DIAGNOSIS — I10 PRIMARY HYPERTENSION: ICD-10-CM

## 2024-06-07 RX ORDER — CARVEDILOL 25 MG/1
25 TABLET ORAL 2 TIMES DAILY
Qty: 180 TABLET | Refills: 3 | Status: SHIPPED | OUTPATIENT
Start: 2024-06-07

## 2024-06-11 ENCOUNTER — OFFICE VISIT (OUTPATIENT)
Dept: PAIN MANAGEMENT | Age: 79
End: 2024-06-11
Payer: MEDICARE

## 2024-06-11 VITALS
WEIGHT: 151 LBS | TEMPERATURE: 98.2 F | HEIGHT: 64 IN | RESPIRATION RATE: 16 BRPM | OXYGEN SATURATION: 97 % | BODY MASS INDEX: 25.78 KG/M2 | HEART RATE: 84 BPM | SYSTOLIC BLOOD PRESSURE: 108 MMHG | DIASTOLIC BLOOD PRESSURE: 67 MMHG

## 2024-06-11 DIAGNOSIS — G89.4 CHRONIC PAIN SYNDROME: Primary | ICD-10-CM

## 2024-06-11 DIAGNOSIS — M79.10 MYALGIA: ICD-10-CM

## 2024-06-11 DIAGNOSIS — S33.5XXD LUMBAR SPRAIN, SUBSEQUENT ENCOUNTER: ICD-10-CM

## 2024-06-11 DIAGNOSIS — Z79.891 ENCOUNTER FOR LONG-TERM OPIATE ANALGESIC USE: ICD-10-CM

## 2024-06-11 DIAGNOSIS — M51.26 DISPLACEMENT OF LUMBAR INTERVERTEBRAL DISC WITHOUT MYELOPATHY: ICD-10-CM

## 2024-06-11 DIAGNOSIS — S33.9XXA CHRONIC OR OLD SPRAIN OF LUMBOSACRAL LIGAMENT: ICD-10-CM

## 2024-06-11 DIAGNOSIS — M51.37 DEGENERATION OF LUMBAR OR LUMBOSACRAL INTERVERTEBRAL DISC: ICD-10-CM

## 2024-06-11 PROCEDURE — G8427 DOCREV CUR MEDS BY ELIG CLIN: HCPCS | Performed by: PHYSICIAN ASSISTANT

## 2024-06-11 PROCEDURE — G8417 CALC BMI ABV UP PARAM F/U: HCPCS | Performed by: PHYSICIAN ASSISTANT

## 2024-06-11 PROCEDURE — 99213 OFFICE O/P EST LOW 20 MIN: CPT | Performed by: PHYSICIAN ASSISTANT

## 2024-06-11 PROCEDURE — 1123F ACP DISCUSS/DSCN MKR DOCD: CPT | Performed by: PHYSICIAN ASSISTANT

## 2024-06-11 PROCEDURE — 1036F TOBACCO NON-USER: CPT | Performed by: PHYSICIAN ASSISTANT

## 2024-06-11 PROCEDURE — 3074F SYST BP LT 130 MM HG: CPT | Performed by: PHYSICIAN ASSISTANT

## 2024-06-11 PROCEDURE — 3078F DIAST BP <80 MM HG: CPT | Performed by: PHYSICIAN ASSISTANT

## 2024-06-11 PROCEDURE — 99213 OFFICE O/P EST LOW 20 MIN: CPT

## 2024-06-11 RX ORDER — LIDOCAINE 50 MG/G
1 PATCH TOPICAL DAILY
Qty: 30 PATCH | Refills: 0 | Status: SHIPPED | OUTPATIENT
Start: 2024-06-11 | End: 2024-07-11

## 2024-06-11 RX ORDER — OXYCODONE AND ACETAMINOPHEN 7.5; 325 MG/1; MG/1
1 TABLET ORAL 3 TIMES DAILY PRN
Qty: 90 TABLET | Refills: 0 | Status: SHIPPED | OUTPATIENT
Start: 2024-06-18 | End: 2024-07-18

## 2024-06-11 RX ORDER — LACTULOSE 10 G/15ML
10 SOLUTION ORAL EVERY EVENING
Qty: 1 EACH | Refills: 0 | Status: SHIPPED | OUTPATIENT
Start: 2024-06-11

## 2024-06-11 NOTE — PROGRESS NOTES
[facility]  [address] [place] [id number]    Follow up Note      Geoff Hardy     Date of Visit:  2024    CC:  Patient presents for follow up   Chief Complaint   Patient presents with    Follow-up     Low back                HPI:    Pain is unchanged.     Appropriate analgesia with current medications regimen: yes.    Change in quality of symptoms:no.    Medication side effects:none.   Recent diagnostic testing:none.   Recent interventional procedures: None.    He has been on anticoagulation medications to include Eliquis and has not been on herbal supplements.  He is not diabetic.     Imagin2022 CT abdomen/pelvis    IMPRESSION:   1. Multiple small nonobstructing bilateral calcified calyceal calculi.   2. No evidence for other calcified collecting system calculi or   obstruction.   3. Hypodense posterior right upper pole renal cortical lesion most   likely a cyst. No further evaluation is required*.   4. Streaky soft tissue densities in the bilateral perinephric fat   compatible with active or, more likely, remote inflammation.   5. No evidence of mass, lymphadenopathy or inflammatory process.   6. Dense atherosclerotic calcification throughout normal caliber   abdominal aorta.       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 Degenerative disc disease, spondylosis causing stenosis. Probable large extruded disc at L3-4. Spinal listhesis L4 and L5 exacerbating the stenosis  EMG dated 3/24/2016     L5 radiculopathy  CT dated 2016 lumbar spine demonstrates degenerative spinal stenosis that is severe at L3-4 L4-5 and L5-S1 levels                                        Potential Aberrant Drug-Related Behavior: no     Urine Drug Screenin2018 buccal consistent  2019 buccal consistent  2019 consistent  10/2019 consistent for oxycodone, metabolites, and benzodiazapines  2020 consistent  2021

## 2024-06-11 NOTE — PROGRESS NOTES
Geoff Hardy presents to the [facility] on 6/11/2024. Geoff is complaining of pain ;LOW BACK. The pain is constant. The pain is described as aching, throbbing, shooting, and stabbing. Pain is rated on his best day at a 4, on his worst day at a 10, and on average at a 6 on the VAS scale. He took his last dose of Percocet .    Any procedures since your last visit: No  He is not on NSAIDS and  is not on anticoagulation medications   Pacemaker or defibrillator: No    Medication Contract and Consent for Opioid Use Documents Filed       Patient Documents       Type of Document Status Date Received Received By Description    Medication Contract Received 5/24/2021  9:00 AM PANCHO ORELLANA Medication Contract    Medication Contract Received 5/17/2022  3:39 PM [employer] [facility]    Medication Contract Received 4/26/2023  4:43 PM MARGARET, GEOFF medication contract                       Temp 98.2 °F (36.8 °C) (Temporal)   Resp 16   Ht 1.626 m (5' 4\")   Wt 68.5 kg (151 lb)   SpO2 97%   BMI 25.92 kg/m²      No LMP for male patient.

## 2024-06-12 ENCOUNTER — TELEPHONE (OUTPATIENT)
Dept: PAIN MANAGEMENT | Age: 79
End: 2024-06-12

## 2024-06-12 NOTE — TELEPHONE ENCOUNTER
Geoff Hardy called in and said that he forgot to ask yesterday if he can take his muscle relaxer TID from now on because BID is not helping enough.  Please advise.

## 2024-06-13 ENCOUNTER — TELEPHONE (OUTPATIENT)
Dept: PRIMARY CARE | Facility: CLINIC | Age: 79
End: 2024-06-13
Payer: COMMERCIAL

## 2024-06-13 DIAGNOSIS — F41.9 ANXIETY DISORDER, UNSPECIFIED TYPE: ICD-10-CM

## 2024-06-13 RX ORDER — DIAZEPAM 5 MG/1
5 TABLET ORAL EVERY 8 HOURS PRN
Qty: 90 TABLET | Refills: 0 | Status: SHIPPED | OUTPATIENT
Start: 2024-06-13

## 2024-06-13 NOTE — TELEPHONE ENCOUNTER
Called and left message on Luzma's name identified voicemail explaining that Khloe does not want Drew to increase his muscle relaxer due to history of falls and asked for them to call the office with any other questions or concerns.

## 2024-06-13 NOTE — TELEPHONE ENCOUNTER
Pt's spouse called in requesting a refill on his Diazepam. Pt's spouse would like this to go to [facility] in [place].

## 2024-06-18 ENCOUNTER — TELEPHONE (OUTPATIENT)
Dept: CARDIOLOGY | Facility: HOSPITAL | Age: 79
End: 2024-06-18
Payer: COMMERCIAL

## 2024-06-18 ENCOUNTER — HOSPITAL ENCOUNTER (OUTPATIENT)
Dept: CARDIOLOGY | Facility: HOSPITAL | Age: 79
Discharge: HOME | End: 2024-06-18
Payer: MEDICARE

## 2024-06-18 DIAGNOSIS — I50.42 CHRONIC COMBINED SYSTOLIC AND DIASTOLIC HEART FAILURE, NYHA CLASS 3 (MULTI): ICD-10-CM

## 2024-06-18 DIAGNOSIS — Z95.810 PRESENCE OF AUTOMATIC (IMPLANTABLE) CARDIAC DEFIBRILLATOR: ICD-10-CM

## 2024-06-18 DIAGNOSIS — I47.29 OTHER VENTRICULAR TACHYCARDIA (MULTI): ICD-10-CM

## 2024-06-18 RX ORDER — SPIRONOLACTONE 25 MG/1
12.5 TABLET ORAL DAILY
Qty: 45 TABLET | Refills: 1 | Status: SHIPPED | OUTPATIENT
Start: 2024-06-18 | End: 2024-06-19 | Stop reason: SDUPTHER

## 2024-06-18 NOTE — TELEPHONE ENCOUNTER
spironolactone (Aldactone) 25 mg tablet Take 0.5 tablets (12.5 mg) by mouth once daily.   Refill [facility]

## 2024-06-19 DIAGNOSIS — I50.42 CHRONIC COMBINED SYSTOLIC AND DIASTOLIC HEART FAILURE, NYHA CLASS 3 (MULTI): ICD-10-CM

## 2024-06-20 RX ORDER — SPIRONOLACTONE 25 MG/1
12.5 TABLET ORAL DAILY
Qty: 45 TABLET | Refills: 1 | Status: SHIPPED | OUTPATIENT
Start: 2024-06-20 | End: 2024-12-17

## 2024-06-24 NOTE — PROGRESS NOTES
Patient ID: Jony Javier is a 79 y.o. male who presents for Diabetes, Congestive Heart Failure, COPD, Asthma, and Cerebrovascular Accident.    Referring Provider: Edgardo Gandara, *  PCP: Edgardo Gandara MD - last visit: 5/8/24, next visit: 8/14/24    Subjective   Allergies   Allergen Reactions    Bee Venom Protein (Honey Bee) Anaphylaxis    Cefaclor Anaphylaxis     Tolerated amoxicillin 4/2023 and 12/2023 outpatient    Ciprofloxacin Anaphylaxis    Pentazocine Hives, Palpitations and Rash    Pregabalin Anaphylaxis, Shortness of breath and Rash    Allopurinol Hives and Itching    Metoprolol Drowsiness    Sulfamethoxazole-Trimethoprim Itching and Rash     HPI  Interval History: Presents today for platinum FUV for COPD/Asthma, CHF, CVA, and DM. Patient still stable & doing well since last appointment, no changes & well managed on therapies.     PULMONARY ASSESSMENT  Patient has been diagnosed with: COPD and Asthma  does not see a pulmonologist  Currently on the following:  Albuterol rescue inhaler - 1 puff Q4H PRN  Patient uses rescue inhaler 1-2 times a day throughout the week, about 2-3 times per week  Symptom Assessment:  Patient endorses the following symptoms: dyspnea  Symptoms remain unchanged  Patient endorses symptoms are triggered by rhinitis  Patient's symptoms are improved by rescue inhaler  Exacerbation Hx:  Patient was last in the hospital for their lungs - never, they were last on antibiotics and/or steroids back in December 2023  Immunization History:  Patient does have up to date vaccinations    CHF ASSESSMENT  Patient currently diagnosed with stage III heart failure. Last ejection fraction was 45%  Patient is currently using the following medications as part of heart failure GDMT:  ARNI/ACEi/ARB: Yes - Entresto 49/51 BID  Beta Blocker: Yes - Carvedilol 25mg BID  MRA: Yes - Spironolactone 12.5mg daily  SGLT2i: Yes - Jardiance 10mg daily  Patient is currently using the following  supportive medications  Bumex 1mg BIW on Wednesdays and Saturdays   Symptom Assessment:  Patient endorses the following symptoms of heart failure: Edema and Dyspnea - edema has decreased since last week to baseline    ASCVD ASSESSMENT  Patient does use anticoagulant - takes Eliquis 5mg BID  HTN history:  Patient does have history of HTN. Home BP is ~120/80, In-office BP is 107/53.  Currently takes the following for HTN  CHF GDMT  Patient has met HTN goals  HLD history:  Patient does have history of HLD. Current LDL is 37. Current TG is 118.  Currently takes the following for HLD  Ezetimibe 10mg daily  Lipitor 80mg daily  Patient has met HLD goals  DM history:  Patient does have history of DM. Current A1c is 6.1%. Current SMBGs are ~100-120 mg/dL. Patient last saw eye doctor in 2023, does not see podiatrist  Patient has met DM goals    DIABETES MELLITUS TYPE 2:    Patient endorses no diabetes complications at time of visit  Patient last saw eye doctor - 2023; Last saw foot doctor - never; Patient has no sores or cuts on feet today   Patient endorses taking the following diabetic medications:  Jardiance 10mg daily  Patient endorses no missed doses in the last week; does not have medication concerns  Home SMBG Records  Patient tests SMBGS once daily with glucometer. Fasting sugars average ~100-120 mg/dL. Patient has had no episodes of hypoglycemia in the last month.  Pertinent PMH Review  Patient does not have history of Gastroparesis, Frequent UTI, Thyroid Cancer, and Pancreatitis    Lifestyle  Patient's diet consists of small meals high in protein/veggies - well balanced, they do not exercise  Patient does not smoke/use tobacco; quit 50 years ago  Patient does drink alcohol; 2 beers/day    Review of Systems    Objective   There were no vitals taken for this visit.   BP Readings from Last 4 Encounters:   05/08/24 107/53   04/19/24 105/61   03/05/24 174/75   02/07/24 122/67      There were no vitals filed for this  visit.   LAB  Lab Results   Component Value Date    BILITOT 0.5 04/25/2024    CALCIUM 9.4 04/25/2024    CO2 27 04/25/2024     04/25/2024    CREATININE 1.66 (H) 04/25/2024    GLUCOSE 111 (H) 04/25/2024    ALKPHOS 48 04/25/2024    K 4.3 04/25/2024    PROT 6.6 04/25/2024     04/25/2024    AST 16 04/25/2024    ALT 9 (L) 04/25/2024    BUN 28 (H) 04/25/2024    ANIONGAP 11 04/25/2024    MG 2.18 03/01/2024    PHOS 4.0 04/25/2024    ALBUMIN 4.1 04/25/2024    GFRMALE 40 (A) 07/24/2023     Lab Results   Component Value Date    TRIG 74 04/25/2024    CHOL 115 04/25/2024    LDLCALC 25 04/25/2024    HDL 75.4 04/25/2024     Lab Results   Component Value Date    HGBA1C 5.5 04/25/2024     Current Outpatient Medications   Medication Instructions    albuterol 90 mcg/actuation inhaler INHALE 1 (ONE) PUFF EVERY 4 HOURS AS NEEDED    atorvastatin (LIPITOR) 80 mg, oral, Nightly    baclofen (LIORESAL) 10 mg, oral, 2 times daily PRN    blood sugar diagnostic (True Metrix Glucose Test Strip) strip USE 1 STRIP 3 times daily    bumetanide (BUMEX) 1 mg, oral, 2 times weekly, May take additional dose for weight gain 3#, increased swelling or shortness of breath.    carvedilol (COREG) 25 mg, oral, 2 times daily    diazePAM (VALIUM) 5 mg, oral, Every 8 hours PRN    Eliquis 5 mg, oral, 2 times daily    Entresto 49-51 mg tablet 1 tablet, oral, 2 times daily    EPINEPHrine (EPIPEN) 0.3 mg, intramuscular, As needed    ezetimibe (ZETIA) 10 mg, oral, Daily    febuxostat (ULORIC) 40 mg, oral, Daily    ferrous gluconate 270 mg (27 mg iron) tablet 1 tablet, oral, Daily    Jardiance 10 mg, oral, Daily    lidocaine (Xylocaine) 5 % ointment APPLY TO THE AFFECTED AREA(S) AS NEEDED FOR PAIN to last 30 days    melatonin 3 mg, oral, Nightly PRN    mirtazapine (REMERON) 30 mg, oral, Nightly    multivitamin tablet 1 tablet, oral, Daily    nystatin (Mycostatin) 100,000 unit/gram powder 1 Application, Topical, 2 times daily    ondansetron ODT (ZOFRAN-ODT) 4  mg, oral, Every 8 hours PRN    OneTouch Delica Plus Lancet 33 gauge misc Test THREE TIMES DAILY AS DIRECTED    oxyCODONE-acetaminophen (Percocet) 7.5-325 mg tablet Take 1 tablet by mouth 3 times daily as needed for Pain for up to 30 days. Max Daily Amount 3 tablets    pantoprazole (PROTONIX) 40 mg, oral, Daily    spironolactone (ALDACTONE) 12.5 mg, oral, Daily    tadalafil (CIALIS) 20 mg, oral, Daily PRN    tamsulosin (FLOMAX) 0.4 mg, oral, Every morning    testosterone (Axiron) 30 mg/actuation (1.5 mL) topical solution 2 Pump, transdermal, Daily    triamcinolone (Kenalog) 0.1 % lotion 1 Application, Topical, As needed    venlafaxine XR (Effexor-XR) 150 mg 24 hr capsule TAKE 1 CAPSULE BY MOUTH AFTER BREAKFAST      Assessment/Plan   Problem List Items Addressed This Visit       Chronic combined systolic and diastolic heart failure, NYHA class 3 (Multi)     Assessment of Heart Failure Control  Patient's current ejection fraction is: 45%  Reported symptoms of heart failure: None    Plan for this appointment  CONTINUE GDMT for HF - Bumex, Entresto, Carvedilol, Spironolactone, Jardiance  If fluid still on top of feet consider using the PRN 3rd dose of Bumex to help with it (1/2-1 tablet)  Diet/lifestyle - limit salt & fat, lean meats & veggies, fluid limitations as directed by cardiologist  BP/Weight checks daily - dry weight, no clothing, AM after using restroom before breakfast  Symptom assessment - monitor for fatigue, SOB, physical activity intolerance  FUV in as directed by PCP/cardiology         CVA (cerebral vascular accident) (Multi)     Assessment of Stroke Prevention  Anticoagulation/antiplatelet meds on board:  Eliquis 5mg BID  HTN at goal? Yes, home BP <140/90  DM at goal? Yes, A1c 6.1%  HLD meds: Lipitor, Zetia  HLD at goal? Yes    Plan for this appointment  CONTINUE anticoagulation as directed - Eliquis 5mg BID  CONTINUE HTN management - meds on board for HF  CONTINUE HLD management - Lipitor & Zetia, watch  for muscle pain with Lipitor  CONTINUE DM management - Jardiance for DM/HF  Diet/lifestyle - cardio diet; limit salts, fats - diet in lean proteins, exercise as tolerated 5x a week 30min/day  BP monitoring - should be once daily; continue keeping in log  FUV as directed by PCP         Chronic obstructive pulmonary disease, unspecified COPD type (Multi)     Assessment of COPD Control  Patient's symptoms of COPD: None  Patient's use of rescue inhaler: 2-3 puffs total per week  Patient's smoking history: Former smoker, quit in 1970  Any recent exacerbations? No - has never had an exacerbation    Plan for this appointment  CONTINUE PRN Albuterol rescue inhaler for SOB  Symptoms - continue to monitor for SOB, cough, wheezing  Triggers - Rhinitis; use OTCs PRN for bothersome symptoms  FUV as directed by PCP         Type 2 diabetes mellitus with diabetic peripheral angiopathy without gangrene, without long-term current use of insulin (Multi)     Assessment of Diabetes Mellitus Control  Patient's Personal Diabetes Goals: Maintain sugars at goal  Patient's goal A1c is < 7%  Is pt at goal? Yes  Current A1c: 6.1%  Patient's SMBGs are at goal, FBG between 100-120  Any complications? No    Plan for this appointment  CONTINUE Jardiance for HF/DM management - no concerns with dose at this time  Diet/lifestyle - continue monitoring carbs/sugars, increase lean fat/protein/veggie, physical activity as tolerated  Sugar checks 1-3 times a day - continue monitoring in log  FUV as directed by PCP          Labs ordered:  none     Follow-up: As directed by PCP/cardiologist - patient stable on pharmacy standpoint     Time spent with pt: Total length of time 20 (minutes) of the encounter and more than 50% was spent counseling the patient.    Raeann Romero, Anil     Continue all meds under the continuation of care with the referring provider and clinical pharmacy team.    Verbal consent to manage patient's drug therapy was obtained from  the patient. They were informed they may decline to participate or withdraw from participation in pharmacy services at any time.

## 2024-06-25 ENCOUNTER — HOSPITAL ENCOUNTER (OUTPATIENT)
Dept: CARDIOLOGY | Facility: HOSPITAL | Age: 79
Discharge: HOME | End: 2024-06-25
Payer: MEDICARE

## 2024-06-25 ENCOUNTER — APPOINTMENT (OUTPATIENT)
Dept: PHARMACY | Facility: HOSPITAL | Age: 79
End: 2024-06-25
Payer: COMMERCIAL

## 2024-06-25 DIAGNOSIS — J44.9 CHRONIC OBSTRUCTIVE PULMONARY DISEASE, UNSPECIFIED COPD TYPE (MULTI): ICD-10-CM

## 2024-06-25 DIAGNOSIS — I47.29 OTHER VENTRICULAR TACHYCARDIA (MULTI): ICD-10-CM

## 2024-06-25 DIAGNOSIS — I50.42 CHRONIC COMBINED SYSTOLIC AND DIASTOLIC HEART FAILURE, NYHA CLASS 3 (MULTI): ICD-10-CM

## 2024-06-25 DIAGNOSIS — I63.9 CEREBROVASCULAR ACCIDENT (CVA), UNSPECIFIED MECHANISM (MULTI): ICD-10-CM

## 2024-06-25 DIAGNOSIS — E11.51 TYPE 2 DIABETES MELLITUS WITH DIABETIC PERIPHERAL ANGIOPATHY WITHOUT GANGRENE, WITHOUT LONG-TERM CURRENT USE OF INSULIN (MULTI): ICD-10-CM

## 2024-06-25 DIAGNOSIS — Z95.810 PRESENCE OF AUTOMATIC (IMPLANTABLE) CARDIAC DEFIBRILLATOR: ICD-10-CM

## 2024-06-25 NOTE — ASSESSMENT & PLAN NOTE
Assessment of Diabetes Mellitus Control  Patient's Personal Diabetes Goals: Maintain sugars at goal  Patient's goal A1c is < 7%  Is pt at goal? Yes  Current A1c: 6.1%  Patient's SMBGs are at goal, FBG between 100-120  Any complications? No    Plan for this appointment  CONTINUE Jardiance for HF/DM management - no concerns with dose at this time  Diet/lifestyle - continue monitoring carbs/sugars, increase lean fat/protein/veggie, physical activity as tolerated  Sugar checks 1-3 times a day - continue monitoring in log  FUV as directed by PCP

## 2024-06-25 NOTE — ASSESSMENT & PLAN NOTE
Assessment of Stroke Prevention  Anticoagulation/antiplatelet meds on board:  Eliquis 5mg BID  HTN at goal? Yes, home BP <140/90  DM at goal? Yes, A1c 6.1%  HLD meds: Lipitor, Zetia  HLD at goal? Yes    Plan for this appointment  CONTINUE anticoagulation as directed - Eliquis 5mg BID  CONTINUE HTN management - meds on board for HF  CONTINUE HLD management - Lipitor & Zetia, watch for muscle pain with Lipitor  CONTINUE DM management - Jardiance for DM/HF  Diet/lifestyle - cardio diet; limit salts, fats - diet in lean proteins, exercise as tolerated 5x a week 30min/day  BP monitoring - should be once daily; continue keeping in log  FUV as directed by PCP

## 2024-06-25 NOTE — ASSESSMENT & PLAN NOTE
Assessment of Heart Failure Control  Patient's current ejection fraction is: 45%  Reported symptoms of heart failure: None    Plan for this appointment  CONTINUE GDMT for HF - Bumex, Entresto, Carvedilol, Spironolactone, Jardiance  If fluid still on top of feet consider using the PRN 3rd dose of Bumex to help with it (1/2-1 tablet)  Diet/lifestyle - limit salt & fat, lean meats & veggies, fluid limitations as directed by cardiologist  BP/Weight checks daily - dry weight, no clothing, AM after using restroom before breakfast  Symptom assessment - monitor for fatigue, SOB, physical activity intolerance  FUV in as directed by PCP/cardiology

## 2024-06-25 NOTE — ASSESSMENT & PLAN NOTE
Assessment of COPD Control  Patient's symptoms of COPD: None  Patient's use of rescue inhaler: 2-3 puffs total per week  Patient's smoking history: Former smoker, quit in 1970  Any recent exacerbations? No - has never had an exacerbation    Plan for this appointment  CONTINUE PRN Albuterol rescue inhaler for SOB  Symptoms - continue to monitor for SOB, cough, wheezing  Triggers - Rhinitis; use OTCs PRN for bothersome symptoms  FUV as directed by PCP

## 2024-06-28 ENCOUNTER — TELEPHONE (OUTPATIENT)
Dept: INFUSION THERAPY | Facility: HOSPITAL | Age: 79
End: 2024-06-28
Payer: COMMERCIAL

## 2024-06-28 NOTE — TELEPHONE ENCOUNTER
Jony was called and a VM message was left. Montserrat Wesley has reviewed Cardio-Mems readings. She recommends that Jony take one extra 1 mg Bumex tablet today only.

## 2024-07-12 ENCOUNTER — LAB (OUTPATIENT)
Dept: LAB | Facility: LAB | Age: 79
End: 2024-07-12
Payer: COMMERCIAL

## 2024-07-12 DIAGNOSIS — I50.42 CHRONIC COMBINED SYSTOLIC AND DIASTOLIC HEART FAILURE, NYHA CLASS 3 (MULTI): ICD-10-CM

## 2024-07-12 DIAGNOSIS — I48.19 PERSISTENT ATRIAL FIBRILLATION (MULTI): ICD-10-CM

## 2024-07-12 DIAGNOSIS — I25.10 CORONARY ARTERY DISEASE INVOLVING NATIVE CORONARY ARTERY OF NATIVE HEART WITHOUT ANGINA PECTORIS: ICD-10-CM

## 2024-07-12 LAB
ANION GAP SERPL CALC-SCNC: 12 MMOL/L (ref 10–20)
BNP SERPL-MCNC: 497 PG/ML (ref 0–99)
BUN SERPL-MCNC: 44 MG/DL (ref 6–23)
CALCIUM SERPL-MCNC: 8.8 MG/DL (ref 8.6–10.3)
CHLORIDE SERPL-SCNC: 96 MMOL/L (ref 98–107)
CO2 SERPL-SCNC: 25 MMOL/L (ref 21–32)
CREAT SERPL-MCNC: 1.98 MG/DL (ref 0.5–1.3)
EGFRCR SERPLBLD CKD-EPI 2021: 34 ML/MIN/1.73M*2
GLUCOSE SERPL-MCNC: 115 MG/DL (ref 74–99)
MAGNESIUM SERPL-MCNC: 2.16 MG/DL (ref 1.6–2.4)
POTASSIUM SERPL-SCNC: 5.5 MMOL/L (ref 3.5–5.3)
SODIUM SERPL-SCNC: 127 MMOL/L (ref 136–145)

## 2024-07-12 PROCEDURE — 83880 ASSAY OF NATRIURETIC PEPTIDE: CPT

## 2024-07-12 PROCEDURE — 80048 BASIC METABOLIC PNL TOTAL CA: CPT

## 2024-07-12 PROCEDURE — 83735 ASSAY OF MAGNESIUM: CPT

## 2024-07-15 ENCOUNTER — TELEPHONE (OUTPATIENT)
Dept: INFUSION THERAPY | Facility: HOSPITAL | Age: 79
End: 2024-07-15
Payer: COMMERCIAL

## 2024-07-15 DIAGNOSIS — I50.9 CHRONIC CONGESTIVE HEART FAILURE, UNSPECIFIED HEART FAILURE TYPE (MULTI): Primary | ICD-10-CM

## 2024-07-15 NOTE — TELEPHONE ENCOUNTER
----- Message from Montserrat Wesley sent at 7/15/2024  3:15 PM EDT -----  Stop the spironolactone.  Recheck BMP in 1 week.

## 2024-07-15 NOTE — TELEPHONE ENCOUNTER
Montserrat reviewed labs and Left message for patient to stop spironolactone and have labs redraw in 1 week.

## 2024-07-17 ENCOUNTER — OFFICE VISIT (OUTPATIENT)
Dept: PAIN MANAGEMENT | Age: 79
End: 2024-07-17
Payer: MEDICARE

## 2024-07-17 VITALS
SYSTOLIC BLOOD PRESSURE: 110 MMHG | HEART RATE: 75 BPM | TEMPERATURE: 97.9 F | WEIGHT: 150 LBS | DIASTOLIC BLOOD PRESSURE: 67 MMHG | BODY MASS INDEX: 25.61 KG/M2 | HEIGHT: 64 IN

## 2024-07-17 DIAGNOSIS — S33.5XXD LUMBAR SPRAIN, SUBSEQUENT ENCOUNTER: ICD-10-CM

## 2024-07-17 DIAGNOSIS — Z79.891 ENCOUNTER FOR LONG-TERM OPIATE ANALGESIC USE: ICD-10-CM

## 2024-07-17 DIAGNOSIS — M51.26 DISPLACEMENT OF LUMBAR INTERVERTEBRAL DISC WITHOUT MYELOPATHY: ICD-10-CM

## 2024-07-17 DIAGNOSIS — M79.10 MYALGIA: ICD-10-CM

## 2024-07-17 DIAGNOSIS — G89.4 CHRONIC PAIN SYNDROME: Primary | ICD-10-CM

## 2024-07-17 DIAGNOSIS — M51.37 DEGENERATION OF LUMBAR OR LUMBOSACRAL INTERVERTEBRAL DISC: ICD-10-CM

## 2024-07-17 DIAGNOSIS — S33.9XXA CHRONIC OR OLD SPRAIN OF LUMBOSACRAL LIGAMENT: ICD-10-CM

## 2024-07-17 PROCEDURE — G8427 DOCREV CUR MEDS BY ELIG CLIN: HCPCS | Performed by: PHYSICIAN ASSISTANT

## 2024-07-17 PROCEDURE — 3074F SYST BP LT 130 MM HG: CPT | Performed by: PHYSICIAN ASSISTANT

## 2024-07-17 PROCEDURE — 1036F TOBACCO NON-USER: CPT | Performed by: PHYSICIAN ASSISTANT

## 2024-07-17 PROCEDURE — 1123F ACP DISCUSS/DSCN MKR DOCD: CPT | Performed by: PHYSICIAN ASSISTANT

## 2024-07-17 PROCEDURE — G8417 CALC BMI ABV UP PARAM F/U: HCPCS | Performed by: PHYSICIAN ASSISTANT

## 2024-07-17 PROCEDURE — 3078F DIAST BP <80 MM HG: CPT | Performed by: PHYSICIAN ASSISTANT

## 2024-07-17 PROCEDURE — 99213 OFFICE O/P EST LOW 20 MIN: CPT

## 2024-07-17 PROCEDURE — 99213 OFFICE O/P EST LOW 20 MIN: CPT | Performed by: PHYSICIAN ASSISTANT

## 2024-07-17 RX ORDER — LIDOCAINE 50 MG/G
1 PATCH TOPICAL DAILY
Qty: 30 PATCH | Refills: 0 | Status: SHIPPED | OUTPATIENT
Start: 2024-07-17 | End: 2024-08-16

## 2024-07-17 RX ORDER — OXYCODONE AND ACETAMINOPHEN 7.5; 325 MG/1; MG/1
1 TABLET ORAL 3 TIMES DAILY PRN
Qty: 90 TABLET | Refills: 0 | Status: SHIPPED | OUTPATIENT
Start: 2024-07-18 | End: 2024-08-17

## 2024-07-17 RX ORDER — LIDOCAINE 50 MG/G
OINTMENT TOPICAL
Qty: 70.88 G | Refills: 2 | Status: SHIPPED | OUTPATIENT
Start: 2024-07-17

## 2024-07-17 RX ORDER — BACLOFEN 10 MG/1
10 TABLET ORAL 2 TIMES DAILY
Qty: 60 TABLET | Refills: 2 | Status: SHIPPED | OUTPATIENT
Start: 2024-07-17 | End: 2024-08-16

## 2024-07-17 NOTE — PROGRESS NOTES
Geoff Hardy presents to the [facility] on 7/17/2024. Geoff is complaining of pain in his lower back. The pain is constant. The pain is described as aching, throbbing, shooting, and stabbing. Pain is rated on his best day at a 4, on his worst day at a 10, and on average at a 8 on the VAS scale. He took his last dose of Percocet and Tylenol this morning.      Any procedures since your last visit: No.    He is not on NSAIDS and  is  on anticoagulation medications to include Eliquis and is managed by Cardiologist Isacc .     Pacemaker or defibrillator: Yes Physician managing device is Cardiologist  Dr. Beasley .    Medication Contract and Consent for Opioid Use Documents Filed       Patient Documents       Type of Document Status Date Received Received By Description    Medication Contract Received 5/24/2021  9:00 AM PANCHO ORELLANA Medication Contract    Medication Contract Received 5/17/2022  3:39 PM [facility]    Medication Contract Received 4/26/2023  4:43 PM GEOFF ROBLES medication contract    Consent Opioid Use Received 6/12/2024  1:47 PM [facility]                       /67   Pulse 75   Temp 97.9 °F (36.6 °C)   Ht 1.626 m (5' 4.02\")   Wt 68 kg (150 lb)   BMI 25.73 kg/m²      No LMP for male patient.

## 2024-07-17 NOTE — PROGRESS NOTES
[facility]  [address] [place] [id number]    Follow up Note      Geoff Hardy     Date of Visit:  2024    CC:  Patient presents for follow up   Chief Complaint   Patient presents with    Follow-up     Low back pain               HPI:    Pain is unchanged.     Appropriate analgesia with current medications regimen: yes.    Change in quality of symptoms:no.    Medication side effects:none.   Recent diagnostic testing:none.   Recent interventional procedures: None.    He has been on anticoagulation medications to include Eliquis and has not been on herbal supplements.  He is not diabetic.     Imagin2022 CT abdomen/pelvis    IMPRESSION:   1. Multiple small nonobstructing bilateral calcified calyceal calculi.   2. No evidence for other calcified collecting system calculi or   obstruction.   3. Hypodense posterior right upper pole renal cortical lesion most   likely a cyst. No further evaluation is required*.   4. Streaky soft tissue densities in the bilateral perinephric fat   compatible with active or, more likely, remote inflammation.   5. No evidence of mass, lymphadenopathy or inflammatory process.   6. Dense atherosclerotic calcification throughout normal caliber   abdominal aorta.       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 Degenerative disc disease, spondylosis causing stenosis. Probable large extruded disc at L3-4. Spinal listhesis L4 and L5 exacerbating the stenosis  EMG dated 3/24/2016     L5 radiculopathy  CT dated 2016 lumbar spine demonstrates degenerative spinal stenosis that is severe at L3-4 L4-5 and L5-S1 levels                                        Potential Aberrant Drug-Related Behavior: no     Urine Drug Screenin2018 buccal consistent  2019 buccal consistent  2019 consistent  10/2019 consistent for oxycodone, metabolites, and benzodiazapines  2020

## 2024-07-18 ENCOUNTER — OFFICE VISIT (OUTPATIENT)
Dept: CARDIOLOGY | Facility: HOSPITAL | Age: 79
End: 2024-07-18
Payer: MEDICARE

## 2024-07-18 VITALS
OXYGEN SATURATION: 96 % | SYSTOLIC BLOOD PRESSURE: 92 MMHG | HEART RATE: 64 BPM | WEIGHT: 161 LBS | RESPIRATION RATE: 20 BRPM | DIASTOLIC BLOOD PRESSURE: 75 MMHG | BODY MASS INDEX: 27.64 KG/M2

## 2024-07-18 DIAGNOSIS — I50.9 CHRONIC CONGESTIVE HEART FAILURE, UNSPECIFIED HEART FAILURE TYPE (MULTI): ICD-10-CM

## 2024-07-18 DIAGNOSIS — I25.10 CORONARY ARTERY DISEASE INVOLVING NATIVE CORONARY ARTERY OF NATIVE HEART WITHOUT ANGINA PECTORIS: ICD-10-CM

## 2024-07-18 DIAGNOSIS — I50.42 CHRONIC COMBINED SYSTOLIC AND DIASTOLIC HEART FAILURE, NYHA CLASS 3 (MULTI): Primary | ICD-10-CM

## 2024-07-18 DIAGNOSIS — I48.19 PERSISTENT ATRIAL FIBRILLATION (MULTI): ICD-10-CM

## 2024-07-18 LAB
ANION GAP SERPL CALC-SCNC: 10 MMOL/L (ref 10–20)
BUN SERPL-MCNC: 45 MG/DL (ref 6–23)
CALCIUM SERPL-MCNC: 8.8 MG/DL (ref 8.6–10.3)
CHLORIDE SERPL-SCNC: 100 MMOL/L (ref 98–107)
CO2 SERPL-SCNC: 25 MMOL/L (ref 21–32)
CREAT SERPL-MCNC: 1.83 MG/DL (ref 0.5–1.3)
EGFRCR SERPLBLD CKD-EPI 2021: 37 ML/MIN/1.73M*2
GLUCOSE SERPL-MCNC: 134 MG/DL (ref 74–99)
POTASSIUM SERPL-SCNC: 5.4 MMOL/L (ref 3.5–5.3)
SODIUM SERPL-SCNC: 130 MMOL/L (ref 136–145)

## 2024-07-18 PROCEDURE — 1123F ACP DISCUSS/DSCN MKR DOCD: CPT | Performed by: NURSE PRACTITIONER

## 2024-07-18 PROCEDURE — 3078F DIAST BP <80 MM HG: CPT | Performed by: NURSE PRACTITIONER

## 2024-07-18 PROCEDURE — 36415 COLL VENOUS BLD VENIPUNCTURE: CPT | Performed by: NURSE PRACTITIONER

## 2024-07-18 PROCEDURE — 3074F SYST BP LT 130 MM HG: CPT | Performed by: NURSE PRACTITIONER

## 2024-07-18 PROCEDURE — 99212 OFFICE O/P EST SF 10 MIN: CPT | Performed by: NURSE PRACTITIONER

## 2024-07-18 PROCEDURE — 82374 ASSAY BLOOD CARBON DIOXIDE: CPT | Performed by: NURSE PRACTITIONER

## 2024-07-18 PROCEDURE — 1036F TOBACCO NON-USER: CPT | Performed by: NURSE PRACTITIONER

## 2024-07-18 PROCEDURE — 1157F ADVNC CARE PLAN IN RCRD: CPT | Performed by: NURSE PRACTITIONER

## 2024-07-18 PROCEDURE — 1125F AMNT PAIN NOTED PAIN PRSNT: CPT | Performed by: NURSE PRACTITIONER

## 2024-07-18 ASSESSMENT — ENCOUNTER SYMPTOMS
WHEEZING: 0
ABDOMINAL DISTENTION: 0
HEMATURIA: 0
BLOOD IN STOOL: 0
CONFUSION: 0
LOSS OF SENSATION IN FEET: 0
CHEST TIGHTNESS: 0
FEVER: 0
COUGH: 0
EYES NEGATIVE: 1
SHORTNESS OF BREATH: 0
LIGHT-HEADEDNESS: 0
DEPRESSION: 0
WEAKNESS: 0
PALPITATIONS: 0
ACTIVITY CHANGE: 0
OCCASIONAL FEELINGS OF UNSTEADINESS: 0
CHILLS: 0

## 2024-07-18 ASSESSMENT — COLUMBIA-SUICIDE SEVERITY RATING SCALE - C-SSRS
1. IN THE PAST MONTH, HAVE YOU WISHED YOU WERE DEAD OR WISHED YOU COULD GO TO SLEEP AND NOT WAKE UP?: NO
2. HAVE YOU ACTUALLY HAD ANY THOUGHTS OF KILLING YOURSELF?: NO
6. HAVE YOU EVER DONE ANYTHING, STARTED TO DO ANYTHING, OR PREPARED TO DO ANYTHING TO END YOUR LIFE?: NO

## 2024-07-18 ASSESSMENT — PATIENT HEALTH QUESTIONNAIRE - PHQ9
2. FEELING DOWN, DEPRESSED OR HOPELESS: NOT AT ALL
1. LITTLE INTEREST OR PLEASURE IN DOING THINGS: NOT AT ALL
SUM OF ALL RESPONSES TO PHQ9 QUESTIONS 1 AND 2: 0

## 2024-07-18 ASSESSMENT — PAIN SCALES - GENERAL: PAINLEVEL: 8

## 2024-07-18 NOTE — PROGRESS NOTES
Subjective   Patient ID: Jony Javier is a 79 y.o. male who presents for follow-up of congestive heart failure.     Current Outpatient Medications:     albuterol 90 mcg/actuation inhaler, INHALE 1 (ONE) PUFF EVERY 4 HOURS AS NEEDED, Disp: 8.5 g, Rfl: 8    atorvastatin (Lipitor) 80 mg tablet, Take 1 tablet (80 mg) by mouth once daily at bedtime., Disp: 90 tablet, Rfl: 3    baclofen (Lioresal) 10 mg tablet, Take 1 tablet (10 mg) by mouth 2 times a day as needed., Disp: , Rfl:     blood sugar diagnostic (True Metrix Glucose Test Strip) strip, USE 1 STRIP 3 times daily, Disp: 300 strip, Rfl: 3    bumetanide (Bumex) 1 mg tablet, Take 1 tablet (1 mg) by mouth 2 times a week. May take additional dose for weight gain 3#, increased swelling or shortness of breath., Disp: 24 tablet, Rfl: 3    carvedilol (Coreg) 25 mg tablet, Take 1 tablet (25 mg) by mouth 2 times a day., Disp: 180 tablet, Rfl: 3    diazePAM (Valium) 5 mg tablet, Take 1 tablet (5 mg) by mouth every 8 hours if needed for anxiety., Disp: 90 tablet, Rfl: 0    Eliquis 5 mg tablet, Take 1 tablet (5 mg) by mouth 2 times a day., Disp: 180 tablet, Rfl: 3    Entresto 49-51 mg tablet, Take 1 tablet by mouth 2 times a day., Disp: , Rfl:     EPINEPHrine 0.3 mg/0.3 mL injection syringe, Inject 0.3 mL (0.3 mg) into the muscle if needed for anaphylaxis for up to 1 dose., Disp: 1 each, Rfl: 3    ezetimibe (Zetia) 10 mg tablet, Take 1 tablet (10 mg) by mouth once daily., Disp: 90 tablet, Rfl: 3    febuxostat (Uloric) 40 mg tablet, Take 1 tablet (40 mg) by mouth once daily., Disp: , Rfl:     ferrous gluconate 270 mg (27 mg iron) tablet, Take 1 tablet by mouth once daily., Disp: , Rfl:     Jardiance 10 mg, Take 1 tablet (10 mg) by mouth once daily., Disp: 30 tablet, Rfl: 11    lidocaine (Xylocaine) 5 % ointment, APPLY TO THE AFFECTED AREA(S) AS NEEDED FOR PAIN to last 30 days, Disp: , Rfl:     melatonin 3 mg tablet, Take 1 tablet (3 mg) by mouth as needed at bedtime for  sleep., Disp: , Rfl:     mirtazapine (Remeron) 30 mg tablet, Take 1 tablet (30 mg) by mouth once daily at bedtime., Disp: , Rfl:     multivitamin tablet, Take 1 tablet by mouth once daily., Disp: , Rfl:     nystatin (Mycostatin) 100,000 unit/gram powder, Apply 1 Application topically 2 times a day., Disp: , Rfl:     ondansetron ODT (Zofran-ODT) 4 mg disintegrating tablet, Take 1 tablet (4 mg) by mouth every 8 hours if needed for nausea or vomiting., Disp: 12 tablet, Rfl: 0    OneTouch Delica Plus Lancet 33 gauge misc, Test THREE TIMES DAILY AS DIRECTED, Disp: , Rfl:     oxyCODONE-acetaminophen (Percocet) 7.5-325 mg tablet, Take 1 tablet by mouth 3 times daily as needed for Pain for up to 30 days. Max Daily Amount 3 tablets, Disp: , Rfl:     pantoprazole (ProtoNix) 40 mg EC tablet, TAKE 1 TABLET BY MOUTH EVERY DAY, Disp: 90 tablet, Rfl: 3    tadalafil 20 mg tablet, Take 1 tablet (20 mg) by mouth once daily as needed for erectile dysfunction., Disp: 90 tablet, Rfl: 4    tamsulosin (Flomax) 0.4 mg 24 hr capsule, Take 1 capsule (0.4 mg) by mouth once daily in the morning., Disp: 90 capsule, Rfl: 1    testosterone (Axiron) 30 mg/actuation (1.5 mL) topical solution, Place 2 Pump on the skin once daily., Disp: , Rfl:     triamcinolone (Kenalog) 0.1 % lotion, Apply 1 Application topically if needed (psoriasis)., Disp: , Rfl:     venlafaxine XR (Effexor-XR) 150 mg 24 hr capsule, TAKE 1 CAPSULE BY MOUTH AFTER BREAKFAST, Disp: , Rfl:      HPI   Past medical history consists significant for history of heart failure reduced ejection fraction. He initially his ejection fraction was around 40%. He has a history of coronary artery disease, persistent atrial fibrillation, GERD, history of carotid artery stenosis and CVA in the past. He has CRT-D in place. He has not had any defibrillations from device. He also has CardioMEMS in place and his readings have been consistently within desired parameters. Last heart cath was in 2020.  Results are noted below. Most recent echocardiogram shows ejection fraction of 45% which is actually decreased from his echocardiogram in 2022 which showed that he had improvement in EF to 60 to 65%.   His CardioMEMS readings have been a little higher recently he has had more swelling in the lower extremities.  He feels that overall he has had more issues with congestion.  He has an aortic stenosis murmur at the base.  On exam today he has some intermittent whistling through that valve.  I have not noticed this before on his examinations.  There is been some debate about whether he is a good candidate for aortic valve replacement.    Review of Systems   Constitutional:  Negative for activity change, chills and fever.   HENT:  Negative for hearing loss.    Eyes: Negative.    Respiratory:  Negative for cough, chest tightness, shortness of breath and wheezing.    Cardiovascular:  Negative for chest pain, palpitations and leg swelling.   Gastrointestinal:  Negative for abdominal distention and blood in stool.   Genitourinary:  Negative for hematuria.   Neurological:  Negative for syncope, weakness and light-headedness.   Psychiatric/Behavioral:  Negative for confusion.        Objective     BP 92/75 (BP Location: Left arm, Patient Position: Sitting)   Pulse 64   Resp 20   Wt 73 kg (161 lb)   SpO2 96%   BMI 27.64 kg/m²         Transthoracic Echo (TTE) Complete    Result Date: 11/28/2023              [place] [address]      Phone [phone] Fax [phone] TRANSTHORACIC ECHOCARDIOGRAM REPORT  Patient Name:      KARAN Paige Physician:    Stevenson Portillo MD Study Date:        11/27/2023           Ordering Provider:    Stevenson PORTILLO MRN/PID:           [id number]             Fellow: Accession#:        [id number]          Nurse: Date of Birth/Age: 1945 / 78 years Sonographer:          Elizabeth Navarro [facility] Gender:            M                    Additional Staff: Height:            157.48 cm            Admit Date:           11/27/2023 Weight:            68.95 kg             Admission Status:     Outpatient BSA:               1.70 m2              Department Location:  [facility] Echo                                                               Lab Blood Pressure: 110 /76 mmHg Study Type:    TRANSTHORACIC ECHO (TTE) COMPLETE Diagnosis/ICD: Chronic systolic (congestive) heart failure (CHF)-I50.22 Indication:    Congestive Heart Failure CPT Codes:     Echo Complete w Full [id number]  Study Detail: The following Echo studies were performed: 2D, M-Mode, Doppler and               color flow.  PHYSICIAN INTERPRETATION: Left Ventricle: Left ventricular systolic function is mildly decreased, with an estimated ejection fraction of 45%. Wall motion is abnormal. The left ventricular cavity size is normal. The left ventricular septal wall thickness is mildly increased. There is moderately increased left ventricular posterior wall thickness. Spectral Doppler shows a pseudonormal pattern of left ventricular diastolic filling. Akinetic inferior and inferolateral wall. Left Atrium: The left atrium is moderately dilated. Right Ventricle: The right ventricle is normal in size. There is reduced right ventricular systolic function. A device is visualized in the right ventricle. Right Atrium: The right atrium is normal in size. Aortic Valve: The aortic valve was not well visualized. There is evidence of mild aortic valve stenosis. The aortic valve dimensionless index is 0.54. There is trivial aortic valve regurgitation. The peak instantaneous gradient of the aortic valve is 20.5 mmHg. The mean gradient of the aortic valve is 9.2 mmHg. TAVR?. Mitral Valve: The mitral valve is normal in structure. There is mild mitral annular calcification.  There is trace to mild mitral valve regurgitation. Tricuspid Valve: The tricuspid valve is structurally normal. There is trace tricuspid regurgitation. Pulmonic Valve: The pulmonic valve is structurally normal. There is trace pulmonic valve regurgitation. Pericardium: There is no pericardial effusion noted. Aorta: The aortic root is normal.  CONCLUSIONS:  1. Left ventricular systolic function is mildly decreased with a 45% estimated ejection fraction.  2. Spectral Doppler shows a pseudonormal pattern of left ventricular diastolic filling.  3. The left ventricular posterior wall thickness is moderately increased.  4. There is reduced right ventricular systolic function.  5. The left atrium is moderately dilated.  6. Mild aortic valve stenosis. QUANTITATIVE DATA SUMMARY: 2D MEASUREMENTS:                           Normal Ranges: LAs:           4.34 cm    (2.7-4.0cm) IVSd:          1.23 cm    (0.6-1.1cm) LVPWd:         1.39 cm    (0.6-1.1cm) LVIDd:         4.34 cm    (3.9-5.9cm) LVIDs:         3.38 cm LV Mass Index: 125.5 g/m2 LV % FS        21.9 % LA VOLUME:                               Normal Ranges: LA Vol A4C:        85.1 ml    (22+/-6mL/m2) LA Vol A2C:        65.6 ml LA Vol BP:         77.6 ml LA Vol Index A4C:  50.0ml/m2 LA Vol Index A2C:  38.6 ml/m2 LA Vol Index BP:   45.6 ml/m2 LA Area A4C:       22.6 cm2 LA Area A2C:       20.6 cm2 LA Major Axis A4C: 5.1 cm LA Major Axis A2C: 5.5 cm LA Volume Index:   39.3 ml/m2 LA Vol A4C:        68.6 ml LA Vol A2C:        62.1 ml RA VOLUME BY A/L METHOD:                       Normal Ranges: RA Area A4C: 13.6 cm2 M-MODE MEASUREMENTS:                  Normal Ranges: Ao Root: 2.05 cm (2.0-3.7cm) AORTA MEASUREMENTS:                    Normal Ranges: Asc Ao, d: 3.40 cm (2.1-3.4cm) LV SYSTOLIC FUNCTION BY 2D PLANIMETRY (MOD):                     Normal Ranges: EF-A4C View: 68.7 % (>=55%) EF-A2C View: 70.6 % EF-Biplane:  68.5 % LV DIASTOLIC FUNCTION:                        Normal  Ranges: MV Peak E:    0.83 m/s (0.7-1.2 m/s) MV Peak A:    0.84 m/s (0.42-0.7 m/s) E/A Ratio:    1.00     (1.0-2.2) MV e'         0.06 m/s (>8.0) MV lateral e' 0.07 m/s MV medial e'  0.05 m/s E/e' Ratio:   13.92    (<8.0) MITRAL VALVE:                 Normal Ranges: MV DT: 226 msec (150-240msec) AORTIC VALVE:                                    Normal Ranges: AoV Vmax:                2.26 m/s  (<=1.7m/s) AoV Peak P.5 mmHg (<20mmHg) AoV Mean P.2 mmHg  (1.7-11.5mmHg) LVOT Max Tam:            1.20 m/s  (<=1.1m/s) AoV VTI:                 45.83 cm  (18-25cm) LVOT VTI:                24.66 cm LVOT Diameter:           2.03 cm   (1.8-2.4cm) AoV Area, VTI:           1.71 cm2  (2.5-5.5cm2) AoV Area,Vmax:           1.69 cm2  (2.5-4.5cm2) AoV Dimensionless Index: 0.54 AORTIC INSUFFICIENCY: AI Vmax:       3.62 m/s AI Half-time:  515 msec AI Decel Time: 1776 msec AI Decel Rate: 203.92 cm/s2  RIGHT VENTRICLE: RV Basal 2.74 cm RV Mid   2.30 cm RV Major 7.2 cm TAPSE:   15.2 mm RV s'    0.13 m/s TRICUSPID VALVE/RVSP:                             Normal Ranges: Peak TR Velocity: 2.74 m/s RV Syst Pressure: 33.0 mmHg (< 30mmHg) IVC Diam:         0.97 cm TV e'             0.1 m/s AORTA: Asc Ao Diam 3.39 cm  [id number] Narendra Portillo MD Electronically signed on 2023 at 5:35:04 PM  ** Final **       Lab Results   Component Value Date    BUN 44 (H) 2024    CREATININE 1.98 (H) 2024     (H) 2024    MG 2.16 2024    K 5.5 (H) 2024     (L) 2024         Constitutional:       General: He is not in acute distress.  HENT:      Head: Normocephalic and atraumatic.      Mouth: Mucous membranes are moist.      Neck:  No JVD or HJR   Eyes:      Extraocular Movements: .      Conjunctiva/sclera: Conjunctivae normal.    Cardiovascular:      Rate and Rhythm: Normal rate and regular rhythm.      Heart sounds:  S1 S2 normal, no murmur, no S3 or S4   Pulmonary:      Effort:  Pulmonary effort is normal. No respiratory distress.      Breath sounds: Normal breath sounds. No stridor. No wheezing or rales.   Abdominal:      General: Bowel sounds are normal. There is no distension.      Tenderness: There is no abdominal tenderness. There is no guarding or rebound.   Musculoskeletal:         General: No swelling, tenderness or deformity. Normal range of motion.      Comments:   Skin:     General: Skin is warm and dry.   Neurological:      General: No focal deficit present.      Mental Status: alert and oriented to person, place, and time. Mental status is at baseline.     Psychiatric:         Mood and Affect: Mood normal.     Assessment/Plan     Problem List Items Addressed This Visit       CAD (coronary artery disease)    Chronic combined systolic and diastolic heart failure, NYHA class 3 (Multi) - Primary    Persistent atrial fibrillation (Multi)      Chronic systolic diastolic heart failure:   Etiology   AHA Stage: C  NYHA class:  2-3  - CPAP a night   Volume Status:  Euvolemic    GFR: 52     GDMT:  BB- Carvedilol 25 mg 1 tablet twice a day   ARB/ACEI/ARNI - Entresto 49/51 mg 1 tablet twice a day   MRA -  on hold due to high potassium.   SGLT2i -  Jardiance 10 mg once a day   Diuretic - Bumex 1 mg daily   Device Therapy: CRT-D / Cardiomems readings have been more labile      CHF: no significant medication side effects noted and reasonably well controlled.  EF 45 %. He is having more issues with swelling and congestion.   Will increase the Bumex to 1 mg daily.   Follow up in 2 weeks.  Recheck BMP today.   Encouraged fluid restriction.       Emphasized salt restriction.  Encouraged daily monitoring of the patient's weight.  Encouraged regular exercise as tolerated. .  Follow up in 2 weeks.      2. Atrial fibrillation :   RRR today. OAC with Eliquis. Denies obvious signs of bleeding.      3. CKD3:  December labs with GFR 52 ml/min  He follows with nephrology in [place].       4 ASHD:  Denies  angina.   Does have chronic sob which he feels is due to lack of activity.  Lipitor 80 mg daily/ BB/ARNI.       5. COPD:  Stable.  Uses CPAP at night.     6. Aortic Valve Stenosis:   Aortic stenosis per hx.  He has aortic stenosis murmur at base which is intermittently musical on auscultation.   Last echocardiogram was 11/2023.    Will recheck the echocardiogram to see if there has been significant change in  valve function.        Montserrat Wesley, APRN-CNP

## 2024-07-18 NOTE — PATIENT INSTRUCTIONS
Thank you for coming in today.  If you have any questions you may contact the office Monday through Friday at [phone] or on week ends at [phone].      Please take the Bumex 1 mg every day.     Please get BMP drawn today.     Please schedule echocardiogram       Please follow  a 2 GM sodium diet and limit fluid intake to 2 liters per day or 8 servings ( serving size = 8 oz. = 1 cup = 240 ml) per day.   Please avoid processed meat products (luncheon meats, sausages, gonsales, hot dogs for example) eat 4 servings of vegetables and 1-2 whole servings of whole fruits per day.   Please weigh daily and call [phone] for weight gain of 3 pounds in 24 hours or 5 pounds or if you experience increased swelling or shortness of breath.         Follow up:   2 weeks.     Please be sure to follow up with your cardiologist at [facility] once every year. Call [phone] to schedule appointment if you do not have a follow up appointment scheduled already.

## 2024-07-26 ENCOUNTER — TELEPHONE (OUTPATIENT)
Dept: CARDIOLOGY | Facility: HOSPITAL | Age: 79
End: 2024-07-26
Payer: COMMERCIAL

## 2024-07-26 DIAGNOSIS — I50.42 CHRONIC COMBINED SYSTOLIC AND DIASTOLIC HEART FAILURE, NYHA CLASS 3 (MULTI): ICD-10-CM

## 2024-07-26 RX ORDER — SACUBITRIL AND VALSARTAN 49; 51 MG/1; MG/1
1 TABLET, FILM COATED ORAL 2 TIMES DAILY
Qty: 180 TABLET | Refills: 3 | Status: SHIPPED | OUTPATIENT
Start: 2024-07-26

## 2024-08-02 ENCOUNTER — APPOINTMENT (OUTPATIENT)
Dept: CARDIOLOGY | Facility: HOSPITAL | Age: 79
End: 2024-08-02
Payer: MEDICARE

## 2024-08-05 ENCOUNTER — HOSPITAL ENCOUNTER (OUTPATIENT)
Dept: CARDIOLOGY | Facility: HOSPITAL | Age: 79
Discharge: HOME | End: 2024-08-05
Payer: MEDICARE

## 2024-08-05 DIAGNOSIS — I25.111 ATHEROSCLEROTIC HEART DISEASE OF NATIVE CORONARY ARTERY WITH ANGINA PECTORIS WITH DOCUMENTED SPASM (CMS-HCC): ICD-10-CM

## 2024-08-05 DIAGNOSIS — I50.42 CHRONIC COMBINED SYSTOLIC AND DIASTOLIC HEART FAILURE, NYHA CLASS 3 (MULTI): ICD-10-CM

## 2024-08-05 DIAGNOSIS — I50.41 ACUTE COMBINED SYSTOLIC (CONGESTIVE) AND DIASTOLIC (CONGESTIVE) HEART FAILURE (MULTI): ICD-10-CM

## 2024-08-05 DIAGNOSIS — I25.10 CORONARY ARTERY DISEASE INVOLVING NATIVE CORONARY ARTERY OF NATIVE HEART WITHOUT ANGINA PECTORIS: ICD-10-CM

## 2024-08-05 PROCEDURE — 93306 TTE W/DOPPLER COMPLETE: CPT

## 2024-08-05 PROCEDURE — 93306 TTE W/DOPPLER COMPLETE: CPT | Performed by: INTERNAL MEDICINE

## 2024-08-05 PROCEDURE — 2500000004 HC RX 250 GENERAL PHARMACY W/ HCPCS (ALT 636 FOR OP/ED): Performed by: NURSE PRACTITIONER

## 2024-08-06 LAB
AORTIC VALVE MEAN GRADIENT: 14.4 MMHG
AORTIC VALVE PEAK VELOCITY: 2.61 M/S
AV PEAK GRADIENT: 27.3 MMHG
AVA (PEAK VEL): 1.1 CM2
AVA (VTI): 1.23 CM2
EJECTION FRACTION APICAL 4 CHAMBER: 51.7
EJECTION FRACTION: 48 %
LEFT ATRIUM VOLUME AREA LENGTH INDEX BSA: 42.3 ML/M2
LEFT VENTRICULAR OUTFLOW TRACT DIAMETER: 2.02 CM
LV EJECTION FRACTION BIPLANE: 50 %
MITRAL VALVE E/A RATIO: 1.43
RIGHT VENTRICLE FREE WALL PEAK S': 10.35 CM/S
RIGHT VENTRICLE PEAK SYSTOLIC PRESSURE: 32.1 MMHG
TRICUSPID ANNULAR PLANE SYSTOLIC EXCURSION: 1.9 CM

## 2024-08-07 ENCOUNTER — LAB (OUTPATIENT)
Dept: LAB | Facility: LAB | Age: 79
End: 2024-08-07
Payer: COMMERCIAL

## 2024-08-07 DIAGNOSIS — E11.51 TYPE 2 DIABETES MELLITUS WITH DIABETIC PERIPHERAL ANGIOPATHY WITHOUT GANGRENE, WITHOUT LONG-TERM CURRENT USE OF INSULIN (MULTI): ICD-10-CM

## 2024-08-07 DIAGNOSIS — N52.9 ERECTILE DYSFUNCTION, UNSPECIFIED ERECTILE DYSFUNCTION TYPE: ICD-10-CM

## 2024-08-07 DIAGNOSIS — N18.32 STAGE 3B CHRONIC KIDNEY DISEASE (MULTI): ICD-10-CM

## 2024-08-07 DIAGNOSIS — G57.93 NEUROPATHY OF BOTH FEET: ICD-10-CM

## 2024-08-07 DIAGNOSIS — F41.9 ANXIETY DISORDER, UNSPECIFIED TYPE: ICD-10-CM

## 2024-08-07 DIAGNOSIS — I50.42 CHRONIC COMBINED SYSTOLIC AND DIASTOLIC HEART FAILURE, NYHA CLASS 3 (MULTI): ICD-10-CM

## 2024-08-07 DIAGNOSIS — Z91.030 BEE STING ALLERGY: ICD-10-CM

## 2024-08-07 DIAGNOSIS — S91.112S: ICD-10-CM

## 2024-08-07 LAB
25(OH)D3 SERPL-MCNC: 64 NG/ML (ref 30–100)
ALBUMIN SERPL BCP-MCNC: 4.2 G/DL (ref 3.4–5)
ALP SERPL-CCNC: 65 U/L (ref 33–136)
ALT SERPL W P-5'-P-CCNC: 10 U/L (ref 10–52)
ANION GAP SERPL CALC-SCNC: 13 MMOL/L (ref 10–20)
AST SERPL W P-5'-P-CCNC: 17 U/L (ref 9–39)
BASOPHILS # BLD AUTO: 0.06 X10*3/UL (ref 0–0.1)
BASOPHILS NFR BLD AUTO: 0.9 %
BILIRUB SERPL-MCNC: 0.5 MG/DL (ref 0–1.2)
BUN SERPL-MCNC: 52 MG/DL (ref 6–23)
CALCIUM SERPL-MCNC: 9.5 MG/DL (ref 8.6–10.3)
CHLORIDE SERPL-SCNC: 105 MMOL/L (ref 98–107)
CHOLEST SERPL-MCNC: 124 MG/DL (ref 0–199)
CHOLESTEROL/HDL RATIO: 1.7
CO2 SERPL-SCNC: 25 MMOL/L (ref 21–32)
CREAT SERPL-MCNC: 2 MG/DL (ref 0.5–1.3)
CREAT UR-MCNC: 85.3 MG/DL (ref 20–370)
EGFRCR SERPLBLD CKD-EPI 2021: 33 ML/MIN/1.73M*2
EOSINOPHIL # BLD AUTO: 0.15 X10*3/UL (ref 0–0.4)
EOSINOPHIL NFR BLD AUTO: 2.2 %
ERYTHROCYTE [DISTWIDTH] IN BLOOD BY AUTOMATED COUNT: 12.3 % (ref 11.5–14.5)
EST. AVERAGE GLUCOSE BLD GHB EST-MCNC: 126 MG/DL
GLUCOSE SERPL-MCNC: 142 MG/DL (ref 74–99)
HBA1C MFR BLD: 6 %
HCT VFR BLD AUTO: 39.6 % (ref 41–52)
HDLC SERPL-MCNC: 74.8 MG/DL
HGB BLD-MCNC: 12.9 G/DL (ref 13.5–17.5)
IMM GRANULOCYTES # BLD AUTO: 0.02 X10*3/UL (ref 0–0.5)
IMM GRANULOCYTES NFR BLD AUTO: 0.3 % (ref 0–0.9)
LDLC SERPL CALC-MCNC: 30 MG/DL
LYMPHOCYTES # BLD AUTO: 1.11 X10*3/UL (ref 0.8–3)
LYMPHOCYTES NFR BLD AUTO: 16.3 %
MCH RBC QN AUTO: 35 PG (ref 26–34)
MCHC RBC AUTO-ENTMCNC: 32.6 G/DL (ref 32–36)
MCV RBC AUTO: 107 FL (ref 80–100)
MICROALBUMIN UR-MCNC: 25.8 MG/L
MICROALBUMIN/CREAT UR: 30.2 UG/MG CREAT
MONOCYTES # BLD AUTO: 0.62 X10*3/UL (ref 0.05–0.8)
MONOCYTES NFR BLD AUTO: 9.1 %
NEUTROPHILS # BLD AUTO: 4.85 X10*3/UL (ref 1.6–5.5)
NEUTROPHILS NFR BLD AUTO: 71.2 %
NON HDL CHOLESTEROL: 49 MG/DL (ref 0–149)
NRBC BLD-RTO: 0 /100 WBCS (ref 0–0)
PLATELET # BLD AUTO: 145 X10*3/UL (ref 150–450)
POTASSIUM SERPL-SCNC: 4.3 MMOL/L (ref 3.5–5.3)
PROT SERPL-MCNC: 6.8 G/DL (ref 6.4–8.2)
RBC # BLD AUTO: 3.69 X10*6/UL (ref 4.5–5.9)
SODIUM SERPL-SCNC: 139 MMOL/L (ref 136–145)
TRIGL SERPL-MCNC: 96 MG/DL (ref 0–149)
TSH SERPL-ACNC: 1.08 MIU/L (ref 0.44–3.98)
VIT B12 SERPL-MCNC: 418 PG/ML (ref 211–911)
VLDL: 19 MG/DL (ref 0–40)
WBC # BLD AUTO: 6.8 X10*3/UL (ref 4.4–11.3)

## 2024-08-07 PROCEDURE — 80061 LIPID PANEL: CPT

## 2024-08-07 PROCEDURE — 82043 UR ALBUMIN QUANTITATIVE: CPT

## 2024-08-07 PROCEDURE — 80053 COMPREHEN METABOLIC PANEL: CPT

## 2024-08-07 PROCEDURE — 84443 ASSAY THYROID STIM HORMONE: CPT

## 2024-08-07 PROCEDURE — 82607 VITAMIN B-12: CPT

## 2024-08-07 PROCEDURE — 82306 VITAMIN D 25 HYDROXY: CPT

## 2024-08-07 PROCEDURE — 82570 ASSAY OF URINE CREATININE: CPT

## 2024-08-07 PROCEDURE — 83036 HEMOGLOBIN GLYCOSYLATED A1C: CPT

## 2024-08-07 PROCEDURE — 85025 COMPLETE CBC W/AUTO DIFF WBC: CPT

## 2024-08-08 ENCOUNTER — OFFICE VISIT (OUTPATIENT)
Dept: CARDIOLOGY | Facility: HOSPITAL | Age: 79
End: 2024-08-08
Payer: MEDICARE

## 2024-08-08 VITALS
WEIGHT: 151.1 LBS | HEART RATE: 60 BPM | BODY MASS INDEX: 25.94 KG/M2 | OXYGEN SATURATION: 96 % | RESPIRATION RATE: 20 BRPM

## 2024-08-08 DIAGNOSIS — I10 HYPERTENSION, UNSPECIFIED TYPE: ICD-10-CM

## 2024-08-08 DIAGNOSIS — I48.19 PERSISTENT ATRIAL FIBRILLATION (MULTI): ICD-10-CM

## 2024-08-08 DIAGNOSIS — I50.42 CHRONIC COMBINED SYSTOLIC AND DIASTOLIC HEART FAILURE, NYHA CLASS 3 (MULTI): Primary | ICD-10-CM

## 2024-08-08 PROCEDURE — 1036F TOBACCO NON-USER: CPT | Performed by: NURSE PRACTITIONER

## 2024-08-08 PROCEDURE — 1157F ADVNC CARE PLAN IN RCRD: CPT | Performed by: NURSE PRACTITIONER

## 2024-08-08 PROCEDURE — 99213 OFFICE O/P EST LOW 20 MIN: CPT | Performed by: NURSE PRACTITIONER

## 2024-08-08 PROCEDURE — 1159F MED LIST DOCD IN RCRD: CPT | Performed by: NURSE PRACTITIONER

## 2024-08-08 PROCEDURE — 1123F ACP DISCUSS/DSCN MKR DOCD: CPT | Performed by: NURSE PRACTITIONER

## 2024-08-08 ASSESSMENT — ENCOUNTER SYMPTOMS
LOSS OF SENSATION IN FEET: 0
CONFUSION: 0
DEPRESSION: 1
FATIGUE: 1
CHEST TIGHTNESS: 0
ABDOMINAL DISTENTION: 0
ACTIVITY CHANGE: 0
CHILLS: 0
HEMATURIA: 0
EYES NEGATIVE: 1
WHEEZING: 0
PALPITATIONS: 0
WEAKNESS: 0
BLOOD IN STOOL: 0
FEVER: 0
SHORTNESS OF BREATH: 1
COUGH: 0
OCCASIONAL FEELINGS OF UNSTEADINESS: 1
LIGHT-HEADEDNESS: 0

## 2024-08-08 NOTE — PROGRESS NOTES
Subjective   Patient ID: Jony Javier is a 79 y.o. male who presents for follow-up of congestive heart failure.     Current Outpatient Medications:     albuterol 90 mcg/actuation inhaler, INHALE 1 (ONE) PUFF EVERY 4 HOURS AS NEEDED, Disp: 8.5 g, Rfl: 8    atorvastatin (Lipitor) 80 mg tablet, Take 1 tablet (80 mg) by mouth once daily at bedtime., Disp: 90 tablet, Rfl: 3    baclofen (Lioresal) 10 mg tablet, Take 1 tablet (10 mg) by mouth 2 times a day as needed., Disp: , Rfl:     blood sugar diagnostic (True Metrix Glucose Test Strip) strip, USE 1 STRIP 3 times daily, Disp: 300 strip, Rfl: 3    bumetanide (Bumex) 1 mg tablet, Take 1 tablet (1 mg) by mouth 2 times a week. May take additional dose for weight gain 3#, increased swelling or shortness of breath., Disp: 24 tablet, Rfl: 3    carvedilol (Coreg) 25 mg tablet, Take 1 tablet (25 mg) by mouth 2 times a day., Disp: 180 tablet, Rfl: 3    diazePAM (Valium) 5 mg tablet, Take 1 tablet (5 mg) by mouth every 8 hours if needed for anxiety., Disp: 90 tablet, Rfl: 0    Eliquis 5 mg tablet, Take 1 tablet (5 mg) by mouth 2 times a day., Disp: 180 tablet, Rfl: 3    Entresto 49-51 mg tablet, Take 1 tablet by mouth 2 times a day., Disp: 180 tablet, Rfl: 3    EPINEPHrine 0.3 mg/0.3 mL injection syringe, Inject 0.3 mL (0.3 mg) into the muscle if needed for anaphylaxis for up to 1 dose., Disp: 1 each, Rfl: 3    ezetimibe (Zetia) 10 mg tablet, Take 1 tablet (10 mg) by mouth once daily., Disp: 90 tablet, Rfl: 3    febuxostat (Uloric) 40 mg tablet, Take 1 tablet (40 mg) by mouth once daily., Disp: , Rfl:     ferrous gluconate 270 mg (27 mg iron) tablet, Take 1 tablet by mouth once daily., Disp: , Rfl:     Jardiance 10 mg, Take 1 tablet (10 mg) by mouth once daily., Disp: 30 tablet, Rfl: 11    lidocaine (Xylocaine) 5 % ointment, APPLY TO THE AFFECTED AREA(S) AS NEEDED FOR PAIN to last 30 days, Disp: , Rfl:     melatonin 3 mg tablet, Take 1 tablet (3 mg) by mouth as needed at  bedtime for sleep., Disp: , Rfl:     mirtazapine (Remeron) 30 mg tablet, Take 1 tablet (30 mg) by mouth once daily at bedtime., Disp: , Rfl:     multivitamin tablet, Take 1 tablet by mouth once daily., Disp: , Rfl:     nystatin (Mycostatin) 100,000 unit/gram powder, Apply 1 Application topically 2 times a day., Disp: , Rfl:     ondansetron ODT (Zofran-ODT) 4 mg disintegrating tablet, Take 1 tablet (4 mg) by mouth every 8 hours if needed for nausea or vomiting., Disp: 12 tablet, Rfl: 0    OneTouch Delica Plus Lancet 33 gauge misc, Test THREE TIMES DAILY AS DIRECTED, Disp: , Rfl:     oxyCODONE-acetaminophen (Percocet) 7.5-325 mg tablet, Take 1 tablet by mouth 3 times daily as needed for Pain for up to 30 days. Max Daily Amount 3 tablets, Disp: , Rfl:     pantoprazole (ProtoNix) 40 mg EC tablet, TAKE 1 TABLET BY MOUTH EVERY DAY, Disp: 90 tablet, Rfl: 3    tadalafil 20 mg tablet, Take 1 tablet (20 mg) by mouth once daily as needed for erectile dysfunction., Disp: 90 tablet, Rfl: 4    tamsulosin (Flomax) 0.4 mg 24 hr capsule, Take 1 capsule (0.4 mg) by mouth once daily in the morning., Disp: 90 capsule, Rfl: 1    testosterone (Axiron) 30 mg/actuation (1.5 mL) topical solution, Place 2 Pump on the skin once daily., Disp: , Rfl:     triamcinolone (Kenalog) 0.1 % lotion, Apply 1 Application topically if needed (psoriasis)., Disp: , Rfl:     venlafaxine XR (Effexor-XR) 150 mg 24 hr capsule, TAKE 1 CAPSULE BY MOUTH AFTER BREAKFAST, Disp: , Rfl:      HPI   Past medical history consists significant for history of heart failure reduced ejection fraction. He initially his ejection fraction was around 40%. He has a history of coronary artery disease, persistent atrial fibrillation, GERD, history of carotid artery stenosis and CVA in the past. He has CRT-D in place. He has not had any defibrillations from device. He also has CardioMEMS in place and his readings have been consistently within desired parameters. Last heart cath was  in 2020. Results are noted below. Most recent echocardiogram shows ejection fraction of 45% which is actually decreased from his echocardiogram in 2022 which showed that he had improvement in EF to 60 to 65%.   Cardiomems reading have been improved since last visit. Congestion seems to have resolved with adjustment in diuretic. He has an aortic stenosis murmur at the base.  On exam today he has some intermittent whistling through that valve.  I have not noticed this before on his examinations.  There is been some debate about whether he is a good candidate for aortic valve replacement.       Review of Systems   Constitutional:  Positive for fatigue. Negative for activity change, chills and fever.   HENT:  Negative for hearing loss.    Eyes: Negative.    Respiratory:  Positive for shortness of breath. Negative for cough, chest tightness and wheezing.         SOB with exertional activities but denies chest pain.    Cardiovascular:  Positive for leg swelling. Negative for chest pain and palpitations.   Gastrointestinal:  Negative for abdominal distention and blood in stool.   Genitourinary:  Negative for hematuria.   Neurological:  Negative for syncope, weakness and light-headedness.   Psychiatric/Behavioral:  Negative for confusion.        Objective     Pulse 60   Resp 20   Wt 68.5 kg (151 lb 1.6 oz)   SpO2 96%   BMI 25.94 kg/m²           Transthoracic echo (TTE) complete    Result Date: 8/6/2024              [place] [id number]      Phone [phone] Fax [phone] TRANSTHORACIC ECHOCARDIOGRAM REPORT Patient Name:      KARAN Paige Physician:    [id number] Narendra Portillo MD Study Date:        8/5/2024             Ordering Provider:    [id number] DOROTHY MYLES MRN/PID:           [id number]             Fellow: Accession#:         [id number]         Nurse:                Maye Dong RN Date of Birth/Age: 1945 / 79 years Sonographer:          Brenna Yusuf RDCS Gender:            M                    Additional Staff: Height:            162.56 cm            Admit Date: Weight:            73.03 kg             Admission Status:     Outpatient BSA / BMI:         1.78 m2 / 27.64      Department Location:  [facility] Echo                    kg/m2                                      Lab Blood Pressure: 92 /75 mmHg Study Type:    TRANSTHORACIC ECHO (TTE) COMPLETE Diagnosis/ICD: Atherosclerotic heart disease of native coronary artery with                angina pectoris with documented spasm-I25.111; Acute combined                systolic (congestive) and diastolic (congestive) heart failure                (CHF)-I50.41 Indication:    CHF, CAD CPT Codes:     Echo Complete w Full [id number] Patient History: Pertinent History: CAD and CHF. Study Detail: The following Echo studies were performed: 2D, M-Mode, Doppler and               color flow. Technically challenging study due to prominent lung               artifact and pacer wires. Optison used as a contrast agent for               endocardial border definition.  PHYSICIAN INTERPRETATION: Left Ventricle: Left ventricular ejection fraction is mildly decreased, by visual estimate at 45-50%. Wall motion is abnormal. The left ventricular cavity size is normal. Spectral Doppler shows a pseudonormal pattern of left ventricular diastolic filling. Unable to measure 2D Plax d/t poor image quality. Akinetic inferior wall. Left Atrium: The left atrium is moderately dilated. Right Ventricle: The right ventricle is normal in size. There is normal right ventricular global systolic function. A device is visualized in the right ventricle. Right Atrium: The right atrium is normal in size. Aortic Valve: The aortic valve was not well visualized.  There is evidence of mild to moderate aortic valve stenosis. The aortic valve dimensionless index is 0.38. There is mild aortic valve regurgitation. The peak instantaneous gradient of the aortic valve is 27.3 mmHg. The mean gradient of the aortic valve is 14.4 mmHg. Mitral Valve: The mitral valve is mild to moderately thickened. There is mild to moderate mitral annular calcification. There is mild to moderate mitral valve regurgitation. Tricuspid Valve: The tricuspid valve is structurally normal. There is mild to moderate tricuspid regurgitation. Pulmonic Valve: The pulmonic valve is not well visualized. There is trace to mild pulmonic valve regurgitation. Pericardium: There is no pericardial effusion noted. Aorta: The aortic root is normal.  CONCLUSIONS:  1. Left ventricular ejection fraction is mildly decreased, by visual estimate at 45-50%.  2. Spectral Doppler shows a pseudonormal pattern of left ventricular diastolic filling.  3. Unable to measure 2D Plax d/t poor image quality.     Akinetic inferior wall.  4. There is normal right ventricular global systolic function.  5. The left atrium is moderately dilated.  6. Mild to moderate mitral valve regurgitation.  7. Mild to moderate tricuspid regurgitation.  8. Mild to moderate aortic valve stenosis.  9. Mild aortic valve regurgitation. QUANTITATIVE DATA SUMMARY: LA VOLUME:                               Normal Ranges: LA Vol A4C:        81.1 ml    (22+/-6mL/m2) LA Vol A2C:        67.0 ml LA Vol BP:         75.4 ml LA Vol Index A4C:  45.5ml/m2 LA Vol Index A2C:  37.5 ml/m2 LA Vol Index BP:   42.3 ml/m2 LA Area A4C:       24.9 cm2 LA Area A2C:       22.1 cm2 LA Major Axis A4C: 6.5 cm LA Major Axis A2C: 6.2 cm LA Vol A4C:        77.7 ml LA Vol A2C:        64.2 ml RA VOLUME BY A/L METHOD:                       Normal Ranges: RA Area A4C: 19.5 cm2 AORTA MEASUREMENTS:                      Normal Ranges: Ao Sinus, d: 2.70 cm (2.1-3.5cm) Ao STJ, d:   2.10 cm (1.7-3.4cm)  Asc Ao, d:   2.80 cm (2.1-3.4cm) LV SYSTOLIC FUNCTION BY 2D PLANIMETRY (MOD):                      Normal Ranges: EF-A4C View:    52 % (>=55%) EF-A2C View:    50 % EF-Biplane:     50 % EF-Visual:      48 % LV EF Reported: 48 % LV DIASTOLIC FUNCTION:                         Normal Ranges: MV Peak E:    1.18 m/s  (0.7-1.2 m/s) MV Peak A:    0.83 m/s  (0.42-0.7 m/s) E/A Ratio:    1.43      (1.0-2.2) MV e'         0.081 m/s (>8.0) MV lateral e' 0.08 m/s MV medial e'  0.08 m/s E/e' Ratio:   14.65     (<8.0) MITRAL VALVE:                 Normal Ranges: MV DT: 250 msec (150-240msec) MITRAL INSUFFICIENCY:                           Normal Ranges: PISA Radius:  0.6 cm MR VTI:       149.60 cm MR Vmax:      474.99 cm/s MR Alias Tam: 32.8 cm/s MR Volume:    23.52 ml MR Flow Rt:   74.68 ml/s MR EROA:      0.16 cm2 AORTIC VALVE:                                    Normal Ranges: AoV Vmax:                2.61 m/s  (<=1.7m/s) AoV Peak P.3 mmHg (<20mmHg) AoV Mean P.4 mmHg (1.7-11.5mmHg) LVOT Max Tam:            0.89 m/s  (<=1.1m/s) AoV VTI:                 52.09 cm  (18-25cm) LVOT VTI:                20.03 cm LVOT Diameter:           2.02 cm   (1.8-2.4cm) AoV Area, VTI:           1.23 cm2  (2.5-5.5cm2) AoV Area,Vmax:           1.10 cm2  (2.5-4.5cm2) AoV Dimensionless Index: 0.38 AORTIC INSUFFICIENCY: AI Vmax:       3.17 m/s AI Half-time:  448 msec AI Decel Time: 1546 msec AI Decel Rate: 205.78 cm/s2  RIGHT VENTRICLE: RV Basal 3.68 cm RV Mid   2.60 cm RV Major 8.0 cm TAPSE:   19.1 mm RV s'    0.10 m/s TRICUSPID VALVE/RVSP:                             Normal Ranges: Peak TR Velocity: 2.70 m/s RV Syst Pressure: 32.1 mmHg (< 30mmHg) IVC Diam:         2.08 cm AORTA: Asc Ao Diam 2.77 cm  [id number] Narendra Portillo MD Electronically signed on 2024 at 5:34:21 PM  ** Final **     Transthoracic Echo (TTE) Complete    Result Date: 2023              [address],  [place] [address]      Phone [phone] Fax [phone] TRANSTHORACIC ECHOCARDIOGRAM REPORT  Patient Name:      KARAN Paige Physician:    [id number] Portillo MD Study Date:        11/27/2023           Ordering Provider:    [id number] MARTHA PORTILLO MRN/PID:           [id number]             Fellow: Accession#:        [id number]         Nurse: Date of Birth/Age: 1945 / 78 years Sonographer:          Elizabeth Navarro [facility] Gender:            M                    Additional Staff: Height:            157.48 cm            Admit Date:           11/27/2023 Weight:            68.95 kg             Admission Status:     Outpatient BSA:               1.70 m2              Department Location:  [facility] Echo                                                               Lab Blood Pressure: 110 /76 mmHg Study Type:    TRANSTHORACIC ECHO (TTE) COMPLETE Diagnosis/ICD: Chronic systolic (congestive) heart failure (CHF)-I50.22 Indication:    Congestive Heart Failure CPT Codes:     Echo Complete w Full Doppler-[id number]  Study Detail: The following Echo studies were performed: 2D, M-Mode, Doppler and               color flow.  PHYSICIAN INTERPRETATION: Left Ventricle: Left ventricular systolic function is mildly decreased, with an estimated ejection fraction of 45%. Wall motion is abnormal. The left ventricular cavity size is normal. The left ventricular septal wall thickness is mildly increased. There is moderately increased left ventricular posterior wall thickness. Spectral Doppler shows a pseudonormal pattern of left ventricular diastolic filling. Akinetic inferior and inferolateral wall. Left Atrium: The left atrium is moderately dilated. Right Ventricle: The right ventricle is normal in size. There is reduced right ventricular systolic function. A device is visualized in the right  ventricle. Right Atrium: The right atrium is normal in size. Aortic Valve: The aortic valve was not well visualized. There is evidence of mild aortic valve stenosis. The aortic valve dimensionless index is 0.54. There is trivial aortic valve regurgitation. The peak instantaneous gradient of the aortic valve is 20.5 mmHg. The mean gradient of the aortic valve is 9.2 mmHg. TAVR?. Mitral Valve: The mitral valve is normal in structure. There is mild mitral annular calcification. There is trace to mild mitral valve regurgitation. Tricuspid Valve: The tricuspid valve is structurally normal. There is trace tricuspid regurgitation. Pulmonic Valve: The pulmonic valve is structurally normal. There is trace pulmonic valve regurgitation. Pericardium: There is no pericardial effusion noted. Aorta: The aortic root is normal.  CONCLUSIONS:  1. Left ventricular systolic function is mildly decreased with a 45% estimated ejection fraction.  2. Spectral Doppler shows a pseudonormal pattern of left ventricular diastolic filling.  3. The left ventricular posterior wall thickness is moderately increased.  4. There is reduced right ventricular systolic function.  5. The left atrium is moderately dilated.  6. Mild aortic valve stenosis. QUANTITATIVE DATA SUMMARY: 2D MEASUREMENTS:                           Normal Ranges: LAs:           4.34 cm    (2.7-4.0cm) IVSd:          1.23 cm    (0.6-1.1cm) LVPWd:         1.39 cm    (0.6-1.1cm) LVIDd:         4.34 cm    (3.9-5.9cm) LVIDs:         3.38 cm LV Mass Index: 125.5 g/m2 LV % FS        21.9 % LA VOLUME:                               Normal Ranges: LA Vol A4C:        85.1 ml    (22+/-6mL/m2) LA Vol A2C:        65.6 ml LA Vol BP:         77.6 ml LA Vol Index A4C:  50.0ml/m2 LA Vol Index A2C:  38.6 ml/m2 LA Vol Index BP:   45.6 ml/m2 LA Area A4C:       22.6 cm2 LA Area A2C:       20.6 cm2 LA Major Axis A4C: 5.1 cm LA Major Axis A2C: 5.5 cm LA Volume Index:   39.3 ml/m2 LA Vol A4C:        68.6 ml  LA Vol A2C:        62.1 ml RA VOLUME BY A/L METHOD:                       Normal Ranges: RA Area A4C: 13.6 cm2 M-MODE MEASUREMENTS:                  Normal Ranges: Ao Root: 2.05 cm (2.0-3.7cm) AORTA MEASUREMENTS:                    Normal Ranges: Asc Ao, d: 3.40 cm (2.1-3.4cm) LV SYSTOLIC FUNCTION BY 2D PLANIMETRY (MOD):                     Normal Ranges: EF-A4C View: 68.7 % (>=55%) EF-A2C View: 70.6 % EF-Biplane:  68.5 % LV DIASTOLIC FUNCTION:                        Normal Ranges: MV Peak E:    0.83 m/s (0.7-1.2 m/s) MV Peak A:    0.84 m/s (0.42-0.7 m/s) E/A Ratio:    1.00     (1.0-2.2) MV e'         0.06 m/s (>8.0) MV lateral e' 0.07 m/s MV medial e'  0.05 m/s E/e' Ratio:   13.92    (<8.0) MITRAL VALVE:                 Normal Ranges: MV DT: 226 msec (150-240msec) AORTIC VALVE:                                    Normal Ranges: AoV Vmax:                2.26 m/s  (<=1.7m/s) AoV Peak P.5 mmHg (<20mmHg) AoV Mean P.2 mmHg  (1.7-11.5mmHg) LVOT Max Tam:            1.20 m/s  (<=1.1m/s) AoV VTI:                 45.83 cm  (18-25cm) LVOT VTI:                24.66 cm LVOT Diameter:           2.03 cm   (1.8-2.4cm) AoV Area, VTI:           1.71 cm2  (2.5-5.5cm2) AoV Area,Vmax:           1.69 cm2  (2.5-4.5cm2) AoV Dimensionless Index: 0.54 AORTIC INSUFFICIENCY: AI Vmax:       3.62 m/s AI Half-time:  515 msec AI Decel Time: 1776 msec AI Decel Rate: 203.92 cm/s2  RIGHT VENTRICLE: RV Basal 2.74 cm RV Mid   2.30 cm RV Major 7.2 cm TAPSE:   15.2 mm RV s'    0.13 m/s TRICUSPID VALVE/RVSP:                             Normal Ranges: Peak TR Velocity: 2.74 m/s RV Syst Pressure: 33.0 mmHg (< 30mmHg) IVC Diam:         0.97 cm TV e'             0.1 m/s AORTA: Asc Ao Diam 3.39 cm  [id number] Narendra Portillo MD Electronically signed on 2023 at 5:35:04 PM  ** Final **        [facility]   Mild CAD LAD, patent stent to the RCA, 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   +2-3 MR @ [facility].     Lab Results    Component Value Date    BUN 52 (H) 08/07/2024    CREATININE 2.00 (H) 08/07/2024     (H) 07/12/2024    MG 2.16 07/12/2024    K 4.3 08/07/2024     08/07/2024       Constitutional:       General:  NAD   HENT:      Head: Normocephalic     Mouth: Mucous membranes are moist.      Neck:  No JVD or HJR   Eyes:      Conjunctiva/sclera: Conjunctivae normal.    Cardiovascular:      Rate and Rhythm: Normal rate and regular rhythm     Heart sounds:  S1 S2 normal,  aortic stenosis murmur noted at base,  no S3 or S4   Pulmonary:      Pulmonary effort is normal.  Normal breath sounds No wheezing or rales.   Abdominal:      General: Bowel sounds are normal, non- tender to palpitations. Liver is non palpable at  right costal margin.   Musculoskeletal:         General: No swelling.   KHALIL well.   Skin:     General: Skin is warm and dry.   Neurological:      General: No focal deficit present.      Mental Status: alert and oriented to person, place, and time. Mental status is at baseline.     Psychiatric:         Mood and Affect: Normal Affect.     Assessment/Plan     Problem List Items Addressed This Visit       Chronic combined systolic and diastolic heart failure, NYHA class 3 (Multi) - Primary    HTN (hypertension)    Persistent atrial fibrillation (Multi)      Chronic systolic diastolic heart failure:   Etiology   AHA Stage: C  NYHA class:  2-3  - CPAP a night   Volume Status:  Euvolemic    GFR: 33     GDMT:  BB- Carvedilol 25 mg 1 tablet twice a day   ARB/ACEI/ARNI - Entresto 49/51 mg 1 tablet twice a day   MRA -  on hold due to high potassium.   SGLT2i -  Jardiance 10 mg once a day   Diuretic - Bumex 1 mg  4 days per week.   Device Therapy: CRT-D / Cardiomems readings have been more labile      CHF:  LVEF 45-50% with  akinesis again noted in the apical area.  EF 45 %.  He appears compensated today. Continue Bumex 1 mg 4 days per week.    Follow up in 6-8 weeks.      Emphasized salt restriction.  Encouraged daily  monitoring of the patient's weight.  Encouraged regular exercise as tolerated. .  Follow up in 6-8 weeks.      2. Atrial fibrillation :   RRR today. OAC with Eliquis. Denies obvious signs of bleeding.      3. CKD3:  GFR 33 ml/min   Cr. Has bumped up with the increase in Bumex.  See adjustment above. He follows with nephrology in [place].     4. ASHD:  No chest pain,  continue secondary prevention medications.  EF is unchanged.  Apical akinesis noted on previous echocardiogram       Montserrat Wesley, APRN-CNP

## 2024-08-08 NOTE — PATIENT INSTRUCTIONS
Thank you for coming in today.  If you have any questions you may contact the office Monday through Friday at [phone] or on week ends at [phone].    Please take the Bumex 1 mg 4 days per week.     Repeat BMP in 4 weeks.     Please follow  a 2 GM sodium diet and limit fluid intake to 2 liters per day or 8 servings ( serving size = 8 oz. = 1 cup = 240 ml) per day.   Please avoid processed meat products (luncheon meats, sausages, gonsales, hot dogs for example) eat 4 servings of vegetables and 1-2 whole servings of whole fruits per day.   Please weigh daily and call [phone] for weight gain of 3 pounds in 24 hours or 5 pounds or if you experience increased swelling or shortness of breath.         Follow up:  6-8 weeks.     Please be sure to follow up with your cardiologist at [facility] once every year. Call [phone] to schedule appointment if you do not have a follow up appointment scheduled already.

## 2024-08-09 DIAGNOSIS — F41.9 ANXIETY DISORDER, UNSPECIFIED TYPE: ICD-10-CM

## 2024-08-09 RX ORDER — DIAZEPAM 5 MG/1
5 TABLET ORAL EVERY 8 HOURS PRN
Qty: 90 TABLET | Refills: 0 | Status: SHIPPED | OUTPATIENT
Start: 2024-08-09

## 2024-08-14 ENCOUNTER — APPOINTMENT (OUTPATIENT)
Dept: PRIMARY CARE | Facility: CLINIC | Age: 79
End: 2024-08-14
Payer: COMMERCIAL

## 2024-08-15 ENCOUNTER — TELEPHONE (OUTPATIENT)
Dept: CARDIOLOGY | Facility: HOSPITAL | Age: 79
End: 2024-08-15
Payer: COMMERCIAL

## 2024-08-15 NOTE — TELEPHONE ENCOUNTER
I spoke with Sally. Malick will skip tomorrow's dose of Bumex Per Montserrat Wesley and Jony's Cardio-mems readings this week.

## 2024-08-19 ENCOUNTER — TELEPHONE (OUTPATIENT)
Dept: PAIN MANAGEMENT | Age: 79
End: 2024-08-19

## 2024-08-19 DIAGNOSIS — G89.4 CHRONIC PAIN SYNDROME: ICD-10-CM

## 2024-08-19 DIAGNOSIS — M51.37 DEGENERATION OF LUMBAR OR LUMBOSACRAL INTERVERTEBRAL DISC: ICD-10-CM

## 2024-08-19 DIAGNOSIS — S33.5XXD LUMBAR SPRAIN, SUBSEQUENT ENCOUNTER: ICD-10-CM

## 2024-08-19 DIAGNOSIS — M51.26 DISPLACEMENT OF LUMBAR INTERVERTEBRAL DISC WITHOUT MYELOPATHY: ICD-10-CM

## 2024-08-19 DIAGNOSIS — S33.9XXA CHRONIC OR OLD SPRAIN OF LUMBOSACRAL LIGAMENT: ICD-10-CM

## 2024-08-19 RX ORDER — OXYCODONE AND ACETAMINOPHEN 7.5; 325 MG/1; MG/1
1 TABLET ORAL 3 TIMES DAILY PRN
Qty: 90 TABLET | Refills: 0 | Status: SHIPPED | OUTPATIENT
Start: 2024-08-19 | End: 2024-09-18

## 2024-08-19 NOTE — TELEPHONE ENCOUNTER
Geoff Hardy called into the office requesting a refill of Percocet and baclofen . It was last filled on 7/18/2024 for a 30 day supply per the OARRS report. The OARRS report is  consistent. Their last office visit was on 7/17/2024. Their next office visit is scheduled for 8/20/2024.

## 2024-08-20 ENCOUNTER — OFFICE VISIT (OUTPATIENT)
Dept: PAIN MANAGEMENT | Age: 79
End: 2024-08-20
Payer: MEDICARE

## 2024-08-20 VITALS
HEIGHT: 64 IN | SYSTOLIC BLOOD PRESSURE: 97 MMHG | BODY MASS INDEX: 25.61 KG/M2 | HEART RATE: 50 BPM | OXYGEN SATURATION: 93 % | RESPIRATION RATE: 16 BRPM | DIASTOLIC BLOOD PRESSURE: 51 MMHG | WEIGHT: 150 LBS | TEMPERATURE: 97.6 F

## 2024-08-20 DIAGNOSIS — G89.4 CHRONIC PAIN SYNDROME: Primary | ICD-10-CM

## 2024-08-20 DIAGNOSIS — M51.37 DEGENERATION OF LUMBAR OR LUMBOSACRAL INTERVERTEBRAL DISC: ICD-10-CM

## 2024-08-20 DIAGNOSIS — M79.10 MYALGIA: ICD-10-CM

## 2024-08-20 DIAGNOSIS — S33.5XXD LUMBAR SPRAIN, SUBSEQUENT ENCOUNTER: ICD-10-CM

## 2024-08-20 DIAGNOSIS — S33.9XXA CHRONIC OR OLD SPRAIN OF LUMBOSACRAL LIGAMENT: ICD-10-CM

## 2024-08-20 DIAGNOSIS — Z79.891 ENCOUNTER FOR LONG-TERM OPIATE ANALGESIC USE: ICD-10-CM

## 2024-08-20 DIAGNOSIS — M51.26 DISPLACEMENT OF LUMBAR INTERVERTEBRAL DISC WITHOUT MYELOPATHY: ICD-10-CM

## 2024-08-20 PROCEDURE — 3074F SYST BP LT 130 MM HG: CPT | Performed by: PHYSICIAN ASSISTANT

## 2024-08-20 PROCEDURE — G8417 CALC BMI ABV UP PARAM F/U: HCPCS | Performed by: PHYSICIAN ASSISTANT

## 2024-08-20 PROCEDURE — G8427 DOCREV CUR MEDS BY ELIG CLIN: HCPCS | Performed by: PHYSICIAN ASSISTANT

## 2024-08-20 PROCEDURE — 1123F ACP DISCUSS/DSCN MKR DOCD: CPT | Performed by: PHYSICIAN ASSISTANT

## 2024-08-20 PROCEDURE — 99213 OFFICE O/P EST LOW 20 MIN: CPT | Performed by: PHYSICIAN ASSISTANT

## 2024-08-20 PROCEDURE — 3078F DIAST BP <80 MM HG: CPT | Performed by: PHYSICIAN ASSISTANT

## 2024-08-20 PROCEDURE — 1036F TOBACCO NON-USER: CPT | Performed by: PHYSICIAN ASSISTANT

## 2024-08-20 NOTE — PROGRESS NOTES
Amy Hardy presents to the [facility] on 8/20/2024. Amy is complaining of pain back. The pain is constant. The pain is described as aching, throbbing, stabbing, and sharp. Pain is rated on his best day at a 4, on his worst day at a 10, and on average at a 4 on the VAS scale. He took his last dose of Percocet today.     Any procedures since your last visit: No    Pacemaker or defibrillator: Yes managed by DR. Mirza .    He is not on NSAIDS and is  on anticoagulation medications to include Eliquis and is managed by DR. Dexter .     Medication Contract and Consent for Opioid Use Documents Filed       Patient Documents       Type of Document Status Date Received Received By Description    Medication Contract Received 5/24/2021  9:00 AM PANCHO ORELLANA Medication Contract    Medication Contract Received 5/17/2022  3:39 PM [facility]    Medication Contract Received 4/26/2023  4:43 PM AMY ROBLES medication contract    Consent Opioid Use Received 6/12/2024  1:47 PM [facility]LL[facility]                    BP (!) 97/51   Pulse 50   Temp 97.6 °F (36.4 °C) (Infrared)   Resp 16   Ht 1.626 m (5' 4\")   Wt 68 kg (150 lb)   SpO2 93%   BMI 25.75 kg/m²      No LMP for male patient.

## 2024-08-20 NOTE — PROGRESS NOTES
[facility]  [address] [place] [id number]    Follow up Note      Geoff Hardy     Date of Visit:  2024    CC:  Patient presents for follow up   Chief Complaint   Patient presents with    Follow-up    Back Pain               HPI:    Pain is unchanged.     Appropriate analgesia with current medications regimen: yes.    Change in quality of symptoms:no.    Medication side effects:none.   Recent diagnostic testing:none.   Recent interventional procedures: None.    He has been on anticoagulation medications to include Eliquis and has not been on herbal supplements.  He is not diabetic.     Imagin2022 CT abdomen/pelvis    IMPRESSION:   1. Multiple small nonobstructing bilateral calcified calyceal calculi.   2. No evidence for other calcified collecting system calculi or   obstruction.   3. Hypodense posterior right upper pole renal cortical lesion most   likely a cyst. No further evaluation is required*.   4. Streaky soft tissue densities in the bilateral perinephric fat   compatible with active or, more likely, remote inflammation.   5. No evidence of mass, lymphadenopathy or inflammatory process.   6. Dense atherosclerotic calcification throughout normal caliber   abdominal aorta.       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 Degenerative disc disease, spondylosis causing stenosis. Probable large extruded disc at L3-4. Spinal listhesis L4 and L5 exacerbating the stenosis  EMG dated 3/24/2016     L5 radiculopathy  CT dated 2016 lumbar spine demonstrates degenerative spinal stenosis that is severe at L3-4 L4-5 and L5-S1 levels                                        Potential Aberrant Drug-Related Behavior: no     Urine Drug Screenin2018 buccal consistent  2019 buccal consistent  2019 consistent  10/2019 consistent for oxycodone, metabolites, and benzodiazapines  2020 consistent  2021

## 2024-08-26 ENCOUNTER — DOCUMENTATION (OUTPATIENT)
Dept: INFUSION THERAPY | Facility: HOSPITAL | Age: 79
End: 2024-08-26
Payer: COMMERCIAL

## 2024-08-26 NOTE — PROGRESS NOTES
Spoke with patient and wife, instructions given to hold 1 dose of diuretic today only. Pt and wife  verbalizes understanding of instructions.

## 2024-09-03 ENCOUNTER — HOSPITAL ENCOUNTER (OUTPATIENT)
Dept: CARDIOLOGY | Facility: HOSPITAL | Age: 79
Discharge: HOME | End: 2024-09-03
Payer: MEDICARE

## 2024-09-03 ENCOUNTER — DOCUMENTATION (OUTPATIENT)
Dept: INFUSION THERAPY | Facility: HOSPITAL | Age: 79
End: 2024-09-03
Payer: COMMERCIAL

## 2024-09-03 DIAGNOSIS — Z95.810 PRESENCE OF AUTOMATIC (IMPLANTABLE) CARDIAC DEFIBRILLATOR: ICD-10-CM

## 2024-09-03 DIAGNOSIS — I47.29 OTHER VENTRICULAR TACHYCARDIA (MULTI): ICD-10-CM

## 2024-09-03 DIAGNOSIS — Z95.810 PRESENCE OF AUTOMATIC (IMPLANTABLE) CARDIAC DEFIBRILLATOR: Primary | ICD-10-CM

## 2024-09-03 PROCEDURE — 93296 REM INTERROG EVL PM/IDS: CPT

## 2024-09-03 NOTE — PROGRESS NOTES
Pt's last 2 cardiomems readings were below PA diastolic goal. I spoke with patient and wife, pt will hold dose of diuretic tomorrow and will send reading on Thursday. PT verbalizes understanding of instructions.

## 2024-09-09 ENCOUNTER — APPOINTMENT (OUTPATIENT)
Dept: CARDIOLOGY | Facility: HOSPITAL | Age: 79
End: 2024-09-09
Payer: MEDICARE

## 2024-09-09 ENCOUNTER — HOSPITAL ENCOUNTER (OUTPATIENT)
Facility: HOSPITAL | Age: 79
Setting detail: OBSERVATION
Discharge: HOME | End: 2024-09-11
Attending: STUDENT IN AN ORGANIZED HEALTH CARE EDUCATION/TRAINING PROGRAM | Admitting: INTERNAL MEDICINE
Payer: MEDICARE

## 2024-09-09 ENCOUNTER — TELEPHONE (OUTPATIENT)
Dept: CARDIOLOGY | Facility: HOSPITAL | Age: 79
End: 2024-09-09
Payer: COMMERCIAL

## 2024-09-09 ENCOUNTER — APPOINTMENT (OUTPATIENT)
Dept: RADIOLOGY | Facility: HOSPITAL | Age: 79
End: 2024-09-09
Payer: MEDICARE

## 2024-09-09 DIAGNOSIS — T50.901A ACUTE DRUG OVERDOSE, ACCIDENTAL OR UNINTENTIONAL, INITIAL ENCOUNTER: ICD-10-CM

## 2024-09-09 DIAGNOSIS — I50.22 CHRONIC SYSTOLIC HEART FAILURE (MULTI): ICD-10-CM

## 2024-09-09 DIAGNOSIS — G93.41 ACUTE METABOLIC ENCEPHALOPATHY: ICD-10-CM

## 2024-09-09 DIAGNOSIS — R41.82 ALTERED MENTAL STATUS: Primary | ICD-10-CM

## 2024-09-09 LAB
ALBUMIN SERPL BCP-MCNC: 3.9 G/DL (ref 3.4–5)
ALP SERPL-CCNC: 60 U/L (ref 33–136)
ALT SERPL W P-5'-P-CCNC: 13 U/L (ref 10–52)
ANION GAP SERPL CALC-SCNC: 12 MMOL/L (ref 10–20)
APPEARANCE UR: CLEAR
AST SERPL W P-5'-P-CCNC: 25 U/L (ref 9–39)
BASOPHILS # BLD AUTO: 0.05 X10*3/UL (ref 0–0.1)
BASOPHILS NFR BLD AUTO: 0.7 %
BILIRUB SERPL-MCNC: 0.6 MG/DL (ref 0–1.2)
BILIRUB UR STRIP.AUTO-MCNC: NEGATIVE MG/DL
BUN SERPL-MCNC: 42 MG/DL (ref 6–23)
CALCIUM SERPL-MCNC: 8.7 MG/DL (ref 8.6–10.3)
CARDIAC TROPONIN I PNL SERPL HS: 16 NG/L (ref 0–20)
CHLORIDE SERPL-SCNC: 101 MMOL/L (ref 98–107)
CO2 SERPL-SCNC: 25 MMOL/L (ref 21–32)
COLOR UR: ABNORMAL
CREAT SERPL-MCNC: 2.12 MG/DL (ref 0.5–1.3)
EGFRCR SERPLBLD CKD-EPI 2021: 31 ML/MIN/1.73M*2
EOSINOPHIL # BLD AUTO: 0.16 X10*3/UL (ref 0–0.4)
EOSINOPHIL NFR BLD AUTO: 2.1 %
ERYTHROCYTE [DISTWIDTH] IN BLOOD BY AUTOMATED COUNT: 13.2 % (ref 11.5–14.5)
GLUCOSE SERPL-MCNC: 106 MG/DL (ref 74–99)
GLUCOSE UR STRIP.AUTO-MCNC: ABNORMAL MG/DL
HCT VFR BLD AUTO: 35.3 % (ref 41–52)
HGB BLD-MCNC: 11.6 G/DL (ref 13.5–17.5)
IMM GRANULOCYTES # BLD AUTO: 0.02 X10*3/UL (ref 0–0.5)
IMM GRANULOCYTES NFR BLD AUTO: 0.3 % (ref 0–0.9)
KETONES UR STRIP.AUTO-MCNC: NEGATIVE MG/DL
LACTATE SERPL-SCNC: 0.7 MMOL/L (ref 0.4–2)
LEUKOCYTE ESTERASE UR QL STRIP.AUTO: NEGATIVE
LIPASE SERPL-CCNC: 108 U/L (ref 9–82)
LYMPHOCYTES # BLD AUTO: 1.21 X10*3/UL (ref 0.8–3)
LYMPHOCYTES NFR BLD AUTO: 16.2 %
MAGNESIUM SERPL-MCNC: 2.08 MG/DL (ref 1.6–2.4)
MCH RBC QN AUTO: 35.2 PG (ref 26–34)
MCHC RBC AUTO-ENTMCNC: 32.9 G/DL (ref 32–36)
MCV RBC AUTO: 107 FL (ref 80–100)
MONOCYTES # BLD AUTO: 0.59 X10*3/UL (ref 0.05–0.8)
MONOCYTES NFR BLD AUTO: 7.9 %
NEUTROPHILS # BLD AUTO: 5.45 X10*3/UL (ref 1.6–5.5)
NEUTROPHILS NFR BLD AUTO: 72.8 %
NITRITE UR QL STRIP.AUTO: NEGATIVE
NRBC BLD-RTO: 0 /100 WBCS (ref 0–0)
PH UR STRIP.AUTO: 5.5 [PH]
PLATELET # BLD AUTO: 120 X10*3/UL (ref 150–450)
POTASSIUM SERPL-SCNC: 5.1 MMOL/L (ref 3.5–5.3)
PROT SERPL-MCNC: 6.5 G/DL (ref 6.4–8.2)
PROT UR STRIP.AUTO-MCNC: NEGATIVE MG/DL
RBC # BLD AUTO: 3.3 X10*6/UL (ref 4.5–5.9)
RBC # UR STRIP.AUTO: NEGATIVE /UL
SARS-COV-2 RNA RESP QL NAA+PROBE: NOT DETECTED
SODIUM SERPL-SCNC: 133 MMOL/L (ref 136–145)
SP GR UR STRIP.AUTO: 1.01
UROBILINOGEN UR STRIP.AUTO-MCNC: NORMAL MG/DL
WBC # BLD AUTO: 7.5 X10*3/UL (ref 4.4–11.3)

## 2024-09-09 PROCEDURE — 94640 AIRWAY INHALATION TREATMENT: CPT

## 2024-09-09 PROCEDURE — 71046 X-RAY EXAM CHEST 2 VIEWS: CPT | Mod: FOREIGN READ | Performed by: RADIOLOGY

## 2024-09-09 PROCEDURE — 99285 EMERGENCY DEPT VISIT HI MDM: CPT

## 2024-09-09 PROCEDURE — 85025 COMPLETE CBC W/AUTO DIFF WBC: CPT | Performed by: STUDENT IN AN ORGANIZED HEALTH CARE EDUCATION/TRAINING PROGRAM

## 2024-09-09 PROCEDURE — 36415 COLL VENOUS BLD VENIPUNCTURE: CPT | Performed by: STUDENT IN AN ORGANIZED HEALTH CARE EDUCATION/TRAINING PROGRAM

## 2024-09-09 PROCEDURE — 2500000001 HC RX 250 WO HCPCS SELF ADMINISTERED DRUGS (ALT 637 FOR MEDICARE OP): Performed by: INTERNAL MEDICINE

## 2024-09-09 PROCEDURE — 71046 X-RAY EXAM CHEST 2 VIEWS: CPT

## 2024-09-09 PROCEDURE — 93005 ELECTROCARDIOGRAM TRACING: CPT

## 2024-09-09 PROCEDURE — 70450 CT HEAD/BRAIN W/O DYE: CPT | Performed by: RADIOLOGY

## 2024-09-09 PROCEDURE — 84484 ASSAY OF TROPONIN QUANT: CPT | Performed by: STUDENT IN AN ORGANIZED HEALTH CARE EDUCATION/TRAINING PROGRAM

## 2024-09-09 PROCEDURE — 70450 CT HEAD/BRAIN W/O DYE: CPT

## 2024-09-09 PROCEDURE — 83690 ASSAY OF LIPASE: CPT | Performed by: STUDENT IN AN ORGANIZED HEALTH CARE EDUCATION/TRAINING PROGRAM

## 2024-09-09 PROCEDURE — 87635 SARS-COV-2 COVID-19 AMP PRB: CPT | Performed by: STUDENT IN AN ORGANIZED HEALTH CARE EDUCATION/TRAINING PROGRAM

## 2024-09-09 PROCEDURE — 83605 ASSAY OF LACTIC ACID: CPT | Performed by: STUDENT IN AN ORGANIZED HEALTH CARE EDUCATION/TRAINING PROGRAM

## 2024-09-09 PROCEDURE — 80053 COMPREHEN METABOLIC PANEL: CPT | Performed by: STUDENT IN AN ORGANIZED HEALTH CARE EDUCATION/TRAINING PROGRAM

## 2024-09-09 PROCEDURE — 83735 ASSAY OF MAGNESIUM: CPT | Performed by: STUDENT IN AN ORGANIZED HEALTH CARE EDUCATION/TRAINING PROGRAM

## 2024-09-09 PROCEDURE — 81003 URINALYSIS AUTO W/O SCOPE: CPT | Performed by: STUDENT IN AN ORGANIZED HEALTH CARE EDUCATION/TRAINING PROGRAM

## 2024-09-09 RX ORDER — ASPIRIN 81 MG/1
81 TABLET ORAL DAILY
Status: DISCONTINUED | OUTPATIENT
Start: 2024-09-09 | End: 2024-09-11 | Stop reason: HOSPADM

## 2024-09-09 RX ORDER — CARVEDILOL 25 MG/1
25 TABLET ORAL 2 TIMES DAILY
Status: DISCONTINUED | OUTPATIENT
Start: 2024-09-09 | End: 2024-09-10

## 2024-09-09 RX ORDER — ALBUTEROL SULFATE 90 UG/1
2 INHALANT RESPIRATORY (INHALATION) EVERY 4 HOURS PRN
Status: DISCONTINUED | OUTPATIENT
Start: 2024-09-09 | End: 2024-09-11 | Stop reason: HOSPADM

## 2024-09-09 RX ORDER — ACETAMINOPHEN 325 MG/1
650 TABLET ORAL EVERY 4 HOURS PRN
Status: DISCONTINUED | OUTPATIENT
Start: 2024-09-09 | End: 2024-09-11 | Stop reason: HOSPADM

## 2024-09-09 RX ORDER — ATORVASTATIN CALCIUM 40 MG/1
80 TABLET, FILM COATED ORAL NIGHTLY
Status: DISCONTINUED | OUTPATIENT
Start: 2024-09-09 | End: 2024-09-11 | Stop reason: HOSPADM

## 2024-09-09 RX ORDER — TAMSULOSIN HYDROCHLORIDE 0.4 MG/1
0.4 CAPSULE ORAL EVERY MORNING
Status: DISCONTINUED | OUTPATIENT
Start: 2024-09-10 | End: 2024-09-11 | Stop reason: HOSPADM

## 2024-09-09 RX ORDER — ONDANSETRON HYDROCHLORIDE 2 MG/ML
4 INJECTION, SOLUTION INTRAVENOUS EVERY 6 HOURS PRN
Status: DISCONTINUED | OUTPATIENT
Start: 2024-09-09 | End: 2024-09-11 | Stop reason: HOSPADM

## 2024-09-09 RX ORDER — EZETIMIBE 10 MG/1
10 TABLET ORAL DAILY
Status: DISCONTINUED | OUTPATIENT
Start: 2024-09-10 | End: 2024-09-11 | Stop reason: HOSPADM

## 2024-09-09 ASSESSMENT — PAIN - FUNCTIONAL ASSESSMENT: PAIN_FUNCTIONAL_ASSESSMENT: 0-10

## 2024-09-09 ASSESSMENT — PAIN DESCRIPTION - PROGRESSION: CLINICAL_PROGRESSION: NOT CHANGED

## 2024-09-09 ASSESSMENT — LIFESTYLE VARIABLES
HAVE YOU EVER FELT YOU SHOULD CUT DOWN ON YOUR DRINKING: NO
HAVE PEOPLE ANNOYED YOU BY CRITICIZING YOUR DRINKING: NO
TOTAL SCORE: 0
EVER FELT BAD OR GUILTY ABOUT YOUR DRINKING: NO
EVER HAD A DRINK FIRST THING IN THE MORNING TO STEADY YOUR NERVES TO GET RID OF A HANGOVER: NO

## 2024-09-09 ASSESSMENT — PAIN SCALES - GENERAL: PAINLEVEL_OUTOF10: 0 - NO PAIN

## 2024-09-09 NOTE — ED PROVIDER NOTES
HPI   No chief complaint on file.      This is a 79-year-old male with a past medical history of chronic systolic diastolic heart failure, with an EF of 45 to 50%, atrial fibrillation on Eliquis, CKD stage III who presents emergency department for altered mental status.  Per EMS wife states that patient was altered last night.  Patient is alert and oriented x 2 to person and place he does not know the year.  No other issues or concerns otherwise.     Wife states that he began acting strange before bed at about 1:45am    He has been coughing more                           No data recorded                Patient History   Past Medical History:   Diagnosis Date    Acute on chronic systolic (congestive) heart failure (Multi) 09/08/2022    Acute on chronic systolic CHF (congestive heart failure), NYHA class 3    Acute on chronic systolic CHF (congestive heart failure), NYHA class 3 (Multi) 05/05/2023    CHF, acute (Multi) 05/05/2023    Nonrheumatic aortic (valve) stenosis 01/10/2022    Severe aortic stenosis    Nonrheumatic mitral (valve) insufficiency 09/14/2021    Severe mitral regurgitation by prior echocardiogram    Nonrheumatic mitral (valve) insufficiency 12/07/2022    Severe mitral regurgitation    Other long term (current) drug therapy     Long term current use of amiodarone    Personal history of diseases of the blood and blood-forming organs and certain disorders involving the immune mechanism     History of hemorrhagic diathesis    Personal history of other diseases of the circulatory system 09/20/2022    History of intraventricular hemorrhage    Personal history of other diseases of the circulatory system     History of cardiac disorder    Personal history of other diseases of the circulatory system     History of hypertension    Personal history of other diseases of the musculoskeletal system and connective tissue     History of arthritis    Severe mitral regurgitation 05/05/2023    Unspecified intracranial  injury with loss of consciousness status unknown, initial encounter (Multi) 2022    Closed TBI (traumatic brain injury)     Past Surgical History:   Procedure Laterality Date    OTHER SURGICAL HISTORY  2020    Pacemaker insertion    OTHER SURGICAL HISTORY  2020    Knee replacement    OTHER SURGICAL HISTORY  2020    Cardiac catheterization with stent placement    OTHER SURGICAL HISTORY  2021    Cardioverter defibrillator insertion    OTHER SURGICAL HISTORY  2021    Angioplasty    OTHER SURGICAL HISTORY  2020    Spinal surgery     Family History   Problem Relation Name Age of Onset    No Known Problems Mother      No Known Problems Father      Other (malignant neoplsm) Other      Hypertension Other      Other (cardiac disorder) Other       Social History     Tobacco Use    Smoking status: Former     Current packs/day: 0.00     Types: Cigarettes     Quit date: 1970     Years since quittin.7    Smokeless tobacco: Never   Vaping Use    Vaping status: Never Used   Substance Use Topics    Alcohol use: Yes     Alcohol/week: 14.0 standard drinks of alcohol     Types: 14 Cans of beer per week     Comment: 2 a night    Drug use: Never       Physical Exam   ED Triage Vitals   Temp Pulse Resp BP   -- -- -- --      SpO2 Temp src Heart Rate Source Patient Position   -- -- -- --      BP Location FiO2 (%)     -- --       Physical Exam  Constitutional:       General: He is not in acute distress.  HENT:      Head: Normocephalic and atraumatic.      Right Ear: Tympanic membrane normal.      Left Ear: Tympanic membrane normal.      Mouth/Throat:      Mouth: Mucous membranes are moist.   Eyes:      Extraocular Movements: Extraocular movements intact.      Conjunctiva/sclera: Conjunctivae normal.      Pupils: Pupils are equal, round, and reactive to light.   Cardiovascular:      Rate and Rhythm: Normal rate and regular rhythm.      Heart sounds: No murmur heard.  Pulmonary:      Effort:  Pulmonary effort is normal. No respiratory distress.      Breath sounds: Normal breath sounds. No stridor. No wheezing or rales.   Abdominal:      General: Bowel sounds are normal. There is no distension.      Tenderness: There is no abdominal tenderness. There is no guarding or rebound.   Musculoskeletal:         General: No swelling, tenderness or deformity. Normal range of motion.   Skin:     General: Skin is warm and dry.      Coloration: Skin is not jaundiced.      Findings: No bruising or lesion.   Neurological:      General: No focal deficit present.      Mental Status: He is alert. Mental status is at baseline. He is disoriented.      Cranial Nerves: No cranial nerve deficit.      Motor: No weakness.      Comments: A&Ox2    Psychiatric:         Mood and Affect: Mood normal.       Labs Reviewed - No data to display  No orders to display       ED Course & MDM   ED Course as of 09/09/24 2234   Mon Sep 09, 2024   1712 EKG on my read shows ventricular paced complexes at a rate of 67 bpm normal intervals he does have 1 PVC noted similar to his prior EKGs. [CF]   1824 IMPRESSION:  No acute process.     [CF]   2037 IMPRESSION:  No evidence of acute cortical infarct or intracranial hemorrhage.      No change. Senescent changes. Global volume loss advanced chronic  small vessel ischemic change if persistent concern, consider MRI.  Chronic right maxillary sinus opacification   [CF]      ED Course User Index  [CF] Sary James MD         Diagnoses as of 09/09/24 2234   Altered mental status       Medical Decision Making  79year-old male presents emerged department for altered mental status.  Per his wife he was this way around 1:45 AM.  States that normally he is alert and oriented x 4.  He does not know the year or who the president is and appears to be slightly confused.  He has no focal neurological deficits otherwise, therefore of lower suspicion for a potential stroke.  He is also out of the window for TNK  and he is on a blood thinner.  He is VAN negative CT of his head, labs and urine and chest x-ray showed no signs of acute pathology that would cause the symptoms.  Patient was ultimately admitted to [facility] for hospital status.  Of note his wife states that he cannot get an MRI due to his pacemaker not being compatible.        Procedure  Procedures     Sary James MD  09/09/24 1940

## 2024-09-09 NOTE — TELEPHONE ENCOUNTER
Sally called with concerns for her  Jony. Jony was hypotensive this morning to 90/60. Sally held his carvedilol and Entresto. Jony was weak, sleepy and lethargic for most of the day. He has not had much to eat or drink. He has not had any urine output today. He is just waking up now at 4 pm. Sally reports that Jony is confused and weak. She decided to call 911 for Jony to have evaluation in the ER.

## 2024-09-10 ENCOUNTER — APPOINTMENT (OUTPATIENT)
Dept: CARDIOLOGY | Facility: HOSPITAL | Age: 79
End: 2024-09-10
Payer: MEDICARE

## 2024-09-10 LAB — HOLD SPECIMEN: NORMAL

## 2024-09-10 PROCEDURE — 99223 1ST HOSP IP/OBS HIGH 75: CPT | Performed by: INTERNAL MEDICINE

## 2024-09-10 PROCEDURE — 93005 ELECTROCARDIOGRAM TRACING: CPT

## 2024-09-10 PROCEDURE — 2500000004 HC RX 250 GENERAL PHARMACY W/ HCPCS (ALT 636 FOR OP/ED): Performed by: INTERNAL MEDICINE

## 2024-09-10 PROCEDURE — 97165 OT EVAL LOW COMPLEX 30 MIN: CPT | Mod: GO

## 2024-09-10 PROCEDURE — 2500000002 HC RX 250 W HCPCS SELF ADMINISTERED DRUGS (ALT 637 FOR MEDICARE OP, ALT 636 FOR OP/ED): Performed by: INTERNAL MEDICINE

## 2024-09-10 PROCEDURE — 94640 AIRWAY INHALATION TREATMENT: CPT

## 2024-09-10 PROCEDURE — G0378 HOSPITAL OBSERVATION PER HR: HCPCS

## 2024-09-10 PROCEDURE — 96374 THER/PROPH/DIAG INJ IV PUSH: CPT

## 2024-09-10 PROCEDURE — 2500000001 HC RX 250 WO HCPCS SELF ADMINISTERED DRUGS (ALT 637 FOR MEDICARE OP): Performed by: INTERNAL MEDICINE

## 2024-09-10 PROCEDURE — 97161 PT EVAL LOW COMPLEX 20 MIN: CPT | Mod: GP | Performed by: PHYSICAL THERAPIST

## 2024-09-10 SDOH — ECONOMIC STABILITY: TRANSPORTATION INSECURITY
IN THE PAST 12 MONTHS, HAS THE LACK OF TRANSPORTATION KEPT YOU FROM MEDICAL APPOINTMENTS OR FROM GETTING MEDICATIONS?: PATIENT UNABLE TO ANSWER

## 2024-09-10 SDOH — ECONOMIC STABILITY: HOUSING INSECURITY: AT ANY TIME IN THE PAST 12 MONTHS, WERE YOU HOMELESS OR LIVING IN A SHELTER (INCLUDING NOW)?: PATIENT UNABLE TO ANSWER

## 2024-09-10 SDOH — SOCIAL STABILITY: SOCIAL INSECURITY: ARE THERE ANY APPARENT SIGNS OF INJURIES/BEHAVIORS THAT COULD BE RELATED TO ABUSE/NEGLECT?: UNABLE TO ASSESS

## 2024-09-10 SDOH — HEALTH STABILITY: MENTAL HEALTH: HOW OFTEN DO YOU HAVE A DRINK CONTAINING ALCOHOL?: PATIENT UNABLE TO ANSWER

## 2024-09-10 SDOH — SOCIAL STABILITY: SOCIAL NETWORK: ARE YOU MARRIED, WIDOWED, DIVORCED, SEPARATED, NEVER MARRIED, OR LIVING WITH A PARTNER?: PATIENT UNABLE TO ANSWER

## 2024-09-10 SDOH — HEALTH STABILITY: MENTAL HEALTH
HOW OFTEN DO YOU NEED TO HAVE SOMEONE HELP YOU WHEN YOU READ INSTRUCTIONS, PAMPHLETS, OR OTHER WRITTEN MATERIAL FROM YOUR DOCTOR OR PHARMACY?: PATIENT UNABLE TO RESPOND

## 2024-09-10 SDOH — ECONOMIC STABILITY: INCOME INSECURITY
IN THE PAST 12 MONTHS, HAS THE ELECTRIC, GAS, OIL, OR WATER COMPANY THREATENED TO SHUT OFF SERVICE IN YOUR HOME?: PATIENT UNABLE TO ANSWER

## 2024-09-10 SDOH — SOCIAL STABILITY: SOCIAL NETWORK: HOW OFTEN DO YOU ATTENT MEETINGS OF THE CLUB OR ORGANIZATION YOU BELONG TO?: PATIENT UNABLE TO ANSWER

## 2024-09-10 SDOH — HEALTH STABILITY: PHYSICAL HEALTH: ON AVERAGE, HOW MANY MINUTES DO YOU ENGAGE IN EXERCISE AT THIS LEVEL?: PATIENT UNABLE TO ANSWER

## 2024-09-10 SDOH — SOCIAL STABILITY: SOCIAL INSECURITY: DO YOU FEEL ANYONE HAS EXPLOITED OR TAKEN ADVANTAGE OF YOU FINANCIALLY OR OF YOUR PERSONAL PROPERTY?: UNABLE TO ASSESS

## 2024-09-10 SDOH — SOCIAL STABILITY: SOCIAL INSECURITY
WITHIN THE LAST YEAR, HAVE TO BEEN RAPED OR FORCED TO HAVE ANY KIND OF SEXUAL ACTIVITY BY YOUR PARTNER OR EX-PARTNER?: PATIENT UNABLE TO ANSWER

## 2024-09-10 SDOH — SOCIAL STABILITY: SOCIAL NETWORK: HOW OFTEN DO YOU ATTEND CHURCH OR RELIGIOUS SERVICES?: PATIENT UNABLE TO ANSWER

## 2024-09-10 SDOH — ECONOMIC STABILITY: FOOD INSECURITY
WITHIN THE PAST 12 MONTHS, YOU WORRIED THAT YOUR FOOD WOULD RUN OUT BEFORE YOU GOT MONEY TO BUY MORE.: PATIENT UNABLE TO ANSWER

## 2024-09-10 SDOH — ECONOMIC STABILITY: FOOD INSECURITY
WITHIN THE PAST 12 MONTHS, THE FOOD YOU BOUGHT JUST DIDN'T LAST AND YOU DIDN'T HAVE MONEY TO GET MORE.: PATIENT UNABLE TO ANSWER

## 2024-09-10 SDOH — SOCIAL STABILITY: SOCIAL NETWORK: HOW OFTEN DO YOU GET TOGETHER WITH FRIENDS OR RELATIVES?: PATIENT UNABLE TO ANSWER

## 2024-09-10 SDOH — HEALTH STABILITY: MENTAL HEALTH: HOW MANY STANDARD DRINKS CONTAINING ALCOHOL DO YOU HAVE ON A TYPICAL DAY?: PATIENT UNABLE TO ANSWER

## 2024-09-10 SDOH — SOCIAL STABILITY: SOCIAL INSECURITY
WITHIN THE LAST YEAR, HAVE YOU BEEN HUMILIATED OR EMOTIONALLY ABUSED IN OTHER WAYS BY YOUR PARTNER OR EX-PARTNER?: PATIENT UNABLE TO ANSWER

## 2024-09-10 SDOH — HEALTH STABILITY: MENTAL HEALTH: HOW OFTEN DO YOU HAVE 6 OR MORE DRINKS ON ONE OCCASION?: PATIENT UNABLE TO ANSWER

## 2024-09-10 SDOH — SOCIAL STABILITY: SOCIAL INSECURITY: WITHIN THE LAST YEAR, HAVE YOU BEEN AFRAID OF YOUR PARTNER OR EX-PARTNER?: PATIENT UNABLE TO ANSWER

## 2024-09-10 SDOH — ECONOMIC STABILITY: INCOME INSECURITY
HOW HARD IS IT FOR YOU TO PAY FOR THE VERY BASICS LIKE FOOD, HOUSING, MEDICAL CARE, AND HEATING?: PATIENT UNABLE TO ANSWER

## 2024-09-10 SDOH — SOCIAL STABILITY: SOCIAL NETWORK
DO YOU BELONG TO ANY CLUBS OR ORGANIZATIONS SUCH AS CHURCH GROUPS UNIONS, FRATERNAL OR ATHLETIC GROUPS, OR SCHOOL GROUPS?: PATIENT UNABLE TO ANSWER

## 2024-09-10 SDOH — SOCIAL STABILITY: SOCIAL INSECURITY: HAVE YOU HAD THOUGHTS OF HARMING ANYONE ELSE?: NO

## 2024-09-10 SDOH — ECONOMIC STABILITY: INCOME INSECURITY
IN THE LAST 12 MONTHS, WAS THERE A TIME WHEN YOU WERE NOT ABLE TO PAY THE MORTGAGE OR RENT ON TIME?: PATIENT UNABLE TO ANSWER

## 2024-09-10 SDOH — HEALTH STABILITY: PHYSICAL HEALTH
ON AVERAGE, HOW MANY DAYS PER WEEK DO YOU ENGAGE IN MODERATE TO STRENUOUS EXERCISE (LIKE A BRISK WALK)?: PATIENT UNABLE TO ANSWER

## 2024-09-10 SDOH — SOCIAL STABILITY: SOCIAL INSECURITY
WITHIN THE LAST YEAR, HAVE YOU BEEN KICKED, HIT, SLAPPED, OR OTHERWISE PHYSICALLY HURT BY YOUR PARTNER OR EX-PARTNER?: PATIENT UNABLE TO ANSWER

## 2024-09-10 SDOH — SOCIAL STABILITY: SOCIAL INSECURITY: HAVE YOU HAD ANY THOUGHTS OF HARMING ANYONE ELSE?: UNABLE TO ASSESS

## 2024-09-10 SDOH — SOCIAL STABILITY: SOCIAL INSECURITY: DO YOU FEEL UNSAFE GOING BACK TO THE PLACE WHERE YOU ARE LIVING?: UNABLE TO ASSESS

## 2024-09-10 SDOH — SOCIAL STABILITY: SOCIAL INSECURITY: HAS ANYONE EVER THREATENED TO HURT YOUR FAMILY OR YOUR PETS?: UNABLE TO ASSESS

## 2024-09-10 SDOH — ECONOMIC STABILITY: TRANSPORTATION INSECURITY
IN THE PAST 12 MONTHS, HAS LACK OF TRANSPORTATION KEPT YOU FROM MEETINGS, WORK, OR FROM GETTING THINGS NEEDED FOR DAILY LIVING?: PATIENT UNABLE TO ANSWER

## 2024-09-10 SDOH — HEALTH STABILITY: MENTAL HEALTH
STRESS IS WHEN SOMEONE FEELS TENSE, NERVOUS, ANXIOUS, OR CAN'T SLEEP AT NIGHT BECAUSE THEIR MIND IS TROUBLED. HOW STRESSED ARE YOU?: PATIENT UNABLE TO ANSWER

## 2024-09-10 SDOH — SOCIAL STABILITY: SOCIAL INSECURITY: DOES ANYONE TRY TO KEEP YOU FROM HAVING/CONTACTING OTHER FRIENDS OR DOING THINGS OUTSIDE YOUR HOME?: UNABLE TO ASSESS

## 2024-09-10 SDOH — SOCIAL STABILITY: SOCIAL NETWORK: IN A TYPICAL WEEK, HOW MANY TIMES DO YOU TALK ON THE PHONE WITH FAMILY, FRIENDS, OR NEIGHBORS?: PATIENT UNABLE TO ANSWER

## 2024-09-10 SDOH — ECONOMIC STABILITY: HOUSING INSECURITY: IN THE PAST 12 MONTHS, HOW MANY TIMES HAVE YOU MOVED WHERE YOU WERE LIVING?: 1

## 2024-09-10 SDOH — SOCIAL STABILITY: SOCIAL INSECURITY: ABUSE: ADULT

## 2024-09-10 SDOH — SOCIAL STABILITY: SOCIAL INSECURITY: ARE YOU OR HAVE YOU BEEN THREATENED OR ABUSED PHYSICALLY, EMOTIONALLY, OR SEXUALLY BY ANYONE?: UNABLE TO ASSESS

## 2024-09-10 SDOH — SOCIAL STABILITY: SOCIAL INSECURITY: WERE YOU ABLE TO COMPLETE ALL THE BEHAVIORAL HEALTH SCREENINGS?: NO

## 2024-09-10 ASSESSMENT — COGNITIVE AND FUNCTIONAL STATUS - GENERAL
MOVING TO AND FROM BED TO CHAIR: TOTAL
DRESSING REGULAR LOWER BODY CLOTHING: TOTAL
DAILY ACTIVITIY SCORE: 6
TOILETING: TOTAL
TURNING FROM BACK TO SIDE WHILE IN FLAT BAD: A LOT
TURNING FROM BACK TO SIDE WHILE IN FLAT BAD: TOTAL
CLIMB 3 TO 5 STEPS WITH RAILING: TOTAL
MOVING FROM LYING ON BACK TO SITTING ON SIDE OF FLAT BED WITH BEDRAILS: TOTAL
EATING MEALS: TOTAL
HELP NEEDED FOR BATHING: A LOT
STANDING UP FROM CHAIR USING ARMS: TOTAL
TOILETING: TOTAL
PERSONAL GROOMING: TOTAL
DRESSING REGULAR UPPER BODY CLOTHING: TOTAL
CLIMB 3 TO 5 STEPS WITH RAILING: TOTAL
DRESSING REGULAR UPPER BODY CLOTHING: A LOT
MOVING TO AND FROM BED TO CHAIR: A LOT
WALKING IN HOSPITAL ROOM: TOTAL
TOILETING: TOTAL
MOBILITY SCORE: 6
WALKING IN HOSPITAL ROOM: TOTAL
DAILY ACTIVITIY SCORE: 6
EATING MEALS: A LITTLE
STANDING UP FROM CHAIR USING ARMS: A LOT
CLIMB 3 TO 5 STEPS WITH RAILING: TOTAL
DRESSING REGULAR LOWER BODY CLOTHING: TOTAL
MOVING TO AND FROM BED TO CHAIR: TOTAL
TURNING FROM BACK TO SIDE WHILE IN FLAT BAD: TOTAL
DRESSING REGULAR UPPER BODY CLOTHING: TOTAL
PATIENT BASELINE BEDBOUND: NO
MOBILITY SCORE: 12
DAILY ACTIVITIY SCORE: 12
MOVING FROM LYING ON BACK TO SITTING ON SIDE OF FLAT BED WITH BEDRAILS: TOTAL
STANDING UP FROM CHAIR USING ARMS: TOTAL
PATIENT BASELINE BEDBOUND: NO
TURNING FROM BACK TO SIDE WHILE IN FLAT BAD: TOTAL
EATING MEALS: TOTAL
MOVING FROM LYING ON BACK TO SITTING ON SIDE OF FLAT BED WITH BEDRAILS: A LITTLE
WALKING IN HOSPITAL ROOM: TOTAL
DRESSING REGULAR LOWER BODY CLOTHING: TOTAL
HELP NEEDED FOR BATHING: TOTAL
MOBILITY SCORE: 6
STANDING UP FROM CHAIR USING ARMS: TOTAL
PERSONAL GROOMING: TOTAL
HELP NEEDED FOR BATHING: TOTAL
MOVING TO AND FROM BED TO CHAIR: TOTAL
PERSONAL GROOMING: A LITTLE
MOBILITY SCORE: 6
CLIMB 3 TO 5 STEPS WITH RAILING: TOTAL
MOVING FROM LYING ON BACK TO SITTING ON SIDE OF FLAT BED WITH BEDRAILS: TOTAL
WALKING IN HOSPITAL ROOM: A LOT

## 2024-09-10 ASSESSMENT — LIFESTYLE VARIABLES
AUDIT-C TOTAL SCORE: -1
HOW OFTEN DO YOU HAVE 6 OR MORE DRINKS ON ONE OCCASION: PATIENT UNABLE TO ANSWER
AUDIT-C TOTAL SCORE: -1
AUDIT-C TOTAL SCORE: -1
HOW MANY STANDARD DRINKS CONTAINING ALCOHOL DO YOU HAVE ON A TYPICAL DAY: PATIENT UNABLE TO ANSWER
HOW OFTEN DO YOU HAVE A DRINK CONTAINING ALCOHOL: PATIENT UNABLE TO ANSWER
SKIP TO QUESTIONS 9-10: 0
PRESCIPTION_ABUSE_PAST_12_MONTHS: NO
SKIP TO QUESTIONS 9-10: 0
AUDIT-C TOTAL SCORE: -1
SUBSTANCE_ABUSE_PAST_12_MONTHS: NO
SKIP TO QUESTIONS 9-10: 0

## 2024-09-10 ASSESSMENT — PAIN SCALES - WONG BAKER
WONGBAKER_NUMERICALRESPONSE: NO HURT
WONGBAKER_NUMERICALRESPONSE: HURTS EVEN MORE

## 2024-09-10 ASSESSMENT — ACTIVITIES OF DAILY LIVING (ADL)
FEEDING YOURSELF: INDEPENDENT
HEARING - RIGHT EAR: DIFFICULTY WITH NOISE
BATHING_ASSISTANCE: MAXIMAL
DRESSING YOURSELF: INDEPENDENT
JUDGMENT_ADEQUATE_SAFELY_COMPLETE_DAILY_ACTIVITIES: YES
BATHING: INDEPENDENT
ASSISTIVE_DEVICE: WALKER
PATIENT'S MEMORY ADEQUATE TO SAFELY COMPLETE DAILY ACTIVITIES?: YES
WALKS IN HOME: INDEPENDENT
GROOMING: INDEPENDENT
ADEQUATE_TO_COMPLETE_ADL: YES
ADL_ASSISTANCE: INDEPENDENT
HEARING - LEFT EAR: DIFFICULTY WITH NOISE
TOILETING: INDEPENDENT

## 2024-09-10 ASSESSMENT — PAIN DESCRIPTION - LOCATION: LOCATION: GENERALIZED

## 2024-09-10 ASSESSMENT — PAIN SCALES - GENERAL
PAINLEVEL_OUTOF10: 10 - WORST POSSIBLE PAIN
PAINLEVEL_OUTOF10: 10 - WORST POSSIBLE PAIN
PAINLEVEL_OUTOF10: 0 - NO PAIN

## 2024-09-10 ASSESSMENT — PAIN - FUNCTIONAL ASSESSMENT
PAIN_FUNCTIONAL_ASSESSMENT: WONG-BAKER FACES
PAIN_FUNCTIONAL_ASSESSMENT: 0-10
PAIN_FUNCTIONAL_ASSESSMENT: 0-10

## 2024-09-10 NOTE — PROGRESS NOTES
Occupational Therapy  Evaluation    Patient Name: Jony Javier  MRN: [id number]  Today's Date: 9/10/2024  Time Calculation  Start Time: 1413  Stop Time: 1428  Time Calculation (min): 15 min    Current Problem:   1. Altered mental status        OT order: OT eval and treat   Referred by: Ricki  Reason for referral: ADLs, safety assessment  Past medical history related to rehab: HF, A fib, CKD, TKA, ETOH use, aortic stenosis, [place]    Precautions:   Hearing/Visual Limitations: [place]  Medical Precautions: Fall precautions, Oxygen therapy device and L/min    ASSESSMENT  OT Assessment: OT eval completed. The patient is functioning below baseline for ADLs and mobility. can benefit from continued OT. Pt with Decreased ADL status, Decreased upper extremity strength, Decreased safe judgment during ADL, Decreased cognition, Decreased endurance, Decreased functional mobility, Decreased gross motor control, Decreased IADLs  Prognosis:    Barriers to discharge: Inaccessible home environment, Decreased caregiver support  Tolerance:      PLAN  Frequency: 3 times per week  Treatment Interventions: ADL retraining, Functional transfer training, UE strengthening/ROM, Endurance training, Cognitive reorientation, Patient/family training, Equipment evaluation/education, Neuromuscular reeducation  Discharge Recommendations: Moderate intensity level of continued care  OT OK to discharge: Yes    GENERAL VISIT INFORMATION   Start of session communication:    End of session communication: Bedside nurse  Family/caregiver present: Yes  Caregiver feedback: wife  Co-Treatment: PT  Reason for co-treatment: to optimize safety and mobility, while focusing on discipline specific goals   Position Pt Received:  Bed, 3 rail up, Alarm on  End of session position: Bed, 3 rail up, Alarm on    SUBJECTIVE  Home Living:  Type of Home: House  Lives With: Spouse  Home Adaptive Equipment: Walker rolling or standard, Cane  Home Layout: One level  Home Access:  Stairs to enter with rails  Entrance Stairs-Number of Steps: 5     Prior Level of Function:  Receives Help From: Family  ADL Assistance: Independent  Homemaking Assistance: Needs assistance  Ambulatory Assistance:  (independent with walker)    IADL History:       Pain:  Assessment: 0-10  Score:  (no rating given)  Type: Chronic pain, Acute pain  Location: Back  Interventions: Repositioned  Response to pain interventions:      OBJECTIVE  Vital Signs:       Cognition:  Overall Cognitive Status: Impaired  Arousal/Alertness: Generalized responses  Orientation Level:  (oriented to person, place. cues for month)  Processing Speed: Delayed             Current ADL function:   EATING:  Stand by     GROOMING: Stand by     BATHING: Maximal     UB DRESSING: Minimal     LB DRESSING: Total     TOILETING: Total    ADL comments:       Activity Tolerance:  Endurance: Decreased tolerance for upright activites    Bed Mobility/Transfers:   Bed Mobility  Bed Mobility: Yes  Bed Mobility 1  Bed Mobility 1: Supine to sitting, Sitting to supine  Level of Assistance 1: Minimum assistance, +2  Transfers  Transfer:  (sit<>stand min A x2 FWW)    Ambulation/Gait Training:  Functional Mobility  Functional Mobility Performed:  (pt performed functional mobility a few steps at bedside with min A x2 FWW)    Sitting Balance:  Static Sitting Balance  Static Sitting-Level of Assistance: Close supervision  Dynamic Sitting Balance  Dynamic Sitting-Level of Assistance: Close supervision    Standing Balance:  Static Standing Balance  Static Standing-Level of Assistance: Minimum assistance  Dynamic Standing Balance  Dynamic Standing-Level of Assistance: Minimum assistance    Vision: Vision - Basic Assessment  Current Vision: No visual deficits   and      Sensation:  Light Touch: No apparent deficits    Strength:  Strength Comments: BUE grossly 3+/5    Perception:  Inattention/Neglect: Appears intact    Coordination:  Movements are Fluid and Coordinated: Yes      Hand Function:  Hand Function  Gross Grasp: Functional    Extremities: RUE   RUE : Within Functional Limits and LUE   LUE: Within Functional Limits    Outcome Measures: [facility] Daily Activity   Putting on and taking off regular lower body clothing: Total  Bathing (including washing, rinsing, drying): A lot  Putting on and taking off regular upper body clothing: A lot  Toileting, which includes using toilet, bedpan or urinal: Total  Taking care of personal grooming such as brushing teeth: A little   Eating Meals: A little   Daily Activity - Total Score: 12    EDUCATION:     Education Documentation  ADL Training, taught by Olivia Peacock OT at 9/10/2024  3:37 PM.  Learner: Patient  Readiness: Acceptance  Method: Explanation  Response: Needs Reinforcement    Education Comments  No comments found.        Goals:   Encounter Problems       Encounter Problems (Active)       ADLs       Patient will complete daily grooming tasks brushing teeth and washing face/hair with set-up and supervision level of assistance and PRN adaptive equipment. (Progressing)       Start:  09/10/24    Expected End:  09/24/24            Patient will complete toileting including hygiene clothing management/hygiene with set-up and supervision level of assistance and raised toilet seat, grab bars, and bedside commode. (Progressing)       Start:  09/10/24    Expected End:  09/24/24               TRANSFERS       Patient will complete functional transfers and functional mobility with least restrictive device with modified independent level of assistance. (Progressing)       Start:  09/10/24    Expected End:  09/24/24

## 2024-09-10 NOTE — CARE PLAN
The patient's goals for the shift include Increased mentation    The clinical goals for the shift include Increased mentation    Over the shift, the patient is making adequate progress towards stated care plan goals

## 2024-09-10 NOTE — H&P
Secondary to unintentional drug history Of Present Illness  Jony Javier is a 79 y.o. male presenting with PMH of systolic and diastolic heart failure, stage IV CKD, atrial fibrillation, HTN, and chronic pain. His wife brought him in to the ED last night for confusion and altered mental status. This morning he is complaining of pain everywhere. He is unable to characterize the pain or state if it radiates. He does not know why he is in the ED. He is unable to answer most questions. Denies taking any illicit drugs. Wife states this began Monday after he took an unknown number of OTC products including Qnol, Valerian, and melatonin for sleep in addition to 7.5 mg Percocet. She also mentioned that he drinks 2+ beers a night and did so on Sunday. He has had 2 similar episodes in the past that were potential CVAs. Denies CP, SOB, fever, chills, nausea, vomiting, diarrhea, lightheadedness, dizziness, HA, vision changes, paresthesias, weakness, or hallucinations.    ED Course (Summary):   Vitals on presentation: 97.7F, 66 bpm, 25 RR,129/65 , 96% on RA  Labs: CMP-  106 glucose,133 sodium , 5.1 potassium , 101 chloride , creatinine 2.12  Lactate 0.7  CBC-hemoglobin 11.6  Imaging:   CT head- No evidence of acute cortical infarct or intracranial hemorrhage  CXR- No acute processes  EKG- normal sinus rhythm  Interventions: Admission to hospital    Past Medical History  He has a past medical history of Acute on chronic systolic (congestive) heart failure (Multi) (09/08/2022), Acute on chronic systolic CHF (congestive heart failure), NYHA class 3 (Multi) (05/05/2023), CHF, acute (Multi) (05/05/2023), Nonrheumatic aortic (valve) stenosis (01/10/2022), Nonrheumatic mitral (valve) insufficiency (09/14/2021), Nonrheumatic mitral (valve) insufficiency (12/07/2022), Other long term (current) drug therapy, Personal history of diseases of the blood and blood-forming organs and certain disorders involving the immune mechanism,  Personal history of other diseases of the circulatory system (09/20/2022), Personal history of other diseases of the circulatory system, Personal history of other diseases of the circulatory system, Personal history of other diseases of the musculoskeletal system and connective tissue, Severe mitral regurgitation (05/05/2023), and Unspecified intracranial injury with loss of consciousness status unknown, initial encounter (Multi) (09/20/2022).    Surgical History  He has a past surgical history that includes Other surgical history (01/08/2020); Other surgical history (01/08/2020); Other surgical history (01/08/2020); Other surgical history (12/06/2021); Other surgical history (12/06/2021); and Other surgical history (01/30/2020).     Social History  He reports that he quit smoking about 54 years ago. His smoking use included cigarettes. He has never used smokeless tobacco. He reports current alcohol use of about 14.0 standard drinks of alcohol per week. He reports that he does not use drugs.    Family History  Family History   Problem Relation Name Age of Onset    No Known Problems Mother      No Known Problems Father      Other (malignant neoplsm) Other      Hypertension Other      Other (cardiac disorder) Other          Allergies  Bee venom protein (honey bee), Cefaclor, Ciprofloxacin, Pentazocine, Pregabalin, Allopurinol, Metoprolol, and Sulfamethoxazole-trimethoprim     Physical Exam  Constitutional:       General: He is in acute distress.      Appearance: He is normal weight. He is ill-appearing.   HENT:      Head: Normocephalic and atraumatic.      Right Ear: Tympanic membrane normal.      Left Ear: Tympanic membrane normal.      Mouth/Throat:      Mouth: Mucous membranes are moist.   Eyes:      General: Lids are normal.      Comments: Unwilling to open eyes for exam.     Cardiovascular:      Rate and Rhythm: Normal rate and regular rhythm.      Heart sounds: Murmur heard.      Comments: Murmur heard best at  apex.  Pulmonary:      Effort: Pulmonary effort is normal. No respiratory distress.      Breath sounds: Normal breath sounds. No stridor. No wheezing or rales.   Abdominal:      General: Bowel sounds are normal. There is no distension.      Tenderness: There is no abdominal tenderness. There is no guarding or rebound.   Musculoskeletal:         General: No swelling, tenderness or deformity. Normal range of motion.      Right lower leg: Edema present.      Left lower leg: Edema present.   Skin:     General: Skin is warm and dry.      Coloration: Skin is not jaundiced.      Findings: No bruising or lesion.   Neurological:      General: No focal deficit present.      Mental Status: He is alert. He is disoriented and confused.      Cranial Nerves: No cranial nerve deficit.      Motor: No weakness.      Comments: A&Ox1    Psychiatric:         Attention and Perception: Attention and perception normal.         Mood and Affect: Mood normal.         Behavior: Behavior normal.         Cognition and Memory: He exhibits impaired recent memory.        Last Recorded Vitals  /77 (BP Location: Left arm)   Pulse 80   Temp 36.6 °C (97.9 °F)   Resp 16   Wt 68 kg (150 lb)   SpO2 98%     Relevant Results  Electrocardiogram, 12-lead    Result Date: 9/10/2024  Atrial-sensed ventricular-paced complexes No further analysis attempted due to paced rhythm    ECG 12 lead    Result Date: 9/10/2024  Ventricular-paced complexes No further analysis attempted due to paced rhythm    CT head wo IV contrast    Result Date: 9/9/2024  Interpreted By:  Jorge Brady, STUDY: CT HEAD WO IV CONTRAST;  9/9/2024 8:05 pm   INDICATION: Signs/Symptoms:ams.     COMPARISON: 03/01/2024   ACCESSION NUMBER(S): [id number]   ORDERING CLINICIAN: DAVID DUNBAR   TECHNIQUE: Noncontrast axial CT scan of head was performed. Angled reformats in brain and bone windows were generated. The images were reviewed in bone, brain, blood and soft tissue windows.    FINDINGS: CSF Spaces: The ventricles, sulci and basal cisterns are within normal limits. There is no extraaxial fluid collection. Global volume loss and fairly advanced chronic small vessel ischemic change. Query any clinical evidence of dementia   Parenchyma:  The grey-white differentiation is intact. There is no mass effect or midline shift.  There is no intracranial hemorrhage.   Calvarium: The calvarium is unremarkable.   Paranasal sinuses and mastoids: Right maxillary sinus opacification; unchanged       No evidence of acute cortical infarct or intracranial hemorrhage.   No change. Senescent changes. Global volume loss advanced chronic small vessel ischemic change if persistent concern, consider MRI. Chronic right maxillary sinus opacification   MACRO: None   Signed by: Jorge Brady 9/9/2024 8:25 PM Dictation workstation:   [id number]    XR chest 2 views    Result Date: 9/9/2024  STUDY: Chest Radiographs;  9/9/2024 5:28PM INDICATION: Altered mental status. COMPARISON: 3/2/2024 XR Chest, 3/5/2021 XR Chest ACCESSION NUMBER(S): [id number] ORDERING CLINICIAN: DAVID DUNBAR TECHNIQUE:  Frontal and lateral chest. FINDINGS: CARDIOMEDIASTINAL SILHOUETTE: Heart is enlarged with evidence of prior mid sternotomy and left-sided cardiac AICD..  LUNGS: Lungs are clear.  ABDOMEN: No remarkable upper abdominal findings.  BONES: No acute osseous changes.    No acute process. Signed by Gavin Goldstein MD       Assessment/Plan     Acute metabolic encephalopathy d/t multiple substance overdose  AMS at presentation  Supportive fluids   Admit for monitoring over night   Education on OTC products and mitigating overdose  PT/OT evaluation    Anemia  Unchanged from baseline   Will continue to monitor    DVT Prophylaxis   SCDs  Continue home Leti PARRISH    Pt seen and examined  with my PA student . I have modified the note to reflect my documentation of HPI and assessment and plan.    Acute metabolic  encephalopathy due to unintentional drug overdose   wife has confirmed the patient on oxycodone 7.5 mg 3 tablets a day split over 6 doses per day.  She has also confirmed that the patient on diazepam 5 mg 3 times a day for many years as well as buying over-the-counter sleeping aids that includes valerian root and many other herbal medications.  She has also confirmed that the patient drinks at least 2 beers at night.  Will give IV fluids as the patient is currently somnolent and monitor overnight.      Jessica Mendosa MD [facility]

## 2024-09-10 NOTE — PROGRESS NOTES
Physical Therapy    Physical Therapy Evaluation    Patient Name: Jony Javier  MRN: [id number]  Today's Date: 9/10/2024   Time Calculation  Start Time: 1414  Stop Time: 1428  Time Calculation (min): 14 min  2310/2310-B    Assessment/Plan   PT Assessment  PT Assessment Results: Decreased strength, Impaired balance, Decreased endurance, Decreased mobility, Decreased coordination, Decreased cognition, Decreased safety awareness, Pain  Rehab Prognosis: Good  Barriers to Discharge: none  Evaluation/Treatment Tolerance: Patient limited by fatigue  Medical Staff Made Aware: Yes  End of Session Communication: Bedside nurse  Assessment Comment: Patient requires assist x 1-2 for all mobility, anticipate some improvement over course of admit. Would benefit from some continued therapy at discharge to improve functional independence - will assess next session for any change to DC rec.  End of Session Patient Position: Bed, 3 rail up, Alarm on (in chair position to facilitate more alertness)  IP OR SWING BED PT PLAN  Inpatient or Swing Bed: Inpatient  PT Plan  Treatment/Interventions: Bed mobility, Transfer training, Gait training, Balance training, Strengthening, Endurance training, Therapeutic exercise, Therapeutic activity  PT Plan: Ongoing PT  PT Frequency: 4 times per week  PT Discharge Recommendations: Moderate intensity level of continued care (will continue to assess for any changes)  PT - OK to Discharge: Yes (when medically cleared)      General Visit Information:  General  Reason for Referral: Dx: Altered mental status, accidental medication OD  Referred By: Ricki  Past Medical History Relevant to Rehab: HF, A fib, CKD, TKA, ETOH use, aortic stenosis, [place]  Co-Treatment:  (with OT for safety and mobility)  Patient Position Received: Bed, 3 rail up, Alarm on  General Comment: Seen in room 2310-B, wife present; O2 on 2-3L/min, external godinez    Home Living:  Home Living  Type of Home:  (Lives with wife in 1 level home  with 5 steps to enter. Patient amb with FWW, fairly independent with ADLs, does not wear O2 at baseline.)    Prior Level of Function:       Precautions:  Precautions  Precautions Comment: falls, O2    Vital Signs:     Objective     Pain:  Pain Assessment  0-10 (Numeric) Pain Score:  (reported chronic back pain, did not rate)    Cognition:  Cognition  Overall Cognitive Status:  (Oriented x 3, mild confusion, stated month was February, after cues wasable to give correct month. Patient initially very difficult to awaken - much more alert at end of treatment)    General Assessments:      Activity Tolerance  Endurance: Tolerates less than 10 min exercise, no significant change in vital signs     Strength  Strength Comments: RADHA LE quads grossly 4/5        Postural Control  Postural Control:  (Sitting balance fair, mild retro lean; standing balance fair- with FWW, mild retro lean, wider PHIL, usteady, rigid posture)          Functional Assessments:     Bed Mobility  Bed Mobility:  (Supine to/from sit with Min assist x 2; cues for sequencing, safety, balance, posture, wt shifting)  Transfers  Transfer:  (sit to stand with Min assist x 2 and FWW; cues for sequencing, safety, balance, posture, wt shifting, AD use)  Ambulation/Gait Training  Ambulation/Gait Training Performed:  (Amb few side steps and few steps forward/retro with FWW and Min assist x 2; cues for sequencing, safety, balance, posture, wt shifting, AD use  - patient tends to keep AD too far forward in amb)          Extremity/Trunk Assessments:                Outcome Measures:     [facility] Basic Mobility  Turning from your back to your side while in a flat bed without using bedrails: A little  Moving from lying on your back to sitting on the side of a flat bed without using bedrails: A lot  Moving to and from bed to chair (including a wheelchair): A lot  Standing up from a chair using your arms (e.g. wheelchair or bedside chair): A lot  To walk in hospital room: A  lot  Climbing 3-5 steps with railing: Total  Basic Mobility - Total Score: 12                                                             Goals:  Encounter Problems       Encounter Problems (Active)       PT Problem       transfers       Start:  09/10/24    Expected End:  09/24/24       Patient will perform all transfers with SBA x1          gait       Start:  09/10/24    Expected End:  09/24/24       Patient will amb 50+ feet with SBA x1          strengthening        Start:  09/10/24    Expected End:  09/24/24       Patient will perform 20+ reps of AROM/RROM for RADHA LE's to improve safety and functional independence                Education Documentation  Precautions, taught by Mary Ellen Alexis, PT at 9/10/2024  2:54 PM.  Learner: Patient  Readiness: Acceptance  Method: Explanation  Response: Needs Reinforcement    Mobility Training, taught by Mary Ellen Alexis, PT at 9/10/2024  2:54 PM.  Learner: Patient  Readiness: Acceptance  Method: Explanation  Response: Needs Reinforcement    Education Comments  No comments found.

## 2024-09-11 VITALS
OXYGEN SATURATION: 94 % | TEMPERATURE: 97.7 F | WEIGHT: 150 LBS | SYSTOLIC BLOOD PRESSURE: 131 MMHG | RESPIRATION RATE: 16 BRPM | DIASTOLIC BLOOD PRESSURE: 73 MMHG | HEART RATE: 67 BPM | BODY MASS INDEX: 24.99 KG/M2 | HEIGHT: 65 IN

## 2024-09-11 LAB
ALBUMIN SERPL BCP-MCNC: 3.5 G/DL (ref 3.4–5)
ALP SERPL-CCNC: 51 U/L (ref 33–136)
ALT SERPL W P-5'-P-CCNC: 10 U/L (ref 10–52)
ANION GAP SERPL CALC-SCNC: 11 MMOL/L (ref 10–20)
AST SERPL W P-5'-P-CCNC: 18 U/L (ref 9–39)
BASOPHILS # BLD AUTO: 0.06 X10*3/UL (ref 0–0.1)
BASOPHILS NFR BLD AUTO: 0.7 %
BILIRUB SERPL-MCNC: 0.4 MG/DL (ref 0–1.2)
BUN SERPL-MCNC: 33 MG/DL (ref 6–23)
CALCIUM SERPL-MCNC: 8.7 MG/DL (ref 8.6–10.3)
CHLORIDE SERPL-SCNC: 107 MMOL/L (ref 98–107)
CO2 SERPL-SCNC: 25 MMOL/L (ref 21–32)
CREAT SERPL-MCNC: 1.68 MG/DL (ref 0.5–1.3)
EGFRCR SERPLBLD CKD-EPI 2021: 41 ML/MIN/1.73M*2
EOSINOPHIL # BLD AUTO: 0.21 X10*3/UL (ref 0–0.4)
EOSINOPHIL NFR BLD AUTO: 2.6 %
ERYTHROCYTE [DISTWIDTH] IN BLOOD BY AUTOMATED COUNT: 13.4 % (ref 11.5–14.5)
GLUCOSE SERPL-MCNC: 99 MG/DL (ref 74–99)
HCT VFR BLD AUTO: 35.7 % (ref 41–52)
HGB BLD-MCNC: 11.5 G/DL (ref 13.5–17.5)
IMM GRANULOCYTES # BLD AUTO: 0.02 X10*3/UL (ref 0–0.5)
IMM GRANULOCYTES NFR BLD AUTO: 0.2 % (ref 0–0.9)
LYMPHOCYTES # BLD AUTO: 1.43 X10*3/UL (ref 0.8–3)
LYMPHOCYTES NFR BLD AUTO: 17.4 %
MAGNESIUM SERPL-MCNC: 2.24 MG/DL (ref 1.6–2.4)
MCH RBC QN AUTO: 35.1 PG (ref 26–34)
MCHC RBC AUTO-ENTMCNC: 32.2 G/DL (ref 32–36)
MCV RBC AUTO: 109 FL (ref 80–100)
MONOCYTES # BLD AUTO: 0.76 X10*3/UL (ref 0.05–0.8)
MONOCYTES NFR BLD AUTO: 9.2 %
NEUTROPHILS # BLD AUTO: 5.74 X10*3/UL (ref 1.6–5.5)
NEUTROPHILS NFR BLD AUTO: 69.9 %
NRBC BLD-RTO: 0 /100 WBCS (ref 0–0)
PLATELET # BLD AUTO: 133 X10*3/UL (ref 150–450)
POTASSIUM SERPL-SCNC: 4.2 MMOL/L (ref 3.5–5.3)
PROT SERPL-MCNC: 5.9 G/DL (ref 6.4–8.2)
RBC # BLD AUTO: 3.28 X10*6/UL (ref 4.5–5.9)
SODIUM SERPL-SCNC: 139 MMOL/L (ref 136–145)
WBC # BLD AUTO: 8.2 X10*3/UL (ref 4.4–11.3)

## 2024-09-11 PROCEDURE — G0378 HOSPITAL OBSERVATION PER HR: HCPCS

## 2024-09-11 PROCEDURE — 2500000002 HC RX 250 W HCPCS SELF ADMINISTERED DRUGS (ALT 637 FOR MEDICARE OP, ALT 636 FOR OP/ED): Performed by: INTERNAL MEDICINE

## 2024-09-11 PROCEDURE — 97110 THERAPEUTIC EXERCISES: CPT | Mod: GP,CQ

## 2024-09-11 PROCEDURE — 99239 HOSP IP/OBS DSCHRG MGMT >30: CPT | Performed by: INTERNAL MEDICINE

## 2024-09-11 PROCEDURE — 2500000001 HC RX 250 WO HCPCS SELF ADMINISTERED DRUGS (ALT 637 FOR MEDICARE OP): Performed by: INTERNAL MEDICINE

## 2024-09-11 PROCEDURE — 97116 GAIT TRAINING THERAPY: CPT | Mod: GP,CQ

## 2024-09-11 PROCEDURE — 97530 THERAPEUTIC ACTIVITIES: CPT | Mod: GP,CQ

## 2024-09-11 PROCEDURE — 36415 COLL VENOUS BLD VENIPUNCTURE: CPT | Performed by: INTERNAL MEDICINE

## 2024-09-11 PROCEDURE — 85025 COMPLETE CBC W/AUTO DIFF WBC: CPT | Performed by: INTERNAL MEDICINE

## 2024-09-11 PROCEDURE — 83735 ASSAY OF MAGNESIUM: CPT | Performed by: INTERNAL MEDICINE

## 2024-09-11 PROCEDURE — 80053 COMPREHEN METABOLIC PANEL: CPT | Performed by: INTERNAL MEDICINE

## 2024-09-11 PROCEDURE — 97535 SELF CARE MNGMENT TRAINING: CPT | Mod: GO,CO

## 2024-09-11 RX ORDER — OXYCODONE HYDROCHLORIDE 5 MG/1
2.5 TABLET ORAL ONCE
Status: COMPLETED | OUTPATIENT
Start: 2024-09-11 | End: 2024-09-11

## 2024-09-11 RX ORDER — OXYCODONE AND ACETAMINOPHEN 5; 325 MG/1; MG/1
1 TABLET ORAL ONCE
Status: COMPLETED | OUTPATIENT
Start: 2024-09-11 | End: 2024-09-11

## 2024-09-11 RX ORDER — OXYCODONE AND ACETAMINOPHEN 7.5; 325 MG/1; MG/1
1 TABLET ORAL ONCE
Status: DISCONTINUED | OUTPATIENT
Start: 2024-09-11 | End: 2024-09-11

## 2024-09-11 ASSESSMENT — COGNITIVE AND FUNCTIONAL STATUS - GENERAL
CLIMB 3 TO 5 STEPS WITH RAILING: A LOT
WALKING IN HOSPITAL ROOM: A LOT
STANDING UP FROM CHAIR USING ARMS: A LITTLE
WALKING IN HOSPITAL ROOM: A LITTLE
DRESSING REGULAR LOWER BODY CLOTHING: A LITTLE
HELP NEEDED FOR BATHING: A LITTLE
DAILY ACTIVITIY SCORE: 21
MOVING FROM LYING ON BACK TO SITTING ON SIDE OF FLAT BED WITH BEDRAILS: A LITTLE
STANDING UP FROM CHAIR USING ARMS: A LITTLE
HELP NEEDED FOR BATHING: A LOT
MOVING TO AND FROM BED TO CHAIR: A LITTLE
DRESSING REGULAR LOWER BODY CLOTHING: A LOT
DRESSING REGULAR UPPER BODY CLOTHING: A LOT
CLIMB 3 TO 5 STEPS WITH RAILING: TOTAL
TURNING FROM BACK TO SIDE WHILE IN FLAT BAD: A LITTLE
MOBILITY SCORE: 19
MOBILITY SCORE: 15
TOILETING: A LITTLE
DAILY ACTIVITIY SCORE: 15
PERSONAL GROOMING: A LITTLE
TOILETING: A LOT
MOVING TO AND FROM BED TO CHAIR: A LITTLE

## 2024-09-11 ASSESSMENT — ACTIVITIES OF DAILY LIVING (ADL): HOME_MANAGEMENT_TIME_ENTRY: 23

## 2024-09-11 ASSESSMENT — PAIN SCALES - GENERAL
PAINLEVEL_OUTOF10: 5 - MODERATE PAIN
PAINLEVEL_OUTOF10: 1
PAINLEVEL_OUTOF10: 3
PAINLEVEL_OUTOF10: 0 - NO PAIN

## 2024-09-11 ASSESSMENT — PAIN DESCRIPTION - LOCATION: LOCATION: OTHER (COMMENT)

## 2024-09-11 ASSESSMENT — PAIN - FUNCTIONAL ASSESSMENT
PAIN_FUNCTIONAL_ASSESSMENT: 0-10
PAIN_FUNCTIONAL_ASSESSMENT: 0-10

## 2024-09-11 ASSESSMENT — PAIN DESCRIPTION - ORIENTATION: ORIENTATION: RIGHT

## 2024-09-11 NOTE — PROGRESS NOTES
Social work consult placed for discharge planning/CWI. SW reviewed pt's chart and communicated with TCC. No SW needs foreseen at this time. SW signing off; available upon request.    Dre Balbuena, MSW, LSW ([id number])   Care Transitions

## 2024-09-11 NOTE — PROGRESS NOTES
Occupational Therapy    OT Treatment    Patient Name: Jony Javier  MRN: [id number]  Department: [facility] 2 E OBS  Room: [facility]/2310-B  Today's Date: 9/11/2024  Time Calculation  Start Time: 1110  Stop Time: 1133  Time Calculation (min): 23 min        Assessment:  OT Assessment: Patient completed transfers with Min assist.  End of Session Communication: Bedside nurse  End of Session Patient Position: Up in chair, Alarm on     Plan:  Treatment Interventions: ADL retraining, Functional transfer training, UE strengthening/ROM, Endurance training, Cognitive reorientation, Patient/family training, Equipment evaluation/education, Neuromuscular reeducation  OT Frequency: 3 times per week  OT Discharge Recommendations: Moderate intensity level of continued care  OT - OK to Discharge: Yes  Treatment Interventions: ADL retraining, Functional transfer training, UE strengthening/ROM, Endurance training, Cognitive reorientation, Patient/family training, Equipment evaluation/education, Neuromuscular reeducation    Subjective   Previous Visit Info:  OT Last Visit  OT Received On: 09/11/24  General:  General  Prior to Session Communication: Bedside nurse  Patient Position Received: Up in chair, Alarm on  General Comment: Patient complained of flank area pain and blurry vision. Nursing updated MD.  Precautions:  Medical Precautions: Fall precautions          Objective    Cognition:  Cognition  Overall Cognitive Status: Within Functional Limits     Activities of Daily Living: Grooming  Grooming Level of Assistance: Close supervision, Setup  Grooming Where Assessed: Chair    UE Bathing  UE Bathing Level of Assistance: Close supervision, Setup  UE Bathing Where Assessed:  (chair)  Functional Standing Tolerance:  Time: 5 minutes  Activity: BUE support on walker  Bed Mobility/Transfers: Transfer 1  Transfer From 1: Standing frame to  Technique 1: Stand to sit, Sit to stand  Transfer Device 1: Walker  Transfer Level of Assistance 1: Minimum  assistance    Outcome Measures:[facility] Daily Activity  Putting on and taking off regular lower body clothing: A lot  Bathing (including washing, rinsing, drying): A lot  Putting on and taking off regular upper body clothing: A lot  Toileting, which includes using toilet, bedpan or urinal: A lot  Taking care of personal grooming such as brushing teeth: A little  Eating Meals: None  Daily Activity - Total Score: 15    Education Documentation  ADL Training, taught by DEEPALI Brand at 9/11/2024  1:30 PM.  Learner: Patient  Readiness: Acceptance  Method: Explanation  Response: Verbalizes Understanding    Education Comments  No comments found.           Goals:  Encounter Problems       Encounter Problems (Active)       ADLs       Patient will complete daily grooming tasks brushing teeth and washing face/hair with set-up and supervision level of assistance and PRN adaptive equipment. (Progressing)       Start:  09/10/24    Expected End:  09/24/24            Patient will complete toileting including hygiene clothing management/hygiene with set-up and supervision level of assistance and raised toilet seat, grab bars, and bedside commode. (Progressing)       Start:  09/10/24    Expected End:  09/24/24               TRANSFERS       Patient will complete functional transfers and functional mobility with least restrictive device with modified independent level of assistance. (Progressing)       Start:  09/10/24    Expected End:  09/24/24

## 2024-09-11 NOTE — CARE PLAN
The patient's goals for the shift include Increased mentation    The clinical goals for the shift include Increased mentation

## 2024-09-11 NOTE — PROGRESS NOTES
Physical Therapy    Physical Therapy Treatment    Patient Name: Jony Javier  MRN: [id number]  Department: [facility] 2 E OBS  Room: 2310/2310-B  Today's Date: 9/11/2024  Time Calculation  Start Time: 0815  Stop Time: 0854  Time Calculation (min): 39 min    Assessment/Plan   PT Assessment  End of Session Communication: Bedside nurse  End of Session Patient Position: Up in chair, Alarm on     PT Plan  Treatment/Interventions: Bed mobility, Transfer training, Gait training, Balance training, Strengthening, Endurance training, Therapeutic exercise, Therapeutic activity  PT Plan: Ongoing PT  PT Frequency: 4 times per week  PT Discharge Recommendations: Moderate intensity level of continued care (will continue to assess for any changes)  PT - OK to Discharge: Yes (when medically cleared)    General Visit Information:   PT  Visit  PT Received On: 09/11/24  General  Prior to Session Communication: Bedside nurse  Patient Position Received: Bed, 3 rail up, Alarm on    Subjective   Precautions:  Precautions  Hearing/Visual Limitations: [place]  Medical Precautions: Fall precautions    Objective   Pain:  Pain Assessment  Pain Assessment: 0-10  0-10 (Numeric) Pain Score: 0 - No pain  Cognition:  Cognition  Orientation Level: Disoriented to time    Treatments:  Therapeutic Exercise  Therapeutic Exercise Performed: Yes  Therapeutic Exercise Activity 1: pt completed seated LE exercises: AP x 10 reps, heel raises x 10 reps, LAQ x 10 reps, hip flexion x 10 reps, hip adduction x 10 reps, hip abduction x 10 reps    Therapeutic Activity  Therapeutic Activity Performed: Yes  Therapeutic Activity 1: pt completed 3 static stands for 1-2 min each with Min A    Bed Mobility  Bed Mobility: Yes  Bed Mobility 1  Bed Mobility 1: Supine to sitting  Level of Assistance 1: Minimum assistance, Minimal verbal cues    Ambulation/Gait Training  Ambulation/Gait Training Performed: Yes  Ambulation/Gait Training 1  Surface 1: Level tile  Device 1: Rolling  walker  Assistance 1: Minimum assistance, Minimal verbal cues, Loss of balance (Comment)  Quality of Gait 1: Decreased step length, Forward flexed posture  Comments/Distance (ft) 1: 10 feet; 50 feet; cues to stand tall, exhibited a single LOB with initial gait but able to self-correct  Transfers  Transfer: Yes  Transfer 1  Technique 1: Sit to stand, Stand to sit  Transfer Device 1: Walker  Transfer Level of Assistance 1: Minimum assistance, Minimal verbal cues    Outcome Measures:  [facility] Basic Mobility  Turning from your back to your side while in a flat bed without using bedrails: A little  Moving from lying on your back to sitting on the side of a flat bed without using bedrails: A little  Moving to and from bed to chair (including a wheelchair): A little  Standing up from a chair using your arms (e.g. wheelchair or bedside chair): A little  To walk in hospital room: A lot  Climbing 3-5 steps with railing: Total  Basic Mobility - Total Score: 15    Education Documentation  Precautions, taught by Aleena Vinson PTA at 9/11/2024 11:42 AM.  Learner: Patient  Readiness: Acceptance  Method: Explanation  Response: Needs Reinforcement    Mobility Training, taught by Aleena Vinson PTA at 9/11/2024 11:42 AM.  Learner: Patient  Readiness: Acceptance  Method: Explanation  Response: Needs Reinforcement    Education Comments  No comments found.        OP EDUCATION:       Encounter Problems       Encounter Problems (Active)       PT Problem       transfers (Progressing)       Start:  09/10/24    Expected End:  09/24/24       Patient will perform all transfers with SBA x1          gait (Progressing)       Start:  09/10/24    Expected End:  09/24/24       Patient will amb 50+ feet with SBA x1          strengthening  (Progressing)       Start:  09/10/24    Expected End:  09/24/24       Patient will perform 20+ reps of AROM/RROM for RADHA LE's to improve safety and functional independence

## 2024-09-11 NOTE — PROGRESS NOTES
Spoke to patients wife about patients discharge and also offered HHC for patient since patient doesn't qualify for SNF placement at this time due to having Medicare and an observation patient. Patient's wife declined HHC at this time.

## 2024-09-12 ENCOUNTER — TELEPHONE (OUTPATIENT)
Dept: CARDIOLOGY | Facility: HOSPITAL | Age: 79
End: 2024-09-12
Payer: COMMERCIAL

## 2024-09-12 ENCOUNTER — PATIENT OUTREACH (OUTPATIENT)
Dept: PRIMARY CARE | Facility: CLINIC | Age: 79
End: 2024-09-12
Payer: COMMERCIAL

## 2024-09-12 NOTE — PROGRESS NOTES
Discharge Facility:[facility]  Discharge Diagnosis:AMS  Admission Date:09/09/24  Discharge Date: 09/10/24    PCP Appointment Date:none available sent to pcp office  Specialist Appointment Date:   Hospital Encounter and Summary Linked: Yes  See discharge assessment below for further details  Engagement  Call Start Time: 0102 (9/12/2024  1:02 PM)    Medications  Medications reviewed with patient/caregiver?: Yes (no new meds) (9/12/2024  1:02 PM)  Is the patient having any side effects they believe may be caused by any medication additions or changes?: No (9/12/2024  1:02 PM)  Does the patient have all medications ordered at discharge?: Not applicable (9/12/2024  1:02 PM)  Is the patient taking all medications as directed (includes completed medication regime)?: Yes (9/12/2024  1:02 PM)    Appointments  Does the patient have a primary care provider?: Yes (9/12/2024  1:02 PM)    Self Management  Has home health visited the patient within 72 hours of discharge?: Not applicable (9/12/2024  1:02 PM)  Has all Durable Medical Equipment (DME) been delivered?: No (9/12/2024  1:02 PM)    Patient Teaching  Does the patient have access to their discharge instructions?: Yes (9/12/2024  1:02 PM)  Care Management Interventions: Reviewed instructions with patient (9/12/2024  1:02 PM)  What is the patient's perception of their health status since discharge?: Improving (spoke to his wife) (9/12/2024  1:02 PM)    Wrap Up  Call End Time: 0110 (9/12/2024  1:02 PM)

## 2024-09-12 NOTE — TELEPHONE ENCOUNTER
Jony and Sally were called back. Jony is A+Ox 4. He is back home now. He does report weakness, but he thinks his strength will come back slowly. Medications were reviewed. Pt is taking Bumex 1 mg tablet Take 1 tablet (1 mg) by mouth 2 times a week. May take additional dose for weight gain 3#, increased swelling or shortness of breath.  Jony is encouraged to call back with any concerns or questions.

## 2024-09-13 NOTE — DISCHARGE SUMMARY
Discharge Diagnosis  Acute metabolic encephalopathy due to unintentional drug overdose  Chronic systolic and diastolic heart failure  Stage IV kidney disease  Permanent atrial fibrillation  Essential hypertension  Chronic pain    Issues Requiring Follow-Up  1.  Follow with PCP in 1 week's time.    Discharge Meds     Medication List      CONTINUE taking these medications     albuterol 90 mcg/actuation inhaler; INHALE 1 (ONE) PUFF EVERY 4 HOURS AS   NEEDED   atorvastatin 80 mg tablet; Commonly known as: Lipitor; Take 1 tablet (80   mg) by mouth once daily at bedtime.   baclofen 10 mg tablet; Commonly known as: Lioresal   bumetanide 1 mg tablet; Commonly known as: Bumex; Take 1 tablet (1 mg)   by mouth 2 times a week. May take additional dose for weight gain 3#,   increased swelling or shortness of breath.   carvedilol 25 mg tablet; Commonly known as: Coreg; Take 1 tablet (25 mg)   by mouth 2 times a day.   diazePAM 5 mg tablet; Commonly known as: Valium; Take 1 tablet (5 mg) by   mouth every 8 hours if needed for anxiety.   Eliquis 5 mg tablet; Generic drug: apixaban; Take 1 tablet (5 mg) by   mouth 2 times a day.   Entresto 49-51 mg tablet; Generic drug: sacubitriL-valsartan; Take 1   tablet by mouth 2 times a day.   EPINEPHrine 0.3 mg/0.3 mL injection syringe; Commonly known as: Epipen;   Inject 0.3 mL (0.3 mg) into the muscle if needed for anaphylaxis for up to   1 dose.   ezetimibe 10 mg tablet; Commonly known as: Zetia; Take 1 tablet (10 mg)   by mouth once daily.   ferrous gluconate 270 mg (27 mg iron) tablet   Jardiance 10 mg; Generic drug: empagliflozin; Take 1 tablet (10 mg) by   mouth once daily.   lidocaine 5 % ointment; Commonly known as: Xylocaine   melatonin 3 mg tablet   mirtazapine 30 mg tablet; Commonly known as: Remeron   multivitamin tablet   nystatin 100,000 unit/gram powder; Commonly known as: Mycostatin   ondansetron ODT 4 mg disintegrating tablet; Commonly known as:   Zofran-ODT; Take 1 tablet  (4 mg) by mouth every 8 hours if needed for   nausea or vomiting.   OneTouch Delica Plus Lancet 33 gauge misc; Generic drug: lancets   oxyCODONE-acetaminophen 7.5-325 mg tablet; Commonly known as: Percocet   pantoprazole 40 mg EC tablet; Commonly known as: ProtoNix; TAKE 1 TABLET   BY MOUTH EVERY DAY   tadalafil 20 mg tablet; Commonly known as: Cialis; Take 1 tablet (20 mg)   by mouth once daily as needed for erectile dysfunction.   tamsulosin 0.4 mg 24 hr capsule; Commonly known as: Flomax; Take 1   capsule (0.4 mg) by mouth once daily in the morning.   testosterone 30 mg/actuation (1.5 mL) topical solution; Commonly known   as: Axiron   triamcinolone 0.1 % lotion; Commonly known as: Kenalog   True Metrix Glucose Test Strip strip; Generic drug: blood sugar   diagnostic; USE 1 STRIP 3 times daily   Uloric 40 mg tablet; Generic drug: febuxostat   venlafaxine  mg 24 hr capsule; Commonly known as: Effexor-XR       Test Results Pending At Discharge  Pending Labs       No current pending labs.            Hospital Course  Jony Javier is a 79 y.o. male presenting with PMH of systolic and diastolic heart failure, stage IV CKD, atrial fibrillation, HTN, and chronic pain. His wife brought him in to the ED last night for confusion and altered mental status. This morning he is complaining of pain everywhere. He is unable to characterize the pain or state if it radiates. He does not know why he is in the ED. He is unable to answer most questions. Denies taking any illicit drugs. Wife states this began Monday after he took an unknown number of OTC products including Qnol, Valerian, and melatonin for sleep in addition to 7.5 mg Percocet. She also mentioned that he drinks 2+ beers a night and did so on Sunday. He has had 2 similar episodes in the past that were potential CVAs. Denies CP, SOB, fever, chills, nausea, vomiting, diarrhea, lightheadedness, dizziness, HA, vision changes, paresthesias, weakness, or  hallucinations.     ED Course (Summary):   Vitals on presentation: 97.7F, 66 bpm, 25 RR,129/65 , 96% on RA  Labs: CMP-  106 glucose,133 sodium , 5.1 potassium , 101 chloride , creatinine 2.12  Lactate 0.7  CBC-hemoglobin 11.6  Imaging:   CT head- No evidence of acute cortical infarct or intracranial hemorrhage  CXR- No acute processes  EKG- normal sinus rhythm  Interventions: Admission to hospital     9/11: Patient was seen this morning, alert and sitting up in his bed. No overnight events. He states he is feeling much better today. Denies fever, chills, nausea, vomiting, changes to bowel habitus, headache, vision changes, chest pain, calf pain, palpitations or urinary symptoms. Mentions mild flank pain that is tender to palpation upon RLQ, given oxycodone PO to alleviate. Patient educated on the importance of proper medication dosing and monitoring OTC medication use. Discharge planned for today.  Patient is to follow with PCP in 1 week's time.  Patient and wife were counseled on polypharmacy and over indulging in over-the-counter sleep supplements.    Pertinent Physical Exam At Time of Discharge  Physical Exam  CONSTITUTIONAL - alert, in no acute distress, elderly frail male, pleasant  CHEST - clear to auscultation, no wheezing, no crackles and no rales, good effort  CARDIAC - regular rate and regular rhythm, no murmur  ABDOMEN - no organomegaly, soft, nontender  EXTREMITIES - no edema, no deformities  NEUROLOGICAL - alert, oriented x3 and no acute focal signs  PSYCHIATRIC - alert, pleasant and cordial, age-appropriate    Outpatient Follow-Up  Future Appointments   Date Time Provider Department Center   9/24/2024  2:40 PM LARRY Keene-CNP [id number] [facility]   10/10/2024  3:00 PM Edgardo Gandara MD [id number] [facility]   12/5/2024  1:00 PM [facility]   1/9/2025  2:00 PM Narendra Portillo MD [id number] [facility]     Discharge planning took more than 30  minutes.    Jessica Mendosa MD

## 2024-09-15 LAB
ATRIAL RATE: 68 BPM
ATRIAL RATE: 69 BPM
P AXIS: 130 DEGREES
P AXIS: 59 DEGREES
PR INTERVAL: 127 MS
PR INTERVAL: 181 MS
Q ONSET: 251 MS
Q ONSET: 252 MS
QRS COUNT: 10 BEATS
QRS COUNT: 12 BEATS
QRS DURATION: 138 MS
QRS DURATION: 149 MS
QT INTERVAL: 434 MS
QT INTERVAL: 461 MS
QTC CALCULATION(BAZETT): 458 MS
QTC CALCULATION(BAZETT): 494 MS
QTC FREDERICIA: 450 MS
QTC FREDERICIA: 483 MS
R AXIS: -58 DEGREES
R AXIS: -62 DEGREES
T AXIS: 79 DEGREES
T AXIS: 88 DEGREES
T OFFSET: 468 MS
T OFFSET: 482 MS
VENTRICULAR RATE: 67 BPM
VENTRICULAR RATE: 69 BPM

## 2024-09-17 ENCOUNTER — OFFICE VISIT (OUTPATIENT)
Dept: PAIN MANAGEMENT | Age: 79
End: 2024-09-17
Payer: MEDICARE

## 2024-09-17 VITALS
WEIGHT: 150 LBS | DIASTOLIC BLOOD PRESSURE: 75 MMHG | HEART RATE: 84 BPM | BODY MASS INDEX: 25.61 KG/M2 | OXYGEN SATURATION: 95 % | SYSTOLIC BLOOD PRESSURE: 115 MMHG | TEMPERATURE: 97.3 F | HEIGHT: 64 IN | RESPIRATION RATE: 16 BRPM

## 2024-09-17 DIAGNOSIS — M51.26 DISPLACEMENT OF LUMBAR INTERVERTEBRAL DISC WITHOUT MYELOPATHY: ICD-10-CM

## 2024-09-17 DIAGNOSIS — R10.9 CHRONIC FLANK PAIN: ICD-10-CM

## 2024-09-17 DIAGNOSIS — S33.5XXD LUMBAR SPRAIN, SUBSEQUENT ENCOUNTER: ICD-10-CM

## 2024-09-17 DIAGNOSIS — G89.29 CHRONIC FLANK PAIN: ICD-10-CM

## 2024-09-17 DIAGNOSIS — M51.37 DEGENERATION OF LUMBAR OR LUMBOSACRAL INTERVERTEBRAL DISC: ICD-10-CM

## 2024-09-17 DIAGNOSIS — G89.4 CHRONIC PAIN SYNDROME: Primary | ICD-10-CM

## 2024-09-17 DIAGNOSIS — S33.9XXA CHRONIC OR OLD SPRAIN OF LUMBOSACRAL LIGAMENT: ICD-10-CM

## 2024-09-17 DIAGNOSIS — Z79.891 ENCOUNTER FOR LONG-TERM OPIATE ANALGESIC USE: ICD-10-CM

## 2024-09-17 DIAGNOSIS — M79.10 MYALGIA: ICD-10-CM

## 2024-09-17 PROCEDURE — 3074F SYST BP LT 130 MM HG: CPT | Performed by: PHYSICIAN ASSISTANT

## 2024-09-17 PROCEDURE — 99214 OFFICE O/P EST MOD 30 MIN: CPT

## 2024-09-17 PROCEDURE — 1123F ACP DISCUSS/DSCN MKR DOCD: CPT | Performed by: PHYSICIAN ASSISTANT

## 2024-09-17 PROCEDURE — 1036F TOBACCO NON-USER: CPT | Performed by: PHYSICIAN ASSISTANT

## 2024-09-17 PROCEDURE — G8427 DOCREV CUR MEDS BY ELIG CLIN: HCPCS | Performed by: PHYSICIAN ASSISTANT

## 2024-09-17 PROCEDURE — G8417 CALC BMI ABV UP PARAM F/U: HCPCS | Performed by: PHYSICIAN ASSISTANT

## 2024-09-17 PROCEDURE — 3078F DIAST BP <80 MM HG: CPT | Performed by: PHYSICIAN ASSISTANT

## 2024-09-17 PROCEDURE — 99214 OFFICE O/P EST MOD 30 MIN: CPT | Performed by: PHYSICIAN ASSISTANT

## 2024-09-17 RX ORDER — OXYCODONE AND ACETAMINOPHEN 5; 325 MG/1; MG/1
1 TABLET ORAL 4 TIMES DAILY PRN
Qty: 120 TABLET | Refills: 0 | Status: SHIPPED | OUTPATIENT
Start: 2024-09-17 | End: 2024-10-17

## 2024-09-17 RX ORDER — LACTULOSE 10 G/15ML
10 SOLUTION ORAL EVERY EVENING
Qty: 1 EACH | Refills: 0 | Status: SHIPPED | OUTPATIENT
Start: 2024-09-17

## 2024-09-17 RX ORDER — LIDOCAINE 50 MG/G
1 PATCH TOPICAL DAILY
Qty: 30 PATCH | Refills: 0 | Status: SHIPPED | OUTPATIENT
Start: 2024-09-17 | End: 2024-10-17

## 2024-09-18 ENCOUNTER — HOSPITAL ENCOUNTER (OUTPATIENT)
Dept: CARDIOLOGY | Facility: HOSPITAL | Age: 79
Discharge: HOME | End: 2024-09-18
Payer: MEDICARE

## 2024-09-18 DIAGNOSIS — Z95.810 PRESENCE OF AUTOMATIC (IMPLANTABLE) CARDIAC DEFIBRILLATOR: ICD-10-CM

## 2024-09-18 DIAGNOSIS — I47.29 OTHER VENTRICULAR TACHYCARDIA: ICD-10-CM

## 2024-09-24 ENCOUNTER — TELEPHONE (OUTPATIENT)
Dept: CARDIOLOGY | Facility: HOSPITAL | Age: 79
End: 2024-09-24
Payer: COMMERCIAL

## 2024-09-24 ENCOUNTER — APPOINTMENT (OUTPATIENT)
Dept: CARDIOLOGY | Facility: HOSPITAL | Age: 79
End: 2024-09-24
Payer: MEDICARE

## 2024-09-24 ENCOUNTER — PATIENT OUTREACH (OUTPATIENT)
Dept: PRIMARY CARE | Facility: CLINIC | Age: 79
End: 2024-09-24
Payer: COMMERCIAL

## 2024-09-27 NOTE — PROGRESS NOTES
Left message for patient that if he is still having LE edema, he may take an additional Bumex tablet X 1.  Encouraged compliance with 2 gm sodium restricted diet and 2 liter fluid restrictions.  Requested call back on Monday concerning patient status.

## 2024-09-29 ENCOUNTER — OFFICE VISIT (OUTPATIENT)
Dept: URGENT CARE | Age: 79
End: 2024-09-29
Payer: MEDICARE

## 2024-09-29 VITALS
HEART RATE: 75 BPM | DIASTOLIC BLOOD PRESSURE: 58 MMHG | SYSTOLIC BLOOD PRESSURE: 128 MMHG | TEMPERATURE: 98.2 F | OXYGEN SATURATION: 95 % | RESPIRATION RATE: 16 BRPM

## 2024-09-29 DIAGNOSIS — J01.00 ACUTE MAXILLARY SINUSITIS, RECURRENCE NOT SPECIFIED: ICD-10-CM

## 2024-09-29 DIAGNOSIS — U07.1 COVID-19: Primary | ICD-10-CM

## 2024-09-29 LAB — POC SARS-COV-2 AG BINAX: ABNORMAL

## 2024-09-29 PROCEDURE — 3074F SYST BP LT 130 MM HG: CPT | Performed by: NURSE PRACTITIONER

## 2024-09-29 PROCEDURE — 1157F ADVNC CARE PLAN IN RCRD: CPT | Performed by: NURSE PRACTITIONER

## 2024-09-29 PROCEDURE — 3078F DIAST BP <80 MM HG: CPT | Performed by: NURSE PRACTITIONER

## 2024-09-29 PROCEDURE — 1160F RVW MEDS BY RX/DR IN RCRD: CPT | Performed by: NURSE PRACTITIONER

## 2024-09-29 PROCEDURE — 87811 SARS-COV-2 COVID19 W/OPTIC: CPT | Performed by: NURSE PRACTITIONER

## 2024-09-29 PROCEDURE — 1036F TOBACCO NON-USER: CPT | Performed by: NURSE PRACTITIONER

## 2024-09-29 PROCEDURE — 1123F ACP DISCUSS/DSCN MKR DOCD: CPT | Performed by: NURSE PRACTITIONER

## 2024-09-29 PROCEDURE — 1159F MED LIST DOCD IN RCRD: CPT | Performed by: NURSE PRACTITIONER

## 2024-09-29 PROCEDURE — 99213 OFFICE O/P EST LOW 20 MIN: CPT | Performed by: NURSE PRACTITIONER

## 2024-09-29 RX ORDER — DOXYCYCLINE 100 MG/1
100 CAPSULE ORAL 2 TIMES DAILY
Qty: 20 CAPSULE | Refills: 0 | Status: SHIPPED | OUTPATIENT
Start: 2024-09-29 | End: 2024-10-09

## 2024-09-29 ASSESSMENT — ENCOUNTER SYMPTOMS
WHEEZING: 0
CHILLS: 0
COUGH: 1
FATIGUE: 1
FEVER: 0
HEARTBURN: 0
SWEATS: 0
SHORTNESS OF BREATH: 1
HEADACHES: 1
RHINORRHEA: 1
WEIGHT LOSS: 0
SORE THROAT: 0
HEMOPTYSIS: 0
MYALGIAS: 0

## 2024-09-29 NOTE — PROGRESS NOTES
Subjective   Patient ID: Jony Javier is a 79 y.o. male. They present today with a chief complaint of Cough and Fatigue (X 6 days).    History of Present Illness    History provided by:  Patient   used: No    Cough  This is a new problem. Episode onset: 5 days. The problem has been unchanged. The problem occurs hourly. The cough is Productive of purulent sputum. Associated symptoms include headaches, nasal congestion, rhinorrhea and shortness of breath. Pertinent negatives include no chest pain, chills, ear congestion, ear pain, fever, heartburn, hemoptysis, myalgias, postnasal drip, rash, sore throat, sweats, weight loss or wheezing. Associated symptoms comments: Positive covid test today.. Nothing aggravates the symptoms. He has tried OTC cough suppressant (Sudafed) for the symptoms. The treatment provided no relief.   Fatigue  Associated symptoms: cough, fatigue, headaches, rhinorrhea and shortness of breath    Associated symptoms: no chest pain, no ear pain, no fever, no myalgias, no rash, no sore throat and no wheezing        Past Medical History  Allergies as of 09/29/2024 - Reviewed 09/29/2024   Allergen Reaction Noted    Bee venom protein (honey bee) Anaphylaxis 04/18/2016    Cefaclor Anaphylaxis 01/24/2006    Ciprofloxacin Anaphylaxis 05/05/2023    Pentazocine Hives, Palpitations, and Rash 03/24/2006    Pregabalin Anaphylaxis, Shortness of breath, and Rash 01/29/2019    Allopurinol Hives and Itching 05/05/2023    Metoprolol Drowsiness 11/14/2016    Sulfamethoxazole-trimethoprim Itching and Rash 05/05/2023       (Not in a hospital admission)       Past Medical History:   Diagnosis Date    Acute on chronic systolic (congestive) heart failure (Multi) 09/08/2022    Acute on chronic systolic CHF (congestive heart failure), NYHA class 3    Acute on chronic systolic CHF (congestive heart failure), NYHA class 3 (Multi) 05/05/2023    CHF, acute (Multi) 05/05/2023    Nonrheumatic aortic  (valve) stenosis 01/10/2022    Severe aortic stenosis    Nonrheumatic mitral (valve) insufficiency 09/14/2021    Severe mitral regurgitation by prior echocardiogram    Nonrheumatic mitral (valve) insufficiency 12/07/2022    Severe mitral regurgitation    Other long term (current) drug therapy     Long term current use of amiodarone    Personal history of diseases of the blood and blood-forming organs and certain disorders involving the immune mechanism     History of hemorrhagic diathesis    Personal history of other diseases of the circulatory system 09/20/2022    History of intraventricular hemorrhage    Personal history of other diseases of the circulatory system     History of cardiac disorder    Personal history of other diseases of the circulatory system     History of hypertension    Personal history of other diseases of the musculoskeletal system and connective tissue     History of arthritis    Severe mitral regurgitation 05/05/2023    Unspecified intracranial injury with loss of consciousness status unknown, initial encounter (Multi) 09/20/2022    Closed TBI (traumatic brain injury)       Past Surgical History:   Procedure Laterality Date    OTHER SURGICAL HISTORY  01/08/2020    Pacemaker insertion    OTHER SURGICAL HISTORY  01/08/2020    Knee replacement    OTHER SURGICAL HISTORY  01/08/2020    Cardiac catheterization with stent placement    OTHER SURGICAL HISTORY  12/06/2021    Cardioverter defibrillator insertion    OTHER SURGICAL HISTORY  12/06/2021    Angioplasty    OTHER SURGICAL HISTORY  01/30/2020    Spinal surgery        reports that he quit smoking about 54 years ago. His smoking use included cigarettes. He has never used smokeless tobacco. He reports current alcohol use of about 14.0 standard drinks of alcohol per week. He reports that he does not use drugs.    Review of Systems  Review of Systems   Constitutional:  Positive for fatigue. Negative for chills, fever and weight loss.   HENT:   Positive for rhinorrhea. Negative for ear pain, postnasal drip and sore throat.    Respiratory:  Positive for cough and shortness of breath. Negative for hemoptysis and wheezing.    Cardiovascular:  Negative for chest pain.   Gastrointestinal:  Negative for heartburn.   Musculoskeletal:  Negative for myalgias.   Skin:  Negative for rash.   Neurological:  Positive for headaches.                                  Objective    Vitals:    09/29/24 1551   BP: 128/58   Pulse: 75   Resp: 16   Temp: 36.8 °C (98.2 °F)   SpO2: 95%     No LMP for male patient.    Physical Exam  Vitals and nursing note reviewed.   Constitutional:       Appearance: Normal appearance.   HENT:      Head: Normocephalic and atraumatic.      Right Ear: Hearing, tympanic membrane, ear canal and external ear normal.      Left Ear: Hearing, tympanic membrane, ear canal and external ear normal.      Nose: Mucosal edema, congestion and rhinorrhea present. No nasal deformity, septal deviation, signs of injury, laceration or nasal tenderness. Rhinorrhea is purulent.      Right Sinus: No maxillary sinus tenderness or frontal sinus tenderness.      Left Sinus: No maxillary sinus tenderness or frontal sinus tenderness.      Mouth/Throat:      Lips: Pink.      Mouth: Mucous membranes are moist.      Pharynx: Uvula midline.      Tonsils: No tonsillar exudate or tonsillar abscesses.   Cardiovascular:      Rate and Rhythm: Normal rate and regular rhythm.      Heart sounds: Normal heart sounds.   Pulmonary:      Effort: Pulmonary effort is normal.      Breath sounds: Normal breath sounds and air entry.   Musculoskeletal:      Cervical back: Normal range of motion and neck supple.   Lymphadenopathy:      Cervical: No cervical adenopathy.   Neurological:      Mental Status: He is alert.   Psychiatric:         Mood and Affect: Mood normal.         Behavior: Behavior normal.         Procedures    Point of Care Test & Imaging Results from this visit  Results for orders  placed or performed in visit on 09/29/24   POCT Covid-19 Rapid Antigen   Result Value Ref Range    POC PABLITO-COV-2 AG Positive test for SARS-CoV-2 (antigen detected) (A) Presumptive negative test for SARS-CoV-2 (no antigen detected)      No results found.    Diagnostic study results (if any) were reviewed by ANISHA Madden.    Assessment/Plan   Allergies, medications, history, and pertinent labs/EKGs/Imaging reviewed by ANISHA Madden.         Orders and Diagnoses  Diagnoses and all orders for this visit:  COVID-19  -     molnupiravir (Lagevrio) capsule capsule; Take 2 capsules (400 mg) by mouth every 12 hours for 5 days.  -     POCT Covid-19 Rapid Antigen  Acute maxillary sinusitis, recurrence not specified  -     doxycycline (Vibramycin) 100 mg capsule; Take 1 capsule (100 mg) by mouth 2 times a day for 10 days. Take with at least 8 ounces (large glass) of water, do not lie down for 30 minutes after      Medical Admin Record      Patient disposition: Home    Electronically signed by ANISHA Madden  4:58 PM

## 2024-09-29 NOTE — PATIENT INSTRUCTIONS
Srinath with renal dose adjustment discussed with patient as he had history of kidney disease.  Patient requested for an anti-COVID medication. Paxlovid was not prescribed as it interacted with his medications.    Take the antibiotic with food.  Eat yogurt or take probiotic once a day.  Symptoms should improve in 2 to 3 days.   Wash your hands often, especially after coughing or touching your nose or mouth.  Use disposable tissues instead of handkerchiefs.  Increase fluid intake and rest as needed.  Take Tylenol and/or ibuprofen as needed for aches and pain and/or fever.  Return or follow-up with primary care provider if symptoms did not improve.  Call 911 or go to the nearest emergency room if symptoms became severe such as fever of 102.5 degrees Fahrenheit or 39.2 degrees Celsius, severe pain, shortness of breath, chest tightness.   Patient verbalized understanding of the instructions and left in stable condition.

## 2024-10-03 ENCOUNTER — APPOINTMENT (OUTPATIENT)
Dept: CARDIOLOGY | Facility: HOSPITAL | Age: 79
End: 2024-10-03
Payer: MEDICARE

## 2024-10-03 NOTE — PROGRESS NOTES
Cardiomems reading shows PAD 30 mmhg.  This is significantly elevated He will take the Bumex 1 mg for the next three days.  Patient is being treated for URI and apparently tested positive for Covid 19 infection.   He will continue to send the cardiomems readings.

## 2024-10-08 ENCOUNTER — PATIENT OUTREACH (OUTPATIENT)
Dept: PRIMARY CARE | Facility: CLINIC | Age: 79
End: 2024-10-08
Payer: MEDICARE

## 2024-10-08 DIAGNOSIS — I50.42 CHRONIC COMBINED SYSTOLIC AND DIASTOLIC HEART FAILURE, NYHA CLASS 3: Primary | ICD-10-CM

## 2024-10-10 ENCOUNTER — APPOINTMENT (OUTPATIENT)
Dept: PRIMARY CARE | Facility: CLINIC | Age: 79
End: 2024-10-10
Payer: MEDICARE

## 2024-10-10 VITALS
HEART RATE: 82 BPM | HEIGHT: 65 IN | OXYGEN SATURATION: 98 % | SYSTOLIC BLOOD PRESSURE: 115 MMHG | BODY MASS INDEX: 24.96 KG/M2 | RESPIRATION RATE: 16 BRPM | TEMPERATURE: 97.8 F | DIASTOLIC BLOOD PRESSURE: 57 MMHG

## 2024-10-10 DIAGNOSIS — U07.1 COVID: ICD-10-CM

## 2024-10-10 DIAGNOSIS — E11.51 TYPE 2 DIABETES MELLITUS WITH DIABETIC PERIPHERAL ANGIOPATHY WITHOUT GANGRENE, WITHOUT LONG-TERM CURRENT USE OF INSULIN (MULTI): ICD-10-CM

## 2024-10-10 DIAGNOSIS — I48.19 PERSISTENT ATRIAL FIBRILLATION (MULTI): ICD-10-CM

## 2024-10-10 DIAGNOSIS — R04.2 COUGH WITH HEMOPTYSIS: Primary | ICD-10-CM

## 2024-10-10 DIAGNOSIS — Z00.00 ROUTINE GENERAL MEDICAL EXAMINATION AT HEALTH CARE FACILITY: ICD-10-CM

## 2024-10-10 DIAGNOSIS — F32.4 MAJOR DEPRESSIVE DISORDER IN PARTIAL REMISSION, UNSPECIFIED WHETHER RECURRENT (CMS-HCC): ICD-10-CM

## 2024-10-10 DIAGNOSIS — R11.0 NAUSEA: ICD-10-CM

## 2024-10-10 DIAGNOSIS — F41.9 ANXIETY DISORDER, UNSPECIFIED TYPE: ICD-10-CM

## 2024-10-10 DIAGNOSIS — I25.10 CORONARY ARTERY DISEASE INVOLVING NATIVE CORONARY ARTERY OF NATIVE HEART, UNSPECIFIED WHETHER ANGINA PRESENT: ICD-10-CM

## 2024-10-10 DIAGNOSIS — N18.32 STAGE 3B CHRONIC KIDNEY DISEASE (MULTI): ICD-10-CM

## 2024-10-10 LAB — POC SARS-COV-2 AG BINAX: NORMAL

## 2024-10-10 PROCEDURE — 99215 OFFICE O/P EST HI 40 MIN: CPT | Performed by: INTERNAL MEDICINE

## 2024-10-10 PROCEDURE — 1157F ADVNC CARE PLAN IN RCRD: CPT | Performed by: INTERNAL MEDICINE

## 2024-10-10 PROCEDURE — G0439 PPPS, SUBSEQ VISIT: HCPCS | Performed by: INTERNAL MEDICINE

## 2024-10-10 PROCEDURE — 1036F TOBACCO NON-USER: CPT | Performed by: INTERNAL MEDICINE

## 2024-10-10 PROCEDURE — 1159F MED LIST DOCD IN RCRD: CPT | Performed by: INTERNAL MEDICINE

## 2024-10-10 PROCEDURE — 1123F ACP DISCUSS/DSCN MKR DOCD: CPT | Performed by: INTERNAL MEDICINE

## 2024-10-10 PROCEDURE — 1170F FXNL STATUS ASSESSED: CPT | Performed by: INTERNAL MEDICINE

## 2024-10-10 PROCEDURE — 3078F DIAST BP <80 MM HG: CPT | Performed by: INTERNAL MEDICINE

## 2024-10-10 PROCEDURE — 3074F SYST BP LT 130 MM HG: CPT | Performed by: INTERNAL MEDICINE

## 2024-10-10 PROCEDURE — 1160F RVW MEDS BY RX/DR IN RCRD: CPT | Performed by: INTERNAL MEDICINE

## 2024-10-10 PROCEDURE — 87811 SARS-COV-2 COVID19 W/OPTIC: CPT | Performed by: INTERNAL MEDICINE

## 2024-10-10 RX ORDER — ONDANSETRON 4 MG/1
4 TABLET, ORALLY DISINTEGRATING ORAL EVERY 8 HOURS PRN
Qty: 12 TABLET | Refills: 0 | Status: SHIPPED | OUTPATIENT
Start: 2024-10-10

## 2024-10-10 RX ORDER — CLOBETASOL PROPIONATE 0.5 MG/ML
SOLUTION TOPICAL
COMMUNITY
Start: 2024-07-25

## 2024-10-10 RX ORDER — DIAZEPAM 5 MG/1
5 TABLET ORAL EVERY 8 HOURS PRN
Qty: 90 TABLET | Refills: 0 | Status: SHIPPED | OUTPATIENT
Start: 2024-10-10

## 2024-10-10 SDOH — ECONOMIC STABILITY: FOOD INSECURITY: WITHIN THE PAST 12 MONTHS, YOU WORRIED THAT YOUR FOOD WOULD RUN OUT BEFORE YOU GOT MONEY TO BUY MORE.: NEVER TRUE

## 2024-10-10 SDOH — ECONOMIC STABILITY: FOOD INSECURITY: WITHIN THE PAST 12 MONTHS, THE FOOD YOU BOUGHT JUST DIDN'T LAST AND YOU DIDN'T HAVE MONEY TO GET MORE.: NEVER TRUE

## 2024-10-10 ASSESSMENT — PATIENT HEALTH QUESTIONNAIRE - PHQ9
SUM OF ALL RESPONSES TO PHQ9 QUESTIONS 1 & 2: 0
2. FEELING DOWN, DEPRESSED OR HOPELESS: NOT AT ALL
1. LITTLE INTEREST OR PLEASURE IN DOING THINGS: NOT AT ALL

## 2024-10-10 ASSESSMENT — LIFESTYLE VARIABLES
SKIP TO QUESTIONS 9-10: 1
AUDIT-C TOTAL SCORE: 4
HOW OFTEN DO YOU HAVE SIX OR MORE DRINKS ON ONE OCCASION: NEVER
HOW MANY STANDARD DRINKS CONTAINING ALCOHOL DO YOU HAVE ON A TYPICAL DAY: 1 OR 2
HOW OFTEN DO YOU HAVE A DRINK CONTAINING ALCOHOL: 4 OR MORE TIMES A WEEK

## 2024-10-10 ASSESSMENT — ACTIVITIES OF DAILY LIVING (ADL)
MANAGING_FINANCES: INDEPENDENT
TAKING_MEDICATION: INDEPENDENT
DRESSING: INDEPENDENT
BATHING: INDEPENDENT
GROCERY_SHOPPING: INDEPENDENT
DOING_HOUSEWORK: INDEPENDENT

## 2024-10-10 ASSESSMENT — ENCOUNTER SYMPTOMS
OCCASIONAL FEELINGS OF UNSTEADINESS: 1
LOSS OF SENSATION IN FEET: 1
DEPRESSION: 0

## 2024-10-10 NOTE — ASSESSMENT & PLAN NOTE
Continue to monitor, check labs    Orders:    Albumin-Creatinine Ratio, Urine Random; Future    CBC and Auto Differential; Future    Comprehensive Metabolic Panel; Future    Hemoglobin A1C; Future

## 2024-10-10 NOTE — ASSESSMENT & PLAN NOTE
Patient has had cough and hemoptysis, check a chest x ray, patient recently tested positive for COVID, check again for COVID, continue doxycycline ordered.     Orders:    XR chest 2 views; Future    CBC and Auto Differential; Future    Comprehensive Metabolic Panel; Future    POCT BinaxNOW Covid-19 Ag Card manually resulted

## 2024-10-10 NOTE — PROGRESS NOTES
"Subjective   Reason for Visit: Jony Javier is an 79 y.o. male here for a Medicare Wellness visit.     Past Medical, Surgical, and Family History reviewed and updated in chart.    Reviewed all medications by prescribing practitioner or clinical pharmacist (such as prescriptions, OTCs, herbal therapies and supplements) and documented in the medical record.    HPI    Follow up and Medicare Wellness Exam:     Admitted 9/10/24-9/11/24 [facility]: Acute metabolic encephalopathy due to unintentional drug overdose. He took an unknown number of OTC products including Qnol, Valerian, and melatonin for sleep in addition to 7.5 mg Percocet. He drinks 2+ beers a night. He had altered mental status but was back to baseline upon discharge. Patient has had \"weird incidents\" in the past that wife has attributed to Valerian and melatonin.     Cough: Experiencing hemoptysis. He has had a sinus infection treated with doxycycline 3 weeks ago. He tested positive for COVID on 9/29/2024. He is on Eliquis. He has not had chest x-ray. He has significant bruising of skin also. He has been coughing up some blood for about 2 weeks now. He did have cough productive of \"a large bloody clot of blood. Patient was in the Urgent care, he was not prescribed Paxlovid due to med interactions. He was prescribed the alternative oral med (Lagevrio) , but it took several days to get the med, he never took the med.      HTN/ CHF: patient takes Entresto, carvedilol , Bumex, Jardiance.  He does check his blood pressure at home and reports it is normally high in the morning, and then low after he takes the medication. BP today 115/57. He [facility] to [facility], he only takes Bumex every other day       Hyperlipidemia: takes Zetia 10 mg and atorvastatin 80 mg. Chol 124, LDL 30, TG 96     Type II DM: patient currently takes Jardiance 10 mg for CHF, he does check glucoses every morning (usually runs around 100). HgbA1C 6.0     Carotid stenosis/ CAD/ " Aortic stenosis/ Atrial fibrillation/ Cardiac arrest/PEA and Mitral regurgitation/ CHF : patient sees Dr Portillo, patient has a pacer/ defibrillator. He takes Eliquis 5 mg, he does not take ASA , He goes to [facility] also. He now takes Entresto, carvedilol, Jardiance. Patient saw Dr Ortiz, he was to follow up with Dr Ragsdale to be assessed for stent procedure, he has not yet followed up with Dr Ragsdale.      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 Patient has not followed with hematololgy     HECTOR: patient is using CPAP     Chronic pain issues: patient takes oxycodone per Dr Aguirre at pain management in [place]. He also takes baclofen. He reports that his back pain really bothers him , and it is radiating into his right hip. He also has right shoulder pain and knee pain (left knee).      Depression/anxiety: he takes Effexor and mirtazapine. He takes diazepam for anxiety, he takes this daily.  I have personally reviewed the OARRS report.  This report is scanned into the electronic medical record.  I have considered the risks of abuse, dependence, addiction and diversion.  I believe that it is clinically appropriate to prescribe this medication. Edgardo Gandara MD.      Anemia: Continues to be anemic on blood work , he has bruising, he has been having cough productive of some blood for about 2 weeks       Patient Care Team:  Edgardo Gandara MD as PCP - General (Internal Medicine)  Edg[facility] Gandara [facility]  Irma Bergeron LPN as Care Manager (Case Management)     Review of Systems   otherwise negative aside from what was mentioned above in HPI.   Objective   Vitals:  /57 (BP Location: Right arm, Patient Position: Sitting, BP Cuff Size: Adult)   Pulse 82   Temp 36.6 °C (97.8 °F)   Resp 16   Ht  "1.651 m (5' 5\")   SpO2 98%   BMI 24.96 kg/m²       Physical Exam    Constitutional   General appearance: Alert and in no acute distress. well developed, well nourished, within normal limits of ideal weight,  accompanied by wife, walks with a cane. Pleasant male, NAD   Eyes   Inspection of eyes: Sclera and conjunctiva were normal.   Ears, Nose, Mouth, and Throat   Ears: Auricles:  Normal. No thyromegaly or neck masses  Pulmonary   Respiratory assessment: No respiratory distress, normal respiratory rhythm and effort.    Auscultation of lungs:  no rales or crackles were heard bilaterally. no rhonchi. no wheezing. diminished breath sounds throughout all lung fields, especially in the bases   Cardiovascular   Auscultation of heart: The heart rate was normal. Irregularly irregular rate and rhythm. Heart sounds: the heart sounds were distant,  A grade 2 systolic murmur was heard at the RUSB. A grade 1 systolic murmur was heard at the LUSB. Murmur radiates to the carotids, this is not new.    Exam for edema:  trace ankle edema on the bilateral lower extremities  Lymphatic   Palpation of lymph nodes in neck: No cervical lymphadenopathy.   Musculoskeletal   pain in the right para lumbar region.  He sees pain management  Neurologic   Cranial nerves: Nerves 2-12 were intact, no focal neuro defects. no facial asymmetry is present.   Psychiatric   Orientation: Oriented to person, place, and time.    Mood and affect: Normal. feels tired.    Assessment & Plan  Anxiety disorder, unspecified type  Stable, no med changes at present time. I have personally reviewed the OARRS report.  This report is scanned into the electronic medical record.  I have considered the risks of abuse, dependence, addiction and diversion.  I believe that it is clinically appropriate to prescribe this medication. Edgardo Gandara MD   Orders:    diazePAM (Valium) 5 mg tablet; Take 1 tablet (5 mg) by mouth every 8 hours if needed for anxiety.    CBC " and Auto Differential; Future    Comprehensive Metabolic Panel; Future    Nausea    Orders:    ondansetron ODT (Zofran-ODT) 4 mg disintegrating tablet; Take 1 tablet (4 mg) by mouth every 8 hours if needed for nausea or vomiting.    CBC and Auto Differential; Future    Comprehensive Metabolic Panel; Future    Cough with hemoptysis  Patient has had cough and hemoptysis, check a chest x ray, patient recently tested positive for COVID, check again for COVID, continue doxycycline ordered.     Orders:    XR chest 2 views; Future    CBC and Auto Differential; Future    Comprehensive Metabolic Panel; Future    POCT BinaxNOW Covid-19 Ag Card manually resulted    Routine general medical examination at health care facility Medicare wellness exam completed today, recommend flu vaccine when healthy, check for COVID and check CXR    Orders:    1 Year Follow Up In Primary Care - Wellness Exam; Future    CBC and Auto Differential; Future    Comprehensive Metabolic Panel; Future    Major depressive disorder in partial remission, unspecified whether recurrent (CMS-[facility])  Continue current med therapies  Orders:    CBC and Auto Differential; Future    Comprehensive Metabolic Panel; Future    Coronary artery disease involving native coronary artery of native heart, unspecified whether angina present  continue follow up with cardiology and heart failure clinic    Orders:    CBC and Auto Differential; Future    Comprehensive Metabolic Panel; Future    Lipid Panel; Future    Persistent atrial fibrillation (Multi)  Continue Eliquis, no changes    Orders:    CBC and Auto Differential; Future    Comprehensive Metabolic Panel; Future    Type 2 diabetes mellitus with diabetic peripheral angiopathy without gangrene, without long-term current use of insulin (Multi)  Continue to monitor, check labs    Orders:    Albumin-Creatinine Ratio, Urine Random; Future    CBC and Auto Differential; Future    Comprehensive Metabolic Panel; Future     Hemoglobin A1C; Future    Stage 3b chronic kidney disease (Multi)  Patient sees nephrology, Dr Dixon    Orders:    CBC and Auto Differential; Future    Comprehensive Metabolic Panel; Future    COVID    Orders:    POCT BinaxNOW Covid-19 Ag Card manually resulted     check CXR and COVID nasal swab, continue doxycycline as ordered  Check labs, follow up in 3 months, Medicare wellness exam completed

## 2024-10-10 NOTE — ASSESSMENT & PLAN NOTE
Continue current med therapies  Orders:    CBC and Auto Differential; Future    Comprehensive Metabolic Panel; Future

## 2024-10-10 NOTE — ASSESSMENT & PLAN NOTE
Continue Eliquis, no changes    Orders:    CBC and Auto Differential; Future    Comprehensive Metabolic Panel; Future

## 2024-10-10 NOTE — ASSESSMENT & PLAN NOTE
continue follow up with cardiology and heart failure clinic    Orders:    CBC and Auto Differential; Future    Comprehensive Metabolic Panel; Future    Lipid Panel; Future

## 2024-10-10 NOTE — ASSESSMENT & PLAN NOTE
Medicare wellness exam completed today, recommend flu vaccine when healthy, check for COVID and check CXR    Orders:    1 Year Follow Up In Primary Care - Wellness Exam; Future    CBC and Auto Differential; Future    Comprehensive Metabolic Panel; Future

## 2024-10-10 NOTE — PROGRESS NOTES
Chief Complaint: Medicare Wellness Exam/Comprehensive Problem Focused Follow Up and Physical Exam    HPI:    Admitted 9/10/24-9/11/24  PMC: Acute metabolic encephalopathy due to unintentional drug overdose. He took an unknown number of OTC products including Qnol, Valerian, and melatonin for sleep in addition to 7.5 mg Percocet. He drinks 2+ beers a night. He had altered mental status but was back to baseline upon discharge.    Cough: Experiencing hemoptysis. He has had a sinus infection treated with doxycycline 3 weeks ago. He tested positive for COVID. He is on Eliquis. He has not had chest x-ray.    HTN/ CHF: patient takes Entresto, carvedilol , Bumex, Jardiance.  He does check his blood pressure at home and reports it is normally high in the morning, and then low after he takes the medication. BP today 115/57. [facility], he only takes Bumex every other day       Hyperlipidemia: takes Zetia 10 mg and atorvastatin 80 mg. Chol 124, LDL 30, TG 96     Type II DM: patient currently takes Jardiance 10 mg for CHF, he does check glucoses every morning (usually runs around 100). A1C 6.0     Carotid stenosis/ CAD/ Aortic stenosis/ Atrial fibrillation/ Cardiac arrest/PEA and Mitral regurgitation/ CHF : patient sees Dr Portillo, patient has a pacer/ defibrillator. He takes Eliquis 5 mg, he does not take ASA , He [facility] also. He now takes Entresto, carvedilol, Jardiance. 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 Patient has not followed with hematololgy     HECTOR: patient is using CPAP     Chronic pain issues: patient takes oxycodone per Dr Aguirre at pain management in [place]. He also takes  baclofen. He reports that his back pain really bothers him , and it is radiating into his right hip. He also has right shoulder pain and knee pain (left knee).      Depression/anxiety: he takes Effexor and mirtazapine. He takes diazepam for anxiety, he takes this daily.  I have personally reviewed the OARRS report.  This report is scanned into the electronic medical record.  I have considered the risks of abuse, dependence, addiction and diversion.  I believe that it is clinically appropriate to prescribe this medication. Edgardo Gandara MD.      Anemia: Continues to be anemic on blood work       Active Problem List  Problem List       Ventricular tachycardia (Multi) (Chronic)       Comprehensive Medical/Surgical/Social/Family History  Past Medical History:   Diagnosis Date    Acute on chronic systolic (congestive) heart failure 09/08/2022    Acute on chronic systolic CHF (congestive heart failure), NYHA class 3    Acute on chronic systolic CHF (congestive heart failure), NYHA class 3 05/05/2023    CHF, acute (Multi) 05/05/2023    Nonrheumatic aortic (valve) stenosis 01/10/2022    Severe aortic stenosis    Nonrheumatic mitral (valve) insufficiency 09/14/2021    Severe mitral regurgitation by prior echocardiogram    Nonrheumatic mitral (valve) insufficiency 12/07/2022    Severe mitral regurgitation    Other long term (current) drug therapy     Long term current use of amiodarone    Personal history of diseases of the blood and blood-forming organs and certain disorders involving the immune mechanism     History of hemorrhagic diathesis    Personal history of other diseases of the circulatory system 09/20/2022    History of intraventricular hemorrhage    Personal history of other diseases of the circulatory system     History of cardiac disorder    Personal history of other diseases of the circulatory system     History of hypertension    Personal history of other diseases of the musculoskeletal system and  connective tissue     History of arthritis    Severe mitral regurgitation 05/05/2023    Unspecified intracranial injury with loss of consciousness status unknown, initial encounter (Multi) 09/20/2022    Closed TBI (traumatic brain injury)     Past Surgical History:   Procedure Laterality Date    OTHER SURGICAL HISTORY  01/08/2020    Pacemaker insertion    OTHER SURGICAL HISTORY  01/08/2020    Knee replacement    OTHER SURGICAL HISTORY  01/08/2020    Cardiac catheterization with stent placement    OTHER SURGICAL HISTORY  12/06/2021    Cardioverter defibrillator insertion    OTHER SURGICAL HISTORY  12/06/2021    Angioplasty    OTHER SURGICAL HISTORY  01/30/2020    Spinal surgery     Social History     Social History Narrative    Not on file         Allergies and Medications  Bee venom protein (honey bee), Cefaclor, Ciprofloxacin, Pentazocine, Pregabalin, Allopurinol, Metoprolol, and Sulfamethoxazole-trimethoprim  Current Outpatient Medications on File Prior to Visit   Medication Sig Dispense Refill    albuterol 90 mcg/actuation inhaler INHALE 1 (ONE) PUFF EVERY 4 HOURS AS NEEDED 8.5 g 8    atorvastatin (Lipitor) 80 mg tablet Take 1 tablet (80 mg) by mouth once daily at bedtime. 90 tablet 3    baclofen (Lioresal) 10 mg tablet Take 1 tablet (10 mg) by mouth 2 times a day as needed.      blood sugar diagnostic (True Metrix Glucose Test Strip) strip USE 1 STRIP 3 times daily 300 strip 3    bumetanide (Bumex) 1 mg tablet Take 1 tablet (1 mg) by mouth 2 times a week. May take additional dose for weight gain 3#, increased swelling or shortness of breath. (Patient taking differently: Take 1 tablet (1 mg) by mouth once daily. Pt to take daily if he is able to.) 24 tablet 3    carvedilol (Coreg) 25 mg tablet Take 1 tablet (25 mg) by mouth 2 times a day. 180 tablet 3    clobetasol (Temovate) 0.05 % external solution APPLY TO THE AFFECTED AREA(S) EVERY DAY AS NEEDED      diazePAM (Valium) 5 mg tablet Take 1 tablet (5 mg) by mouth  every 8 hours if needed for anxiety. 90 tablet 0    Eliquis 5 mg tablet Take 1 tablet (5 mg) by mouth 2 times a day. 180 tablet 3    Entresto 49-51 mg tablet Take 1 tablet by mouth 2 times a day. 180 tablet 3    EPINEPHrine 0.3 mg/0.3 mL injection syringe Inject 0.3 mL (0.3 mg) into the muscle if needed for anaphylaxis for up to 1 dose. 1 each 3    ezetimibe (Zetia) 10 mg tablet Take 1 tablet (10 mg) by mouth once daily. 90 tablet 3    febuxostat (Uloric) 40 mg tablet Take 1 tablet (40 mg) by mouth once daily.      ferrous gluconate 270 mg (27 mg iron) tablet Take 1 tablet by mouth once daily.      Jardiance 10 mg Take 1 tablet (10 mg) by mouth once daily. 30 tablet 11    lidocaine (Xylocaine) 5 % ointment APPLY TO THE AFFECTED AREA(S) AS NEEDED FOR PAIN to last 30 days      melatonin 3 mg tablet Take 1 tablet (3 mg) by mouth as needed at bedtime for sleep.      mirtazapine (Remeron) 30 mg tablet Take 1 tablet (30 mg) by mouth once daily at bedtime.      multivitamin tablet Take 1 tablet by mouth once daily.      nystatin (Mycostatin) 100,000 unit/gram powder Apply 1 Application topically 2 times a day.      ondansetron ODT (Zofran-ODT) 4 mg disintegrating tablet Take 1 tablet (4 mg) by mouth every 8 hours if needed for nausea or vomiting. 12 tablet 0    OneTouch Delica Plus Lancet 33 gauge misc Test THREE TIMES DAILY AS DIRECTED      oxyCODONE-acetaminophen (Percocet) 7.5-325 mg tablet Take 1 tablet by mouth 3 times daily as needed for Pain for up to 30 days. Max Daily Amount 3 tablets      pantoprazole (ProtoNix) 40 mg EC tablet TAKE 1 TABLET BY MOUTH EVERY DAY 90 tablet 3    tadalafil 20 mg tablet Take 1 tablet (20 mg) by mouth once daily as needed for erectile dysfunction. 90 tablet 4    tamsulosin (Flomax) 0.4 mg 24 hr capsule Take 1 capsule (0.4 mg) by mouth once daily in the morning. 90 capsule 1    testosterone (Axiron) 30 mg/actuation (1.5 mL) topical solution Place 2 Pump on the skin once daily.       "triamcinolone (Kenalog) 0.1 % lotion Apply 1 Application topically if needed (psoriasis).      venlafaxine XR (Effexor-XR) 150 mg 24 hr capsule TAKE 1 CAPSULE BY MOUTH AFTER BREAKFAST      [] doxycycline (Vibramycin) 100 mg capsule Take 1 capsule (100 mg) by mouth 2 times a day for 10 days. Take with at least 8 ounces (large glass) of water, do not lie down for 30 minutes after 20 capsule 0    [] molnupiravir (Lagevrio) capsule capsule Take 2 capsules (400 mg) by mouth every 12 hours for 5 days. 20 capsule 0     No current facility-administered medications on file prior to visit.       Medications and Supplements  prescribed by me and other practitioners or clinical pharmacist (such as prescriptions, OTC's, herbal therapies and supplements) were reviewed and documented in the medical record.    Tobacco/Alcohol/Opioid use, as well as Illicit Drug Use was screened for/reviewed and documented in Social History section and medication list as appropriate  Activities of Daily Living  In your present state of health, do you have any difficulty performing the following activities?:   Preparing food and eating?: {yes/no (default no):1400::\"No\"}  Bathing yourself: {yes/no (default no):1400::\"No\"}  Getting dressed: {yes/no (default no):1400::\"No\"}  Using the toilet:{yes/no (default no):1400::\"No\"}  Moving around from place to place: {yes/no (default no):1400::\"No\"}  In the past year have you fallen or had a near fall?:{yes/no (default no):1400::\"No\"}    Depression Screen  (Note: if answer to either of the following is \"Yes\", then a more complete depression screening is indicated)   Q1: Over the past two weeks, have you felt down, depressed or hopeless? {yes/no (default no):1400::\"No\"}  Q2: Over the past two weeks, have you felt little interest or pleasure in doing things? {yes/no (default no):1400::\"No\"}    Current exercise habits: {exercise:}   Dietary issues discussed: Yes  Hearing " "difficulties: {yes/no (default no):298163::\"No\"}  Safe in current home environment: {yes/no free text:617697}  Visual Acuity assessed: {yes/no free text:414700}  Cognitive Impairment assessed: {yes/no free text:560799}       Advance directives  Advanced Care Planning (including a Living Will, Healthcare POA, as well as specific end of life choices and/or directives), was discussed for approximately 16 minutes with the patient and/or surrogate, voluntarily, and documented in the medical record.     Cardiac Risk Assessment  Cardiovascular risk was discussed and, if needed, lifestyle modifications recommended, including nutritional choices, exercise, and elimination of habits contributing to risk. We agreed on a plan to reduce the current cardiovascular risk based on above discussion as needed.  Aspirin use/disuse was discussed after reviewing the updated guidelines below:    Consider low dose Aspirin ( mg) use if the benefit for cardiovascular disease prevention outweighs risk for bleeding complications.   In general, low dose ASA should be considered:  In patients WITHOUT prior MI/stroke/PAD (primary prevention):   a. Age <60: Use if 10-year cardiovascular disease risk >20%, with discussion of risks and benefits with patient  b. Age 60-<70: Use if 10-year cardiovascular disease risk >20% and low bleeding (e.g., gastrointenstinal) risk, with discussion of risks and benefits with patient  c. Age >=70: Do not use    In patients WITH prior MI/stroke/PAD (secondary prevention):   Generally use unless extremely high bleeding (e.g., gastrointenstinal) risk, with discussion of risks and benefits with patient    ROS otherwise negative aside from what was mentioned above in HPI.    Vitals  /57 (BP Location: Right arm, Patient Position: Sitting, BP Cuff Size: Adult)   Pulse 82   Temp 36.6 °C (97.8 °F)   Resp 16   Ht 1.651 m (5' 5\")   SpO2 98%   BMI 24.96 kg/m²   Body mass index is 24.96 kg/m².  Physical " Exam  Constitutional   General appearance: Alert and in no acute distress. well developed, well nourished, within normal limits of ideal weight,  accompanied by wife, walks with a cane. Pleasant male, NAD   Eyes   Inspection of eyes: Sclera and conjunctiva were normal.   Ears, Nose, Mouth, and Throat   Ears: Auricles:  Normal. No thyromegaly or neck masses  Pulmonary   Respiratory assessment: No respiratory distress, normal respiratory rhythm and effort.    Auscultation of lungs:  no rales or crackles were heard bilaterally. no rhonchi. no wheezing. diminished breath sounds throughout all lung fields   Cardiovascular   Auscultation of heart: The heart rate was normal. The rhythm was regular. Heart sounds: the heart sounds were distant, but normal S1 and normal S2. A grade 2 systolic murmur was heard at the RUSB. A grade 1 systolic murmur was heard at the LUSB. Murmur radiates to the carotids, this is not new.    Exam for edema:  1 + ankle edema on the bilateral lower extremities  Lymphatic   Palpation of lymph nodes in neck: No cervical lymphadenopathy.   Musculoskeletal   pain in the right para lumbar region.  He sees pain management  Neurologic   Cranial nerves: Nerves 2-12 were intact, no focal neuro defects. no facial asymmetry is present.   Psychiatric   Orientation: Oriented to person, place, and time.    Mood and affect: Normal. feels tired.          Assessment and Plan:  Problem List Items Addressed This Visit       Anxiety disorder    Cough with hemoptysis - Primary     Other Visit Diagnoses       Nausea                    During the course of the visit the patient was educated and counseled about age appropriate screening and preventive services. Completed preventive screenings were documented in the chart and orders were placed for outstanding screenings/procedures as documented in the Assessment and Plan.      Patient Instructions (the written plan) was given to the patient at check out.      Kathy  Adornato

## 2024-10-10 NOTE — ASSESSMENT & PLAN NOTE
Stable, no med changes at present time. I have personally reviewed the OARRS report.  This report is scanned into the electronic medical record.  I have considered the risks of abuse, dependence, addiction and diversion.  I believe that it is clinically appropriate to prescribe this medication. Edgardo Gandara MD   Orders:    diazePAM (Valium) 5 mg tablet; Take 1 tablet (5 mg) by mouth every 8 hours if needed for anxiety.    CBC and Auto Differential; Future    Comprehensive Metabolic Panel; Future

## 2024-10-10 NOTE — ASSESSMENT & PLAN NOTE
Patient sees nephrology, Dr Dixon    Orders:    CBC and Auto Differential; Future    Comprehensive Metabolic Panel; Future

## 2024-10-11 ENCOUNTER — HOSPITAL ENCOUNTER (OUTPATIENT)
Dept: RADIOLOGY | Facility: CLINIC | Age: 79
Discharge: HOME | End: 2024-10-11
Payer: MEDICARE

## 2024-10-11 DIAGNOSIS — R04.2 HEMOPTYSIS: ICD-10-CM

## 2024-10-11 PROCEDURE — 71046 X-RAY EXAM CHEST 2 VIEWS: CPT

## 2024-10-16 ENCOUNTER — TELEPHONE (OUTPATIENT)
Dept: PRIMARY CARE | Facility: CLINIC | Age: 79
End: 2024-10-16
Payer: MEDICARE

## 2024-10-16 DIAGNOSIS — B35.6 TINEA CRURIS: ICD-10-CM

## 2024-10-16 DIAGNOSIS — B37.0 THRUSH: Primary | ICD-10-CM

## 2024-10-16 RX ORDER — FLUCONAZOLE 100 MG/1
100 TABLET ORAL DAILY
Qty: 14 TABLET | Refills: 0 | Status: SHIPPED | OUTPATIENT
Start: 2024-10-16 | End: 2024-10-30

## 2024-10-16 NOTE — TELEPHONE ENCOUNTER
Pt finished taking an antibiotic (doxycyline) and says he thinks that he may have thrust in his mouth.   He was wondering if something could be called in.  Also he is having a  groin infection.  He is raw and red. No fever or oozing. It is normal for him after he finishes an antibiotic that he get thrush and a groin infection.  He is usually prescribed an oral medication (same as for yeast infections).      [facility]

## 2024-10-17 ENCOUNTER — OFFICE VISIT (OUTPATIENT)
Dept: PAIN MANAGEMENT | Age: 79
End: 2024-10-17
Payer: MEDICARE

## 2024-10-17 VITALS
HEIGHT: 64 IN | TEMPERATURE: 97.6 F | WEIGHT: 150 LBS | DIASTOLIC BLOOD PRESSURE: 64 MMHG | OXYGEN SATURATION: 93 % | BODY MASS INDEX: 25.61 KG/M2 | SYSTOLIC BLOOD PRESSURE: 105 MMHG | RESPIRATION RATE: 16 BRPM | HEART RATE: 67 BPM

## 2024-10-17 DIAGNOSIS — G89.4 CHRONIC PAIN SYNDROME: ICD-10-CM

## 2024-10-17 DIAGNOSIS — G89.29 CHRONIC FLANK PAIN: ICD-10-CM

## 2024-10-17 DIAGNOSIS — R10.9 CHRONIC FLANK PAIN: ICD-10-CM

## 2024-10-17 DIAGNOSIS — Z79.891 ENCOUNTER FOR LONG-TERM OPIATE ANALGESIC USE: Primary | ICD-10-CM

## 2024-10-17 DIAGNOSIS — Z79.891 ENCOUNTER FOR LONG-TERM OPIATE ANALGESIC USE: ICD-10-CM

## 2024-10-17 DIAGNOSIS — S33.5XXD LUMBAR SPRAIN, SUBSEQUENT ENCOUNTER: ICD-10-CM

## 2024-10-17 DIAGNOSIS — M51.379 DEGENERATION OF LUMBAR OR LUMBOSACRAL INTERVERTEBRAL DISC: ICD-10-CM

## 2024-10-17 DIAGNOSIS — M51.26 DISPLACEMENT OF LUMBAR INTERVERTEBRAL DISC WITHOUT MYELOPATHY: ICD-10-CM

## 2024-10-17 DIAGNOSIS — M79.10 MYALGIA: ICD-10-CM

## 2024-10-17 DIAGNOSIS — S33.9XXA CHRONIC OR OLD SPRAIN OF LUMBOSACRAL LIGAMENT: ICD-10-CM

## 2024-10-17 DIAGNOSIS — M51.379 DEGENERATION OF INTERVERTEBRAL DISC OF LUMBOSACRAL REGION, UNSPECIFIED WHETHER PAIN PRESENT: ICD-10-CM

## 2024-10-17 PROCEDURE — 1036F TOBACCO NON-USER: CPT | Performed by: PHYSICIAN ASSISTANT

## 2024-10-17 PROCEDURE — G8484 FLU IMMUNIZE NO ADMIN: HCPCS | Performed by: PHYSICIAN ASSISTANT

## 2024-10-17 PROCEDURE — 1123F ACP DISCUSS/DSCN MKR DOCD: CPT | Performed by: PHYSICIAN ASSISTANT

## 2024-10-17 PROCEDURE — 99213 OFFICE O/P EST LOW 20 MIN: CPT

## 2024-10-17 PROCEDURE — 3074F SYST BP LT 130 MM HG: CPT | Performed by: PHYSICIAN ASSISTANT

## 2024-10-17 PROCEDURE — 99213 OFFICE O/P EST LOW 20 MIN: CPT | Performed by: PHYSICIAN ASSISTANT

## 2024-10-17 PROCEDURE — G8417 CALC BMI ABV UP PARAM F/U: HCPCS | Performed by: PHYSICIAN ASSISTANT

## 2024-10-17 PROCEDURE — 3078F DIAST BP <80 MM HG: CPT | Performed by: PHYSICIAN ASSISTANT

## 2024-10-17 PROCEDURE — G8427 DOCREV CUR MEDS BY ELIG CLIN: HCPCS | Performed by: PHYSICIAN ASSISTANT

## 2024-10-17 RX ORDER — LACTULOSE 10 G/15ML
10 SOLUTION ORAL EVERY EVENING
Qty: 1 EACH | Refills: 0 | Status: SHIPPED | OUTPATIENT
Start: 2024-10-17

## 2024-10-17 RX ORDER — OXYCODONE AND ACETAMINOPHEN 5; 325 MG/1; MG/1
1 TABLET ORAL 4 TIMES DAILY PRN
Qty: 120 TABLET | Refills: 0 | Status: SHIPPED | OUTPATIENT
Start: 2024-10-17 | End: 2024-11-16

## 2024-10-17 RX ORDER — LIDOCAINE 50 MG/G
1 PATCH TOPICAL DAILY
Qty: 30 PATCH | Refills: 0 | Status: SHIPPED | OUTPATIENT
Start: 2024-10-17 | End: 2024-11-16

## 2024-10-17 RX ORDER — LIDOCAINE 50 MG/G
OINTMENT TOPICAL
Qty: 70.88 G | Refills: 2 | Status: SHIPPED | OUTPATIENT
Start: 2024-10-17

## 2024-10-17 NOTE — PROGRESS NOTES
Amy Hardy presents to the [facility] on 10/17/2024. Amy is complaining of pain lower back, right shoulder. The pain is constant. The pain is described as aching, throbbing, stabbing, and sharp. Pain is rated on his best day at a 4, on his worst day at a 10, and on average at a 4 on the VAS scale. He took his last dose of Percocet ***.     Any procedures since your last visit: No  Pacemaker or defibrillator: Yes managed by Dr. Jasmine.    He is not on NSAIDS and is  on anticoagulation medications to include Eliquis and is managed by ***.     Medication Contract and Consent for Opioid Use Documents Filed       Patient Documents       Type of Document Status Date Received Received By Description    Medication Contract Received 5/24/2021  9:00 AM PANCHO ORELLANA Medication Contract    Medication Contract Received 5/17/2022  3:39 PM [facility]    Medication Contract Received 4/26/2023  4:43 PM AMY ROBLES medication contract    Consent Opioid Use Received 6/12/2024  1:47 PM [facility]                    There were no vitals taken for this visit.     No LMP for male patient.    
                                                                                                                          Amy Hardy presents to the [facility] on 10/17/2024. Amy is complaining of pain in his back. The pain is constant. The pain is described as dull and sharp. Pain is rated on his best day at a 4, on his worst day at a 10, and on average at a 7 on the VAS scale. He took his last dose of Percocet and Tylenol today.     Any procedures since your last visit: No     Pacemaker or defibrillator: Yes managed by Dr. Beasley.    He is not on NSAIDS and is  on anticoagulation medications to include Eliquis and is managed by Dr. Dexter.     Medication Contract and Consent for Opioid Use Documents Filed       Patient Documents       Type of Document Status Date Received Received By Description    Medication Contract Received 5/24/2021  9:00 AM PANCHO ORELLANA Medication Contract    Medication Contract Received 5/17/2022  3:39 PM [facility]    Medication Contract Received 4/26/2023  4:43 PM AMY ROBLES medication contract    Consent Opioid Use Received 6/12/2024  1:47 PM [facility]                    There were no vitals taken for this visit.     No LMP for male patient.    
benzodiazapines  2/2020 consistent  05/2021 consistent  3/2022 consistent  01/2023 consistent  08/2023 consistent  03/2024 consistent     OARRS report:  12/2018-10/2024 consistent     Opioid Agreement:  Date enacted:  05/24/2021  Renewal date:  Updated: 05/17/2022 04/26/2023 Updated.   06/12/2024 Updated.         Past Medical History:   Diagnosis Date    CAD (coronary artery disease)     Cardiac defibrillator in place     [employer]    CHF (congestive heart failure) (HCC)     Depression     Disease of pericardium     DJD (degenerative joint disease)     Erectile dysfunction     GERD (gastroesophageal reflux disease)     Hyperlipidemia     Hypertension     Ischemic cardiomyopathy     Ischemic heart disease     LBBB (left bundle branch block)     w/ wide Qrs    Mitral regurgitation     Nonrheumatic aortic valve disorder     SHELLEY on CPAP     setting 7    Presence of coronary angioplasty implant and graft     Ventricular tachycardia (HCC)        Past Surgical History:   Procedure Laterality Date    ANKLE SURGERY      CARDIAC CATHETERIZATION  08/24/2020    CARDIAC DEFIBRILLATOR PLACEMENT  11/2009    Bi-vent    CARDIAC DEFIBRILLATOR PLACEMENT      CARDIAC PACEMAKER PLACEMENT      CARPAL TUNNEL RELEASE      CORONARY ANGIOPLASTY  09/2014    DIAGNOSTIC CARDIAC CATH LAB PROCEDURE Left 04/2006    DIAGNOSTIC CARDIAC CATH LAB PROCEDURE Left 09/2014    LEXY-RCA    ELBOW SURGERY      EPIDURAL STEROID INJECTION N/A 5/7/2019    CAUDAL EPIDURAL STEROID INJECTION performed by Leda Banuelos DO at [facility] OR    EPIDURAL STEROID INJECTION N/A 8/1/2019    CAUDAL EPIDURAL STEROID INJECTION performed by Leda Banuelos DO at [facility]    FRACTURE SURGERY      foot, finger, left ankle, left leg,and right arm    JOINT REPLACEMENT Right 09/18/2018    knee    LEG SURGERY      LUMBAR FUSION  05/29/2018    OTHER SURGICAL HISTORY  04/2006    cardiac tamponade evacuation     PACEMAKER INSERTION      laura - Dr. Hogan [facility] Everywhere     PAIN

## 2024-10-18 LAB
6-MAM, QUANTITATIVE, URINE: <10 NG/ML
6-MONOACETYLMORPHINE, URINE: NEGATIVE
7-AMINOCLONAZEPAM, QUANTITATIVE, URINE: <50 NG/ML
ABNORMAL SPECIMEN VALIDITY TEST: ABNORMAL
ALCOHOL URINE: NOT DETECTED MG/DL
ALPHA-HYDROXYALPRAZOLAM, QUANTITATIVE, URINE: <50 NG/ML
ALPHA-HYDROXYMIDAZOLAM, QUANTITATIVE, URINE: <50 NG/ML
ALPHA-HYDROXYTRIAZOLAM, QUANTITATIVE, URINE: <50 NG/ML
ALPRAZOLAM URINE QUANT: <50 NG/ML
AMPHETAMINE SCREEN URINE: NEGATIVE
BARBITURATE SCREEN URINE: NEGATIVE
BENZODIAZEPINE SCREEN, URINE: POSITIVE
BUPRENORPHINE URINE: NEGATIVE
CANNABINOID SCREEN URINE: NEGATIVE
CHLORDIAZEPOXIDE, QUANTITATIVE, URINE: <50 NG/ML
CLONAZEPAM, QUANTITATIVE, URINE: <50 NG/ML
COCAINE METABOLITE, URINE: NEGATIVE
CODEINE, QUANTITATIVE, URINE: <50 NG/ML
COMPLIANCE DRUG ANALYSIS, URINE: NORMAL
DIAZEPAM URINE QUANT: <50 NG/ML
FENTANYL URINE: NEGATIVE
FLUNITRAZEPAM, QUANTITATIVE, URINE: <50 NG/ML
FLURAZEPAM, QUANTITATIVE, URINE: <50 NG/ML
HYDROCODONE, QUANTITATIVE, URINE: <50 NG/ML
HYDROMORPHONE, QUANTITATIVE, URINE: <50 NG/ML
INTEGRITY CHECK, CREATININE, URINE: 65.6 MG/DL (ref 22–250)
INTEGRITY CHECK, OXIDANT, URINE: <40 MG/L
INTEGRITY CHECK, PH, URINE: 6.2 (ref 4.5–9)
INTEGRITY CHECK, SPECIFIC GRAVITY, URINE: 1.01 (ref 1–1.03)
LORAZEPAM URINE QUANT: <50 NG/ML
METHADONE SCREEN, URINE: NEGATIVE
MIDAZOLAM URINE QUANT: <50 NG/ML
MORPHINE, QUANTITATIVE, URINE: <50 NG/ML
NORDIAZEPAM URINE QUANT: 200.5 NG/ML
NORHYDROCODONE, QUANTITATIVE, URINE: <50 NG/ML
NOROXYCODONE, QUANTITATIVE, URINE: >1000 NG/ML
OPIATES, URINE: NEGATIVE
OXAZEPAM URINE QUANT: 186.1 NG/ML
OXYCODONE SCREEN URINE: POSITIVE
OXYCODONE URINE, QUANTITATIVE: 822.1 NG/ML
OXYMORPHONE, QUANTITATIVE, URINE: >1000 NG/ML
PCP,URINE: NEGATIVE
TEMAZEPAM, QUANTITATIVE, URINE: 116.8 NG/ML
TEST INFORMATION: ABNORMAL
TRAMADOL, URINE: NEGATIVE

## 2024-10-21 ENCOUNTER — LAB (OUTPATIENT)
Dept: LAB | Facility: LAB | Age: 79
End: 2024-10-21
Payer: MEDICARE

## 2024-10-21 ENCOUNTER — OFFICE VISIT (OUTPATIENT)
Dept: CARDIOLOGY | Facility: HOSPITAL | Age: 79
End: 2024-10-21
Payer: MEDICARE

## 2024-10-21 VITALS
WEIGHT: 158.4 LBS | RESPIRATION RATE: 18 BRPM | DIASTOLIC BLOOD PRESSURE: 77 MMHG | OXYGEN SATURATION: 96 % | BODY MASS INDEX: 26.36 KG/M2 | HEART RATE: 60 BPM | SYSTOLIC BLOOD PRESSURE: 126 MMHG

## 2024-10-21 DIAGNOSIS — I50.42 CHRONIC COMBINED SYSTOLIC AND DIASTOLIC HEART FAILURE, NYHA CLASS 3: Primary | ICD-10-CM

## 2024-10-21 DIAGNOSIS — I10 HYPERTENSION, UNSPECIFIED TYPE: ICD-10-CM

## 2024-10-21 DIAGNOSIS — I48.19 PERSISTENT ATRIAL FIBRILLATION (MULTI): ICD-10-CM

## 2024-10-21 DIAGNOSIS — I50.42 CHRONIC COMBINED SYSTOLIC AND DIASTOLIC HEART FAILURE, NYHA CLASS 3: ICD-10-CM

## 2024-10-21 LAB
ANION GAP SERPL CALC-SCNC: 11 MMOL/L (ref 10–20)
BNP SERPL-MCNC: 647 PG/ML (ref 0–99)
BUN SERPL-MCNC: 28 MG/DL (ref 6–23)
CALCIUM SERPL-MCNC: 8.6 MG/DL (ref 8.6–10.3)
CHLORIDE SERPL-SCNC: 102 MMOL/L (ref 98–107)
CO2 SERPL-SCNC: 29 MMOL/L (ref 21–32)
CREAT SERPL-MCNC: 1.58 MG/DL (ref 0.5–1.3)
EGFRCR SERPLBLD CKD-EPI 2021: 44 ML/MIN/1.73M*2
GLUCOSE SERPL-MCNC: 123 MG/DL (ref 74–99)
POTASSIUM SERPL-SCNC: 4.8 MMOL/L (ref 3.5–5.3)
SODIUM SERPL-SCNC: 137 MMOL/L (ref 136–145)

## 2024-10-21 PROCEDURE — 1126F AMNT PAIN NOTED NONE PRSNT: CPT | Performed by: NURSE PRACTITIONER

## 2024-10-21 PROCEDURE — 99212 OFFICE O/P EST SF 10 MIN: CPT | Performed by: NURSE PRACTITIONER

## 2024-10-21 PROCEDURE — 1159F MED LIST DOCD IN RCRD: CPT | Performed by: NURSE PRACTITIONER

## 2024-10-21 PROCEDURE — 1157F ADVNC CARE PLAN IN RCRD: CPT | Performed by: NURSE PRACTITIONER

## 2024-10-21 PROCEDURE — 3074F SYST BP LT 130 MM HG: CPT | Performed by: NURSE PRACTITIONER

## 2024-10-21 PROCEDURE — 83880 ASSAY OF NATRIURETIC PEPTIDE: CPT

## 2024-10-21 PROCEDURE — 1123F ACP DISCUSS/DSCN MKR DOCD: CPT | Performed by: NURSE PRACTITIONER

## 2024-10-21 PROCEDURE — 3078F DIAST BP <80 MM HG: CPT | Performed by: NURSE PRACTITIONER

## 2024-10-21 PROCEDURE — 80048 BASIC METABOLIC PNL TOTAL CA: CPT

## 2024-10-21 PROCEDURE — 36415 COLL VENOUS BLD VENIPUNCTURE: CPT

## 2024-10-21 ASSESSMENT — ENCOUNTER SYMPTOMS
PALPITATIONS: 0
CHEST TIGHTNESS: 0
LOSS OF SENSATION IN FEET: 0
ABDOMINAL DISTENTION: 0
CONFUSION: 0
BLOOD IN STOOL: 0
CHILLS: 0
ARTHRALGIAS: 1
SHORTNESS OF BREATH: 0
COUGH: 0
LIGHT-HEADEDNESS: 0
WHEEZING: 0
EYES NEGATIVE: 1
DEPRESSION: 0
HEMATURIA: 0
ACTIVITY CHANGE: 0
FEVER: 0
WEAKNESS: 0
OCCASIONAL FEELINGS OF UNSTEADINESS: 0

## 2024-10-21 ASSESSMENT — PAIN SCALES - GENERAL: PAINLEVEL_OUTOF10: 0-NO PAIN

## 2024-10-21 ASSESSMENT — PATIENT HEALTH QUESTIONNAIRE - PHQ9
1. LITTLE INTEREST OR PLEASURE IN DOING THINGS: NOT AT ALL
SUM OF ALL RESPONSES TO PHQ9 QUESTIONS 1 AND 2: 0
2. FEELING DOWN, DEPRESSED OR HOPELESS: NOT AT ALL

## 2024-10-21 NOTE — PROGRESS NOTES
Subjective   Patient ID: Jony Javier is a 79 y.o. male who presents for follow-up of congestive heart failure.     Current Outpatient Medications:     albuterol 90 mcg/actuation inhaler, INHALE 1 (ONE) PUFF EVERY 4 HOURS AS NEEDED, Disp: 8.5 g, Rfl: 8    atorvastatin (Lipitor) 80 mg tablet, Take 1 tablet (80 mg) by mouth once daily at bedtime., Disp: 90 tablet, Rfl: 3    baclofen (Lioresal) 10 mg tablet, Take 1 tablet (10 mg) by mouth 2 times a day as needed., Disp: , Rfl:     blood sugar diagnostic (True Metrix Glucose Test Strip) strip, USE 1 STRIP 3 times daily, Disp: 300 strip, Rfl: 3    bumetanide (Bumex) 1 mg tablet, Take 1 tablet (1 mg) by mouth 2 times a week. May take additional dose for weight gain 3#, increased swelling or shortness of breath. (Patient taking differently: Take 1 tablet (1 mg) by mouth once daily. Pt to take daily if he is able to.), Disp: 24 tablet, Rfl: 3    carvedilol (Coreg) 25 mg tablet, Take 1 tablet (25 mg) by mouth 2 times a day., Disp: 180 tablet, Rfl: 3    clobetasol (Temovate) 0.05 % external solution, APPLY TO THE AFFECTED AREA(S) EVERY DAY AS NEEDED, Disp: , Rfl:     diazePAM (Valium) 5 mg tablet, Take 1 tablet (5 mg) by mouth every 8 hours if needed for anxiety., Disp: 90 tablet, Rfl: 0    Eliquis 5 mg tablet, Take 1 tablet (5 mg) by mouth 2 times a day., Disp: 180 tablet, Rfl: 3    Entresto 49-51 mg tablet, Take 1 tablet by mouth 2 times a day., Disp: 180 tablet, Rfl: 3    EPINEPHrine 0.3 mg/0.3 mL injection syringe, Inject 0.3 mL (0.3 mg) into the muscle if needed for anaphylaxis for up to 1 dose., Disp: 1 each, Rfl: 3    ezetimibe (Zetia) 10 mg tablet, Take 1 tablet (10 mg) by mouth once daily., Disp: 90 tablet, Rfl: 3    febuxostat (Uloric) 40 mg tablet, Take 1 tablet (40 mg) by mouth once daily., Disp: , Rfl:     ferrous gluconate 270 mg (27 mg iron) tablet, Take 1 tablet by mouth once daily., Disp: , Rfl:     fluconazole (Diflucan) 100 mg tablet, Take 1 tablet  (100 mg) by mouth once daily for 14 days., Disp: 14 tablet, Rfl: 0    Jardiance 10 mg, Take 1 tablet (10 mg) by mouth once daily., Disp: 30 tablet, Rfl: 11    lidocaine (Xylocaine) 5 % ointment, APPLY TO THE AFFECTED AREA(S) AS NEEDED FOR PAIN to last 30 days, Disp: , Rfl:     melatonin 3 mg tablet, Take 1 tablet (3 mg) by mouth as needed at bedtime for sleep., Disp: , Rfl:     mirtazapine (Remeron) 30 mg tablet, Take 1 tablet (30 mg) by mouth once daily at bedtime., Disp: , Rfl:     multivitamin tablet, Take 1 tablet by mouth once daily., Disp: , Rfl:     nystatin (Mycostatin) 100,000 unit/gram powder, Apply 1 Application topically 2 times a day., Disp: , Rfl:     ondansetron ODT (Zofran-ODT) 4 mg disintegrating tablet, Take 1 tablet (4 mg) by mouth every 8 hours if needed for nausea or vomiting., Disp: 12 tablet, Rfl: 0    OneTouch Delica Plus Lancet 33 gauge misc, Test THREE TIMES DAILY AS DIRECTED, Disp: , Rfl:     oxyCODONE-acetaminophen (Percocet) 7.5-325 mg tablet, Take 1 tablet by mouth 3 times daily as needed for Pain for up to 30 days. Max Daily Amount 3 tablets, Disp: , Rfl:     pantoprazole (ProtoNix) 40 mg EC tablet, TAKE 1 TABLET BY MOUTH EVERY DAY, Disp: 90 tablet, Rfl: 3    tadalafil 20 mg tablet, Take 1 tablet (20 mg) by mouth once daily as needed for erectile dysfunction., Disp: 90 tablet, Rfl: 4    tamsulosin (Flomax) 0.4 mg 24 hr capsule, Take 1 capsule (0.4 mg) by mouth once daily in the morning., Disp: 90 capsule, Rfl: 1    testosterone (Axiron) 30 mg/actuation (1.5 mL) topical solution, Place 2 Pump on the skin once daily., Disp: , Rfl:     triamcinolone (Kenalog) 0.1 % lotion, Apply 1 Application topically if needed (psoriasis)., Disp: , Rfl:     venlafaxine XR (Effexor-XR) 150 mg 24 hr capsule, TAKE 1 CAPSULE BY MOUTH AFTER BREAKFAST, Disp: , Rfl:      HPI     Past medical history consists significant for history of heart failure reduced ejection fraction. He initially his ejection fraction  was around 40%. He has a history of coronary artery disease, persistent atrial fibrillation, GERD, history of carotid artery stenosis and CVA in the past. He has CRT-D in place. He has not had any defibrillations from device. He also has CardioMEMS in place and his readings have been consistently within desired parameters. Last heart cath was in 2020. Results are noted below. Most recent echocardiogram shows ejection fraction of 45% which is actually decreased from his echocardiogram in 2022 which showed that he had improvement in EF to 60 to 65%.     Patient tells me he is feeling well on current medications.  He has no pitting edema lungs are clear blood pressure stable no chest pain or palpitations see review of systems    Review of Systems   Constitutional:  Negative for activity change, chills and fever.   HENT:  Negative for hearing loss.    Eyes: Negative.    Respiratory:  Negative for cough, chest tightness, shortness of breath and wheezing.    Cardiovascular:  Negative for chest pain, palpitations and leg swelling.   Gastrointestinal:  Negative for abdominal distention and blood in stool.   Genitourinary:  Negative for hematuria.   Musculoskeletal:  Positive for arthralgias.        Ambulates with cane.    Neurological:  Negative for syncope, weakness and light-headedness.   Psychiatric/Behavioral:  Negative for confusion.        Objective     /77 (BP Location: Right arm, Patient Position: Sitting)   Pulse 60   Resp 18   Wt 71.8 kg (158 lb 6.4 oz)   SpO2 96%   BMI 26.36 kg/m²           Transthoracic echo (TTE) complete    Result Date: 8/6/2024              [place] [id number]      Phone [phone] Fax [phone] TRANSTHORACIC ECHOCARDIOGRAM REPORT Patient Name:      KARAN Paige Physician:    [id number] Narendra Portillo MD Study Date:        8/5/2024             Ordering  Provider:    [id number] DOROTHY HEIN                                                               SHAR MRN/PID:           [id number]             Fellow: Accession#:        [id number]         Nurse:                Maye Dong RN Date of Birth/Age: 1945 / 79 years Sonographer:          Brenna Yusuf RDCS Gender:            M                    Additional Staff: Height:            162.56 cm            Admit Date: Weight:            73.03 kg             Admission Status:     Outpatient BSA / BMI:         1.78 m2 / 27.64      Department Location:  [facility] Echo                    kg/m2                                      Lab Blood Pressure: 92 /75 mmHg Study Type:    TRANSTHORACIC ECHO (TTE) COMPLETE Diagnosis/ICD: Atherosclerotic heart disease of native coronary artery with                angina pectoris with documented spasm-I25.111; Acute combined                systolic (congestive) and diastolic (congestive) heart failure                (CHF)-I50.41 Indication:    CHF, CAD CPT Codes:     Echo Complete w Full [id number] Patient History: Pertinent History: CAD and CHF. Study Detail: The following Echo studies were performed: 2D, M-Mode, Doppler and               color flow. Technically challenging study due to prominent lung               artifact and pacer wires. Optison used as a contrast agent for               endocardial border definition.  PHYSICIAN INTERPRETATION: Left Ventricle: Left ventricular ejection fraction is mildly decreased, by visual estimate at 45-50%. Wall motion is abnormal. The left ventricular cavity size is normal. Spectral Doppler shows a pseudonormal pattern of left ventricular diastolic filling. Unable to measure 2D Plax d/t poor image quality. Akinetic inferior wall. Left Atrium: The left atrium is moderately dilated. Right Ventricle: The right ventricle is normal in size. There is normal right ventricular global  systolic function. A device is visualized in the right ventricle. Right Atrium: The right atrium is normal in size. Aortic Valve: The aortic valve was not well visualized. There is evidence of mild to moderate aortic valve stenosis. The aortic valve dimensionless index is 0.38. There is mild aortic valve regurgitation. The peak instantaneous gradient of the aortic valve is 27.3 mmHg. The mean gradient of the aortic valve is 14.4 mmHg. Mitral Valve: The mitral valve is mild to moderately thickened. There is mild to moderate mitral annular calcification. There is mild to moderate mitral valve regurgitation. Tricuspid Valve: The tricuspid valve is structurally normal. There is mild to moderate tricuspid regurgitation. Pulmonic Valve: The pulmonic valve is not well visualized. There is trace to mild pulmonic valve regurgitation. Pericardium: There is no pericardial effusion noted. Aorta: The aortic root is normal.  CONCLUSIONS:  1. Left ventricular ejection fraction is mildly decreased, by visual estimate at 45-50%.  2. Spectral Doppler shows a pseudonormal pattern of left ventricular diastolic filling.  3. Unable to measure 2D Plax d/t poor image quality.     Akinetic inferior wall.  4. There is normal right ventricular global systolic function.  5. The left atrium is moderately dilated.  6. Mild to moderate mitral valve regurgitation.  7. Mild to moderate tricuspid regurgitation.  8. Mild to moderate aortic valve stenosis.  9. Mild aortic valve regurgitation. QUANTITATIVE DATA SUMMARY: LA VOLUME:                               Normal Ranges: LA Vol A4C:        81.1 ml    (22+/-6mL/m2) LA Vol A2C:        67.0 ml LA Vol BP:         75.4 ml LA Vol Index A4C:  45.5ml/m2 LA Vol Index A2C:  37.5 ml/m2 LA Vol Index BP:   42.3 ml/m2 LA Area A4C:       24.9 cm2 LA Area A2C:       22.1 cm2 LA Major Axis A4C: 6.5 cm LA Major Axis A2C: 6.2 cm LA Vol A4C:        77.7 ml LA Vol A2C:        64.2 ml RA VOLUME BY A/L METHOD:                        Normal Ranges: RA Area A4C: 19.5 cm2 AORTA MEASUREMENTS:                      Normal Ranges: Ao Sinus, d: 2.70 cm (2.1-3.5cm) Ao STJ, d:   2.10 cm (1.7-3.4cm) Asc Ao, d:   2.80 cm (2.1-3.4cm) LV SYSTOLIC FUNCTION BY 2D PLANIMETRY (MOD):                      Normal Ranges: EF-A4C View:    52 % (>=55%) EF-A2C View:    50 % EF-Biplane:     50 % EF-Visual:      48 % LV EF Reported: 48 % LV DIASTOLIC FUNCTION:                         Normal Ranges: MV Peak E:    1.18 m/s  (0.7-1.2 m/s) MV Peak A:    0.83 m/s  (0.42-0.7 m/s) E/A Ratio:    1.43      (1.0-2.2) MV e'         0.081 m/s (>8.0) MV lateral e' 0.08 m/s MV medial e'  0.08 m/s E/e' Ratio:   14.65     (<8.0) MITRAL VALVE:                 Normal Ranges: MV DT: 250 msec (150-240msec) MITRAL INSUFFICIENCY:                           Normal Ranges: PISA Radius:  0.6 cm MR VTI:       149.60 cm MR Vmax:      474.99 cm/s MR Alias Tam: 32.8 cm/s MR Volume:    23.52 ml MR Flow Rt:   74.68 ml/s MR EROA:      0.16 cm2 AORTIC VALVE:                                    Normal Ranges: AoV Vmax:                2.61 m/s  (<=1.7m/s) AoV Peak P.3 mmHg (<20mmHg) AoV Mean P.4 mmHg (1.7-11.5mmHg) LVOT Max Tam:            0.89 m/s  (<=1.1m/s) AoV VTI:                 52.09 cm  (18-25cm) LVOT VTI:                20.03 cm LVOT Diameter:           2.02 cm   (1.8-2.4cm) AoV Area, VTI:           1.23 cm2  (2.5-5.5cm2) AoV Area,Vmax:           1.10 cm2  (2.5-4.5cm2) AoV Dimensionless Index: 0.38 AORTIC INSUFFICIENCY: AI Vmax:       3.17 m/s AI Half-time:  448 msec AI Decel Time: 1546 msec AI Decel Rate: 205.78 cm/s2  RIGHT VENTRICLE: RV Basal 3.68 cm RV Mid   2.60 cm RV Major 8.0 cm TAPSE:   19.1 mm RV s'    0.10 m/s TRICUSPID VALVE/RVSP:                             Normal Ranges: Peak TR Velocity: 2.70 m/s RV Syst Pressure: 32.1 mmHg (< 30mmHg) IVC Diam:         2.08 cm AORTA: Asc Ao Diam 2.77 cm  [id number] Narendra Portillo MD Electronically signed on  8/6/2024 at 5:34:21 PM  ** Final **     Transthoracic Echo (TTE) Complete    Result Date: 11/28/2023              [place] [address]      Phone [phone] Fax [phone] TRANSTHORACIC ECHOCARDIOGRAM REPORT  Patient Name:      KARAN LUU ROSEMARY Paige Physician:    [id number] Cardenas MD Study Date:        11/27/2023           Ordering Provider:    [id number] CARDENAS MRN/PID:           [id number]             Fellow: Accession#:        [id number]         Nurse: Date of Birth/Age: 1945 / 78 years Sonographer:          Elizabeth Navarro RDCS Gender:            M                    Additional Staff: Height:            157.48 cm            Admit Date:           11/27/2023 Weight:            68.95 kg             Admission Status:     Outpatient BSA:               1.70 m2              [place]  [facility]                                                               Lab Blood Pressure: 110 /76 mmHg Study Type:    TRANSTHORACIC ECHO (TTE) COMPLETE Diagnosis/ICD: Chronic systolic (congestive) heart failure (CHF)-I50.22 Indication:    Congestive Heart Failure CPT Codes:     Echo Complete w Full Doppler-[id number]  Study Detail: The following Echo studies were performed: 2D, M-Mode, Doppler and               color flow.  PHYSICIAN INTERPRETATION: Left Ventricle: Left ventricular systolic function is mildly decreased, with an estimated ejection fraction of 45%. Wall motion is abnormal. The left ventricular cavity size is normal. The left ventricular septal wall thickness is mildly increased. There is moderately increased left ventricular posterior wall thickness. Spectral Doppler shows a pseudonormal pattern of left ventricular diastolic filling. Akinetic inferior and inferolateral wall. Left Atrium: The left atrium is  moderately dilated. Right Ventricle: The right ventricle is normal in size. There is reduced right ventricular systolic function. A device is visualized in the right ventricle. Right Atrium: The right atrium is normal in size. Aortic Valve: The aortic valve was not well visualized. There is evidence of mild aortic valve stenosis. The aortic valve dimensionless index is 0.54. There is trivial aortic valve regurgitation. The peak instantaneous gradient of the aortic valve is 20.5 mmHg. The mean gradient of the aortic valve is 9.2 mmHg. TAVR?. Mitral Valve: The mitral valve is normal in structure. There is mild mitral annular calcification. There is trace to mild mitral valve regurgitation. Tricuspid Valve: The tricuspid valve is structurally normal. There is trace tricuspid regurgitation. Pulmonic Valve: The pulmonic valve is structurally normal. There is trace pulmonic valve regurgitation. Pericardium: There is no pericardial effusion noted. Aorta: The aortic root is normal.  CONCLUSIONS:  1. Left ventricular systolic function is mildly decreased with a 45% estimated ejection fraction.  2. Spectral Doppler shows a pseudonormal pattern of left ventricular diastolic filling.  3. The left ventricular posterior wall thickness is moderately increased.  4. There is reduced right ventricular systolic function.  5. The left atrium is moderately dilated.  6. Mild aortic valve stenosis. QUANTITATIVE DATA SUMMARY: 2D MEASUREMENTS:                           Normal Ranges: LAs:           4.34 cm    (2.7-4.0cm) IVSd:          1.23 cm    (0.6-1.1cm) LVPWd:         1.39 cm    (0.6-1.1cm) LVIDd:         4.34 cm    (3.9-5.9cm) LVIDs:         3.38 cm LV Mass Index: 125.5 g/m2 LV % FS        21.9 % LA VOLUME:                               Normal Ranges: LA Vol A4C:        85.1 ml    (22+/-6mL/m2) LA Vol A2C:        65.6 ml LA Vol BP:         77.6 ml LA Vol Index A4C:  50.0ml/m2 LA Vol Index A2C:  38.6 ml/m2 LA Vol Index BP:   45.6 ml/m2  LA Area A4C:       22.6 cm2 LA Area A2C:       20.6 cm2 LA Major Axis A4C: 5.1 cm LA Major Axis A2C: 5.5 cm LA Volume Index:   39.3 ml/m2 LA Vol A4C:        68.6 ml LA Vol A2C:        62.1 ml RA VOLUME BY A/L METHOD:                       Normal Ranges: RA Area A4C: 13.6 cm2 M-MODE MEASUREMENTS:                  Normal Ranges: Ao Root: 2.05 cm (2.0-3.7cm) AORTA MEASUREMENTS:                    Normal Ranges: Asc Ao, d: 3.40 cm (2.1-3.4cm) LV SYSTOLIC FUNCTION BY 2D PLANIMETRY (MOD):                     Normal Ranges: EF-A4C View: 68.7 % (>=55%) EF-A2C View: 70.6 % EF-Biplane:  68.5 % LV DIASTOLIC FUNCTION:                        Normal Ranges: MV Peak E:    0.83 m/s (0.7-1.2 m/s) MV Peak A:    0.84 m/s (0.42-0.7 m/s) E/A Ratio:    1.00     (1.0-2.2) MV e'         0.06 m/s (>8.0) MV lateral e' 0.07 m/s MV medial e'  0.05 m/s E/e' Ratio:   13.92    (<8.0) MITRAL VALVE:                 Normal Ranges: MV DT: 226 msec (150-240msec) AORTIC VALVE:                                    Normal Ranges: AoV Vmax:                2.26 m/s  (<=1.7m/s) AoV Peak P.5 mmHg (<20mmHg) AoV Mean P.2 mmHg  (1.7-11.5mmHg) LVOT Max Tam:            1.20 m/s  (<=1.1m/s) AoV VTI:                 45.83 cm  (18-25cm) LVOT VTI:                24.66 cm LVOT Diameter:           2.03 cm   (1.8-2.4cm) AoV Area, VTI:           1.71 cm2  (2.5-5.5cm2) AoV Area,Vmax:           1.69 cm2  (2.5-4.5cm2) AoV Dimensionless Index: 0.54 AORTIC INSUFFICIENCY: AI Vmax:       3.62 m/s AI Half-time:  515 msec AI Decel Time: 1776 msec AI Decel Rate: 203.92 cm/s2  RIGHT VENTRICLE: RV Basal 2.74 cm RV Mid   2.30 cm RV Major 7.2 cm TAPSE:   15.2 mm RV s'    0.13 m/s TRICUSPID VALVE/RVSP:                             Normal Ranges: Peak TR Velocity: 2.74 m/s RV Syst Pressure: 33.0 mmHg (< 30mmHg) IVC Diam:         0.97 cm TV e'             0.1 m/s AORTA: Asc Ao Diam 3.39 cm  [id number] Narendra Portillo MD Electronically signed on 2023 at  5:35:04 PM  ** Final **       Lab Results   Component Value Date    BUN 33 (H) 09/11/2024    CREATININE 1.68 (H) 09/11/2024     (H) 07/12/2024    MG 2.24 09/11/2024    K 4.2 09/11/2024     09/11/2024       Constitutional:       General:  NAD   HENT:      Head: Normocephalic     Mouth: Mucous membranes are moist.      Neck:  No JVD or HJR   Eyes:      Conjunctiva/sclera: Conjunctivae normal.    Cardiovascular:      Rate and Rhythm: Normal rate and regular rhythm     Heart sounds:  S1 S2 normal, no murmur, no S3 or S4   Pulmonary:      Pulmonary effort is normal.  prolonged expiratory phase. No wheezing or rales.   Abdominal:      General: Bowel sounds are normal, non- tender to palpitations. Liver is non palpable at  right costal margin.   Musculoskeletal:         General: No swelling   KHALIL well.   Skin:     General: Skin is warm and dry   Neurological:      General: No focal deficit present.      Mental Status: alert and oriented to person, place, and time. Mental status is at baseline.     Psychiatric:         Mood and Affect: Normal Affect.     Assessment/Plan     Problem List Items Addressed This Visit       Chronic combined systolic and diastolic heart failure, NYHA class 3    HTN (hypertension)    Persistent atrial fibrillation (Multi)      Chronic systolic diastolic heart failure:   Etiology   AHA Stage: C  NYHA class:  2-3  - CPAP a night   Volume Status:  Euvolemic    GFR: 41     GDMT:  BB- Carvedilol 25 mg 1 tablet twice a day   ARB/ACEI/ARNI - Entresto 49/51 mg 1 tablet twice a day   MRA -  on hold due to high potassium.   SGLT2i -  Jardiance 10 mg once a day   Diuretic - Bumex 1 mg  every other day.   Device Therapy: CRT-D / Cardiomems readings have been more labile      CHF:  LVEF 45-50% with  akinesis again noted in the apical area.   He appears compensated today. Will continue current medications. Lab work to be drawn today.  Follow up 2-3 months.      Emphasized salt  restriction.  Encouraged daily monitoring of the patient's weight.  Encouraged regular exercise as tolerated. .  Follow up in  2-3 months.      2. Atrial fibrillation :   RRR today. OAC with Eliquis. Denies obvious signs of bleeding.  Stable.      3. CKD3:  GFR 41 ml/min  Renal function is stable per last labs.  Will recheck BMP/BNP today.  Stable.      4. ASHD:  No chest pain,  continue secondary prevention medications.  EF is unchanged.  Apical akinesis noted on previous echocardiogram  stable.          Montserrat Wesley, APRN-CNP

## 2024-10-21 NOTE — PATIENT INSTRUCTIONS
Thank you for coming in today.  If you have any questions you may contact the office Monday through Friday at [phone] or on week ends at [phone].    Continue current medications.     Please get la work drawn today.     Please follow  a 2 GM sodium diet and limit fluid intake to 2 liters per day or 8 servings ( serving size = 8 oz. = 1 cup = 240 ml) per day.   Please avoid processed meat products (luncheon meats, sausages, gonsales, hot dogs for example) eat 4 servings of vegetables and 1-2 whole servings of whole fruits per day.   Please weigh daily and call [phone] for weight gain of 3 pounds in 24 hours or 5 pounds or if you experience increased swelling or shortness of breath.         Follow up:    2-3  months     Please be sure to follow up with your cardiologist at [facility] once every year. Call [phone] to schedule appointment if you do not have a follow up appointment scheduled already.

## 2024-10-28 ENCOUNTER — TELEPHONE (OUTPATIENT)
Dept: INFUSION THERAPY | Facility: HOSPITAL | Age: 79
End: 2024-10-28
Payer: MEDICARE

## 2024-10-28 ENCOUNTER — TELEPHONE (OUTPATIENT)
Dept: CARDIOLOGY | Facility: HOSPITAL | Age: 79
End: 2024-10-28
Payer: MEDICARE

## 2024-11-05 ENCOUNTER — LAB (OUTPATIENT)
Dept: LAB | Facility: LAB | Age: 79
End: 2024-11-05
Payer: COMMERCIAL

## 2024-11-05 DIAGNOSIS — I10 HYPERTENSION, UNSPECIFIED TYPE: ICD-10-CM

## 2024-11-05 DIAGNOSIS — I50.42 CHRONIC COMBINED SYSTOLIC AND DIASTOLIC HEART FAILURE, NYHA CLASS 3: ICD-10-CM

## 2024-11-05 DIAGNOSIS — I48.19 PERSISTENT ATRIAL FIBRILLATION (MULTI): ICD-10-CM

## 2024-11-05 DIAGNOSIS — N18.32 CHRONIC KIDNEY DISEASE, STAGE 3B (MULTI): ICD-10-CM

## 2024-11-05 LAB
25(OH)D3 SERPL-MCNC: 66 NG/ML (ref 30–100)
ALBUMIN SERPL BCP-MCNC: 3.7 G/DL (ref 3.4–5)
ANION GAP SERPL CALC-SCNC: 11 MMOL/L (ref 10–20)
APPEARANCE UR: CLEAR
BILIRUB UR STRIP.AUTO-MCNC: NEGATIVE MG/DL
BNP SERPL-MCNC: 557 PG/ML (ref 0–99)
BUN SERPL-MCNC: 33 MG/DL (ref 6–23)
CALCIUM SERPL-MCNC: 8.4 MG/DL (ref 8.6–10.3)
CHLORIDE SERPL-SCNC: 102 MMOL/L (ref 98–107)
CO2 SERPL-SCNC: 26 MMOL/L (ref 21–32)
COLOR UR: ABNORMAL
CREAT SERPL-MCNC: 1.59 MG/DL (ref 0.5–1.3)
CREAT UR-MCNC: 70.7 MG/DL (ref 20–370)
EGFRCR SERPLBLD CKD-EPI 2021: 44 ML/MIN/1.73M*2
GLUCOSE SERPL-MCNC: 129 MG/DL (ref 74–99)
GLUCOSE UR STRIP.AUTO-MCNC: ABNORMAL MG/DL
KETONES UR STRIP.AUTO-MCNC: NEGATIVE MG/DL
LEUKOCYTE ESTERASE UR QL STRIP.AUTO: NEGATIVE
MAGNESIUM SERPL-MCNC: 2.02 MG/DL (ref 1.6–2.4)
MICROALBUMIN UR-MCNC: 17.3 MG/L
MICROALBUMIN/CREAT UR: 24.5 UG/MG CREAT
NITRITE UR QL STRIP.AUTO: NEGATIVE
PH UR STRIP.AUTO: 5.5 [PH]
PHOSPHATE SERPL-MCNC: 4.2 MG/DL (ref 2.5–4.9)
POTASSIUM SERPL-SCNC: 5 MMOL/L (ref 3.5–5.3)
PROT UR STRIP.AUTO-MCNC: NEGATIVE MG/DL
RBC # UR STRIP.AUTO: NEGATIVE /UL
SODIUM SERPL-SCNC: 134 MMOL/L (ref 136–145)
SP GR UR STRIP.AUTO: 1.01
UROBILINOGEN UR STRIP.AUTO-MCNC: NORMAL MG/DL

## 2024-11-05 PROCEDURE — 82306 VITAMIN D 25 HYDROXY: CPT

## 2024-11-05 PROCEDURE — 83735 ASSAY OF MAGNESIUM: CPT

## 2024-11-05 PROCEDURE — 82043 UR ALBUMIN QUANTITATIVE: CPT

## 2024-11-05 PROCEDURE — 82570 ASSAY OF URINE CREATININE: CPT

## 2024-11-05 PROCEDURE — 80069 RENAL FUNCTION PANEL: CPT

## 2024-11-05 PROCEDURE — 83970 ASSAY OF PARATHORMONE: CPT

## 2024-11-05 PROCEDURE — 81003 URINALYSIS AUTO W/O SCOPE: CPT

## 2024-11-05 PROCEDURE — 83880 ASSAY OF NATRIURETIC PEPTIDE: CPT

## 2024-11-06 LAB — PTH-INTACT SERPL-MCNC: 111.1 PG/ML (ref 18.5–88)

## 2024-11-12 ENCOUNTER — OFFICE VISIT (OUTPATIENT)
Dept: PAIN MANAGEMENT | Age: 79
End: 2024-11-12
Payer: MEDICARE

## 2024-11-12 VITALS
TEMPERATURE: 97.9 F | OXYGEN SATURATION: 96 % | HEIGHT: 65 IN | BODY MASS INDEX: 24.99 KG/M2 | RESPIRATION RATE: 16 BRPM | HEART RATE: 68 BPM | DIASTOLIC BLOOD PRESSURE: 69 MMHG | SYSTOLIC BLOOD PRESSURE: 121 MMHG | WEIGHT: 150 LBS

## 2024-11-12 DIAGNOSIS — G89.4 CHRONIC PAIN SYNDROME: Primary | ICD-10-CM

## 2024-11-12 DIAGNOSIS — R10.9 CHRONIC FLANK PAIN: ICD-10-CM

## 2024-11-12 DIAGNOSIS — M51.379 DEGENERATION OF INTERVERTEBRAL DISC OF LUMBOSACRAL REGION, UNSPECIFIED WHETHER PAIN PRESENT: ICD-10-CM

## 2024-11-12 DIAGNOSIS — M79.10 MYALGIA: ICD-10-CM

## 2024-11-12 DIAGNOSIS — S33.5XXD LUMBAR SPRAIN, SUBSEQUENT ENCOUNTER: ICD-10-CM

## 2024-11-12 DIAGNOSIS — S33.9XXA CHRONIC OR OLD SPRAIN OF LUMBOSACRAL LIGAMENT: ICD-10-CM

## 2024-11-12 DIAGNOSIS — M51.369 DEGENERATION OF INTERVERTEBRAL DISC OF LUMBAR REGION, UNSPECIFIED WHETHER PAIN PRESENT: ICD-10-CM

## 2024-11-12 DIAGNOSIS — M51.26 DISPLACEMENT OF LUMBAR INTERVERTEBRAL DISC WITHOUT MYELOPATHY: ICD-10-CM

## 2024-11-12 DIAGNOSIS — Z79.891 ENCOUNTER FOR LONG-TERM OPIATE ANALGESIC USE: ICD-10-CM

## 2024-11-12 DIAGNOSIS — G89.29 CHRONIC FLANK PAIN: ICD-10-CM

## 2024-11-12 PROCEDURE — 99213 OFFICE O/P EST LOW 20 MIN: CPT | Performed by: PHYSICIAN ASSISTANT

## 2024-11-12 RX ORDER — OXYCODONE AND ACETAMINOPHEN 5; 325 MG/1; MG/1
1 TABLET ORAL 4 TIMES DAILY PRN
Qty: 120 TABLET | Refills: 0 | Status: SHIPPED | OUTPATIENT
Start: 2024-11-16 | End: 2024-12-16

## 2024-11-12 NOTE — PROGRESS NOTES
Amy Hardy presents to the [facility] on 11/12/2024. Amy is complaining of pain back. The pain is constant. The pain is described as aching, throbbing, shooting, stabbing, and dull. Pain is rated on his best day at a 3, on his worst day at a 10, and on average at a 6 on the VAS scale. He took his last dose of Percocet today.     Any procedures since your last visit: No,    Pacemaker or defibrillator: Yes managed by Dr. Beasley.    He is not on NSAIDS and is  on anticoagulation medications to include Eliquis and is managed by Dr. Dexter Cardiologist.     Medication Contract and Consent for Opioid Use Documents Filed       Patient Documents       Type of Document Status Date Received Received By Description    Medication Contract Received 5/24/2021  9:00 AM PANCHO ORELLANA Medication Contract    Medication Contract Received 5/17/2022  3:39 PM [facility]    Medication Contract Received 4/26/2023  4:43 PM AMY ROBLES medication contract    Consent Opioid Use Received 6/12/2024  1:47 PM [facility] ORELLANA Pain [facility]                    /69   Pulse 68   Temp 97.9 °F (36.6 °C) (Infrared)   Resp 16   Ht 1.651 m (5' 5\")   Wt 68 kg (150 lb)   SpO2 96%   BMI 24.96 kg/m²      No LMP for male patient.

## 2024-11-12 NOTE — PROGRESS NOTES
[facility]  [address] [place] [id number]    Follow up Note      Geoff Hardy     Date of Visit:  2024    CC:  Patient presents for follow up   Chief Complaint   Patient presents with    Follow-up     back               HPI:    Pain is relatively controlled.    Appropriate analgesia with current medications regimen: yes.    Change in quality of symptoms:no.    Medication side effects:none.   Recent diagnostic testing:none.   Recent interventional procedures: None.    He has been on anticoagulation medications to include Eliquis and has not been on herbal supplements.  He is not diabetic.     Imagin2022 CT abdomen/pelvis    IMPRESSION:   1. Multiple small nonobstructing bilateral calcified calyceal calculi.   2. No evidence for other calcified collecting system calculi or   obstruction.   3. Hypodense posterior right upper pole renal cortical lesion most   likely a cyst. No further evaluation is required*.   4. Streaky soft tissue densities in the bilateral perinephric fat   compatible with active or, more likely, remote inflammation.   5. No evidence of mass, lymphadenopathy or inflammatory process.   6. Dense atherosclerotic calcification throughout normal caliber   abdominal aorta.       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 Degenerative disc disease, spondylosis causing stenosis. Probable large extruded disc at L3-4. Spinal listhesis L4 and L5 exacerbating the stenosis  EMG dated 3/24/2016     L5 radiculopathy  CT dated 2016 lumbar spine demonstrates degenerative spinal stenosis that is severe at L3-4 L4-5 and L5-S1 levels                                        Potential Aberrant Drug-Related Behavior: no     Urine Drug Screenin2018 buccal consistent  2019 buccal consistent  2019 consistent  10/2019 consistent for oxycodone, metabolites, and benzodiazapines  2020

## 2024-11-25 DIAGNOSIS — I50.22 CHRONIC SYSTOLIC HEART FAILURE: ICD-10-CM

## 2024-11-25 RX ORDER — BUMETANIDE 1 MG/1
1 TABLET ORAL EVERY OTHER DAY
Qty: 45 TABLET | Refills: 3 | Status: SHIPPED | OUTPATIENT
Start: 2024-11-25 | End: 2024-11-26 | Stop reason: SDUPTHER

## 2024-11-25 NOTE — TELEPHONE ENCOUNTER
bumetanide (Bumex) 1 mg tablet Take 1 tablet (1 mg) by mouth every other day.       Refill: [facility]    Pt also having an issue with Cardio Mems machines not going to green light. Please call

## 2024-11-25 NOTE — TELEPHONE ENCOUNTER
bumetanide (Bumex) 1 mg tablet Take 1 tablet (1 mg) by mouth 2 times a week. May take additional dose for weight gain 3#, increased swelling or shortness of breath.       **Directions changed - Pt now takes every other day.    Refill needed [facility]

## 2024-11-26 RX ORDER — BUMETANIDE 1 MG/1
1 TABLET ORAL EVERY OTHER DAY
Qty: 45 TABLET | Refills: 3 | Status: SHIPPED | OUTPATIENT
Start: 2024-11-26 | End: 2025-11-26

## 2024-11-26 NOTE — TELEPHONE ENCOUNTER
Jony was called. The CardioMems reading was sent 2 days ago and we received the result. Jony is encouraged to continue sending reading. The representative from [facility]'s phone number was given to Jony for support. [phone].

## 2024-11-27 DIAGNOSIS — F41.9 ANXIETY DISORDER, UNSPECIFIED TYPE: ICD-10-CM

## 2024-11-27 RX ORDER — DIAZEPAM 5 MG/1
5 TABLET ORAL EVERY 8 HOURS PRN
Qty: 90 TABLET | Refills: 0 | Status: SHIPPED | OUTPATIENT
Start: 2024-11-27

## 2024-12-05 ENCOUNTER — HOSPITAL ENCOUNTER (OUTPATIENT)
Dept: CARDIOLOGY | Facility: HOSPITAL | Age: 79
Discharge: HOME | End: 2024-12-05
Payer: MEDICARE

## 2024-12-05 ENCOUNTER — PATIENT OUTREACH (OUTPATIENT)
Dept: PRIMARY CARE | Facility: CLINIC | Age: 79
End: 2024-12-05
Payer: COMMERCIAL

## 2024-12-05 DIAGNOSIS — I47.29 OTHER VENTRICULAR TACHYCARDIA: ICD-10-CM

## 2024-12-05 DIAGNOSIS — Z95.810 PRESENCE OF AUTOMATIC (IMPLANTABLE) CARDIAC DEFIBRILLATOR: ICD-10-CM

## 2024-12-05 DIAGNOSIS — Z95.810 PRESENCE OF AUTOMATIC (IMPLANTABLE) CARDIAC DEFIBRILLATOR: Primary | ICD-10-CM

## 2024-12-05 DIAGNOSIS — I44.2 AV BLOCK, COMPLETE (MULTI): ICD-10-CM

## 2024-12-05 PROCEDURE — 93284 PRGRMG EVAL IMPLANTABLE DFB: CPT

## 2024-12-11 DIAGNOSIS — D64.9 ANEMIA, UNSPECIFIED: ICD-10-CM

## 2024-12-11 RX ORDER — PANTOPRAZOLE SODIUM 40 MG/1
40 TABLET, DELAYED RELEASE ORAL DAILY
Qty: 90 TABLET | Refills: 3 | Status: SHIPPED | OUTPATIENT
Start: 2024-12-11

## 2024-12-16 ENCOUNTER — APPOINTMENT (OUTPATIENT)
Dept: CARDIOLOGY | Facility: HOSPITAL | Age: 79
End: 2024-12-16
Payer: MEDICARE

## 2024-12-16 ENCOUNTER — OFFICE VISIT (OUTPATIENT)
Dept: PAIN MANAGEMENT | Age: 79
End: 2024-12-16
Payer: COMMERCIAL

## 2024-12-16 VITALS
BODY MASS INDEX: 24.99 KG/M2 | OXYGEN SATURATION: 95 % | WEIGHT: 150 LBS | RESPIRATION RATE: 18 BRPM | SYSTOLIC BLOOD PRESSURE: 94 MMHG | TEMPERATURE: 97.1 F | DIASTOLIC BLOOD PRESSURE: 60 MMHG | HEIGHT: 65 IN | HEART RATE: 67 BPM

## 2024-12-16 DIAGNOSIS — S33.5XXD LUMBAR SPRAIN, SUBSEQUENT ENCOUNTER: ICD-10-CM

## 2024-12-16 DIAGNOSIS — G89.29 CHRONIC FLANK PAIN: ICD-10-CM

## 2024-12-16 DIAGNOSIS — M51.26 DISPLACEMENT OF LUMBAR INTERVERTEBRAL DISC WITHOUT MYELOPATHY: ICD-10-CM

## 2024-12-16 DIAGNOSIS — M79.10 MYALGIA: ICD-10-CM

## 2024-12-16 DIAGNOSIS — R10.9 CHRONIC FLANK PAIN: ICD-10-CM

## 2024-12-16 DIAGNOSIS — G89.4 CHRONIC PAIN SYNDROME: ICD-10-CM

## 2024-12-16 DIAGNOSIS — S33.9XXA CHRONIC OR OLD SPRAIN OF LUMBOSACRAL LIGAMENT: ICD-10-CM

## 2024-12-16 DIAGNOSIS — Z79.891 ENCOUNTER FOR LONG-TERM OPIATE ANALGESIC USE: ICD-10-CM

## 2024-12-16 PROBLEM — D63.8 ANEMIA OF CHRONIC DISEASE: Status: ACTIVE | Noted: 2023-05-05

## 2024-12-16 PROBLEM — I50.30 HEART FAILURE WITH PRESERVED EJECTION FRACTION: Status: ACTIVE | Noted: 2023-12-05

## 2024-12-16 PROBLEM — E11.21 DIABETIC NEPHROPATHY (MULTI): Status: ACTIVE | Noted: 2024-12-16

## 2024-12-16 PROBLEM — E11.8 TYPE 2 DIABETES MELLITUS WITH UNSPECIFIED COMPLICATIONS: Status: ACTIVE | Noted: 2023-05-05

## 2024-12-16 PROBLEM — Z22.7 LTBI (LATENT TUBERCULOSIS INFECTION): Status: ACTIVE | Noted: 2024-12-16

## 2024-12-16 PROCEDURE — 3074F SYST BP LT 130 MM HG: CPT | Performed by: PHYSICIAN ASSISTANT

## 2024-12-16 PROCEDURE — 3078F DIAST BP <80 MM HG: CPT | Performed by: PHYSICIAN ASSISTANT

## 2024-12-16 PROCEDURE — 99213 OFFICE O/P EST LOW 20 MIN: CPT | Performed by: PHYSICIAN ASSISTANT

## 2024-12-16 PROCEDURE — 1123F ACP DISCUSS/DSCN MKR DOCD: CPT | Performed by: PHYSICIAN ASSISTANT

## 2024-12-16 RX ORDER — LIDOCAINE 50 MG/G
1 PATCH TOPICAL DAILY
Qty: 30 PATCH | Refills: 0 | Status: SHIPPED | OUTPATIENT
Start: 2024-12-16 | End: 2025-01-15

## 2024-12-16 RX ORDER — BACLOFEN 10 MG/1
10 TABLET ORAL 2 TIMES DAILY
COMMUNITY
Start: 2024-09-09

## 2024-12-16 RX ORDER — LIDOCAINE 50 MG/G
OINTMENT TOPICAL
Qty: 70.88 G | Refills: 2 | Status: SHIPPED | OUTPATIENT
Start: 2024-12-16

## 2024-12-16 RX ORDER — OXYCODONE AND ACETAMINOPHEN 5; 325 MG/1; MG/1
1 TABLET ORAL 4 TIMES DAILY PRN
Qty: 120 TABLET | Refills: 0 | Status: SHIPPED | OUTPATIENT
Start: 2024-12-17 | End: 2025-01-16

## 2024-12-16 NOTE — PROGRESS NOTES
Amy Hardy presents to the [facility] on 12/16/2024. Amy is complaining of pain in his back. The pain is constant. The pain is described as aching, throbbing, shooting, stabbing, and dull. Pain is rated on his best day at a 3, on his worst day at a 10, and on average at a 6 on the VAS scale. He took his last dose of Percocet today.      Any procedures since your last visit: No    He is not on NSAIDS and  is  on anticoagulation medications to include Eliquis and is managed by Dr. Bajwa - cardiology.     Pacemaker or defibrillator: Yes, managed by Dr. Beasley.     Medication Contract and Consent for Opioid Use Documents Filed       Patient Documents       Type of Document Status Date Received Received By Description    Medication Contract Received 5/24/2021  9:00 AM PANCHO ORELLANA Medication Contract    Medication Contract Received 5/17/2022  3:39 PM [facility]    Medication Contract Received 4/26/2023  4:43 PM AMY ROBLES medication contract    Consent Opioid Use Received 6/12/2024  1:47 PM [facility]                       Resp 18   Ht 1.651 m (5' 5\")   Wt 68 kg (150 lb)   BMI 24.96 kg/m²      No LMP for male patient.    
Friends and Family: Patient unable to answer     Attends [employer] Services: Patient unable to answer     Active Member of Clubs or Organizations: Patient unable to answer     Attends Club or Organization Meetings: Patient unable to answer     Marital Status: Patient unable to answer   Intimate Partner Violence: Patient Unable To Answer (9/10/2024)    Received from [facility]    Humiliation, Afraid, Rape, and Kick questionnaire     Fear of Current or Ex-Partner: Patient unable to answer     Emotionally Abused: Patient unable to answer     Physically Abused: Patient unable to answer     Sexually Abused: Patient unable to answer   Housing Stability: Patient Unable To Answer (9/10/2024)    Received from [facility]    Housing Stability Vital Sign     Unable to Pay for Housing in the Last Year: Patient unable to answer     Number of Times Moved in the Last Year: 1     Homeless in the Last Year: Patient unable to answer       Family History   Problem Relation Age of Onset    Heart Disease Mother     No Known Problems Father     Prostate Cancer Brother         2011    Cancer Other     Diabetes Other     High Blood Pressure Other     Stroke Other     Tuberculosis Other        REVIEW OF SYSTEMS:     Geoff denies fever/chills, chest pain, shortness of breath, new bowel or bladder complaints. All other review of systems was negative.    PHYSICAL EXAMINATION:      BP 94/60   Pulse 67   Temp 97.1 °F (36.2 °C) (Infrared)   Resp 18   Ht 1.651 m (5' 5\")   Wt 68 kg (150 lb)   SpO2 95%   BMI 24.96 kg/m²     General:      General appearance:   pleasant and well-hydrated.   , in mild discomfort and A & O x3  Build:Normal Weight    HEENT:    Head:normocephalic and atraumatic  Sclera: icterus absent,    Lungs:    Breathing:Normal expansion.  No wheezing.    Abdomen:    Shape:non-distended and normal    Lumbar spine:    Range of motion:abnormal moderately Lateral bending, flexion,

## 2024-12-17 DIAGNOSIS — N52.9 ERECTILE DYSFUNCTION, UNSPECIFIED ERECTILE DYSFUNCTION TYPE: ICD-10-CM

## 2024-12-18 RX ORDER — TADALAFIL 20 MG/1
20 TABLET ORAL DAILY PRN
Qty: 90 TABLET | Refills: 4 | Status: SHIPPED | OUTPATIENT
Start: 2024-12-18

## 2024-12-30 ENCOUNTER — OFFICE VISIT (OUTPATIENT)
Dept: CARDIOLOGY | Facility: HOSPITAL | Age: 79
End: 2024-12-30
Payer: MEDICARE

## 2024-12-30 VITALS
DIASTOLIC BLOOD PRESSURE: 75 MMHG | HEART RATE: 77 BPM | OXYGEN SATURATION: 94 % | BODY MASS INDEX: 26.63 KG/M2 | WEIGHT: 160 LBS | SYSTOLIC BLOOD PRESSURE: 120 MMHG | RESPIRATION RATE: 24 BRPM

## 2024-12-30 DIAGNOSIS — I48.19 PERSISTENT ATRIAL FIBRILLATION (MULTI): ICD-10-CM

## 2024-12-30 DIAGNOSIS — I10 HYPERTENSION, UNSPECIFIED TYPE: ICD-10-CM

## 2024-12-30 DIAGNOSIS — I50.42 CHRONIC COMBINED SYSTOLIC AND DIASTOLIC HEART FAILURE, NYHA CLASS 3: Primary | ICD-10-CM

## 2024-12-30 DIAGNOSIS — I35.0 AORTIC VALVE STENOSIS, ETIOLOGY OF CARDIAC VALVE DISEASE UNSPECIFIED: ICD-10-CM

## 2024-12-30 PROCEDURE — 99213 OFFICE O/P EST LOW 20 MIN: CPT | Performed by: NURSE PRACTITIONER

## 2024-12-30 PROCEDURE — 1159F MED LIST DOCD IN RCRD: CPT | Performed by: NURSE PRACTITIONER

## 2024-12-30 PROCEDURE — 3078F DIAST BP <80 MM HG: CPT | Performed by: NURSE PRACTITIONER

## 2024-12-30 PROCEDURE — 3074F SYST BP LT 130 MM HG: CPT | Performed by: NURSE PRACTITIONER

## 2024-12-30 PROCEDURE — 1036F TOBACCO NON-USER: CPT | Performed by: NURSE PRACTITIONER

## 2024-12-30 PROCEDURE — 1157F ADVNC CARE PLAN IN RCRD: CPT | Performed by: NURSE PRACTITIONER

## 2024-12-30 PROCEDURE — 1123F ACP DISCUSS/DSCN MKR DOCD: CPT | Performed by: NURSE PRACTITIONER

## 2024-12-30 ASSESSMENT — ENCOUNTER SYMPTOMS
ABDOMINAL DISTENTION: 0
SHORTNESS OF BREATH: 1
LIGHT-HEADEDNESS: 0
FEVER: 0
CHEST TIGHTNESS: 0
ACTIVITY CHANGE: 0
BLOOD IN STOOL: 0
CHILLS: 0
PALPITATIONS: 0
WHEEZING: 0
COUGH: 0
CONFUSION: 0
WEAKNESS: 0
EYES NEGATIVE: 1
HEMATURIA: 0

## 2024-12-30 NOTE — PROGRESS NOTES
Subjective   Patient ID: Jony Javier is a 79 y.o. male who presents for follow-up of congestive heart failure.     Current Outpatient Medications:     albuterol 90 mcg/actuation inhaler, INHALE 1 (ONE) PUFF EVERY 4 HOURS AS NEEDED, Disp: 8.5 g, Rfl: 8    atorvastatin (Lipitor) 80 mg tablet, Take 1 tablet (80 mg) by mouth once daily at bedtime., Disp: 90 tablet, Rfl: 3    baclofen (Lioresal) 10 mg tablet, Take 1 tablet (10 mg) by mouth 2 times a day as needed., Disp: , Rfl:     blood sugar diagnostic (True Metrix Glucose Test Strip) strip, USE 1 STRIP 3 times daily, Disp: 300 strip, Rfl: 3    bumetanide (Bumex) 1 mg tablet, Take 1 tablet (1 mg) by mouth every other day., Disp: 45 tablet, Rfl: 3    carvedilol (Coreg) 25 mg tablet, Take 1 tablet (25 mg) by mouth 2 times a day., Disp: 180 tablet, Rfl: 3    clobetasol (Temovate) 0.05 % external solution, APPLY TO THE AFFECTED AREA(S) EVERY DAY AS NEEDED, Disp: , Rfl:     diazePAM (Valium) 5 mg tablet, Take 1 tablet (5 mg) by mouth every 8 hours if needed for anxiety., Disp: 90 tablet, Rfl: 0    Eliquis 5 mg tablet, Take 1 tablet (5 mg) by mouth 2 times a day., Disp: 180 tablet, Rfl: 3    Entresto 49-51 mg tablet, Take 1 tablet by mouth 2 times a day., Disp: 180 tablet, Rfl: 3    EPINEPHrine 0.3 mg/0.3 mL injection syringe, Inject 0.3 mL (0.3 mg) into the muscle if needed for anaphylaxis for up to 1 dose., Disp: 1 each, Rfl: 3    ezetimibe (Zetia) 10 mg tablet, Take 1 tablet (10 mg) by mouth once daily., Disp: 90 tablet, Rfl: 3    febuxostat (Uloric) 40 mg tablet, Take 1 tablet (40 mg) by mouth once daily., Disp: , Rfl:     ferrous gluconate 270 mg (27 mg iron) tablet, Take 1 tablet by mouth once daily., Disp: , Rfl:     Jardiance 10 mg, Take 1 tablet (10 mg) by mouth once daily., Disp: 30 tablet, Rfl: 11    lidocaine (Xylocaine) 5 % ointment, APPLY TO THE AFFECTED AREA(S) AS NEEDED FOR PAIN to last 30 days, Disp: , Rfl:     melatonin 3 mg tablet, Take 1 tablet (3  mg) by mouth as needed at bedtime for sleep., Disp: , Rfl:     mirtazapine (Remeron) 30 mg tablet, Take 1 tablet (30 mg) by mouth once daily at bedtime., Disp: , Rfl:     multivitamin tablet, Take 1 tablet by mouth once daily., Disp: , Rfl:     nystatin (Mycostatin) 100,000 unit/gram powder, Apply 1 Application topically 2 times a day., Disp: , Rfl:     ondansetron ODT (Zofran-ODT) 4 mg disintegrating tablet, Take 1 tablet (4 mg) by mouth every 8 hours if needed for nausea or vomiting., Disp: 12 tablet, Rfl: 0    OneTouch Delica Plus Lancet 33 gauge misc, Test THREE TIMES DAILY AS DIRECTED, Disp: , Rfl:     oxyCODONE-acetaminophen (Percocet) 7.5-325 mg tablet, Take 1 tablet by mouth 3 times daily as needed for Pain for up to 30 days. Max Daily Amount 3 tablets, Disp: , Rfl:     pantoprazole (ProtoNix) 40 mg EC tablet, TAKE 1 TABLET BY MOUTH EVERY DAY, Disp: 90 tablet, Rfl: 3    tadalafil 20 mg tablet, TAKE 1 TABLET BY MOUTH ONCE DAILY AS NEEDED for erectile dysfunction., Disp: 90 tablet, Rfl: 4    tamsulosin (Flomax) 0.4 mg 24 hr capsule, Take 1 capsule (0.4 mg) by mouth once daily in the morning., Disp: 90 capsule, Rfl: 1    testosterone (Axiron) 30 mg/actuation (1.5 mL) topical solution, Place 2 Pump on the skin once daily., Disp: , Rfl:     triamcinolone (Kenalog) 0.1 % lotion, Apply 1 Application topically if needed (psoriasis)., Disp: , Rfl:     venlafaxine XR (Effexor-XR) 150 mg 24 hr capsule, TAKE 1 CAPSULE BY MOUTH AFTER BREAKFAST, Disp: , Rfl:      HPI     Past medical history consists significant for history of heart failure reduced ejection fraction. He initially his ejection fraction was around 40%. He has a history of coronary artery disease, persistent atrial fibrillation, GERD, history of carotid artery stenosis and CVA in the past. He has CRT-D in place. He has not had any defibrillations from device. He also has CardioMEMS in place and his readings have been consistently within desired parameters.  Last heart cath was in 2020. Results are noted below. Most recent echocardiogram shows ejection fraction of 45% which is actually decreased from his echocardiogram in 2022 which showed that he had improvement in EF to 60 to 65%.     Patient presents for follow-up in heart failure clinic.  Symptoms are stable.  He has no exam findings that would suggest that he is fluid congested.  He is taking medications as directed.    Review of Systems   Constitutional:  Negative for activity change, chills and fever.   HENT:  Negative for hearing loss.    Eyes: Negative.    Respiratory:  Positive for shortness of breath. Negative for cough, chest tightness and wheezing.         CPAP at night.    Cardiovascular:  Negative for chest pain, palpitations and leg swelling.   Gastrointestinal:  Negative for abdominal distention and blood in stool.   Genitourinary:  Negative for hematuria.   Neurological:  Negative for syncope, weakness and light-headedness.   Psychiatric/Behavioral:  Negative for confusion.        Objective     /75 (BP Location: Right arm, Patient Position: Sitting, BP Cuff Size: Adult)   Pulse 77   Resp 24   Wt 72.6 kg (160 lb)   SpO2 94%   BMI 26.63 kg/m²         Transthoracic echo (TTE) complete    Result Date: 8/6/2024              [place] [address]      Phone [phone] Fax [phone] TRANSTHORACIC ECHOCARDIOGRAM REPORT Patient Name:      KARAN Paige Physician:    [id number] Narendra Portillo MD Study Date:        8/5/2024             Ordering Provider:    [id number] DOROTHY MYLES MRN/PID:           [id number]             Fellow: Accession#:        [id number]         Nurse:                Maye Dong RN Date of Birth/Age: 1945 / 79 years Sonographer:          Brenna Yusuf                                                                [facility] Gender:            M                    Additional Staff: Height:            162.56 cm            Admit Date: Weight:            73.03 kg             Admission Status:     Outpatient BSA / BMI:         1.78 m2 / 27.64      Department Location:  [facility] Echo                    kg/m2                                      Lab Blood Pressure: 92 /75 mmHg Study Type:    TRANSTHORACIC ECHO (TTE) COMPLETE Diagnosis/ICD: Atherosclerotic heart disease of native coronary artery with                angina pectoris with documented spasm-I25.111; Acute combined                systolic (congestive) and diastolic (congestive) heart failure                (CHF)-I50.41 Indication:    CHF, CAD CPT Codes:     Echo Complete w Full [id number] Patient History: Pertinent History: CAD and CHF. Study Detail: The following Echo studies were performed: 2D, M-Mode, Doppler and               color flow. Technically challenging study due to prominent lung               artifact and pacer wires. Optison used as a contrast agent for               endocardial border definition.  PHYSICIAN INTERPRETATION: Left Ventricle: Left ventricular ejection fraction is mildly decreased, by visual estimate at 45-50%. Wall motion is abnormal. The left ventricular cavity size is normal. Spectral Doppler shows a pseudonormal pattern of left ventricular diastolic filling. Unable to measure 2D Plax d/t poor image quality. Akinetic inferior wall. Left Atrium: The left atrium is moderately dilated. Right Ventricle: The right ventricle is normal in size. There is normal right ventricular global systolic function. A device is visualized in the right ventricle. Right Atrium: The right atrium is normal in size. Aortic Valve: The aortic valve was not well visualized. There is evidence of mild to moderate aortic valve stenosis. The aortic valve dimensionless index is 0.38. There is mild aortic valve regurgitation. The peak instantaneous  gradient of the aortic valve is 27.3 mmHg. The mean gradient of the aortic valve is 14.4 mmHg. Mitral Valve: The mitral valve is mild to moderately thickened. There is mild to moderate mitral annular calcification. There is mild to moderate mitral valve regurgitation. Tricuspid Valve: The tricuspid valve is structurally normal. There is mild to moderate tricuspid regurgitation. Pulmonic Valve: The pulmonic valve is not well visualized. There is trace to mild pulmonic valve regurgitation. Pericardium: There is no pericardial effusion noted. Aorta: The aortic root is normal.  CONCLUSIONS:  1. Left ventricular ejection fraction is mildly decreased, by visual estimate at 45-50%.  2. Spectral Doppler shows a pseudonormal pattern of left ventricular diastolic filling.  3. Unable to measure 2D Plax d/t poor image quality.     Akinetic inferior wall.  4. There is normal right ventricular global systolic function.  5. The left atrium is moderately dilated.  6. Mild to moderate mitral valve regurgitation.  7. Mild to moderate tricuspid regurgitation.  8. Mild to moderate aortic valve stenosis.  9. Mild aortic valve regurgitation. QUANTITATIVE DATA SUMMARY: LA VOLUME:                               Normal Ranges: LA Vol A4C:        81.1 ml    (22+/-6mL/m2) LA Vol A2C:        67.0 ml LA Vol BP:         75.4 ml LA Vol Index A4C:  45.5ml/m2 LA Vol Index A2C:  37.5 ml/m2 LA Vol Index BP:   42.3 ml/m2 LA Area A4C:       24.9 cm2 LA Area A2C:       22.1 cm2 LA Major Axis A4C: 6.5 cm LA Major Axis A2C: 6.2 cm LA Vol A4C:        77.7 ml LA Vol A2C:        64.2 ml RA VOLUME BY A/L METHOD:                       Normal Ranges: RA Area A4C: 19.5 cm2 AORTA MEASUREMENTS:                      Normal Ranges: Ao Sinus, d: 2.70 cm (2.1-3.5cm) Ao STJ, d:   2.10 cm (1.7-3.4cm) Asc Ao, d:   2.80 cm (2.1-3.4cm) LV SYSTOLIC FUNCTION BY 2D PLANIMETRY (MOD):                      Normal Ranges: EF-A4C View:    52 % (>=55%) EF-A2C View:    50 %  EF-Biplane:     50 % EF-Visual:      48 % LV EF Reported: 48 % LV DIASTOLIC FUNCTION:                         Normal Ranges: MV Peak E:    1.18 m/s  (0.7-1.2 m/s) MV Peak A:    0.83 m/s  (0.42-0.7 m/s) E/A Ratio:    1.43      (1.0-2.2) MV e'         0.081 m/s (>8.0) MV lateral e' 0.08 m/s MV medial e'  0.08 m/s E/e' Ratio:   14.65     (<8.0) MITRAL VALVE:                 Normal Ranges: MV DT: 250 msec (150-240msec) MITRAL INSUFFICIENCY:                           Normal Ranges: PISA Radius:  0.6 cm MR VTI:       149.60 cm MR Vmax:      474.99 cm/s MR Alias Tam: 32.8 cm/s MR Volume:    23.52 ml MR Flow Rt:   74.68 ml/s MR EROA:      0.16 cm2 AORTIC VALVE:                                    Normal Ranges: AoV Vmax:                2.61 m/s  (<=1.7m/s) AoV Peak P.3 mmHg (<20mmHg) AoV Mean P.4 mmHg (1.7-11.5mmHg) LVOT Max Tam:            0.89 m/s  (<=1.1m/s) AoV VTI:                 52.09 cm  (18-25cm) LVOT VTI:                20.03 cm LVOT Diameter:           2.02 cm   (1.8-2.4cm) AoV Area, VTI:           1.23 cm2  (2.5-5.5cm2) AoV Area,Vmax:           1.10 cm2  (2.5-4.5cm2) AoV Dimensionless Index: 0.38 AORTIC INSUFFICIENCY: AI Vmax:       3.17 m/s AI Half-time:  448 msec AI Decel Time: 1546 msec AI Decel Rate: 205.78 cm/s2  RIGHT VENTRICLE: RV Basal 3.68 cm RV Mid   2.60 cm RV Major 8.0 cm TAPSE:   19.1 mm RV s'    0.10 m/s TRICUSPID VALVE/RVSP:                             Normal Ranges: Peak TR Velocity: 2.70 m/s RV Syst Pressure: 32.1 mmHg (< 30mmHg) IVC Diam:         2.08 cm AORTA: Asc Ao Diam 2.77 cm  [id number] Narendra Portillo MD Electronically signed on 2024 at 5:34:21 PM  ** Final **       Lab Results   Component Value Date    BUN 33 (H) 2024    CREATININE 1.59 (H) 2024     (H) 2024    MG 2.02 2024    K 5.0 2024     (L) 2024       Constitutional:       General:  NAD   HENT:      Head: Normocephalic     Mouth: Mucous membranes  are moist.      Neck:  No JVD or HJR   Eyes:      Conjunctiva/sclera: Conjunctivae normal.    Cardiovascular:      Rate and Rhythm: Normal rate and regular rhythm      Heart sounds:  S1 S2 normal, musical aortic stenosis murmur , no S3 or S4   Pulmonary:      Pulmonary effort is normal.  diminished breath sounds,  prolonged expiratory phase No wheezing or rales.   Abdominal:      General: Bowel sounds are normal, non- tender to palpitations. Liver is non palpable at  right costal margin.   Musculoskeletal:         General: No swelling   KHALIL well.   Skin:     General: Skin is warm and dry   Neurological:      General: No focal deficit present.      Mental Status: alert and oriented to person, place, and time. Mental status is at baseline.     Psychiatric:         Mood and Affect: Normal Affect.     Assessment/Plan     Problem List Items Addressed This Visit       Chronic combined systolic and diastolic heart failure, NYHA class 3    HTN (hypertension)    Persistent atrial fibrillation (Multi)      Chronic systolic diastolic heart failure:   Etiology   AHA Stage: C  NYHA class:  2-3  - CPAP a night   Volume Status:  Euvolemic    GFR: 44     GDMT:  BB- Carvedilol 25 mg 1 tablet twice a day   ARB/ACEI/ARNI - Entresto 49/51 mg 1 tablet twice a day   MRA -  on hold due to high potassium.   SGLT2i -  Jardiance 10 mg once a day   Diuretic - Bumex 1 mg  every other day.   Device Therapy: CRT-D / Cardiomems readings have been stable recently.      CHF:  LVEF 45-50% with  akinesis again noted in the apical area.   He appears compensated today. Will continue current medications.      Emphasized salt restriction.  Encouraged daily monitoring of the patient's weight.  Encouraged regular exercise as tolerated.   Repeat lab work in 3 months.   Follow up in  4 months.      2. Atrial fibrillation :   RRR today. OAC with Eliquis. Denies obvious signs of bleeding.  Stable.      3. CKD3:  GFR 44 ml/min  Renal function is stable per last  labs.  Stable.      4. ASHD:  No chest pain,  continue secondary prevention medications.  EF is unchanged.  Apical akinesis noted on previous echocardiogram  stable.      5. Aortic stenosis:  musical murmur noted.   Repeat echocardiogram in September 2025.     Montserrat Wesley, LARRY-CNP

## 2024-12-30 NOTE — PATIENT INSTRUCTIONS
Thank you for coming in today.  If you have any questions you may contact the office Monday through Friday at [phone] or on week ends at [phone].        Please follow  a 2 GM sodium diet and limit fluid intake to 2 liters per day or 8 servings ( serving size = 8 oz. = 1 cup = 240 ml) per day.   Please avoid processed meat products (luncheon meats, sausages, gonsales, hot dogs for example) eat 4 servings of vegetables and 1-2 whole servings of whole fruits per day.   Please weigh daily and call [phone] for weight gain of 3 pounds in 24 hours or 5 pounds or if you experience increased swelling or shortness of breath.         Follow up:  4 months.     Please be sure to follow up with your cardiologist at [facility] once every year. Call [phone] to schedule appointment if you do not have a follow up appointment scheduled already.

## 2024-12-31 DIAGNOSIS — F41.9 ANXIETY DISORDER, UNSPECIFIED TYPE: ICD-10-CM

## 2024-12-31 RX ORDER — DIAZEPAM 5 MG/1
5 TABLET ORAL EVERY 8 HOURS PRN
Qty: 90 TABLET | Refills: 0 | Status: SHIPPED | OUTPATIENT
Start: 2024-12-31

## 2024-12-31 NOTE — TELEPHONE ENCOUNTER
Patient's wife phoned for a refill of Diazepam to be sent to [facility].

## 2025-01-09 ENCOUNTER — APPOINTMENT (OUTPATIENT)
Dept: CARDIOLOGY | Facility: CLINIC | Age: 80
End: 2025-01-09
Payer: MEDICARE

## 2025-01-16 ENCOUNTER — TELEPHONE (OUTPATIENT)
Dept: PAIN MANAGEMENT | Age: 80
End: 2025-01-16

## 2025-01-16 DIAGNOSIS — G89.29 CHRONIC FLANK PAIN: ICD-10-CM

## 2025-01-16 DIAGNOSIS — M79.10 MYALGIA: ICD-10-CM

## 2025-01-16 DIAGNOSIS — S33.5XXD LUMBAR SPRAIN, SUBSEQUENT ENCOUNTER: ICD-10-CM

## 2025-01-16 DIAGNOSIS — G89.4 CHRONIC PAIN SYNDROME: ICD-10-CM

## 2025-01-16 DIAGNOSIS — Z79.891 ENCOUNTER FOR LONG-TERM OPIATE ANALGESIC USE: ICD-10-CM

## 2025-01-16 DIAGNOSIS — R10.9 CHRONIC FLANK PAIN: ICD-10-CM

## 2025-01-16 DIAGNOSIS — M51.26 DISPLACEMENT OF LUMBAR INTERVERTEBRAL DISC WITHOUT MYELOPATHY: ICD-10-CM

## 2025-01-16 DIAGNOSIS — I50.9 CHRONIC CONGESTIVE HEART FAILURE, UNSPECIFIED HEART FAILURE TYPE: ICD-10-CM

## 2025-01-16 DIAGNOSIS — S33.9XXA CHRONIC OR OLD SPRAIN OF LUMBOSACRAL LIGAMENT: ICD-10-CM

## 2025-01-16 RX ORDER — EMPAGLIFLOZIN 10 MG/1
10 TABLET, FILM COATED ORAL DAILY
Qty: 90 TABLET | Refills: 3 | Status: SHIPPED | OUTPATIENT
Start: 2025-01-16 | End: 2026-01-16

## 2025-01-16 RX ORDER — OXYCODONE AND ACETAMINOPHEN 5; 325 MG/1; MG/1
1 TABLET ORAL 4 TIMES DAILY PRN
Qty: 120 TABLET | Refills: 0 | Status: SHIPPED | OUTPATIENT
Start: 2025-01-16 | End: 2025-02-15

## 2025-01-16 NOTE — TELEPHONE ENCOUNTER
Patient has to cancel apt for today due to weather, can you call in enough medication to get him to his next apt on 2/14/25? Thanks

## 2025-01-27 DIAGNOSIS — I25.10 CORONARY ARTERY DISEASE INVOLVING NATIVE CORONARY ARTERY OF NATIVE HEART, UNSPECIFIED WHETHER ANGINA PRESENT: ICD-10-CM

## 2025-01-27 NOTE — TELEPHONE ENCOUNTER
Patient called for refill of Ezetimibe/Zetia 10 mg Daily.  [facility] [place]

## 2025-01-28 RX ORDER — EZETIMIBE 10 MG/1
10 TABLET ORAL DAILY
Qty: 90 TABLET | Refills: 0 | Status: SHIPPED | OUTPATIENT
Start: 2025-01-28 | End: 2025-04-28

## 2025-01-29 DIAGNOSIS — I48.19 PERSISTENT ATRIAL FIBRILLATION (MULTI): ICD-10-CM

## 2025-01-30 RX ORDER — APIXABAN 5 MG/1
5 TABLET, FILM COATED ORAL 2 TIMES DAILY
Qty: 180 TABLET | Refills: 1 | Status: SHIPPED | OUTPATIENT
Start: 2025-01-30 | End: 2026-01-30

## 2025-01-31 DIAGNOSIS — F41.9 ANXIETY DISORDER, UNSPECIFIED TYPE: ICD-10-CM

## 2025-01-31 RX ORDER — DIAZEPAM 5 MG/1
5 TABLET ORAL EVERY 8 HOURS PRN
Qty: 90 TABLET | Refills: 0 | Status: SHIPPED | OUTPATIENT
Start: 2025-01-31

## 2025-02-05 ENCOUNTER — OFFICE VISIT (OUTPATIENT)
Dept: PAIN MANAGEMENT | Age: 80
End: 2025-02-05
Payer: MEDICARE

## 2025-02-05 VITALS
HEART RATE: 67 BPM | TEMPERATURE: 96.8 F | BODY MASS INDEX: 24.98 KG/M2 | RESPIRATION RATE: 18 BRPM | SYSTOLIC BLOOD PRESSURE: 136 MMHG | HEIGHT: 65 IN | WEIGHT: 149.91 LBS | OXYGEN SATURATION: 98 % | DIASTOLIC BLOOD PRESSURE: 85 MMHG

## 2025-02-05 DIAGNOSIS — G89.4 CHRONIC PAIN SYNDROME: Primary | ICD-10-CM

## 2025-02-05 DIAGNOSIS — S33.9XXA CHRONIC OR OLD SPRAIN OF LUMBOSACRAL LIGAMENT: ICD-10-CM

## 2025-02-05 DIAGNOSIS — Z79.891 ENCOUNTER FOR LONG-TERM OPIATE ANALGESIC USE: ICD-10-CM

## 2025-02-05 DIAGNOSIS — S33.5XXD LUMBAR SPRAIN, SUBSEQUENT ENCOUNTER: ICD-10-CM

## 2025-02-05 DIAGNOSIS — M51.369 DEGENERATION OF INTERVERTEBRAL DISC OF LUMBAR REGION, UNSPECIFIED WHETHER PAIN PRESENT: ICD-10-CM

## 2025-02-05 DIAGNOSIS — M79.10 MYALGIA: ICD-10-CM

## 2025-02-05 DIAGNOSIS — M51.379 DEGENERATION OF INTERVERTEBRAL DISC OF LUMBOSACRAL REGION, UNSPECIFIED WHETHER PAIN PRESENT: ICD-10-CM

## 2025-02-05 DIAGNOSIS — M51.26 DISPLACEMENT OF LUMBAR INTERVERTEBRAL DISC WITHOUT MYELOPATHY: ICD-10-CM

## 2025-02-05 DIAGNOSIS — R10.9 CHRONIC FLANK PAIN: ICD-10-CM

## 2025-02-05 DIAGNOSIS — G89.29 CHRONIC FLANK PAIN: ICD-10-CM

## 2025-02-05 PROCEDURE — 1036F TOBACCO NON-USER: CPT | Performed by: PHYSICIAN ASSISTANT

## 2025-02-05 PROCEDURE — 1123F ACP DISCUSS/DSCN MKR DOCD: CPT | Performed by: PHYSICIAN ASSISTANT

## 2025-02-05 PROCEDURE — 1160F RVW MEDS BY RX/DR IN RCRD: CPT | Performed by: PHYSICIAN ASSISTANT

## 2025-02-05 PROCEDURE — G8420 CALC BMI NORM PARAMETERS: HCPCS | Performed by: PHYSICIAN ASSISTANT

## 2025-02-05 PROCEDURE — G8427 DOCREV CUR MEDS BY ELIG CLIN: HCPCS | Performed by: PHYSICIAN ASSISTANT

## 2025-02-05 PROCEDURE — 1159F MED LIST DOCD IN RCRD: CPT | Performed by: PHYSICIAN ASSISTANT

## 2025-02-05 PROCEDURE — 99213 OFFICE O/P EST LOW 20 MIN: CPT | Performed by: PHYSICIAN ASSISTANT

## 2025-02-05 PROCEDURE — 3075F SYST BP GE 130 - 139MM HG: CPT | Performed by: PHYSICIAN ASSISTANT

## 2025-02-05 PROCEDURE — 3079F DIAST BP 80-89 MM HG: CPT | Performed by: PHYSICIAN ASSISTANT

## 2025-02-05 RX ORDER — OXYCODONE AND ACETAMINOPHEN 5; 325 MG/1; MG/1
1 TABLET ORAL 4 TIMES DAILY PRN
Qty: 120 TABLET | Refills: 0 | Status: SHIPPED | OUTPATIENT
Start: 2025-02-14 | End: 2025-03-16

## 2025-02-05 NOTE — PROGRESS NOTES
Amy Hardy presents to the [facility] on 2/5/2025. Amy is complaining of pain in his back and right shoulder. The pain is constant. The pain is described as aching, throbbing, shooting, and stabbing. Pain is rated on his best day at a 4, on his worst day at a 10, and on average at a 4 on the VAS scale. He took his last dose of Percocet today.      Any procedures since your last visit: No    He is  on NSAIDS and  is  on anticoagulation medications to include Eliquis and is managed by Dr Ascencio.     Pacemaker or defibrillator: No     Do you want someone present when the provider examines you? No    Medication Contract and Consent for Opioid Use Documents Filed       Patient Documents       Type of Document Status Date Received Received By Description    Medication Contract Received 5/24/2021  9:00 AM PANCHO ORELLANA Medication Contract    Medication Contract Received 5/17/2022  3:39 PM [facility]    Medication Contract Received 4/26/2023  4:43 PM AYM ROBLES medication contract    Consent Opioid Use Received 6/12/2024  1:47 PM [facility] Pain Management                       There were no vitals taken for this visit.     No LMP for male patient.

## 2025-02-05 NOTE — PROGRESS NOTES
[facility]  [address] [place] [id number]    Follow up Note      Geoff Hardy     Date of Visit:  2025    CC:  Patient presents for follow up   Chief Complaint   Patient presents with    Follow-up    Back Pain               HPI:    Pain is worse due to the weather.   Appropriate analgesia with current medications regimen: yes.    Change in quality of symptoms:no.    Medication side effects:none.   Recent diagnostic testing:none.   Recent interventional procedures: None.    He has been on anticoagulation medications to include Eliquis and has not been on herbal supplements.  He is not diabetic.     Imagin2022 CT abdomen/pelvis    IMPRESSION:   1. Multiple small nonobstructing bilateral calcified calyceal calculi.   2. No evidence for other calcified collecting system calculi or   obstruction.   3. Hypodense posterior right upper pole renal cortical lesion most   likely a cyst. No further evaluation is required*.   4. Streaky soft tissue densities in the bilateral perinephric fat   compatible with active or, more likely, remote inflammation.   5. No evidence of mass, lymphadenopathy or inflammatory process.   6. Dense atherosclerotic calcification throughout normal caliber   abdominal aorta.       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 Degenerative disc disease, spondylosis causing stenosis. Probable large extruded disc at L3-4. Spinal listhesis L4 and L5 exacerbating the stenosis  EMG dated 3/24/2016     L5 radiculopathy  CT dated 2016 lumbar spine demonstrates degenerative spinal stenosis that is severe at L3-4 L4-5 and L5-S1 levels                                        Potential Aberrant Drug-Related Behavior: no     Urine Drug Screenin2018 buccal consistent  2019 buccal consistent  2019 consistent  10/2019 consistent for oxycodone, metabolites, and benzodiazapines  2020

## 2025-02-13 ENCOUNTER — APPOINTMENT (OUTPATIENT)
Dept: PRIMARY CARE | Facility: CLINIC | Age: 80
End: 2025-02-13
Payer: MEDICARE

## 2025-02-18 RX ORDER — OXYCODONE AND ACETAMINOPHEN 5; 325 MG/1; MG/1
1 TABLET ORAL EVERY 6 HOURS PRN
COMMUNITY
Start: 2025-01-16

## 2025-02-25 ENCOUNTER — TELEPHONE (OUTPATIENT)
Dept: CARDIOLOGY | Facility: HOSPITAL | Age: 80
End: 2025-02-25

## 2025-02-25 ENCOUNTER — APPOINTMENT (OUTPATIENT)
Dept: CARDIOLOGY | Facility: HOSPITAL | Age: 80
End: 2025-02-25
Payer: COMMERCIAL

## 2025-02-25 NOTE — TELEPHONE ENCOUNTER
Pt wife called for refill of atorvastatin to be sent to   [id number] - [place], [place] - [address]  [address] [address]  Phone: [phone]  Fax: [phone]

## 2025-02-25 NOTE — TELEPHONE ENCOUNTER
Pt wife Sally called to r/s appt today with Dr. Portillo as pt not feeling well. Next available appt not until May, but added pt to waitlist.

## 2025-02-27 ENCOUNTER — TELEPHONE (OUTPATIENT)
Dept: PAIN MANAGEMENT | Age: 80
End: 2025-02-27

## 2025-02-27 ENCOUNTER — TELEPHONE (OUTPATIENT)
Dept: PRIMARY CARE | Facility: CLINIC | Age: 80
End: 2025-02-27
Payer: COMMERCIAL

## 2025-02-27 DIAGNOSIS — S33.5XXD LUMBAR SPRAIN, SUBSEQUENT ENCOUNTER: ICD-10-CM

## 2025-02-27 DIAGNOSIS — I25.10 CORONARY ARTERY DISEASE INVOLVING NATIVE CORONARY ARTERY OF NATIVE HEART, UNSPECIFIED WHETHER ANGINA PRESENT: ICD-10-CM

## 2025-02-27 DIAGNOSIS — M51.26 DISPLACEMENT OF LUMBAR INTERVERTEBRAL DISC WITHOUT MYELOPATHY: ICD-10-CM

## 2025-02-27 DIAGNOSIS — F41.9 ANXIETY DISORDER, UNSPECIFIED TYPE: ICD-10-CM

## 2025-02-27 DIAGNOSIS — M51.379 DEGENERATION OF LUMBAR OR LUMBOSACRAL INTERVERTEBRAL DISC: ICD-10-CM

## 2025-02-27 DIAGNOSIS — S33.9XXA CHRONIC OR OLD SPRAIN OF LUMBOSACRAL LIGAMENT: ICD-10-CM

## 2025-02-27 DIAGNOSIS — G89.4 CHRONIC PAIN SYNDROME: ICD-10-CM

## 2025-02-27 RX ORDER — DIAZEPAM 5 MG/1
5 TABLET ORAL EVERY 8 HOURS PRN
Qty: 90 TABLET | Refills: 0 | Status: SHIPPED | OUTPATIENT
Start: 2025-03-01

## 2025-02-27 RX ORDER — BACLOFEN 10 MG/1
10 TABLET ORAL 2 TIMES DAILY
Qty: 60 TABLET | Refills: 0 | Status: SHIPPED | OUTPATIENT
Start: 2025-02-27 | End: 2025-03-29

## 2025-02-28 RX ORDER — ATORVASTATIN CALCIUM 80 MG/1
80 TABLET, FILM COATED ORAL NIGHTLY
Qty: 90 TABLET | Refills: 3 | Status: SHIPPED | OUTPATIENT
Start: 2025-02-28 | End: 2026-02-28

## 2025-03-11 ENCOUNTER — HOSPITAL ENCOUNTER (OUTPATIENT)
Dept: CARDIOLOGY | Facility: HOSPITAL | Age: 80
Discharge: HOME | End: 2025-03-11
Payer: MEDICARE

## 2025-03-11 DIAGNOSIS — Z95.810 PRESENCE OF AUTOMATIC (IMPLANTABLE) CARDIAC DEFIBRILLATOR: ICD-10-CM

## 2025-03-11 DIAGNOSIS — I47.29 OTHER VENTRICULAR TACHYCARDIA: ICD-10-CM

## 2025-03-11 DIAGNOSIS — I44.2 AV BLOCK, COMPLETE (MULTI): ICD-10-CM

## 2025-03-11 PROCEDURE — 93296 REM INTERROG EVL PM/IDS: CPT

## 2025-03-13 ENCOUNTER — TELEPHONE (OUTPATIENT)
Dept: PAIN MANAGEMENT | Age: 80
End: 2025-03-13

## 2025-03-13 DIAGNOSIS — M51.26 DISPLACEMENT OF LUMBAR INTERVERTEBRAL DISC WITHOUT MYELOPATHY: ICD-10-CM

## 2025-03-13 DIAGNOSIS — S33.5XXD LUMBAR SPRAIN, SUBSEQUENT ENCOUNTER: ICD-10-CM

## 2025-03-13 DIAGNOSIS — M79.10 MYALGIA: ICD-10-CM

## 2025-03-13 DIAGNOSIS — Z79.891 ENCOUNTER FOR LONG-TERM OPIATE ANALGESIC USE: ICD-10-CM

## 2025-03-13 DIAGNOSIS — R10.9 CHRONIC FLANK PAIN: ICD-10-CM

## 2025-03-13 DIAGNOSIS — G89.29 CHRONIC FLANK PAIN: ICD-10-CM

## 2025-03-13 DIAGNOSIS — S33.9XXA CHRONIC OR OLD SPRAIN OF LUMBOSACRAL LIGAMENT: ICD-10-CM

## 2025-03-13 DIAGNOSIS — G89.4 CHRONIC PAIN SYNDROME: ICD-10-CM

## 2025-03-13 RX ORDER — OXYCODONE AND ACETAMINOPHEN 5; 325 MG/1; MG/1
1 TABLET ORAL 4 TIMES DAILY PRN
Qty: 96 TABLET | Refills: 0 | Status: SHIPPED | OUTPATIENT
Start: 2025-03-17 | End: 2025-04-10

## 2025-03-13 NOTE — TELEPHONE ENCOUNTER
Patient calling in to reschedule appt for today. He is a lot of pain and cannot make it in to the appt.  I rescheduled him to 4/10/25, wife was asking if meds can be given to him to get him to this appt, please advise.

## 2025-04-08 ENCOUNTER — TELEPHONE (OUTPATIENT)
Dept: PRIMARY CARE | Facility: CLINIC | Age: 80
End: 2025-04-08
Payer: COMMERCIAL

## 2025-04-08 DIAGNOSIS — F41.9 ANXIETY DISORDER, UNSPECIFIED TYPE: ICD-10-CM

## 2025-04-08 RX ORDER — DIAZEPAM 5 MG/1
5 TABLET ORAL EVERY 8 HOURS PRN
Qty: 90 TABLET | Refills: 0 | Status: SHIPPED | OUTPATIENT
Start: 2025-04-08

## 2025-04-08 NOTE — TELEPHONE ENCOUNTER
diazePAM (Valium) 5 mg tablet     [id number] - [place], [place] - [address]  [address] [address]  Phone: [phone]  Fax: [phone]  JA #: --

## 2025-04-10 ENCOUNTER — OFFICE VISIT (OUTPATIENT)
Dept: PAIN MANAGEMENT | Age: 80
End: 2025-04-10
Payer: MEDICARE

## 2025-04-10 DIAGNOSIS — M51.26 DISPLACEMENT OF LUMBAR INTERVERTEBRAL DISC WITHOUT MYELOPATHY: ICD-10-CM

## 2025-04-10 DIAGNOSIS — M79.10 MYALGIA: ICD-10-CM

## 2025-04-10 DIAGNOSIS — G89.4 CHRONIC PAIN SYNDROME: ICD-10-CM

## 2025-04-10 DIAGNOSIS — G89.4 CHRONIC PAIN SYNDROME: Primary | ICD-10-CM

## 2025-04-10 DIAGNOSIS — M51.379 DEGENERATION OF LUMBAR OR LUMBOSACRAL INTERVERTEBRAL DISC: ICD-10-CM

## 2025-04-10 DIAGNOSIS — M51.369 DEGENERATION OF INTERVERTEBRAL DISC OF LUMBAR REGION, UNSPECIFIED WHETHER PAIN PRESENT: ICD-10-CM

## 2025-04-10 DIAGNOSIS — R10.9 CHRONIC FLANK PAIN: ICD-10-CM

## 2025-04-10 DIAGNOSIS — S33.9XXA CHRONIC OR OLD SPRAIN OF LUMBOSACRAL LIGAMENT: ICD-10-CM

## 2025-04-10 DIAGNOSIS — S33.5XXD LUMBAR SPRAIN, SUBSEQUENT ENCOUNTER: ICD-10-CM

## 2025-04-10 DIAGNOSIS — G89.29 CHRONIC FLANK PAIN: ICD-10-CM

## 2025-04-10 DIAGNOSIS — M51.379 DEGENERATION OF INTERVERTEBRAL DISC OF LUMBOSACRAL REGION, UNSPECIFIED WHETHER PAIN PRESENT: ICD-10-CM

## 2025-04-10 DIAGNOSIS — Z79.891 ENCOUNTER FOR LONG-TERM OPIATE ANALGESIC USE: ICD-10-CM

## 2025-04-10 PROCEDURE — 1123F ACP DISCUSS/DSCN MKR DOCD: CPT | Performed by: PHYSICIAN ASSISTANT

## 2025-04-10 PROCEDURE — 99213 OFFICE O/P EST LOW 20 MIN: CPT | Performed by: PHYSICIAN ASSISTANT

## 2025-04-10 RX ORDER — OXYCODONE AND ACETAMINOPHEN 5; 325 MG/1; MG/1
1 TABLET ORAL 4 TIMES DAILY PRN
Qty: 120 TABLET | Refills: 0 | Status: SHIPPED | OUTPATIENT
Start: 2025-04-10 | End: 2025-05-10

## 2025-04-10 RX ORDER — BACLOFEN 10 MG/1
10 TABLET ORAL 2 TIMES DAILY
Qty: 60 TABLET | Refills: 0 | Status: SHIPPED | OUTPATIENT
Start: 2025-04-10 | End: 2025-05-10

## 2025-04-10 NOTE — PROGRESS NOTES
Difficult to exam fully due to patient not fully cooperating  Discussed paronychia  Possibly needs drainage, but as patient was uncooperative in the office, will try supportive treatment  Warm water soaks and warm compresses  Start Bactrim  Recommend probiotic  ER precautions given for worsening symptoms and signs that it may need I&D  Mom and Dad understood and agreeable to plan  Follow up here as needed  encouraged him to utilize the lowest dose of benzo as possible and wean off if possible              Hold percocet 7.5/325 TID #90                 RF perocet to 5/325 QID prn.                RF baclofen 10 mg BID - not taking as much.  No RF lidocaine ointment  No RF lactulose for OIC     He is not a candidate for SCS due to his quite frequent falls  Not candidate for TENS due to pacer/defib  Patient encouraged to stay active  Treatment plan discussed with the patient including medication side effects                    We discussed with the patient that combining opioids, benzodiazepines, alcohol, illicit drugs or sleep aids increases the risk of respiratory depression including death. We discussed that these medications may cause drowsiness, sedation or dizziness and have counseled the patient not to drive or operate machinery. We have discussed that these medications will be prescribed only by one provider. We have discussed with the patient about age related risk factors and have thoroughly discussed the importance of taking these medications as prescribed. The patient verbalizes understanding.    ccreferring physic

## 2025-04-10 NOTE — PROGRESS NOTES
Vaping Use    Vaping status: Never Used   Substance and Sexual Activity    Alcohol use: Yes     Comment: 6-8 beers weekly     Drug use: No     Comment: caffeine 2 cups coffee daily    Sexual activity: Not on file   Other Topics Concern    Not on file   Social History Narrative    Not on file     Social Drivers of Health     Financial Resource Strain: Patient Unable To Answer (9/10/2024)    Received from [facility]    Overall Financial Resource Strain (CARDIA)     Difficulty of Paying Living Expenses: Patient unable to answer   Food Insecurity: No Food Insecurity (10/10/2024)    Received from [facility]    Hunger Vital Sign     Worried About Running Out of Food in the Last Year: Never true     Ran Out of Food in the Last Year: Never true   Transportation Needs: Patient Unable To Answer (9/10/2024)    Received from [facility]    PRAPARE - Transportation     Lack of Transportation (Medical): Patient unable to answer     Lack of Transportation (Non-Medical): Patient unable to answer   Physical Activity: Patient Unable To Answer (9/10/2024)    Received from [facility]    Exercise Vital Sign     Days of Exercise per Week: Patient unable to answer     Minutes of Exercise per Session: Patient unable to answer   Stress: Patient Unable To Answer (9/10/2024)    Received from [facility]    [employer] - Occupational Stress Questionnaire     Feeling of Stress : Patient unable to answer   Social Connections: Patient Unable To Answer (9/10/2024)    Received from [facility]    Social Connection and Isolation Panel [NHANES]     Frequency of Communication with Friends and Family: Patient unable to answer     Frequency of Social Gatherings with Friends and Family: Patient unable to answer     Attends Mu-ism Services: Patient unable to answer     Active Member of

## 2025-04-12 LAB
ALBUMIN SERPL-MCNC: 4.1 G/DL (ref 3.6–5.1)
ALBUMIN/CREAT UR: 54 MG/G CREAT
ALP SERPL-CCNC: 55 U/L (ref 35–144)
ALT SERPL-CCNC: 7 U/L (ref 9–46)
ANION GAP SERPL CALCULATED.4IONS-SCNC: 10 MMOL/L (CALC) (ref 7–17)
ANION GAP SERPL CALCULATED.4IONS-SCNC: 7 MMOL/L (CALC) (ref 7–17)
AST SERPL-CCNC: 15 U/L (ref 10–35)
BASOPHILS # BLD AUTO: 47 CELLS/UL (ref 0–200)
BASOPHILS NFR BLD AUTO: 0.6 %
BILIRUB SERPL-MCNC: 0.6 MG/DL (ref 0.2–1.2)
BNP SERPL-MCNC: NORMAL PG/ML
BUN SERPL-MCNC: 29 MG/DL (ref 7–25)
BUN SERPL-MCNC: 29 MG/DL (ref 7–25)
BUN/CREAT SERPL: 18 (CALC) (ref 6–22)
CALCIUM SERPL-MCNC: 9.2 MG/DL (ref 8.6–10.3)
CALCIUM SERPL-MCNC: 9.3 MG/DL (ref 8.6–10.3)
CHLORIDE SERPL-SCNC: 100 MMOL/L (ref 98–110)
CHLORIDE SERPL-SCNC: 99 MMOL/L (ref 98–110)
CHOLEST SERPL-MCNC: 123 MG/DL
CHOLEST/HDLC SERPL: 1.6 (CALC)
CO2 SERPL-SCNC: 25 MMOL/L (ref 20–32)
CO2 SERPL-SCNC: 28 MMOL/L (ref 20–32)
CREAT SERPL-MCNC: 1.55 MG/DL (ref 0.7–1.22)
CREAT SERPL-MCNC: 1.58 MG/DL (ref 0.7–1.22)
CREAT UR-MCNC: 41 MG/DL (ref 20–320)
EGFRCR SERPLBLD CKD-EPI 2021: 44 ML/MIN/1.73M2
EGFRCR SERPLBLD CKD-EPI 2021: 45 ML/MIN/1.73M2
EOSINOPHIL # BLD AUTO: 117 CELLS/UL (ref 15–500)
EOSINOPHIL NFR BLD AUTO: 1.5 %
ERYTHROCYTE [DISTWIDTH] IN BLOOD BY AUTOMATED COUNT: 11.6 % (ref 11–15)
EST. AVERAGE GLUCOSE BLD GHB EST-MCNC: 114 MG/DL
EST. AVERAGE GLUCOSE BLD GHB EST-SCNC: 6.3 MMOL/L
GLUCOSE SERPL-MCNC: 93 MG/DL (ref 65–99)
GLUCOSE SERPL-MCNC: 94 MG/DL (ref 65–99)
HBA1C MFR BLD: 5.6 % OF TOTAL HGB
HCT VFR BLD AUTO: 35 % (ref 38.5–50)
HDLC SERPL-MCNC: 79 MG/DL
HGB BLD-MCNC: 11.7 G/DL (ref 13.2–17.1)
LDLC SERPL CALC-MCNC: 30 MG/DL (CALC)
LYMPHOCYTES # BLD AUTO: 1576 CELLS/UL (ref 850–3900)
LYMPHOCYTES NFR BLD AUTO: 20.2 %
MAGNESIUM SERPL-MCNC: 2.2 MG/DL (ref 1.5–2.5)
MCH RBC QN AUTO: 35.2 PG (ref 27–33)
MCHC RBC AUTO-ENTMCNC: 33.4 G/DL (ref 32–36)
MCV RBC AUTO: 105.4 FL (ref 80–100)
MICROALBUMIN UR-MCNC: 2.2 MG/DL
MONOCYTES # BLD AUTO: 624 CELLS/UL (ref 200–950)
MONOCYTES NFR BLD AUTO: 8 %
NEUTROPHILS # BLD AUTO: 5437 CELLS/UL (ref 1500–7800)
NEUTROPHILS NFR BLD AUTO: 69.7 %
NONHDLC SERPL-MCNC: 44 MG/DL (CALC)
PLATELET # BLD AUTO: 111 THOUSAND/UL (ref 140–400)
PMV BLD REES-ECKER: 11.9 FL (ref 7.5–12.5)
POTASSIUM SERPL-SCNC: 4.8 MMOL/L (ref 3.5–5.3)
POTASSIUM SERPL-SCNC: 4.8 MMOL/L (ref 3.5–5.3)
PROT SERPL-MCNC: 6.3 G/DL (ref 6.1–8.1)
RBC # BLD AUTO: 3.32 MILLION/UL (ref 4.2–5.8)
SODIUM SERPL-SCNC: 134 MMOL/L (ref 135–146)
SODIUM SERPL-SCNC: 135 MMOL/L (ref 135–146)
TRIGL SERPL-MCNC: 62 MG/DL
WBC # BLD AUTO: 7.8 THOUSAND/UL (ref 3.8–10.8)

## 2025-04-14 LAB
ANION GAP SERPL CALCULATED.4IONS-SCNC: 10 MMOL/L (CALC) (ref 7–17)
BNP SERPL-MCNC: 856 PG/ML
BUN SERPL-MCNC: 29 MG/DL (ref 7–25)
BUN/CREAT SERPL: 18 (CALC) (ref 6–22)
CALCIUM SERPL-MCNC: 9.3 MG/DL (ref 8.6–10.3)
CHLORIDE SERPL-SCNC: 100 MMOL/L (ref 98–110)
CO2 SERPL-SCNC: 25 MMOL/L (ref 20–32)
CREAT SERPL-MCNC: 1.58 MG/DL (ref 0.7–1.22)
EGFRCR SERPLBLD CKD-EPI 2021: 44 ML/MIN/1.73M2
GLUCOSE SERPL-MCNC: 93 MG/DL (ref 65–99)
MAGNESIUM SERPL-MCNC: 2.2 MG/DL (ref 1.5–2.5)
POTASSIUM SERPL-SCNC: 4.8 MMOL/L (ref 3.5–5.3)
SODIUM SERPL-SCNC: 135 MMOL/L (ref 135–146)

## 2025-04-15 ENCOUNTER — APPOINTMENT (OUTPATIENT)
Dept: PRIMARY CARE | Facility: CLINIC | Age: 80
End: 2025-04-15
Payer: MEDICARE

## 2025-04-15 ENCOUNTER — TELEPHONE (OUTPATIENT)
Dept: CARDIOLOGY | Facility: HOSPITAL | Age: 80
End: 2025-04-15

## 2025-04-15 VITALS
WEIGHT: 155.2 LBS | RESPIRATION RATE: 20 BRPM | BODY MASS INDEX: 25.86 KG/M2 | SYSTOLIC BLOOD PRESSURE: 122 MMHG | HEIGHT: 65 IN | DIASTOLIC BLOOD PRESSURE: 64 MMHG | TEMPERATURE: 96.9 F | OXYGEN SATURATION: 96 % | HEART RATE: 65 BPM

## 2025-04-15 DIAGNOSIS — I50.9 CHRONIC CONGESTIVE HEART FAILURE, UNSPECIFIED HEART FAILURE TYPE: ICD-10-CM

## 2025-04-15 DIAGNOSIS — E11.59 TYPE 2 DIABETES MELLITUS WITH OTHER CIRCULATORY COMPLICATION, WITHOUT LONG-TERM CURRENT USE OF INSULIN: ICD-10-CM

## 2025-04-15 DIAGNOSIS — B35.6 TINEA CRURIS: ICD-10-CM

## 2025-04-15 DIAGNOSIS — G95.19 OTHER VASCULAR MYELOPATHIES: ICD-10-CM

## 2025-04-15 DIAGNOSIS — N18.32 STAGE 3B CHRONIC KIDNEY DISEASE (MULTI): ICD-10-CM

## 2025-04-15 DIAGNOSIS — I48.91 ATRIAL FIBRILLATION, UNSPECIFIED TYPE (MULTI): ICD-10-CM

## 2025-04-15 DIAGNOSIS — I50.42 CHRONIC COMBINED SYSTOLIC AND DIASTOLIC HEART FAILURE, NYHA CLASS 3: Primary | ICD-10-CM

## 2025-04-15 DIAGNOSIS — E78.5 HYPERLIPIDEMIA, UNSPECIFIED HYPERLIPIDEMIA TYPE: ICD-10-CM

## 2025-04-15 DIAGNOSIS — N52.9 ERECTILE DYSFUNCTION, UNSPECIFIED ERECTILE DYSFUNCTION TYPE: ICD-10-CM

## 2025-04-15 DIAGNOSIS — Z00.00 ROUTINE GENERAL MEDICAL EXAMINATION AT HEALTH CARE FACILITY: Primary | ICD-10-CM

## 2025-04-15 DIAGNOSIS — F03.A4 MILD DEMENTIA WITH ANXIETY, UNSPECIFIED DEMENTIA TYPE (MULTI): ICD-10-CM

## 2025-04-15 DIAGNOSIS — I50.84 END STAGE HEART FAILURE: ICD-10-CM

## 2025-04-15 DIAGNOSIS — J44.9 CHRONIC OBSTRUCTIVE PULMONARY DISEASE, UNSPECIFIED COPD TYPE (MULTI): ICD-10-CM

## 2025-04-15 DIAGNOSIS — S06.309A: ICD-10-CM

## 2025-04-15 PROBLEM — J96.02 ACUTE RESPIRATORY FAILURE WITH HYPERCAPNIA: Status: RESOLVED | Noted: 2024-02-07 | Resolved: 2025-04-15

## 2025-04-15 PROBLEM — D63.1 ANEMIA DUE TO STAGE 4 CHRONIC KIDNEY DISEASE TREATED WITH ERYTHROPOIETIN: Status: RESOLVED | Noted: 2021-08-12 | Resolved: 2025-04-15

## 2025-04-15 PROBLEM — N18.4 ANEMIA DUE TO STAGE 4 CHRONIC KIDNEY DISEASE TREATED WITH ERYTHROPOIETIN: Status: RESOLVED | Noted: 2021-08-12 | Resolved: 2025-04-15

## 2025-04-15 PROCEDURE — 1157F ADVNC CARE PLAN IN RCRD: CPT | Performed by: INTERNAL MEDICINE

## 2025-04-15 PROCEDURE — 1125F AMNT PAIN NOTED PAIN PRSNT: CPT | Performed by: INTERNAL MEDICINE

## 2025-04-15 PROCEDURE — 1123F ACP DISCUSS/DSCN MKR DOCD: CPT | Performed by: INTERNAL MEDICINE

## 2025-04-15 PROCEDURE — 1160F RVW MEDS BY RX/DR IN RCRD: CPT | Performed by: INTERNAL MEDICINE

## 2025-04-15 PROCEDURE — 3074F SYST BP LT 130 MM HG: CPT | Performed by: INTERNAL MEDICINE

## 2025-04-15 PROCEDURE — 3078F DIAST BP <80 MM HG: CPT | Performed by: INTERNAL MEDICINE

## 2025-04-15 PROCEDURE — 1036F TOBACCO NON-USER: CPT | Performed by: INTERNAL MEDICINE

## 2025-04-15 PROCEDURE — 1159F MED LIST DOCD IN RCRD: CPT | Performed by: INTERNAL MEDICINE

## 2025-04-15 PROCEDURE — 99214 OFFICE O/P EST MOD 30 MIN: CPT | Performed by: INTERNAL MEDICINE

## 2025-04-15 RX ORDER — TESTOSTERONE 20.25 MG/1.25G
GEL TOPICAL
COMMUNITY
Start: 2025-03-17

## 2025-04-15 RX ORDER — TRIAMCINOLONE ACETONIDE 1 MG/ML
1 LOTION TOPICAL AS NEEDED
Status: CANCELLED | OUTPATIENT
Start: 2025-04-15

## 2025-04-15 RX ORDER — TADALAFIL 20 MG/1
20 TABLET ORAL DAILY PRN
Qty: 90 TABLET | Refills: 3 | Status: SHIPPED | OUTPATIENT
Start: 2025-04-15

## 2025-04-15 RX ORDER — NYSTATIN 100000 U/G
CREAM TOPICAL 2 TIMES DAILY
Qty: 30 G | Refills: 3 | Status: SHIPPED | OUTPATIENT
Start: 2025-04-15 | End: 2025-04-22

## 2025-04-15 RX ORDER — NYSTATIN 100000 [USP'U]/G
1 POWDER TOPICAL 2 TIMES DAILY
Qty: 60 G | Refills: 2 | Status: SHIPPED | OUTPATIENT
Start: 2025-04-15

## 2025-04-15 SDOH — ECONOMIC STABILITY: FOOD INSECURITY: WITHIN THE PAST 12 MONTHS, YOU WORRIED THAT YOUR FOOD WOULD RUN OUT BEFORE YOU GOT MONEY TO BUY MORE.: NEVER TRUE

## 2025-04-15 SDOH — ECONOMIC STABILITY: FOOD INSECURITY: WITHIN THE PAST 12 MONTHS, THE FOOD YOU BOUGHT JUST DIDN'T LAST AND YOU DIDN'T HAVE MONEY TO GET MORE.: NEVER TRUE

## 2025-04-15 ASSESSMENT — ENCOUNTER SYMPTOMS
LOSS OF SENSATION IN FEET: 1
DEPRESSION: 0
OCCASIONAL FEELINGS OF UNSTEADINESS: 0

## 2025-04-15 ASSESSMENT — ANXIETY QUESTIONNAIRES
6. BECOMING EASILY ANNOYED OR IRRITABLE: NOT AT ALL
5. BEING SO RESTLESS THAT IT IS HARD TO SIT STILL: NOT AT ALL
4. TROUBLE RELAXING: NOT AT ALL
GAD7 TOTAL SCORE: 0
1. FEELING NERVOUS, ANXIOUS, OR ON EDGE: NOT AT ALL
3. WORRYING TOO MUCH ABOUT DIFFERENT THINGS: NOT AT ALL
7. FEELING AFRAID AS IF SOMETHING AWFUL MIGHT HAPPEN: NOT AT ALL
IF YOU CHECKED OFF ANY PROBLEMS ON THIS QUESTIONNAIRE, HOW DIFFICULT HAVE THESE PROBLEMS MADE IT FOR YOU TO DO YOUR WORK, TAKE CARE OF THINGS AT HOME, OR GET ALONG WITH OTHER PEOPLE: NOT DIFFICULT AT ALL
2. NOT BEING ABLE TO STOP OR CONTROL WORRYING: NOT AT ALL

## 2025-04-15 ASSESSMENT — LIFESTYLE VARIABLES
HOW OFTEN DO YOU HAVE SIX OR MORE DRINKS ON ONE OCCASION: NEVER
SKIP TO QUESTIONS 9-10: 1
AUDIT-C TOTAL SCORE: 0
HOW MANY STANDARD DRINKS CONTAINING ALCOHOL DO YOU HAVE ON A TYPICAL DAY: PATIENT DOES NOT DRINK
HOW OFTEN DO YOU HAVE A DRINK CONTAINING ALCOHOL: NEVER

## 2025-04-15 ASSESSMENT — PATIENT HEALTH QUESTIONNAIRE - PHQ9
2. FEELING DOWN, DEPRESSED OR HOPELESS: NOT AT ALL
SUM OF ALL RESPONSES TO PHQ9 QUESTIONS 1 & 2: 0
1. LITTLE INTEREST OR PLEASURE IN DOING THINGS: NOT AT ALL

## 2025-04-15 ASSESSMENT — PAIN SCALES - GENERAL: PAINLEVEL_OUTOF10: 7

## 2025-04-15 NOTE — ASSESSMENT & PLAN NOTE
Medicare wellness exam is completed  Orders:    1 Year Follow Up In Primary Care - Wellness Exam    1 Year Follow Up In Primary Care - Wellness Exam; Future    CBC and Auto Differential; Future    Comprehensive Metabolic Panel; Future

## 2025-04-15 NOTE — ASSESSMENT & PLAN NOTE
Orders:    CBC and Auto Differential; Future    Comprehensive Metabolic Panel; Future    Albumin-Creatinine Ratio, Urine Random; Future    Vitamin D 25-Hydroxy,Total (for eval of Vitamin D levels); Future

## 2025-04-15 NOTE — ASSESSMENT & PLAN NOTE
He is stable now  Orders:    CBC and Auto Differential; Future    Comprehensive Metabolic Panel; Future

## 2025-04-15 NOTE — ASSESSMENT & PLAN NOTE
See above    Medicare wellness exam completed today  Orders:    CBC and Auto Differential; Future    Comprehensive Metabolic Panel; Future

## 2025-04-15 NOTE — ASSESSMENT & PLAN NOTE
He is pleasant, no issues today, accompanied by wife  Orders:    CBC and Auto Differential; Future    Comprehensive Metabolic Panel; Future

## 2025-04-15 NOTE — ASSESSMENT & PLAN NOTE
Ordered tadalafil , he would like to see an new urologist prior to next visit  Orders:    tadalafil 20 mg tablet; Take 1 tablet (20 mg) by mouth once daily as needed for erectile dysfunction.    CBC and Auto Differential; Future    Comprehensive Metabolic Panel; Future

## 2025-04-15 NOTE — ASSESSMENT & PLAN NOTE
Patient has increased the bumetanide as ordered by cardiology, will increase the Jardiance also  Orders:    empagliflozin (Jardiance) 25 mg tablet; Take 1 tablet (25 mg) by mouth once daily.    CBC and Auto Differential; Future    Comprehensive Metabolic Panel; Future

## 2025-04-15 NOTE — PROGRESS NOTES
"Subjective   Reason for Visit: Jony Javier is an 79 y.o. male here for a Medicare Wellness visit.     Past Medical, Surgical, and Family History reviewed and updated in chart.    Reviewed all medications by prescribing practitioner or clinical pharmacist (such as prescriptions, OTCs, herbal therapies and supplements) and documented in the medical record.    HPI    Follow up and Medicare wellness exam:     Patient reports persistent soreness in the left heel and sole of the foot, particularly noticeable when lying in bed. Symptoms improve approximately one hour after standing. Notably, the patient observed purple discoloration of the toes one morning, although they are unsure if this was accompanied by pain or due to being cold; the color has since returned to nearly normal. The patient does not recall any specific injury to the foot. Symptoms began approximately two weeks ago     Follow up and Medicare Wellness Exam:      Prior  metabolic encephalopathy due to unintentional drug overdose. He took an unknown number of OTC products including Qnol, Valerian, and melatonin for sleep in addition to 7.5 mg Percocet. He drinks 2+ beers a night. He had altered mental status but was back to baseline upon discharge. Patient has had \"weird incidents\" in the past that wife has attributed to Valerian and melatonin.     Cough: denies further hemoptysis     HTN/ CHF: patient takes Entresto, carvedilol , Bumex, Jardiance.  He does check his blood pressure at home and reports it is normally high in the morning, and then low after he takes the medication. BP today 115/57. [facility], he only takes Bumex every other day  , patient will see Heart failure clinic soon.      Hyperlipidemia: takes Zetia 10 mg and atorvastatin 80 mg.      Type II DM: patient currently takes Jardiance 10 mg for CHF, he does check glucoses every morning (usually runs around 100). HgbA1C  was 5.6     Carotid stenosis/ CAD/ Aortic " stenosis/ Atrial fibrillation/ Cardiac arrest/PEA and Mitral regurgitation/ CHF : patient sees Dr Portillo, patient has a pacer/ defibrillator. He takes Eliquis 5 mg, he does not take ASA , He goes to [facility] also. He now takes Entresto, carvedilol, Jardiance. Patient saw Dr Ortiz, he was to follow up with Dr Ragsdale to be assessed for stent procedure, he has not yet followed up with Dr Ragsdale.      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 Patient has not followed with hematololgy     HECTOR: patient is using CPAP     Chronic pain issues: patient takes oxycodone per Dr Aguirre at pain management in [place]. He also takes baclofen. He reports that his back pain really bothers him , and it is radiating into his right hip. He also has right shoulder pain and knee pain (left knee).      Depression/anxiety: he takes Effexor and mirtazapine. He takes diazepam for anxiety, he takes this daily.  I have personally reviewed the OARRS report.  This report is scanned into the electronic medical record.  I have considered the risks of abuse, dependence, addiction and diversion.  I believe that it is clinically appropriate to prescribe this medication. Edgardo Gandara MD.      Anemia: Continues to be anemic on blood work , he has bruising, he has been having cough productive of some blood for about 2 weeks    Urinary issues: patient sees Dr Barboza. He uses Cialis, he would like a refill.           Patient Care Team:  Edgardo Gandara MD as PCP - General (Internal Medicine)  Edgardo Gandara MD as PCP - [facility] Attributed Provider  Narendra Portillo MD as Cardiologist (Cardiology)     Review of Systems     otherwise negative aside from what was mentioned above in HPI.     Objective   Vitals:  /64 (BP Location: Right arm, Patient Position: Sitting, BP Cuff Size: Adult)    "Pulse 65   Temp 36.1 °C (96.9 °F) (Temporal)   Resp 20   Ht 1.651 m (5' 5\")   Wt 70.4 kg (155 lb 3.2 oz)   SpO2 96%   BMI 25.83 kg/m²       Physical Exam    Constitutional   General appearance: Alert and in no acute distress. well developed, well nourished, within normal limits of ideal weight,  accompanied by wife, walks with a cane. Pleasant male, NAD   Eyes   Inspection of eyes: Sclera and conjunctiva were normal.   Ears, Nose, Mouth, and Throat   Ears: Auricles:  Normal. No thyromegaly or neck masses  Pulmonary   Respiratory assessment: No respiratory distress, normal respiratory rhythm and effort.    Auscultation of lungs:  no rales or crackles were heard bilaterally. no rhonchi. no wheezing. diminished breath sounds throughout all lung fields, especially in the bases   Cardiovascular   Auscultation of heart: The heart rate was normal. Irregularly irregular rate and rhythm. Heart sounds: the heart sounds were distant,  A grade 2/6 systolic murmur was heard at the RUSB. A grade 1/6 systolic murmur was heard at the LUSB. Murmur radiates to the carotids, this is not new.    Exam for edema:  trace ankle edema on the bilateral lower extremities, toes on the left foot are a little dusky in appearance   Lymphatic   Palpation of lymph nodes in neck: No cervical lymphadenopathy.   Musculoskeletal   pain in the right para lumbar region.  He sees pain management  Neurologic   Cranial nerves: Nerves 2-12 were intact, no focal neuro defects. no facial asymmetry is present.   Psychiatric   Orientation: Oriented to person, place, and time.    Mood and affect: Normal. feels tired.          Assessment & Plan  Routine general medical examination at health care facility  Medicare wellness exam is completed  Orders:    1 Year Follow Up In Primary Care - Wellness Exam    1 Year Follow Up In Primary Care - Wellness Exam; Future    CBC and Auto Differential; Future    Comprehensive Metabolic Panel; Future    Mild dementia with " anxiety, unspecified dementia type (Multi)  He is pleasant, no issues today, accompanied by wife  Orders:    CBC and Auto Differential; Future    Comprehensive Metabolic Panel; Future    Other vascular myelopathies  He is fair today  Orders:    CBC and Auto Differential; Future    Comprehensive Metabolic Panel; Future    Chronic obstructive pulmonary disease, unspecified COPD type (Multi)  No acute changes  Orders:    CBC and Auto Differential; Future    Comprehensive Metabolic Panel; Future    Atrial fibrillation, unspecified type (Multi)  He is stable, continue Eliquis  Orders:    CBC and Auto Differential; Future    Comprehensive Metabolic Panel; Future    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    CBC and Auto Differential; Future    Comprehensive Metabolic Panel; Future    Hemoglobin A1C; Future    Albumin-Creatinine Ratio, Urine Random; Future    End stage heart failure  Patient has increased the bumetanide as ordered by cardiology, will increase the Jardiance also  Orders:    empagliflozin (Jardiance) 25 mg tablet; Take 1 tablet (25 mg) by mouth once daily.    CBC and Auto Differential; Future    Comprehensive Metabolic Panel; Future    Erectile dysfunction, unspecified erectile dysfunction type  Ordered tadalafil , he would like to see an new urologist prior to next visit  Orders:    tadalafil 20 mg tablet; Take 1 tablet (20 mg) by mouth once daily as needed for erectile dysfunction.    CBC and Auto Differential; Future    Comprehensive Metabolic Panel; Future    Tinea cruris  Will treat with  nystatin  Orders:    nystatin (Mycostatin) 100,000 unit/gram powder; Apply 1 Application topically 2 times a day.    nystatin (Mycostatin) cream; Apply topically 2 times a day for 7 days. apply to affected area    CBC and Auto Differential; Future    Comprehensive Metabolic Panel; Future    Chronic congestive heart failure, unspecified heart failure type  See above    Medicare wellness exam completed today  Orders:    CBC and Auto Differential; Future    Comprehensive Metabolic Panel; Future    Stage 3b chronic kidney disease (Multi)    Orders:    CBC and Auto Differential; Future    Comprehensive Metabolic Panel; Future    Albumin-Creatinine Ratio, Urine Random; Future    Vitamin D 25-Hydroxy,Total (for eval of Vitamin D levels); Future    Hyperlipidemia, unspecified hyperlipidemia type    Orders:    Lipid Panel; Future     Monitor since Jardiance will be increased today  Medicare wellness  Completed today, follow up in 3 months

## 2025-04-15 NOTE — ASSESSMENT & PLAN NOTE
No acute changes  Orders:    CBC and Auto Differential; Future    Comprehensive Metabolic Panel; Future

## 2025-04-15 NOTE — ASSESSMENT & PLAN NOTE
He is fair today  Orders:    CBC and Auto Differential; Future    Comprehensive Metabolic Panel; Future

## 2025-04-15 NOTE — TELEPHONE ENCOUNTER
----- Message from Montserrat Wesley sent at 4/15/2025  9:32 AM EDT -----  Please see instructions below due to rising BNP.  Please call patient with changes thanks.     This is rising over time.   Patient needs to send in the cardiomems readings.  I would like for him to increase the Bumex to 1 tablet in a.m. and 1 tablet in afternoon on M-W-F.  Careful attention to fluid and sodium restrictions.  Recheck BMP in 2-3 weeks.     Patient was called and the message above was given via voicemail. Patient is encouraged to call back ([phone]) with any questions or concerns.

## 2025-04-17 ENCOUNTER — TELEPHONE (OUTPATIENT)
Dept: INFUSION THERAPY | Facility: HOSPITAL | Age: 80
End: 2025-04-17
Payer: COMMERCIAL

## 2025-04-17 NOTE — TELEPHONE ENCOUNTER
Told to take Bumax every day and extra one on M, W, F. That day primary care Dr. Gandara decided he should take 25mg Jardiance instead of 10mg.  He wanted to clear this with Montserrat. He also has groin infection and have cream and medication for that.  Primary care does not believe the new dose will make it worse.

## 2025-04-18 NOTE — TELEPHONE ENCOUNTER
That is fine. And he can stay on the Bumex 1 mg every day then and not take the extra tablet on M-W-F at this time. Thanks.     Patient was called and the message above was given via voicemail. Patient is encouraged to call back with any questions or concerns.

## 2025-04-21 ENCOUNTER — APPOINTMENT (OUTPATIENT)
Dept: CARDIOLOGY | Facility: HOSPITAL | Age: 80
End: 2025-04-21
Payer: COMMERCIAL

## 2025-04-22 ENCOUNTER — TELEPHONE (OUTPATIENT)
Dept: CARDIOLOGY | Facility: HOSPITAL | Age: 80
End: 2025-04-22
Payer: COMMERCIAL

## 2025-04-22 ENCOUNTER — APPOINTMENT (OUTPATIENT)
Dept: CARDIOLOGY | Facility: HOSPITAL | Age: 80
End: 2025-04-22
Payer: MEDICARE

## 2025-04-22 DIAGNOSIS — I48.19 PERSISTENT ATRIAL FIBRILLATION (MULTI): ICD-10-CM

## 2025-04-22 DIAGNOSIS — Z86.79 HISTORY OF HYPERTENSION: ICD-10-CM

## 2025-04-22 DIAGNOSIS — I50.42 CHRONIC COMBINED SYSTOLIC AND DIASTOLIC HEART FAILURE, NYHA CLASS 3: ICD-10-CM

## 2025-04-22 DIAGNOSIS — Z95.810 AUTOMATIC IMPLANTABLE CARDIOVERTER-DEFIBRILLATOR IN SITU: Primary | ICD-10-CM

## 2025-04-22 DIAGNOSIS — E11.59 TYPE 2 DIABETES MELLITUS WITH OTHER CIRCULATORY COMPLICATION, WITHOUT LONG-TERM CURRENT USE OF INSULIN: ICD-10-CM

## 2025-04-22 RX ORDER — CALCIUM CITRATE/VITAMIN D3 200MG-6.25
TABLET ORAL
Qty: 300 STRIP | Refills: 3 | Status: SHIPPED | OUTPATIENT
Start: 2025-04-22

## 2025-04-22 NOTE — TELEPHONE ENCOUNTER
Please call pt's wife Morenita regarding Jardiance. PCP wants to up the dosage from 10 mg to 25mg and she has concerns she would like to  speak to Montserrat about

## 2025-04-22 NOTE — PROGRESS NOTES
Subjective   Patient ID: Jony Riley is a 79 y.o. male who presents for follow-up of congestive heart failure.   Current Medications[1]     PMH: Past medical history consists significant for history of heart failure reduced ejection fraction. He initially his ejection fraction was around 40%. He has a history of coronary artery disease, persistent atrial fibrillation, GERD, history of carotid artery stenosis and CVA in the past. He has CRT-D in place. He has not had any defibrillations from device. He also has CardioMEMS in place and his readings have been consistently within desired parameters. Last heart cath was in 2020. Results are noted below. Most recent echocardiogram shows ejection fraction of 45% which is actually decreased from his echocardiogram in 2022 which showed that he had improvement in EF to 60 to 65%.     HPI    Review of Systems    Objective     There were no vitals taken for this visit.        Transthoracic echo (TTE) complete  Result Date: 8/6/2024              [place] [address]      Phone [phone] Fax [phone] TRANSTHORACIC ECHOCARDIOGRAM REPORT Patient Name:      JONY RILEY   Reading Physician:    [id number] Narendra Portillo MD Study Date:        8/5/2024             Ordering Provider:    [id number] DOROTHY MYLES MRN/PID:           [id number]             Fellow: Accession#:        [id number]         Nurse:                Maye Dong RN Date of Birth/Age: 1945 / 79 years Sonographer:          Brenna Yusuf RDCS Gender:            M                    Additional Staff: Height:            162.56 cm            Admit Date: Weight:            73.03 kg             Admission Status:     Outpatient BSA / BMI:         1.78 m2 / 27.64       Department Location:  [facility]                    kg/m2                                      Lab Blood Pressure: 92 /75 mmHg Study Type:    TRANSTHORACIC ECHO (TTE) COMPLETE Diagnosis/ICD: Atherosclerotic heart disease of native coronary artery with                angina pectoris with documented spasm-I25.111; Acute combined                systolic (congestive) and diastolic (congestive) heart failure                (CHF)-I50.41 Indication:    CHF, CAD CPT Codes:     Echo Complete w Full [id number] Patient History: Pertinent History: CAD and CHF. Study Detail: The following Echo studies were performed: 2D, M-Mode, Doppler and               color flow. Technically challenging study due to prominent lung               artifact and pacer wires. Optison used as a contrast agent for               endocardial border definition.  PHYSICIAN INTERPRETATION: Left Ventricle: Left ventricular ejection fraction is mildly decreased, by visual estimate at 45-50%. Wall motion is abnormal. The left ventricular cavity size is normal. Spectral Doppler shows a pseudonormal pattern of left ventricular diastolic filling. Unable to measure 2D Plax d/t poor image quality. Akinetic inferior wall. Left Atrium: The left atrium is moderately dilated. Right Ventricle: The right ventricle is normal in size. There is normal right ventricular global systolic function. A device is visualized in the right ventricle. Right Atrium: The right atrium is normal in size. Aortic Valve: The aortic valve was not well visualized. There is evidence of mild to moderate aortic valve stenosis. The aortic valve dimensionless index is 0.38. There is mild aortic valve regurgitation. The peak instantaneous gradient of the aortic valve is 27.3 mmHg. The mean gradient of the aortic valve is 14.4 mmHg. Mitral Valve: The mitral valve is mild to moderately thickened. There is mild to moderate mitral annular calcification. There is mild to moderate mitral valve  regurgitation. Tricuspid Valve: The tricuspid valve is structurally normal. There is mild to moderate tricuspid regurgitation. Pulmonic Valve: The pulmonic valve is not well visualized. There is trace to mild pulmonic valve regurgitation. Pericardium: There is no pericardial effusion noted. Aorta: The aortic root is normal.  CONCLUSIONS:  1. Left ventricular ejection fraction is mildly decreased, by visual estimate at 45-50%.  2. Spectral Doppler shows a pseudonormal pattern of left ventricular diastolic filling.  3. Unable to measure 2D Plax d/t poor image quality.     Akinetic inferior wall.  4. There is normal right ventricular global systolic function.  5. The left atrium is moderately dilated.  6. Mild to moderate mitral valve regurgitation.  7. Mild to moderate tricuspid regurgitation.  8. Mild to moderate aortic valve stenosis.  9. Mild aortic valve regurgitation. QUANTITATIVE DATA SUMMARY: LA VOLUME:                               Normal Ranges: LA Vol A4C:        81.1 ml    (22+/-6mL/m2) LA Vol A2C:        67.0 ml LA Vol BP:         75.4 ml LA Vol Index A4C:  45.5ml/m2 LA Vol Index A2C:  37.5 ml/m2 LA Vol Index BP:   42.3 ml/m2 LA Area A4C:       24.9 cm2 LA Area A2C:       22.1 cm2 LA Major Axis A4C: 6.5 cm LA Major Axis A2C: 6.2 cm LA Vol A4C:        77.7 ml LA Vol A2C:        64.2 ml RA VOLUME BY A/L METHOD:                       Normal Ranges: RA Area A4C: 19.5 cm2 AORTA MEASUREMENTS:                      Normal Ranges: Ao Sinus, d: 2.70 cm (2.1-3.5cm) Ao STJ, d:   2.10 cm (1.7-3.4cm) Asc Ao, d:   2.80 cm (2.1-3.4cm) LV SYSTOLIC FUNCTION BY 2D PLANIMETRY (MOD):                      Normal Ranges: EF-A4C View:    52 % (>=55%) EF-A2C View:    50 % EF-Biplane:     50 % EF-Visual:      48 % LV EF Reported: 48 % LV DIASTOLIC FUNCTION:                         Normal Ranges: MV Peak E:    1.18 m/s  (0.7-1.2 m/s) MV Peak A:    0.83 m/s  (0.42-0.7 m/s) E/A Ratio:    1.43      (1.0-2.2) MV e'         0.081 m/s  (>8.0) MV lateral e' 0.08 m/s MV medial e'  0.08 m/s E/e' Ratio:   14.65     (<8.0) MITRAL VALVE:                 Normal Ranges: MV DT: 250 msec (150-240msec) MITRAL INSUFFICIENCY:                           Normal Ranges: PISA Radius:  0.6 cm MR VTI:       149.60 cm MR Vmax:      474.99 cm/s MR Alias Tam: 32.8 cm/s MR Volume:    23.52 ml MR Flow Rt:   74.68 ml/s MR EROA:      0.16 cm2 AORTIC VALVE:                                    Normal Ranges: AoV Vmax:                2.61 m/s  (<=1.7m/s) AoV Peak P.3 mmHg (<20mmHg) AoV Mean P.4 mmHg (1.7-11.5mmHg) LVOT Max Tam:            0.89 m/s  (<=1.1m/s) AoV VTI:                 52.09 cm  (18-25cm) LVOT VTI:                20.03 cm LVOT Diameter:           2.02 cm   (1.8-2.4cm) AoV Area, VTI:           1.23 cm2  (2.5-5.5cm2) AoV Area,Vmax:           1.10 cm2  (2.5-4.5cm2) AoV Dimensionless Index: 0.38 AORTIC INSUFFICIENCY: AI Vmax:       3.17 m/s AI Half-time:  448 msec AI Decel Time: 1546 msec AI Decel Rate: 205.78 cm/s2  RIGHT VENTRICLE: RV Basal 3.68 cm RV Mid   2.60 cm RV Major 8.0 cm TAPSE:   19.1 mm RV s'    0.10 m/s TRICUSPID VALVE/RVSP:                             Normal Ranges: Peak TR Velocity: 2.70 m/s RV Syst Pressure: 32.1 mmHg (< 30mmHg) IVC Diam:         2.08 cm AORTA: Asc Ao Diam 2.77 cm  [id number] Narendra Portillo MD Electronically signed on 2024 at 5:34:21 PM  ** Final **        Lab Results   Component Value Date    BUN 29 (H) 2025    CREATININE 1.58 (H) 2025     (H) 2025    MG 2.2 2025    K 4.8 2025     2025       Constitutional:       General:  NAD   HENT:      Head: Normocephalic     Mouth: Mucous membranes are moist.      Neck:  No JVD or HJR   Eyes:      Conjunctiva/sclera: Conjunctivae normal.    Cardiovascular:      Rate and Rhythm: Normal rate and regular rhythm/ irregularly irregular/ occasional premature beat.      Heart sounds:  S1 S2 normal, no murmur, no S3  or S4   Pulmonary:      Pulmonary effort is normal.  Normal breath sounds/ diminished breath sounds/ diminished bases/ prolonged expiratory phase. No wheezing or rales.   Abdominal:      General: Bowel sounds are normal, non- tender to palpitations. Liver is non palpable at  right costal margin.   Musculoskeletal:         General: No swelling/ bilateral lower edema.  KHALIL well.   Skin:     General: Skin is warm and dry   Neurological:      General: No focal deficit present.      Mental Status: alert and oriented to person, place, and time. Mental status is at baseline.     Psychiatric:         Mood and Affect: Normal Affect.     Assessment/Plan     Problem List Items Addressed This Visit       Chronic combined systolic and diastolic heart failure, NYHA class 3    Persistent atrial fibrillation (Multi)    Automatic implantable cardioverter-defibrillator in situ - Primary    History of hypertension      Chronic systolic diastolic heart failure:   Etiology   AHA Stage: C  NYHA class:  2-3  - CPAP a night   Volume Status:  Euvolemic    GFR: 44     GDMT:  BB- Carvedilol 25 mg 1 tablet twice a day   ARB/ACEI/ARNI - Entresto 49/51 mg 1 tablet twice a day   MRA -  on hold due to high potassium.   SGLT2i -  Jardiance 10 mg once a day   Diuretic - Bumex 1 mg  every other day.   Device Therapy: CRT-D / Cardiomems readings have been stable recently.      CHF:  LVEF 45-50% with  akinesis again noted in the apical area.   He appears compensated today. Will continue current medications.      Emphasized salt restriction.  Encouraged daily monitoring of the patient's weight.  Encouraged regular exercise as tolerated.   Repeat lab work in 3 months.   Follow up in  4 months.      2. Atrial fibrillation :   RRR today. OAC with Eliquis. Denies obvious signs of bleeding.  Stable.      3. CKD3:  GFR 44 ml/min  Renal function is stable per last labs.  Stable.      4. ASHD:  No chest pain,  continue secondary prevention medications.  EF is  unchanged.  Apical akinesis noted on previous echocardiogram  stable.      5. Aortic stenosis:  musical murmur noted.   Repeat echocardiogram in September 2025.       Montserrat Wesley, APRN-CNP         [1]    Current Outpatient Medications:   •  albuterol 90 mcg/actuation inhaler, INHALE 1 (ONE) PUFF EVERY 4 HOURS AS NEEDED, Disp: 8.5 g, Rfl: 8  •  atorvastatin (Lipitor) 80 mg tablet, Take 1 tablet (80 mg) by mouth once daily at bedtime., Disp: 90 tablet, Rfl: 3  •  baclofen (Lioresal) 10 mg tablet, Take 1 tablet (10 mg) by mouth 2 times a day as needed., Disp: , Rfl:   •  blood sugar diagnostic (True Metrix Glucose Test Strip) strip, USE 1 STRIP 3 times daily, Disp: 300 strip, Rfl: 3  •  bumetanide (Bumex) 1 mg tablet, Take 1 tablet (1 mg) by mouth every other day., Disp: 45 tablet, Rfl: 3  •  carvedilol (Coreg) 25 mg tablet, Take 1 tablet (25 mg) by mouth 2 times a day., Disp: 180 tablet, Rfl: 3  •  clobetasol (Temovate) 0.05 % external solution, APPLY TO THE AFFECTED AREA(S) EVERY DAY AS NEEDED, Disp: , Rfl:   •  diazePAM (Valium) 5 mg tablet, Take 1 tablet (5 mg) by mouth every 8 hours if needed for anxiety. No further refills unless the patient is seen within the next 30 days, Disp: 90 tablet, Rfl: 0  •  Eliquis 5 mg tablet, Take 1 tablet (5 mg) by mouth 2 times a day., Disp: 180 tablet, Rfl: 1  •  empagliflozin (Jardiance) 25 mg tablet, Take 1 tablet (25 mg) by mouth once daily., Disp: 100 tablet, Rfl: 3  •  Entresto 49-51 mg tablet, Take 1 tablet by mouth 2 times a day., Disp: 180 tablet, Rfl: 3  •  EPINEPHrine 0.3 mg/0.3 mL injection syringe, Inject 0.3 mL (0.3 mg) into the muscle if needed for anaphylaxis for up to 1 dose., Disp: 1 each, Rfl: 3  •  ezetimibe (Zetia) 10 mg tablet, Take 1 tablet (10 mg) by mouth once daily., Disp: 90 tablet, Rfl: 0  •  febuxostat (Uloric) 40 mg tablet, Take 1 tablet (40 mg) by mouth once daily., Disp: , Rfl:   •  ferrous gluconate 270 mg (27 mg iron) tablet, Take 1 tablet  by mouth once daily., Disp: , Rfl:   •  lidocaine (Xylocaine) 5 % ointment, APPLY TO THE AFFECTED AREA(S) AS NEEDED FOR PAIN to last 30 days, Disp: , Rfl:   •  melatonin 3 mg tablet, Take 1 tablet (3 mg) by mouth as needed at bedtime for sleep., Disp: , Rfl:   •  mirtazapine (Remeron) 30 mg tablet, Take 1 tablet (30 mg) by mouth once daily at bedtime., Disp: , Rfl:   •  multivitamin tablet, Take 1 tablet by mouth once daily., Disp: , Rfl:   •  nystatin (Mycostatin) 100,000 unit/gram powder, Apply 1 Application topically 2 times a day., Disp: 60 g, Rfl: 2  •  nystatin (Mycostatin) cream, Apply topically 2 times a day for 7 days. apply to affected area, Disp: 30 g, Rfl: 3  •  ondansetron ODT (Zofran-ODT) 4 mg disintegrating tablet, Take 1 tablet (4 mg) by mouth every 8 hours if needed for nausea or vomiting., Disp: 12 tablet, Rfl: 0  •  OneTouch Delica Plus Lancet 33 gauge misc, Test THREE TIMES DAILY AS DIRECTED, Disp: , Rfl:   •  oxyCODONE-acetaminophen (Percocet) 5-325 mg tablet, 1 tablet every 6 hours if needed., Disp: , Rfl:   •  oxyCODONE-acetaminophen (Percocet) 7.5-325 mg tablet, Take 1 tablet by mouth 3 times daily as needed for Pain for up to 30 days. Max Daily Amount 3 tablets (Patient not taking: Reported on 4/15/2025), Disp: , Rfl:   •  pantoprazole (ProtoNix) 40 mg EC tablet, TAKE 1 TABLET BY MOUTH EVERY DAY, Disp: 90 tablet, Rfl: 3  •  tadalafil 20 mg tablet, Take 1 tablet (20 mg) by mouth once daily as needed for erectile dysfunction., Disp: 90 tablet, Rfl: 3  •  tamsulosin (Flomax) 0.4 mg 24 hr capsule, Take 1 capsule (0.4 mg) by mouth once daily in the morning., Disp: 90 capsule, Rfl: 1  •  testosterone 20.25 mg/1.25 gram (1.62 %) gel in metered-dose pump, Apply 2 Actuations topically daily. Use on hairless area like upper arm or shoulder., Disp: , Rfl:   •  venlafaxine XR (Effexor-XR) 150 mg 24 hr capsule, TAKE 1 CAPSULE BY MOUTH AFTER BREAKFAST, Disp: , Rfl:

## 2025-04-22 NOTE — TELEPHONE ENCOUNTER
Spoke with wife, made aware Montserrat feels Jony will do well with increased dosage. Advised to take increased dose and to call PCP or clinic with any complications.

## 2025-04-25 PROBLEM — R39.12 BENIGN PROSTATIC HYPERPLASIA WITH WEAK URINARY STREAM: Status: ACTIVE | Noted: 2025-03-14

## 2025-04-25 PROBLEM — N40.1 BENIGN PROSTATIC HYPERPLASIA WITH WEAK URINARY STREAM: Status: ACTIVE | Noted: 2025-03-14

## 2025-04-28 ENCOUNTER — TELEPHONE (OUTPATIENT)
Dept: INFUSION THERAPY | Facility: HOSPITAL | Age: 80
End: 2025-04-28
Payer: COMMERCIAL

## 2025-04-28 NOTE — TELEPHONE ENCOUNTER
Jony is no longer having leg cramps.  Patient is encouraged to take his cardiac medications with meals. Jony plans to have lab work completed tomorrow and will also keep is CHF clinic appt tomorrow. Sally reports that his weight, and VS remain stable.

## 2025-04-28 NOTE — TELEPHONE ENCOUNTER
Patient having a lot of leg cramps.  Thinks it coincides with increase in Bumax.  Since starting Jardiance today wondering if can decrease Bumax to every other day.

## 2025-04-29 ENCOUNTER — OFFICE VISIT (OUTPATIENT)
Dept: CARDIOLOGY | Facility: HOSPITAL | Age: 80
End: 2025-04-29
Payer: MEDICARE

## 2025-04-29 VITALS
RESPIRATION RATE: 24 BRPM | BODY MASS INDEX: 25.71 KG/M2 | HEART RATE: 68 BPM | SYSTOLIC BLOOD PRESSURE: 106 MMHG | OXYGEN SATURATION: 95 % | WEIGHT: 154.5 LBS | DIASTOLIC BLOOD PRESSURE: 67 MMHG

## 2025-04-29 DIAGNOSIS — I48.19 PERSISTENT ATRIAL FIBRILLATION (MULTI): ICD-10-CM

## 2025-04-29 DIAGNOSIS — I50.42 CHRONIC COMBINED SYSTOLIC AND DIASTOLIC HEART FAILURE, NYHA CLASS 3: ICD-10-CM

## 2025-04-29 DIAGNOSIS — I50.42 CHRONIC COMBINED SYSTOLIC AND DIASTOLIC HEART FAILURE, NYHA CLASS 3: Primary | ICD-10-CM

## 2025-04-29 DIAGNOSIS — I35.0 AORTIC VALVE STENOSIS, ETIOLOGY OF CARDIAC VALVE DISEASE UNSPECIFIED: ICD-10-CM

## 2025-04-29 DIAGNOSIS — I25.10 CORONARY ARTERY DISEASE INVOLVING NATIVE CORONARY ARTERY OF NATIVE HEART, UNSPECIFIED WHETHER ANGINA PRESENT: ICD-10-CM

## 2025-04-29 DIAGNOSIS — I10 HYPERTENSION, UNSPECIFIED TYPE: ICD-10-CM

## 2025-04-29 PROCEDURE — 1036F TOBACCO NON-USER: CPT | Performed by: NURSE PRACTITIONER

## 2025-04-29 PROCEDURE — 99213 OFFICE O/P EST LOW 20 MIN: CPT | Performed by: NURSE PRACTITIONER

## 2025-04-29 PROCEDURE — 3074F SYST BP LT 130 MM HG: CPT | Performed by: NURSE PRACTITIONER

## 2025-04-29 PROCEDURE — 3078F DIAST BP <80 MM HG: CPT | Performed by: NURSE PRACTITIONER

## 2025-04-29 PROCEDURE — 1157F ADVNC CARE PLAN IN RCRD: CPT | Performed by: NURSE PRACTITIONER

## 2025-04-29 PROCEDURE — 1159F MED LIST DOCD IN RCRD: CPT | Performed by: NURSE PRACTITIONER

## 2025-04-29 PROCEDURE — 1123F ACP DISCUSS/DSCN MKR DOCD: CPT | Performed by: NURSE PRACTITIONER

## 2025-04-29 ASSESSMENT — ENCOUNTER SYMPTOMS
ORTHOPNEA: 1
PALPITATIONS: 0
NEAR-SYNCOPE: 0
FATIGUE: 0
CHEST PRESSURE: 0
EDEMA: 1
CLAUDICATION: 0
SHORTNESS OF BREATH: 0
ABDOMINAL PAIN: 0
UNEXPECTED WEIGHT CHANGE: 0

## 2025-04-29 ASSESSMENT — COLUMBIA-SUICIDE SEVERITY RATING SCALE - C-SSRS
2. HAVE YOU ACTUALLY HAD ANY THOUGHTS OF KILLING YOURSELF?: NO
1. IN THE PAST MONTH, HAVE YOU WISHED YOU WERE DEAD OR WISHED YOU COULD GO TO SLEEP AND NOT WAKE UP?: NO

## 2025-04-29 ASSESSMENT — PATIENT HEALTH QUESTIONNAIRE - PHQ9
SUM OF ALL RESPONSES TO PHQ9 QUESTIONS 1 AND 2: 0
2. FEELING DOWN, DEPRESSED OR HOPELESS: NOT AT ALL
1. LITTLE INTEREST OR PLEASURE IN DOING THINGS: NOT AT ALL

## 2025-04-29 NOTE — PROGRESS NOTES
Subjective   Patient ID: Jony Javier is a 79 y.o. male who presents for follow-up of congestive heart failure.   Current Medications[1]     PMH: significant for history of heart failure reduced ejection fraction. He initially his ejection fraction was around 40%. He has a history of coronary artery disease, persistent atrial fibrillation, GERD, history of carotid artery stenosis and CVA in the past. He has CRT-D in place. He has not had any defibrillations from device. He also has CardioMEMS in place and his readings have been consistently within desired parameters. Last heart cath was in 2020. Results are noted below. Most recent echocardiogram shows ejection fraction of 45% which is actually decreased from his echocardiogram in 2022 which showed that he had improvement in EF to 60 to 65%.     Congestive Heart Failure  Presents for follow-up visit. Associated symptoms include edema (trace intermittent edema), nocturia and orthopnea (2 pillow orthopnea). Pertinent negatives include no abdominal pain, chest pain, chest pressure, claudication, fatigue, muscle weakness, near-syncope, palpitations, paroxysmal nocturnal dyspnea, shortness of breath or unexpected weight change. The symptoms have been stable. Compliance with total regimen is %. Compliance with diet is %. Compliance with medications is %.       Review of Systems   Constitutional:  Negative for fatigue and unexpected weight change.   Respiratory:  Negative for shortness of breath.    Cardiovascular:  Negative for chest pain, palpitations, claudication and near-syncope.   Gastrointestinal:  Negative for abdominal pain.   Genitourinary:  Positive for nocturia.   Musculoskeletal:  Negative for muscle weakness.       Objective     /67 (BP Location: Right arm, Patient Position: Sitting, BP Cuff Size: Adult)   Pulse 68   Resp 24   Wt 70.1 kg (154 lb 8 oz)   SpO2 95%   BMI 25.71 kg/m²         Transthoracic echo (TTE)  complete  Result Date: 8/6/2024              [place] [id number]      Phone [phone] Fax [phone] TRANSTHORACIC ECHOCARDIOGRAM REPORT Patient Name:      KARAN Paige Physician:    [id number] Narendra Portillo MD Study Date:        8/5/2024             Ordering Provider:    [id number] DOROTHY MYLES MRN/PID:           [id number]             Fellow: Accession#:        [id number]         Nurse:                Maye Dong RN Date of Birth/Age: 1945 / 79 years Sonographer:          Brenna Yusuf RDCS Gender:            M                    Additional Staff: Height:            162.56 cm            Admit Date: Weight:            73.03 kg             Admission Status:     Outpatient BSA / BMI:         1.78 m2 / 27.64      Department Location:  [facility] Echo                    kg/m2                                      Lab Blood Pressure: 92 /75 mmHg Study Type:    TRANSTHORACIC ECHO (TTE) COMPLETE Diagnosis/ICD: Atherosclerotic heart disease of native coronary artery with                angina pectoris with documented spasm-I25.111; Acute combined                systolic (congestive) and diastolic (congestive) heart failure                (CHF)-I50.41 Indication:    CHF, CAD CPT Codes:     Echo Complete w Full Doppler-[id number] Patient History: Pertinent History: CAD and CHF. Study Detail: The following Echo studies were performed: 2D, M-Mode, Doppler and               color flow. Technically challenging study due to prominent lung               artifact and pacer wires. Optison used as a contrast agent for               endocardial border definition.  PHYSICIAN INTERPRETATION: Left Ventricle: Left ventricular ejection fraction is mildly decreased, by visual estimate  at 45-50%. Wall motion is abnormal. The left ventricular cavity size is normal. Spectral Doppler shows a pseudonormal pattern of left ventricular diastolic filling. Unable to measure 2D Plax d/t poor image quality. Akinetic inferior wall. Left Atrium: The left atrium is moderately dilated. Right Ventricle: The right ventricle is normal in size. There is normal right ventricular global systolic function. A device is visualized in the right ventricle. Right Atrium: The right atrium is normal in size. Aortic Valve: The aortic valve was not well visualized. There is evidence of mild to moderate aortic valve stenosis. The aortic valve dimensionless index is 0.38. There is mild aortic valve regurgitation. The peak instantaneous gradient of the aortic valve is 27.3 mmHg. The mean gradient of the aortic valve is 14.4 mmHg. Mitral Valve: The mitral valve is mild to moderately thickened. There is mild to moderate mitral annular calcification. There is mild to moderate mitral valve regurgitation. Tricuspid Valve: The tricuspid valve is structurally normal. There is mild to moderate tricuspid regurgitation. Pulmonic Valve: The pulmonic valve is not well visualized. There is trace to mild pulmonic valve regurgitation. Pericardium: There is no pericardial effusion noted. Aorta: The aortic root is normal.  CONCLUSIONS:  1. Left ventricular ejection fraction is mildly decreased, by visual estimate at 45-50%.  2. Spectral Doppler shows a pseudonormal pattern of left ventricular diastolic filling.  3. Unable to measure 2D Plax d/t poor image quality.     Akinetic inferior wall.  4. There is normal right ventricular global systolic function.  5. The left atrium is moderately dilated.  6. Mild to moderate mitral valve regurgitation.  7. Mild to moderate tricuspid regurgitation.  8. Mild to moderate aortic valve stenosis.  9. Mild aortic valve regurgitation. QUANTITATIVE DATA SUMMARY: LA VOLUME:                               Normal  Ranges: LA Vol A4C:        81.1 ml    (22+/-6mL/m2) LA Vol A2C:        67.0 ml LA Vol BP:         75.4 ml LA Vol Index A4C:  45.5ml/m2 LA Vol Index A2C:  37.5 ml/m2 LA Vol Index BP:   42.3 ml/m2 LA Area A4C:       24.9 cm2 LA Area A2C:       22.1 cm2 LA Major Axis A4C: 6.5 cm LA Major Axis A2C: 6.2 cm LA Vol A4C:        77.7 ml LA Vol A2C:        64.2 ml RA VOLUME BY A/L METHOD:                       Normal Ranges: RA Area A4C: 19.5 cm2 AORTA MEASUREMENTS:                      Normal Ranges: Ao Sinus, d: 2.70 cm (2.1-3.5cm) Ao STJ, d:   2.10 cm (1.7-3.4cm) Asc Ao, d:   2.80 cm (2.1-3.4cm) LV SYSTOLIC FUNCTION BY 2D PLANIMETRY (MOD):                      Normal Ranges: EF-A4C View:    52 % (>=55%) EF-A2C View:    50 % EF-Biplane:     50 % EF-Visual:      48 % LV EF Reported: 48 % LV DIASTOLIC FUNCTION:                         Normal Ranges: MV Peak E:    1.18 m/s  (0.7-1.2 m/s) MV Peak A:    0.83 m/s  (0.42-0.7 m/s) E/A Ratio:    1.43      (1.0-2.2) MV e'         0.081 m/s (>8.0) MV lateral e' 0.08 m/s MV medial e'  0.08 m/s E/e' Ratio:   14.65     (<8.0) MITRAL VALVE:                 Normal Ranges: MV DT: 250 msec (150-240msec) MITRAL INSUFFICIENCY:                           Normal Ranges: PISA Radius:  0.6 cm MR VTI:       149.60 cm MR Vmax:      474.99 cm/s MR Alias Tam: 32.8 cm/s MR Volume:    23.52 ml MR Flow Rt:   74.68 ml/s MR EROA:      0.16 cm2 AORTIC VALVE:                                    Normal Ranges: AoV Vmax:                2.61 m/s  (<=1.7m/s) AoV Peak P.3 mmHg (<20mmHg) AoV Mean P.4 mmHg (1.7-11.5mmHg) LVOT Max Tam:            0.89 m/s  (<=1.1m/s) AoV VTI:                 52.09 cm  (18-25cm) LVOT VTI:                20.03 cm LVOT Diameter:           2.02 cm   (1.8-2.4cm) AoV Area, VTI:           1.23 cm2  (2.5-5.5cm2) AoV Area,Vmax:           1.10 cm2  (2.5-4.5cm2) AoV Dimensionless Index: 0.38 AORTIC INSUFFICIENCY: AI Vmax:       3.17 m/s AI Half-time:  448 msec  AI Decel Time: 1546 msec AI Decel Rate: 205.78 cm/s2  RIGHT VENTRICLE: RV Basal 3.68 cm RV Mid   2.60 cm RV Major 8.0 cm TAPSE:   19.1 mm RV s'    0.10 m/s TRICUSPID VALVE/RVSP:                             Normal Ranges: Peak TR Velocity: 2.70 m/s RV Syst Pressure: 32.1 mmHg (< 30mmHg) IVC Diam:         2.08 cm AORTA: Asc Ao Diam 2.77 cm  [id number] Narendra Portillo MD Electronically signed on 8/6/2024 at 5:34:21 PM  ** Final **        Lab Results   Component Value Date    BUN 29 (H) 04/11/2025    CREATININE 1.58 (H) 04/11/2025     (H) 04/11/2025    MG 2.2 04/11/2025    K 4.8 04/11/2025     04/11/2025       Constitutional:       General:  NAD   HENT:      Head: Normocephalic     Mouth: Mucous membranes are moist.      Neck:  No JVD or HJR   Eyes:      Conjunctiva/sclera: Conjunctivae normal.    Cardiovascular:      Rate and Rhythm: Normal rate and regular rhythm, occas premature beat.      Heart sounds:  S1 S2 normal, no murmur, no S3 or S4   Pulmonary:      Pulmonary effort is normal.  Normal breath sounds  No wheezing or rales.   Abdominal:      General: Bowel sounds are normal, non- tender to palpitations.   Musculoskeletal:         General: trace edema of LE.  KHALIL well.   Skin:     General: Skin is warm and dry   Neurological:      General: No focal deficit present.      Mental Status: alert and oriented to person, place, and time. Mental status is at baseline.     Psychiatric:         Mood and Affect: Normal Affect.     Assessment/Plan     Problem List Items Addressed This Visit       Aortic valve stenosis    Chronic combined systolic and diastolic heart failure, NYHA class 3    HTN (hypertension)    Persistent atrial fibrillation (Multi)    Chronic systolic diastolic heart failure:   Etiology   AHA Stage: C  NYHA class:  2-3  - CPAP a night   Volume Status:  trace edema.   GFR: 44     GDMT:  BB- Carvedilol 25 mg 1 tablet twice a day   ARB/ACEI/ARNI - Entresto 49/51 mg 1 tablet twice a day   MRA -  on  hold due to high potassium.   SGLT2i -  Jardiance 10 mg once a day   Diuretic - Bumex 1 mg  every other day.   Device Therapy: CRT-D / Cardiomems readings have been stable recently.      CHF:  trace edema today.  He will get lab work completed.   Will continue current   medications.  May take an additional Bumex 1 mg every week.  Follow up as noted below      Emphasized salt restriction. 2 liter fluid restriction.   Encouraged daily monitoring of the patient's weight.  Encouraged regular exercise as tolerated.   Repeat lab work 1-2 weeks.   Follow up in  6 weeks.      2. Atrial fibrillation :   RRR today. OAC with Eliquis. Denies obvious signs of bleeding.  Stable.      3. CKD3:  GFR 44 ml/min  Renal function is stable per last labs.  Stable.      4. ASHD:  No chest pain,  continue secondary prevention medications.  EF is unchanged.  Apical akinesis noted on previous echocardiogram  stable.      5. Aortic stenosis:  musical murmur noted.   Repeat echocardiogram in September 2025.         Montserrat Wesley, LARRY-CNP         [1]   Current Outpatient Medications:     albuterol 90 mcg/actuation inhaler, INHALE 1 (ONE) PUFF EVERY 4 HOURS AS NEEDED, Disp: 8.5 g, Rfl: 8    atorvastatin (Lipitor) 80 mg tablet, Take 1 tablet (80 mg) by mouth once daily at bedtime., Disp: 90 tablet, Rfl: 3    baclofen (Lioresal) 10 mg tablet, Take 1 tablet (10 mg) by mouth 2 times a day as needed., Disp: , Rfl:     blood sugar diagnostic (True Metrix Glucose Test Strip), USE AS DIRECTED to test BLOOD SUGAR THREE TIMES DAILY, Disp: 300 strip, Rfl: 3    bumetanide (Bumex) 1 mg tablet, Take 1 tablet (1 mg) by mouth every other day., Disp: 45 tablet, Rfl: 3    carvedilol (Coreg) 25 mg tablet, Take 1 tablet (25 mg) by mouth 2 times a day., Disp: 180 tablet, Rfl: 3    clobetasol (Temovate) 0.05 % external solution, APPLY TO THE AFFECTED AREA(S) EVERY DAY AS NEEDED, Disp: , Rfl:     diazePAM (Valium) 5 mg tablet, Take 1 tablet (5 mg) by mouth every  8 hours if needed for anxiety. No further refills unless the patient is seen within the next 30 days, Disp: 90 tablet, Rfl: 0    Eliquis 5 mg tablet, Take 1 tablet (5 mg) by mouth 2 times a day., Disp: 180 tablet, Rfl: 1    empagliflozin (Jardiance) 25 mg tablet, Take 1 tablet (25 mg) by mouth once daily., Disp: 100 tablet, Rfl: 3    Entresto 49-51 mg tablet, Take 1 tablet by mouth 2 times a day., Disp: 180 tablet, Rfl: 3    EPINEPHrine 0.3 mg/0.3 mL injection syringe, Inject 0.3 mL (0.3 mg) into the muscle if needed for anaphylaxis for up to 1 dose., Disp: 1 each, Rfl: 3    ezetimibe (Zetia) 10 mg tablet, Take 1 tablet (10 mg) by mouth once daily., Disp: 90 tablet, Rfl: 0    febuxostat (Uloric) 40 mg tablet, Take 1 tablet (40 mg) by mouth once daily., Disp: , Rfl:     ferrous gluconate 270 mg (27 mg iron) tablet, Take 1 tablet by mouth once daily., Disp: , Rfl:     lidocaine (Xylocaine) 5 % ointment, APPLY TO THE AFFECTED AREA(S) AS NEEDED FOR PAIN to last 30 days, Disp: , Rfl:     melatonin 3 mg tablet, Take 1 tablet (3 mg) by mouth as needed at bedtime for sleep., Disp: , Rfl:     mirtazapine (Remeron) 30 mg tablet, Take 1 tablet (30 mg) by mouth once daily at bedtime., Disp: , Rfl:     multivitamin tablet, Take 1 tablet by mouth once daily., Disp: , Rfl:     nystatin (Mycostatin) 100,000 unit/gram powder, Apply 1 Application topically 2 times a day., Disp: 60 g, Rfl: 2    ondansetron ODT (Zofran-ODT) 4 mg disintegrating tablet, Take 1 tablet (4 mg) by mouth every 8 hours if needed for nausea or vomiting., Disp: 12 tablet, Rfl: 0    OneTouch Delica Plus Lancet 33 gauge misc, Test THREE TIMES DAILY AS DIRECTED, Disp: , Rfl:     oxyCODONE-acetaminophen (Percocet) 5-325 mg tablet, 1 tablet every 6 hours if needed., Disp: , Rfl:     pantoprazole (ProtoNix) 40 mg EC tablet, TAKE 1 TABLET BY MOUTH EVERY DAY, Disp: 90 tablet, Rfl: 3    tadalafil 20 mg tablet, Take 1 tablet (20 mg) by mouth once daily as needed for  erectile dysfunction., Disp: 90 tablet, Rfl: 3    tamsulosin (Flomax) 0.4 mg 24 hr capsule, Take 1 capsule (0.4 mg) by mouth once daily in the morning., Disp: 90 capsule, Rfl: 1    testosterone 20.25 mg/1.25 gram (1.62 %) gel in metered-dose pump, Apply 2 Actuations topically daily. Use on hairless area like upper arm or shoulder., Disp: , Rfl:     venlafaxine XR (Effexor-XR) 150 mg 24 hr capsule, TAKE 1 CAPSULE BY MOUTH AFTER BREAKFAST, Disp: , Rfl:     oxyCODONE-acetaminophen (Percocet) 7.5-325 mg tablet, Take 1 tablet by mouth 3 times daily as needed for Pain for up to 30 days. Max Daily Amount 3 tablets (Patient not taking: Reported on 4/15/2025), Disp: , Rfl:

## 2025-04-29 NOTE — PATIENT INSTRUCTIONS
Thank you for coming in today.  If you have any questions you may contact the office Monday through Friday at [phone] or on week ends at [phone].    Please continue current medications.     Please get lab work.    Please follow  a 2 GM sodium diet and limit fluid intake to 2 liters per day or 8 servings ( serving size = 8 oz. = 1 cup = 240 ml) per day.   Please avoid processed meat products (luncheon meats, sausages, gonsales, hot dogs for example) eat 4 servings of vegetables and 1-2 whole servings of whole fruits per day.   Please weigh daily and call [phone] for weight gain of 3 pounds in 24 hours or 5 pounds or if you experience increased swelling or shortness of breath.         Follow up:   6 weeks.     Please be sure to follow up with your cardiologist at [facility] once every year. Call [phone] to schedule appointment if you do not have a follow up appointment scheduled already.

## 2025-04-29 NOTE — TELEPHONE ENCOUNTER
Patient called office asking for a refill of his ezetimibe (Zetia) 10 mg tablet [[id number]] and asks for it to be sent to [id number] - [place], [place] - [address]   [address] [address]

## 2025-04-30 ENCOUNTER — TELEPHONE (OUTPATIENT)
Dept: PRIMARY CARE | Facility: CLINIC | Age: 80
End: 2025-04-30
Payer: COMMERCIAL

## 2025-04-30 NOTE — TELEPHONE ENCOUNTER
I got the request from the pharmacy, sent what they requested, and called the patient to let them know.

## 2025-04-30 NOTE — TELEPHONE ENCOUNTER
Patient called and said the pharmacy will not fill the  blood sugar diagnostic (True Metrix Glucose Test Strip) [[id number]]  because they need documentation sent to them.  I told the pharmacy to send us a fax on what they need.  It is getting billed to medicare so they said that is why they need the documentation.

## 2025-05-05 ENCOUNTER — TELEPHONE (OUTPATIENT)
Dept: PAIN MANAGEMENT | Age: 80
End: 2025-05-05

## 2025-05-05 DIAGNOSIS — S33.9XXA CHRONIC OR OLD SPRAIN OF LUMBOSACRAL LIGAMENT: ICD-10-CM

## 2025-05-05 DIAGNOSIS — G89.4 CHRONIC PAIN SYNDROME: ICD-10-CM

## 2025-05-05 DIAGNOSIS — Z79.891 ENCOUNTER FOR LONG-TERM OPIATE ANALGESIC USE: ICD-10-CM

## 2025-05-05 DIAGNOSIS — G89.29 CHRONIC FLANK PAIN: ICD-10-CM

## 2025-05-05 DIAGNOSIS — M79.10 MYALGIA: ICD-10-CM

## 2025-05-05 DIAGNOSIS — S33.5XXD LUMBAR SPRAIN, SUBSEQUENT ENCOUNTER: ICD-10-CM

## 2025-05-05 DIAGNOSIS — M51.26 DISPLACEMENT OF LUMBAR INTERVERTEBRAL DISC WITHOUT MYELOPATHY: ICD-10-CM

## 2025-05-05 DIAGNOSIS — R10.9 CHRONIC FLANK PAIN: ICD-10-CM

## 2025-05-05 RX ORDER — EZETIMIBE 10 MG/1
10 TABLET ORAL DAILY
Qty: 30 TABLET | Refills: 0 | Status: SHIPPED | OUTPATIENT
Start: 2025-05-05 | End: 2025-06-04

## 2025-05-05 RX ORDER — OXYCODONE AND ACETAMINOPHEN 5; 325 MG/1; MG/1
1 TABLET ORAL 4 TIMES DAILY PRN
Qty: 24 TABLET | Refills: 0 | Status: SHIPPED | OUTPATIENT
Start: 2025-05-10 | End: 2025-05-09

## 2025-05-05 NOTE — TELEPHONE ENCOUNTER
Geoff Hardy.  Pt requesting refill Percocet will be out by the 10th.  Scheduled appt with you 5/16.  [facility] [place], [place]  Please advise   Thank you

## 2025-05-06 DIAGNOSIS — I50.42 CHRONIC COMBINED SYSTOLIC AND DIASTOLIC HEART FAILURE, NYHA CLASS 3: ICD-10-CM

## 2025-05-06 DIAGNOSIS — I10 HYPERTENSION, UNSPECIFIED TYPE: ICD-10-CM

## 2025-05-08 ENCOUNTER — TELEPHONE (OUTPATIENT)
Dept: PAIN MANAGEMENT | Age: 80
End: 2025-05-08

## 2025-05-08 DIAGNOSIS — Z79.891 ENCOUNTER FOR LONG-TERM OPIATE ANALGESIC USE: ICD-10-CM

## 2025-05-08 DIAGNOSIS — S33.5XXD LUMBAR SPRAIN, SUBSEQUENT ENCOUNTER: ICD-10-CM

## 2025-05-08 DIAGNOSIS — M51.26 DISPLACEMENT OF LUMBAR INTERVERTEBRAL DISC WITHOUT MYELOPATHY: ICD-10-CM

## 2025-05-08 DIAGNOSIS — M79.10 MYALGIA: ICD-10-CM

## 2025-05-08 DIAGNOSIS — S33.9XXA CHRONIC OR OLD SPRAIN OF LUMBOSACRAL LIGAMENT: ICD-10-CM

## 2025-05-08 DIAGNOSIS — G89.29 CHRONIC FLANK PAIN: ICD-10-CM

## 2025-05-08 DIAGNOSIS — G89.4 CHRONIC PAIN SYNDROME: ICD-10-CM

## 2025-05-08 DIAGNOSIS — R10.9 CHRONIC FLANK PAIN: ICD-10-CM

## 2025-05-08 NOTE — TELEPHONE ENCOUNTER
Geoff Hardy.  Pt requesting refill of Percocet 5-325.  Rescheduled for 6/3.  [facility]  Please advise  Thank you.

## 2025-05-09 ENCOUNTER — TELEPHONE (OUTPATIENT)
Dept: PAIN MANAGEMENT | Age: 80
End: 2025-05-09

## 2025-05-09 DIAGNOSIS — S33.5XXD LUMBAR SPRAIN, SUBSEQUENT ENCOUNTER: ICD-10-CM

## 2025-05-09 DIAGNOSIS — S33.9XXA CHRONIC OR OLD SPRAIN OF LUMBOSACRAL LIGAMENT: ICD-10-CM

## 2025-05-09 DIAGNOSIS — M51.26 DISPLACEMENT OF LUMBAR INTERVERTEBRAL DISC WITHOUT MYELOPATHY: ICD-10-CM

## 2025-05-09 DIAGNOSIS — Z79.891 ENCOUNTER FOR LONG-TERM OPIATE ANALGESIC USE: ICD-10-CM

## 2025-05-09 DIAGNOSIS — G89.29 CHRONIC FLANK PAIN: ICD-10-CM

## 2025-05-09 DIAGNOSIS — G89.4 CHRONIC PAIN SYNDROME: ICD-10-CM

## 2025-05-09 DIAGNOSIS — M79.10 MYALGIA: ICD-10-CM

## 2025-05-09 DIAGNOSIS — R10.9 CHRONIC FLANK PAIN: ICD-10-CM

## 2025-05-09 RX ORDER — OXYCODONE AND ACETAMINOPHEN 5; 325 MG/1; MG/1
1 TABLET ORAL 4 TIMES DAILY PRN
Qty: 120 TABLET | Refills: 0 | Status: SHIPPED | OUTPATIENT
Start: 2025-05-09 | End: 2025-06-08

## 2025-05-09 NOTE — TELEPHONE ENCOUNTER
Geoff Hardy is scheduled on 6-3-25 and will need medications filled to get him to that appointment date please.

## 2025-05-13 ENCOUNTER — APPOINTMENT (OUTPATIENT)
Dept: CARDIOLOGY | Facility: HOSPITAL | Age: 80
End: 2025-05-13
Payer: MEDICARE

## 2025-05-13 VITALS
BODY MASS INDEX: 25.95 KG/M2 | HEART RATE: 77 BPM | SYSTOLIC BLOOD PRESSURE: 120 MMHG | HEIGHT: 64 IN | DIASTOLIC BLOOD PRESSURE: 60 MMHG | WEIGHT: 152 LBS

## 2025-05-13 DIAGNOSIS — I35.0 NONRHEUMATIC AORTIC VALVE STENOSIS: Primary | ICD-10-CM

## 2025-05-13 DIAGNOSIS — Z95.810 HISTORY OF IMPLANTABLE CARDIOVERTER-DEFIBRILLATOR (ICD) PLACEMENT: ICD-10-CM

## 2025-05-13 DIAGNOSIS — E78.5 HYPERLIPIDEMIA, UNSPECIFIED HYPERLIPIDEMIA TYPE: ICD-10-CM

## 2025-05-13 DIAGNOSIS — I10 PRIMARY HYPERTENSION: ICD-10-CM

## 2025-05-13 DIAGNOSIS — I48.19 PERSISTENT ATRIAL FIBRILLATION (MULTI): ICD-10-CM

## 2025-05-13 DIAGNOSIS — I25.10 CAD S/P PERCUTANEOUS CORONARY ANGIOPLASTY: ICD-10-CM

## 2025-05-13 DIAGNOSIS — Z86.79 HISTORY OF HYPERTENSION: ICD-10-CM

## 2025-05-13 DIAGNOSIS — I25.10 CORONARY ARTERY DISEASE INVOLVING NATIVE CORONARY ARTERY OF NATIVE HEART, UNSPECIFIED WHETHER ANGINA PRESENT: ICD-10-CM

## 2025-05-13 DIAGNOSIS — I50.22 CHRONIC SYSTOLIC (CONGESTIVE) HEART FAILURE: ICD-10-CM

## 2025-05-13 DIAGNOSIS — I50.42 CHRONIC COMBINED SYSTOLIC AND DIASTOLIC HEART FAILURE, NYHA CLASS 3: ICD-10-CM

## 2025-05-13 DIAGNOSIS — Z98.61 CAD S/P PERCUTANEOUS CORONARY ANGIOPLASTY: ICD-10-CM

## 2025-05-13 PROCEDURE — 3078F DIAST BP <80 MM HG: CPT | Performed by: INTERNAL MEDICINE

## 2025-05-13 PROCEDURE — 1036F TOBACCO NON-USER: CPT | Performed by: INTERNAL MEDICINE

## 2025-05-13 PROCEDURE — 99214 OFFICE O/P EST MOD 30 MIN: CPT | Performed by: INTERNAL MEDICINE

## 2025-05-13 PROCEDURE — 1159F MED LIST DOCD IN RCRD: CPT | Performed by: INTERNAL MEDICINE

## 2025-05-13 PROCEDURE — 93005 ELECTROCARDIOGRAM TRACING: CPT | Performed by: INTERNAL MEDICINE

## 2025-05-13 PROCEDURE — 99214 OFFICE O/P EST MOD 30 MIN: CPT | Mod: 25 | Performed by: INTERNAL MEDICINE

## 2025-05-13 PROCEDURE — 3074F SYST BP LT 130 MM HG: CPT | Performed by: INTERNAL MEDICINE

## 2025-05-13 PROCEDURE — 1160F RVW MEDS BY RX/DR IN RCRD: CPT | Performed by: INTERNAL MEDICINE

## 2025-05-13 PROCEDURE — 93010 ELECTROCARDIOGRAM REPORT: CPT | Performed by: INTERNAL MEDICINE

## 2025-05-13 RX ORDER — CARVEDILOL 25 MG/1
25 TABLET ORAL 2 TIMES DAILY
Qty: 180 TABLET | Refills: 3 | Status: SHIPPED | OUTPATIENT
Start: 2025-05-13

## 2025-05-13 RX ORDER — EZETIMIBE 10 MG/1
10 TABLET ORAL DAILY
Qty: 90 TABLET | Refills: 3 | Status: SHIPPED | OUTPATIENT
Start: 2025-05-13 | End: 2026-05-13

## 2025-05-13 ASSESSMENT — ENCOUNTER SYMPTOMS
OCCASIONAL FEELINGS OF UNSTEADINESS: 1
LOSS OF SENSATION IN FEET: 0
DEPRESSION: 0

## 2025-05-13 NOTE — PROGRESS NOTES
Chief Complaint:   Follow-up (Annual, thin skin)     History Of Present Illness:    Jony Javier is a 79 y.o. male presenting for follow-up.    He has a past medical history of aortic stenosis, mitral regurgitation, hypertension, hyperlipidemia, CVA, and GERD, coronary artery disease, chronic systolic heart failure, persistent atrial fibrillation, cardiogenic shock. He has seen Dr Tatum at [facility]. He was told that he is not a candidate for valve replacement. Patient initally came here to get a 2nd opinion.   He has coronary artery disease and a chronically occluded anomalous left circumflex. Several years ago while trying to intervene on that he suffered from myocardial or coronary rupture, possible other complication and needed open heart surgery.      More recently he has been having heart failure symptoms with echo done at our facility (2020) showing moderate LV dysfunction with ejection fraction of 40%, severe mitral regurgitation, restrictive diastolic function, pulmonary hypertension, moderate to severe aortic valve stenosis.     This was followed by a SUPA done at [facility], that showed the cause of mitral regurgitation to be a dilated left ventricle. Aortic valve appeared moderately severely stenosed.     He had a right and left heart catheterization done at [facility], showing chronically occluded anomalous left circumflex stent, moderate disease elsewhere. He has a severely calcified ascending aorta and abdominal aorta and both iliacs.     Right heart catheterization showed a mean right atrial pressure of 6 mmHg, mean PA pressure of 26, wedge pressure of 19, LVEDP of 18, Damián cardiac output of 4.7 L/min and cardiac index of 2.8 L/min/m2.  SVR was 1315.  He was admitted to our facility in early 2021 with acute heart failure and cardiogenic shock. Treated in intensive care unit for several days, suffered from a PEA arrest and was successfully resuscitated, attempted cardioversion x2  but did not maintain sinus rhythm. Started on amiodarone and EP was consulted. After long hospital stay he has been discharged home. Subsequently he underwent CardioMEMS placement and eventually AV andry ablation and atrial fibrillation ablation on May 12, 2021.      He had a BiV ICD placed in 2009, subsequently in 2014 had ventricular tachycardia and several ICD shocks. He was temporarily on amiodarone, but then weaned off of it by his electrophysiologist at [facility] . More recently Dr. Mayberry has again discontinued amiodarone.     His EKG today shows BiV paced rhythm, underlying rhythm appears to be sinus.     He was readmitted to the hospital with acute heart failure in mid October 2021. He was treated medically. He was also found to have an incidental subdural hematoma reviewed by neurologist who felt he may also have normal pressure hydrocephalus contributing to falls and subdural hematoma. Nevertheless his oral anticoagulants were restarted. He was referred to [facility] for evaluation by valve team for moderate to severe mitral regurgitation and moderate aortic valve stenosis. The valve team did not feel he is a candidate for any intervention.      He had an echo in October 23, 2021 at [facility]. This showed left ventricle ejection fraction of 40%, mild to moderate mitral regurgitation and moderate aortic valve stenosis. PA pressures were normal at that time.     He still feels quite fatigued still and has some shortness of breath on exertion. No symptoms at rest. No chest pain. Quite bothered by right shoulder pain that keeps him up at night. He states this is his rotator cuff and he intends to get it fixed. Echo from 2022 shows LV function has returned to normal no significant mitral regurgitation and there is at least moderate aortic valve stenosis. Echo 2023 LVEF 45%, MILD as, mild MR.     He tells me he had stroke in May 2022 and was started on aspirin by the neurologist.     Last  "Recorded Vitals:  Vitals:    05/13/25 1447   BP: 120/60   BP Location: Left arm   Pulse: 77   Weight: 68.9 kg (152 lb)   Height: 1.626 m (5' 4\")       Past Medical History:  He has a past medical history of Acute on chronic systolic (congestive) heart failure (09/08/2022), Acute on chronic systolic CHF (congestive heart failure), NYHA class 3 (05/05/2023), CHF, acute (Multi) (05/05/2023), Nonrheumatic aortic (valve) stenosis (01/10/2022), Nonrheumatic mitral (valve) insufficiency (09/14/2021), Nonrheumatic mitral (valve) insufficiency (12/07/2022), Other long term (current) drug therapy, Personal history of diseases of the blood and blood-forming organs and certain disorders involving the immune mechanism, Personal history of other diseases of the circulatory system (09/20/2022), Personal history of other diseases of the circulatory system, Personal history of other diseases of the circulatory system, Personal history of other diseases of the musculoskeletal system and connective tissue, Severe mitral regurgitation (05/05/2023), and Unspecified intracranial injury with loss of consciousness status unknown, initial encounter (Multi) (09/20/2022).    Past Surgical History:  He has a past surgical history that includes Other surgical history (01/08/2020); Other surgical history (01/08/2020); Other surgical history (01/08/2020); Other surgical history (12/06/2021); Other surgical history (12/06/2021); and Other surgical history (01/30/2020).      Social History:  He reports that he quit smoking about 55 years ago. His smoking use included cigarettes. He has been exposed to tobacco smoke. He has never used smokeless tobacco. He reports current alcohol use of about 14.0 standard drinks of alcohol per week. He reports that he does not use drugs.    Family History:  Family History[1]     Allergies:  Bee venom protein (honey bee), Cefaclor, Ciprofloxacin, Pentazocine, Pregabalin, Allopurinol, Metoprolol, and " Sulfamethoxazole-trimethoprim    Outpatient Medications:  Current Outpatient Medications   Medication Instructions    albuterol 90 mcg/actuation inhaler INHALE 1 (ONE) PUFF EVERY 4 HOURS AS NEEDED    atorvastatin (LIPITOR) 80 mg, oral, Nightly    baclofen (LIORESAL) 10 mg, 2 times daily PRN    blood sugar diagnostic (True Metrix Glucose Test Strip) USE AS DIRECTED to test BLOOD SUGAR THREE TIMES DAILY    bumetanide (BUMEX) 1 mg, oral, Every other day    carvedilol (COREG) 25 mg, oral, 2 times daily    clobetasol (Temovate) 0.05 % external solution APPLY TO THE AFFECTED AREA(S) EVERY DAY AS NEEDED    diazePAM (VALIUM) 5 mg, oral, Every 8 hours PRN, No further refills unless the patient is seen within the next 30 days    Eliquis 5 mg, oral, 2 times daily    empagliflozin (JARDIANCE) 25 mg, oral, Daily    Entresto 49-51 mg tablet 1 tablet, oral, 2 times daily    EPINEPHrine (EPIPEN) 0.3 mg, intramuscular, As needed    ezetimibe (ZETIA) 10 mg, oral, Daily    febuxostat (ULORIC) 40 mg, Daily    ferrous gluconate 270 mg (27 mg iron) tablet 1 tablet, Daily    lidocaine (Xylocaine) 5 % ointment APPLY TO THE AFFECTED AREA(S) AS NEEDED FOR PAIN to last 30 days    melatonin 3 mg, Nightly PRN    mirtazapine (REMERON) 30 mg, Nightly    multivitamin tablet 1 tablet, Daily    nystatin (Mycostatin) 100,000 unit/gram powder 1 Application, Topical, 2 times daily    ondansetron ODT (ZOFRAN-ODT) 4 mg, oral, Every 8 hours PRN    OneTouch Delica Plus Lancet 33 gauge misc Test THREE TIMES DAILY AS DIRECTED    oxyCODONE-acetaminophen (Percocet) 5-325 mg tablet 1 tablet, Every 6 hours PRN    oxyCODONE-acetaminophen (Percocet) 7.5-325 mg tablet Take 1 tablet by mouth 3 times daily as needed for Pain for up to 30 days. Max Daily Amount 3 tablets    pantoprazole (PROTONIX) 40 mg, oral, Daily    tadalafil 20 mg, oral, Daily PRN    tamsulosin (FLOMAX) 0.4 mg, oral, Every morning    testosterone 20.25 mg/1.25 gram (1.62 %) gel in metered-dose  pump Apply 2 Actuations topically daily. Use on hairless area like upper arm or shoulder.    venlafaxine XR (Effexor-XR) 150 mg 24 hr capsule TAKE 1 CAPSULE BY MOUTH AFTER BREAKFAST       Physical Exam:  Physical Exam  Vitals reviewed.   Constitutional:       Appearance: Normal appearance.   Neck:      Vascular: No carotid bruit or JVD.   Cardiovascular:      Rate and Rhythm: Normal rate and regular rhythm.      Heart sounds: Normal heart sounds, S1 normal and S2 normal. No murmur heard.  Pulmonary:      Effort: Pulmonary effort is normal.      Breath sounds: Normal breath sounds.   Abdominal:      General: Abdomen is flat. Bowel sounds are normal.      Palpations: Abdomen is soft.   Musculoskeletal:      Right lower le+ Edema present.      Left lower le+ Edema present.   Skin:     General: Skin is warm.   Neurological:      Mental Status: He is alert. Mental status is at baseline.   Psychiatric:         Mood and Affect: Mood normal.         Behavior: Behavior normal.           Last Labs:  CBC -  Lab Results   Component Value Date    WBC 7.8 2025    HGB 11.7 (L) 2025    HCT 35.0 (L) 2025    .4 (H) 2025     (L) 2025       CMP -  Lab Results   Component Value Date    CALCIUM 9.3 2025    PHOS 4.2 2024    PROT 6.3 2025    ALBUMIN 4.1 2025    AST 15 2025    ALT 7 (L) 2025    ALKPHOS 55 2025    BILITOT 0.6 2025       LIPID PANEL -   Lab Results   Component Value Date    CHOL 123 2025    TRIG 62 2025    HDL 79 2025    CHHDL 1.6 2025    LDLF 25 2023    VLDL 19 2024    NHDL 44 2025       RENAL FUNCTION PANEL -   Lab Results   Component Value Date    GLUCOSE 93 2025     2025    K 4.8 2025     2025    CO2 25 2025    ANIONGAP 10 2025    BUN 29 (H) 2025    CREATININE 1.58 (H) 2025    GFRMALE 40 (A) 2023    CALCIUM 9.3  04/11/2025    PHOS 4.2 11/05/2024    ALBUMIN 4.1 04/11/2025        Lab Results   Component Value Date     (H) 05/12/2025    HGBA1C 5.6 04/11/2025       Last Cardiology Tests:  ECG:  ECG 12 Lead 05/13/2025 (Preliminary)      Echo:  Transthoracic echo (TTE) complete 08/05/2024      Ejection Fractions:  EF   Date/Time Value Ref Range Status   08/05/2024 04:13 PM 48 %        Cath:  No results found for this or any previous visit from the past 1095 days.      Stress Test:  No results found for this or any previous visit from the past 1095 days.      Cardiac Imaging:  No results found for this or any previous visit from the past 1095 days.          Assessment/Plan     In summary Mr. Javier is a 79-year-old gentleman with dilated ischemic cardiomyopathy, moderate left ventricular systolic dysfunction, severe mitral regurgitation due to dilated annulus, now improved, moderate to severe aortic valve stenosis, paroxysmal atrial fibrillation. He has chronic systolic heart failure. He has coronary artery disease. He has severely calcified aorta and iliacs. He has prior history of CVA. He has chronic subdural hematoma.     1-severe mitral regurgitation-told not to be a surgical candidate, I believe the best treatment is medical with intensive treatment of systolic heart failure. Likely caused by dilated LV and improving with CRT, working on medical therapy. Continue current medical therapy. Repeat echo shows improvement in mitral regurgitation. Has been evaluated by valve team not a candidate.  He has CardioMEMS in place and readings have been stable, being monitored by CHF clinic.     Following up closely in CHF clinic. Being seen by palliative care team at home as well. May need to be transitioned to hospice in future. Prognosis is poor, with multiple comorbidities complicating clinical course.     2-“moderate to severe aortic valve stenosis- DI 0.33, will wait and observe. I have reviewed the SUPA done at [facility] personally, the aortic valve stenosis does not appear to be severe. Valve is opening well on my review of the images. His echo in October 2022 showed moderate aortic valve stenosis. Repeat echo 2023- mild aS.  Planning for repeat echo in 2025.     3-Chronic systolic heart failure-please refer to problem #1. Currently well compensated with New York Heart Association class III symptoms, monitor clinically. Also follows in advanced heart failure clinic. He has not been interested in LVAD in the past. He is on appropriate medical therapy and well compensated. His LV function has now returned to normal.     4-paroxysmal atrial fibrillation-failed to maintain rhythm S/P AV andry ablation and A. fib ablation with Dr. Mayberry as above. Remains chronically anticoagulated. Heart rate well controlled at present. He has been taken off amiodarone by Dr. Mayberry.  Reduce dose of Eliquis according to renal function and age.     5-coronary artery disease-secondary prevention with statins.     Lipid profile favorable.     6-ventricular tachycardia-patient has ICD in place. Taken off amiodarone. Continue beta-blockers only, no recurrent tachycardia or shock recently. He follows with EP as well.          Narendra Portillo MD         [1]   Family History  Problem Relation Name Age of Onset    No Known Problems Mother      No Known Problems Father      Other (malignant neoplsm) Other      Hypertension Other      Other (cardiac disorder) Other

## 2025-05-14 DIAGNOSIS — F41.9 ANXIETY DISORDER, UNSPECIFIED TYPE: ICD-10-CM

## 2025-05-14 LAB
ANION GAP SERPL CALCULATED.4IONS-SCNC: 13 MMOL/L (CALC) (ref 7–17)
BNP SERPL-MCNC: 615 PG/ML
BUN SERPL-MCNC: 35 MG/DL (ref 7–25)
BUN/CREAT SERPL: 20 (CALC) (ref 6–22)
CALCIUM SERPL-MCNC: 9.3 MG/DL (ref 8.6–10.3)
CHLORIDE SERPL-SCNC: 102 MMOL/L (ref 98–110)
CO2 SERPL-SCNC: 21 MMOL/L (ref 20–32)
CREAT SERPL-MCNC: 1.74 MG/DL (ref 0.7–1.22)
EGFRCR SERPLBLD CKD-EPI 2021: 39 ML/MIN/1.73M2
GLUCOSE SERPL-MCNC: 131 MG/DL (ref 65–99)
MAGNESIUM SERPL-MCNC: 2.1 MG/DL (ref 1.5–2.5)
POTASSIUM SERPL-SCNC: 4.4 MMOL/L (ref 3.5–5.3)
SODIUM SERPL-SCNC: 136 MMOL/L (ref 135–146)

## 2025-05-14 RX ORDER — DIAZEPAM 5 MG/1
5 TABLET ORAL EVERY 8 HOURS PRN
Qty: 90 TABLET | Refills: 0 | Status: SHIPPED | OUTPATIENT
Start: 2025-05-14

## 2025-05-14 NOTE — TELEPHONE ENCOUNTER
diazePAM (Valium) 5 mg tablet     [id number] - [place], [place] - [address]  [address] [address]  Phone: [phone]  Fax: [phone]

## 2025-05-15 DIAGNOSIS — I50.42 CHRONIC COMBINED SYSTOLIC AND DIASTOLIC HEART FAILURE, NYHA CLASS 3: Primary | ICD-10-CM

## 2025-05-16 ENCOUNTER — TELEPHONE (OUTPATIENT)
Dept: INFUSION THERAPY | Facility: HOSPITAL | Age: 80
End: 2025-05-16
Payer: COMMERCIAL

## 2025-05-16 ENCOUNTER — TELEPHONE (OUTPATIENT)
Age: 80
End: 2025-05-16

## 2025-05-16 DIAGNOSIS — S33.9XXA CHRONIC OR OLD SPRAIN OF LUMBOSACRAL LIGAMENT: ICD-10-CM

## 2025-05-16 DIAGNOSIS — S33.5XXD LUMBAR SPRAIN, SUBSEQUENT ENCOUNTER: ICD-10-CM

## 2025-05-16 DIAGNOSIS — M51.26 DISPLACEMENT OF LUMBAR INTERVERTEBRAL DISC WITHOUT MYELOPATHY: ICD-10-CM

## 2025-05-16 DIAGNOSIS — M51.379 DEGENERATION OF LUMBAR OR LUMBOSACRAL INTERVERTEBRAL DISC: ICD-10-CM

## 2025-05-16 DIAGNOSIS — G89.4 CHRONIC PAIN SYNDROME: ICD-10-CM

## 2025-05-16 LAB
Q ONSET: 184 MS
QRS COUNT: 13 BEATS
QRS DURATION: 160 MS
QT INTERVAL: 454 MS
QTC CALCULATION(BAZETT): 513 MS
QTC FREDERICIA: 493 MS
R AXIS: 38 DEGREES
T AXIS: 232 DEGREES
T OFFSET: 411 MS
VENTRICULAR RATE: 77 BPM

## 2025-05-16 RX ORDER — BACLOFEN 10 MG/1
10 TABLET ORAL 2 TIMES DAILY
Qty: 60 TABLET | Refills: 0 | Status: SHIPPED | OUTPATIENT
Start: 2025-05-16 | End: 2025-06-15

## 2025-05-16 NOTE — TELEPHONE ENCOUNTER
Luzma called asking for a refill on baclofen for Geoff Hardy. His next office visit is scheduled on 6/3/25

## 2025-05-21 ENCOUNTER — OFFICE VISIT (OUTPATIENT)
Dept: URGENT CARE | Age: 80
End: 2025-05-21
Payer: MEDICARE

## 2025-05-21 VITALS
HEART RATE: 85 BPM | OXYGEN SATURATION: 95 % | RESPIRATION RATE: 16 BRPM | SYSTOLIC BLOOD PRESSURE: 104 MMHG | DIASTOLIC BLOOD PRESSURE: 48 MMHG

## 2025-05-21 DIAGNOSIS — S51.811A SKIN TEAR OF RIGHT FOREARM WITHOUT COMPLICATION, INITIAL ENCOUNTER: ICD-10-CM

## 2025-05-21 DIAGNOSIS — L03.012 CELLULITIS OF FINGER OF LEFT HAND: Primary | ICD-10-CM

## 2025-05-21 PROCEDURE — 3078F DIAST BP <80 MM HG: CPT | Performed by: PHYSICIAN ASSISTANT

## 2025-05-21 PROCEDURE — 1159F MED LIST DOCD IN RCRD: CPT | Performed by: PHYSICIAN ASSISTANT

## 2025-05-21 PROCEDURE — 1036F TOBACCO NON-USER: CPT | Performed by: PHYSICIAN ASSISTANT

## 2025-05-21 PROCEDURE — 3074F SYST BP LT 130 MM HG: CPT | Performed by: PHYSICIAN ASSISTANT

## 2025-05-21 PROCEDURE — 99213 OFFICE O/P EST LOW 20 MIN: CPT | Performed by: PHYSICIAN ASSISTANT

## 2025-05-21 RX ORDER — DOXYCYCLINE 100 MG/1
100 CAPSULE ORAL 2 TIMES DAILY
Qty: 14 CAPSULE | Refills: 0 | Status: SHIPPED | OUTPATIENT
Start: 2025-05-21 | End: 2025-05-28

## 2025-05-21 ASSESSMENT — ENCOUNTER SYMPTOMS
NAUSEA: 0
WOUND: 1
COLOR CHANGE: 1
CHILLS: 0
VOMITING: 0
FEVER: 0

## 2025-05-21 NOTE — PROGRESS NOTES
Subjective   Patient ID: Jony Javier is a 79 y.o. male. They present today with a chief complaint of Cellulitis (Pt c/o cellulitis to left hand and right forearm following multiple wounds from family dog).    History of Present Illness  Patient presents for evaluation of wounds to bilateral upper extremities.  He states that his job jumped up on his arms causing scratches to his skin due to his thin skin.  Patient received scratches to right posterior forearm that was present for several weeks that he feels are healing well.  He also has a wound to left dorsal hand now has had some redness, pain and drainage.  Denies any fevers or chills.          Past Medical History  Allergies as of 05/21/2025 - Reviewed 05/21/2025   Allergen Reaction Noted    Bee venom protein (honey bee) Anaphylaxis 04/18/2016    Cefaclor Anaphylaxis 01/24/2006    Ciprofloxacin Anaphylaxis 05/05/2023    Pentazocine Hives, Palpitations, and Rash 03/24/2006    Pregabalin Anaphylaxis, Shortness of breath, and Rash 01/29/2019    Allopurinol Hives and Itching 05/05/2023    Metoprolol Drowsiness 11/14/2016    Sulfamethoxazole-trimethoprim Itching and Rash 05/05/2023       Prescriptions Prior to Admission[1]     Medical History[2]    Surgical History[3]     reports that he quit smoking about 55 years ago. His smoking use included cigarettes. He has been exposed to tobacco smoke. He has never used smokeless tobacco. He reports current alcohol use of about 14.0 standard drinks of alcohol per week. He reports that he does not use drugs.    Review of Systems  Review of Systems   Constitutional:  Negative for chills and fever.   Gastrointestinal:  Negative for nausea and vomiting.   Skin:  Positive for color change and wound.                                  Objective    Vitals:    05/21/25 1648   BP: (!) 104/48   Pulse: 85   Resp: 16   SpO2: 95%     No LMP for male patient.    Physical Exam  Vitals reviewed.   Constitutional:       General: He is  not in acute distress.     Appearance: He is not ill-appearing.   Skin:     Comments: Left dorsal hand reveals skin tear present with surrounding erythema, warmth and tenderness.  He has no streaking up his arm.  Left posterior forearm displays 2 scabbed over areas of skin tear without surrounding redness, warmth or drainage.   Neurological:      Mental Status: He is alert.         Procedures    Point of Care Test & Imaging Results from this visit  No results found for this visit on 05/21/25.   Imaging  No results found.    Cardiology, Vascular, and Other Imaging  No other imaging results found for the past 2 days      Diagnostic study results (if any) were reviewed by Susan Vasquez PA-C.    Assessment/Plan   Allergies, medications, history, and pertinent labs/EKGs/Imaging reviewed by Susan Vasquez PA-C.     Medical Decision Making  MDM- Signs and symptoms consistent with cellulitis. No evidence of sepsis, abscess, or other complications. Plan is for antibiotic therapy and symptomatic support at home. Plan to follow with PCP. Patient advised to return to clinic or go to the ED if symptoms change or worsen. Patient verbalized understanding and agrees with plan.      Orders and Diagnoses  Diagnoses and all orders for this visit:  Cellulitis of finger of left hand  -     doxycycline (Vibramycin) 100 mg capsule; Take 1 capsule (100 mg) by mouth 2 times a day for 7 days. Take with at least 8 ounces (large glass) of water, do not lie down for 30 minutes after  Skin tear of right forearm without complication, initial encounter      Medical Admin Record      Patient disposition: Home    Electronically signed by Susan Vasquez PA-C  5:18 PM           [1] (Not in a hospital admission)   [2]   Past Medical History:  Diagnosis Date    Acute on chronic systolic (congestive) heart failure 09/08/2022    Acute on chronic systolic CHF (congestive heart failure), NYHA class 3    Acute on chronic systolic CHF (congestive heart  failure), NYHA class 3 05/05/2023    CHF, acute (Multi) 05/05/2023    Nonrheumatic aortic (valve) stenosis 01/10/2022    Severe aortic stenosis    Nonrheumatic mitral (valve) insufficiency 09/14/2021    Severe mitral regurgitation by prior echocardiogram    Nonrheumatic mitral (valve) insufficiency 12/07/2022    Severe mitral regurgitation    Other long term (current) drug therapy     Long term current use of amiodarone    Personal history of diseases of the blood and blood-forming organs and certain disorders involving the immune mechanism     History of hemorrhagic diathesis    Personal history of other diseases of the circulatory system 09/20/2022    History of intraventricular hemorrhage    Personal history of other diseases of the circulatory system     History of cardiac disorder    Personal history of other diseases of the circulatory system     History of hypertension    Personal history of other diseases of the musculoskeletal system and connective tissue     History of arthritis    Severe mitral regurgitation 05/05/2023    Unspecified intracranial injury with loss of consciousness status unknown, initial encounter (Multi) 09/20/2022    Closed TBI (traumatic brain injury)   [3]   Past Surgical History:  Procedure Laterality Date    OTHER SURGICAL HISTORY  01/08/2020    Pacemaker insertion    OTHER SURGICAL HISTORY  01/08/2020    Knee replacement    OTHER SURGICAL HISTORY  01/08/2020    Cardiac catheterization with stent placement    OTHER SURGICAL HISTORY  12/06/2021    Cardioverter defibrillator insertion    OTHER SURGICAL HISTORY  12/06/2021    Angioplasty    OTHER SURGICAL HISTORY  01/30/2020    Spinal surgery

## 2025-05-22 ENCOUNTER — HOSPITAL ENCOUNTER (OUTPATIENT)
Dept: CARDIOLOGY | Facility: HOSPITAL | Age: 80
Discharge: HOME | End: 2025-05-22
Payer: COMMERCIAL

## 2025-05-22 DIAGNOSIS — Z95.810 PRESENCE OF AUTOMATIC (IMPLANTABLE) CARDIAC DEFIBRILLATOR: ICD-10-CM

## 2025-05-22 DIAGNOSIS — I44.2 AV BLOCK, COMPLETE (MULTI): ICD-10-CM

## 2025-05-22 DIAGNOSIS — I47.29 OTHER VENTRICULAR TACHYCARDIA: ICD-10-CM

## 2025-05-28 ENCOUNTER — OFFICE VISIT (OUTPATIENT)
Dept: WOUND CARE | Facility: CLINIC | Age: 80
End: 2025-05-28
Payer: MEDICARE

## 2025-05-28 PROCEDURE — 99213 OFFICE O/P EST LOW 20 MIN: CPT | Mod: 25

## 2025-05-28 PROCEDURE — 11042 DBRDMT SUBQ TIS 1ST 20SQCM/<: CPT

## 2025-06-02 ENCOUNTER — APPOINTMENT (OUTPATIENT)
Dept: CARDIOLOGY | Facility: HOSPITAL | Age: 80
End: 2025-06-02
Payer: COMMERCIAL

## 2025-06-03 ENCOUNTER — OFFICE VISIT (OUTPATIENT)
Age: 80
End: 2025-06-03
Payer: MEDICARE

## 2025-06-03 VITALS
WEIGHT: 149 LBS | RESPIRATION RATE: 16 BRPM | HEART RATE: 78 BPM | TEMPERATURE: 97.6 F | SYSTOLIC BLOOD PRESSURE: 107 MMHG | BODY MASS INDEX: 24.83 KG/M2 | OXYGEN SATURATION: 92 % | DIASTOLIC BLOOD PRESSURE: 64 MMHG | HEIGHT: 65 IN

## 2025-06-03 DIAGNOSIS — M51.379 DEGENERATION OF INTERVERTEBRAL DISC OF LUMBOSACRAL REGION, UNSPECIFIED WHETHER PAIN PRESENT: ICD-10-CM

## 2025-06-03 DIAGNOSIS — Z79.891 ENCOUNTER FOR LONG-TERM OPIATE ANALGESIC USE: ICD-10-CM

## 2025-06-03 DIAGNOSIS — M51.369 DEGENERATION OF INTERVERTEBRAL DISC OF LUMBAR REGION, UNSPECIFIED WHETHER PAIN PRESENT: ICD-10-CM

## 2025-06-03 DIAGNOSIS — S33.9XXA CHRONIC OR OLD SPRAIN OF LUMBOSACRAL LIGAMENT: ICD-10-CM

## 2025-06-03 DIAGNOSIS — R10.9 CHRONIC FLANK PAIN: ICD-10-CM

## 2025-06-03 DIAGNOSIS — G89.4 CHRONIC PAIN SYNDROME: Primary | ICD-10-CM

## 2025-06-03 DIAGNOSIS — S33.5XXD LUMBAR SPRAIN, SUBSEQUENT ENCOUNTER: ICD-10-CM

## 2025-06-03 DIAGNOSIS — M79.10 MYALGIA: ICD-10-CM

## 2025-06-03 DIAGNOSIS — M51.26 DISPLACEMENT OF LUMBAR INTERVERTEBRAL DISC WITHOUT MYELOPATHY: ICD-10-CM

## 2025-06-03 DIAGNOSIS — G89.29 CHRONIC FLANK PAIN: ICD-10-CM

## 2025-06-03 PROCEDURE — 3074F SYST BP LT 130 MM HG: CPT | Performed by: PHYSICIAN ASSISTANT

## 2025-06-03 PROCEDURE — 1160F RVW MEDS BY RX/DR IN RCRD: CPT | Performed by: PHYSICIAN ASSISTANT

## 2025-06-03 PROCEDURE — 99213 OFFICE O/P EST LOW 20 MIN: CPT | Performed by: PHYSICIAN ASSISTANT

## 2025-06-03 PROCEDURE — 3078F DIAST BP <80 MM HG: CPT | Performed by: PHYSICIAN ASSISTANT

## 2025-06-03 PROCEDURE — 1036F TOBACCO NON-USER: CPT | Performed by: PHYSICIAN ASSISTANT

## 2025-06-03 PROCEDURE — 99212 OFFICE O/P EST SF 10 MIN: CPT | Performed by: PHYSICIAN ASSISTANT

## 2025-06-03 PROCEDURE — 1159F MED LIST DOCD IN RCRD: CPT | Performed by: PHYSICIAN ASSISTANT

## 2025-06-03 PROCEDURE — G8427 DOCREV CUR MEDS BY ELIG CLIN: HCPCS | Performed by: PHYSICIAN ASSISTANT

## 2025-06-03 PROCEDURE — 1123F ACP DISCUSS/DSCN MKR DOCD: CPT | Performed by: PHYSICIAN ASSISTANT

## 2025-06-03 PROCEDURE — G8420 CALC BMI NORM PARAMETERS: HCPCS | Performed by: PHYSICIAN ASSISTANT

## 2025-06-03 RX ORDER — OXYCODONE AND ACETAMINOPHEN 5; 325 MG/1; MG/1
1 TABLET ORAL 4 TIMES DAILY PRN
Qty: 120 TABLET | Refills: 0 | Status: SHIPPED | OUTPATIENT
Start: 2025-06-06 | End: 2025-07-06

## 2025-06-03 RX ORDER — LIDOCAINE 50 MG/G
OINTMENT TOPICAL
Qty: 70.88 G | Refills: 2 | Status: SHIPPED | OUTPATIENT
Start: 2025-06-03

## 2025-06-03 RX ORDER — BACLOFEN 10 MG/1
10 TABLET ORAL 2 TIMES DAILY
Qty: 60 TABLET | Refills: 0 | Status: SHIPPED | OUTPATIENT
Start: 2025-06-03 | End: 2025-07-03

## 2025-06-03 NOTE — PROGRESS NOTES
Amy Hardy presents to the [facility] on 6/3/2025. Amy is complaining of pain in lower back. The pain is constant. The pain is described as aching, stabbing, dull, and sharp. Pain is rated on his best day at a 5, on his worst day at a 10, and on average at a 7 on the VAS scale. He took his last dose of oxycodone today.     Any procedures since your last visit: No    Pacemaker or defibrillator: Yes     He is not on NSAIDS and is  on anticoagulation medications to include Eliquis and is managed by Dr. Dexter.     Do you want someone present when the provider examines you? No    Medication Contract and Consent for Opioid Use Documents Filed       Patient Documents       Type of Document Status Date Received Received By Description    Medication Contract Received 5/24/2021  9:00 AM PANCHO ORELLANA Medication Contract    Medication Contract Received 5/17/2022  3:39 PM [facility]    Medication Contract Received 4/26/2023  4:43 PM AMY ROBLES medication contract    Consent Opioid Use Received 6/12/2024  1:47 PM [facility]                    /64   Pulse 78   Temp 97.6 °F (36.4 °C) (Infrared)   Resp 16   Ht 1.651 m (5' 5\")   Wt 67.6 kg (149 lb)   SpO2 92%   BMI 24.79 kg/m²      No LMP for male patient.

## 2025-06-03 NOTE — PROGRESS NOTES
[facility]  [address] [place] [id number]    Follow up Note      Geoff Hardy     Date of Visit:  6/3/2025    CC:  Patient presents for follow up   Chief Complaint   Patient presents with    Follow-up     Lower back pain               HPI:    Pain is unchanged.    Appropriate analgesia with current medications regimen: yes.    Change in quality of symptoms:no.    Medication side effects:none.   Recent diagnostic testing:none.   Recent interventional procedures: None.    He has been on anticoagulation medications to include Eliquis and has not been on herbal supplements.  He is not diabetic.     Imagin2022 CT abdomen/pelvis    IMPRESSION:   1. Multiple small nonobstructing bilateral calcified calyceal calculi.   2. No evidence for other calcified collecting system calculi or   obstruction.   3. Hypodense posterior right upper pole renal cortical lesion most   likely a cyst. No further evaluation is required*.   4. Streaky soft tissue densities in the bilateral perinephric fat   compatible with active or, more likely, remote inflammation.   5. No evidence of mass, lymphadenopathy or inflammatory process.   6. Dense atherosclerotic calcification throughout normal caliber   abdominal aorta.       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 Degenerative disc disease, spondylosis causing stenosis. Probable large extruded disc at L3-4. Spinal listhesis L4 and L5 exacerbating the stenosis  EMG dated 3/24/2016     L5 radiculopathy  CT dated 2016 lumbar spine demonstrates degenerative spinal stenosis that is severe at L3-4 L4-5 and L5-S1 levels                                        Potential Aberrant Drug-Related Behavior: no     Urine Drug Screenin2018 buccal consistent  2019 buccal consistent  2019 consistent  10/2019 consistent for oxycodone, metabolites, and benzodiazapines  2020

## 2025-06-04 ENCOUNTER — OFFICE VISIT (OUTPATIENT)
Dept: WOUND CARE | Facility: CLINIC | Age: 80
End: 2025-06-04
Payer: MEDICARE

## 2025-06-04 LAB
6-MAM, QUANTITATIVE, URINE: <10 NG/ML
6-MONOACETYLMORPHINE, URINE: NEGATIVE
7-AMINOCLONAZEPAM, URINE QUANT: <50 NG/ML
ABNORMAL SPECIMEN VALIDITY TEST: ABNORMAL
ALCOHOL URINE: NOT DETECTED MG/DL
ALPHA-HYDROXYALPRAZOLAM, QUANTITATIVE, URINE: <50 NG/ML
ALPHA-HYDROXYMIDAZOLAM, QUANTITATIVE, URINE: <50 NG/ML
ALPHA-HYDROXYTRIAZOLAM, QUANTITATIVE, URINE: <50 NG/ML
ALPRAZOLAM URINE QUANT: <50 NG/ML
AMPHETAMINE SCREEN URINE: NEGATIVE
BARBITURATE SCREEN URINE: NEGATIVE
BENZODIAZEPINE SCREEN, URINE: POSITIVE
BUPRENORPHINE URINE: NEGATIVE
CANNABINOID SCREEN URINE: NEGATIVE
CHLORDIAZEPOXIDE, URINE QUANT: <50 NG/ML
CLONAZEPAM, URINE QUANT: <50 NG/ML
COCAINE METABOLITE, URINE: NEGATIVE
CODEINE, QUANTITATIVE, URINE: <50 NG/ML
COMPLIANCE DRUG ANALYSIS, URINE: NORMAL
DIAZEPAM URINE QUANT: <50 NG/ML
FENTANYL URINE: NEGATIVE
FLUNITRAZEPAM, QUANTITATIVE, URINE: <50 NG/ML
FLURAZEPAM, QUANTITATIVE, URINE: <50 NG/ML
HYDROCODONE, QUANTITATIVE, URINE: <50 NG/ML
HYDROMORPHONE, QUANTITATIVE, URINE: <50 NG/ML
INTEGRITY CHECK, CREATININE, URINE: 108 MG/DL (ref 22–250)
INTEGRITY CHECK, OXIDANT, URINE: 48 MG/L
INTEGRITY CHECK, PH, URINE: 5.5 (ref 4.5–8)
INTEGRITY CHECK, SPECIFIC GRAVITY, URINE: 1.01 (ref 1–1.03)
LORAZEPAM URINE QUANT: <50 NG/ML
METHADONE SCREEN, URINE: NEGATIVE
MIDAZOLAM URINE QUANT: <50 NG/ML
MORPHINE, QUANTITATIVE, URINE: <50 NG/ML
NORDIAZEPAM URINE QUANT: >1000 NG/ML
NORHYDROCODONE, QUANTITATIVE, URINE: <50 NG/ML
NOROXYCODONE, QUANTITATIVE, URINE: >1000 NG/ML
OPIATES, URINE: NEGATIVE
OXAZEPAM URINE QUANT: >1000 NG/ML
OXYCODONE SCREEN URINE: POSITIVE
OXYCODONE URINE, QUANTITATIVE: >1000 NG/ML
OXYMORPHONE, QUANTITATIVE, URINE: >1000 NG/ML
PCP,URINE: NEGATIVE
TEMAZEPAM, QUANTITATIVE, URINE: >1000 NG/ML
TEST INFORMATION: ABNORMAL
TRAMADOL, URINE: NEGATIVE

## 2025-06-04 PROCEDURE — 99213 OFFICE O/P EST LOW 20 MIN: CPT

## 2025-06-10 ENCOUNTER — APPOINTMENT (OUTPATIENT)
Dept: CARDIOLOGY | Facility: HOSPITAL | Age: 80
End: 2025-06-10
Payer: MEDICARE

## 2025-06-11 ENCOUNTER — APPOINTMENT (OUTPATIENT)
Dept: WOUND CARE | Facility: CLINIC | Age: 80
End: 2025-06-11
Payer: COMMERCIAL

## 2025-06-13 ENCOUNTER — HOSPITAL ENCOUNTER (OUTPATIENT)
Dept: CARDIOLOGY | Facility: HOSPITAL | Age: 80
Discharge: HOME | End: 2025-06-13
Payer: MEDICARE

## 2025-06-13 DIAGNOSIS — I50.41 ACUTE COMBINED SYSTOLIC (CONGESTIVE) AND DIASTOLIC (CONGESTIVE) HEART FAILURE: ICD-10-CM

## 2025-06-13 DIAGNOSIS — I35.0 NONRHEUMATIC AORTIC VALVE STENOSIS: ICD-10-CM

## 2025-06-13 DIAGNOSIS — I50.42 CHRONIC COMBINED SYSTOLIC AND DIASTOLIC HEART FAILURE, NYHA CLASS 3: ICD-10-CM

## 2025-06-13 LAB
AORTIC VALVE MEAN GRADIENT: 10 MMHG
AORTIC VALVE PEAK VELOCITY: 2.51 M/S
AV PEAK GRADIENT: 25 MMHG
AVA (PEAK VEL): 1.02 CM2
AVA (VTI): 1.11 CM2
EJECTION FRACTION APICAL 4 CHAMBER: 56.6
EJECTION FRACTION: 53 %
LEFT ATRIUM VOLUME AREA LENGTH INDEX BSA: 36.4 ML/M2
LEFT VENTRICLE INTERNAL DIMENSION DIASTOLE: 4.89 CM (ref 3.5–6)
LEFT VENTRICULAR OUTFLOW TRACT DIAMETER: 2.29 CM
LV EJECTION FRACTION BIPLANE: 48 %
MITRAL VALVE E/A RATIO: 1.51
MITRAL VALVE E/E' RATIO: 11.06
RIGHT VENTRICLE FREE WALL PEAK S': 11.99 CM/S
RIGHT VENTRICLE PEAK SYSTOLIC PRESSURE: 44 MMHG
TRICUSPID ANNULAR PLANE SYSTOLIC EXCURSION: 1.7 CM

## 2025-06-13 PROCEDURE — 93306 TTE W/DOPPLER COMPLETE: CPT

## 2025-06-14 LAB
ANION GAP SERPL CALCULATED.4IONS-SCNC: 8 MMOL/L (CALC) (ref 7–17)
BUN SERPL-MCNC: 26 MG/DL (ref 7–25)
BUN/CREAT SERPL: 15 (CALC) (ref 6–22)
CALCIUM SERPL-MCNC: 8.8 MG/DL (ref 8.6–10.3)
CHLORIDE SERPL-SCNC: 102 MMOL/L (ref 98–110)
CO2 SERPL-SCNC: 28 MMOL/L (ref 20–32)
CREAT SERPL-MCNC: 1.73 MG/DL (ref 0.7–1.22)
EGFRCR SERPLBLD CKD-EPI 2021: 39 ML/MIN/1.73M2
GLUCOSE SERPL-MCNC: 111 MG/DL (ref 65–99)
POTASSIUM SERPL-SCNC: 5 MMOL/L (ref 3.5–5.3)
SODIUM SERPL-SCNC: 138 MMOL/L (ref 135–146)

## 2025-06-15 DIAGNOSIS — F03.A4 MILD DEMENTIA WITH ANXIETY, UNSPECIFIED DEMENTIA TYPE (MULTI): ICD-10-CM

## 2025-06-15 DIAGNOSIS — I50.42 CHRONIC COMBINED SYSTOLIC AND DIASTOLIC HEART FAILURE, NYHA CLASS 3: ICD-10-CM

## 2025-06-15 DIAGNOSIS — S06.309A: ICD-10-CM

## 2025-06-15 DIAGNOSIS — Z00.00 ROUTINE GENERAL MEDICAL EXAMINATION AT HEALTH CARE FACILITY: ICD-10-CM

## 2025-06-15 DIAGNOSIS — N52.9 ERECTILE DYSFUNCTION, UNSPECIFIED ERECTILE DYSFUNCTION TYPE: ICD-10-CM

## 2025-06-15 DIAGNOSIS — I50.84 END STAGE HEART FAILURE: ICD-10-CM

## 2025-06-15 DIAGNOSIS — N18.32 STAGE 3B CHRONIC KIDNEY DISEASE (MULTI): ICD-10-CM

## 2025-06-15 DIAGNOSIS — B35.6 TINEA CRURIS: ICD-10-CM

## 2025-06-15 DIAGNOSIS — G95.19 OTHER VASCULAR MYELOPATHIES: ICD-10-CM

## 2025-06-15 DIAGNOSIS — E11.59 TYPE 2 DIABETES MELLITUS WITH OTHER CIRCULATORY COMPLICATION, WITHOUT LONG-TERM CURRENT USE OF INSULIN: ICD-10-CM

## 2025-06-15 DIAGNOSIS — I50.9 CHRONIC CONGESTIVE HEART FAILURE, UNSPECIFIED HEART FAILURE TYPE: ICD-10-CM

## 2025-06-15 DIAGNOSIS — E78.5 HYPERLIPIDEMIA, UNSPECIFIED HYPERLIPIDEMIA TYPE: ICD-10-CM

## 2025-06-15 DIAGNOSIS — J44.9 CHRONIC OBSTRUCTIVE PULMONARY DISEASE, UNSPECIFIED COPD TYPE (MULTI): ICD-10-CM

## 2025-06-15 DIAGNOSIS — I48.91 ATRIAL FIBRILLATION, UNSPECIFIED TYPE (MULTI): ICD-10-CM

## 2025-06-16 ENCOUNTER — TELEPHONE (OUTPATIENT)
Dept: CARDIOLOGY | Facility: HOSPITAL | Age: 80
End: 2025-06-16
Payer: COMMERCIAL

## 2025-06-16 ENCOUNTER — TELEPHONE (OUTPATIENT)
Dept: PRIMARY CARE | Facility: CLINIC | Age: 80
End: 2025-06-16
Payer: COMMERCIAL

## 2025-06-16 DIAGNOSIS — F41.9 ANXIETY DISORDER, UNSPECIFIED TYPE: ICD-10-CM

## 2025-06-16 NOTE — TELEPHONE ENCOUNTER
Called patient to review non critical TTE results. No change in care plan or follow up at this time. Patient stated he was having occasionally lightheadness when turning his head to quickly. He has F/U with Kelly 6/23 and no other cardiac symptoms at this time. Patient will bring up at follow up.

## 2025-06-17 RX ORDER — DIAZEPAM 5 MG/1
5 TABLET ORAL EVERY 8 HOURS PRN
Qty: 90 TABLET | Refills: 0 | Status: SHIPPED | OUTPATIENT
Start: 2025-06-17

## 2025-06-18 ENCOUNTER — OFFICE VISIT (OUTPATIENT)
Dept: WOUND CARE | Facility: CLINIC | Age: 80
End: 2025-06-18
Payer: MEDICARE

## 2025-06-18 PROCEDURE — 99212 OFFICE O/P EST SF 10 MIN: CPT

## 2025-06-23 ENCOUNTER — OFFICE VISIT (OUTPATIENT)
Dept: CARDIOLOGY | Facility: HOSPITAL | Age: 80
End: 2025-06-23
Payer: MEDICARE

## 2025-06-23 VITALS
RESPIRATION RATE: 20 BRPM | BODY MASS INDEX: 27.21 KG/M2 | DIASTOLIC BLOOD PRESSURE: 50 MMHG | SYSTOLIC BLOOD PRESSURE: 96 MMHG | HEART RATE: 66 BPM | OXYGEN SATURATION: 94 % | WEIGHT: 158.5 LBS

## 2025-06-23 DIAGNOSIS — I48.19 PERSISTENT ATRIAL FIBRILLATION (MULTI): ICD-10-CM

## 2025-06-23 DIAGNOSIS — I50.22 CHRONIC SYSTOLIC HEART FAILURE: ICD-10-CM

## 2025-06-23 DIAGNOSIS — I50.42 CHRONIC COMBINED SYSTOLIC AND DIASTOLIC HEART FAILURE, NYHA CLASS 3: Primary | ICD-10-CM

## 2025-06-23 DIAGNOSIS — I35.0 AORTIC VALVE STENOSIS, ETIOLOGY OF CARDIAC VALVE DISEASE UNSPECIFIED: ICD-10-CM

## 2025-06-23 DIAGNOSIS — I10 HYPERTENSION, UNSPECIFIED TYPE: ICD-10-CM

## 2025-06-23 PROCEDURE — 99213 OFFICE O/P EST LOW 20 MIN: CPT | Performed by: NURSE PRACTITIONER

## 2025-06-23 PROCEDURE — 3074F SYST BP LT 130 MM HG: CPT | Performed by: NURSE PRACTITIONER

## 2025-06-23 PROCEDURE — 99213 OFFICE O/P EST LOW 20 MIN: CPT

## 2025-06-23 PROCEDURE — 1125F AMNT PAIN NOTED PAIN PRSNT: CPT | Performed by: NURSE PRACTITIONER

## 2025-06-23 PROCEDURE — 3078F DIAST BP <80 MM HG: CPT | Performed by: NURSE PRACTITIONER

## 2025-06-23 PROCEDURE — 1036F TOBACCO NON-USER: CPT | Performed by: NURSE PRACTITIONER

## 2025-06-23 PROCEDURE — 1159F MED LIST DOCD IN RCRD: CPT | Performed by: NURSE PRACTITIONER

## 2025-06-23 RX ORDER — BUMETANIDE 1 MG/1
1 TABLET ORAL SEE ADMIN INSTRUCTIONS
Qty: 60 TABLET | Refills: 3 | Status: SHIPPED | OUTPATIENT
Start: 2025-06-23 | End: 2026-06-23

## 2025-06-23 ASSESSMENT — ENCOUNTER SYMPTOMS
CLAUDICATION: 0
EDEMA: 1
ORTHOPNEA: 0
ABDOMINAL PAIN: 0
FATIGUE: 0
CHEST PRESSURE: 0
SHORTNESS OF BREATH: 1
NEAR-SYNCOPE: 0
UNEXPECTED WEIGHT CHANGE: 0
LOSS OF SENSATION IN FEET: 0
DEPRESSION: 0
OCCASIONAL FEELINGS OF UNSTEADINESS: 0
PALPITATIONS: 0

## 2025-06-23 ASSESSMENT — PAIN SCALES - GENERAL: PAINLEVEL_OUTOF10: 8

## 2025-06-23 ASSESSMENT — COLUMBIA-SUICIDE SEVERITY RATING SCALE - C-SSRS
6. HAVE YOU EVER DONE ANYTHING, STARTED TO DO ANYTHING, OR PREPARED TO DO ANYTHING TO END YOUR LIFE?: NO
1. IN THE PAST MONTH, HAVE YOU WISHED YOU WERE DEAD OR WISHED YOU COULD GO TO SLEEP AND NOT WAKE UP?: NO
2. HAVE YOU ACTUALLY HAD ANY THOUGHTS OF KILLING YOURSELF?: NO

## 2025-06-23 NOTE — PROGRESS NOTES
Subjective   Patient ID: Jony Javier is a 80 y.o. male who presents for follow-up of congestive heart failure.   Current Medications[1]     PMH: Past medical history consists significant for history of heart failure reduced ejection fraction. He initially his ejection fraction was around 40%. He has a history of coronary artery disease, persistent atrial fibrillation, GERD, history of carotid artery stenosis and CVA in the past. He has CRT-D in place. He has not had any defibrillations from device. He also has CardioMEMS in place and his readings have been consistently within desired parameters. Last heart cath was in 2020. Results are noted below. Most recent echocardiogram shows ejection fraction of 45% which is actually decreased from his echocardiogram in 2022 which showed that he had improvement in EF to 60 to 65%.     Congestive Heart Failure  Presents for follow-up visit. Associated symptoms include edema (+1 edema bilateral legs) and shortness of breath. Pertinent negatives include no abdominal pain, chest pain, chest pressure, claudication, fatigue, muscle weakness, near-syncope, nocturia, orthopnea, palpitations, paroxysmal nocturnal dyspnea or unexpected weight change. The symptoms have been stable. Compliance with total regimen is %. Compliance with diet is %. Compliance with medications is %.       Review of Systems   Constitutional:  Negative for fatigue and unexpected weight change.   Respiratory:  Positive for shortness of breath.    Cardiovascular:  Negative for chest pain, palpitations, claudication and near-syncope.   Gastrointestinal:  Negative for abdominal pain.   Genitourinary:  Negative for nocturia.   Musculoskeletal:  Negative for muscle weakness.       Objective     BP 96/50 (BP Location: Left arm, Patient Position: Sitting)   Pulse 66   Resp 20   Wt 71.9 kg (158 lb 8 oz)   SpO2 94%   BMI 27.21 kg/m²     Transthoracic Echo Complete  Result Date: 6/13/2025               [place] [id number]      Phone [phone] Fax [phone] TRANSTHORACIC ECHOCARDIOGRAM REPORT Patient Name:       KARAN Paige Physician:    [id number] Malcolm Garcia MD Study Date:         6/13/2025           Ordering Provider:    [id number] MARTHA OMALLEYKRAVARTY MRN/PID:            [id number]            Fellow: Accession#:         [id number]        Nurse: Date of Birth/Age:  1945 / 80      Sonographer:          Eileen Nava RDCS                     years Gender Assigned at                     Additional Staff: Birth: Height:             162.56 cm           Admit Date: Weight:             68.95 kg            Admission Status:     Outpatient BSA / BMI:          1.74 m2 / 26.09     Department Location:  [facility] Echo                     kg/m2                                     Lab Blood Pressure: 104 /48 mmHg Study Type:    TRANSTHORACIC ECHO (TTE) COMPLETE Diagnosis/ICD: Nonrheumatic aortic (valve) stenosis-I35.0; Acute combined                systolic (congestive) and diastolic (congestive) heart failure                (CHF)-I50.41 Indication:    Congestive Heart Failure, Non rheumatic Aortic Stenosis CPT Codes:     Echo Complete w Full [id number] Patient History: Diabetes:          Yes Pacer/Defib:       AICD/Perment pacemaker Pertinent History: Murmur, CAD, CHF, HTN, Hyperlipidemia, Previous SVT and CVA. Study Detail: The following Echo studies were performed: 2D, M-Mode, Doppler and               color flow.  PHYSICIAN INTERPRETATION: Left Ventricle: The left ventricular systolic function is low normal with a visually estimated ejection fraction of 50-55%. There is moderate concentric left ventricular hypertrophy. There are no regional wall motion abnormalities. The left ventricular cavity size is normal. There is mild  increased septal and mildly increased posterior left ventricular wall thickness. Left ventricular diastolic filling was indeterminate. Hypo inferolateral, anterolateral and inferior segments. Left Atrium: The left atrial size is mildly dilated. Mild increase in LA volume. Right Ventricle: The right ventricle is normal in size. There is normal right ventricular global systolic function. A device is visualized in the right ventricle. Right Atrium: The right atrial size is normal. There is a device visualized in the right atrium. Aortic Valve: The aortic valve was not well visualized. The aortic valve area by VTI is 1.11 cmï¿½ with a peak velocity of 2.51 m/s. The peak and mean gradients are 23 mmHg and 10 mmHg, respectively, with a dimensionless index of 0.31. There is moderate aortic valve cusp calcification. There is moderate aortic valve thickening. There is evidence of moderate aortic valve stenosis. There is mild aortic valve regurgitation. Mitral Valve: The mitral valve is mildly thickened. There is mild to moderate mitral annular calcification. There is moderate mitral valve regurgitation which is posteriorly directed. The E Vmax is 0.97 m/s. The mitral regurgitant orifice area is 11 mm2. The mitral regurgitant volume is 18.26 ml. Tricuspid Valve: The tricuspid valve is structurally normal. There is moderate tricuspid regurgitation. The Doppler estimated right ventricular systolic pressure (RVSP) is mildly elevated at 44 mmHg. Pulmonic Valve: The pulmonic valve is structurally normal. There is trace pulmonic valve regurgitation. Pericardium: Trivial pericardial effusion. There is a pericardial fat pad present. Aorta: The aortic root is normal. Systemic Veins: The inferior vena cava appears normal in size, with IVC inspiratory collapse greater than 50%.  CONCLUSIONS:  1. The left ventricular systolic function is low normal with a visually estimated ejection fraction of 50-55%.  2. Left ventricular diastolic  filling was indeterminate.  3. There is normal right ventricular global systolic function.  4. Moderate mitral valve regurgitation.  5. Moderate tricuspid regurgitation.  6. The Doppler estimated RVSP is mildly elevated at 44 mmHg.  7. There is moderate aortic valve cusp calcification.  8. Mild aortic valve regurgitation.  9. There is moderate concentric left ventricular hypertrophy. 10. Moderate aortic valve stenosis. The peak and mean gradients are 25 mmHg and 10 mmHg respectively. QUANTITATIVE DATA SUMMARY:  2D MEASUREMENTS:             Normal Ranges: IVSd:            1.22 cm     (0.6-1.1cm) LVPWd:           1.26 cm     (0.6-1.1cm) LVIDd:           4.89 cm     (3.9-5.9cm) LVIDs:           3.50 cm LV Mass Index:   135.4 g/m2 LVEDV Index:     64.75 ml/m2 LV % FS          28.4 %  LEFT ATRIUM:                  Normal Ranges: LA Vol A4C:        60.2 ml    (22+/-6mL/m2) LA Vol A2C:        63.0 ml LA Vol BP:         63.3 ml LA Vol Index A4C:  34.6ml/m2 LA Vol Index A2C:  36.2 ml/m2 LA Vol Index BP:   36.4 ml/m2 LA Area A4C:       19.0 cm2 LA Area A2C:       20.0 cm2 LA Major Axis A4C: 5.1 cm LA Major Axis A2C: 5.4 cm LA Volume Index:   37.0 ml/m2 LA Vol A4C:        61.1 ml LA Vol A2C:        64.3 ml LA Vol Index BSA:  36.0 ml/m2  RIGHT ATRIUM:          Normal Ranges: RA Area A4C:  16.0 cm2  AORTA MEASUREMENTS:         Normal Ranges: Ao Sinus, d:        3.50 cm (2.1-3.5cm) Asc Ao, d:          3.20 cm (2.1-3.4cm)  LV SYSTOLIC FUNCTION:                      Normal Ranges: EF-A4C View:    57 % (>=55%) EF-A2C View:    33 % EF-Biplane:     48 % EF-Visual:      53 % LV EF Reported: 53 %  LV DIASTOLIC FUNCTION:           Normal Ranges: MV Peak E:             0.97 m/s  (0.7-1.2 m/s) MV Peak A:             0.64 m/s  (0.42-0.7 m/s) E/A Ratio:             1.51      (1.0-2.2) MV e'                  0.088 m/s (>8.0) MV lateral e'          0.09 m/s MV medial e'           0.08 m/s E/e' Ratio:            11.06     (<8.0) LV IVRT:                76 msec   (<100ms)  MITRAL VALVE:          Normal Ranges: MV DT:        175 msec (150-240msec)  MITRAL INSUFFICIENCY:             Normal Ranges: PISA Radius:          0.5 cm MR VTI:               165.55 cm MR Vmax:              485.09 cm/s MR Alias Tam:         41.2 cm/s MR Volume:            18.26 ml MR Flow Rt:           53.52 ml/s MR EROA:              11 mm2  AORTIC VALVE:                      Normal Ranges: AoV Vmax:                2.51 m/s  (<=1.7m/s) AoV Peak P.2 mmHg (<20mmHg) AoV Mean PG:             10.3 mmHg (1.7-11.5mmHg) LVOT Max Tam:            0.71 m/s  (<=1.1m/s) AoV VTI:                 50.51 cm  (18-25cm) LVOT VTI:                15.55 cm LVOT Diameter:           2.29 cm   (1.8-2.4cm) AoV Area, VTI:           1.11 cm2  (2.5-5.5cm2) AoV Area,Vmax:           1.02 cm2  (2.5-4.5cm2) AoV Dimensionless Index: 0.31  AORTIC INSUFFICIENCY: AI Vmax:       3.21 m/s AI Half-time:  612 msec AI Decel Time: 2112 msec AI Decel Rate: 157.20 cm/s2  RIGHT VENTRICLE: RV Basal 3.50 cm RV Mid   2.50 cm RV Major 8.0 cm TAPSE:   16.9 mm RV s'    0.12 m/s  TRICUSPID VALVE/RVSP:          Normal Ranges: Peak TR Velocity:     3.19 m/s Est. RA Pressure:     3 mmHg RV Syst Pressure:     44 mmHg  (< 30mmHg) IVC Diam:             1.49 cm  PULMONIC VALVE:          Normal Ranges: PV Max Tam:     0.8 m/s  (0.6-0.9m/s) PV Max P.6 mmHg  AORTA: Asc Ao Diam 3.15 cm  [id number] Malcolm Garcia MD Electronically signed on 2025 at 5:20:47 PM  ** Final **     Transthoracic echo (TTE) complete  Result Date: 2024              [place] [id number]      Phone [phone] Fax [phone] TRANSTHORACIC ECHOCARDIOGRAM REPORT Patient Name:      KARAN Paige Physician:    [id number] Narendra Portillo MD Study Date:        2024             Ordering Provider:    [id number] DOROTHY HEIN                                                                MYLES MRN/PID:           [id number]             Fellow: Accession#:        [id number]         Nurse:                Maye Dong RN Date of Birth/Age: 1945 / 79 years Sonographer:          Brenna Yusuf RDCS Gender:            M                    Additional Staff: Height:            162.56 cm            Admit Date: Weight:            73.03 kg             Admission Status:     Outpatient BSA / BMI:         1.78 m2 / 27.64      Department Location:  [facility] Echo                    kg/m2                                      Lab Blood Pressure: 92 /75 mmHg Study Type:    TRANSTHORACIC ECHO (TTE) COMPLETE Diagnosis/ICD: Atherosclerotic heart disease of native coronary artery with                angina pectoris with documented spasm-I25.111; Acute combined                systolic (congestive) and diastolic (congestive) heart failure                (CHF)-I50.41 Indication:    CHF, CAD CPT Codes:     Echo Complete w Full Doppler-[id number] Patient History: Pertinent History: CAD and CHF. Study Detail: The following Echo studies were performed: 2D, M-Mode, Doppler and               color flow. Technically challenging study due to prominent lung               artifact and pacer wires. Optison used as a contrast agent for               endocardial border definition.  PHYSICIAN INTERPRETATION: Left Ventricle: Left ventricular ejection fraction is mildly decreased, by visual estimate at 45-50%. Wall motion is abnormal. The left ventricular cavity size is normal. Spectral Doppler shows a pseudonormal pattern of left ventricular diastolic filling. Unable to measure 2D Plax d/t poor image quality. Akinetic inferior wall. Left Atrium: The left atrium is moderately dilated. Right Ventricle: The right ventricle is normal in size. There is normal right ventricular global systolic function. A device is visualized in  the right ventricle. Right Atrium: The right atrium is normal in size. Aortic Valve: The aortic valve was not well visualized. There is evidence of mild to moderate aortic valve stenosis. The aortic valve dimensionless index is 0.38. There is mild aortic valve regurgitation. The peak instantaneous gradient of the aortic valve is 27.3 mmHg. The mean gradient of the aortic valve is 14.4 mmHg. Mitral Valve: The mitral valve is mild to moderately thickened. There is mild to moderate mitral annular calcification. There is mild to moderate mitral valve regurgitation. Tricuspid Valve: The tricuspid valve is structurally normal. There is mild to moderate tricuspid regurgitation. Pulmonic Valve: The pulmonic valve is not well visualized. There is trace to mild pulmonic valve regurgitation. Pericardium: There is no pericardial effusion noted. Aorta: The aortic root is normal.  CONCLUSIONS:  1. Left ventricular ejection fraction is mildly decreased, by visual estimate at 45-50%.  2. Spectral Doppler shows a pseudonormal pattern of left ventricular diastolic filling.  3. Unable to measure 2D Plax d/t poor image quality.     Akinetic inferior wall.  4. There is normal right ventricular global systolic function.  5. The left atrium is moderately dilated.  6. Mild to moderate mitral valve regurgitation.  7. Mild to moderate tricuspid regurgitation.  8. Mild to moderate aortic valve stenosis.  9. Mild aortic valve regurgitation. QUANTITATIVE DATA SUMMARY: LA VOLUME:                               Normal Ranges: LA Vol A4C:        81.1 ml    (22+/-6mL/m2) LA Vol A2C:        67.0 ml LA Vol BP:         75.4 ml LA Vol Index A4C:  45.5ml/m2 LA Vol Index A2C:  37.5 ml/m2 LA Vol Index BP:   42.3 ml/m2 LA Area A4C:       24.9 cm2 LA Area A2C:       22.1 cm2 LA Major Axis A4C: 6.5 cm LA Major Axis A2C: 6.2 cm LA Vol A4C:        77.7 ml LA Vol A2C:        64.2 ml RA VOLUME BY A/L METHOD:                       Normal Ranges: RA Area A4C: 19.5  cm2 AORTA MEASUREMENTS:                      Normal Ranges: Ao Sinus, d: 2.70 cm (2.1-3.5cm) Ao STJ, d:   2.10 cm (1.7-3.4cm) Asc Ao, d:   2.80 cm (2.1-3.4cm) LV SYSTOLIC FUNCTION BY 2D PLANIMETRY (MOD):                      Normal Ranges: EF-A4C View:    52 % (>=55%) EF-A2C View:    50 % EF-Biplane:     50 % EF-Visual:      48 % LV EF Reported: 48 % LV DIASTOLIC FUNCTION:                         Normal Ranges: MV Peak E:    1.18 m/s  (0.7-1.2 m/s) MV Peak A:    0.83 m/s  (0.42-0.7 m/s) E/A Ratio:    1.43      (1.0-2.2) MV e'         0.081 m/s (>8.0) MV lateral e' 0.08 m/s MV medial e'  0.08 m/s E/e' Ratio:   14.65     (<8.0) MITRAL VALVE:                 Normal Ranges: MV DT: 250 msec (150-240msec) MITRAL INSUFFICIENCY:                           Normal Ranges: PISA Radius:  0.6 cm MR VTI:       149.60 cm MR Vmax:      474.99 cm/s MR Alias Tam: 32.8 cm/s MR Volume:    23.52 ml MR Flow Rt:   74.68 ml/s MR EROA:      0.16 cm2 AORTIC VALVE:                                    Normal Ranges: AoV Vmax:                2.61 m/s  (<=1.7m/s) AoV Peak P.3 mmHg (<20mmHg) AoV Mean P.4 mmHg (1.7-11.5mmHg) LVOT Max Tam:            0.89 m/s  (<=1.1m/s) AoV VTI:                 52.09 cm  (18-25cm) LVOT VTI:                20.03 cm LVOT Diameter:           2.02 cm   (1.8-2.4cm) AoV Area, VTI:           1.23 cm2  (2.5-5.5cm2) AoV Area,Vmax:           1.10 cm2  (2.5-4.5cm2) AoV Dimensionless Index: 0.38 AORTIC INSUFFICIENCY: AI Vmax:       3.17 m/s AI Half-time:  448 msec AI Decel Time: 1546 msec AI Decel Rate: 205.78 cm/s2  RIGHT VENTRICLE: RV Basal 3.68 cm RV Mid   2.60 cm RV Major 8.0 cm TAPSE:   19.1 mm RV s'    0.10 m/s TRICUSPID VALVE/RVSP:                             Normal Ranges: Peak TR Velocity: 2.70 m/s RV Syst Pressure: 32.1 mmHg (< 30mmHg) IVC Diam:         2.08 cm AORTA: Asc Ao Diam 2.77 cm  [id number] Narendra Portillo MD Electronically signed on 2024 at 5:34:21 PM  ** Final **         Lab Results   Component Value Date    BUN 26 (H) 06/13/2025    CREATININE 1.73 (H) 06/13/2025     (H) 05/12/2025    MG 2.1 05/12/2025    K 5.0 06/13/2025     06/13/2025       Constitutional:       General:  NAD   HENT:      Head: Normocephalic     Mouth: Mucous membranes are moist.      Neck:  No JVD or HJR   Eyes:      Conjunctiva/sclera: Conjunctivae normal.    Cardiovascular:      Rate and Rhythm: Normal rate and rhythm on auscultation.      Heart sounds:  S1 S2 normal, + systolic murmur  no S3 or S4   Pulmonary:      Pulmonary effort is normal.  Normal breath sounds. No wheezing or rales.   Abdominal:      General: Bowel sounds are normal, non- tender to palpitations.   Musculoskeletal:         General: trace to +1 edema of lower legs.   KHALIL well.   Skin:     General: Skin is warm and dry   Neurological:      General: No focal deficit present.      Mental Status: alert and oriented to person, place, and time. Mental status is at baseline.     Psychiatric:         Mood and Affect: Normal Affect.     Assessment/Plan     Problem List Items Addressed This Visit       Aortic valve stenosis    Chronic combined systolic and diastolic heart failure, NYHA class 3    HTN (hypertension)    Persistent atrial fibrillation (Multi)      Chronic systolic diastolic heart failure:   Etiology   AHA Stage: C  NYHA class:  2-3  - CPAP a night   Volume Status:  trace edema.   GFR: 39     GDMT:  BB- Carvedilol 25 mg 1 tablet twice a day   ARB/ACEI/ARNI - Entresto 49/51 mg 1 tablet twice a day   MRA -  on hold due to high potassium.   SGLT2i -  Jardiance 10 mg once a day   Diuretic - Bumex 1 mg  5 days per week.   Device Therapy: CRT-D /Cardiomems readings are elevated.       CHF:  trace edema.   Will change Bumex to 5 days per week.  Recheck BMP/BNP in 2 weeks.   Follow up in 4 weeks.   He is encouraged to be more careful with sodium restrictions. He reports having a fair amount of chinese food lately.   Emphasized  salt restriction. 2 liter fluid restriction.   Encouraged daily monitoring of the patient's weight.  Encouraged regular exercise as tolerated.   Repeat lab work 2 weeks.   Follow up in  4 weeks.      2. Atrial fibrillation :   RRR today. OAC with Eliquis. Denies obvious signs of bleeding.  Stable.      3. CKD3:  GFR 39 ml/min Recheck lab in 2 weeks.      4. ASHD:  No chest pain,  continue secondary prevention medications.  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 Aortic stenosis:  musical murmur noted.   Mean gradient is 10 mm hg.  Moderate MR noted.     Montserrat Wesley, APRN-CNP       [1]   Current Outpatient Medications:     albuterol 90 mcg/actuation inhaler, INHALE 1 (ONE) PUFF EVERY 4 HOURS AS NEEDED, Disp: 8.5 g, Rfl: 8    apixaban (Eliquis) 2.5 mg tablet, Take 1 tablet (2.5 mg) by mouth 2 times a day., Disp: 180 tablet, Rfl: 3    atorvastatin (Lipitor) 80 mg tablet, Take 1 tablet (80 mg) by mouth once daily at bedtime., Disp: 90 tablet, Rfl: 3    baclofen (Lioresal) 10 mg tablet, Take 1 tablet (10 mg) by mouth 2 times a day as needed., Disp: , Rfl:     blood sugar diagnostic (True Metrix Glucose Test Strip), USE AS DIRECTED to test BLOOD SUGAR THREE TIMES DAILY, Disp: 300 strip, Rfl: 3    bumetanide (Bumex) 1 mg tablet, Take 1 tablet (1 mg) by mouth every other day., Disp: 45 tablet, Rfl: 3    carvedilol (Coreg) 25 mg tablet, Take 1 tablet (25 mg) by mouth 2 times a day., Disp: 180 tablet, Rfl: 3    clobetasol (Temovate) 0.05 % external solution, APPLY TO THE AFFECTED AREA(S) EVERY DAY AS NEEDED, Disp: , Rfl:     diazePAM (Valium) 5 mg tablet, Take 1 tablet (5 mg) by mouth every 8 hours if needed for anxiety. No further refills unless the patient is seen within the next 30 days, Disp: 90 tablet, Rfl: 0    empagliflozin (Jardiance) 25 mg tablet, Take 1 tablet (25 mg) by mouth once daily., Disp: 100 tablet, Rfl: 3    Entresto 49-51 mg tablet, Take 1 tablet by mouth 2 times a day., Disp:  180 tablet, Rfl: 3    EPINEPHrine 0.3 mg/0.3 mL injection syringe, Inject 0.3 mL (0.3 mg) into the muscle if needed for anaphylaxis for up to 1 dose., Disp: 1 each, Rfl: 3    ezetimibe (Zetia) 10 mg tablet, Take 1 tablet (10 mg) by mouth once daily., Disp: 90 tablet, Rfl: 3    febuxostat (Uloric) 40 mg tablet, Take 1 tablet (40 mg) by mouth once daily., Disp: , Rfl:     ferrous gluconate 270 mg (27 mg iron) tablet, Take 1 tablet by mouth once daily., Disp: , Rfl:     lidocaine (Xylocaine) 5 % ointment, APPLY TO THE AFFECTED AREA(S) AS NEEDED FOR PAIN to last 30 days, Disp: , Rfl:     melatonin 3 mg tablet, Take 1 tablet (3 mg) by mouth as needed at bedtime for sleep., Disp: , Rfl:     mirtazapine (Remeron) 30 mg tablet, Take 1 tablet (30 mg) by mouth once daily at bedtime., Disp: , Rfl:     multivitamin tablet, Take 1 tablet by mouth once daily., Disp: , Rfl:     nystatin (Mycostatin) 100,000 unit/gram powder, Apply 1 Application topically 2 times a day., Disp: 60 g, Rfl: 2    ondansetron ODT (Zofran-ODT) 4 mg disintegrating tablet, Take 1 tablet (4 mg) by mouth every 8 hours if needed for nausea or vomiting., Disp: 12 tablet, Rfl: 0    OneTouch Delica Plus Lancet 33 gauge misc, Test THREE TIMES DAILY AS DIRECTED, Disp: , Rfl:     oxyCODONE-acetaminophen (Percocet) 5-325 mg tablet, 1 tablet every 6 hours if needed., Disp: , Rfl:     oxyCODONE-acetaminophen (Percocet) 7.5-325 mg tablet, , Disp: , Rfl:     pantoprazole (ProtoNix) 40 mg EC tablet, TAKE 1 TABLET BY MOUTH EVERY DAY, Disp: 90 tablet, Rfl: 3    tadalafil 20 mg tablet, Take 1 tablet (20 mg) by mouth once daily as needed for erectile dysfunction., Disp: 90 tablet, Rfl: 3    tamsulosin (Flomax) 0.4 mg 24 hr capsule, Take 1 capsule (0.4 mg) by mouth once daily in the morning., Disp: 90 capsule, Rfl: 1    testosterone 20.25 mg/1.25 gram (1.62 %) gel in metered-dose pump, Apply 2 Actuations topically daily. Use on hairless area like upper arm or shoulder., Disp:  , Rfl:     venlafaxine XR (Effexor-XR) 150 mg 24 hr capsule, TAKE 1 CAPSULE BY MOUTH AFTER BREAKFAST, Disp: , Rfl:

## 2025-06-23 NOTE — PATIENT INSTRUCTIONS
Thank you for coming in today.  If you have any questions you may contact the office Monday through Friday at [phone] or on week ends at [phone].    Please take the Bumex 1 mg 5 days per week.     Lab work in 2 weeks.     Please follow  a 2 GM sodium diet and limit fluid intake to 2 liters per day or 8 servings ( serving size = 8 oz. = 1 cup = 240 ml) per day.   Please avoid processed meat products (luncheon meats, sausages, gonsales, hot dogs for example) eat 4 servings of vegetables and 1-2 whole servings of whole fruits per day.   Please weigh daily and call [phone] for weight gain of 3 pounds in 24 hours or 5 pounds or if you experience increased swelling or shortness of breath.      Follow up: 4 weeks.     Please be sure to follow up with your cardiologist at [facility] (formerly [facility]) once every year. Call [phone] to schedule appointment if you do not have a follow up appointment scheduled already.

## 2025-06-24 ENCOUNTER — HOSPITAL ENCOUNTER (OUTPATIENT)
Dept: CARDIOLOGY | Facility: HOSPITAL | Age: 80
Discharge: HOME | End: 2025-06-24
Payer: COMMERCIAL

## 2025-06-24 ENCOUNTER — APPOINTMENT (OUTPATIENT)
Dept: CARDIOLOGY | Facility: HOSPITAL | Age: 80
End: 2025-06-24
Payer: COMMERCIAL

## 2025-06-24 DIAGNOSIS — Z95.810 PRESENCE OF AUTOMATIC (IMPLANTABLE) CARDIAC DEFIBRILLATOR: ICD-10-CM

## 2025-06-24 DIAGNOSIS — I44.2 AV BLOCK, COMPLETE (MULTI): ICD-10-CM

## 2025-06-24 DIAGNOSIS — I47.29 OTHER VENTRICULAR TACHYCARDIA: ICD-10-CM

## 2025-06-24 PROCEDURE — 93295 DEV INTERROG REMOTE 1/2/MLT: CPT | Performed by: STUDENT IN AN ORGANIZED HEALTH CARE EDUCATION/TRAINING PROGRAM

## 2025-06-24 PROCEDURE — 93296 REM INTERROG EVL PM/IDS: CPT

## 2025-06-27 ENCOUNTER — APPOINTMENT (OUTPATIENT)
Dept: WOUND CARE | Facility: CLINIC | Age: 80
End: 2025-06-27
Payer: COMMERCIAL

## 2025-06-30 DIAGNOSIS — I50.42 CHRONIC COMBINED SYSTOLIC AND DIASTOLIC HEART FAILURE, NYHA CLASS 3: ICD-10-CM

## 2025-06-30 DIAGNOSIS — I10 HYPERTENSION, UNSPECIFIED TYPE: ICD-10-CM

## 2025-07-01 ENCOUNTER — TELEPHONE (OUTPATIENT)
Dept: PRIMARY CARE | Facility: CLINIC | Age: 80
End: 2025-07-01
Payer: COMMERCIAL

## 2025-07-01 NOTE — TELEPHONE ENCOUNTER
Patients spouse called in saying the patient is having pain in his toe. (Was not sure which one) Patient thinks possible gout but has not had a uric acid done in a long time.   Patient had left over Uloric from 2022 and took it and was wondering if a refill can be sent for this or if you want to do the uric acid first to check for gout.  Please advise

## 2025-07-09 ENCOUNTER — APPOINTMENT (OUTPATIENT)
Dept: RADIOLOGY | Facility: HOSPITAL | Age: 80
End: 2025-07-09
Payer: MEDICARE

## 2025-07-09 ENCOUNTER — OFFICE VISIT (OUTPATIENT)
Age: 80
End: 2025-07-09
Payer: MEDICARE

## 2025-07-09 ENCOUNTER — HOSPITAL ENCOUNTER (EMERGENCY)
Facility: HOSPITAL | Age: 80
Discharge: ANOTHER HOSPITAL | End: 2025-07-10
Attending: EMERGENCY MEDICINE
Payer: MEDICARE

## 2025-07-09 VITALS
TEMPERATURE: 97.5 F | SYSTOLIC BLOOD PRESSURE: 115 MMHG | WEIGHT: 149 LBS | HEART RATE: 82 BPM | HEIGHT: 65 IN | RESPIRATION RATE: 16 BRPM | BODY MASS INDEX: 24.83 KG/M2 | OXYGEN SATURATION: 90 % | DIASTOLIC BLOOD PRESSURE: 69 MMHG

## 2025-07-09 DIAGNOSIS — Z79.891 ENCOUNTER FOR LONG-TERM OPIATE ANALGESIC USE: ICD-10-CM

## 2025-07-09 DIAGNOSIS — M51.369 DEGENERATION OF INTERVERTEBRAL DISC OF LUMBAR REGION, UNSPECIFIED WHETHER PAIN PRESENT: ICD-10-CM

## 2025-07-09 DIAGNOSIS — M79.10 MYALGIA: ICD-10-CM

## 2025-07-09 DIAGNOSIS — S00.03XA HEMATOMA OF SCALP, INITIAL ENCOUNTER: ICD-10-CM

## 2025-07-09 DIAGNOSIS — S33.9XXA CHRONIC OR OLD SPRAIN OF LUMBOSACRAL LIGAMENT: ICD-10-CM
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
## 2025-07-09 DIAGNOSIS — R10.9 CHRONIC FLANK PAIN: ICD-10-CM

## 2025-07-09 DIAGNOSIS — I60.9 SAH (SUBARACHNOID HEMORRHAGE) (MULTI): ICD-10-CM

## 2025-07-09 DIAGNOSIS — S33.5XXD LUMBAR SPRAIN, SUBSEQUENT ENCOUNTER: ICD-10-CM

## 2025-07-09 DIAGNOSIS — Z79.01 ANTICOAGULATED: ICD-10-CM

## 2025-07-09 DIAGNOSIS — G89.4 CHRONIC PAIN SYNDROME: Primary | ICD-10-CM

## 2025-07-09 DIAGNOSIS — G89.29 CHRONIC FLANK PAIN: ICD-10-CM
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
## 2025-07-09 DIAGNOSIS — M51.26 DISPLACEMENT OF LUMBAR INTERVERTEBRAL DISC WITHOUT MYELOPATHY: ICD-10-CM

## 2025-07-09 DIAGNOSIS — M51.379 DEGENERATION OF INTERVERTEBRAL DISC OF LUMBOSACRAL REGION, UNSPECIFIED WHETHER PAIN PRESENT: ICD-10-CM

## 2025-07-09 LAB
ABO GROUP (TYPE) IN BLOOD: NORMAL
ALBUMIN SERPL BCP-MCNC: 4.3 G/DL (ref 3.4–5)
ALP SERPL-CCNC: 58 U/L (ref 33–136)
ALT SERPL W P-5'-P-CCNC: 8 U/L (ref 10–52)
ANION GAP SERPL CALC-SCNC: 12 MMOL/L (ref 10–20)
ANION GAP SERPL CALCULATED.4IONS-SCNC: 11 MMOL/L (CALC) (ref 7–17)
ANTIBODY SCREEN: NORMAL
AST SERPL W P-5'-P-CCNC: 22 U/L (ref 9–39)
BASOPHILS # BLD AUTO: 0.08 X10*3/UL (ref 0–0.1)
BASOPHILS NFR BLD AUTO: 0.8 %
BILIRUB SERPL-MCNC: 0.4 MG/DL (ref 0–1.2)
BNP SERPL-MCNC: 718 PG/ML
BNP SERPL-MCNC: 774 PG/ML (ref 0–99)
BUN SERPL-MCNC: 28 MG/DL (ref 6–23)
BUN SERPL-MCNC: 29 MG/DL (ref 7–25)
BUN/CREAT SERPL: 18 (CALC) (ref 6–22)
CALCIUM SERPL-MCNC: 9.2 MG/DL (ref 8.6–10.3)
CALCIUM SERPL-MCNC: 9.2 MG/DL (ref 8.6–10.3)
CARDIAC TROPONIN I PNL SERPL HS: 16 NG/L (ref 0–20)
CHLORIDE SERPL-SCNC: 96 MMOL/L (ref 98–107)
CHLORIDE SERPL-SCNC: 99 MMOL/L (ref 98–110)
CO2 SERPL-SCNC: 24 MMOL/L (ref 20–32)
CO2 SERPL-SCNC: 25 MMOL/L (ref 21–32)
CREAT SERPL-MCNC: 1.61 MG/DL (ref 0.7–1.22)
CREAT SERPL-MCNC: 1.65 MG/DL (ref 0.5–1.3)
EGFRCR SERPLBLD CKD-EPI 2021: 41 ML/MIN/1.73M*2
EGFRCR SERPLBLD CKD-EPI 2021: 43 ML/MIN/1.73M2
EOSINOPHIL # BLD AUTO: 0.37 X10*3/UL (ref 0–0.4)
EOSINOPHIL NFR BLD AUTO: 3.6 %
ERYTHROCYTE [DISTWIDTH] IN BLOOD BY AUTOMATED COUNT: 13.7 % (ref 11.5–14.5)
ETHANOL SERPL-MCNC: 78 MG/DL
GLUCOSE SERPL-MCNC: 100 MG/DL (ref 74–99)
GLUCOSE SERPL-MCNC: 125 MG/DL (ref 65–139)
HCT VFR BLD AUTO: 18.2 % (ref 41–52)
HGB BLD-MCNC: 5.9 G/DL (ref 13.5–17.5)
HYPOCHROMIA BLD QL SMEAR: NORMAL
IMM GRANULOCYTES # BLD AUTO: 0.02 X10*3/UL (ref 0–0.5)
IMM GRANULOCYTES NFR BLD AUTO: 0.2 % (ref 0–0.9)
INR PPP: 1.2 (ref 0.9–1.1)
LACTATE SERPL-SCNC: 1.8 MMOL/L (ref 0.4–2)
LYMPHOCYTES # BLD AUTO: 2.94 X10*3/UL (ref 0.8–3)
LYMPHOCYTES NFR BLD AUTO: 28.9 %
MCH RBC QN AUTO: 35.3 PG (ref 26–34)
MCHC RBC AUTO-ENTMCNC: 32.4 G/DL (ref 32–36)
MCV RBC AUTO: 109 FL (ref 80–100)
MONOCYTES # BLD AUTO: 1.33 X10*3/UL (ref 0.05–0.8)
MONOCYTES NFR BLD AUTO: 13.1 %
NEUTROPHILS # BLD AUTO: 5.45 X10*3/UL (ref 1.6–5.5)
NEUTROPHILS NFR BLD AUTO: 53.4 %
NRBC BLD-RTO: 0 /100 WBCS (ref 0–0)
OVALOCYTES BLD QL SMEAR: NORMAL
PLATELET # BLD AUTO: 162 X10*3/UL (ref 150–450)
POTASSIUM SERPL-SCNC: 4.6 MMOL/L (ref 3.5–5.3)
POTASSIUM SERPL-SCNC: 4.7 MMOL/L (ref 3.5–5.3)
PROT SERPL-MCNC: 6.9 G/DL (ref 6.4–8.2)
PROTHROMBIN TIME: 12.9 SECONDS (ref 9.8–12.4)
RBC # BLD AUTO: 1.67 X10*6/UL (ref 4.5–5.9)
RBC MORPH BLD: NORMAL
RH FACTOR (ANTIGEN D): NORMAL
SODIUM SERPL-SCNC: 128 MMOL/L (ref 136–145)
SODIUM SERPL-SCNC: 134 MMOL/L (ref 135–146)
URATE SERPL-MCNC: 7.2 MG/DL (ref 4–8)
WBC # BLD AUTO: 10.2 X10*3/UL (ref 4.4–11.3)

## 2025-07-09 PROCEDURE — 73110 X-RAY EXAM OF WRIST: CPT | Mod: LT

## 2025-07-09 PROCEDURE — 12001 RPR S/N/AX/GEN/TRNK 2.5CM/<: CPT | Performed by: NURSE PRACTITIONER

## 2025-07-09 PROCEDURE — 1036F TOBACCO NON-USER: CPT | Performed by: PHYSICIAN ASSISTANT

## 2025-07-09 PROCEDURE — 71045 X-RAY EXAM CHEST 1 VIEW: CPT | Performed by: RADIOLOGY

## 2025-07-09 PROCEDURE — 1159F MED LIST DOCD IN RCRD: CPT | Performed by: PHYSICIAN ASSISTANT

## 2025-07-09 PROCEDURE — 82077 ASSAY SPEC XCP UR&BREATH IA: CPT | Performed by: EMERGENCY MEDICINE

## 2025-07-09 PROCEDURE — 72125 CT NECK SPINE W/O DYE: CPT

## 2025-07-09 PROCEDURE — 83880 ASSAY OF NATRIURETIC PEPTIDE: CPT | Performed by: EMERGENCY MEDICINE

## 2025-07-09 PROCEDURE — 6360000002 HC RX 636 FACTOR: Mod: JZ | Performed by: EMERGENCY MEDICINE

## 2025-07-09 PROCEDURE — G0390 TRAUMA RESPONS W/HOSP CRITI: HCPCS

## 2025-07-09 PROCEDURE — 72128 CT CHEST SPINE W/O DYE: CPT | Performed by: RADIOLOGY

## 2025-07-09 PROCEDURE — 74177 CT ABD & PELVIS W/CONTRAST: CPT

## 2025-07-09 PROCEDURE — G8427 DOCREV CUR MEDS BY ELIG CLIN: HCPCS | Performed by: PHYSICIAN ASSISTANT

## 2025-07-09 PROCEDURE — 96374 THER/PROPH/DIAG INJ IV PUSH: CPT | Mod: 59

## 2025-07-09 PROCEDURE — 99212 OFFICE O/P EST SF 10 MIN: CPT | Performed by: PHYSICIAN ASSISTANT

## 2025-07-09 PROCEDURE — 83605 ASSAY OF LACTIC ACID: CPT | Performed by: EMERGENCY MEDICINE

## 2025-07-09 PROCEDURE — 80053 COMPREHEN METABOLIC PANEL: CPT | Performed by: EMERGENCY MEDICINE

## 2025-07-09 PROCEDURE — 70450 CT HEAD/BRAIN W/O DYE: CPT | Performed by: RADIOLOGY

## 2025-07-09 PROCEDURE — 74177 CT ABD & PELVIS W/CONTRAST: CPT | Performed by: RADIOLOGY

## 2025-07-09 PROCEDURE — 2500000005 HC RX 250 GENERAL PHARMACY W/O HCPCS: Performed by: EMERGENCY MEDICINE

## 2025-07-09 PROCEDURE — 70450 CT HEAD/BRAIN W/O DYE: CPT

## 2025-07-09 PROCEDURE — 2500000004 HC RX 250 GENERAL PHARMACY W/ HCPCS (ALT 636 FOR OP/ED): Performed by: NURSE PRACTITIONER

## 2025-07-09 PROCEDURE — 3078F DIAST BP <80 MM HG: CPT | Performed by: PHYSICIAN ASSISTANT

## 2025-07-09 PROCEDURE — 3074F SYST BP LT 130 MM HG: CPT | Performed by: PHYSICIAN ASSISTANT

## 2025-07-09 PROCEDURE — 73130 X-RAY EXAM OF HAND: CPT | Mod: LT

## 2025-07-09 PROCEDURE — 1160F RVW MEDS BY RX/DR IN RCRD: CPT | Performed by: PHYSICIAN ASSISTANT

## 2025-07-09 PROCEDURE — 99213 OFFICE O/P EST LOW 20 MIN: CPT | Performed by: PHYSICIAN ASSISTANT

## 2025-07-09 PROCEDURE — 85025 COMPLETE CBC W/AUTO DIFF WBC: CPT | Performed by: EMERGENCY MEDICINE

## 2025-07-09 PROCEDURE — 84484 ASSAY OF TROPONIN QUANT: CPT | Performed by: EMERGENCY MEDICINE

## 2025-07-09 PROCEDURE — 71260 CT THORAX DX C+: CPT | Performed by: RADIOLOGY

## 2025-07-09 PROCEDURE — 1123F ACP DISCUSS/DSCN MKR DOCD: CPT | Performed by: PHYSICIAN ASSISTANT

## 2025-07-09 PROCEDURE — 86923 COMPATIBILITY TEST ELECTRIC: CPT

## 2025-07-09 PROCEDURE — P9016 RBC LEUKOCYTES REDUCED: HCPCS

## 2025-07-09 PROCEDURE — 99291 CRITICAL CARE FIRST HOUR: CPT | Performed by: EMERGENCY MEDICINE

## 2025-07-09 PROCEDURE — 36415 COLL VENOUS BLD VENIPUNCTURE: CPT | Performed by: EMERGENCY MEDICINE

## 2025-07-09 PROCEDURE — 72131 CT LUMBAR SPINE W/O DYE: CPT | Performed by: RADIOLOGY

## 2025-07-09 PROCEDURE — 71045 X-RAY EXAM CHEST 1 VIEW: CPT

## 2025-07-09 PROCEDURE — 86901 BLOOD TYPING SEROLOGIC RH(D): CPT | Performed by: EMERGENCY MEDICINE

## 2025-07-09 PROCEDURE — G8420 CALC BMI NORM PARAMETERS: HCPCS | Performed by: PHYSICIAN ASSISTANT

## 2025-07-09 PROCEDURE — 36430 TRANSFUSION BLD/BLD COMPNT: CPT | Mod: 59

## 2025-07-09 PROCEDURE — 2550000001 HC RX 255 CONTRASTS: Performed by: EMERGENCY MEDICINE

## 2025-07-09 PROCEDURE — 72125 CT NECK SPINE W/O DYE: CPT | Performed by: RADIOLOGY

## 2025-07-09 PROCEDURE — 85610 PROTHROMBIN TIME: CPT | Performed by: EMERGENCY MEDICINE

## 2025-07-09 PROCEDURE — 73130 X-RAY EXAM OF HAND: CPT | Mod: LEFT SIDE | Performed by: RADIOLOGY

## 2025-07-09 PROCEDURE — 73110 X-RAY EXAM OF WRIST: CPT | Mod: LEFT SIDE | Performed by: RADIOLOGY

## 2025-07-09 RX ORDER — LIDOCAINE HYDROCHLORIDE AND EPINEPHRINE 10; 10 UG/ML; MG/ML
5 INJECTION, SOLUTION INFILTRATION; PERINEURAL ONCE
Status: COMPLETED | OUTPATIENT
Start: 2025-07-09 | End: 2025-07-09

## 2025-07-09 RX ORDER — BACLOFEN 10 MG/1
10 TABLET ORAL 2 TIMES DAILY
Qty: 60 TABLET | Refills: 1 | Status: SHIPPED | OUTPATIENT
Start: 2025-07-09 | End: 2025-08-08

## 2025-07-09 RX ORDER — OXYCODONE AND ACETAMINOPHEN 5; 325 MG/1; MG/1
1 TABLET ORAL 4 TIMES DAILY PRN
Qty: 120 TABLET | Refills: 0 | Status: SHIPPED | OUTPATIENT
Start: 2025-07-09 | End: 2025-08-08

## 2025-07-09 RX ADMIN — IOHEXOL 100 ML: 350 INJECTION, SOLUTION INTRAVENOUS at 22:17

## 2025-07-09 RX ADMIN — LIDOCAINE HYDROCHLORIDE,EPINEPHRINE BITARTRATE 5 ML: 10; .01 INJECTION, SOLUTION INFILTRATION; PERINEURAL at 22:39

## 2025-07-09 RX ADMIN — Medication 2076 UNITS: at 23:39

## 2025-07-09 ASSESSMENT — LIFESTYLE VARIABLES
TOTAL SCORE: 0
EVER HAD A DRINK FIRST THING IN THE MORNING TO STEADY YOUR NERVES TO GET RID OF A HANGOVER: NO
EVER FELT BAD OR GUILTY ABOUT YOUR DRINKING: NO
HAVE YOU EVER FELT YOU SHOULD CUT DOWN ON YOUR DRINKING: NO
HAVE PEOPLE ANNOYED YOU BY CRITICIZING YOUR DRINKING: NO

## 2025-07-09 ASSESSMENT — PAIN DESCRIPTION - PAIN TYPE: TYPE: ACUTE PAIN

## 2025-07-09 ASSESSMENT — PAIN - FUNCTIONAL ASSESSMENT: PAIN_FUNCTIONAL_ASSESSMENT: 0-10

## 2025-07-09 ASSESSMENT — PAIN SCALES - GENERAL: PAINLEVEL_OUTOF10: 10 - WORST POSSIBLE PAIN

## 2025-07-09 ASSESSMENT — PAIN DESCRIPTION - LOCATION: LOCATION: SHOULDER

## 2025-07-09 NOTE — PROGRESS NOTES
Amy Hardy presents to the [facility] on 7/9/2025. Amy is complaining of pain in lower back. The pain is constant. The pain is described as aching, stabbing, and sharp. Pain is rated on his best day at a 5, on his worst day at a 10, and on average at a 7 on the VAS scale. He took his last dose of oxycodone today.     Any procedures since your last visit: No    Pacemaker or defibrillator: Yes    He is not on NSAIDS and is  on anticoagulation medications to include Eliquis and is managed by Dr. Dexter.       Medication Contract and Consent for Opioid Use Documents Filed       Patient Documents       Type of Document Status Date Received Received By Description    Medication Contract Received 5/24/2021  9:00 AM PANCHO ORELLANA Medication Contract    Medication Contract Received 5/17/2022  3:39 PM [facility]    Medication Contract Received 4/26/2023  4:43 PM AMY ROBLES medication contract    Consent Opioid Use Received 6/12/2024  1:47 PM [facility]                    /69   Pulse 82   Temp 97.5 °F (36.4 °C) (Infrared)   Resp 16   Ht 1.651 m (5' 5\")   Wt 67.6 kg (149 lb)   SpO2 90%   BMI 24.79 kg/m²      No LMP for male patient.    
Patient Unable To Answer (9/10/2024)    Received from [facility]    Humiliation, Afraid, Rape, and Kick questionnaire     Fear of Current or Ex-Partner: Patient unable to answer     Emotionally Abused: Patient unable to answer     Physically Abused: Patient unable to answer     Sexually Abused: Patient unable to answer   Housing Stability: Patient Unable To Answer (9/10/2024)    Received from [facility]    Housing Stability Vital Sign     Unable to Pay for Housing in the Last Year: Patient unable to answer     Number of Times Moved in the Last Year: 1     Homeless in the Last Year: Patient unable to answer       Family History   Problem Relation Age of Onset    Heart Disease Mother     No Known Problems Father     Prostate Cancer Brother         2011    Cancer Other     Diabetes Other     High Blood Pressure Other     Stroke Other     Tuberculosis Other        REVIEW OF SYSTEMS:     Geoff denies fever/chills, chest pain, shortness of breath, new bowel or bladder complaints. All other review of systems was negative.    PHYSICAL EXAMINATION:      /69   Pulse 82   Temp 97.5 °F (36.4 °C) (Infrared)   Resp 16   Ht 1.651 m (5' 5\")   Wt 67.6 kg (149 lb)   SpO2 90%   BMI 24.79 kg/m²     General:      General appearance:   pleasant and well-hydrated.   , in mild discomfort and A & O x3  Build:Normal Weight    HEENT:    Head:normocephalic and atraumatic  Sclera: icterus absent,    Lungs:    Breathing:Normal expansion.  No wheezing.    Abdomen:    Shape:non-distended and normal    Lumbar spine:    Range of motion:abnormal moderately Lateral bending, flexion, extension rotation bilateral and is painful.    Extremities:    Tremors:None bilaterally upper and lower  Range of motion:Generally limited extension shoulder - right, pain with internal rotation of hips not done.  Intact:Yes  Edema:Normal      Neurological:    Gait:antalgic    Dermatology:    Skin:no unusual

## 2025-07-10 VITALS
HEART RATE: 75 BPM | OXYGEN SATURATION: 96 % | TEMPERATURE: 96.8 F | SYSTOLIC BLOOD PRESSURE: 149 MMHG | WEIGHT: 160 LBS | RESPIRATION RATE: 16 BRPM | DIASTOLIC BLOOD PRESSURE: 78 MMHG | BODY MASS INDEX: 27.31 KG/M2 | HEIGHT: 64 IN

## 2025-07-10 DIAGNOSIS — I50.22 CHRONIC SYSTOLIC HEART FAILURE: Primary | ICD-10-CM

## 2025-07-10 DIAGNOSIS — I50.32 CHRONIC DIASTOLIC CONGESTIVE HEART FAILURE: ICD-10-CM

## 2025-07-10 PROCEDURE — 2500000004 HC RX 250 GENERAL PHARMACY W/ HCPCS (ALT 636 FOR OP/ED): Performed by: EMERGENCY MEDICINE

## 2025-07-10 PROCEDURE — 96375 TX/PRO/DX INJ NEW DRUG ADDON: CPT

## 2025-07-10 RX ORDER — FENTANYL CITRATE 50 UG/ML
25 INJECTION, SOLUTION INTRAMUSCULAR; INTRAVENOUS ONCE
Status: COMPLETED | OUTPATIENT
Start: 2025-07-10 | End: 2025-07-10

## 2025-07-10 RX ORDER — BUMETANIDE 1 MG/1
1 TABLET ORAL DAILY
Qty: 60 TABLET | Refills: 3 | Status: SHIPPED | OUTPATIENT
Start: 2025-07-10 | End: 2026-07-10

## 2025-07-10 RX ADMIN — FENTANYL CITRATE 25 MCG: 50 INJECTION INTRAMUSCULAR; INTRAVENOUS at 01:01

## 2025-07-10 NOTE — ED PROCEDURE NOTE
Procedure  Laceration Repair    Performed by: ANISHA Hutchinson  Authorized by: Ivy Germain DO    Consent:     Consent obtained:  Verbal    Consent given by:  Patient    Risks, benefits, and alternatives were discussed: yes      Risks discussed:  Infection, need for additional repair, nerve damage, poor wound healing, poor cosmetic result, pain, tendon damage, vascular damage and retained foreign body    Alternatives discussed:  No treatment  Universal protocol:     Procedure explained and questions answered to patient or proxy's satisfaction: yes      Relevant documents present and verified: yes      Test results available: yes      Imaging studies available: yes      Required blood products, implants, devices, and special equipment available: yes      Site/side marked: yes      Immediately prior to procedure, a time out was called: yes      Patient identity confirmed:  Verbally with patient and arm band  Anesthesia:     Anesthesia method:  Local infiltration    Local anesthetic:  Lidocaine 1% WITH epi  Laceration details:     Location:  Scalp    Scalp location:  Frontal    Length (cm):  0.5  Exploration:     Hemostasis achieved with:  Epinephrine and direct pressure    Wound exploration: wound explored through full range of motion and entire depth of wound visualized      Contaminated: no    Treatment:     Area cleansed with:  Vickie-Sukh    Amount of cleaning:  Standard    Irrigation solution:  Sterile water    Irrigation method:  Syringe    Debridement:  None    Undermining:  None    Scar revision: no    Skin repair:     Repair method:  Tissue adhesive  Repair type:     Repair type:  Simple  Post-procedure details:     Dressing:  Antibiotic ointment and non-adherent dressing    Procedure completion:  Tolerated well, no immediate complications  Comments:      Multiple Surface abrasions + hematoma               ANISHA Hutchinson  07/09/25 2185

## 2025-07-10 NOTE — ED PROVIDER NOTES
LIMITED TRAUMA ACTIVATION    Jony Javier is a 80 y.o. patient presenting as a limited trauma activation.    Patient presenting as a limited trauma activation after fall down 3 stairs.  He states he lost his balance and fell backwards.  He was able to turn landing on his left arm and striking his forehead on the concrete ground.  He does report taking Eliquis daily as prescribed.  Reports compliance with other prescribed medications as well.    EXAM:  _________________________________________________________________________  PRIMARY SURVEY:  A- intact  B- full b/l breath sounds  C- full and equal pulses x 4 extremities  D- movement x 4 extremities, gross sensation intact x4 extremities  E- exposed, rolled maintaining spine precautions, then covered with warm blankets    Chest x-ray is interpreted by me does not show any evidence of pneumothorax or consolidation.  Pelvic x-ray not indicated as the patient is hemodynamically stable and this would delay definitive care.  FAST exam is not indicated as the patient is hemodynamically stable and this would delay definitive care.    SECONDARY SURVEY:  Gen - Patient is awake and alert.  HEENT - No Valverde’s sign or raccoon eyes.  A cervical collar is in place.  Chest - Breathing is nonlabored without paradoxical chest wall motion. No crepitus. Contusions over the left lateral chest wall.  Abd - Soft without significant tenderness or contusions.  Ext -swelling to the left hand and wrist.  Neuro - Patient answers questions appropriately.  There is no facial asymmetry. Patient moves all uninjured extremities equally.   Skin - Skin is warm and dry.  __________________________________________________________________________  MEDICAL DECISION MAKING/ ED COURSE:   Patient was seen and examined on arrival.   Trauma surgeon: Dr. Bae     Patient is hemodynamically stable presenting after fall.  CT pan scan was ordered.  Chest x-ray does not show any pneumothorax.        ED  Course as of 07/10/25 0524   Wed Jul 09, 2025   2253 Patient is anemic with Hgb of 5.9. Most recent previous is 11.6, 10 months ago. I discussed this with the patient and his wife. He has required blood transfusion in the past, but no cause of anemia was found. He does consent to transfusion today.  [SP]   2310 Discussed CT head with radiologist. Patient has a small SAH.  Patient's last dose of Eliquis was approximately 1 PM this afternoon.  He has not missed any doses recently.  As such PCC was ordered. [SP]   2321 CT C-spine is negative for any acute fracture. [SP]   2321 CT of the chest, abdomen, pelvis by my interpretation does not show any evidence of significant bleeding  to explain the patient's anemia.  Pending radiology read. [SP]   2337 CT of the chest, abdomen, pelvis, T-spine, L-spine is without traumatic injuries identified. [SP]   2337 X-rays of the left wrist and hand are without fracture by my interpretation.  Confirmed by radiology. [SP]   2337 Discussed the case with Dr. Bae, on-call trauma surgeon.  She is in agreement with workup and transfer.  Dr. Ambrocio, trauma surgeon at [facility] accepted the patient in transfer. [SP]      ED Course User Index  [SP] Ivy Germain DO         Diagnoses as of 07/10/25 0524   Fall, initial encounter   SAH (subarachnoid hemorrhage) (Multi)   Anticoagulated   Hematoma of scalp, initial encounter       Ivy Germain DO  Emergency Medicine    Critical Care    Performed by: Ivy Germain DO  Authorized by: Ivy Germain DO    Critical care provider statement:     Critical care time (minutes):  45    Critical care time was exclusive of:  Separately billable procedures and treating other patients and teaching time    Critical care was necessary to treat or prevent imminent or life-threatening deterioration of the following conditions:  Trauma    Critical care was time spent personally by me on the following activities:  Development of treatment plan with patient or surrogate,  discussions with consultants, evaluation of patient's response to treatment, examination of patient, obtaining history from patient or surrogate, ordering and performing treatments and interventions, ordering and review of laboratory studies, ordering and review of radiographic studies, pulse oximetry and re-evaluation of patient's condition    Care discussed with: accepting provider at another facility           Ivy Germain DO  07/10/25 0580

## 2025-07-10 NOTE — ED TRIAGE NOTES
Pt arrived via EMS from home for a fall. Per EMS pt fell from 3-4 stairs on the patio. +loc +hit head +thinners. Pt is A+Ox4 on arrival. Pt complains of head pain and right shoulder pain. Pt also presents with hematoma on right side of head and new bruising on left side of chest and arm/hand. LMT @4095

## 2025-07-11 ENCOUNTER — TELEPHONE (OUTPATIENT)
Dept: CARDIOLOGY | Facility: HOSPITAL | Age: 80
End: 2025-07-11
Payer: COMMERCIAL

## 2025-07-11 LAB
BLOOD EXPIRATION DATE: NORMAL
BLOOD EXPIRATION DATE: NORMAL
DISPENSE STATUS: NORMAL
DISPENSE STATUS: NORMAL
PRODUCT BLOOD TYPE: 5100
PRODUCT BLOOD TYPE: 5100
PRODUCT CODE: NORMAL
PRODUCT CODE: NORMAL
UNIT ABO: NORMAL
UNIT ABO: NORMAL
UNIT NUMBER: NORMAL
UNIT NUMBER: NORMAL
UNIT RH: NORMAL
UNIT RH: NORMAL
UNIT VOLUME: 284
UNIT VOLUME: 350
XM INTEP: NORMAL
XM INTEP: NORMAL

## 2025-07-11 NOTE — TELEPHONE ENCOUNTER
----- Message from Montserrat Wesley sent at 7/10/2025  5:31 PM EDT -----  Regarding: Call patient  Please call patient with lab and change to Bumex /follow up lab work thanks.      Please resume taking the Bumex 1 tablet every day.   Get BMP lab rechecked in 1 month.  Please follow the sodium and fluid restrictions closely. cardiomems is also rising so this would indicate fluid congestion.     I called Sally (Spouse) and gave the message above. Jony had a fall a couple of days ago. He hit his head and is now hospitalized at [facility]. Sally said Jony received a unit of blood and is feeling better. She thinks that he will be discharged home soon.     Jony is encouraged to call back once he is home and schedule a post hospitalization follow up in the [place] [facility].

## 2025-07-17 ENCOUNTER — APPOINTMENT (OUTPATIENT)
Dept: PRIMARY CARE | Facility: CLINIC | Age: 80
End: 2025-07-17
Payer: COMMERCIAL

## 2025-07-28 ENCOUNTER — APPOINTMENT (OUTPATIENT)
Dept: CARDIOLOGY | Facility: HOSPITAL | Age: 80
End: 2025-07-28
Payer: MEDICARE

## 2025-08-01 ENCOUNTER — OFFICE VISIT (OUTPATIENT)
Dept: CARDIOLOGY | Facility: HOSPITAL | Age: 80
End: 2025-08-01
Payer: MEDICARE

## 2025-08-01 VITALS
DIASTOLIC BLOOD PRESSURE: 56 MMHG | RESPIRATION RATE: 24 BRPM | SYSTOLIC BLOOD PRESSURE: 94 MMHG | HEART RATE: 60 BPM | WEIGHT: 144.4 LBS | BODY MASS INDEX: 24.79 KG/M2 | OXYGEN SATURATION: 94 %

## 2025-08-01 DIAGNOSIS — I10 HYPERTENSION, UNSPECIFIED TYPE: ICD-10-CM

## 2025-08-01 DIAGNOSIS — I50.42 CHRONIC COMBINED SYSTOLIC AND DIASTOLIC HEART FAILURE, NYHA CLASS 3: Primary | ICD-10-CM

## 2025-08-01 DIAGNOSIS — I48.19 PERSISTENT ATRIAL FIBRILLATION (MULTI): ICD-10-CM

## 2025-08-01 PROCEDURE — 1160F RVW MEDS BY RX/DR IN RCRD: CPT | Performed by: NURSE PRACTITIONER

## 2025-08-01 PROCEDURE — 3078F DIAST BP <80 MM HG: CPT | Performed by: NURSE PRACTITIONER

## 2025-08-01 PROCEDURE — 1159F MED LIST DOCD IN RCRD: CPT | Performed by: NURSE PRACTITIONER

## 2025-08-01 PROCEDURE — 3074F SYST BP LT 130 MM HG: CPT | Performed by: NURSE PRACTITIONER

## 2025-08-01 PROCEDURE — 99212 OFFICE O/P EST SF 10 MIN: CPT

## 2025-08-01 PROCEDURE — 99213 OFFICE O/P EST LOW 20 MIN: CPT | Performed by: NURSE PRACTITIONER

## 2025-08-01 ASSESSMENT — ENCOUNTER SYMPTOMS
CLAUDICATION: 0
FATIGUE: 0
NEAR-SYNCOPE: 0
LOSS OF SENSATION IN FEET: 0
ABDOMINAL PAIN: 0
CHEST PRESSURE: 0
UNEXPECTED WEIGHT CHANGE: 0
PALPITATIONS: 0
OCCASIONAL FEELINGS OF UNSTEADINESS: 1
DEPRESSION: 0
SHORTNESS OF BREATH: 1
EDEMA: 0
ORTHOPNEA: 0

## 2025-08-01 NOTE — PATIENT INSTRUCTIONS
GLP-1 Agonists Antihyperglycemics Refill Protocol - 12 Month Protocol Passed       Pt last seen by PCP on: 12/5/2024 (Virtual)   Pt was advised to return in: None  Pt has next appt scheduled: 4/8/2025    Medication: Mounjaro passed protocol.   Next appointment scheduled?: Yes     Refilled until seen per PCP.           Thank you for coming in today.  If you have any questions you may contact the office Monday through Friday at [phone] or on week ends at [phone].      Continue current medications.     Lab work in 4 weeks.       Please follow  a 2 GM sodium diet and limit fluid intake to 2 liters per day or 8 servings ( serving size = 8 oz. = 1 cup = 240 ml) per day.   Please avoid processed meat products (luncheon meats, sausages, gonsales, hot dogs for example) eat 4 servings of vegetables and 1-2 whole servings of whole fruits per day.   Please weigh daily and call [phone] for weight gain of 3 pounds in 24 hours or 5 pounds or if you experience increased swelling or shortness of breath.         Follow up:  6 weeks.     Please be sure to follow up with your cardiologist at [facility] once every year. Call [phone] to schedule appointment if you do not have a follow up appointment scheduled already.    59

## 2025-08-01 NOTE — PROGRESS NOTES
Subjective   Patient ID: Jony Javier is a 80 y.o. male who presents for follow-up of congestive heart failure.   Current Medications[1]     PMH: Past medical history consists significant for history of heart failure reduced ejection fraction. He initially his ejection fraction was around 40%. He has a history of coronary artery disease, persistent atrial fibrillation, GERD, history of carotid artery stenosis and CVA in the past. He has CRT-D in place. He has not had any defibrillations from device. He also has CardioMEMS in place and his readings have been consistently within desired parameters. Last heart cath was in 2020.  Recent echocardiogram shows EF 50-55%     Congestive Heart Failure  Presents for follow-up visit. Associated symptoms include shortness of breath. Pertinent negatives include no abdominal pain, chest pain, chest pressure, claudication, edema, fatigue, muscle weakness (Ambulates with walker), near-syncope, orthopnea, palpitations, paroxysmal nocturnal dyspnea or unexpected weight change. The symptoms have been stable. Compliance with total regimen is %. Compliance with diet is %. Compliance with medications is %.        Recent hospitalization s/p fall with subdural hematoma/hemorrhage for which he was transferred to [facility].  Just released from rehab 3 days ago.  OAC on hold at this time.     Review of Systems   Constitutional:  Negative for fatigue and unexpected weight change.   Respiratory:  Positive for shortness of breath.    Cardiovascular:  Negative for chest pain, palpitations, claudication and near-syncope.   Gastrointestinal:  Negative for abdominal pain.   Musculoskeletal:  Negative for muscle weakness (Ambulates with walker).       Objective     BP 94/56 (BP Location: Left arm, Patient Position: Sitting, BP Cuff Size: Adult)   Pulse 60   Resp 24   Wt 65.5 kg (144 lb 6.4 oz)   SpO2 94%   BMI 24.79 kg/m²         Transthoracic Echo Complete  Result Date:  6/13/2025              [place] [id number]      Phone [phone] Fax [phone] TRANSTHORACIC ECHOCARDIOGRAM REPORT Patient Name:       KARAN Paige Physician:    [id number] Garcia MD Study Date:         6/13/2025           Ordering Provider:    [id number] MARTHA CARDENAS MRN/PID:            [id number]            Fellow: Accession#:         [id number]        Nurse: Date of Birth/Age:  1945 / 80      Sonographer:          Eileen Nava RDCS                     years Gender Assigned at                     Additional Staff: Birth: Height:             162.56 cm           Admit Date: Weight:             68.95 kg            Admission Status:     Outpatient BSA / BMI:          1.74 m2 / 26.09     Department Location:  [facility] Echo                     kg/m2                                     Lab Blood Pressure: 104 /48 mmHg Study Type:    TRANSTHORACIC ECHO (TTE) COMPLETE Diagnosis/ICD: Nonrheumatic aortic (valve) stenosis-I35.0; Acute combined                systolic (congestive) and diastolic (congestive) heart failure                (CHF)-I50.41 Indication:    Congestive Heart Failure, Non rheumatic Aortic Stenosis CPT Codes:     Echo Complete w Full [id number] Patient History: Diabetes:          Yes Pacer/Defib:       AICD/Perment pacemaker Pertinent History: Murmur, CAD, CHF, HTN, Hyperlipidemia, Previous SVT and CVA. Study Detail: The following Echo studies were performed: 2D, M-Mode, Doppler and               color flow.  PHYSICIAN INTERPRETATION: Left Ventricle: The left ventricular systolic function is low normal with a visually estimated ejection fraction of 50-55%. There is moderate concentric left ventricular hypertrophy. There are no regional wall motion abnormalities. The left ventricular cavity size is  normal. There is mild increased septal and mildly increased posterior left ventricular wall thickness. Left ventricular diastolic filling was indeterminate. Hypo inferolateral, anterolateral and inferior segments. Left Atrium: The left atrial size is mildly dilated. Mild increase in LA volume. Right Ventricle: The right ventricle is normal in size. There is normal right ventricular global systolic function. A device is visualized in the right ventricle. Right Atrium: The right atrial size is normal. There is a device visualized in the right atrium. Aortic Valve: The aortic valve was not well visualized. The aortic valve area by VTI is 1.11 cmï¿½ with a peak velocity of 2.51 m/s. The peak and mean gradients are 23 mmHg and 10 mmHg, respectively, with a dimensionless index of 0.31. There is moderate aortic valve cusp calcification. There is moderate aortic valve thickening. There is evidence of moderate aortic valve stenosis. There is mild aortic valve regurgitation. Mitral Valve: The mitral valve is mildly thickened. There is mild to moderate mitral annular calcification. There is moderate mitral valve regurgitation which is posteriorly directed. The E Vmax is 0.97 m/s. The mitral regurgitant orifice area is 11 mm2. The mitral regurgitant volume is 18.26 ml. Tricuspid Valve: The tricuspid valve is structurally normal. There is moderate tricuspid regurgitation. The Doppler estimated right ventricular systolic pressure (RVSP) is mildly elevated at 44 mmHg. Pulmonic Valve: The pulmonic valve is structurally normal. There is trace pulmonic valve regurgitation. Pericardium: Trivial pericardial effusion. There is a pericardial fat pad present. Aorta: The aortic root is normal. Systemic Veins: The inferior vena cava appears normal in size, with IVC inspiratory collapse greater than 50%.  CONCLUSIONS:  1. The left ventricular systolic function is low normal with a visually estimated ejection fraction of 50-55%.  2. Left  ventricular diastolic filling was indeterminate.  3. There is normal right ventricular global systolic function.  4. Moderate mitral valve regurgitation.  5. Moderate tricuspid regurgitation.  6. The Doppler estimated RVSP is mildly elevated at 44 mmHg.  7. There is moderate aortic valve cusp calcification.  8. Mild aortic valve regurgitation.  9. There is moderate concentric left ventricular hypertrophy. 10. Moderate aortic valve stenosis. The peak and mean gradients are 25 mmHg and 10 mmHg respectively. QUANTITATIVE DATA SUMMARY:  2D MEASUREMENTS:             Normal Ranges: IVSd:            1.22 cm     (0.6-1.1cm) LVPWd:           1.26 cm     (0.6-1.1cm) LVIDd:           4.89 cm     (3.9-5.9cm) LVIDs:           3.50 cm LV Mass Index:   135.4 g/m2 LVEDV Index:     64.75 ml/m2 LV % FS          28.4 %  LEFT ATRIUM:                  Normal Ranges: LA Vol A4C:        60.2 ml    (22+/-6mL/m2) LA Vol A2C:        63.0 ml LA Vol BP:         63.3 ml LA Vol Index A4C:  34.6ml/m2 LA Vol Index A2C:  36.2 ml/m2 LA Vol Index BP:   36.4 ml/m2 LA Area A4C:       19.0 cm2 LA Area A2C:       20.0 cm2 LA Major Axis A4C: 5.1 cm LA Major Axis A2C: 5.4 cm LA Volume Index:   37.0 ml/m2 LA Vol A4C:        61.1 ml LA Vol A2C:        64.3 ml LA Vol Index BSA:  36.0 ml/m2  RIGHT ATRIUM:          Normal Ranges: RA Area A4C:  16.0 cm2  AORTA MEASUREMENTS:         Normal Ranges: Ao Sinus, d:        3.50 cm (2.1-3.5cm) Asc Ao, d:          3.20 cm (2.1-3.4cm)  LV SYSTOLIC FUNCTION:                      Normal Ranges: EF-A4C View:    57 % (>=55%) EF-A2C View:    33 % EF-Biplane:     48 % EF-Visual:      53 % LV EF Reported: 53 %  LV DIASTOLIC FUNCTION:           Normal Ranges: MV Peak E:             0.97 m/s  (0.7-1.2 m/s) MV Peak A:             0.64 m/s  (0.42-0.7 m/s) E/A Ratio:             1.51      (1.0-2.2) MV e'                  0.088 m/s (>8.0) MV lateral e'          0.09 m/s MV medial e'           0.08 m/s E/e' Ratio:            11.06      (<8.0) LV IVRT:               76 msec   (<100ms)  MITRAL VALVE:          Normal Ranges: MV DT:        175 msec (150-240msec)  MITRAL INSUFFICIENCY:             Normal Ranges: PISA Radius:          0.5 cm MR VTI:               165.55 cm MR Vmax:              485.09 cm/s MR Alias Tam:         41.2 cm/s MR Volume:            18.26 ml MR Flow Rt:           53.52 ml/s MR EROA:              11 mm2  AORTIC VALVE:                      Normal Ranges: AoV Vmax:                2.51 m/s  (<=1.7m/s) AoV Peak P.2 mmHg (<20mmHg) AoV Mean PG:             10.3 mmHg (1.7-11.5mmHg) LVOT Max Tam:            0.71 m/s  (<=1.1m/s) AoV VTI:                 50.51 cm  (18-25cm) LVOT VTI:                15.55 cm LVOT Diameter:           2.29 cm   (1.8-2.4cm) AoV Area, VTI:           1.11 cm2  (2.5-5.5cm2) AoV Area,Vmax:           1.02 cm2  (2.5-4.5cm2) AoV Dimensionless Index: 0.31  AORTIC INSUFFICIENCY: AI Vmax:       3.21 m/s AI Half-time:  612 msec AI Decel Time: 2112 msec AI Decel Rate: 157.20 cm/s2  RIGHT VENTRICLE: RV Basal 3.50 cm RV Mid   2.50 cm RV Major 8.0 cm TAPSE:   16.9 mm RV s'    0.12 m/s  TRICUSPID VALVE/RVSP:          Normal Ranges: Peak TR Velocity:     3.19 m/s Est. RA Pressure:     3 mmHg RV Syst Pressure:     44 mmHg  (< 30mmHg) IVC Diam:             1.49 cm  PULMONIC VALVE:          Normal Ranges: PV Max Tam:     0.8 m/s  (0.6-0.9m/s) PV Max P.6 mmHg  AORTA: Asc Ao Diam 3.15 cm  [id number] Malcolm Garcia MD Electronically signed on 2025 at 5:20:47 PM  ** Final **     Transthoracic echo (TTE) complete  Result Date: 2024              [place] [id number]      Phone [phone] Fax [phone] TRANSTHORACIC ECHOCARDIOGRAM REPORT Patient Name:      KARAN Paige Physician:    [id number] Narendra Portillo MD Study Date:        2024             Ordering Provider:    [id number]  DOROTHY PADILLAAUGHLIN MRN/PID:           [id number]             Fellow: Accession#:        [id number]         Nurse:                Maye Dong RN Date of Birth/Age: 1945 / 79 years Sonographer:          Brenna Yusuf RDCS Gender:            M                    Additional Staff: Height:            162.56 cm            Admit Date: Weight:            73.03 kg             Admission Status:     Outpatient BSA / BMI:         1.78 m2 / 27.64      Department Location:  [facility] Echo                    kg/m2                                      Lab Blood Pressure: 92 /75 mmHg Study Type:    TRANSTHORACIC ECHO (TTE) COMPLETE Diagnosis/ICD: Atherosclerotic heart disease of native coronary artery with                angina pectoris with documented spasm-I25.111; Acute combined                systolic (congestive) and diastolic (congestive) heart failure                (CHF)-I50.41 Indication:    CHF, CAD CPT Codes:     Echo Complete w Full Doppler-[id number] Patient History: Pertinent History: CAD and CHF. Study Detail: The following Echo studies were performed: 2D, M-Mode, Doppler and               color flow. Technically challenging study due to prominent lung               artifact and pacer wires. Optison used as a contrast agent for               endocardial border definition.  PHYSICIAN INTERPRETATION: Left Ventricle: Left ventricular ejection fraction is mildly decreased, by visual estimate at 45-50%. Wall motion is abnormal. The left ventricular cavity size is normal. Spectral Doppler shows a pseudonormal pattern of left ventricular diastolic filling. Unable to measure 2D Plax d/t poor image quality. Akinetic inferior wall. Left Atrium: The left atrium is moderately dilated. Right Ventricle: The right ventricle is normal in size. There is normal right ventricular global systolic function. A  device is visualized in the right ventricle. Right Atrium: The right atrium is normal in size. Aortic Valve: The aortic valve was not well visualized. There is evidence of mild to moderate aortic valve stenosis. The aortic valve dimensionless index is 0.38. There is mild aortic valve regurgitation. The peak instantaneous gradient of the aortic valve is 27.3 mmHg. The mean gradient of the aortic valve is 14.4 mmHg. Mitral Valve: The mitral valve is mild to moderately thickened. There is mild to moderate mitral annular calcification. There is mild to moderate mitral valve regurgitation. Tricuspid Valve: The tricuspid valve is structurally normal. There is mild to moderate tricuspid regurgitation. Pulmonic Valve: The pulmonic valve is not well visualized. There is trace to mild pulmonic valve regurgitation. Pericardium: There is no pericardial effusion noted. Aorta: The aortic root is normal.  CONCLUSIONS:  1. Left ventricular ejection fraction is mildly decreased, by visual estimate at 45-50%.  2. Spectral Doppler shows a pseudonormal pattern of left ventricular diastolic filling.  3. Unable to measure 2D Plax d/t poor image quality.     Akinetic inferior wall.  4. There is normal right ventricular global systolic function.  5. The left atrium is moderately dilated.  6. Mild to moderate mitral valve regurgitation.  7. Mild to moderate tricuspid regurgitation.  8. Mild to moderate aortic valve stenosis.  9. Mild aortic valve regurgitation. QUANTITATIVE DATA SUMMARY: LA VOLUME:                               Normal Ranges: LA Vol A4C:        81.1 ml    (22+/-6mL/m2) LA Vol A2C:        67.0 ml LA Vol BP:         75.4 ml LA Vol Index A4C:  45.5ml/m2 LA Vol Index A2C:  37.5 ml/m2 LA Vol Index BP:   42.3 ml/m2 LA Area A4C:       24.9 cm2 LA Area A2C:       22.1 cm2 LA Major Axis A4C: 6.5 cm LA Major Axis A2C: 6.2 cm LA Vol A4C:        77.7 ml LA Vol A2C:        64.2 ml RA VOLUME BY A/L METHOD:                       Normal  Ranges: RA Area A4C: 19.5 cm2 AORTA MEASUREMENTS:                      Normal Ranges: Ao Sinus, d: 2.70 cm (2.1-3.5cm) Ao STJ, d:   2.10 cm (1.7-3.4cm) Asc Ao, d:   2.80 cm (2.1-3.4cm) LV SYSTOLIC FUNCTION BY 2D PLANIMETRY (MOD):                      Normal Ranges: EF-A4C View:    52 % (>=55%) EF-A2C View:    50 % EF-Biplane:     50 % EF-Visual:      48 % LV EF Reported: 48 % LV DIASTOLIC FUNCTION:                         Normal Ranges: MV Peak E:    1.18 m/s  (0.7-1.2 m/s) MV Peak A:    0.83 m/s  (0.42-0.7 m/s) E/A Ratio:    1.43      (1.0-2.2) MV e'         0.081 m/s (>8.0) MV lateral e' 0.08 m/s MV medial e'  0.08 m/s E/e' Ratio:   14.65     (<8.0) MITRAL VALVE:                 Normal Ranges: MV DT: 250 msec (150-240msec) MITRAL INSUFFICIENCY:                           Normal Ranges: PISA Radius:  0.6 cm MR VTI:       149.60 cm MR Vmax:      474.99 cm/s MR Alias Tam: 32.8 cm/s MR Volume:    23.52 ml MR Flow Rt:   74.68 ml/s MR EROA:      0.16 cm2 AORTIC VALVE:                                    Normal Ranges: AoV Vmax:                2.61 m/s  (<=1.7m/s) AoV Peak P.3 mmHg (<20mmHg) AoV Mean P.4 mmHg (1.7-11.5mmHg) LVOT Max Tam:            0.89 m/s  (<=1.1m/s) AoV VTI:                 52.09 cm  (18-25cm) LVOT VTI:                20.03 cm LVOT Diameter:           2.02 cm   (1.8-2.4cm) AoV Area, VTI:           1.23 cm2  (2.5-5.5cm2) AoV Area,Vmax:           1.10 cm2  (2.5-4.5cm2) AoV Dimensionless Index: 0.38 AORTIC INSUFFICIENCY: AI Vmax:       3.17 m/s AI Half-time:  448 msec AI Decel Time: 1546 msec AI Decel Rate: 205.78 cm/s2  RIGHT VENTRICLE: RV Basal 3.68 cm RV Mid   2.60 cm RV Major 8.0 cm TAPSE:   19.1 mm RV s'    0.10 m/s TRICUSPID VALVE/RVSP:                             Normal Ranges: Peak TR Velocity: 2.70 m/s RV Syst Pressure: 32.1 mmHg (< 30mmHg) IVC Diam:         2.08 cm AORTA: Asc Ao Diam 2.77 cm  [id number] Narendra Portillo MD Electronically signed on 2024 at  5:34:21 PM  ** Final **        Lab Results   Component Value Date    BUN 28 (H) 07/09/2025    BUN 29 (H) 07/08/2025    CREATININE 1.65 (H) 07/09/2025    CREATININE 1.61 (H) 07/08/2025     (H) 07/09/2025     (H) 07/08/2025    MG 2.1 05/12/2025    K 4.7 07/09/2025    K 4.6 07/08/2025     (L) 07/09/2025     (L) 07/08/2025       Constitutional:       General:  NAD   HENT:      Head: Normocephalic     Mouth: Mucous membranes are moist.      Neck:  No JVD or HJR   Eyes:      Conjunctiva/sclera: Conjunctivae normal.    Cardiovascular:      Rate and Rhythm: Normal rate and regular rhythm       Heart sounds:  S1 S2 normal, no murmur, no S3 or S4   Pulmonary:      Pulmonary effort is normal.  Normal breath sounds  No wheezing or rales.   Abdominal:      General: Bowel sounds are normal, non- tender to palpitations.   Musculoskeletal:         General: No edema.  KHALIL well.   Skin:     General: Skin is warm and dry   Neurological:      General: No focal deficit present.      Mental Status: alert and oriented to person, place, and time. Mental status is at baseline.     Psychiatric:         Mood and Affect: Normal Affect.     Assessment/Plan     Problem List Items Addressed This Visit       HTN (hypertension)    Persistent atrial fibrillation (Multi)     Other Visit Diagnoses         Chronic combined systolic and diastolic heart failure, NYHA class 3               Chronic systolic diastolic heart failure:   Etiology   AHA Stage: C  NYHA class:  2-3  - CPAP a night   Volume Status:  trace edema.   GFR: 39     GDMT:  BB- Carvedilol 25 mg 1 tablet twice a day   ARB/ACEI/ARNI - Entresto 49/51 mg 1 tablet twice a day   MRA -  on hold due to high potassium.   SGLT2i -  Jardiance 10 mg once a day   Diuretic - Bumex 1 mg  5 days per week.   Device Therapy: CRT-D /Cardiomems readings are elevated.       CHF:   Compensated on exam.   Will continue current medications without change.   Emphasized salt restriction.  2 liter fluid restriction.   Encouraged daily monitoring of the patient's weight.  Encouraged regular exercise as tolerated.      2. Atrial fibrillation :   RRR today.   He has been off the Eliquis due to subarachnoid hemorrhage following fall on 7/9/25.   He is due for repeat CT on 8/11/25 with follow up with neurology.  Will hold the Eliquis for now until cleared by neurology to restart the OAC.       3. CKD3:  GFR 53.7 ml/min       4. ASHD:  No chest pain,  continue secondary prevention medications.  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 Aortic stenosis: murmur noted.   Mean gradient is 10 mm hg.  Moderate MR noted.         Montserrat Wesley, APRN-CNP       [1]   Current Outpatient Medications:     albuterol 90 mcg/actuation inhaler, INHALE 1 (ONE) PUFF EVERY 4 HOURS AS NEEDED, Disp: 8.5 g, Rfl: 8    atorvastatin (Lipitor) 80 mg tablet, Take 1 tablet (80 mg) by mouth once daily at bedtime., Disp: 90 tablet, Rfl: 3    baclofen (Lioresal) 10 mg tablet, Take 1 tablet (10 mg) by mouth 2 times a day as needed., Disp: , Rfl:     blood sugar diagnostic (True Metrix Glucose Test Strip), USE AS DIRECTED to test BLOOD SUGAR THREE TIMES DAILY, Disp: 300 strip, Rfl: 3    bumetanide (Bumex) 1 mg tablet, Take 1 tablet (1 mg) by mouth once daily. 1 tablet 5 days per week., Disp: 60 tablet, Rfl: 3    carvedilol (Coreg) 25 mg tablet, Take 1 tablet (25 mg) by mouth 2 times a day., Disp: 180 tablet, Rfl: 3    clobetasol (Temovate) 0.05 % external solution, APPLY TO THE AFFECTED AREA(S) EVERY DAY AS NEEDED, Disp: , Rfl:     diazePAM (Valium) 5 mg tablet, Take 1 tablet (5 mg) by mouth every 8 hours if needed for anxiety. No further refills unless the patient is seen within the next 30 days, Disp: 90 tablet, Rfl: 0    empagliflozin (Jardiance) 25 mg tablet, Take 1 tablet (25 mg) by mouth once daily., Disp: 100 tablet, Rfl: 3    Entresto 49-51 mg tablet, Take 1 tablet by mouth 2 times a day., Disp: 180 tablet,  Rfl: 3    EPINEPHrine 0.3 mg/0.3 mL injection syringe, Inject 0.3 mL (0.3 mg) into the muscle if needed for anaphylaxis for up to 1 dose., Disp: 1 each, Rfl: 3    ezetimibe (Zetia) 10 mg tablet, Take 1 tablet (10 mg) by mouth once daily., Disp: 90 tablet, Rfl: 3    ferrous gluconate 270 mg (27 mg iron) tablet, Take 1 tablet by mouth once daily., Disp: , Rfl:     lidocaine (Xylocaine) 5 % ointment, APPLY TO THE AFFECTED AREA(S) AS NEEDED FOR PAIN to last 30 days, Disp: , Rfl:     melatonin 3 mg tablet, Take 1 tablet (3 mg) by mouth as needed at bedtime for sleep., Disp: , Rfl:     mirtazapine (Remeron) 30 mg tablet, Take 1 tablet (30 mg) by mouth once daily at bedtime., Disp: , Rfl:     multivitamin tablet, Take 1 tablet by mouth once daily., Disp: , Rfl:     nystatin (Mycostatin) 100,000 unit/gram powder, Apply 1 Application topically 2 times a day., Disp: 60 g, Rfl: 2    ondansetron ODT (Zofran-ODT) 4 mg disintegrating tablet, Take 1 tablet (4 mg) by mouth every 8 hours if needed for nausea or vomiting., Disp: 12 tablet, Rfl: 0    OneTouch Delica Plus Lancet 33 gauge misc, Test THREE TIMES DAILY AS DIRECTED, Disp: , Rfl:     oxyCODONE-acetaminophen (Percocet) 7.5-325 mg tablet, , Disp: , Rfl:     pantoprazole (ProtoNix) 40 mg EC tablet, TAKE 1 TABLET BY MOUTH EVERY DAY, Disp: 90 tablet, Rfl: 3    tadalafil 20 mg tablet, Take 1 tablet (20 mg) by mouth once daily as needed for erectile dysfunction., Disp: 90 tablet, Rfl: 3    tamsulosin (Flomax) 0.4 mg 24 hr capsule, Take 1 capsule (0.4 mg) by mouth once daily in the morning., Disp: 90 capsule, Rfl: 1    testosterone 20.25 mg/1.25 gram (1.62 %) gel in metered-dose pump, Apply 2 Actuations topically daily. Use on hairless area like upper arm or shoulder., Disp: , Rfl:     venlafaxine XR (Effexor-XR) 150 mg 24 hr capsule, TAKE 1 CAPSULE BY MOUTH AFTER BREAKFAST, Disp: , Rfl:     apixaban (Eliquis) 2.5 mg tablet, Take 1 tablet (2.5 mg) by mouth 2 times a day. (Patient  not taking: Reported on 8/1/2025), Disp: 180 tablet, Rfl: 3    colchicine 0.6 mg tablet, Take 1 tablet (0.6 mg) by mouth 2 times a day as needed for muscle/joint pain (acute gout). (Patient not taking: Reported on 8/1/2025), Disp: 60 tablet, Rfl: 0    febuxostat (Uloric) 40 mg tablet, Take 1 tablet (40 mg) by mouth once daily. (Patient not taking: Reported on 8/1/2025), Disp: , Rfl:     oxyCODONE-acetaminophen (Percocet) 5-325 mg tablet, 1 tablet every 6 hours if needed., Disp: , Rfl:

## 2025-08-08 ENCOUNTER — OFFICE VISIT (OUTPATIENT)
Age: 80
End: 2025-08-08
Payer: MEDICARE

## 2025-08-08 VITALS
RESPIRATION RATE: 16 BRPM | SYSTOLIC BLOOD PRESSURE: 98 MMHG | BODY MASS INDEX: 25.44 KG/M2 | OXYGEN SATURATION: 95 % | WEIGHT: 149 LBS | HEIGHT: 64 IN | TEMPERATURE: 98.3 F | HEART RATE: 89 BPM | DIASTOLIC BLOOD PRESSURE: 72 MMHG

## 2025-08-08 DIAGNOSIS — G89.29 CHRONIC FLANK PAIN: ICD-10-CM

## 2025-08-08 DIAGNOSIS — Z79.891 ENCOUNTER FOR LONG-TERM OPIATE ANALGESIC USE: ICD-10-CM

## 2025-08-08 DIAGNOSIS — G89.4 CHRONIC PAIN SYNDROME: Primary | ICD-10-CM

## 2025-08-08 DIAGNOSIS — R10.9 CHRONIC FLANK PAIN: ICD-10-CM

## 2025-08-08 DIAGNOSIS — M79.10 MYALGIA: ICD-10-CM

## 2025-08-08 DIAGNOSIS — M51.369 DEGENERATION OF INTERVERTEBRAL DISC OF LUMBAR REGION, UNSPECIFIED WHETHER PAIN PRESENT: ICD-10-CM

## 2025-08-08 DIAGNOSIS — S33.5XXD LUMBAR SPRAIN, SUBSEQUENT ENCOUNTER: ICD-10-CM

## 2025-08-08 DIAGNOSIS — M51.379 DEGENERATION OF INTERVERTEBRAL DISC OF LUMBOSACRAL REGION, UNSPECIFIED WHETHER PAIN PRESENT: ICD-10-CM

## 2025-08-08 DIAGNOSIS — M51.26 DISPLACEMENT OF LUMBAR INTERVERTEBRAL DISC WITHOUT MYELOPATHY: ICD-10-CM

## 2025-08-08 DIAGNOSIS — S33.9XXA CHRONIC OR OLD SPRAIN OF LUMBOSACRAL LIGAMENT: ICD-10-CM

## 2025-08-08 PROCEDURE — 99212 OFFICE O/P EST SF 10 MIN: CPT | Performed by: PHYSICIAN ASSISTANT

## 2025-08-08 RX ORDER — OXYCODONE AND ACETAMINOPHEN 5; 325 MG/1; MG/1
1 TABLET ORAL 4 TIMES DAILY PRN
Qty: 120 TABLET | Refills: 0 | Status: SHIPPED | OUTPATIENT
Start: 2025-08-28 | End: 2025-09-27

## 2025-08-10 DIAGNOSIS — I50.32 CHRONIC DIASTOLIC CONGESTIVE HEART FAILURE: ICD-10-CM

## 2025-08-10 DIAGNOSIS — I50.22 CHRONIC SYSTOLIC HEART FAILURE: ICD-10-CM

## 2025-08-13 ENCOUNTER — APPOINTMENT (OUTPATIENT)
Dept: PRIMARY CARE | Facility: CLINIC | Age: 80
End: 2025-08-13
Payer: COMMERCIAL

## 2025-08-13 VITALS
HEART RATE: 61 BPM | BODY MASS INDEX: 25.13 KG/M2 | WEIGHT: 147.2 LBS | HEIGHT: 64 IN | RESPIRATION RATE: 20 BRPM | SYSTOLIC BLOOD PRESSURE: 102 MMHG | TEMPERATURE: 97 F | OXYGEN SATURATION: 94 % | DIASTOLIC BLOOD PRESSURE: 60 MMHG

## 2025-08-13 DIAGNOSIS — E11.8 TYPE 2 DIABETES MELLITUS WITH UNSPECIFIED COMPLICATIONS: ICD-10-CM

## 2025-08-13 DIAGNOSIS — E78.5 HYPERLIPIDEMIA, UNSPECIFIED HYPERLIPIDEMIA TYPE: ICD-10-CM

## 2025-08-13 DIAGNOSIS — I48.19 PERSISTENT ATRIAL FIBRILLATION (MULTI): ICD-10-CM

## 2025-08-13 DIAGNOSIS — N18.32 STAGE 3B CHRONIC KIDNEY DISEASE (MULTI): ICD-10-CM

## 2025-08-13 DIAGNOSIS — F41.9 ANXIETY DISORDER, UNSPECIFIED TYPE: ICD-10-CM

## 2025-08-13 DIAGNOSIS — S06.6X1D TRAUMATIC SUBARACHNOID HEMORRHAGE WITH LOSS OF CONSCIOUSNESS OF 30 MINUTES OR LESS, SUBSEQUENT ENCOUNTER: Primary | ICD-10-CM

## 2025-08-13 PROBLEM — S06.6X1A TRAUMATIC SUBARACHNOID HEMORRHAGE WITH LOSS OF CONSCIOUSNESS OF 30 MINUTES OR LESS: Status: ACTIVE | Noted: 2023-10-30

## 2025-08-13 PROCEDURE — 99214 OFFICE O/P EST MOD 30 MIN: CPT | Performed by: INTERNAL MEDICINE

## 2025-08-13 PROCEDURE — 1159F MED LIST DOCD IN RCRD: CPT | Performed by: INTERNAL MEDICINE

## 2025-08-13 PROCEDURE — 3074F SYST BP LT 130 MM HG: CPT | Performed by: INTERNAL MEDICINE

## 2025-08-13 PROCEDURE — 1125F AMNT PAIN NOTED PAIN PRSNT: CPT | Performed by: INTERNAL MEDICINE

## 2025-08-13 PROCEDURE — G2211 COMPLEX E/M VISIT ADD ON: HCPCS | Performed by: INTERNAL MEDICINE

## 2025-08-13 PROCEDURE — 3078F DIAST BP <80 MM HG: CPT | Performed by: INTERNAL MEDICINE

## 2025-08-13 RX ORDER — LACTULOSE 10 G/15ML
10 SOLUTION ORAL; RECTAL
COMMUNITY
Start: 2024-09-17

## 2025-08-13 RX ORDER — DIAZEPAM 5 MG/1
5 TABLET ORAL EVERY 8 HOURS PRN
Qty: 90 TABLET | Refills: 0 | Status: SHIPPED | OUTPATIENT
Start: 2025-08-13

## 2025-08-13 SDOH — ECONOMIC STABILITY: FOOD INSECURITY: WITHIN THE PAST 12 MONTHS, THE FOOD YOU BOUGHT JUST DIDN'T LAST AND YOU DIDN'T HAVE MONEY TO GET MORE.: NEVER TRUE

## 2025-08-13 SDOH — ECONOMIC STABILITY: FOOD INSECURITY: WITHIN THE PAST 12 MONTHS, YOU WORRIED THAT YOUR FOOD WOULD RUN OUT BEFORE YOU GOT MONEY TO BUY MORE.: NEVER TRUE

## 2025-08-13 ASSESSMENT — LIFESTYLE VARIABLES
HOW MANY STANDARD DRINKS CONTAINING ALCOHOL DO YOU HAVE ON A TYPICAL DAY: 1 OR 2
SKIP TO QUESTIONS 9-10: 1
HOW OFTEN DO YOU HAVE SIX OR MORE DRINKS ON ONE OCCASION: NEVER
AUDIT-C TOTAL SCORE: 4
HOW OFTEN DO YOU HAVE A DRINK CONTAINING ALCOHOL: 4 OR MORE TIMES A WEEK

## 2025-08-13 ASSESSMENT — ENCOUNTER SYMPTOMS
DEPRESSION: 0
LOSS OF SENSATION IN FEET: 0
OCCASIONAL FEELINGS OF UNSTEADINESS: 0

## 2025-08-13 ASSESSMENT — PAIN SCALES - GENERAL: PAINLEVEL_OUTOF10: 4

## 2025-08-15 ENCOUNTER — CLINICAL SUPPORT (OUTPATIENT)
Dept: PRIMARY CARE | Facility: CLINIC | Age: 80
End: 2025-08-15
Payer: COMMERCIAL

## 2025-08-15 DIAGNOSIS — H61.23 BILATERAL IMPACTED CERUMEN: ICD-10-CM

## 2025-08-15 PROCEDURE — 69210 REMOVE IMPACTED EAR WAX UNI: CPT | Performed by: INTERNAL MEDICINE

## 2025-08-28 DIAGNOSIS — I50.42 CHRONIC COMBINED SYSTOLIC AND DIASTOLIC HEART FAILURE, NYHA CLASS 3: ICD-10-CM

## 2025-08-28 RX ORDER — SACUBITRIL AND VALSARTAN 49; 51 MG/1; MG/1
1 TABLET, FILM COATED ORAL 2 TIMES DAILY
Qty: 180 TABLET | Refills: 1 | Status: SHIPPED | OUTPATIENT
Start: 2025-08-28

## 2025-09-02 ENCOUNTER — HOSPITAL ENCOUNTER (OUTPATIENT)
Dept: CARDIOLOGY | Facility: HOSPITAL | Age: 80
Discharge: HOME | End: 2025-09-02
Payer: COMMERCIAL

## 2025-09-02 DIAGNOSIS — I44.2 AV BLOCK, COMPLETE (MULTI): ICD-10-CM

## 2025-09-02 DIAGNOSIS — Z95.810 PRESENCE OF AUTOMATIC (IMPLANTABLE) CARDIAC DEFIBRILLATOR: ICD-10-CM

## 2025-09-02 DIAGNOSIS — Z95.810 PRESENCE OF AUTOMATIC (IMPLANTABLE) CARDIAC DEFIBRILLATOR: Primary | ICD-10-CM

## 2025-09-02 DIAGNOSIS — I47.29 OTHER VENTRICULAR TACHYCARDIA: ICD-10-CM

## 2025-09-04 ENCOUNTER — TELEPHONE (OUTPATIENT)
Dept: CARDIOLOGY | Facility: HOSPITAL | Age: 80
End: 2025-09-04
Payer: COMMERCIAL

## 2025-09-04 ENCOUNTER — APPOINTMENT (OUTPATIENT)
Dept: CARDIOLOGY | Facility: HOSPITAL | Age: 80
End: 2025-09-04
Payer: COMMERCIAL

## 2025-11-13 ENCOUNTER — APPOINTMENT (OUTPATIENT)
Dept: PRIMARY CARE | Facility: CLINIC | Age: 80
End: 2025-11-13
Payer: MEDICARE

## 2026-04-20 ENCOUNTER — APPOINTMENT (OUTPATIENT)
Dept: PRIMARY CARE | Facility: CLINIC | Age: 81
End: 2026-04-20
Payer: COMMERCIAL

## (undated) DEVICE — NEEDLE HYPO 18GA L1.5IN PNK POLYPR HUB S STL THN WALL FILL

## (undated) DEVICE — 12 ML SYRINGE,LUER-LOCK TIP: Brand: MONOJECT

## (undated) DEVICE — 3M™ RED DOT™ MONITORING ELECTRODE WITH FOAM TAPE AND STICKY GEL 2560, 50/BAG, 20/CASE, 72/PLT: Brand: RED DOT™

## (undated) DEVICE — BANDAGE ADH W1XL3IN NAT FAB WVN FLX DURABLE N ADH PD SEAL

## (undated) DEVICE — 6 ML SYRINGE LUER-LOCK TIP: Brand: MONOJECT

## (undated) DEVICE — NEEDLE HYPO 25GA L1.5IN BLU POLYPR HUB S STL REG BVL STR

## (undated) DEVICE — Z DISCONTINUED APPLICATOR SURG PREP 0.35OZ 2% CHG 70% ISO ALC W/ HI LT

## (undated) DEVICE — SYRINGE, LUER LOCK, 5ML: Brand: MEDLINE